# Patient Record
Sex: FEMALE | Race: BLACK OR AFRICAN AMERICAN | Employment: OTHER | URBAN - METROPOLITAN AREA
[De-identification: names, ages, dates, MRNs, and addresses within clinical notes are randomized per-mention and may not be internally consistent; named-entity substitution may affect disease eponyms.]

---

## 2017-04-12 ENCOUNTER — OFFICE VISIT (OUTPATIENT)
Dept: CARDIOLOGY CLINIC | Age: 72
End: 2017-04-12

## 2017-04-12 VITALS
SYSTOLIC BLOOD PRESSURE: 126 MMHG | DIASTOLIC BLOOD PRESSURE: 72 MMHG | RESPIRATION RATE: 18 BRPM | HEART RATE: 63 BPM | BODY MASS INDEX: 30.78 KG/M2 | OXYGEN SATURATION: 93 % | WEIGHT: 191.5 LBS | HEIGHT: 66 IN

## 2017-04-12 DIAGNOSIS — I71.00 DISSECTION OF AORTA, UNSPECIFIED PORTION OF AORTA (HCC): ICD-10-CM

## 2017-04-12 DIAGNOSIS — Z86.79 S/P ASCENDING AORTIC ANEURYSM REPAIR: ICD-10-CM

## 2017-04-12 DIAGNOSIS — R07.89 DISCOMFORT IN CHEST: ICD-10-CM

## 2017-04-12 DIAGNOSIS — R07.89 CHEST TIGHTNESS OR PRESSURE: Primary | ICD-10-CM

## 2017-04-12 DIAGNOSIS — R06.02 SOB (SHORTNESS OF BREATH) ON EXERTION: ICD-10-CM

## 2017-04-12 DIAGNOSIS — Z98.890 S/P ASCENDING AORTIC ANEURYSM REPAIR: ICD-10-CM

## 2017-04-12 NOTE — MR AVS SNAPSHOT
Visit Information Date & Time Provider Department Dept. Phone Encounter #  
 4/12/2017  2:30 PM Emma Andersen NP [facility] [phone] [id number] Upcoming Health Maintenance Date Due DTaP/Tdap/Td series (1 - Tdap) 7/23/1964 FOBT Q 1 YEAR AGE 50-75 7/23/1993 ZOSTER VACCINE AGE 60> 7/23/2003 GLAUCOMA SCREENING Q2Y 7/23/2008 OSTEOPOROSIS SCREENING (DEXA) 7/23/2008 MEDICARE YEARLY EXAM 7/23/2008 INFLUENZA AGE 9 TO ADULT 8/1/2016 Pneumococcal 65+ Low/Medium Risk (2 of 2 - PCV13) 1/8/2017 BREAST CANCER SCRN MAMMOGRAM 4/21/2018 Allergies as of 4/12/2017  Review Complete On: 4/12/2017 By: Emma Andersen NP No Known Allergies Current Immunizations  Reviewed on 1/7/2016 Name Date Influenza Vaccine (Quad) PF 1/8/2016  9:27 AM  
 Pneumococcal Polysaccharide (PPSV-23) 1/8/2016  9:24 AM  
  
 Not reviewed this visit You Were Diagnosed With   
  
 Codes Comments Chest tightness or pressure    -  Primary ICD-10-CM: R07.89 ICD-9-CM: 786.59   
 SOB (shortness of breath) on exertion     ICD-10-CM: R06.02 
ICD-9-CM: 786.05 Dissection of aorta, unspecified portion of aorta (HCC)     ICD-10-CM: I71.00 ICD-9-CM: 441.00 S/P ascending aortic aneurysm repair     ICD-10-CM: Z98.890, Z86.79 
ICD-9-CM: V45.89 Discomfort in chest     ICD-10-CM: R07.89 ICD-9-CM: 786.59 Vitals BP Pulse Resp Height(growth percentile) Weight(growth percentile) SpO2  
 126/72 (BP 1 Location: Right arm, BP Patient Position: Sitting) 63 18 5' 6\" (1.676 m) 191 lb 8 oz (86.9 kg) 93% BMI OB Status Smoking Status 30.91 kg/m2 Hysterectomy Never Smoker Vitals History BMI and BSA Data Body Mass Index Body Surface Area 30.91 kg/m 2 2.01 m 2 Preferred Pharmacy Pharmacy Name Phone [phone] [place] [phone] Your Updated Medication List  
  
   
 This list is accurate as of: 4/12/17  3:17 PM.  Always use your most recent med list.  
  
  
  
  
 aspirin 81 mg tablet Take 81 mg by mouth daily. FISH OIL 1,000 mg Cap Generic drug:  omega-3 fatty acids-vitamin e Take 1 Cap by mouth three (3) times daily. losartan 50 mg tablet Commonly known as:  COZAAR  
take 1 tablet by mouth once daily  
  
 multivitamin tablet Commonly known as:  ONE A DAY Take 1 Tab by mouth daily. naproxen sodium 220 mg tablet Commonly known as:  NAPROSYN Take 220 mg by mouth two (2) times daily (with meals). PRAVACHOL 20 mg tablet Generic drug:  pravastatin Take 20 mg by mouth daily. STOOL SOFTENER PO Take 50 mg by mouth two (2) times a day. We Performed the Following AMB POC EKG ROUTINE W/ 12 LEADS, INTER & REP [91602 CPT(R)] To-Do List   
 04/13/2017 ECG:  STRESS TEST LEXISCAN/CARDIOLITE   
  
 04/19/2017 ECHO:  2D ECHO COMPLETE ADULT (TTE) W OR WO CONTR   
  
 04/19/2017 Imaging:  CTA CHEST W OR W WO CONT   
  
 04/26/2017 10:00 AM  
  Appointment with [facility] 4 at [address] [place] [address] ([phone]) Shower or bathe using soap and water. Do not use deodorant, powder, perfumes, or lotion the day of your exam.  If your prior mammograms were not performed at [facility] 6 please bring films with you or forward prior images 2 days before your procedure. Check in at registration 15min before your appointment time unless you were instructed to do otherwise. A script is not necessary, but if you have one, please bring it on the day of the mammogram or have it faxed to the department. [facility] [phone] [facility]  [phone] [facility] [address]  [phone] [facility] [phone] [facility] [address] [facility] [phone] Referral Information Referral ID Referred By Referred To  
  
 [id number] iPerce CROCKETT Not Available Visits Status Start Date End Date 1 New Request 4/12/17 4/12/18 If your referral has a status of pending review or denied, additional information will be sent to support the outcome of this decision. Introducing [facility]! [facility] introduces [employer] patient portal. Now you can access parts of your medical record, email your doctor's office, and request medication refills online. 1. In your internet browser, go to [email or web address] [email or web address] 2. Click on the First Time User? Click Here link in the Sign In box. You will see the New Member Sign Up page. 3. Enter your [employer] Access Code exactly as it appears below. You will not need to use this code after youve completed the sign-up process. If you do not sign up before the expiration date, you must request a new code. · [employer] Access Code: [email or web address] Expires: 7/11/2017  2:19 PM 
 
4. Enter the last four digits of your Social Security Number (xxxx) and Date of Birth (mm/dd/yyyy) as indicated and click Submit. You will be taken to the next sign-up page. 5. Create a [employer] ID. This will be your [employer] login ID and cannot be changed, so think of one that is secure and easy to remember. 6. Create a [employer] password. You can change your password at any time. 7. Enter your Password Reset Question and Answer. This can be used at a later time if you forget your password. 8. Enter your e-mail address. You will receive e-mail notification when new information is available in [address] 9. Click Sign Up. You can now view and download portions of your medical record. 10. Click the Download Summary menu link to download a portable copy of your medical information. If you have questions, please visit the Frequently Asked Questions section of the [employer] website. Remember, [employer] is NOT to be used for urgent needs. For medical emergencies, dial 911. Now available from your iPhone and Android! Please provide this summary of care documentation to your next provider. Your primary care clinician is listed as DEBORAH Jeffrey. If you have any questions after today's visit, please call [phone].

## 2017-04-12 NOTE — PROGRESS NOTES
Chief Complaint   Patient presents with    Shortness of Breath     pt c/o intermittent sob with exertion x 1 mo    Chest Pain     pt c/o intermittent chest pain with tingling sensation to upper back x 1 mo; denies any arm pain or jaw pain

## 2017-04-12 NOTE — PROGRESS NOTES
Kayode Oneil NP  Subjective: Ludmila Rouse is a 66 y.o. female is here for symptom based appt. The patient presents with intermittent substernal chest burning and discomfort with radiation to the upper back on and off for the last 1 month. She reports there are no exacerbating triggering or alleviating factors. She is also noted to be experiencing some mild dyspnea on exertion on her daily walks. Cardiac risk factors include female postmenopausal, hypertension and family history of heart disease. She has a known history of TAA dissection repair. Patient Active Problem List    Diagnosis Date Noted    Heart palpitations 03/08/2016    Swelling of both ankles 01/20/2016    Aortic dissection (HCC) 12/28/2015    S/P ascending aortic aneurysm repair 12/28/2015      Ignacio Castro MD  Past Medical History:   Diagnosis Date    Arthritis     lower back    Asthma     GERD (gastroesophageal reflux disease)     Hypertension     Other ill-defined conditions     high cholesterol    PUD (peptic ulcer disease)       Past Surgical History:   Procedure Laterality Date    HX GYN      hysterectomy    HX GYN      tubal ligation    WY COLONOSCOPY FLX DX W/COLLJ SPEC WHEN PFRMD  3/19/2012          No Known Allergies   Family History   Problem Relation Age of Onset    Cancer Father      colon cancer      Current Outpatient Prescriptions   Medication Sig    losartan (COZAAR) 50 mg tablet take 1 tablet by mouth once daily    DOCUSATE CALCIUM (STOOL SOFTENER PO) Take 50 mg by mouth two (2) times a day.  multivitamin (ONE A DAY) tablet Take 1 Tab by mouth daily.  naproxen sodium (NAPROSYN) 220 mg tablet Take 220 mg by mouth two (2) times daily (with meals).  pravastatin (PRAVACHOL) 20 mg tablet Take 20 mg by mouth daily.  aspirin 81 mg tablet Take 81 mg by mouth daily.  omega-3 fatty acids-vitamin e (FISH OIL) 1,000 mg Cap Take 1 Cap by mouth three (3) times daily.        No current facility-administered medications for this visit. Vitals:    04/12/17 1431 04/12/17 1439   BP: 128/82 126/72   Pulse: 63    Resp: 18    SpO2: 93%    Weight: 191 lb 8 oz (86.9 kg)    Height: 5' 6\" (1.676 m)      Social History     Social History    Marital status:      Spouse name: N/A    Number of children: N/A    Years of education: N/A     Occupational History    Not on file. Social History Main Topics    Smoking status: Never Smoker    Smokeless tobacco: Never Used    Alcohol use 0.0 oz/week     0 Standard drinks or equivalent per week      Comment: Rare wine    Drug use: No    Sexual activity: Not on file     Other Topics Concern    Not on file     Social History Narrative       I have reviewed the nurses notes, vitals, problem list, allergy list, medical history, family medical, social history and medications. Review of Symptoms:    General: Pt denies excessive weight gain or loss. Pt is able to conduct ADL's  HEENT: Denies blurred vision, headaches, epistaxis and difficulty swallowing. Respiratory: Denies shortness of breath, + MIMS, wheezing or stridor. Cardiovascular: + Chest pain, no palpitations, edema or PND  Gastrointestinal: Denies poor appetite, indigestion, abdominal pain or blood in stool  Musculoskeletal: Denies pain or swelling from muscles or joints  Neurologic: Denies tremor, paresthesias, or sensory motor disturbance  Skin: Denies rash, itching or texture change. Physical Exam:      General: Well developed, in no acute distress. HEENT: No carotid bruits, no JVD, trach is midline. Heart:  Normal S1/S2 negative S3 or S4. Regular, no murmur, gallop or rub.   Respiratory: Clear bilaterally x 4, no wheezing or rales  Abdomen:   Soft, non-tender, bowel sounds are active.   Extremities:  No edema, normal cap refill, no cyanosis. Neuro: A&Ox3, speech clear, gait stable. Skin: Skin color is normal. No rashes or lesions.  Non diaphoretic  Vascular: 2+ pulses symmetric in all extremities      Cardiographics    ECG: Sinus rhythm  Results for orders placed or performed during the hospital encounter of 03/21/16   EKG, 12 LEAD, INITIAL   Result Value Ref Range    Ventricular Rate 62 BPM    Atrial Rate 62 BPM    P-R Interval 212 ms    QRS Duration 90 ms    Q-T Interval 460 ms    QTC Calculation (Bezet) 466 ms    Calculated P Axis 44 degrees    Calculated R Axis -9 degrees    Calculated T Axis 1 degrees    Diagnosis       Sinus rhythm with 1st degree AV block  Abnormal P wave  Voltage criteria for left ventricular hypertrophy  Nonspecific T wave abnormality  When compared with ECG of 28-DEC-2015 07:08,  [place] interval has increased  The heart rate has decreased BY  40 BPM  Confirmed by Daljit Salas MD, Yuni Jones ([id number]) on 3/21/2016 4:01:53 PM     Results for orders placed or performed in visit on 02/22/16   CARDIAC HOLTER MONITOR, 24 HOURS    Narrative    ECG Monitor/24 hours, Complete    Reason for Holter Monitor   A-FIB    Heartbeat    Slowest 58  Average 74  Fastest  85      Results:   Underlying Rhythm: Normal sinus rhythm      Atrial Arrhythmias: premature atrial contractions; occasional,  atrial couplets and atrial triplets            AV Conduction: normal    Ventricular Arrhythmias: premature ventricular contractions;  occasional     ST Segment Analysis:normal     Symptom Correlation:  Arm pain does not correlate with any arrhythmias    Comment:   Sinus rhythm throughout     Noy Tyler MD, [facility], [facility]           Labs:  Lab Results   Component Value Date/Time    Sodium 143 03/21/2016 12:40 PM    Potassium 3.9 03/21/2016 12:40 PM    Chloride 108 03/21/2016 12:40 PM    CO2 29 03/21/2016 12:40 PM    Anion gap 6 03/21/2016 12:40 PM    Glucose 84 03/21/2016 12:40 PM    BUN 23 03/21/2016 12:40 PM    Creatinine 0.93 03/21/2016 12:40 PM    BUN/Creatinine ratio 25 03/21/2016 12:40 PM    GFR est AA >60 03/21/2016 12:40 PM    GFR est non-AA 59 03/21/2016 12:40 PM    Calcium 9.2 03/21/2016 12:40 PM    Bilirubin, total 0.2 03/21/2016 12:40 PM    AST (SGOT) 12 03/21/2016 12:40 PM    Alk. phosphatase 71 03/21/2016 12:40 PM    Protein, total 7.1 03/21/2016 12:40 PM    Albumin 3.6 03/21/2016 12:40 PM    Globulin 3.5 03/21/2016 12:40 PM    A-G Ratio 1.0 03/21/2016 12:40 PM    ALT (SGPT) 18 03/21/2016 12:40 PM      Lab Results   Component Value Date/Time    WBC 5.5 03/21/2016 12:40 PM    HGB 11.6 03/21/2016 12:40 PM    HCT 37.4 03/21/2016 12:40 PM    PLATELET 189 71/51/8926 12:40 PM    MCV 84.0 03/21/2016 12:40 PM    All Cardiac Markers in the last 24 hours:  No results found for: CPK, CKMMB, CKMB, RCK3, CKMBT, CKNDX, CKND1, DEEP, TROPT, TROIQ, GODFREY, TROPT, TNIPOC, BNP, BNPP              Assessment:     Assessment:         ICD-10-CM ICD-9-CM    1. Chest tightness or pressure R07.89 786.59 2D ECHO COMPLETE ADULT (TTE) W OR WO CONTR      STRESS TEST LEXISCAN/CARDIOLITE      CTA CHEST W OR W WO CONT   2. SOB (shortness of breath) on exertion R06.02 786.05 AMB POC EKG ROUTINE W/ 12 LEADS, INTER & REP      2D ECHO COMPLETE ADULT (TTE) W OR WO CONTR      STRESS TEST LEXISCAN/CARDIOLITE      CTA CHEST W OR W WO CONT   3. Dissection of aorta, unspecified portion of aorta (HCC) I71.00 441.00 2D ECHO COMPLETE ADULT (TTE) W OR WO CONTR      STRESS TEST LEXISCAN/CARDIOLITE      CTA CHEST W OR W WO CONT   4. S/P ascending aortic aneurysm repair T86.260 V45.89 2D ECHO COMPLETE ADULT (TTE) W OR WO CONTR    Z86.79  STRESS TEST LEXISCAN/CARDIOLITE      CTA CHEST W OR W WO CONT   5. Discomfort in chest R07.89 786.59 2D ECHO COMPLETE ADULT (TTE) W OR WO CONTR      STRESS TEST LEXISCAN/CARDIOLITE      CTA CHEST W OR W WO CONT       Orders Placed This Encounter    CTA CHEST W OR W WO CONT     Standing Status:   Future     Standing Expiration Date:   5/12/2018     Order Specific Question:   Is Patient Allergic to Contrast Dye?      Answer:   No    AMB POC EKG ROUTINE W/ 12 LEADS, INTER & REP     Order Specific Question:   Reason for Exam:     Answer:   Routine    LEXISCAN/CARDIOLITE, Clinic Performed     Standing Status:   Future     Standing Expiration Date:   10/12/2017     Order Specific Question:   Reason for Exam:     Answer:   Mims chest pain    2D ECHO COMPLETE ADULT (TTE) W OR WO CONTR     Standing Status:   Future     Standing Expiration Date:   10/12/2017     Order Specific Question:   Reason for Exam:     Answer:   MIMS     Order Specific Question:   Contrast Enhancement (Bubble Study, Definity, Optison) may be used if criteria listed in established evidence-based protocol has been identified. Answer:   Yes        Plan:     Patient is a 64-year-old female who presents with anterior chest discomfort and shortness of breath with increasing fatigue. She has a history of a ascending aortic aneurysm repair, no known history of coronary artery disease. She has not been screened previously for coronary artery disease. 1.  Chest pain/dyspnea on exertion: Will evaluate with echo and Lexiscan  2. History of ascending thoracic aortic aneurysm repair due for surveillance CT: Ordered and she will arrange follow-up with Dr. Rayne Carrasco for yearly visit  3. Hypertension controlled: Continue losartan  Follow-up when testing complete.       Lamine Conklin NP

## 2017-04-24 ENCOUNTER — TELEPHONE (OUTPATIENT)
Dept: CARDIOLOGY CLINIC | Age: 72
End: 2017-04-24

## 2017-04-24 ENCOUNTER — CLINICAL SUPPORT (OUTPATIENT)
Dept: CARDIOLOGY CLINIC | Age: 72
End: 2017-04-24

## 2017-04-24 DIAGNOSIS — I71.00 DISSECTION OF AORTA, UNSPECIFIED PORTION OF AORTA (HCC): ICD-10-CM

## 2017-04-24 DIAGNOSIS — R06.02 SOB (SHORTNESS OF BREATH) ON EXERTION: ICD-10-CM

## 2017-04-24 DIAGNOSIS — Z86.79 S/P ASCENDING AORTIC ANEURYSM REPAIR: ICD-10-CM

## 2017-04-24 DIAGNOSIS — Z98.890 S/P ASCENDING AORTIC ANEURYSM REPAIR: ICD-10-CM

## 2017-04-24 DIAGNOSIS — R07.89 DISCOMFORT IN CHEST: ICD-10-CM

## 2017-04-24 DIAGNOSIS — R07.89 CHEST TIGHTNESS OR PRESSURE: ICD-10-CM

## 2017-04-24 DIAGNOSIS — R93.1 ABNORMAL NUCLEAR CARDIAC IMAGING TEST: Primary | ICD-10-CM

## 2017-04-24 NOTE — TELEPHONE ENCOUNTER
Verified patient with two identifiers. Spoke with patient regarding STRESS test results. Maurice Cifuentes, please call pt to set up an appt with Dr. Ranjit Putnam. Thanks!

## 2017-04-24 NOTE — TELEPHONE ENCOUNTER
----- Message from Gissel Frances MD sent at 4/24/2017  1:17 PM EDT -----  Stress test abnormal, f/u with Dr. Tahir Jenkins to discuss.  thx.

## 2017-04-24 NOTE — TELEPHONE ENCOUNTER
----- Message from Von Spangler MD sent at 4/24/2017  1:17 PM EDT -----  Stress test abnormal, f/u with Dr. Tiffani Lincoln to discuss.  thx.

## 2017-04-26 ENCOUNTER — HOSPITAL ENCOUNTER (OUTPATIENT)
Dept: MAMMOGRAPHY | Age: 72
Discharge: HOME | End: 2017-04-26
Attending: INTERNAL MEDICINE
Payer: MEDICARE

## 2017-04-26 DIAGNOSIS — Z12.31 VISIT FOR SCREENING MAMMOGRAM: ICD-10-CM

## 2017-04-26 PROCEDURE — 77067 SCR MAMMO BI INCL CAD: CPT

## 2017-04-27 ENCOUNTER — TELEPHONE (OUTPATIENT)
Dept: CARDIOLOGY CLINIC | Age: 72
End: 2017-04-27

## 2017-04-27 NOTE — TELEPHONE ENCOUNTER
Notes Recorded by Malou Lombardo LPN on 8/02/5213 at 09:71 PM  Verified patient with two identifiers.  Spoke with patient informed her [facility] is unchanged from last year, has a leak in her mitral valve, for now will treat with keeping BP down.  I am aware of her abnormal stress test, she has an appt with Dr Iline Sandhoff to discuss treatment plan.  She verbalized understanding.

## 2017-04-27 NOTE — TELEPHONE ENCOUNTER
----- Message from Delfina Laguna NP sent at 4/26/2017 10:20 PM EDT -----  Jermaine Veliz: Please call patient her ECHO is unchanged from last year, has a leak in her mitral valve, for now will treat with keeping BP down. I am aware of her abnormal stress test, she has an appt with Dr Connie Natarajan to discuss treatment plan.   Thanks Matt Cho

## 2017-05-01 ENCOUNTER — OFFICE VISIT (OUTPATIENT)
Dept: CARDIOLOGY CLINIC | Age: 72
End: 2017-05-01

## 2017-05-01 VITALS
SYSTOLIC BLOOD PRESSURE: 124 MMHG | BODY MASS INDEX: 30.78 KG/M2 | RESPIRATION RATE: 18 BRPM | WEIGHT: 191.5 LBS | DIASTOLIC BLOOD PRESSURE: 84 MMHG | HEART RATE: 64 BPM | OXYGEN SATURATION: 91 % | HEIGHT: 66 IN

## 2017-05-01 DIAGNOSIS — Z86.79 S/P ASCENDING AORTIC ANEURYSM REPAIR: ICD-10-CM

## 2017-05-01 DIAGNOSIS — I20.8 STABLE ANGINA PECTORIS (HCC): Primary | ICD-10-CM

## 2017-05-01 DIAGNOSIS — Z98.890 S/P ASCENDING AORTIC ANEURYSM REPAIR: ICD-10-CM

## 2017-05-01 DIAGNOSIS — I71.019 DISSECTION OF THORACIC AORTA: ICD-10-CM

## 2017-05-01 RX ORDER — ISOSORBIDE MONONITRATE 30 MG/1
30 TABLET, EXTENDED RELEASE ORAL DAILY
Qty: 30 TAB | Refills: 12 | Status: SHIPPED | OUTPATIENT
Start: 2017-05-01 | End: 2017-05-09

## 2017-05-01 RX ORDER — METOPROLOL TARTRATE 25 MG/1
25 TABLET, FILM COATED ORAL 2 TIMES DAILY
Qty: 60 TAB | Refills: 12 | Status: SHIPPED | OUTPATIENT
Start: 2017-05-01 | End: 2017-07-31 | Stop reason: SDUPTHER

## 2017-05-01 NOTE — PROGRESS NOTES
Chief Complaint   Patient presents with    Results     here for test results and review-pt denies any cardiac symptoms

## 2017-05-01 NOTE — PROGRESS NOTES
Rayo Trujillo MD          NAME:  Regan Connelly   :   1945   MRN:   [id number]   PCP:  Africa Dennison MD           Subjective: The patient is a 66y.o. year old female  who returns for a routine follow-up after stress testing. Echo fine. Myoview shows reversible inf defect with high probability of CAD. Less CP but episodic dyspnea, fairly consistent with exertion    Past Medical History:   Diagnosis Date    Arthritis     lower back    Asthma     GERD (gastroesophageal reflux disease)     Hypertension     Other ill-defined conditions     high cholesterol    PUD (peptic ulcer disease)         ICD-10-CM ICD-9-CM    1. Stable angina pectoris (HCC) I20.8 413.9 CBC WITH AUTOMATED DIFF      METABOLIC PANEL, COMPREHENSIVE      PROTHROMBIN TIME + INR   2. S/P ascending aortic aneurysm repair Z98.890 V45.89 CBC WITH AUTOMATED DIFF    L32.44  METABOLIC PANEL, COMPREHENSIVE      PROTHROMBIN TIME + INR   3. Dissection of thoracic aorta (HCC) I71.01 441.01 CBC WITH AUTOMATED DIFF      METABOLIC PANEL, COMPREHENSIVE      PROTHROMBIN TIME + INR      Social History   Substance Use Topics    Smoking status: Never Smoker    Smokeless tobacco: Never Used    Alcohol use 0.0 oz/week     0 Standard drinks or equivalent per week      Comment: Rare wine      Family History   Problem Relation Age of Onset    Cancer Father      colon cancer        Review of Systems  Cardiovascular: Negative except as noted in HPI      Objective:       Vitals:    17 0941 17 0950   BP: 130/82 124/84   Pulse: 64    Resp: 18    SpO2: 91%    Weight: 191 lb 8 oz (86.9 kg)    Height: 5' 6\" (1.676 m)     Body mass index is 30.91 kg/(m^2). General PE  Mental Status - Alert. General Appearance - Not in acute distress. Chest and Lung Exam   Inspection: Accessory muscles - No use of accessory muscles in breathing.   Auscultation:   Breath sounds: - Normal.    Cardiovascular   Inspection: Jugular vein - Bilateral - Inspection Normal.  Palpation/Percussion:   Apical Impulse: - Normal.  Auscultation: Rhythm - Regular. Heart Sounds - S1 WNL and S2 WNL. No S3 or S4. Murmurs & Other Heart Sounds: Auscultation of the heart reveals - No Murmurs. Peripheral Vascular   Upper Extremity: Inspection - Bilateral - No Cyanotic nailbeds or Digital clubbing. Lower Extremity:   Palpation: Edema - Bilateral - No edema. Data Review:     EKG -  EKG: normal EKG, normal sinus rhythm, unchanged from previous tracings. LABS- @brieflabs@      Allergies reviewed  No Known Allergies    Medications reviewed  Current Outpatient Prescriptions   Medication Sig    metoprolol tartrate (LOPRESSOR) 25 mg tablet Take 1 Tab by mouth two (2) times a day.  isosorbide mononitrate ER (IMDUR) 30 mg tablet Take 1 Tab by mouth daily.  losartan (COZAAR) 50 mg tablet take 1 tablet by mouth once daily    DOCUSATE CALCIUM (STOOL SOFTENER PO) Take 50 mg by mouth two (2) times a day.  multivitamin (ONE A DAY) tablet Take 1 Tab by mouth daily.  naproxen sodium (NAPROSYN) 220 mg tablet Take 220 mg by mouth two (2) times daily (with meals).  pravastatin (PRAVACHOL) 20 mg tablet Take 20 mg by mouth daily.  aspirin 81 mg tablet Take 81 mg by mouth daily.  omega-3 fatty acids-vitamin e (FISH OIL) 1,000 mg Cap Take 1 Cap by mouth three (3) times daily. No current facility-administered medications for this visit. Assessment:       ICD-10-CM ICD-9-CM    1. Stable angina pectoris (HCC) I20.8 413.9 CBC WITH AUTOMATED DIFF      METABOLIC PANEL, COMPREHENSIVE      PROTHROMBIN TIME + INR   2. S/P ascending aortic aneurysm repair Z98.890 V45.89 CBC WITH AUTOMATED DIFF    K37.67  METABOLIC PANEL, COMPREHENSIVE      PROTHROMBIN TIME + INR   3.  Dissection of thoracic aorta (HCC) I71.01 441.01 CBC WITH AUTOMATED DIFF      METABOLIC PANEL, COMPREHENSIVE      PROTHROMBIN TIME + INR        Orders Placed This Encounter    CBC WITH AUTOMATED DIFF    METABOLIC PANEL, COMPREHENSIVE    PROTHROMBIN TIME + INR    metoprolol tartrate (LOPRESSOR) 25 mg tablet     Sig: Take 1 Tab by mouth two (2) times a day. Dispense:  60 Tab     Refill:  12    isosorbide mononitrate ER (IMDUR) 30 mg tablet     Sig: Take 1 Tab by mouth daily. Dispense:  30 Tab     Refill:  12       Plan:     MIMS and rare CP with high prob stress test for inf ischemia. Rec r radial cath. Due for f/u CTA re thoracic aneurysm repair and f/u with Dr Sera Esteban.     Gini Ibarra MD

## 2017-05-02 LAB
ALBUMIN SERPL-MCNC: 4.1 G/DL (ref 3.5–4.8)
ALBUMIN/GLOB SERPL: 1.6 {RATIO} (ref 1.2–2.2)
ALP SERPL-CCNC: 78 IU/L (ref 39–117)
ALT SERPL-CCNC: 13 IU/L (ref 0–32)
AST SERPL-CCNC: 15 IU/L (ref 0–40)
BASOPHILS # BLD AUTO: 0 X10E3/UL (ref 0–0.2)
BASOPHILS NFR BLD AUTO: 0 %
BILIRUB SERPL-MCNC: 0.4 MG/DL (ref 0–1.2)
BUN SERPL-MCNC: 22 MG/DL (ref 8–27)
BUN/CREAT SERPL: 31 (ref 12–28)
CALCIUM SERPL-MCNC: 9.7 MG/DL (ref 8.7–10.3)
CHLORIDE SERPL-SCNC: 105 MMOL/L (ref 96–106)
CO2 SERPL-SCNC: 24 MMOL/L (ref 18–29)
CREAT SERPL-MCNC: 0.71 MG/DL (ref 0.57–1)
EOSINOPHIL # BLD AUTO: 0.1 X10E3/UL (ref 0–0.4)
EOSINOPHIL NFR BLD AUTO: 3 %
ERYTHROCYTE [DISTWIDTH] IN BLOOD BY AUTOMATED COUNT: 14.8 % (ref 12.3–15.4)
GLOBULIN SER CALC-MCNC: 2.5 G/DL (ref 1.5–4.5)
GLUCOSE SERPL-MCNC: 90 MG/DL (ref 65–99)
HCT VFR BLD AUTO: 41.1 % (ref 34–46.6)
HGB BLD-MCNC: 13.1 G/DL (ref 11.1–15.9)
IMM GRANULOCYTES # BLD: 0 X10E3/UL (ref 0–0.1)
IMM GRANULOCYTES NFR BLD: 0 %
INR PPP: 1 (ref 0.8–1.2)
LYMPHOCYTES # BLD AUTO: 2 X10E3/UL (ref 0.7–3.1)
LYMPHOCYTES NFR BLD AUTO: 44 %
MCH RBC QN AUTO: 28.4 PG (ref 26.6–33)
MCHC RBC AUTO-ENTMCNC: 31.9 G/DL (ref 31.5–35.7)
MCV RBC AUTO: 89 FL (ref 79–97)
MONOCYTES # BLD AUTO: 0.5 X10E3/UL (ref 0.1–0.9)
MONOCYTES NFR BLD AUTO: 11 %
NEUTROPHILS # BLD AUTO: 1.9 X10E3/UL (ref 1.4–7)
NEUTROPHILS NFR BLD AUTO: 42 %
PLATELET # BLD AUTO: 183 X10E3/UL (ref 150–379)
POTASSIUM SERPL-SCNC: 4.2 MMOL/L (ref 3.5–5.2)
PROT SERPL-MCNC: 6.6 G/DL (ref 6–8.5)
PROTHROMBIN TIME: 10.6 SEC (ref 9.1–12)
RBC # BLD AUTO: 4.62 X10E6/UL (ref 3.77–5.28)
SODIUM SERPL-SCNC: 145 MMOL/L (ref 134–144)
WBC # BLD AUTO: 4.6 X10E3/UL (ref 3.4–10.8)

## 2017-05-08 ENCOUNTER — HOSPITAL ENCOUNTER (OUTPATIENT)
Dept: CT IMAGING | Age: 72
Discharge: HOME | End: 2017-05-08
Attending: NURSE PRACTITIONER
Payer: MEDICARE

## 2017-05-08 DIAGNOSIS — R07.89 CHEST TIGHTNESS OR PRESSURE: ICD-10-CM

## 2017-05-08 DIAGNOSIS — I71.00 DISSECTION OF AORTA, UNSPECIFIED PORTION OF AORTA (HCC): ICD-10-CM

## 2017-05-08 DIAGNOSIS — Z98.890 S/P ASCENDING AORTIC ANEURYSM REPAIR: ICD-10-CM

## 2017-05-08 DIAGNOSIS — R07.89 DISCOMFORT IN CHEST: ICD-10-CM

## 2017-05-08 DIAGNOSIS — Z86.79 S/P ASCENDING AORTIC ANEURYSM REPAIR: ICD-10-CM

## 2017-05-08 DIAGNOSIS — R06.02 SOB (SHORTNESS OF BREATH) ON EXERTION: ICD-10-CM

## 2017-05-08 PROCEDURE — 74011250636 HC RX REV CODE- 250/636: Performed by: NURSE PRACTITIONER

## 2017-05-08 PROCEDURE — 71275 CT ANGIOGRAPHY CHEST: CPT

## 2017-05-08 PROCEDURE — 74011636320 HC RX REV CODE- 636/320: Performed by: NURSE PRACTITIONER

## 2017-05-08 RX ORDER — SODIUM CHLORIDE 9 MG/ML
50 INJECTION, SOLUTION INTRAVENOUS
Status: COMPLETED | OUTPATIENT
Start: 2017-05-08 | End: 2017-05-08

## 2017-05-08 RX ORDER — SODIUM CHLORIDE 0.9 % (FLUSH) 0.9 %
10 SYRINGE (ML) INJECTION
Status: COMPLETED | OUTPATIENT
Start: 2017-05-08 | End: 2017-05-08

## 2017-05-08 RX ADMIN — SODIUM CHLORIDE 50 ML/HR: 900 INJECTION, SOLUTION INTRAVENOUS at 10:55

## 2017-05-08 RX ADMIN — IOPAMIDOL 100 ML: 755 INJECTION, SOLUTION INTRAVENOUS at 10:55

## 2017-05-08 RX ADMIN — Medication 10 ML: at 10:55

## 2017-05-09 ENCOUNTER — HOSPITAL ENCOUNTER (OUTPATIENT)
Dept: CARDIAC CATH/INVASIVE PROCEDURES | Age: 72
Discharge: HOME | End: 2017-05-09
Attending: INTERNAL MEDICINE | Admitting: INTERNAL MEDICINE
Payer: MEDICARE

## 2017-05-09 VITALS
OXYGEN SATURATION: 94 % | HEART RATE: 64 BPM | DIASTOLIC BLOOD PRESSURE: 70 MMHG | WEIGHT: 191 LBS | HEIGHT: 66 IN | BODY MASS INDEX: 30.7 KG/M2 | TEMPERATURE: 98.8 F | RESPIRATION RATE: 16 BRPM | SYSTOLIC BLOOD PRESSURE: 146 MMHG

## 2017-05-09 PROBLEM — Z98.890 S/P CARDIAC CATH: Status: ACTIVE | Noted: 2017-05-09

## 2017-05-09 PROCEDURE — C1894 INTRO/SHEATH, NON-LASER: HCPCS

## 2017-05-09 PROCEDURE — 74011636320 HC RX REV CODE- 636/320

## 2017-05-09 PROCEDURE — 74011250637 HC RX REV CODE- 250/637: Performed by: NURSE PRACTITIONER

## 2017-05-09 PROCEDURE — 77030015766

## 2017-05-09 PROCEDURE — 74011000250 HC RX REV CODE- 250

## 2017-05-09 PROCEDURE — 77030019698 HC SYR ANGI MDLON MRTM -A

## 2017-05-09 PROCEDURE — 77030008543 HC TBNG MON PRSS MRTM -A

## 2017-05-09 PROCEDURE — 77030019569 HC BND COMPR RAD TERU -B

## 2017-05-09 PROCEDURE — C1769 GUIDE WIRE: HCPCS

## 2017-05-09 PROCEDURE — 99152 MOD SED SAME PHYS/QHP 5/>YRS: CPT

## 2017-05-09 PROCEDURE — 77030010221 HC SPLNT WR POS TELE -B

## 2017-05-09 PROCEDURE — 74011250636 HC RX REV CODE- 250/636

## 2017-05-09 PROCEDURE — 74011250636 HC RX REV CODE- 250/636: Performed by: INTERNAL MEDICINE

## 2017-05-09 PROCEDURE — 77030030195 HC CATH ANGI DX PRF4 MRTM -A

## 2017-05-09 PROCEDURE — 77030004549 HC CATH ANGI DX PRF MRTM -A

## 2017-05-09 RX ORDER — HEPARIN SODIUM 200 [USP'U]/100ML
500 INJECTION, SOLUTION INTRAVENOUS ONCE
Status: COMPLETED | OUTPATIENT
Start: 2017-05-09 | End: 2017-05-09

## 2017-05-09 RX ORDER — METOPROLOL TARTRATE 25 MG/1
25 TABLET, FILM COATED ORAL 2 TIMES DAILY
Status: DISCONTINUED | OUTPATIENT
Start: 2017-05-09 | End: 2017-05-09 | Stop reason: HOSPADM

## 2017-05-09 RX ORDER — LIDOCAINE HYDROCHLORIDE 10 MG/ML
1-30 INJECTION, SOLUTION EPIDURAL; INFILTRATION; INTRACAUDAL; PERINEURAL
Status: DISCONTINUED | OUTPATIENT
Start: 2017-05-09 | End: 2017-05-09 | Stop reason: HOSPADM

## 2017-05-09 RX ORDER — VERAPAMIL HYDROCHLORIDE 2.5 MG/ML
INJECTION, SOLUTION INTRAVENOUS
Status: COMPLETED
Start: 2017-05-09 | End: 2017-05-09

## 2017-05-09 RX ORDER — LIDOCAINE HYDROCHLORIDE 10 MG/ML
INJECTION, SOLUTION EPIDURAL; INFILTRATION; INTRACAUDAL; PERINEURAL
Status: COMPLETED
Start: 2017-05-09 | End: 2017-05-09

## 2017-05-09 RX ORDER — ACETAMINOPHEN 325 MG/1
650 TABLET ORAL
Status: DISCONTINUED | OUTPATIENT
Start: 2017-05-09 | End: 2017-05-09 | Stop reason: HOSPADM

## 2017-05-09 RX ORDER — NALOXONE HYDROCHLORIDE 0.4 MG/ML
0.4 INJECTION, SOLUTION INTRAMUSCULAR; INTRAVENOUS; SUBCUTANEOUS AS NEEDED
Status: DISCONTINUED | OUTPATIENT
Start: 2017-05-09 | End: 2017-05-09 | Stop reason: HOSPADM

## 2017-05-09 RX ORDER — HEPARIN SODIUM 200 [USP'U]/100ML
INJECTION, SOLUTION INTRAVENOUS
Status: COMPLETED
Start: 2017-05-09 | End: 2017-05-09

## 2017-05-09 RX ORDER — MIDAZOLAM HYDROCHLORIDE 1 MG/ML
INJECTION, SOLUTION INTRAMUSCULAR; INTRAVENOUS
Status: COMPLETED
Start: 2017-05-09 | End: 2017-05-09

## 2017-05-09 RX ORDER — SODIUM CHLORIDE 0.9 % (FLUSH) 0.9 %
5-10 SYRINGE (ML) INJECTION AS NEEDED
Status: DISCONTINUED | OUTPATIENT
Start: 2017-05-09 | End: 2017-05-09 | Stop reason: HOSPADM

## 2017-05-09 RX ORDER — HEPARIN SODIUM 1000 [USP'U]/ML
1000-10000 INJECTION, SOLUTION INTRAVENOUS; SUBCUTANEOUS
Status: DISCONTINUED | OUTPATIENT
Start: 2017-05-09 | End: 2017-05-09 | Stop reason: HOSPADM

## 2017-05-09 RX ORDER — VERAPAMIL HYDROCHLORIDE 2.5 MG/ML
2.5 INJECTION, SOLUTION INTRAVENOUS ONCE
Status: COMPLETED | OUTPATIENT
Start: 2017-05-09 | End: 2017-05-09

## 2017-05-09 RX ORDER — SODIUM CHLORIDE 0.9 % (FLUSH) 0.9 %
5-10 SYRINGE (ML) INJECTION EVERY 8 HOURS
Status: DISCONTINUED | OUTPATIENT
Start: 2017-05-09 | End: 2017-05-09 | Stop reason: HOSPADM

## 2017-05-09 RX ORDER — FENTANYL CITRATE 50 UG/ML
25-50 INJECTION, SOLUTION INTRAMUSCULAR; INTRAVENOUS
Status: DISCONTINUED | OUTPATIENT
Start: 2017-05-09 | End: 2017-05-09 | Stop reason: HOSPADM

## 2017-05-09 RX ORDER — HEPARIN SODIUM 1000 [USP'U]/ML
INJECTION, SOLUTION INTRAVENOUS; SUBCUTANEOUS
Status: COMPLETED
Start: 2017-05-09 | End: 2017-05-09

## 2017-05-09 RX ORDER — FENTANYL CITRATE 50 UG/ML
INJECTION, SOLUTION INTRAMUSCULAR; INTRAVENOUS
Status: COMPLETED
Start: 2017-05-09 | End: 2017-05-09

## 2017-05-09 RX ORDER — MIDAZOLAM HYDROCHLORIDE 1 MG/ML
.5-2 INJECTION, SOLUTION INTRAMUSCULAR; INTRAVENOUS
Status: DISCONTINUED | OUTPATIENT
Start: 2017-05-09 | End: 2017-05-09 | Stop reason: HOSPADM

## 2017-05-09 RX ADMIN — HEPARIN SODIUM 1000 UNITS: 200 INJECTION, SOLUTION INTRAVENOUS at 15:35

## 2017-05-09 RX ADMIN — LIDOCAINE HYDROCHLORIDE 1 ML: 10 INJECTION, SOLUTION EPIDURAL; INFILTRATION; INTRACAUDAL; PERINEURAL at 15:44

## 2017-05-09 RX ADMIN — FENTANYL CITRATE 25 MCG: 50 INJECTION, SOLUTION INTRAMUSCULAR; INTRAVENOUS at 15:27

## 2017-05-09 RX ADMIN — NITROGLYCERIN 200 MCG: 5 INJECTION, SOLUTION INTRAVENOUS at 15:46

## 2017-05-09 RX ADMIN — MIDAZOLAM HYDROCHLORIDE 1 MG: 1 INJECTION, SOLUTION INTRAMUSCULAR; INTRAVENOUS at 15:27

## 2017-05-09 RX ADMIN — MIDAZOLAM HYDROCHLORIDE 1 MG: 1 INJECTION, SOLUTION INTRAMUSCULAR; INTRAVENOUS at 15:48

## 2017-05-09 RX ADMIN — VERAPAMIL HYDROCHLORIDE 2.5 MG: 2.5 INJECTION, SOLUTION INTRAVENOUS at 15:47

## 2017-05-09 RX ADMIN — VERAPAMIL HYDROCHLORIDE 2.5 MG: 2.5 INJECTION INTRAVENOUS at 15:47

## 2017-05-09 RX ADMIN — HEPARIN SODIUM 2500 UNITS: 1000 INJECTION, SOLUTION INTRAVENOUS; SUBCUTANEOUS at 15:47

## 2017-05-09 RX ADMIN — MIDAZOLAM HYDROCHLORIDE 1 MG: 1 INJECTION INTRAMUSCULAR; INTRAVENOUS at 15:27

## 2017-05-09 RX ADMIN — METOPROLOL TARTRATE 25 MG: 25 TABLET ORAL at 18:21

## 2017-05-09 NOTE — IP AVS SNAPSHOT
[address] [facility] 
[phone] Patient: Sona Daniels MRN: [id number] EXQ:1/30/0507 You are allergic to the following No active allergies Recent Documentation Height Weight Breastfeeding? BMI OB Status Smoking Status 1.676 m 86.6 kg No 30.83 kg/m2 Hysterectomy Never Smoker Emergency Contacts Name Discharge Info Relation Home Work Mobile Ramos Howard DISCHARGE CAREGIVER [3] Spouse [3] [phone] Twan Ahuja  Child [2] [phone] About your hospitalization You were admitted on:  May 9, 2017 You last received care in the:  [facility] You were discharged on:  May 9, 2017 Unit phone number:  [phone] Why you were hospitalized Your primary diagnosis was:  Not on File Your diagnoses also included:  S/P Cardiac Cath Providers Seen During Your Hospitalizations Provider Role Specialty Primary office phone Jayjay Kidd MD Attending Provider Cardiology [phone] Your Primary Care Physician (PCP) Primary Care Physician Office Phone Office Fax Damariselisa Carlin [phone] Follow-up Information Follow up With Details Comments Contact Info Gloria Sam MD   [facility] 
[phone] Jayjay Kidd MD Schedule an appointment as soon as possible for a visit in 2 weeks  [address] [facility] 
[phone] Your Appointments Tuesday May 16, 2017  2:15 PM EDT  
CONSULT with Verenice Cross MD  
[facility] ([facility]) [address] [address] [phone]  
[phone] Current Discharge Medication List  
  
CONTINUE these medications which have NOT CHANGED Dose & Instructions Dispensing Information Comments Morning Noon Evening Bedtime  
 aspirin 81 mg tablet Your last dose was: Your next dose is:    
   
   
 Dose:  81 mg Take 81 mg by mouth daily. Refills:  0  
     
   
   
   
  
 FISH OIL 1,000 mg Cap Generic drug:  omega-3 fatty acids-vitamin e Your last dose was: Your next dose is:    
   
   
 Dose:  1 Cap Take 1 Cap by mouth three (3) times daily. Refills:  0  
     
   
   
   
  
 losartan 50 mg tablet Commonly known as:  COZAAR Your last dose was: Your next dose is:    
   
   
 take 1 tablet by mouth once daily Quantity:  30 Tab Refills:  6  
     
   
   
   
  
 metoprolol tartrate 25 mg tablet Commonly known as:  LOPRESSOR Your last dose was: Your next dose is:    
   
   
 Dose:  25 mg Take 1 Tab by mouth two (2) times a day. Quantity:  60 Tab Refills:  12  
     
   
   
   
  
 multivitamin tablet Commonly known as:  ONE A DAY Your last dose was: Your next dose is:    
   
   
 Dose:  1 Tab Take 1 Tab by mouth daily. Refills:  0  
     
   
   
   
  
 naproxen sodium 220 mg tablet Commonly known as:  NAPROSYN Your last dose was: Your next dose is:    
   
   
 Dose:  220 mg Take 220 mg by mouth two (2) times daily (with meals). Refills:  0 PRAVACHOL 20 mg tablet Generic drug:  pravastatin Your last dose was: Your next dose is:    
   
   
 Dose:  20 mg Take 20 mg by mouth daily. Refills:  0 STOP taking these medications   
 isosorbide mononitrate ER 30 mg tablet Commonly known as:  IMDUR Discharge Instructions [address] [address]  [phone] Patient ID: Pleasant Postal [id number] 
17 y.o. 
1945 Admit Date: 5/9/2017 Discharge Date: 5/9/2017 Admitting Physician: Mindy Gonzalez MD  
 
Discharge Physician: Nato Cornejo NP/ Dr. Dorothy Samuels Admission Diagnoses: ABN TEST Discharge Diagnoses: Active Problems: S/P cardiac cath (5/9/2017) Overview: 5/9/17: negative for significant CAD. No intervention Discharge Condition: Good Cardiology Procedures this Admission:  Diagnostic left heart catheterization Disposition: home Reference discharge instructions provided by nursing for diet and activity. Signed: 
Nato Cornejo NP 
5/9/2017 5:25 PM 
 
 
Radial Cardiac Catheterization/Angiography Discharge Instructions It is normal to feel tired the first couple days. Take it easy and follow the physicians instructions. CHECK THE CATHETER INSERTION SITE DAILY: 
 
Remove the wrist dressing 24 hours after the procedure. You may shower 24 hours after the procedure. Wash with soap and water and pat dry. Gentle cleaning of the site with soap and water is sufficient, cover with a dry clean dressing or bandage. Do not apply creams or powders to the area. No soaking the wrist for 3 days. Leave the puncture site open to air after 24 hours post-procedure. CALL THE PHYSICIANS:  
 
If the site becomes red, swollen or feels warm to the touch If there is bleeding or drainage or if there is unusual pain at the radial site. If there is any minor oozing, you may apply a band-aid and remove after 12 hours. If the bleeding continues, hold pressure with the middle finger against the puncture site and the thumb against the back of the wrist,call 911 to be transported to the hospital. 
DO NOT DRIVE YOURSELF, [employer]. ACTIVITY:  
For the first 24 hours do not manipulate the wrist. 
No lifting, pushing or pulling over 3-5 pounds with the affected wrist for 7 daysand no straining the insertion site.  Do not life grocery bags or the garbage can, do not run the vacuum  or  for 7 days. Start with short walks as in the hospital and gradually increase as tolerated each day. It is recommended to walk 30 minutes 5-7 days per week. Follow your physicians instructions on activity. Avoid walking outside in extremes of heat or cold. Walk inside when it is cold and windy or hot and humid. Things to keep in mind: 
No driving for at least 24 hours, or as designated by your physician. Limit the number of times you go up and down the stairs Take rests and pace yourself with activity. Be careful and do not strain with bowel movements. MEDICATIONS: 
 
Take all medications as prescribed Call your physician if you have any questions Keep an updated list of your medications with you at all times and give a list to your physician and pharmacist 
 
SIGNS AND SYMPTOMS:  
Be cautious of symptoms of angina or recurrent symptoms such as chest discomfort, unusual shortness of breath or fatigue. These could be symptoms of restenosis, a new blockage or a heart attack. If your symptoms are relieved with rest it is still recommended that you notify your physician of recurrent chest pain or discomfort. For CHEST PAIN or symptoms of angina not relieved with rest:  If the discomfort is not relieved with rest, and you have been prescribed Nitroglycerin, take as directed (taken under the tongue, one at a time 5 minutes apart for a total of 3 doses). If the discomfort is not relieved after the 3rd nitroglycerin, call 911. If you have not been prescribed Nitroglycerin  and your chest discomfort is not relieved with rest, call 911. AFTER CARE:  
Follow up with your physician as instructed. Follow a heart healthy diet with proper portion control, daily stress management, daily exercise, blood pressure and cholesterol control , and smoking cessation. Discharge Orders None Introducing [facility]! [employer] introduces [employer] patient portal. Now you can access parts of your medical record, email your doctor's office, and request medication refills online. 1. In your internet browser, go to [email or web address] 2. Click on the First Time User? Click Here link in the Sign In box. You will see the New Member Sign Up page. 3. Enter your [employer] Access Code exactly as it appears below. You will not need to use this code after youve completed the sign-up process. If you do not sign up before the expiration date, you must request a new code. · [employer] Access Code: [email or web address] Expires: 7/11/2017  2:19 PM 
 
4. Enter the last four digits of your Social Security Number (xxxx) and Date of Birth (mm/dd/yyyy) as indicated and click Submit. You will be taken to the next sign-up page. 5. Create a [employer] ID. This will be your [employer] login ID and cannot be changed, so think of one that is secure and easy to remember. 6. Create a [employer] password. You can change your password at any time. 7. Enter your Password Reset Question and Answer. This can be used at a later time if you forget your password. 8. Enter your e-mail address. You will receive e-mail notification when new information is available in [address] 9. Click Sign Up. You can now view and download portions of your medical record. 10. Click the Download Summary menu link to download a portable copy of your medical information. If you have questions, please visit the Frequently Asked Questions section of the [employer] website. Remember, [employer] is NOT to be used for urgent needs. For medical emergencies, dial 911. Now available from your iPhone and Android! General Information Please provide this summary of care documentation to your next provider. Patient Signature:  ____________________________________________________________ Date:  ____________________________________________________________  
  
[employer] [facility] Provider Signature:  ____________________________________________________________ Date:  ____________________________________________________________

## 2017-05-09 NOTE — INTERVAL H&P NOTE
H&P Update: Ceci Duckworth was seen and examined. History and physical has been reviewed. The patient has been examined.  There have been no significant clinical changes since the completion of the originally dated History and Physical.    Signed By: Darshan Enciso MD     May 9, 2017 3:04 PM

## 2017-05-09 NOTE — H&P (VIEW-ONLY)
Wil Nichols MD          NAME:  Hali Oscar   :   1945   MRN:   [id number]   PCP:  Yolanda Borges MD           Subjective: The patient is a 66y.o. year old female  who returns for a routine follow-up after stress testing. Echo fine. Myoview shows reversible inf defect with high probability of CAD. Less CP but episodic dyspnea, fairly consistent with exertion    Past Medical History:   Diagnosis Date    Arthritis     lower back    Asthma     GERD (gastroesophageal reflux disease)     Hypertension     Other ill-defined conditions     high cholesterol    PUD (peptic ulcer disease)         ICD-10-CM ICD-9-CM    1. Stable angina pectoris (HCC) I20.8 413.9 CBC WITH AUTOMATED DIFF      METABOLIC PANEL, COMPREHENSIVE      PROTHROMBIN TIME + INR   2. S/P ascending aortic aneurysm repair Z98.890 V45.89 CBC WITH AUTOMATED DIFF    J59.50  METABOLIC PANEL, COMPREHENSIVE      PROTHROMBIN TIME + INR   3. Dissection of thoracic aorta (HCC) I71.01 441.01 CBC WITH AUTOMATED DIFF      METABOLIC PANEL, COMPREHENSIVE      PROTHROMBIN TIME + INR      Social History   Substance Use Topics    Smoking status: Never Smoker    Smokeless tobacco: Never Used    Alcohol use 0.0 oz/week     0 Standard drinks or equivalent per week      Comment: Rare wine      Family History   Problem Relation Age of Onset    Cancer Father      colon cancer        Review of Systems  Cardiovascular: Negative except as noted in HPI      Objective:       Vitals:    17 0941 17 0950   BP: 130/82 124/84   Pulse: 64    Resp: 18    SpO2: 91%    Weight: 191 lb 8 oz (86.9 kg)    Height: 5' 6\" (1.676 m)     Body mass index is 30.91 kg/(m^2). General PE  Mental Status - Alert. General Appearance - Not in acute distress. Chest and Lung Exam   Inspection: Accessory muscles - No use of accessory muscles in breathing.   Auscultation:   Breath sounds: - Normal.    Cardiovascular   Inspection: Jugular vein - Bilateral - Inspection Normal.  Palpation/Percussion:   Apical Impulse: - Normal.  Auscultation: Rhythm - Regular. Heart Sounds - S1 WNL and S2 WNL. No S3 or S4. Murmurs & Other Heart Sounds: Auscultation of the heart reveals - No Murmurs. Peripheral Vascular   Upper Extremity: Inspection - Bilateral - No Cyanotic nailbeds or Digital clubbing. Lower Extremity:   Palpation: Edema - Bilateral - No edema. Data Review:     EKG -  EKG: normal EKG, normal sinus rhythm, unchanged from previous tracings. LABS- @brieflabs@      Allergies reviewed  No Known Allergies    Medications reviewed  Current Outpatient Prescriptions   Medication Sig    metoprolol tartrate (LOPRESSOR) 25 mg tablet Take 1 Tab by mouth two (2) times a day.  isosorbide mononitrate ER (IMDUR) 30 mg tablet Take 1 Tab by mouth daily.  losartan (COZAAR) 50 mg tablet take 1 tablet by mouth once daily    DOCUSATE CALCIUM (STOOL SOFTENER PO) Take 50 mg by mouth two (2) times a day.  multivitamin (ONE A DAY) tablet Take 1 Tab by mouth daily.  naproxen sodium (NAPROSYN) 220 mg tablet Take 220 mg by mouth two (2) times daily (with meals).  pravastatin (PRAVACHOL) 20 mg tablet Take 20 mg by mouth daily.  aspirin 81 mg tablet Take 81 mg by mouth daily.  omega-3 fatty acids-vitamin e (FISH OIL) 1,000 mg Cap Take 1 Cap by mouth three (3) times daily. No current facility-administered medications for this visit. Assessment:       ICD-10-CM ICD-9-CM    1. Stable angina pectoris (HCC) I20.8 413.9 CBC WITH AUTOMATED DIFF      METABOLIC PANEL, COMPREHENSIVE      PROTHROMBIN TIME + INR   2. S/P ascending aortic aneurysm repair Z98.890 V45.89 CBC WITH AUTOMATED DIFF    T68.76  METABOLIC PANEL, COMPREHENSIVE      PROTHROMBIN TIME + INR   3.  Dissection of thoracic aorta (HCC) I71.01 441.01 CBC WITH AUTOMATED DIFF      METABOLIC PANEL, COMPREHENSIVE      PROTHROMBIN TIME + INR        Orders Placed This Encounter    CBC WITH AUTOMATED DIFF    METABOLIC PANEL, COMPREHENSIVE    PROTHROMBIN TIME + INR    metoprolol tartrate (LOPRESSOR) 25 mg tablet     Sig: Take 1 Tab by mouth two (2) times a day. Dispense:  60 Tab     Refill:  12    isosorbide mononitrate ER (IMDUR) 30 mg tablet     Sig: Take 1 Tab by mouth daily. Dispense:  30 Tab     Refill:  12       Plan:     MIMS and rare CP with high prob stress test for inf ischemia. Rec r radial cath. Due for f/u CTA re thoracic aneurysm repair and f/u with Dr Collins Mast.     Mindy Gonzalez MD

## 2017-05-09 NOTE — PROGRESS NOTES
Franki Abel is recovering post-procedure. R radial site dressing is CDI without swelling or bleeding. VSS. Rhythm sinus. Franki Sarabjitnayan denies complaints at this time. If recovery continues to progress without complication, discharge is planned for later today. Discontinued patient's imdur on request, as medication did not help her SOB, gave her a headache, and she does not have significant CAD.        Emelia Ko NP  DNP, RN, AGACNP-BC

## 2017-05-09 NOTE — DISCHARGE INSTRUCTIONS
[address] [place] [address]  [phone]        Patient ID:  Alexx Olson  [id number]  27 y.o.  1945    Admit Date: 5/9/2017    Discharge Date: 5/9/2017     Admitting Physician: Davonte Penn MD     Discharge Physician: Mirian Palm NP/ Dr. Vicki Kate     Admission Diagnoses:   ABN TEST    Discharge Diagnoses: Active Problems:    S/P cardiac cath (5/9/2017)      Overview: 5/9/17: negative for significant CAD. No intervention         Discharge Condition: Good    Cardiology Procedures this Admission:  Diagnostic left heart catheterization    Disposition: home    Reference discharge instructions provided by nursing for diet and activity. Signed:  Mirian Palm NP  5/9/2017  5:25 PM      Radial Cardiac Catheterization/Angiography Discharge Instructions    It is normal to feel tired the first couple days. Take it easy and follow the physicians instructions. CHECK THE CATHETER INSERTION SITE DAILY:    Remove the wrist dressing 24 hours after the procedure. You may shower 24 hours after the procedure. Wash with soap and water and pat dry. Gentle cleaning of the site with soap and water is sufficient, cover with a dry clean dressing or bandage. Do not apply creams or powders to the area. No soaking the wrist for 3 days. Leave the puncture site open to air after 24 hours post-procedure. CALL THE PHYSICIANS:     If the site becomes red, swollen or feels warm to the touch  If there is bleeding or drainage or if there is unusual pain at the radial site. If there is any minor oozing, you may apply a band-aid and remove after 12 hours. If the bleeding continues, hold pressure with the middle finger against the puncture site and the thumb against the back of the wrist,call 911 to be transported to the hospital.  DO NOT DRIVE YOURSELF, [facility].     ACTIVITY:   For the first 24 hours do not manipulate the wrist.  No lifting, pushing or pulling over 3-5 pounds with the affected wrist for 7 daysand no straining the insertion site. Do not life grocery bags or the garbage can, do not run the vacuum  or  for 7 days. Start with short walks as in the hospital and gradually increase as tolerated each day. It is recommended to walk 30 minutes 5-7 days per week. Follow your physicians instructions on activity. Avoid walking outside in extremes of heat or cold. Walk inside when it is cold and windy or hot and humid. Things to keep in mind:  No driving for at least 24 hours, or as designated by your physician. Limit the number of times you go up and down the stairs  Take rests and pace yourself with activity. Be careful and do not strain with bowel movements. MEDICATIONS:    Take all medications as prescribed  Call your physician if you have any questions  Keep an updated list of your medications with you at all times and give a list to your physician and pharmacist    SIGNS AND SYMPTOMS:   Be cautious of symptoms of angina or recurrent symptoms such as chest discomfort, unusual shortness of breath or fatigue. These could be symptoms of restenosis, a new blockage or a heart attack. If your symptoms are relieved with rest it is still recommended that you notify your physician of recurrent chest pain or discomfort. For CHEST PAIN or symptoms of angina not relieved with rest:  If the discomfort is not relieved with rest, and you have been prescribed Nitroglycerin, take as directed (taken under the tongue, one at a time 5 minutes apart for a total of 3 doses). If the discomfort is not relieved after the 3rd nitroglycerin, call 911. If you have not been prescribed Nitroglycerin  and your chest discomfort is not relieved with rest, call 911. AFTER CARE:   Follow up with your physician as instructed.   Follow a heart healthy diet with proper portion control, daily stress management, daily exercise, blood pressure and cholesterol control , and smoking cessation.

## 2017-05-09 NOTE — IP AVS SNAPSHOT
Current Discharge Medication List  
  
CONTINUE these medications which have NOT CHANGED Dose & Instructions Dispensing Information Comments Morning Noon Evening Bedtime  
 aspirin 81 mg tablet Your last dose was: Your next dose is:    
   
   
 Dose:  81 mg Take 81 mg by mouth daily. Refills:  0  
     
   
   
   
  
 FISH OIL 1,000 mg Cap Generic drug:  omega-3 fatty acids-vitamin e Your last dose was: Your next dose is:    
   
   
 Dose:  1 Cap Take 1 Cap by mouth three (3) times daily. Refills:  0  
     
   
   
   
  
 losartan 50 mg tablet Commonly known as:  COZAAR Your last dose was: Your next dose is:    
   
   
 take 1 tablet by mouth once daily Quantity:  30 Tab Refills:  6  
     
   
   
   
  
 metoprolol tartrate 25 mg tablet Commonly known as:  LOPRESSOR Your last dose was: Your next dose is:    
   
   
 Dose:  25 mg Take 1 Tab by mouth two (2) times a day. Quantity:  60 Tab Refills:  12  
     
   
   
   
  
 multivitamin tablet Commonly known as:  ONE A DAY Your last dose was: Your next dose is:    
   
   
 Dose:  1 Tab Take 1 Tab by mouth daily. Refills:  0  
     
   
   
   
  
 naproxen sodium 220 mg tablet Commonly known as:  NAPROSYN Your last dose was: Your next dose is:    
   
   
 Dose:  220 mg Take 220 mg by mouth two (2) times daily (with meals). Refills:  0 PRAVACHOL 20 mg tablet Generic drug:  pravastatin Your last dose was: Your next dose is:    
   
   
 Dose:  20 mg Take 20 mg by mouth daily. Refills:  0 STOP taking these medications   
 isosorbide mononitrate ER 30 mg tablet Commonly known as:  IMDUR

## 2017-05-09 NOTE — PROGRESS NOTES
Cardiac Cath Lab Recovery Arrival Note:      Harry Carvajal arrived to Cardiac Cath Lab, Recovery Area. Staff introduced to patient. Patient identifiers verified with NAME and DATE OF BIRTH. Procedure verified with patient. Consent forms reviewed and signed by patient or authorized representative and verified. Allergies verified. Patient and family oriented to department. Patient and family informed of procedure and plan of care. Questions answered with review. Patient prepped for procedure, per orders from physician, prior to arrival.    Patient on cardiac monitor, non-invasive blood pressure, SPO2 monitor. On room air. Patient is A&Ox 3. Patient reports no c/o. Patient in stretcher, in low position, with side rails up, call bell within reach, patient instructed to call if assistance as needed. Patient prep in: [address], [place] [address]. Patient family has pager # none  Family in: [facility] waiting area.    Prep by: Yumiko Alarcon,, RN

## 2017-05-10 ENCOUNTER — TELEPHONE (OUTPATIENT)
Dept: CARDIOLOGY CLINIC | Age: 72
End: 2017-05-10

## 2017-05-10 NOTE — PROGRESS NOTES
Discharge instructions reviewed with patient and her son. No prescriptions given. All questions answered. Saline lock removed. Telemetry monitor removed. Pt transported to front hospital entrance via wheelchair to private car. Pt discharged in to son's care. No bleeding or hematoma noted to right radial site at time of discharge. Wrist immobilizer in use. Denies pain at time of discharge.

## 2017-05-10 NOTE — PROGRESS NOTES
Please call patient Mild change in the aorta from last CT scan, she should have a upcomming appt with CT Surgery Dr Christel Dozier.    Cardiac cath yesterday all arteries in heart are normal

## 2017-05-10 NOTE — TELEPHONE ENCOUNTER
----- Message from Valentina Morales NP sent at 5/10/2017 12:10 PM EDT -----  Please call patient Mild change in the aorta from last CT scan, she should have a upcomming appt with CT Surgery Dr Colleen Cockayne.    Cardiac cath yesterday all arteries in heart are normal

## 2017-05-10 NOTE — TELEPHONE ENCOUNTER
Notes Recorded by Holly Degroot LPN on 3/25/1036 at 1:33 PM  Verified patient with two identifiers.  Spoke with patient informed her per Eric Walters NP mild change in the aorta from last CT scan, she should have a upcomming appt with CT Surgery Dr Blayne Camarena.   Cardiac cath yesterday all arteries in heart are normal.  She verbalized understanding.

## 2017-05-10 NOTE — PROGRESS NOTES
Verified patient with two identifiers. Spoke with patient informed her per Grace Teixeira NP mild change in the aorta from last CT scan, she should have a upcomming appt with CT Surgery Dr Maria T Richards. Cardiac cath yesterday all arteries in heart are normal.  She verbalized understanding.

## 2017-05-12 ENCOUNTER — PATIENT OUTREACH (OUTPATIENT)
Dept: CARDIOLOGY CLINIC | Age: 72
End: 2017-05-12

## 2017-05-12 NOTE — PROGRESS NOTES
Transitional Care Nurse Navigator Note:  Hospital Follow Up for hospital visit to : [address] Admission on 5/9/17 for cardiac catheterization. Spoke to . Keny Lucero. She lives at home with her  and is independent in her ADL's. Patient denies any swelling, bleeding, or pain at her cath site (R radial) and states \"It's just fine. \" Patient states she does have SOB but this is not new for her. Reviewed medications and they are up-to-date. Patient stated she stopped her isosorbide and feels much better since doing so- she was having nausea before and has not had any since stopping. Medical History:     Past Medical History:   Diagnosis Date    Arthritis     lower back    Asthma     GERD (gastroesophageal reflux disease)     Hypertension     Other ill-defined conditions     high cholesterol    PUD (peptic ulcer disease)      Course of current Hospitalization (referenced by Dr. Josiah Valera note 5/1/17): Subjective:      The patient is a 66y.o. year old female who returns for a routine follow-up after stress testing. Echo fine. Myoview shows reversible inf defect with high probability of CAD. Less CP but episodic dyspnea, fairly consistent with exertion     Plan:      MIMS and rare CP with high prob stress test for inf ischemia. Rec r radial cath. Due for f/u CTA re thoracic aneurysm repair and f/u with Dr Deshawn Barrera. Advance Medical Directive on file in EMR? no     Total Hospitalizations/ED visits last 12 months? [facility] orders at discharge? no     Called patient on 5/12/17 and verified with 2 identifiers. Medication Reconciliation completed: yes New medications at discharge include STOPPED: isosorbide    Support System consists of: spouse    Plan: MD on call at [phone] 24/7.    Follow up appt with Dr. Deshawn Barrera 5/16/17

## 2017-05-17 ENCOUNTER — OFFICE VISIT (OUTPATIENT)
Dept: CARDIOTHORACIC SURGERY | Age: 72
End: 2017-05-17

## 2017-05-17 VITALS
DIASTOLIC BLOOD PRESSURE: 84 MMHG | SYSTOLIC BLOOD PRESSURE: 146 MMHG | WEIGHT: 190.5 LBS | RESPIRATION RATE: 16 BRPM | TEMPERATURE: 97.1 F | HEART RATE: 60 BPM | HEIGHT: 66 IN | BODY MASS INDEX: 30.62 KG/M2 | OXYGEN SATURATION: 98 %

## 2017-05-17 DIAGNOSIS — Z98.890 S/P ASCENDING AORTIC ANEURYSM REPAIR: Primary | ICD-10-CM

## 2017-05-17 DIAGNOSIS — I71.019 DISSECTION OF THORACIC AORTA: ICD-10-CM

## 2017-05-17 DIAGNOSIS — Z86.79 S/P ASCENDING AORTIC ANEURYSM REPAIR: Primary | ICD-10-CM

## 2017-05-17 RX ORDER — ACETAMINOPHEN 500 MG
2000 TABLET ORAL
COMMUNITY
End: 2017-08-21 | Stop reason: ALTCHOICE

## 2017-05-17 NOTE — LETTER
5/17/2017 3:47 PM 
 
Patient: Arlene Mendoza YOB: 1945 Date of Visit: 5/17/2017 May 17,2017 Augie Hays MD  
 
 
 
Dear. Dr. Julia Kyle: I saw Mrs. Venda Simmonds in my clinic today. She is a 66 y.o. woman who underwent ascending aortic dissection repair approximately two years ago, return today for clinic for shortness of breath and questionable chest pain. I reviewed her CT scan. All of her measurements by my eye are sort of in the 4.2 to 4.5 range as far as diameter. I do not really see anything surgical to repair at this stage. She does have a really large esophagus on CT scan. I am not sure if she is having some food retained in her esophagus and maybe she has some silent regurgitation going on, possibly some aspiration which is causing her shortness of breath and pain. I am going to have her see one of the GI specialists. Thank you for referring your patient to me.   
 
Sincerely,  
 
 
 
Ailyn Jiang MD  
 
 
 
 
 
 
Sincerely, 
 
 
Nirali Jerez MD

## 2017-05-17 NOTE — PROGRESS NOTES
CSS  Consult     Subjective: Gerber Arora is a 66 y.o.  female who was referred for cardiac evaluation by Dr. Ruddy Riley. 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 Magdaleno Bryan, last seen in April 2016 for routine follow up with repeat CTA (results below). Other PMH significant for HTN, high cholesterol, GERD/PUD, asthma and arthritis. Pt reports she was in her usual state of health until about 2 months ago when she started to experience intermittent CP and pain between her shoulder blades. No aggravating or relieving factors of the pain, would come and go on its own. She then started to feel more SOB with activity since the beginning of May. She made a follow up appointment with her cardiologist who ordered a repeat chest CTA and cardiac cath, results below. She was also placed on Imdur but was not able to tolerate this due to HA and nausea. She denies any dizziness, palpitations, orthopnea, PND or edema. She currently reports that she has not experienced any pain recently, but continues to have MIMS. Cardiac Testing    Cardiac catheterization 5/9/17:  VENTRICLES: EF calculated by contrast ventriculography was 60 %. CORONARY CIRCULATION: The coronary circulation is right dominant. Left  main: Normal. LAD: Normal. Circumflex: Normal. RCA: Normal.    CTA Chest 5/8/17:  1. Dissection of the aortic arch extending inferiorly to the inferior most  imaged abdominal aorta. Dissection extends superiorly into the brachiocephalic  artery, right subclavian artery and right common carotid artery. Overall,  findings appear similar with details as above. 2. Ectasia of the main pulmonary artery suggesting possible pulmonary artery  hypertension. 3. Patulous appearance of the esophagus. Other incidental findings as above. CTA Chest/Abdomen/Pelvis 3/21/16:  1. Known aortic dissection with involvement of the ascending aorta with   extension detailed below.  Patient post aortic root repair with slight   increased size of the proximal arch. Maximum dimension of 4.8 cm. 2. Main pulmonary artery mildly enlarged. Question pulmonary artery   hypertension  3. Dilated esophagus  4. No acute abnormality of the abdomen or pelvis    Echo 4/2017:  Left ventricle: Systolic function was normal. Ejection fraction was  estimated in the range of 55 % to 60 %. There were no regional wall motion  abnormalities. There was mild concentric hypertrophy. Left atrium: The atrium was moderately to severely dilated. Mitral valve: There was moderate regurgitation. Past Medical History:   Diagnosis Date    Aortic dissection (HCC)     Arthritis     lower back    Asthma     GERD (gastroesophageal reflux disease)     Hyperlipidemia     Hypertension     PUD (peptic ulcer disease)      Past Surgical History:   Procedure Laterality Date    HX GYN      hysterectomy    HX GYN      tubal ligation    HX OTHER SURGICAL  2015    Type A Dissection Repair    MD COLONOSCOPY FLX DX W/COLLJ SPEC WHEN PFRMD  3/19/2012           Social History   Substance Use Topics    Smoking status: Never Smoker    Smokeless tobacco: Never Used    Alcohol use 0.0 oz/week     0 Standard drinks or equivalent per week      Comment: Rare wine      Family History   Problem Relation Age of Onset    Cancer Father      colon cancer     Prior to Admission medications    Medication Sig Start Date End Date Taking? Authorizing Provider   acetaminophen (TYLENOL EXTRA STRENGTH) 500 mg tablet Take 2,000 mg by mouth daily as needed for Pain. Yes Historical Provider   vit B Cmplx 3-FA-Vit C-Biotin (NEPHRO ALLY RX) 1- mg-mg-mcg tablet Take 1 Tab by mouth daily. Yes Historical Provider   metoprolol tartrate (LOPRESSOR) 25 mg tablet Take 1 Tab by mouth two (2) times a day.  5/1/17  Yes Aquilino Gaston MD   losartan (COZAAR) 50 mg tablet take 1 tablet by mouth once daily 11/3/16  Yes Amol Wade NP   multivitamin (ONE A DAY) tablet Take 1 Tab by mouth daily. Yes Historical Provider   naproxen sodium (NAPROSYN) 220 mg tablet Take 220 mg by mouth daily as needed. Yes Kaushik Hickman MD   pravastatin (PRAVACHOL) 20 mg tablet Take 20 mg by mouth daily. Yes Historical Provider   aspirin 81 mg tablet Take 81 mg by mouth daily. Yes Historical Provider   omega-3 fatty acids-vitamin e (FISH OIL) 1,000 mg Cap Take 1 Cap by mouth three (3) times daily. Yes Historical Provider       No Known Allergies    Review of Systems:   Consititutional: Denies fever or chills. Eyes:  Denies use of glasses or vision problems (cataracts). ENT:  Denies hearing or swallowing difficulty. CV: Denies claudication. Resp: Denies productive cough. : Denies dialysis or kidney problems. GI: Denies ulcers, esophageal strictures, liver problems. M/S: Denies joint or bone problems, or implanted artificial hardware. Skin: Denies varicose veins, edema. Neuro: Denies strokes, or TIAs. Psych: Denies anxiety or depression. Endocrine: Denies thyroid problems or diabetes. Heme/Lymphatic: Denies easy bruising or lymphedema. Objective:     VS:   BP: 146/84 HR: 60 RR: 16 SpO2: 98% Temp: 97.1F Weight: 190 lb Height: 5'6\"    Physical Exam:    General appearance: alert, cooperative, no distress, appears stated age  Head: Normocephalic, without obvious abnormality, atraumatic  Eyes: conjunctivae/corneas clear. PERRL, EOM's intact. Neck: supple, symmetrical, trachea midline, no adenopathy, thyroid: not enlarged, symmetric, no tenderness/mass/nodules, no carotid bruit and no JVD  Lungs: clear to auscultation bilaterally  Heart: regular rate and rhythm, S1, S2 normal, soft murmur, no click, rub or gallop  Abdomen: soft, non-tender.  Bowel sounds normal. No masses,  no organomegaly  Extremities: extremities normal, atraumatic, no cyanosis or edema  Skin: Skin color, texture, turgor normal. No rashes or lesions  Neurologic: Grossly normal      Assessment: Thoracic aortic dissection    Plan:   1. Hx of aortic dissection repair: size of aorta not much changed since previous CTA, reviewed by Dr. Diane Henderson and did not feel there were any surgical concerns regarding dissection. Would continue to follow with CT scans and good BP control    2. Dilated esophagus: refer to GI for consultation/workup     3. HTN: cont metoprolol and losartan    4.  Hx of GERD/PUD: not on medications, can evaluate this when she sees GI    Signed By: Elizabeth Lucas NP     May 17, 2017        Saw patient, agree with above; do not see a surgical indication for her aorta at this time - max diameter is about 4.6 cm; she has a very large esophagus on CT scan, will refer her to GI specialist   Risk of morbidity and mortality - high  Medical decision making - high complexity

## 2017-06-05 RX ORDER — LOSARTAN POTASSIUM 50 MG/1
TABLET ORAL
Qty: 30 TAB | Refills: 6 | Status: SHIPPED | OUTPATIENT
Start: 2017-06-05 | End: 2018-01-01 | Stop reason: SDUPTHER

## 2017-07-12 ENCOUNTER — ANESTHESIA (OUTPATIENT)
Dept: ENDOSCOPY | Age: 72
End: 2017-07-12
Payer: MEDICARE

## 2017-07-12 ENCOUNTER — ANESTHESIA EVENT (OUTPATIENT)
Dept: ENDOSCOPY | Age: 72
End: 2017-07-12
Payer: MEDICARE

## 2017-07-12 ENCOUNTER — HOSPITAL ENCOUNTER (OUTPATIENT)
Age: 72
Setting detail: OUTPATIENT SURGERY
Discharge: HOME | End: 2017-07-12
Attending: INTERNAL MEDICINE | Admitting: INTERNAL MEDICINE
Payer: MEDICARE

## 2017-07-12 VITALS
TEMPERATURE: 97.7 F | DIASTOLIC BLOOD PRESSURE: 86 MMHG | WEIGHT: 190.5 LBS | HEIGHT: 63 IN | RESPIRATION RATE: 18 BRPM | BODY MASS INDEX: 33.75 KG/M2 | SYSTOLIC BLOOD PRESSURE: 153 MMHG | OXYGEN SATURATION: 96 % | HEART RATE: 56 BPM

## 2017-07-12 PROCEDURE — 74011000250 HC RX REV CODE- 250

## 2017-07-12 PROCEDURE — 74011250636 HC RX REV CODE- 250/636: Performed by: INTERNAL MEDICINE

## 2017-07-12 PROCEDURE — 76040000007: Performed by: INTERNAL MEDICINE

## 2017-07-12 PROCEDURE — 88305 TISSUE EXAM BY PATHOLOGIST: CPT | Performed by: INTERNAL MEDICINE

## 2017-07-12 PROCEDURE — 77030018712 HC DEV BLLN INFL BSC -B: Performed by: INTERNAL MEDICINE

## 2017-07-12 PROCEDURE — 77030013992 HC SNR POLYP ENDOSC BSC -B: Performed by: INTERNAL MEDICINE

## 2017-07-12 PROCEDURE — 77030019988 HC FCPS ENDOSC DISP BSC -B: Performed by: INTERNAL MEDICINE

## 2017-07-12 PROCEDURE — 74011250636 HC RX REV CODE- 250/636

## 2017-07-12 PROCEDURE — 76060000032 HC ANESTHESIA 0.5 TO 1 HR: Performed by: INTERNAL MEDICINE

## 2017-07-12 PROCEDURE — C1726 CATH, BAL DIL, NON-VASCULAR: HCPCS | Performed by: INTERNAL MEDICINE

## 2017-07-12 PROCEDURE — 74011000258 HC RX REV CODE- 258: Performed by: INTERNAL MEDICINE

## 2017-07-12 RX ORDER — PROPOFOL 10 MG/ML
INJECTION, EMULSION INTRAVENOUS AS NEEDED
Status: DISCONTINUED | OUTPATIENT
Start: 2017-07-12 | End: 2017-07-12 | Stop reason: HOSPADM

## 2017-07-12 RX ORDER — MIDAZOLAM HYDROCHLORIDE 1 MG/ML
1-2 INJECTION, SOLUTION INTRAMUSCULAR; INTRAVENOUS
Status: ACTIVE | OUTPATIENT
Start: 2017-07-12 | End: 2017-07-12

## 2017-07-12 RX ORDER — SODIUM CHLORIDE 0.9 % (FLUSH) 0.9 %
5-10 SYRINGE (ML) INJECTION AS NEEDED
Status: ACTIVE | OUTPATIENT
Start: 2017-07-12 | End: 2017-07-12

## 2017-07-12 RX ORDER — FENTANYL CITRATE 50 UG/ML
25 INJECTION, SOLUTION INTRAMUSCULAR; INTRAVENOUS
Status: ACTIVE | OUTPATIENT
Start: 2017-07-12 | End: 2017-07-12

## 2017-07-12 RX ORDER — SODIUM CHLORIDE 0.9 % (FLUSH) 0.9 %
5-10 SYRINGE (ML) INJECTION EVERY 8 HOURS
Status: ACTIVE | OUTPATIENT
Start: 2017-07-12 | End: 2017-07-12

## 2017-07-12 RX ORDER — SODIUM CHLORIDE 9 MG/ML
50 INJECTION, SOLUTION INTRAVENOUS CONTINUOUS
Status: DISPENSED | OUTPATIENT
Start: 2017-07-12 | End: 2017-07-12

## 2017-07-12 RX ORDER — DEXTROMETHORPHAN/PSEUDOEPHED 2.5-7.5/.8
1.2 DROPS ORAL
Status: DISCONTINUED | OUTPATIENT
Start: 2017-07-12 | End: 2017-07-14 | Stop reason: HOSPADM

## 2017-07-12 RX ORDER — PROPOFOL 10 MG/ML
10-1000 INJECTION, EMULSION INTRAVENOUS
Status: DISCONTINUED | OUTPATIENT
Start: 2017-07-12 | End: 2017-07-14 | Stop reason: HOSPADM

## 2017-07-12 RX ORDER — LIDOCAINE HYDROCHLORIDE 20 MG/ML
INJECTION, SOLUTION EPIDURAL; INFILTRATION; INTRACAUDAL; PERINEURAL AS NEEDED
Status: DISCONTINUED | OUTPATIENT
Start: 2017-07-12 | End: 2017-07-12 | Stop reason: HOSPADM

## 2017-07-12 RX ADMIN — GENTAMICIN SULFATE 160 MG: 40 INJECTION, SOLUTION INTRAMUSCULAR; INTRAVENOUS at 08:34

## 2017-07-12 RX ADMIN — PROPOFOL 50 MG: 10 INJECTION, EMULSION INTRAVENOUS at 08:53

## 2017-07-12 RX ADMIN — LIDOCAINE HYDROCHLORIDE 40 MG: 20 INJECTION, SOLUTION EPIDURAL; INFILTRATION; INTRACAUDAL; PERINEURAL at 08:47

## 2017-07-12 RX ADMIN — AMPICILLIN SODIUM 2 G: 2 INJECTION, POWDER, FOR SOLUTION INTRAMUSCULAR; INTRAVENOUS at 08:08

## 2017-07-12 RX ADMIN — PROPOFOL 50 MG: 10 INJECTION, EMULSION INTRAVENOUS at 09:06

## 2017-07-12 RX ADMIN — SODIUM CHLORIDE 50 ML/HR: 900 INJECTION, SOLUTION INTRAVENOUS at 07:52

## 2017-07-12 RX ADMIN — PROPOFOL 50 MG: 10 INJECTION, EMULSION INTRAVENOUS at 08:47

## 2017-07-12 RX ADMIN — PROPOFOL 50 MG: 10 INJECTION, EMULSION INTRAVENOUS at 09:02

## 2017-07-12 RX ADMIN — PROPOFOL 50 MG: 10 INJECTION, EMULSION INTRAVENOUS at 08:57

## 2017-07-12 NOTE — DISCHARGE INSTRUCTIONS
Gerber Arora  [id number]  1945    COLON DISCHARGE INSTRUCTIONS  Discomfort:  Redness at IV site- apply warm compress to area; if redness or soreness persist- contact your physician  There may be a slight amount of blood passed from the rectum  Gaseous discomfort- walking, belching will help relieve any discomfort  You may not operate a vehicle for 12 hours  You may not engage in an occupation involving machinery or appliances for rest of today  You may not drink alcoholic beverages for at least 12 hours  Avoid making any critical decisions for at least 24 hour  DIET:   Regular diet    - however -  remember your colon is empty and a heavy meal will produce gas. ACTIVITY:  It is recommended that you spend the remainder of the day resting -  avoid any strenuous activity. CALL M.D. ANY SIGN OF:   Increasing pain, nausea, vomiting  Abdominal distension (swelling)  New increased bleeding (oral or rectal)  Fever (chills)    Follow-up Instructions:   Call Dr. Tolbert Cardinal results in 10 days  Telephone # [phone]  Brief Findings: polyp removed.   Need manometry for achalasia        DISCHARGE SUMMARY from Nurse    The following personal items collected during your admission are returned to you:   Dental Appliance: Dental Appliances: Partials (partial top)  Vision: Visual Aid: Glasses  Hearing Aid:    Jewelry:    Clothing:    Other Valuables:    Valuables sent to safe:

## 2017-07-12 NOTE — H&P
Gastroenterology History and Physical    Patient: Lesly Bautista    Physician: Nancy Matamoros MD    Vital Signs: Blood pressure 166/79, pulse (!) 58, temperature 97.8 °F (36.6 °C), resp. rate 20, height 5' 3\" (1.6 m), weight 86.4 kg (190 lb 8 oz), SpO2 94 %. Allergies: No Known Allergies    Indication: dysphagia, dilated esophagus and FH colon cancer    History:  Past Medical History:   Diagnosis Date    Aortic dissection (HCC)     Arthritis     lower back    Asthma     GERD (gastroesophageal reflux disease)     Hyperlipidemia     Hypertension     PUD (peptic ulcer disease)       Past Surgical History:   Procedure Laterality Date    HX GYN      hysterectomy    HX GYN      tubal ligation    HX OTHER SURGICAL  2015    Type A Dissection Repair    AR COLONOSCOPY FLX DX W/COLLJ SPEC WHEN PFRMD  3/19/2012           Social History     Social History    Marital status:      Spouse name: N/A    Number of children: N/A    Years of education: N/A     Social History Main Topics    Smoking status: Never Smoker    Smokeless tobacco: Never Used    Alcohol use 0.0 oz/week     0 Standard drinks or equivalent per week    Drug use: No    Sexual activity: Not Asked     Other Topics Concern    None     Social History Narrative      Family History   Problem Relation Age of Onset    Cancer Father      colon cancer       Medications:   Prior to Admission medications    Medication Sig Start Date End Date Taking? Authorizing Provider   losartan (COZAAR) 50 mg tablet take 1 tablet by mouth once daily 6/5/17  Yes Allen Zhao NP   acetaminophen (TYLENOL EXTRA STRENGTH) 500 mg tablet Take 2,000 mg by mouth daily as needed for Pain. Yes Historical Provider   metoprolol tartrate (LOPRESSOR) 25 mg tablet Take 1 Tab by mouth two (2) times a day. 5/1/17  Yes Jada Bruno MD   multivitamin (ONE A DAY) tablet Take 1 Tab by mouth daily.    Yes Historical Provider   naproxen sodium (NAPROSYN) 220 mg tablet Take 220 mg by mouth daily as needed. Yes Phys MD Vick   pravastatin (PRAVACHOL) 20 mg tablet Take 20 mg by mouth daily. Yes Historical Provider   aspirin 81 mg tablet Take 81 mg by mouth daily. Yes Historical Provider   omega-3 fatty acids-vitamin e (FISH OIL) 1,000 mg Cap Take 1 Cap by mouth two (2) times a day. Yes Historical Provider       Physical Exam:   HEENT: Head: Normocephalic, no lesions, without obvious abnormality.    Heart: regular rate and rhythm, S1, S2 normal, no murmur, click, rub or gallop   Lungs: chest clear, no wheezing, rales, normal symmetric air entry, Heart exam - S1, S2 normal, no murmur, no gallop, rate regular   Abdominal: Bowel sounds are normal, liver is not enlarged, spleen is not enlarged     Signed:  Dearl Holstein, MD 7/12/2017

## 2017-07-12 NOTE — ANESTHESIA PREPROCEDURE EVALUATION
Anesthetic History   No history of anesthetic complications            Review of Systems / Medical History  Patient summary reviewed, nursing notes reviewed and pertinent labs reviewed    Pulmonary            Asthma : well controlled       Neuro/Psych   Within defined limits           Cardiovascular    Hypertension          Hyperlipidemia    Exercise tolerance: >4 METS     GI/Hepatic/Renal     GERD      PUD     Endo/Other        Obesity and arthritis     Other Findings   Comments: Sp AAA repair           Physical Exam    Airway  Mallampati: II  TM Distance: 4 - 6 cm  Neck ROM: normal range of motion   Mouth opening: Normal     Cardiovascular  Regular rate and rhythm,  S1 and S2 normal,  no murmur, click, rub, or gallop             Dental    Dentition: Upper partial plate     Pulmonary  Breath sounds clear to auscultation               Abdominal  GI exam deferred       Other Findings            Anesthetic Plan    ASA: 3  Anesthesia type: total IV anesthesia and MAC          Induction: Intravenous  Anesthetic plan and risks discussed with: Patient

## 2017-07-12 NOTE — PERIOP NOTES
Asher Carl  1945  [id number]    Situation:  Verbal report received from: Campbell Tomlin RN  Procedure: Procedure(s) with comments:  COLONOSCOPY WITH IV ANTIBIOTICS - colon with polyp  ESOPHAGOGASTRODUODENOSCOPY (EGD) - egd with bx and dil  ESOPHAGOGASTRODUODENAL (EGD) BIOPSY  ESOPHAGEAL DILATION  ENDOSCOPIC POLYPECTOMY    Background:    Preoperative diagnosis: DYSPHAGIA, FAM HX COLON CA  Postoperative diagnosis: achalasia. , colon polyp, diverticulosis    :  Dr. Dina Rosales  Assistant(s): Endoscopy Technician-1: Cherrie Murdock  Endoscopy RN-1: Bobby Fulton    Specimens:   ID Type Source Tests Collected by Time Destination   1 : bx Preservative   Sunil Medina MD 7/12/2017 1542 Pathology   2 : polyp Preservative Cecum  Sunil Medina MD 7/12/2017 1736 Pathology     H. Pylori  no    Assessment:  Intra-procedure medications   Anesthesia gave intra-procedure sedation and medications, see anesthesia flow sheet yes    Intravenous fluids: NS@ KVO     Vital signs stable     Abdominal assessment: round and soft     Recommendation:  Discharge patient per MD order.   Family or Friend   Permission to share finding with family or friend yes

## 2017-07-12 NOTE — PROCEDURES
Endoscopic Gastroduodenoscopy Procedure Note  Herchel Mask  1945  [id number]      Procedure: Endoscopic Gastroduodenoscopy with biopsy, esophageal dilation    Indication:  Dyphagia/odynophagia    Pre-operative Diagnosis: see indication above    Post-operative Diagnosis: see findings below    :  Maryam Avila MD    Referring Provider:  coy    Anethesia/Sedation:  MAC anesthesia Propofol    Pre-Procedural Exam:      Airway: clear, Malimpati 2   Heart: RRR, without gallops or rubs  Lungs: clear bilaterally without wheezes, crackles, or rhonchi  Abdomen: soft, nontender, nondistended, bowel sounds present        Procedure Details     After infom consent was obtained for the procedure, with all risks and benefits of procedure explained the patient was taken to the endoscopy suite and placed in the left lateral decubitus position. Following sequential administration of sedation as per above, the [id number] gastroscope was inserted into the mouth and advanced under direct vision to second portion of the duodenum. A careful inspection was made as the gastroscope was withdrawn, including a retroflexed view of the proximal stomach; findings and interventions are described below. Findings/Impression: ESOPHAGUS: The esophagus is dilated with exudate at GE junction. The proximal, mid, and distal portions are normal.   STOMACH: The fundus on antegrade and retroflex views is normal. The body, antrum, and pylorus are normal.   DUODENUM: The bulb and second portions are normal.      Therapies:  esophageal dilation with 20mm sized balloon  biopsy of esophagus    Specimens: GE junction          Complications:   None; patient tolerated the procedure well. EBL:  None. Recommendations:  -Await pathology. , -esophageal manometry    Discharge Disposition:

## 2017-07-12 NOTE — ANESTHESIA POSTPROCEDURE EVALUATION
Post-Anesthesia Evaluation and Assessment    Patient: Lesly Bautista MRN: [id number]  SSN: [id number]    YOB: 1945  Age: 66 y.o. Sex: female       Cardiovascular Function/Vital Signs  Visit Vitals    /86    Pulse (!) 56    Temp 36.5 °C (97.7 °F)    Resp 18    Ht 5' 3\" (1.6 m)    Wt 86.4 kg (190 lb 8 oz)    SpO2 96%    BMI 33.75 kg/m2       Patient is status post total IV anesthesia, MAC anesthesia for Procedure(s):  COLONOSCOPY WITH IV ANTIBIOTICS  ESOPHAGOGASTRODUODENOSCOPY (EGD)  ESOPHAGOGASTRODUODENAL (EGD) BIOPSY  ESOPHAGEAL DILATION  ENDOSCOPIC POLYPECTOMY. Nausea/Vomiting: None    Postoperative hydration reviewed and adequate. Pain:  Pain Scale 1: Numeric (0 - 10) (07/12/17 1002)  Pain Intensity 1: 0 (07/12/17 1002)   Managed    Neurological Status: At baseline    Mental Status and Level of Consciousness: Arousable    Pulmonary Status:   O2 Device: Room air (07/12/17 1002)   Adequate oxygenation and airway patent    Complications related to anesthesia: None    Post-anesthesia assessment completed.  No concerns    Signed By: India Flower DO     July 12, 2017

## 2017-07-12 NOTE — IP AVS SNAPSHOT
[address] [facility] 
[phone] Patient: Alexandra Alex MRN: [id number] ZWF:1[birth year]32[birth year] You are allergic to the following No active allergies Recent Documentation Height Weight BMI OB Status Smoking Status 1.6 m 86.4 kg 33.75 kg/m2 Hysterectomy Never Smoker Emergency Contacts Name Discharge Info Relation Home Work Mobile Ramos Howard DISCHARGE CAREGIVER [3] Spouse [3] [phone] Rafael Guidry  Child [2] [phone] About your hospitalization You were admitted on:  July 12, 2017 You last received care in the:  [facility] ENDOSCOPY You were discharged on:  July 12, 2017 Unit phone number:  [phone] Why you were hospitalized Your primary diagnosis was:  Not on File Providers Seen During Your Hospitalizations Provider Role Specialty Primary office phone Denise Claros MD Attending Provider Gastroenterology [phone] Your Primary Care Physician (PCP) Primary Care Physician Office Phone Office Fax Sujey Edelson [phone] Follow-up Information None Current Discharge Medication List  
  
CONTINUE these medications which have NOT CHANGED Dose & Instructions Dispensing Information Comments Morning Noon Evening Bedtime  
 aspirin 81 mg tablet Your last dose was: Your next dose is:    
   
   
 Dose:  81 mg Take 81 mg by mouth daily. Refills:  0  
     
   
   
   
  
 FISH OIL 1,000 mg Cap Generic drug:  omega-3 fatty acids-vitamin e Your last dose was: Your next dose is:    
   
   
 Dose:  1 Cap Take 1 Cap by mouth two (2) times a day. Refills:  0  
     
   
   
   
  
 losartan 50 mg tablet Commonly known as:  COZAAR Your last dose was: Your next dose is:    
   
   
 take 1 tablet by mouth once daily Quantity:  30 Tab Refills:  6  
     
   
   
   
  
 metoprolol tartrate 25 mg tablet Commonly known as:  LOPRESSOR Your last dose was: Your next dose is:    
   
   
 Dose:  25 mg Take 1 Tab by mouth two (2) times a day. Quantity:  60 Tab Refills:  12  
     
   
   
   
  
 multivitamin tablet Commonly known as:  ONE A DAY Your last dose was: Your next dose is:    
   
   
 Dose:  1 Tab Take 1 Tab by mouth daily. Refills:  0  
     
   
   
   
  
 naproxen sodium 220 mg tablet Commonly known as:  NAPROSYN Your last dose was: Your next dose is:    
   
   
 Dose:  220 mg Take 220 mg by mouth daily as needed. Refills:  0 PRAVACHOL 20 mg tablet Generic drug:  pravastatin Your last dose was: Your next dose is:    
   
   
 Dose:  20 mg Take 20 mg by mouth daily. Refills:  0  
     
   
   
   
  
 TYLENOL EXTRA STRENGTH 500 mg tablet Generic drug:  acetaminophen Your last dose was: Your next dose is:    
   
   
 Dose:  2000 mg Take 2,000 mg by mouth daily as needed for Pain. Refills:  0 Discharge Instructions Lesly Bautista [id number] 
1945 COLON DISCHARGE INSTRUCTIONS Discomfort: 
Redness at IV site- apply warm compress to area; if redness or soreness persist- contact your physician There may be a slight amount of blood passed from the rectum Gaseous discomfort- walking, belching will help relieve any discomfort You may not operate a vehicle for 12 hours You may not engage in an occupation involving machinery or appliances for rest of today You may not drink alcoholic beverages for at least 12 hours Avoid making any critical decisions for at least 24 hour DIET: 
 Regular diet  however -  remember your colon is empty and a heavy meal will produce gas.  
  
 
ACTIVITY: 
 It is recommended that you spend the remainder of the day resting -  avoid any strenuous activity. CALL M.D. ANY SIGN OF: Increasing pain, nausea, vomiting Abdominal distension (swelling) New increased bleeding (oral or rectal) Fever (chills) Follow-up Instructions: 
 Call Dr. Tom Melton Biopsy results in 10 days Telephone # [phone] Brief Findings: polyp removed. Need manometry for achalasia DISCHARGE SUMMARY from Nurse The following personal items collected during your admission are returned to you:  
Dental Appliance: Dental Appliances: Partials (partial top) Vision: Visual Aid: Glasses Hearing Aid:   
Jewelry:   
Clothing:   
Other Valuables:   
Valuables sent to safe:   
 
 
Discharge Orders None Introducing [address] Hiwot Multani introduces [employer] patient portal. Now you can access parts of your medical record, email your doctor's office, and request medication refills online. 1. In your internet browser, go to [email or web address] 2. Click on the First Time User? Click Here link in the Sign In box. You will see the New Member Sign Up page. 3. Enter your [employer] Access Code exactly as it appears below. You will not need to use this code after youve completed the sign-up process. If you do not sign up before the expiration date, you must request a new code. · [employer] Access Code: [email or web address] Expires: 10/10/2017  6:29 AM 
 
4. Enter the last four digits of your Social Security Number (xxxx) and Date of Birth (mm/dd/yyyy) as indicated and click Submit. You will be taken to the next sign-up page. 5. Create a [employer] ID. This will be your [employer] login ID and cannot be changed, so think of one that is secure and easy to remember. 6. Create a [employer] password. You can change your password at any time. 7. Enter your Password Reset Question and Answer. This can be used at a later time if you forget your password. 8. Enter your e-mail address. You will receive e-mail notification when new information is available in [address] 9. Click Sign Up. You can now view and download portions of your medical record. 10. Click the Download Summary menu link to download a portable copy of your medical information. If you have questions, please visit the Frequently Asked Questions section of the ATEM[employer] website. Remember, [employer] is NOT to be used for urgent needs. For medical emergencies, dial 911. Now available from your iPhone and Android! General Information Please provide this summary of care documentation to your next provider. Patient Signature:  ____________________________________________________________ Date:  ____________________________________________________________  
  
Kanika [facility] Provider Signature:  ____________________________________________________________ Date:  ____________________________________________________________

## 2017-07-12 NOTE — PERIOP NOTES
Anesthesia reports 250 mg Propofol, 40 mg Lidocaine and 550 mL NS given during procedure. Received report from anesthesia staff on vital signs and status of patient. Dentures and glasses  returned to pt.

## 2017-07-12 NOTE — PROCEDURES
Colonoscopy Operative Report  Fabiola Dumont  1945  [id number]      Procedure Type:   Colonoscopy with polypectomy (snare cautery)     Indications:     Family history of coloretal cancer (screening only)    Pre-operative Diagnosis: see indication above    Post-operative Diagnosis:  See findings below    : Matty Fuller MD    Referring Provider: [facility]    Sedation:  MAC anesthesia Propofol    Pre-Procedural Exam:      Airway: clear, Malimpati 2   Heart: RRR, without gallops or rubs  Lungs: clear bilaterally without wheezes, crackles, or rhonchi  Abdomen: soft, nontender, nondistended, bowel sounds present     Procedure Details:  After informed consent was obtained with all risks and benefits of procedure explained and preoperative exam completed, the patient was taken to the endoscopy suite and placed in the left lateral decubitus position. Upon sequential sedation as per above, a digital rectal exam was performed. The Olympus videocolonoscope [id number] was inserted in the rectum and carefully advanced to the cecum, which was identified by the ileocecal valve. The quality of preparation was good. The colonoscope was slowly withdrawn with careful evaluation between folds. Retoflexion in the rectum was completed. Findings/Impression: Rectum:   Normal  Sigmoid:     - Excavated lesions:     - Diverticulosis    - 3mm polyp removed with hot snare  Descending Colon:     - Excavated lesions:     - Diverticulosis  Transverse Colon:     - Excavated lesions:     - Diverticulosis  Ascending Colon:     - Excavated lesions:     - Diverticulosis  Cecum:     - Protruding lesions:     -Sessile Polyp(s) size 4 mm removed by polypectomy (snare cautery)              Specimen Removed:  2 polyps removed. No residual tissue in distal sigmoid    Complications: None. EBL:  None. Recommendations: --Await pathology. , -Repeat colonoscopy in 5 years. Regular diet. Resume normal medication(s).   You may be asked to call your doctor's office for the results. Discharge Disposition:  Home in the company of a  when able to ambulate.

## 2017-07-25 ENCOUNTER — HOSPITAL ENCOUNTER (OUTPATIENT)
Age: 72
Setting detail: OUTPATIENT SURGERY
Discharge: HOME | End: 2017-07-25
Attending: INTERNAL MEDICINE | Admitting: INTERNAL MEDICINE
Payer: MEDICARE

## 2017-07-25 ENCOUNTER — APPOINTMENT (OUTPATIENT)
Dept: GENERAL RADIOLOGY | Age: 72
End: 2017-07-25
Attending: INTERNAL MEDICINE
Payer: MEDICARE

## 2017-07-25 VITALS
HEART RATE: 65 BPM | DIASTOLIC BLOOD PRESSURE: 86 MMHG | BODY MASS INDEX: 33.66 KG/M2 | WEIGHT: 190 LBS | RESPIRATION RATE: 16 BRPM | SYSTOLIC BLOOD PRESSURE: 155 MMHG | HEIGHT: 63 IN | OXYGEN SATURATION: 93 %

## 2017-07-25 PROCEDURE — 74011000250 HC RX REV CODE- 250: Performed by: INTERNAL MEDICINE

## 2017-07-25 PROCEDURE — 76040000019: Performed by: INTERNAL MEDICINE

## 2017-07-25 RX ORDER — LIDOCAINE HYDROCHLORIDE 20 MG/ML
JELLY TOPICAL ONCE
Status: COMPLETED | OUTPATIENT
Start: 2017-07-25 | End: 2017-07-25

## 2017-07-25 RX ADMIN — LIDOCAINE HYDROCHLORIDE 5 MG: 20 JELLY TOPICAL at 12:21

## 2017-07-25 NOTE — IP AVS SNAPSHOT
[address] [facility] 
[phone] Patient: Alexandra Alex MRN: [id number] QNF:4/97/6042 You are allergic to the following No active allergies Recent Documentation Height Weight Breastfeeding? BMI OB Status Smoking Status 1.6 m 86.2 kg No 33.66 kg/m2 Hysterectomy Never Smoker Emergency Contacts Name Discharge Info Relation Home Work Mobile Ramos Howard DISCHARGE CAREGIVER [3] Spouse [3] [phone] Rafael Guidry  Child [2] [phone] About your hospitalization You were admitted on:  July 25, 2017 You last received care in the:  [facility] ENDOSCOPY You were discharged on:  July 25, 2017 Unit phone number:  [phone] Why you were hospitalized Your primary diagnosis was:  Not on File Providers Seen During Your Hospitalizations Provider Role Specialty Primary office phone Denise Claros MD Attending Provider Gastroenterology [phone] Your Primary Care Physician (PCP) Primary Care Physician Office Phone Office Fax Sujey Edelson [phone] Follow-up Information None Your Appointments Wednesday July 26, 2017  1:00 PM EDT ACUTE CARE with JESENIA Tse [facility] ([address]) [address] [id number] [phone] Current Discharge Medication List  
  
ASK your doctor about these medications Dose & Instructions Dispensing Information Comments Morning Noon Evening Bedtime  
 aspirin 81 mg tablet Your last dose was: Your next dose is:    
   
   
 Dose:  81 mg Take 81 mg by mouth daily. Refills:  0  
     
   
   
   
  
 FISH OIL 1,000 mg Cap Generic drug:  omega-3 fatty acids-vitamin e Your last dose was: Your next dose is: Dose:  1 Cap Take 1 Cap by mouth two (2) times a day. Refills:  0  
     
   
   
   
  
 losartan 50 mg tablet Commonly known as:  COZAAR Your last dose was: Your next dose is:    
   
   
 take 1 tablet by mouth once daily Quantity:  30 Tab Refills:  6  
     
   
   
   
  
 metoprolol tartrate 25 mg tablet Commonly known as:  LOPRESSOR Your last dose was: Your next dose is:    
   
   
 Dose:  25 mg Take 1 Tab by mouth two (2) times a day. Quantity:  60 Tab Refills:  12  
     
   
   
   
  
 multivitamin tablet Commonly known as:  ONE A DAY Your last dose was: Your next dose is:    
   
   
 Dose:  1 Tab Take 1 Tab by mouth daily. Refills:  0  
     
   
   
   
  
 naproxen sodium 220 mg tablet Commonly known as:  NAPROSYN Your last dose was: Your next dose is:    
   
   
 Dose:  220 mg Take 220 mg by mouth daily as needed. Refills:  0 PRAVACHOL 20 mg tablet Generic drug:  pravastatin Your last dose was: Your next dose is:    
   
   
 Dose:  20 mg Take 20 mg by mouth daily. Refills:  0  
     
   
   
   
  
 TYLENOL EXTRA STRENGTH 500 mg tablet Generic drug:  acetaminophen Your last dose was: Your next dose is:    
   
   
 Dose:  2000 mg Take 2,000 mg by mouth daily as needed for Pain. Refills:  0 Discharge Instructions Case Whitney [id number] 
1945 MANOMETRY DISCHARGE INSTRUCTION You may resume your regular diet as tolerated. You may resume your normal daily activities. If you develop a sore throat- throat lozenges or warm salt water gargles will help. Call your Physician if you have any complications or questions. [employer] Activation Thank you for requesting access to [employer]. Please follow the instructions below to securely access and download your online medical record.  [employer] allows you to send messages to your doctor, view your test results, renew your prescriptions, schedule appointments, and more. How Do I Sign Up? 1. In your internet browser, go to [email or web address] 
2. Click on the First Time User? Click Here link in the Sign In box. You will be redirect to the New Member Sign Up page. 3. Enter your [employer] Access Code exactly as it appears below. You will not need to use this code after youve completed the sign-up process. If you do not sign up before the expiration date, you must request a new code. [employer] Access Code: [email or web address] Expires: 10/10/2017  6:29 AM (This is the date your [employer] access code will ) 4. Enter the last four digits of your Social Security Number (xxxx) and Date of Birth (mm/dd/yyyy) as indicated and click Submit. You will be taken to the next sign-up page. 5. Create a [employer] ID. This will be your [employer] login ID and cannot be changed, so think of one that is secure and easy to remember. 6. Create a [employer] password. You can change your password at any time. 7. Enter your Password Reset Question and Answer. This can be used at a later time if you forget your password. 8. Enter your e-mail address. You will receive e-mail notification when new information is available in [address] 9. Click Sign Up. You can now view and download portions of your medical record. 10. Click the Download Summary menu link to download a portable copy of your medical information. Additional Information If you have questions, please visit the Frequently Asked Questions section of the [employer] website at [email or web address] Remember, In[employer]ha[employer] is NOT to be used for urgent needs. For medical emergencies, dial 911. Discharge Orders None Introducing hospitals SERVICES!    
 Дмитрий Sanderson introduces [employer] patient portal. Now you can access parts of your medical record, email your doctor's office, and request medication refills online. 1. In your internet browser, go to [email or web address] 2. Click on the First Time User? Click Here link in the Sign In box. You will see the New Member Sign Up page. 3. Enter your [facility] Access Code exactly as it appears below. You will not need to use this code after youve completed the sign-up process. If you do not sign up before the expiration date, you must request a new code. · [facility] Access Code: [email or web address] Expires: 10/10/2017  6:29 AM 
 
4. Enter the last four digits of your Social Security Number (xxxx) and Date of Birth (mm/dd/yyyy) as indicated and click Submit. You will be taken to the next sign-up page. 5. Create a [facility] ID. This will be your [facility] login ID and cannot be changed, so think of one that is secure and easy to remember. 6. Create a [facility] password. You can change your password at any time. 7. Enter your Password Reset Question and Answer. This can be used at a later time if you forget your password. 8. Enter your e-mail address. You will receive e-mail notification when new information is available in [address] 9. Click Sign Up. You can now view and download portions of your medical record. 10. Click the Download Summary menu link to download a portable copy of your medical information. If you have questions, please visit the Frequently Asked Questions section of the [facility] website. Remember, [facility] is NOT to be used for urgent needs. For medical emergencies, dial 911. Now available from your iPhone and Android! General Information Please provide this summary of care documentation to your next provider. Patient Signature:  ____________________________________________________________ Date:  ____________________________________________________________  
  
Elizabethann Glad Provider Signature:  ____________________________________________________________ Date:  ____________________________________________________________

## 2017-07-25 NOTE — DISCHARGE INSTRUCTIONS
[facility]  [id number]  1945      MANOMETRY DISCHARGE INSTRUCTION    You may resume your regular diet as tolerated. You may resume your normal daily activities. If you develop a sore throat- throat lozenges or warm salt water gargles will help. Call your Physician if you have any complications or questions. [employer] Activation    Thank you for requesting access to [employer]. Please follow the instructions below to securely access and download your online medical record. [employer] allows you to send messages to your doctor, view your test results, renew your prescriptions, schedule appointments, and more. How Do I Sign Up? 1. In your internet browser, go to [email or web address]  2. Click on the First Time User? Click Here link in the Sign In box. You will be redirect to the New Member Sign Up page. 3. Enter your [employer] Access Code exactly as it appears below. You will not need to use this code after youve completed the sign-up process. If you do not sign up before the expiration date, you must request a new code. [employer] Access Code: [email or web address]  Expires: 10/10/2017  6:29 AM (This is the date your [employer] access code will )    4. Enter the last four digits of your Social Security Number (xxxx) and Date of Birth (mm/dd/yyyy) as indicated and click Submit. You will be taken to the next sign-up page. 5. Create a [employer] ID. This will be your [employer] login ID and cannot be changed, so think of one that is secure and easy to remember. 6. Create a [employer] password. You can change your password at any time. 7. Enter your Password Reset Question and Answer. This can be used at a later time if you forget your password. 8. Enter your e-mail address. You will receive e-mail notification when new information is available in [address] 9. Click Sign Up. You can now view and download portions of your medical record.   10. Click the Download Summary menu link to download a portable copy of your medical information. Additional Information    If you have questions, please visit the Frequently Asked Questions section of the GitHub website at [email or web address] Remember, GitHub is NOT to be used for urgent needs. For medical emergencies, dial 911.

## 2017-07-26 ENCOUNTER — OFFICE VISIT (OUTPATIENT)
Dept: INTERNAL MEDICINE CLINIC | Age: 72
End: 2017-07-26

## 2017-07-26 VITALS
OXYGEN SATURATION: 91 % | SYSTOLIC BLOOD PRESSURE: 138 MMHG | HEIGHT: 64 IN | TEMPERATURE: 98.7 F | BODY MASS INDEX: 32.1 KG/M2 | DIASTOLIC BLOOD PRESSURE: 85 MMHG | HEART RATE: 57 BPM | WEIGHT: 188 LBS

## 2017-07-26 DIAGNOSIS — M54.41 ACUTE RIGHT-SIDED LOW BACK PAIN WITH RIGHT-SIDED SCIATICA: Primary | ICD-10-CM

## 2017-07-26 PROBLEM — L30.4 INTERTRIGO: Status: ACTIVE | Noted: 2017-07-26

## 2017-07-26 PROBLEM — I51.7 LVH (LEFT VENTRICULAR HYPERTROPHY): Status: ACTIVE | Noted: 2017-07-26

## 2017-07-26 PROBLEM — M54.32 SCIATICA OF LEFT SIDE: Status: ACTIVE | Noted: 2017-07-26

## 2017-07-26 PROBLEM — H10.10 ALLERGIC CONJUNCTIVITIS: Status: ACTIVE | Noted: 2017-07-26

## 2017-07-26 PROBLEM — Z79.899 LONG-TERM USE OF HIGH-RISK MEDICATION: Status: ACTIVE | Noted: 2017-07-26

## 2017-07-26 PROBLEM — M48.061 SPINAL STENOSIS, LUMBAR REGION, WITHOUT NEUROGENIC CLAUDICATION: Status: ACTIVE | Noted: 2017-07-26

## 2017-07-26 RX ORDER — DEXTROMETHORPHAN HYDROBROMIDE, GUAIFENESIN 5; 100 MG/5ML; MG/5ML
650 LIQUID ORAL
COMMUNITY

## 2017-07-26 RX ORDER — AMIODARONE HYDROCHLORIDE 200 MG/1
TABLET ORAL
COMMUNITY
End: 2017-08-21 | Stop reason: ALTCHOICE

## 2017-07-26 RX ORDER — NITROFURANTOIN 25; 75 MG/1; MG/1
100 CAPSULE ORAL 2 TIMES DAILY
COMMUNITY
End: 2017-08-21 | Stop reason: ALTCHOICE

## 2017-07-26 RX ORDER — METHYLPREDNISOLONE 4 MG/1
4 TABLET ORAL
Qty: 1 DOSE PACK | Refills: 0 | Status: SHIPPED | OUTPATIENT
Start: 2017-07-26 | End: 2017-08-21 | Stop reason: ALTCHOICE

## 2017-07-26 RX ORDER — CHOLECALCIFEROL (VITAMIN D3) 125 MCG
CAPSULE ORAL
COMMUNITY
End: 2017-08-21 | Stop reason: ALTCHOICE

## 2017-07-26 RX ORDER — CYCLOBENZAPRINE HCL 10 MG
10 TABLET ORAL
Qty: 21 TAB | Refills: 0 | Status: SHIPPED | OUTPATIENT
Start: 2017-07-26 | End: 2017-08-02

## 2017-07-26 NOTE — MR AVS SNAPSHOT
Visit Information Date & Time Provider Department Dept. Phone Encounter #  
 7/26/2017  1:00 PM Deepika Lackey NP [facility] [phone] [id number] Upcoming Health Maintenance Date Due DTaP/Tdap/Td series (1 - Tdap) 7/23/1964 FOBT Q 1 YEAR AGE 50-75 7/23/1993 ZOSTER VACCINE AGE 60> 5/23/2003 GLAUCOMA SCREENING Q2Y 7/23/2008 OSTEOPOROSIS SCREENING (DEXA) 7/23/2008 MEDICARE YEARLY EXAM 7/23/2008 Pneumococcal 65+ Low/Medium Risk (2 of 2 - PCV13) 1/8/2017 INFLUENZA AGE 9 TO ADULT 8/1/2017 BREAST CANCER SCRN MAMMOGRAM 4/26/2019 Allergies as of 7/26/2017  Review Complete On: 7/26/2017 By: Matthias Shoemaker No Known Allergies Current Immunizations  Reviewed on 1/7/2016 Name Date Influenza Vaccine (Quad) PF 1/8/2016  9:27 AM  
 Pneumococcal Polysaccharide (PPSV-23) 1/8/2016  9:24 AM  
  
 Not reviewed this visit You Were Diagnosed With   
  
 Codes Comments Acute right-sided low back pain with right-sided sciatica    -  Primary ICD-10-CM: M54.41 
ICD-9-CM: 724.2, 724.3 Vitals BP Pulse Temp Height(growth percentile) Weight(growth percentile) SpO2  
 138/85 (BP 1 Location: Left arm, BP Patient Position: Sitting) (!) 57 98.7 °F (37.1 °C) (Oral) 5' 3.5\" (1.613 m) 188 lb (85.3 kg) 91% BMI OB Status Smoking Status 32.78 kg/m2 Hysterectomy Never Smoker BMI and BSA Data Body Mass Index Body Surface Area 32.78 kg/m 2 1.95 m 2 Preferred Pharmacy Pharmacy Name Phone [id number], [address] [phone] Your Updated Medication List  
  
   
This list is accurate as of: 7/26/17  5:24 PM.  Always use your most recent med list.  
  
  
  
  
 amiodarone 200 mg tablet Commonly known as:  CORDARONE Take  by mouth. aspirin 81 mg tablet Take 81 mg by mouth daily. cyclobenzaprine 10 mg tablet Commonly known as:  FLEXERIL Take 1 Tab by mouth three (3) times daily as needed for Muscle Spasm(s) for up to 7 days. FISH OIL 1,000 mg Cap Generic drug:  omega-3 fatty acids-vitamin e Take 1 Cap by mouth two (2) times a day. losartan 50 mg tablet Commonly known as:  COZAAR  
take 1 tablet by mouth once daily MACROBID 100 mg capsule Generic drug:  nitrofurantoin (macrocrystal-monohydrate) Take 100 mg by mouth two (2) times a day. MELATIN PO Take  by mouth.  
  
 methylPREDNISolone 4 mg tablet Commonly known as:  Sanjay Midget Take 1 Tab by mouth Specific Days and Specific Times. metoprolol tartrate 25 mg tablet Commonly known as:  LOPRESSOR Take 1 Tab by mouth two (2) times a day. multivitamin tablet Commonly known as:  ONE A DAY Take 1 Tab by mouth daily. * naproxen sodium 220 mg tablet Commonly known as:  NAPROSYN Take 220 mg by mouth daily as needed. * ALEVE 220 mg Cap Generic drug:  naproxen sodium Take  by mouth. PRAVACHOL 20 mg tablet Generic drug:  pravastatin Take 20 mg by mouth daily. * TYLENOL EXTRA STRENGTH 500 mg tablet Generic drug:  acetaminophen Take 2,000 mg by mouth daily as needed for Pain. * TYLENOL ARTHRITIS PAIN 650 mg CR tablet Generic drug:  acetaminophen Take 650 mg by mouth every six (6) hours as needed for Pain. * Notice: This list has 4 medication(s) that are the same as other medications prescribed for you. Read the directions carefully, and ask your doctor or other care provider to review them with you. Prescriptions Sent to Pharmacy Refills  
 cyclobenzaprine (FLEXERIL) 10 mg tablet 0 Sig: Take 1 Tab by mouth three (3) times daily as needed for Muscle Spasm(s) for up to 7 days. Class: Normal  
 Pharmacy: [address] Ph #: [phone]  Route: Oral  
 methylPREDNISolone (MEDROL DOSEPACK) 4 mg tablet 0 Sig: Take 1 Tab by mouth Specific Days and Specific Times. Class: Normal  
 [facility]: [facility] [place], [address]  #: [phone] Route: Oral  
  
To-Do List   
 07/26/2017 Imaging:  XR SPINE LUMB 2 OR 3 V Patient Instructions Back Pain: Care Instructions Your Care Instructions Back pain has many possible causes. It is often related to problems with muscles and ligaments of the back. It may also be related to problems with the nerves, discs, or bones of the back. Moving, lifting, standing, sitting, or sleeping in an awkward way can strain the back. Sometimes you don't notice the injury until later. Arthritis is another common cause of back pain. Although it may hurt a lot, back pain usually improves on its own within several weeks. Most people recover in 12 weeks or less. Using good home treatment and being careful not to stress your back can help you feel better sooner. Follow-up care is a key part of your treatment and safety. Be sure to make and go to all appointments, and call your doctor if you are having problems. Its also a good idea to know your test results and keep a list of the medicines you take. How can you care for yourself at home? · Sit or lie in positions that are most comfortable and reduce your pain. Try one of these positions when you lie down: ¨ Lie on your back with your knees bent and supported by large pillows. ¨ Lie on the floor with your legs on the seat of a sofa or chair. Darcy Sermons on your side with your knees and hips bent and a pillow between your legs. ¨ Lie on your stomach if it does not make pain worse. · Do not sit up in bed, and avoid soft couches and twisted positions. Bed rest can help relieve pain at first, but it delays healing. Avoid bed rest after the first day of back pain. · Change positions every 30 minutes.  If you must sit for long periods of time, take breaks from sitting. Get up and walk around, or lie in a comfortable position. · Try using a heating pad on a low or medium setting for 15 to 20 minutes every 2 or 3 hours. Try a warm shower in place of one session with the heating pad. · You can also try an ice pack for 10 to 15 minutes every 2 to 3 hours. Put a thin cloth between the ice pack and your skin. · Take pain medicines exactly as directed. ¨ If the doctor gave you a prescription medicine for pain, take it as prescribed. ¨ If you are not taking a prescription pain medicine, ask your doctor if you can take an over-the-counter medicine. · Take short walks several times a day. You can start with 5 to 10 minutes, 3 or 4 times a day, and work up to longer walks. Walk on level surfaces and avoid hills and stairs until your back is better. · Return to work and other activities as soon as you can. Continued rest without activity is usually not good for your back. · To prevent future back pain, do exercises to stretch and strengthen your back and stomach. Learn how to use good posture, safe lifting techniques, and proper body mechanics. When should you call for help? Call your doctor now or seek immediate medical care if: 
· You have new or worsening numbness in your legs. · You have new or worsening weakness in your legs. (This could make it hard to stand up.) · You lose control of your bladder or bowels. Watch closely for changes in your health, and be sure to contact your doctor if: 
· Your pain gets worse. · You are not getting better after 2 weeks. Where can you learn more? Go to http://kim-natalia.info/. Enter Z311 in the search box to learn more about \"Back Pain: Care Instructions. \" Current as of: March 21, 2017 Content Version: 11.3 © 3026-7336 apta.me.  Care instructions adapted under license by [employer] (which disclaims liability or warranty for this information). If you have questions about a medical condition or this instruction, always ask your healthcare professional. [address] any warranty or liability for your use of this information. Introducing [facility]! Jonnathan Bell introduces Xignite patient portal. Now you can access parts of your medical record, email your doctor's office, and request medication refills online. 1. In your internet browser, go to [email or web address] 2. Click on the First Time User? Click Here link in the Sign In box. You will see the New Member Sign Up page. 3. Enter your Xignite Access Code exactly as it appears below. You will not need to use this code after youve completed the sign-up process. If you do not sign up before the expiration date, you must request a new code. · Xignite Access Code: [email or web address] Expires: 10/10/2017  6:29 AM 
 
4. Enter the last four digits of your Social Security Number (xxxx) and Date of Birth (mm/dd/yyyy) as indicated and click Submit. You will be taken to the next sign-up page. 5. Create a Xignite ID. This will be your Xignite login ID and cannot be changed, so think of one that is secure and easy to remember. 6. Create a Xignite password. You can change your password at any time. 7. Enter your Password Reset Question and Answer. This can be used at a later time if you forget your password. 8. Enter your e-mail address. You will receive e-mail notification when new information is available in [address] 9. Click Sign Up. You can now view and download portions of your medical record. 10. Click the Download Summary menu link to download a portable copy of your medical information. If you have questions, please visit the Frequently Asked Questions section of the X[employer]ite website. Remember, Xignite is NOT to be used for urgent needs. For medical emergencies, dial 911. Now available from your iPhone and Android! Please provide this summary of care documentation to your next provider. Your primary care clinician is listed as DEBORAH Jeffrey. If you have any questions after today's visit, please call [phone].

## 2017-07-26 NOTE — PROGRESS NOTES
Subjective:  Ms. Herb Corona is a very pleasant 74year old lady, patient of Dr. Leoncio Ramirez, who comes in today for evaluation of low back pain. She denied any injury and was not aware of any precipitating factors. All of her difficulties started last Saturday. She tells me that she's known for quite a while that she has degenerative arthritis throughout her spine. Maybe 2-3 times a year she gets an acute attack. This time the pain is located in her right lower back and radiates down her right leg. She denies any numbness or tingling down this right leg. She denies any groin pain. Denies any bowel or bladder difficulties. It is much worse when she is sitting or standing. Laying down helps her discomfort. She has used some Advil with very little relief. Physical Examination:  GENERAL:  On exam she is a pleasant lady in no acute distress. She is alert and oriented. She does ambulate with a limp. She is having difficulty getting from a sitting to standing position. NECK:  Supple without adenopathy or carotid bruits. CHEST:  Lungs were clear to auscultation, no rales or wheezes. CARDIAC:  Heart regular rhythm without murmur. EXTREMITIES:  No edema or calf tenderness. Distal pulses were present. NEUROMUSCULAR:  There is no pain on percussion of the lumbar spine. There is no CVA tenderness. There is no bruising or rashes noted. She did have pain on hyperextension and right lateral bending. SLRs are negative bilaterally. She does have full ROM of both hips. She had excellent strength in the lower extremities against resistance. Sensation is preserved. Reflexes are 2+ and symmetrical.    Studies: Three views of the lumbar spine reveal significant degenerative arthritis throughout the entire lumbar spine. Impression:  1. 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 Plan:  1.  It was opted to start her on a Medrol Dosepak along with some Cyclobenzaprine 10 mg three times a day as needed for muscle spasm. 2. She may use heat alternating with ice as needed. 3. She is cautioned of doing any heavy lifting or twisting until symptoms have improved. Should that fail to improve we certainly can consider a referral to ortho.

## 2017-07-26 NOTE — PROGRESS NOTES
Radha Cooper presents with   Chief Complaint   Patient presents with   [facility] PAIN     Patient of Dr Raúl Martell here with complaint of low back pain that started Saturday. Pain is mainly on th e right side & radiates down her leg. She has had this in the past.    1. Have you been to the ER, urgent care clinic since your last visit? Hospitalized since your last visit? No    2. Have you seen or consulted any other health care providers outside of the [address] [place] [address] since your last visit? Include any pap smears or colon screening.  No

## 2017-07-26 NOTE — PATIENT INSTRUCTIONS
Back Pain: Care Instructions  Your Care Instructions    Back pain has many possible causes. It is often related to problems with muscles and ligaments of the back. It may also be related to problems with the nerves, discs, or bones of the back. Moving, lifting, standing, sitting, or sleeping in an awkward way can strain the back. Sometimes you don't notice the injury until later. Arthritis is another common cause of back pain. Although it may hurt a lot, back pain usually improves on its own within several weeks. Most people recover in 12 weeks or less. Using good home treatment and being careful not to stress your back can help you feel better sooner. Follow-up care is a key part of your treatment and safety. Be sure to make and go to all appointments, and call your doctor if you are having problems. Its also a good idea to know your test results and keep a list of the medicines you take. How can you care for yourself at home? · Sit or lie in positions that are most comfortable and reduce your pain. Try one of these positions when you lie down:  ¨ Lie on your back with your knees bent and supported by large pillows. ¨ Lie on the floor with your legs on the seat of a sofa or chair. Clement Robin on your side with your knees and hips bent and a pillow between your legs. ¨ Lie on your stomach if it does not make pain worse. · Do not sit up in bed, and avoid soft couches and twisted positions. Bed rest can help relieve pain at first, but it delays healing. Avoid bed rest after the first day of back pain. · Change positions every 30 minutes. If you must sit for long periods of time, take breaks from sitting. Get up and walk around, or lie in a comfortable position. · Try using a heating pad on a low or medium setting for 15 to 20 minutes every 2 or 3 hours. Try a warm shower in place of one session with the heating pad. · You can also try an ice pack for 10 to 15 minutes every 2 to 3 hours.  Put a thin cloth between the ice pack and your skin. · Take pain medicines exactly as directed. ¨ If the doctor gave you a prescription medicine for pain, take it as prescribed. ¨ If you are not taking a prescription pain medicine, ask your doctor if you can take an over-the-counter medicine. · Take short walks several times a day. You can start with 5 to 10 minutes, 3 or 4 times a day, and work up to longer walks. Walk on level surfaces and avoid hills and stairs until your back is better. · Return to work and other activities as soon as you can. Continued rest without activity is usually not good for your back. · To prevent future back pain, do exercises to stretch and strengthen your back and stomach. Learn how to use good posture, safe lifting techniques, and proper body mechanics. When should you call for help? Call your doctor now or seek immediate medical care if:  · You have new or worsening numbness in your legs. · You have new or worsening weakness in your legs. (This could make it hard to stand up.)  · You lose control of your bladder or bowels. Watch closely for changes in your health, and be sure to contact your doctor if:  · Your pain gets worse. · You are not getting better after 2 weeks. Where can you learn more? Go to http://kim-natalia.info/. Enter A553 in the search box to learn more about \"Back Pain: Care Instructions. \"  Current as of: March 21, 2017  Content Version: 11.3  © 7221-8883 [employer]. Care instructions adapted under license by [employer] (which disclaims liability or warranty for this information). If you have questions about a medical condition or this instruction, always ask your healthcare professional. [address] any warranty or liability for your use of this information.

## 2017-07-31 RX ORDER — METOPROLOL TARTRATE 25 MG/1
TABLET, FILM COATED ORAL
Qty: 90 TAB | Refills: 0 | Status: SHIPPED | OUTPATIENT
Start: 2017-07-31 | End: 2017-07-31 | Stop reason: SDUPTHER

## 2017-07-31 NOTE — TELEPHONE ENCOUNTER
Requested Prescriptions     Pending Prescriptions Disp Refills    metoprolol tartrate (LOPRESSOR) 25 mg tablet [Pharmacy Med Name: Gonzalo Ready TAR 25MG   TAB] 90 Tab 0     Sig: TAKE ONE TABLET BY MOUTH TWICE DAILY       Last Refill: 3-14-17  Last visit:6-6-16

## 2017-08-01 RX ORDER — METOPROLOL TARTRATE 25 MG/1
TABLET, FILM COATED ORAL
Qty: 90 TAB | Refills: 0 | Status: SHIPPED | OUTPATIENT
Start: 2017-08-01 | End: 2017-10-30 | Stop reason: SDUPTHER

## 2017-08-01 NOTE — OP NOTES
[address], [address]   OP NOTE       Name:  Yvette Barreto   MR#:  [id number]   :  1945   Account #:  [de-identified]    Surgery Date:  2017   Date of Adm:  2017       DATE OF STUDY: 2017    READ: 2017    PREOPERATIVE DIAGNOSIS:      POSTOPERATIVE [email or web address]:      PROCEDURES PERFORMED:  Esophageal manometry with   impedance. ESTIMATED BLOOD LOSS: 0    SPECIMENS REMOVED: 0    ANESTHESIA:  0    INDICATION: Dysphagia. DESCRIPTION OF PROCEDURE: Lower esophageal sphincter   pressures were normal and with basal pressure 35.3 (normal 13-43)   and residual pressure normal at 12.3 (normal less than 15). The upper   esophageal sphincter basal pressure was low at 24.5 (normal )   with a normal residual pressure 7.1 (normal less than 12). There was   no peristalsis in the body of the esophagus with 100% of contractions   simultaneous, giving an impedance of 100% incomplete bolus   clearance.          MD ALO Bosch / AVE   D:  2017   13:57   T:  2017   14:22   Job #:  [id number]

## 2017-08-01 NOTE — TELEPHONE ENCOUNTER
Requested Prescriptions     Pending Prescriptions Disp Refills    metoprolol tartrate (LOPRESSOR) 25 mg tablet [Pharmacy Med Name: Sky Hernandez TAR 25MG   TAB] 90 Tab 0     Sig: TAKE ONE TABLET BY MOUTH TWICE DAILY       Last Refill: 3/14/17  Last visit:6/6/16

## 2017-08-21 ENCOUNTER — OFFICE VISIT (OUTPATIENT)
Dept: INTERNAL MEDICINE CLINIC | Age: 72
End: 2017-08-21

## 2017-08-21 VITALS
WEIGHT: 187.2 LBS | BODY MASS INDEX: 31.96 KG/M2 | SYSTOLIC BLOOD PRESSURE: 124 MMHG | HEART RATE: 66 BPM | HEIGHT: 64 IN | DIASTOLIC BLOOD PRESSURE: 80 MMHG

## 2017-08-21 DIAGNOSIS — N39.0 URINARY TRACT INFECTION, SITE NOT SPECIFIED: ICD-10-CM

## 2017-08-21 DIAGNOSIS — I10 ESSENTIAL HYPERTENSION: Primary | ICD-10-CM

## 2017-08-21 DIAGNOSIS — Z79.899 LONG-TERM USE OF HIGH-RISK MEDICATION: ICD-10-CM

## 2017-08-21 DIAGNOSIS — E78.00 HYPERCHOLESTEROLEMIA: ICD-10-CM

## 2017-08-21 LAB
ALBUMIN SERPL-MCNC: 4 G/DL (ref 3.9–5.4)
ALKALINE PHOS POC: 82 U/L (ref 38–126)
ALT SERPL-CCNC: 21 U/L (ref 9–52)
AST SERPL-CCNC: 20 U/L (ref 14–36)
BACTERIA UA POCT, BACTPOCT: ABNORMAL
BILIRUB UR QL STRIP: NEGATIVE
BUN BLD-MCNC: 26 MG/DL (ref 7–17)
CALCIUM BLD-MCNC: 9.8 MG/DL (ref 8.4–10.2)
CASTS UA POCT: ABNORMAL
CHLORIDE BLD-SCNC: 107 MMOL/L (ref 98–107)
CHOLEST SERPL-MCNC: 127 MG/DL (ref 0–200)
CK (CPK) POC: 106 U/L (ref 30–135)
CLUE CELLS, CLUEPOCT: NEGATIVE
CO2 POC: 29 MMOL/L (ref 22–32)
CREAT BLD-MCNC: 0.7 MG/DL (ref 0.7–1.2)
CRYSTALS UA POCT, CRYSPOCT: NEGATIVE
EGFR (POC): 85.4
EPITHELIAL CELLS POCT, EPITHPOCT: ABNORMAL
GLUCOSE POC: 83 MG/DL (ref 65–105)
GLUCOSE UR-MCNC: NEGATIVE MG/DL
GRAN# POC: 2.4 K/UL (ref 2–7.8)
GRAN% POC: 49.7 % (ref 37–92)
HCT VFR BLD CALC: 41.2 % (ref 37–51)
HDLC SERPL-MCNC: 52 MG/DL (ref 35–130)
HGB BLD-MCNC: 13 G/DL (ref 12–18)
KETONES P FAST UR STRIP-MCNC: NEGATIVE MG/DL
LDL CHOLESTEROL POC: 61.6 MG/DL (ref 0–130)
LY# POC: 2 K/UL (ref 0.6–4.1)
LY% POC: 43.5 % (ref 10–58.5)
MCH RBC QN: 28.3 PG (ref 26–32)
MCHC RBC-ENTMCNC: 31.6 G/DL (ref 30–36)
MCV RBC: 89 FL (ref 80–97)
MID #, POC: 0.3 K/UL (ref 0–1.8)
MID% POC: 6.8 % (ref 0.1–24)
MUCUS UA POCT, MUCPOCT: ABNORMAL
PH UR STRIP: 6 [PH] (ref 5–7)
PLATELET # BLD: 168 K/UL (ref 140–440)
POTASSIUM SERPL-SCNC: 4.4 MMOL/L (ref 3.6–5)
PROT SERPL-MCNC: 6.9 G/DL (ref 6.3–8.2)
PROTEIN,URINE POC: ABNORMAL MG/DL
RBC # BLD: 4.6 M/UL (ref 4.2–6.3)
RBC UA POCT, RBCPOCT: ABNORMAL
SODIUM SERPL-SCNC: 145 MMOL/L (ref 137–145)
SP GR UR STRIP: 1.02 (ref 1.01–1.02)
TCHOL/HDL RATIO (POC): 2.4 (ref 0–4)
TOTAL BILIRUBIN POC: 0.7 MG/DL (ref 0.2–1.3)
TRICH UA POCT, TRICHPOC: NEGATIVE
TRIGL SERPL-MCNC: 67 MG/DL (ref 0–200)
TSH BLD-ACNC: 0.71 UIU/ML (ref 0.4–4.2)
UA UROBILINOGEN AMB POC: NORMAL (ref 0.2–1)
URINALYSIS CLARITY POC: ABNORMAL
URINALYSIS COLOR POC: ABNORMAL
URINE BLOOD POC: ABNORMAL
URINE LEUKOCYTES POC: ABNORMAL
URINE NITRITES POC: POSITIVE
VLDLC SERPL CALC-MCNC: 13.4 MG/DL
WBC # BLD: 4.7 K/UL (ref 4.1–10.9)
WBC UA POCT, WBCPOCT: ABNORMAL
YEAST UA POCT, YEASTPOC: NEGATIVE

## 2017-08-21 RX ORDER — PRAVASTATIN SODIUM 20 MG/1
20 TABLET ORAL DAILY
Qty: 90 TAB | Refills: 3 | Status: SHIPPED | OUTPATIENT
Start: 2017-08-21 | End: 2018-08-19 | Stop reason: SDUPTHER

## 2017-08-21 NOTE — PROGRESS NOTES
This note will not be viewable in [address] Subjective:       Mrs. Joseph Hernandez presents to the office today in general medical follow-up. It has been a year since she was last here. She is had an aortic dissection and an aortic aneurysm repair. Patient returns in follow-up of her hypertension and hypercholesterolemia. The patient is on metoprolol and losartan for her blood pressure. She denies any fatigue or palpitations, dizzy spells or lower extremity edema. She has had no headaches numbness tingling or focal neurological problems. She remains on pravastatin for her hypercholesterolemia and tolerates this without muscle soreness or GI upset. She has no history of ASCVD and denies exertional chest pains or claudication.     Past Medical History:   Diagnosis Date    Allergic conjunctivitis 7/26/2017    Aortic dissection (HCC)     Arthritis     lower back    Asthma     Essential hypertension 8/21/2017    GERD (gastroesophageal reflux disease)     Hypercholesterolemia 8/21/2017    Hyperlipidemia     Hypertension     Intertrigo 7/26/2017    Long-term use of high-risk medication 7/26/2017    LVH (left ventricular hypertrophy) 7/26/2017    Positional vertigo 7/26/2017    PUD (peptic ulcer disease)     Sciatica of left side 7/26/2017    Spinal stenosis, lumbar region, without neurogenic claudication 7/26/2017     Past Surgical History:   Procedure Laterality Date    COLONOSCOPY N/A 7/12/2017    COLONOSCOPY WITH IV ANTIBIOTICS performed by Mae Ibarra MD at [address] [facility] [address]  7/12/2017         HX GYN      hysterectomy    HX GYN      tubal ligation    HX OTHER SURGICAL  2015    Type A Dissection Repair    OK COLONOSCOPY FLX DX W/COLLJ SPEC WHEN PFRMD  3/19/2012         UPPER GI ENDOSCOPY,BALL DIL,30MM  7/12/2017         UPPER GI ENDOSCOPY,BIOPSY  7/12/2017          No Known Allergies  Current Outpatient Prescriptions   Medication Sig Dispense Refill    pravastatin (PRAVACHOL) 20 mg tablet Take 1 Tab by mouth daily. 90 Tab 3    metoprolol tartrate (LOPRESSOR) 25 mg tablet TAKE ONE TABLET BY MOUTH TWICE DAILY 90 Tab 0    acetaminophen (TYLENOL ARTHRITIS PAIN) 650 mg CR tablet Take 650 mg by mouth every six (6) hours as needed for Pain.  losartan (COZAAR) 50 mg tablet take 1 tablet by mouth once daily 30 Tab 6    multivitamin (ONE A DAY) tablet Take 1 Tab by mouth daily.  aspirin 81 mg tablet Take 81 mg by mouth daily.  omega-3 fatty acids-vitamin e (FISH OIL) 1,000 mg Cap Take 1 Cap by mouth two (2) times a day. Social History     Social History    Marital status:      Spouse name: N/A    Number of children: N/A    Years of education: N/A     Social History Main Topics    Smoking status: Never Smoker    Smokeless tobacco: Never Used    Alcohol use 0.0 oz/week     0 Standard drinks or equivalent per week    Drug use: No    Sexual activity: Not Asked     Other Topics Concern    None     Social History Narrative     Family History   Problem Relation Age of Onset    Cancer Father      colon cancer       Review of Systems:  GEN: no weight loss, weight gain, fatigue or night sweats  CV: no PND, orthopnea, or palpitations  Resp: no dyspnea on exertion, no cough  Abd: no nausea, vomiting or diarrhea  EXT: denies edema, claudication  Endocrine: no hair loss, excessive thirst or polyuria  Neurological ROS: no TIA or stroke symptoms  ROS otherwise negative      Objective:     Visit Vitals    /80 (BP 1 Location: Right arm, BP Patient Position: Sitting)    Pulse 66    Ht 5' 3.5\" (1.613 m)    Wt 187 lb 3.2 oz (84.9 kg)    BMI 32.64 kg/m2     Body mass index is 32.64 kg/(m^2). General:   alert, cooperative and no distress   Eyes: conjunctivae/sclerae clear.  PERRL, EOM's intact   Mouth:  No oral lesions, no pharyngeal erythema, no exudates   Neck: Trachea midline, no thyromegaly, no bruits   Heart: S1 and S2 normal,no murmurs noted Lungs: Clear to auscultation bilaterally, no increased work of breathing   Abdomen: Soft, nontender. Normal bowel sounds   Extremities: No edema or cyanosis   Neuro: ..alert, oriented x3,speech normal in context and clarity, cranial nerves II-XII intact,motor strength: full proximally and distally,gait: normal  reflexes: full and symmetric     Physical exam otherwise negative         Assessment/Plan:     Diagnoses and all orders for this visit:    Essential hypertension  -     AMB POC COMPLETE CBC,AUTOMATED ENTER  -     AMB POC COMPREHENSIVE METABOLIC PANEL  -     AK COLLECTION VENOUS BLOOD,VENIPUNCTURE  -     AMB POC URINALYSIS DIP STICK AUTO W/ MICRO     Hypercholesterolemia  -     AMB POC LIPID PROFILE  -     AMB POC TSH  -     pravastatin (PRAVACHOL) 20 mg tablet; Take 1 Tab by mouth daily. , Normal, Disp-90 Tab, R-3    Long-term use of high-risk medication  -     AMB POC CK (CPK)        Other instructions: The patient's medications are reviewed and reconciled. No change in her current medical regimen is made. No added salt, prudent diet is encouraged    Await results of multiple labs. Follow-up Disposition:  Return in about 1 year (around 8/21/2018).     Paul Hardin MD

## 2017-08-21 NOTE — PATIENT INSTRUCTIONS

## 2017-08-21 NOTE — MR AVS SNAPSHOT
Visit Information Date & Time Provider Department Dept. Phone Encounter #  
 8/21/2017 10:30 AM [facility] Morales [facility] [phone] [id number] Follow-up Instructions Return in about 1 year (around 8/21/2018). Follow-up and Disposition History Your Appointments 8/20/2018  9:00 AM  
FOLLOW UP 10 with Felipa Bryan MD  
[facility] [address] ([address]) Appt Note: 1 yr fu  
 [address] [address] [address] [address] Upcoming Health Maintenance Date Due DTaP/Tdap/Td series (1 - Tdap) 7/23/1964 FOBT Q 1 YEAR AGE 50-75 7/23/1993 ZOSTER VACCINE AGE 60> 5/23/2003 GLAUCOMA SCREENING Q2Y 7/23/2008 OSTEOPOROSIS SCREENING (DEXA) 7/23/2008 MEDICARE YEARLY EXAM 7/23/2008 Pneumococcal 65+ Low/Medium Risk (2 of 2 - PCV13) 1/8/2017 INFLUENZA AGE 9 TO ADULT 8/1/2017 BREAST CANCER SCRN MAMMOGRAM 4/26/2019 Allergies as of 8/21/2017  Review Complete On: 8/21/2017 By: Felipa Bryan MD  
 No Known Allergies Current Immunizations  Reviewed on 1/7/2016 Name Date Influenza Vaccine (Quad) PF 1/8/2016  9:27 AM  
 Pneumococcal Polysaccharide (PPSV-23) 1/8/2016  9:24 AM  
  
 Not reviewed this visit You Were Diagnosed With   
  
 Codes Comments Essential hypertension    -  Primary ICD-10-CM: I10 
ICD-9-CM: 401.9 Hypercholesterolemia     ICD-10-CM: E78.00 ICD-9-CM: 272.0 Long-term use of high-risk medication     ICD-10-CM: Z79.899 ICD-9-CM: V58.69 Vitals BP Pulse Height(growth percentile) Weight(growth percentile) BMI OB Status 124/80 (BP 1 Location: Right arm, BP Patient Position: Sitting) 66 5' 3.5\" (1.613 m) 187 lb 3.2 oz (84.9 kg) 32.64 kg/m2 Hysterectomy Smoking Status Never Smoker BMI and BSA Data Body Mass Index Body Surface Area  
 32.64 kg/m 2 1.95 m 2 Preferred Pharmacy Pharmacy Name Phone [facility] [place], [place] - [address] [place] [address] Ashanti Elmore [phone] Your Updated Medication List  
  
   
This list is accurate as of: 8/21/17 11:28 AM.  Always use your most recent med list.  
  
  
  
  
 aspirin 81 mg tablet Take 81 mg by mouth daily. FISH OIL 1,000 mg Cap Generic drug:  omega-3 fatty acids-vitamin e Take 1 Cap by mouth two (2) times a day. losartan 50 mg tablet Commonly known as:  COZAAR  
take 1 tablet by mouth once daily  
  
 metoprolol tartrate 25 mg tablet Commonly known as:  LOPRESSOR  
TAKE ONE TABLET BY MOUTH TWICE DAILY  
  
 multivitamin tablet Commonly known as:  ONE A DAY Take 1 Tab by mouth daily. pravastatin 20 mg tablet Commonly known as:  PRAVACHOL Take 1 Tab by mouth daily. TYLENOL ARTHRITIS PAIN 650 mg CR tablet Generic drug:  acetaminophen Take 650 mg by mouth every six (6) hours as needed for Pain. Prescriptions Sent to Pharmacy Refills  
 pravastatin (PRAVACHOL) 20 mg tablet 3 Sig: Take 1 Tab by mouth daily. Class: Normal  
 Pharmacy: [facility], [address] [place] [address] Ph #: [phone] Route: Oral  
  
We Performed the Following AMB POC CK (CPK) [98518 CPT(R)] AMB POC COMPLETE CBC,AUTOMATED ENTER E9808142 CPT(R)] AMB POC COMPREHENSIVE METABOLIC PANEL [58942 CPT(R)] AMB POC LIPID PROFILE [77161 CPT(R)] AMB POC TSH [51620 CPT(R)] AMB POC URINALYSIS DIP STICK AUTO W/ MICRO  [16496 CPT(R)] DE COLLECTION VENOUS BLOOD,VENIPUNCTURE M6580053 CPT(R)] Follow-up Instructions Return in about 1 year (around 8/21/2018). Patient Instructions High Cholesterol: Care Instructions Your Care Instructions Cholesterol is a type of fat in your blood.  It is needed for many body functions, such as making new cells. Cholesterol is made by your body. It also comes from food you eat. High cholesterol means that you have too much of the fat in your blood. This raises your risk of a heart attack and stroke. LDL and HDL are part of your total cholesterol. LDL is the \"bad\" cholesterol. High LDL can raise your risk for heart disease, heart attack, and stroke. HDL is the \"good\" cholesterol. It helps clear bad cholesterol from the body. High HDL is linked with a lower risk of heart disease, heart attack, and stroke. Your cholesterol levels help your doctor find out your risk for having a heart attack or stroke. You and your doctor can talk about whether you need to lower your risk and what treatment is best for you. A heart-healthy lifestyle along with medicines can help lower your cholesterol and your risk. The way you choose to lower your risk will depend on how high your risk is for heart attack and stroke. It will also depend on how you feel about taking medicines. Follow-up care is a key part of your treatment and safety. Be sure to make and go to all appointments, and call your doctor if you are having problems. It's also a good idea to know your test results and keep a list of the medicines you take. How can you care for yourself at home? · Eat a variety of foods every day. Good choices include fruits, vegetables, whole grains (like oatmeal), dried beans and peas, nuts and seeds, soy products (like tofu), and fat-free or low-fat dairy products. · Replace butter, margarine, and hydrogenated or partially hydrogenated oils with olive and canola oils. (Canola oil margarine without trans fat is fine.) · Replace red meat with fish, poultry, and soy protein (like tofu). · Limit processed and packaged foods like chips, crackers, and cookies. · Bake, broil, or steam foods. Don't solis them. · Be physically active.  Get at least 30 minutes of exercise on most days of the week. Walking is a good choice. You also may want to do other activities, such as running, swimming, cycling, or playing tennis or team sports. · Stay at a healthy weight or lose weight by making the changes in eating and physical activity listed above. Losing just a small amount of weight, even 5 to 10 pounds, can reduce your risk for having a heart attack or stroke. · Do not smoke. When should you call for help? Watch closely for changes in your health, and be sure to contact your doctor if: 
· You need help making lifestyle changes. · You have questions about your medicine. Where can you learn more? Go to http://kimTwillionnatalia.info/. Enter J839 in the search box to learn more about \"High Cholesterol: Care Instructions. \" Current as of: April 3, 2017 Content Version: 11.3 © 4541-1791 [employer]. Care instructions adapted under license by [employer] (which disclaims liability or warranty for this information). If you have questions about a medical condition or this instruction, always ask your healthcare professional. [address] any warranty or liability for your use of this information. Patient Instructions History Introducing [facility]! [facility] introduces Ingenic patient portal. Now you can access parts of your medical record, email your doctor's office, and request medication refills online. 1. In your internet browser, go to [email or web address] 2. Click on the First Time User? Click Here link in the Sign In box. You will see the New Member Sign Up page. 3. Enter your Ingenic Access Code exactly as it appears below. You will not need to use this code after youve completed the sign-up process. If you do not sign up before the expiration date, you must request a new code. · Ingenic Access Code: [email or web address] Expires: 10/10/2017  6:29 AM 
 
 4. Enter the last four digits of your Social Security Number (xxxx) and Date of Birth (mm/dd/yyyy) as indicated and click Submit. You will be taken to the next sign-up page. 5. Create a [employer] ID. This will be your [employer] login ID and cannot be changed, so think of one that is secure and easy to remember. 6. Create a [employer] password. You can change your password at any time. 7. Enter your Password Reset Question and Answer. This can be used at a later time if you forget your password. 8. Enter your e-mail address. You will receive e-mail notification when new information is available in [address] 9. Click Sign Up. You can now view and download portions of your medical record. 10. Click the Download Summary menu link to download a portable copy of your medical information. If you have questions, please visit the Frequently Asked Questions section of the [employer] website. Remember, [employer] is NOT to be used for urgent needs. For medical emergencies, dial 911. Now available from your iPhone and Android! Please provide this summary of care documentation to your next provider. Your primary care clinician is listed as DEBORAH Banda 21. If you have any questions after today's visit, please call [phone].

## 2017-08-21 NOTE — PROGRESS NOTES
Kayla De La Rosa is a 74 y.o. female presenting for Hypertension (1 yr fu) and Cholesterol Problem (1 yr fu)  . 1. Have you been to the ER, urgent care clinic since your last visit? Hospitalized since your last visit? No    2. Have you seen or consulted any other health care providers outside of the [address] [place] [address] since your last visit? Include any pap smears or colon screening. No    Fall Risk Assessment, last 12 mths 7/26/2017   Able to walk? Yes   Fall in past 12 months? No         Abuse Screening Questionnaire 8/21/2017   Do you ever feel afraid of your partner? N   Are you in a relationship with someone who physically or mentally threatens you? N   Is it safe for you to go home? Y       PHQ over the last two weeks 7/26/2017   Little interest or pleasure in doing things Not at all   Feeling down, depressed or hopeless Not at all   Total Score PHQ 2 0       There are no discontinued medications.

## 2017-08-23 LAB
BACTERIA UR CULT: ABNORMAL
BACTERIA UR CULT: ABNORMAL

## 2017-08-24 RX ORDER — AMOXICILLIN 250 MG/1
250 CAPSULE ORAL 3 TIMES DAILY
Qty: 21 CAP | Refills: 0 | Status: SHIPPED | OUTPATIENT
Start: 2017-08-24 | End: 2017-08-31

## 2018-01-02 RX ORDER — LOSARTAN POTASSIUM 50 MG/1
TABLET ORAL
Qty: 30 TAB | Refills: 6 | Status: SHIPPED | OUTPATIENT
Start: 2018-01-02 | End: 2018-07-24 | Stop reason: SDUPTHER

## 2018-05-01 ENCOUNTER — HOSPITAL ENCOUNTER (OUTPATIENT)
Dept: MAMMOGRAPHY | Age: 73
Discharge: HOME | End: 2018-05-01
Attending: INTERNAL MEDICINE
Payer: MEDICARE

## 2018-05-01 DIAGNOSIS — Z12.31 VISIT FOR SCREENING MAMMOGRAM: ICD-10-CM

## 2018-05-01 PROCEDURE — 77067 SCR MAMMO BI INCL CAD: CPT

## 2018-05-16 DIAGNOSIS — Z98.890 S/P ASCENDING AORTIC ANEURYSM REPAIR: Primary | ICD-10-CM

## 2018-05-16 DIAGNOSIS — Z86.79 S/P ASCENDING AORTIC ANEURYSM REPAIR: Primary | ICD-10-CM

## 2018-05-22 ENCOUNTER — APPOINTMENT (OUTPATIENT)
Dept: GENERAL RADIOLOGY | Age: 73
End: 2018-05-22
Attending: NURSE PRACTITIONER
Payer: MEDICARE

## 2018-05-22 ENCOUNTER — HOSPITAL ENCOUNTER (EMERGENCY)
Age: 73
Discharge: HOME | End: 2018-05-22
Attending: EMERGENCY MEDICINE | Admitting: EMERGENCY MEDICINE
Payer: MEDICARE

## 2018-05-22 ENCOUNTER — APPOINTMENT (OUTPATIENT)
Dept: CT IMAGING | Age: 73
End: 2018-05-22
Attending: EMERGENCY MEDICINE
Payer: MEDICARE

## 2018-05-22 VITALS
DIASTOLIC BLOOD PRESSURE: 83 MMHG | TEMPERATURE: 97.6 F | RESPIRATION RATE: 21 BRPM | HEIGHT: 65 IN | OXYGEN SATURATION: 97 % | SYSTOLIC BLOOD PRESSURE: 155 MMHG | WEIGHT: 196.06 LBS | BODY MASS INDEX: 32.66 KG/M2 | HEART RATE: 70 BPM

## 2018-05-22 DIAGNOSIS — R06.02 SOB (SHORTNESS OF BREATH): ICD-10-CM

## 2018-05-22 DIAGNOSIS — R00.2 PALPITATIONS: Primary | ICD-10-CM

## 2018-05-22 LAB
ALBUMIN SERPL-MCNC: 3.5 G/DL (ref 3.5–5)
ALBUMIN/GLOB SERPL: 1 {RATIO} (ref 1.1–2.2)
ALP SERPL-CCNC: 78 U/L (ref 45–117)
ALT SERPL-CCNC: 44 U/L (ref 12–78)
ANION GAP SERPL CALC-SCNC: 8 MMOL/L (ref 5–15)
AST SERPL-CCNC: 28 U/L (ref 15–37)
ATRIAL RATE: 67 BPM
BASOPHILS # BLD: 0 K/UL (ref 0–0.1)
BASOPHILS NFR BLD: 0 % (ref 0–1)
BILIRUB SERPL-MCNC: 0.4 MG/DL (ref 0.2–1)
BNP SERPL-MCNC: 1896 PG/ML (ref 0–125)
BUN SERPL-MCNC: 23 MG/DL (ref 6–20)
BUN/CREAT SERPL: 32 (ref 12–20)
CALCIUM SERPL-MCNC: 9.2 MG/DL (ref 8.5–10.1)
CALCULATED R AXIS, ECG10: -8 DEGREES
CALCULATED T AXIS, ECG11: 60 DEGREES
CHLORIDE SERPL-SCNC: 116 MMOL/L (ref 97–108)
CO2 SERPL-SCNC: 21 MMOL/L (ref 21–32)
CREAT SERPL-MCNC: 0.72 MG/DL (ref 0.55–1.02)
D DIMER PPP FEU-MCNC: 2.28 MG/L FEU (ref 0–0.65)
DIAGNOSIS, 93000: NORMAL
DIFFERENTIAL METHOD BLD: ABNORMAL
EOSINOPHIL # BLD: 0.2 K/UL (ref 0–0.4)
EOSINOPHIL NFR BLD: 3 % (ref 0–7)
ERYTHROCYTE [DISTWIDTH] IN BLOOD BY AUTOMATED COUNT: 15.5 % (ref 11.5–14.5)
GLOBULIN SER CALC-MCNC: 3.4 G/DL (ref 2–4)
GLUCOSE SERPL-MCNC: 91 MG/DL (ref 65–100)
HCT VFR BLD AUTO: 44.2 % (ref 35–47)
HGB BLD-MCNC: 13.4 G/DL (ref 11.5–16)
IMM GRANULOCYTES # BLD: 0 K/UL (ref 0–0.04)
IMM GRANULOCYTES NFR BLD AUTO: 0 % (ref 0–0.5)
LYMPHOCYTES # BLD: 2.1 K/UL (ref 0.8–3.5)
LYMPHOCYTES NFR BLD: 39 % (ref 12–49)
MAGNESIUM SERPL-MCNC: 2.1 MG/DL (ref 1.6–2.4)
MCH RBC QN AUTO: 28.7 PG (ref 26–34)
MCHC RBC AUTO-ENTMCNC: 30.3 G/DL (ref 30–36.5)
MCV RBC AUTO: 94.6 FL (ref 80–99)
MONOCYTES # BLD: 0.7 K/UL (ref 0–1)
MONOCYTES NFR BLD: 13 % (ref 5–13)
NEUTS SEG # BLD: 2.4 K/UL (ref 1.8–8)
NEUTS SEG NFR BLD: 44 % (ref 32–75)
NRBC # BLD: 0 K/UL (ref 0–0.01)
NRBC BLD-RTO: 0 PER 100 WBC
PHOSPHATE SERPL-MCNC: 3 MG/DL (ref 2.6–4.7)
PLATELET # BLD AUTO: 149 K/UL (ref 150–400)
PMV BLD AUTO: 11.4 FL (ref 8.9–12.9)
POTASSIUM SERPL-SCNC: 3.9 MMOL/L (ref 3.5–5.1)
PROT SERPL-MCNC: 6.9 G/DL (ref 6.4–8.2)
Q-T INTERVAL, ECG07: 420 MS
QRS DURATION, ECG06: 92 MS
QTC CALCULATION (BEZET), ECG08: 443 MS
RBC # BLD AUTO: 4.67 M/UL (ref 3.8–5.2)
SODIUM SERPL-SCNC: 145 MMOL/L (ref 136–145)
TROPONIN I SERPL-MCNC: <0.04 NG/ML
TROPONIN I SERPL-MCNC: <0.04 NG/ML
VENTRICULAR RATE, ECG03: 67 BPM
WBC # BLD AUTO: 5.4 K/UL (ref 3.6–11)

## 2018-05-22 PROCEDURE — 74174 CTA ABD&PLVS W/CONTRAST: CPT

## 2018-05-22 PROCEDURE — 93005 ELECTROCARDIOGRAM TRACING: CPT

## 2018-05-22 PROCEDURE — 74011636320 HC RX REV CODE- 636/320: Performed by: EMERGENCY MEDICINE

## 2018-05-22 PROCEDURE — 99285 EMERGENCY DEPT VISIT HI MDM: CPT

## 2018-05-22 PROCEDURE — G8978 MOBILITY CURRENT STATUS: HCPCS

## 2018-05-22 PROCEDURE — 36415 COLL VENOUS BLD VENIPUNCTURE: CPT | Performed by: EMERGENCY MEDICINE

## 2018-05-22 PROCEDURE — 74011000258 HC RX REV CODE- 258: Performed by: EMERGENCY MEDICINE

## 2018-05-22 PROCEDURE — 84484 ASSAY OF TROPONIN QUANT: CPT | Performed by: NURSE PRACTITIONER

## 2018-05-22 PROCEDURE — 71046 X-RAY EXAM CHEST 2 VIEWS: CPT

## 2018-05-22 PROCEDURE — 85379 FIBRIN DEGRADATION QUANT: CPT | Performed by: NURSE PRACTITIONER

## 2018-05-22 PROCEDURE — 97116 GAIT TRAINING THERAPY: CPT

## 2018-05-22 PROCEDURE — G8979 MOBILITY GOAL STATUS: HCPCS

## 2018-05-22 PROCEDURE — 80053 COMPREHEN METABOLIC PANEL: CPT | Performed by: NURSE PRACTITIONER

## 2018-05-22 PROCEDURE — 85025 COMPLETE CBC W/AUTO DIFF WBC: CPT | Performed by: NURSE PRACTITIONER

## 2018-05-22 PROCEDURE — 83880 ASSAY OF NATRIURETIC PEPTIDE: CPT | Performed by: NURSE PRACTITIONER

## 2018-05-22 PROCEDURE — 83735 ASSAY OF MAGNESIUM: CPT | Performed by: NURSE PRACTITIONER

## 2018-05-22 PROCEDURE — 97161 PT EVAL LOW COMPLEX 20 MIN: CPT

## 2018-05-22 PROCEDURE — 84100 ASSAY OF PHOSPHORUS: CPT | Performed by: NURSE PRACTITIONER

## 2018-05-22 PROCEDURE — 74011250637 HC RX REV CODE- 250/637: Performed by: EMERGENCY MEDICINE

## 2018-05-22 PROCEDURE — 93270 REMOTE 30 DAY ECG REV/REPORT: CPT | Performed by: NURSE PRACTITIONER

## 2018-05-22 PROCEDURE — 71275 CT ANGIOGRAPHY CHEST: CPT

## 2018-05-22 PROCEDURE — G8980 MOBILITY D/C STATUS: HCPCS

## 2018-05-22 PROCEDURE — 93306 TTE W/DOPPLER COMPLETE: CPT

## 2018-05-22 RX ORDER — FUROSEMIDE 10 MG/ML
40 INJECTION INTRAMUSCULAR; INTRAVENOUS
Status: DISCONTINUED | OUTPATIENT
Start: 2018-05-22 | End: 2018-05-22

## 2018-05-22 RX ORDER — SODIUM CHLORIDE 0.9 % (FLUSH) 0.9 %
10 SYRINGE (ML) INJECTION
Status: COMPLETED | OUTPATIENT
Start: 2018-05-22 | End: 2018-05-22

## 2018-05-22 RX ORDER — FUROSEMIDE 40 MG/1
40 TABLET ORAL DAILY
Qty: 20 TAB | Refills: 0 | Status: SHIPPED | OUTPATIENT
Start: 2018-05-22 | End: 2018-05-31 | Stop reason: SDUPTHER

## 2018-05-22 RX ORDER — FUROSEMIDE 40 MG/1
40 TABLET ORAL DAILY
Status: DISCONTINUED | OUTPATIENT
Start: 2018-05-23 | End: 2018-05-22

## 2018-05-22 RX ORDER — POTASSIUM CHLORIDE 20 MEQ/1
20 TABLET, EXTENDED RELEASE ORAL DAILY
Qty: 20 TAB | Refills: 0 | Status: SHIPPED | OUTPATIENT
Start: 2018-05-22 | End: 2018-05-31 | Stop reason: SDUPTHER

## 2018-05-22 RX ORDER — FUROSEMIDE 40 MG/1
40 TABLET ORAL
Status: COMPLETED | OUTPATIENT
Start: 2018-05-22 | End: 2018-05-22

## 2018-05-22 RX ADMIN — Medication 10 ML: at 13:36

## 2018-05-22 RX ADMIN — SODIUM CHLORIDE 100 ML: 900 INJECTION, SOLUTION INTRAVENOUS at 13:36

## 2018-05-22 RX ADMIN — FUROSEMIDE 40 MG: 40 TABLET ORAL at 17:13

## 2018-05-22 RX ADMIN — IOPAMIDOL 100 ML: 755 INJECTION, SOLUTION INTRAVENOUS at 13:36

## 2018-05-22 NOTE — CONSULTS
CARDIOLOGY CONSULT NOTE     [facility] [address], [address] [place] [address], [place] [address]   [phone] fax [phone]    Name: Millicent Matt  1945 [id number]  5/22/2018 2:22 PM     Assessment/Plan:     Assessment:      1. Palpitations/frequent PAC- will get op loop, no afib seen here, keep k and mag repleted, fu cardiology   2. MR- moderate by last Echo, repeat today for progression with dyspena and pac  3. Ao Dissection s/p repair stable on CTA today  4. LE edema- mild, chronic, not impressive today    Soc no tob no eoth  Fhx +HTN  Admit Date: 5/22/2018     Primary Care Physician:Haroldo Ohara MD     Attending Provider: Ashlie Yu MD  REASON FOR CONSULT: palpitations  Requesting Physician: Ben Mcdowell    Subjective: Millicent Matt is a 74 y.o. female     Millicent Matt is a 66 y.o.  female who was referred for cardiac evaluation by Dr. Inocente Garay. 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 Isi Rosen, last seen in April 2016 for routine follow up with repeat CTA (results below). Other PMH significant for HTN, high cholesterol, GERD/PUD, asthma and arthritis. Pt reports she was in her usual state of health until about 2 months ago when she started to experience intermittent CP and pain between her shoulder blades. No aggravating or relieving factors of the pain, would come and go on its own. She then started to feel more SOB with activity since the beginning of May. She made a follow up appointment with her cardiologist who ordered a repeat chest CTA and cardiac cath, results below. She was also placed on Imdur but was not able to tolerate this due to HA and nausea. She denies any dizziness, palpitations, orthopnea, PND or edema. She currently reports that she has not experienced any pain recently, but continues to have MIMS. Today in Er feeling better, edema is better, having frequent Pac but no afib on monitor. Agreeable to loop and echo. No chest pain. Has been a little tired but nothing severe. admitted for There are no admission diagnoses documented for this encounter. .Patient complains of  dyspnea, palpitations. Review of Symptoms:  A comprehensive review of systems was negative except for that written in the HPI. Previous treatment/evaluation includes echocardiogram .  Cardiac risk factors: hypertension, stress, post-menopausal.    Past Medical History:   Diagnosis Date    Allergic conjunctivitis 7/26/2017    Aortic dissection (HCC)     Arthritis     lower back    Asthma     Essential hypertension 8/21/2017    GERD (gastroesophageal reflux disease)     Hypercholesterolemia 8/21/2017    Hyperlipidemia     Hypertension     Intertrigo 7/26/2017    Long-term use of high-risk medication 7/26/2017    LVH (left ventricular hypertrophy) 7/26/2017    Positional vertigo 7/26/2017    PUD (peptic ulcer disease)     Sciatica of left side 7/26/2017    Spinal stenosis, lumbar region, without neurogenic claudication 7/26/2017     Past Surgical History:   Procedure Laterality Date    COLONOSCOPY N/A 7/12/2017    COLONOSCOPY WITH IV ANTIBIOTICS performed by Landry King MD at [facility]  7/12/2017         HX GYN      hysterectomy    HX GYN      tubal ligation    HX OTHER SURGICAL  2015    Type A Dissection Repair    FL COLONOSCOPY FLX DX W/COLLJ SPEC WHEN PFRMD  3/19/2012         UPPER GI ENDOSCOPY,BALL DIL,30MM  7/12/2017         UPPER GI ENDOSCOPY,BIOPSY  7/12/2017          No current facility-administered medications for this encounter.       Current Outpatient Prescriptions   Medication Sig    metoprolol tartrate (LOPRESSOR) 25 mg tablet TAKE ONE TABLET BY MOUTH TWICE DAILY    losartan (COZAAR) 50 mg tablet take 1 tablet by mouth once daily    metoprolol tartrate (LOPRESSOR) 25 mg tablet TAKE ONE TABLET BY MOUTH TWICE DAILY    pravastatin (PRAVACHOL) 20 mg tablet Take 1 Tab by mouth daily.  acetaminophen (TYLENOL ARTHRITIS PAIN) 650 mg CR tablet Take 650 mg by mouth every six (6) hours as needed for Pain.  multivitamin (ONE A DAY) tablet Take 1 Tab by mouth daily.  aspirin 81 mg tablet Take 81 mg by mouth daily.  omega-3 fatty acids-vitamin e (FISH OIL) 1,000 mg Cap Take 1 Cap by mouth two (2) times a day. No Known Allergies   Family History   Problem Relation Age of Onset    Cancer Father      colon cancer      Social History     Social History    Marital status:      Spouse name: N/A    Number of children: N/A    Years of education: N/A     Social History Main Topics    Smoking status: Never Smoker    Smokeless tobacco: Never Used    Alcohol use 0.0 oz/week     0 Standard drinks or equivalent per week    Drug use: No    Sexual activity: Not Asked     Other Topics Concern    None     Social History Narrative          Objective:      Physical Exam  Vitals:    05/22/18 1205 05/22/18 1215 05/22/18 1300 05/22/18 1415   BP: 135/85 157/87 134/71 154/86   Pulse: (!) 54 63 69 67   Resp:   19 20   Temp:       SpO2: 96% 93% 93% 95%   Weight:       Height:           General:  Alert, cooperative, no distress, appears stated age. Eyes:  Conjunctivae/corneas clear. Ears:  Normal external ear canals both ears. Nose: Nares normal. No drainage    Mouth/Throat: Moist mucous membranes. Dentition normal.    Neck: Supple, symmetrical, trachea midline, no carotid bruit and no JVD. Back:   Symmetric, no curvature. ROM normal.    Lungs:   Clear to auscultation bilaterally. Heart:  Irregular rate and rhythm, S1, S2 normal, no murmur, click, rub or gallop. Abdomen:   Soft, non-tender. Bowel sounds normal. No masses,  No organomegaly. Extremities: Extremities normal, atraumatic, no cyanosis, 1+ edema bilat. Vascular: 1+ and symmetric all extremities.    Skin: Skin color normal. No rashes or lesions   Lymph nodes: Not assessed   Neurologic: CNII-XII intact. Normal strength throughout. Telemetry: PAC  ECG: PAC's noted    Data Review:     Recent Labs      05/22/18   1058   TROIQ  <0.04     Recent Labs      05/22/18   1058   NA  145   K  3.9   CL  116*   CO2  21   BUN  23*   CREA  0.72   GLU  91   PHOS  3.0   CA  9.2     Recent Labs      05/22/18   1058   WBC  5.4   HGB  13.4   HCT  44.2   PLT  149*     Recent Labs      05/22/18   1058   SGOT  28   AP  78     No results for input(s): CHOL, LDLC in the last 72 hours. No lab exists for component: TGL, HDLC,  HBA1C  No results for input(s): CRP, TSH, TSHEXT, TSHEXT in the last 72 hours. No lab exists for component: ESR  Thank you very much for this referral. I appreciate the opportunity to participate in this patient's care. I will follow along with above stated plan.     Thomas Galindo MD  [facility] [address] [place] [address] [address], [address] [facility] [address]  [facility]  [phone]    Pilar Perez MD

## 2018-05-22 NOTE — ED TRIAGE NOTES
Pt c/o palpitations and sob for a few days, denies cp, denies fever, denies n/v , worse with walking , denies cough

## 2018-05-22 NOTE — ED NOTES
10:46 AM  I have evaluated the patient as the Provider in Triage. I have reviewed Her vital signs and the triage nurse assessment. I have talked with the patient and any available family and advised that I am the provider in triage and have ordered the appropriate study to initiate their work up based on the clinical presentation during my assessment. I have advised that the patient will be accommodated in the Main ED as soon as possible. I have also requested to contact the triage nurse or myself immediately if the patient experiences any changes in their condition during this brief waiting period.   Kelsey Bourgeois, NP

## 2018-05-22 NOTE — ED NOTES
2:03 PM  Dr. Neva Hassan: Change of shift. Care of patient taken over from Dr. Quinton Isidro; H&P reviewed, bedside handoff complete. Awaiting Cardiology consult. PROGRESS NOTE:  2:58 PM  Cardiology has seen the patient. Cardiology midlevel provider has ordered a D-dimer, BNP, echocardiogram, and event monitor. Plan is to discharge the patient after these results are available. PROGRESS NOTE:  4:24 PM  Patient reports not being on a diuretic at this time. PROGRESS NOTE:  4:29 PM  Spoke with Dr. Long Cary MD, Cardiology. Dr. Arnav Calvillo recommends starting the patient on a diuretic and discharging her home with instructions to follow up with her office in ~1 week.

## 2018-05-22 NOTE — ED NOTES
Discharge instructions reviewed with and given to pt by ER MD.  No obvious distress noted at time of discharge, no questions per pt. Taken to waiting room via wheelchair to wait for family for ride.

## 2018-05-22 NOTE — SENIOR SERVICES NOTE
physical Therapy Emergency Department EVALUATION/DISCHARGE with 6 MWT  Patient: Brook Elmore (74 y.o. female)  Date: 5/22/2018  Primary Diagnosis: There are no admission diagnoses documented for this encounter. Precautions:      ASSESSMENT :  Chart reviewed. Patient cleared to be seen by MD. Patient presents to ED with \"palitations\" and episodes of SOB. Based on the objective data described below, the patient presents with slow but steady gait pattern and generally decreased activity tolerance, but otherwise approaching functional baseline. Upon entering the room, patient holding abdomin/lower chest (between the xiphoid and the belly button) and seemed to have an increased RR. Vitals stable. Patient stating \"it may be gas\". Patient sitting EOB without assist and RR returning to normal. In fact, breathing stable throughout assessment. Initiated 6 MWT without device. Narrow ALEKSANDAR observed and gait speed very slow. Given a RW to increase patient's confidence and speed to assess O2. Patient completing x 168 feet, SBA in total. O2 >93% throughout on RA. Patient resting in bed with vitals stable. No c/o of palpations or SOB at his time. Awaiting results of chest/abdomin CT. If medically stable, recommend return home with family without need for further PT services at this time. Discussed the benefits of a rollator in the future. Patient to follow up with PCP. Safe to return home using her cane and with family at this time. Further acute physical therapy is not indicated at this time. PLAN :  Discharge Recommendations:     [x]   Home with family  []   Skilled nursing facility  []   Admission to hospital with rehab likely needed  []   Inpatient rehab referral  []   Outpatient physical therapy referral  []   Other:    Further Equipment Recommendations for Discharge: Discussed the benefits of a rollator in the future. Patient to follow up with PCP. Safe using a cane to return home today.    []   Rolling walker with 5\" wheels  []   Crutches   []   [address] [facility]   []   Wheelchair   []   Other:     COMMUNICATION/EDUCATION:   Communication/Collaboration:  [x]   Fall prevention education was provided and the patient/caregiver indicated understanding. [x]   Patient/family have participated as able and agree with findings and recommendations. []   Patient is unable to participate in plan of care at this time. Findings and recommendations were discussed with: MD physician       SUBJECTIVE:   Patient stated Maybe I should describe it as being light headed?     OBJECTIVE DATA SUMMARY:   HISTORY:    Past Medical History:   Diagnosis Date    Allergic conjunctivitis 7/26/2017    Aortic dissection (HCC)     Arthritis     lower back    Asthma     Essential hypertension 8/21/2017    GERD (gastroesophageal reflux disease)     Hypercholesterolemia 8/21/2017    Hyperlipidemia     Hypertension     Intertrigo 7/26/2017    Long-term use of high-risk medication 7/26/2017    LVH (left ventricular hypertrophy) 7/26/2017    Positional vertigo 7/26/2017    PUD (peptic ulcer disease)     Sciatica of left side 7/26/2017    Spinal stenosis, lumbar region, without neurogenic claudication 7/26/2017     Past Surgical History:   Procedure Laterality Date    COLONOSCOPY N/A 7/12/2017    COLONOSCOPY WITH IV ANTIBIOTICS [facility] Dearl Holstein[facility]  7/12/2017         HX GYN      hysterectomy    HX GYN      tubal ligation    HX OTHER SURGICAL  2015    Type A Dissection Repair    [place] COLONOSCOPY FLX DX W/COLLJ SPEC WHEN PFRMD  3/19/2012         UPPER GI ENDOSCOPY,BALL DIL,30MM  7/12/2017         UPPER GI ENDOSCOPY,BIOPSY  7/12/2017          Prior Level of Function/Home Situation: Patient lives with her  and son. She ambulate independently within her house and uses a cane for community ambulation. Patient admits that she used to walk almost a mile a day, but she has recently cut back. No falls reported. Personal factors and/or comorbidities impacting plan of care:     Home Situation  Home Environment: Private residence  # Steps to Enter: 4  Rails to Enter: Yes  Hand Rails : Bilateral  Wheelchair Ramp: No  One/Two Story Residence: Two story  # of Interior Steps: 12  Interior Rails: Right  Living Alone: No  Support Systems: Spouse/Significant Other/Partner, Child(aj) (son)  Patient Expects to be Discharged to[de-identified] Private residence  Current DME Used/Available at Home: Cane, straight    EXAMINATION/PRESENTATION/DECISION MAKING:   Hearing: Auditory  Auditory Impairment: None     Strength:    Strength: Within functional limits     Tone & Sensation:   Tone: Normal  Sensation: Intact      Coordination:  Coordination: Within functional limits    Functional Mobility:  Bed Mobility:  Supine to Sit: Stand-by assistance  Sit to Supine: Stand-by assistance  Transfers:  Sit to Stand: Stand-by assistance  Stand to Sit: Stand-by assistance  Balance:   Sitting: Intact  Standing: Impaired  Standing - Static: Good  Standing - Dynamic : Fair  Ambulation/Gait Training:  Distance (ft): 168 Feet (ft)  Assistive Device: Gait belt  Ambulation - Level of Assistance: Stand-by assistance;Contact guard assistance  Gait Abnormalities: Decreased step clearance; Path deviations  Base of Support: Narrowed  Speed/Angy: Slow  Step Length: Left shortened;Right shortened    Special Tests:  6 MWT results: on RA  Distance Walked in Feet (ft): 168 ft. Pre Heart Rate: 54   Pre O2 Saturation: 96      Post Heart Rate: 63   Post O2 Saturation: 93   Assistive device used: Assistive Device: Gait belt       Normative data:   Men 39-80 years old = 1889 feet; Women 3680 years auw=8035 feet  Modified 10 point Luiz RPE scale utilized:  0 = no breathlessness at all ---> 10 = maximum exertion  Please refer to the flowsheet for any additional vital signs taken during this treatment.      Tinetti test:    Sitting Balance: 1  Arises: 1  Attempts to Rise: 2  Immediate Standing Balance: 2  Standing Balance: 2  Nudged: 1  Eyes Closed: 0  Turn 360 Degrees - Continuous/Discontinuous: 0  Turn 360 Degrees - Steady/Unsteady: 1  Sitting Down: 2  Balance Score: 12  Indication of Gait: 1  R Step Length/Height: 0  L Step Length/Height: 0  R Foot Clearance: 1  L Foot Clearance: 1  Step Symmetry: 1  Step Continuity: 1  Path: 1  Trunk: 1  Walking Time: 1  Gait Score: 8  Total Score: 20       Tinetti Test and G-code impairment scale:  Percentage of Impairment CH    0%   CI    1-19% CJ    20-39% CK    40-59% CL    60-79% CM    80-99% CN     100%   Tinetti  Score 0-28 28 23-27 17-22 12-16 6-11 1-5 0       Tinetti Tool Score Risk of Falls  <19 = High Fall Risk  19-24 = Moderate Fall Risk  25-28 = Low Fall Risk  Tinetti ME. Performance-Oriented Assessment of Mobility Problems in Elderly Patients. Gonsales 64; [id number]. (Scoring Description: PT Bulletin Feb. 10, 1993)    Older adults: Artist Wili et al, 2009; n = 1000 [facility] elderly evaluated with ABC, NANO, ADL, and IADL)  · Mean NANO score for males aged 69-68 years = 26.21(3.40)  · Mean NANO score for females age 69-68 years = 25.16(4.30)  · Mean NANO score for males over 80 years = 23.29(6.02)  · Mean NANO score for females over 80 years = 17.20(8.32)          In compliance with CMSs Claims Based Outcome Reporting, the following G-code set was chosen for this patient based on their primary functional limitation being treated: The outcome measure chosen to determine the severity of the functional limitation was the Tinetti with a score of 20/28 which was correlated with the impairment scale.     ? Mobility - Walking and Moving Around:     - CURRENT STATUS: CJ - 20%-39% impaired, limited or restricted    - GOAL STATUS: CJ - 20%-39% impaired, limited or restricted    - D/C STATUS:  CJ - 20%-39% impaired, limited or restricted    Physical Therapy Evaluation Charge Determination   History Examination Presentation Decision-Making   MEDIUM  Complexity : 1-2 comorbidities / personal factors will impact the outcome/ POC  LOW Complexity : 1-2 Standardized tests and measures addressing body structure, function, activity limitation and / or participation in recreation  LOW Complexity : Stable, uncomplicated  LOW Complexity : FOTO score of       Based on the above components, the patient evaluation is determined to be of the following complexity level: LOW       Pain:  Pain Scale 1: Numeric (0 - 10)  Pain Intensity 1: 0     Activity Tolerance:   Patient completing 6 MWT on RA, keeping O2 >93% on RA. HR 63, BP elevating to 157/87. Minimal SOB observed during exercises, however very slow taylor with ambulation. Please refer to the flowsheet for vital signs taken during this treatment.   After treatment:   []         Patient left in no apparent distress sitting up in chair  [x]         Patient left in no apparent distress in bed  [x]         Call bell left within reach  [x]         Nursing notified  []         Caregiver present  []         Bed alarm activated        Thank you for this referral.  Chiqui Fuller PT, DPT   Time Calculation: 15 mins

## 2018-05-22 NOTE — ED NOTES
Resting quietly sitting on edge of stretcher. Waiting for additional blood tests to result. Holter monitor on pt and pt was educated by person applying it.

## 2018-05-22 NOTE — DISCHARGE INSTRUCTIONS
Future Appointments  Date Time Provider Luis Angel Borden   5/23/2018 10:00 AM [facility] ER [facility]   5/31/2018 10:30 AM JESENIA Joshi SCHED   8/20/2018 9:00 AM MD MUNA Mars GISEL SCHED          Palpitations: Care Instructions  Your Care Instructions    Heart palpitations are the uncomfortable sensation that your heart is beating fast or irregularly. You might feel pounding or fluttering in your chest. It might feel like your heart is skipping a beat. Although palpitations may be caused by a heart problem, they also occur because of stress, fatigue, or use of alcohol, caffeine, or nicotine. Many medicines, including diet pills, antihistamines, decongestants, and some herbal products, can cause heart palpitations. Nearly everyone has palpitations from time to time. Depending on your symptoms, your doctor may need to do more tests to try to find the cause of your palpitations. Follow-up care is a key part of your treatment and safety. Be sure to make and go to all appointments, and call your doctor if you are having problems. It's also a good idea to know your test results and keep a list of the medicines you take. How can you care for yourself at home? · Avoid caffeine, nicotine, and excess alcohol. · Do not take illegal drugs, such as methamphetamines and cocaine. · Do not take weight loss or diet medicines unless you talk with your doctor first.  · Get plenty of sleep. · Do not overeat. · If you have palpitations again, take deep breaths and try to relax. This may slow a racing heart. · If you start to feel lightheaded, lie down to avoid injuries that might result if you pass out and fall down. · Keep a record of your palpitations and bring it to your next doctor's appointment. Write down:  ¨ The date and time. ¨ Your pulse. (If your heart is beating fast, it may be hard to count your pulse.)  ¨ What you were doing when the palpitations started.   ¨ How long the palpitations lasted. ¨ Any other symptoms. · If an activity causes palpitations, slow down or stop. Talk to your doctor before you do that activity again. · Take your medicines exactly as prescribed. Call your doctor if you think you are having a problem with your medicine. When should you call for help? Call 911 anytime you think you may need emergency care. For example, call if:  ? · You passed out (lost consciousness). ? · You have symptoms of a heart attack. These may include:  ¨ Chest pain or pressure, or a strange feeling in the chest.  ¨ Sweating. ¨ Shortness of breath. ¨ Pain, pressure, or a strange feeling in the back, neck, jaw, or upper belly or in one or both shoulders or arms. ¨ Lightheadedness or sudden weakness. ¨ A fast or irregular heartbeat. After you call 911, the  may tell you to chew 1 adult-strength or 2 to 4 low-dose aspirin. Wait for an ambulance. Do not try to drive yourself. ? · You have symptoms of a stroke. These may include:  ¨ Sudden numbness, tingling, weakness, or loss of movement in your face, arm, or leg, especially on only one side of your body. ¨ Sudden vision changes. ¨ Sudden trouble speaking. ¨ Sudden confusion or trouble understanding simple statements. ¨ Sudden problems with walking or balance. ¨ A sudden, severe headache that is different from past headaches. ?Call your doctor now or seek immediate medical care if:  ? · You have heart palpitations and:  ¨ Are dizzy or lightheaded, or you feel like you may faint. ¨ Have new or increased shortness of breath. ? Watch closely for changes in your health, and be sure to contact your doctor if:  ? · You continue to have heart palpitations. Where can you learn more? Go to http://kim-natalia.info/. Enter R508 in the search box to learn more about \"Palpitations: Care Instructions. \"  Current as of: September 21, 2016  Content Version: 11.4  © 3951-2884 [employer]. Care instructions adapted under license by [employer] (which disclaims liability or warranty for this information). If you have questions about a medical condition or this instruction, always ask your healthcare professional. [address] any warranty or liability for your use of this information. Shortness of Breath: Care Instructions  Your Care Instructions  Shortness of breath has many causes. Sometimes conditions such as anxiety can lead to shortness of breath. Some people get mild shortness of breath when they exercise. Trouble breathing also can be a symptom of a serious problem, such as asthma, lung disease, emphysema, heart problems, and pneumonia. If your shortness of breath continues, you may need tests and treatment. Watch for any changes in your breathing and other symptoms. Follow-up care is a key part of your treatment and safety. Be sure to make and go to all appointments, and call your doctor if you are having problems. It's also a good idea to know your test results and keep a list of the medicines you take. How can you care for yourself at home? · Do not smoke or allow others to smoke around you. If you need help quitting, talk to your doctor about stop-smoking programs and medicines. These can increase your chances of quitting for good. · Get plenty of rest and sleep. · Take your medicines exactly as prescribed. Call your doctor if you think you are having a problem with your medicine. · Find healthy ways to deal with stress. ¨ Exercise daily. ¨ Get plenty of sleep. ¨ Eat regularly and well. When should you call for help? Call 911 anytime you think you may need emergency care. For example, call if:  ? · You have severe shortness of breath. ? · You have symptoms of a heart attack. These may include:  ¨ Chest pain or pressure, or a strange feeling in the chest.  ¨ Sweating. ¨ Shortness of breath. ¨ Nausea or vomiting.   ¨ Pain, pressure, or a strange feeling in the back, neck, jaw, or upper belly or in one or both shoulders or arms. ¨ Lightheadedness or sudden weakness. ¨ A fast or irregular heartbeat. After you call 911, the  may tell you to chew 1 adult-strength or 2 to 4 low-dose aspirin. Wait for an ambulance. Do not try to drive yourself. ?Call your doctor now or seek immediate medical care if:  ? · Your shortness of breath gets worse or you start to wheeze. Wheezing is a high-pitched sound when you breathe. ? · You wake up at night out of breath or have to prop your head up on several pillows to breathe. ? · You are short of breath after only light activity or while at rest.   ? Watch closely for changes in your health, and be sure to contact your doctor if:  ? · You do not get better over the next 1 to 2 days. Where can you learn more? Go to http://kim-natalia.info/. Enter S780 in the search box to learn more about \"Shortness of Breath: Care Instructions. \"  Current as of: May 12, 2017  Content Version: 11.4  © 9233-3698 [employer]. Care instructions adapted under license by [employer] (which disclaims liability or warranty for this information). If you have questions about a medical condition or this instruction, always ask your healthcare professional. [place] [address] any warranty or liability for your use of this information.

## 2018-05-22 NOTE — ED PROVIDER NOTES
HPI Comments: 74 y.o. female with past medical history significant for HTN, asthma, PUD, GERD, aortic dissection, and vertigo who presents from home via private vehicle with chief complaint of palpitations. Pt reports SOB and palpitations for the last 3 days with exertion. Pt notes yesterday she had palpitations without any exertion for the first time. Pt notes mild chest tightness this morning, otherwise denies chest pain. Pt reports a history of an aortic dissection, and was scheduled for a chest CTA tomorrow for a routine check. Pt denies any fever, nausea, vomiting, or cough. There are no other acute medical concerns at this time. Social hx: Nonsmoker; No EtOH use  PCP: Radhames Maldonado MD  Cardiologist: Roni Capellan MD    Note written by Jorge L Baez, as dictated by Janee Lee MD 11:50 AM    The history is provided by the patient. No  was used.         Past Medical History:   Diagnosis Date    Allergic conjunctivitis 7/26/2017    Aortic dissection (HCC)     Arthritis     lower back    Asthma     Essential hypertension 8/21/2017    GERD (gastroesophageal reflux disease)     Hypercholesterolemia 8/21/2017    Hyperlipidemia     Hypertension     Intertrigo 7/26/2017    Long-term use of high-risk medication 7/26/2017    LVH (left ventricular hypertrophy) 7/26/2017    Positional vertigo 7/26/2017    PUD (peptic ulcer disease)     Sciatica of left side 7/26/2017    Spinal stenosis, lumbar region, without neurogenic claudication 7/26/2017       Past Surgical History:   Procedure Laterality Date    COLONOSCOPY N/A 7/12/2017    COLONOSCOPY WITH IV ANTIBIOTICS performed [facility] Hudson Salinas [facility]  7/12/2017         HX GYN      hysterectomy    HX GYN      tubal ligation    HX OTHER SURGICAL  2015    Type A Dissection Repair    HI COLONOSCOPY FLX DX W/COLLJ SPEC WHEN PFRMD  3/19/2012         UPPER GI ENDOSCOPYDEVEN DIL,30MM  7/12/2017         UPPER GI ENDOSCOPY,BIOPSY  7/12/2017              Family History:   Problem Relation Age of Onset    Cancer Father      colon cancer       Social History     Social History    Marital status:      Spouse name: N/A    Number of children: N/A    Years of education: N/A     Occupational History    Not on file. Social History Main Topics    Smoking status: Never Smoker    Smokeless tobacco: Never Used    Alcohol use 0.0 oz/week     0 Standard drinks or equivalent per week    Drug use: No    Sexual activity: Not on file     Other Topics Concern    Not on file     Social History Narrative         ALLERGIES: Review of patient's allergies indicates no known allergies. Review of Systems   Constitutional: Negative for chills and fever. HENT: Negative for rhinorrhea and sore throat. Respiratory: Positive for chest tightness and shortness of breath. Negative for cough. Cardiovascular: Positive for palpitations. Negative for chest pain. Gastrointestinal: Negative for abdominal pain, diarrhea, nausea and vomiting. Genitourinary: Negative for dysuria and urgency. Musculoskeletal: Negative for arthralgias and back pain. Skin: Negative for rash. Neurological: Negative for dizziness, weakness and light-headedness. All other systems reviewed and are negative. Vitals:    05/22/18 1046   BP: 161/77   Pulse: 60   Resp: 20   Temp: 97.4 °F (36.3 °C)   SpO2: 92%   Weight: 88.9 kg (196 lb 1 oz)   Height: 5' 5\" (1.651 m)            Physical Exam   Vital signs reviewed. Nursing notes reviewed.     Const:  No acute distress, well developed, well nourished  Head:  Atraumatic, normocephalic  Eyes:  PERRL, conjunctiva normal, no scleral icterus  Neck:  Supple, trachea midline  Cardiovascular:  RRR, no murmurs, no gallops, no rubs  Resp:  No resp distress, no increased work of breathing, no wheezes, no rhonchi, no rales,  Abd:  Soft, non-tender, non-distended, no rebound, no guarding, no CVA tenderness  :  Deferred  MSK:  No pedal edema, normal ROM  Neuro:  Alert and oriented x3, no cranial nerve defect  Skin:  Warm, dry, intact  Psych: normal mood and affect, behavior is normal, judgement and thought content is normal    Note written by Jorge L Lyman, as dictated by Oneyda Santiago MD 11:50 AM     MDM  Number of Diagnoses or Management Options  Palpitations:   SOB (shortness of breath):      Amount and/or Complexity of Data Reviewed  Clinical lab tests: ordered and reviewed  Tests in the radiology section of CPT®: ordered and reviewed  Review and summarize past medical records: yes    Patient Progress  Patient progress: stable      ED Course     Pt. Presents to the ER with complaints of palpitations and MIMS. She was able to walk for 6 minutes in the ER without SOB and she did not become hypoxic. CTA's pending. Cardiology consult pending. Pt. Signed out to Dr. Lesvia Oneil. Procedures    PROGRESS NOTE:  12:17 PM  Physical Therapy completed a 6 minute walk test and her O2 sats stayed above 93% the entire time.

## 2018-05-23 NOTE — CALL BACK NOTE
[facility] Emergency Department Follow Up Call Record    Discharged to : Home/Family Home/Home Health/Skilled Facility/Rehab/Assisted Living/Other__Home_____  1) Did you receive your discharge instructions? Yes  This writer spoke with Bryson Byrnes, who verified . [facility] reports feeling better today. Minimal Shortness of breath. 2) Do you understand them? Yes         3) Are you able to follow them? Yes          If NO, what can I clarify for you? 4) Do you understand your diagnosis? Yes         5) Do you know which symptoms should prompt you to call the doctor? Yes     6) Were you able to fill and  any medications that were prescribed? Yes     7) You were prescribed __lasix, potassium _________for _fluid retention and low K+___________________. Common side effects of this medication are__electolyte imbalance, dehydration, arrythmia__________________. This is not a complete list so please review the forms given from the pharmacy for a complete list.      8) Are there any questions about your medications? No            Have you scheduled any recommended doctors appointments (specialty, PCP) YES. Appointment scheduled with Cardiology May 31,2018  If NO, what barriers are you encountering (transportation/lost contact info/cost/  didnt think necessary/no PCP  9) If discharged with Home Health, has the agency contacted you to schedule visit? Not applicable  10) Is there anyone available to help you at home (meals, errands, transportation    monitoring) (adult children, neighbors, private duty companions) Yes    11) Are you on a special diet? No         If YES, do you understand the requirements for this diet? Education provided? 12) If presented with cough, bronchitis, COPD, asthma, is it ok to ask that the   respiratory disease management educator call you? Not applicable      13)  A) If presented with fall, were you issued an assistive device in the ED    Are you using? Not applicable  B) If given RX for device, have you obtained? Not applicable       If NO, barriers? C) Therapist recommended:No   Are you able to implement the suggestions? Not applicable        If NO, barriers to implementation? D) Are you having any difficulties with mobility inside your home?     (steps, bed, tub)No   If YES, ask if the [facility] PT can contact patient and good time and number?  14)  At the end of your discharge instructions, there is information about accessing [facility] SERVICES, have you had a chance to review those? Yes         Do you have any questions about signing up for this service? No   We encourage our patients to be active participants in their healthcare and this site is one of the ways to do that. It will allow you to access parts of your medical record, email your doctors office, schedule appointments, and request medications refills . 15) Are there any other questions that I can answer for you regarding    your Emergency department visit?  NO             Estimated Call Time:____4:52 PM  _______________ Date/Time:_______________

## 2018-05-31 ENCOUNTER — OFFICE VISIT (OUTPATIENT)
Dept: CARDIOLOGY CLINIC | Age: 73
End: 2018-05-31

## 2018-05-31 VITALS
WEIGHT: 186.6 LBS | SYSTOLIC BLOOD PRESSURE: 146 MMHG | DIASTOLIC BLOOD PRESSURE: 86 MMHG | BODY MASS INDEX: 31.09 KG/M2 | HEIGHT: 65 IN | RESPIRATION RATE: 18 BRPM | HEART RATE: 65 BPM | OXYGEN SATURATION: 93 %

## 2018-05-31 DIAGNOSIS — E78.00 HYPERCHOLESTEROLEMIA: ICD-10-CM

## 2018-05-31 DIAGNOSIS — Z86.79 S/P ASCENDING AORTIC ANEURYSM REPAIR: ICD-10-CM

## 2018-05-31 DIAGNOSIS — I51.7 LVH (LEFT VENTRICULAR HYPERTROPHY): ICD-10-CM

## 2018-05-31 DIAGNOSIS — I34.0 SEVERE MITRAL REGURGITATION BY PRIOR ECHOCARDIOGRAM: Primary | ICD-10-CM

## 2018-05-31 DIAGNOSIS — Z98.890 S/P ASCENDING AORTIC ANEURYSM REPAIR: ICD-10-CM

## 2018-05-31 RX ORDER — FUROSEMIDE 40 MG/1
40 TABLET ORAL DAILY
Qty: 30 TAB | Refills: 1 | Status: SHIPPED | OUTPATIENT
Start: 2018-05-31 | End: 2018-07-13 | Stop reason: ALTCHOICE

## 2018-05-31 RX ORDER — POTASSIUM CHLORIDE 20 MEQ/1
20 TABLET, EXTENDED RELEASE ORAL DAILY
Qty: 30 TAB | Refills: 1 | Status: SHIPPED | OUTPATIENT
Start: 2018-05-31 | End: 2018-07-13 | Stop reason: ALTCHOICE

## 2018-05-31 NOTE — MR AVS SNAPSHOT
[address] [facility] 
[phone] Patient: Hugh Moreno MRN: [id number] Visit Information Date & Time Provider Department Dept. Phone Encounter #  
 5/31/2018 10:30 AM Vince Joseph NP [facility] [phone] Your Appointments 8/20/2018  9:00 AM  
FOLLOW UP 10 with MD Donell [address] ([address]) Appt Note: 1 yr fu  
 [address] [address] [address] [address] Upcoming Health Maintenance Date Due DTaP/Tdap/Td series (1 - Tdap) 7/23/1964 FOBT Q 1 YEAR AGE 50-75 7/23/1993 ZOSTER VACCINE AGE 60> 5/23/2003 GLAUCOMA SCREENING Q2Y 7/23/2008 Bone Densitometry (Dexa) Screening 7/23/2008 Pneumococcal 65+ Low/Medium Risk (2 of 2 - PCV13) 1/8/2017 MEDICARE YEARLY EXAM 3/14/2018 Influenza Age 5 to Adult 8/1/2018 BREAST CANCER SCRN MAMMOGRAM 5/1/2020 Allergies as of 5/31/2018  Review Complete On: 5/31/2018 By: Vince Joseph NP No Known Allergies Current Immunizations  Reviewed on 1/7/2016 Name Date Influenza Vaccine (Quad) PF 1/8/2016  9:27 AM  
 Pneumococcal Polysaccharide (PPSV-23) 1/8/2016  9:24 AM  
  
 Not reviewed this visit You Were Diagnosed With   
  
 Codes Comments Hypercholesterolemia    -  Primary ICD-10-CM: E78.00 ICD-9-CM: 272.0 Severe mitral regurgitation by prior echocardiogram     ICD-10-CM: I34.0 ICD-9-CM: 424.0 Vitals BP Pulse Resp Height(growth percentile) Weight(growth percentile) SpO2  
 146/86 (BP 1 Location: Right arm, BP Patient Position: Sitting) 65 18 5' 5\" (1.651 m) 186 lb 9.6 oz (84.6 kg) 93% BMI OB Status Smoking Status 31.05 kg/m2 Hysterectomy Never Smoker Vitals History BMI and BSA Data Body Mass Index Body Surface Area 31.05 kg/m 2 1.97 m 2 Preferred Pharmacy Pharmacy Name Phone [facility] [address] [facility], [address] [place] [phone] Your Updated Medication List  
  
   
This list is accurate as of 5/31/18 12:24 PM.  Always use your most recent med list.  
  
  
  
  
 aspirin 81 mg tablet Take 81 mg by mouth daily. FISH OIL 1,000 mg Cap Generic drug:  omega-3 fatty acids-vitamin e Take 1 Cap by mouth two (2) times a day. furosemide 40 mg tablet Commonly known as:  LASIX Take 1 Tab by mouth daily. losartan 50 mg tablet Commonly known as:  COZAAR  
take 1 tablet by mouth once daily  
  
 metoprolol tartrate 25 mg tablet Commonly known as:  LOPRESSOR  
TAKE ONE TABLET BY MOUTH TWICE DAILY  
  
 multivitamin tablet Commonly known as:  ONE A DAY Take 1 Tab by mouth daily. potassium chloride 20 mEq tablet Commonly known as:  K-DUR, KLOR-CON Take 1 Tab by mouth daily. pravastatin 20 mg tablet Commonly known as:  PRAVACHOL Take 1 Tab by mouth daily. TYLENOL ARTHRITIS PAIN 650 mg [facility] Generic drug:  acetaminophen Take 650 mg by mouth every six (6) hours as needed for Pain. Prescriptions Sent to Pharmacy Refills  
 furosemide (LASIX) 40 mg tablet 1 Sig: Take 1 Tab by mouth daily. Class: Normal  
 [facility] [address] [place] [address] Ph #: [phone] Route: Oral  
 potassium chloride (K-DUR, KLOR-CON) 20 mEq tablet 1 Sig: Take 1 Tab by mouth daily. Class: Normal  
 Pharmacy: [facility] LOPE[facility] [address] [place] [address] Ph #: [phone] Route: Oral  
  
We Performed the Following AMB POC EKG ROUTINE W/ 12 LEADS, INTER & REP [56689 CPT(R)] To-Do List   
 06/05/2018 ECHO:  BARB DOPPLER Introducing [facility]!    
 Sofia Nava introduces [employer] patient portal. Now you can access parts of your medical record, email your doctor's office, and request medication refills online. 1. In your internet browser, go to [email or web address] 2. Click on the First Time User? Click Here link in the Sign In box. You will see the New Member Sign Up page. 3. Enter your [employer] Access Code exactly as it appears below. You will not need to use this code after youve completed the sign-up process. If you do not sign up before the expiration date, you must request a new code. · Cook[employer] Access Code: [email or web address] Expires: 8/29/2018 10:31 AM 
 
4. Enter the last four digits of your Social Security Number (xxxx) and Date of Birth (mm/dd/yyyy) as indicated and click Submit. You will be taken to the next sign-up page. 5. Create a Cookstr ID. This will be your Cookstr login ID and cannot be changed, so think of one that is secure and easy to remember. 6. Create a Cook[employer] password. You can change your password at any time. 7. Enter your Password Reset Question and Answer. This can be used at a later time if you forget your password. 8. Enter your e-mail address. You will receive e-mail notification when new information is available in [address] 9. Click Sign Up. You can now view and download portions of your medical record. 10. Click the Download Summary menu link to download a portable copy of your medical information. If you have questions, please visit the Frequently Asked Questions section of the Cook[employer] website. Remember, Cook[employer] is NOT to be used for urgent needs. For medical emergencies, dial 911. Now available from your iPhone and Android! Please provide this summary of care documentation to your next provider. Your primary care clinician is listed as DEBORAH Jeffrey. If you have any questions after today's visit, please call [phone].

## 2018-05-31 NOTE — PROGRESS NOTES
1. Have you been to the ER, urgent care clinic since your last visit? Hospitalized since your last visit? [facility] [address] [facility] for palpitations    2. Have you seen or consulted any other health care providers outside of the [address] [place] [address] since your last visit? Include any pap smears or colon screening.  No    Chief Complaint   Patient presents with   [facility] Follow Up     ED for palpitations    Ankle swelling     pt c/o mild bilateral swelling to ankles, worse on the left

## 2018-05-31 NOTE — PROGRESS NOTES
Bishnu Whitt DNP, ANP-BC  Subjective/HPI:     Edgardo Hdez is a 74 y.o. female is here for hospital follow-up where patient was seen at [address] [place] [address] on May 22 for progressive dyspnea on exertion and palpitations. D-dimer 2.28 underwent CT scan results as posted below. Her proBNP 1896. She was discharged on diuretics and reports her symptoms are improved. Part of her workup at [facility] a 2D echocardiogram was performed showing she has moderate to severe mitral regurgitation which is a new finding compared to her transesophageal echocardiogram performed in 2015 at the time of her aortic dissection repair. She is currently wearing an event monitor for evaluation of palpitations, she reports the frequency and intensity has decreased. IMPRESSION  IMPRESSION:      1. Stable aortic dissection and surgical changes involving the aortic root. There is stable dilatation of the aortic arch measuring 4.8 cm in diameter. No  new vascular abnormality.     2. Trace right pleural effusion with patchy opacity throughout the lungs  predominantly in the lower lobes that may represent edema, atelectasis or  nonspecific infection or inflammation.     3. There is no other acute abnormality in the chest, abdomen, or pelvis. SUMMARY:  Left ventricle: Systolic function was normal. Ejection fraction was  estimated in the range of 65 % to 70 %. There were no regional wall motion  abnormalities. Left atrium: The atrium was moderately dilated. Right atrium: The atrium was dilated. Mitral valve: There was moderate to severe regurgitation. Multiple jets. Aortic valve: There was mild to moderate regurgitation. Tricuspid valve: There was moderate regurgitation. Pulmonic valve: There was moderate regurgitation.     PCP Provider  Radhames Maldonado MD  Past Medical History:   Diagnosis Date    Allergic conjunctivitis 7/26/2017    Aortic dissection (HCC)     Arthritis     lower back    Asthma     Essential hypertension 8/21/2017    GERD (gastroesophageal reflux disease)     Hypercholesterolemia 8/21/2017    Hyperlipidemia     Hypertension     Intertrigo 7/26/2017    Long-term use of high-risk medication 7/26/2017    LVH (left ventricular hypertrophy) 7/26/2017    Positional vertigo 7/26/2017    PUD (peptic ulcer disease)     Sciatica of left side 7/26/2017    Spinal stenosis, lumbar region, without neurogenic claudication 7/26/2017      Past Surgical History:   Procedure Laterality Date    COLONOSCOPY N/A 7/12/2017    COLONOSCOPY WITH IV ANTIBIOTICS [facility]  7/12/2017         HX GYN      hysterectomy    HX GYN      tubal ligation    HX OTHER SURGICAL  2015    Type A Dissection Repair    [place] COLONOSCOPY FLX DX W/COLLJ SPEC WHEN PFRMD  3/19/2012         UPPER GI ENDOSCOPY,BALL DIL,30MM  7/12/2017         UPPER GI ENDOSCOPY,BIOPSY  7/12/2017          No Known Allergies   Family History   Problem Relation Age of Onset    Cancer Father      colon cancer      Current Outpatient Prescriptions   Medication Sig    furosemide (LASIX) 40 mg tablet Take 1 Tab by mouth daily.  potassium chloride (K-DUR, KLOR-CON) 20 mEq tablet Take 1 Tab by mouth daily.  losartan (COZAAR) 50 mg tablet take 1 tablet by mouth once daily    metoprolol tartrate (LOPRESSOR) 25 mg tablet TAKE ONE TABLET BY MOUTH TWICE DAILY    pravastatin (PRAVACHOL) 20 mg tablet Take 1 Tab by mouth daily.  acetaminophen (TYLENOL ARTHRITIS PAIN) 650 mg CR tablet Take 650 mg by mouth every six (6) hours as needed for Pain.  multivitamin (ONE A DAY) tablet Take 1 Tab by mouth daily.  aspirin 81 mg tablet Take 81 mg by mouth daily.  omega-3 fatty acids-vitamin e (FISH OIL) 1,000 mg Cap Take 1 Cap by mouth two (2) times a day. No current facility-administered medications for this visit.        Vitals:    05/31/18 1046 05/31/18 1055   BP: 140/82 146/86 Pulse: 65    Resp: 18    SpO2: 93%    Weight: 186 lb 9.6 oz (84.6 kg)    Height: 5' 5\" (1.651 m)      Social History     Social History    Marital status:      Spouse name: N/A    Number of children: N/A    Years of education: N/A     Occupational History    Not on file. Social History Main Topics    Smoking status: Never Smoker    Smokeless tobacco: Never Used    Alcohol use 0.0 oz/week     0 Standard drinks or equivalent per week    Drug use: No    Sexual activity: Not on file     Other Topics Concern    Not on file     Social History Narrative       I have reviewed the nurses notes, vitals, problem list, allergy list, medical history, family, social history and medications. Review of Symptoms:    General: Pt denies excessive weight gain or loss. Pt is able to conduct ADL's  HEENT: Denies blurred vision, headaches, epistaxis and difficulty swallowing. Respiratory: Denies shortness of breath, + MIMS, denies wheezing or stridor. Cardiovascular: Denies precordial pain, + palpitations, denies edema +PND  Gastrointestinal: Denies poor appetite, indigestion, abdominal pain or blood in stool  Musculoskeletal: Denies pain or swelling from muscles or joints  Neurologic: Denies tremor, paresthesias, or sensory motor disturbance  Skin: Denies rash, itching or texture change. Physical Exam:      General: Well developed, in no acute distress, cooperative and alert  HEENT: No carotid bruits, no JVD, trach is midline. Neck Supple,   Heart:  Normal S1/S2 negative S3 or S4. Regular, 2/6 systolic murmur, gallop or rub.   Respiratory: Clear bilaterally x 4, no wheezing or rales  Abdomen:   Soft, non-tender, no masses, bowel sounds are active.   Extremities: No dependent edema, normal cap refill, no cyanosis, atraumatic. Neuro: A&Ox3, speech clear, gait stable. Skin: Skin color is normal. No rashes or lesions.  Non diaphoretic  Vascular: 2+ pulses symmetric in all extremities    Cardiographics    ECG: Sinus rhythm  Results for orders placed or performed during the hospital encounter of 05/22/18   EKG, 12 LEAD, INITIAL   Result Value Ref Range    Ventricular Rate 67 BPM    Atrial Rate 67 BPM    QRS Duration 92 ms    Q-T Interval 420 ms    QTC Calculation (Bezet) 443 ms    Calculated R Axis -8 degrees    Calculated T Axis 60 degrees    Diagnosis       Sinus bradycardia premature atrial complexes  When compared with ECG of 21-MAR-2016 11:49,  Current undetermined rhythm precludes rhythm comparison, needs review  T wave inversion no longer evident in Inferior leads  Confirmed by Tg Bernal MD, Huyen Red ([id number]) on 5/22/2018 12:45:01 PM     Results for orders placed or performed in visit on 02/22/16   CARDIAC HOLTER MONITOR, 24 HOURS    Narrative    ECG Monitor/24 hours, Complete    Reason for Holter Monitor   A-FIB    Heartbeat    Slowest 58  Average 74  Fastest  85      Results:   Underlying Rhythm: Normal sinus rhythm      Atrial Arrhythmias: premature atrial contractions; occasional,  atrial couplets and atrial triplets            AV Conduction: normal    Ventricular Arrhythmias: premature ventricular contractions;  occasional     ST Segment Analysis:normal     Symptom Correlation:  Arm pain does not correlate with any arrhythmias    Comment:   Sinus rhythm throughout     Faye Allen MD, [facility]            Cardiology Labs:  No results found for: CHOL, CHOLX, CHLST, CHOLV, 634186, HDL, LDL, LDLC, DLDLP, TGLX, TRIGL, TRIGP, CHHD, CHHDX    Lab Results   Component Value Date/Time    Sodium 145 05/22/2018 10:58 AM    Potassium 3.9 05/22/2018 10:58 AM    Chloride 116 (H) 05/22/2018 10:58 AM    CO2 21 05/22/2018 10:58 AM    Anion gap 8 05/22/2018 10:58 AM    Glucose 91 05/22/2018 10:58 AM    BUN 23 (H) 05/22/2018 10:58 AM    Creatinine 0.72 05/22/2018 10:58 AM    BUN/Creatinine ratio 32 (H) 05/22/2018 10:58 AM    GFR est AA >60 05/22/2018 10:58 AM    GFR est non-AA >60 05/22/2018 10:58 AM    Calcium 9.2 05/22/2018 10:58 AM    Bilirubin, total 0.4 05/22/2018 10:58 AM    AST (SGOT) 28 05/22/2018 10:58 AM    Alk. phosphatase 78 05/22/2018 10:58 AM    Protein, total 6.9 05/22/2018 10:58 AM    Albumin 3.5 05/22/2018 10:58 AM    Globulin 3.4 05/22/2018 10:58 AM    A-G Ratio 1.0 (L) 05/22/2018 10:58 AM    ALT (SGPT) 44 05/22/2018 10:58 AM           Assessment:     Assessment:     Diagnoses and all orders for this visit:    1. Severe mitral regurgitation by prior echocardiogram  -     BARB DOPPLER; Future    2. Hypercholesterolemia  -     AMB POC EKG ROUTINE W/ 12 LEADS, INTER & REP  -     BARB DOPPLER; Future    3. LVH (left ventricular hypertrophy)    4. S/P ascending aortic aneurysm repair    Other orders  -     furosemide (LASIX) 40 mg tablet; Take 1 Tab by mouth daily. -     potassium chloride (K-DUR, KLOR-CON) 20 mEq tablet; Take 1 Tab by mouth daily. ICD-10-CM ICD-9-CM    1. Severe mitral regurgitation by prior echocardiogram I34.0 424.0 BARB DOPPLER   2. Hypercholesterolemia E78.00 272.0 AMB POC EKG ROUTINE W/ 12 LEADS, INTER & REP      BARB DOPPLER   3. LVH (left ventricular hypertrophy) I51.7 429.3    4. S/P ascending aortic aneurysm repair Z98.890 V45.89     Z86.79       Orders Placed This Encounter    AMB POC EKG ROUTINE W/ 12 LEADS, INTER & REP     Order Specific Question:   Reason for Exam:     Answer:   Routine    BARB DOPPLER     Standing Status:   Future     Standing Expiration Date:   11/30/2018     Order Specific Question:   Reason for Exam:     Answer:   severe MR    furosemide (LASIX) 40 mg tablet     Sig: Take 1 Tab by mouth daily. Dispense:  30 Tab     Refill:  1    potassium chloride (K-DUR, KLOR-CON) 20 mEq tablet     Sig: Take 1 Tab by mouth daily. Dispense:  30 Tab     Refill:  1        Plan:     Patient is a 15-year-old female who presented to [facility] ER with progressive dyspnea on exertion, shortness of breath and palpitations.   ProBNP 1896, she was started on diuretics and her dyspnea is improved. Will continue furosemide 40 mg daily with 20 mEq of K Dur. Echocardiogram indicates she has severe mitral regurgitation with several jets, discussed with patient pathology and further evaluation with transesophageal echocardiogram and she agrees to proceed. Procedure has been scheduled with Dr. Melba Corado and will eventually need valve clinic consultation. Follow-up with general cardiology after BARB complete. Ivan Arora NP    This note was created using voice recognition software. Despite editing, there may be syntax errors.

## 2018-06-18 ENCOUNTER — HOSPITAL ENCOUNTER (OUTPATIENT)
Dept: NON INVASIVE DIAGNOSTICS | Age: 73
Discharge: HOME | End: 2018-06-18
Payer: MEDICARE

## 2018-06-18 VITALS
HEIGHT: 65 IN | SYSTOLIC BLOOD PRESSURE: 133 MMHG | WEIGHT: 187 LBS | OXYGEN SATURATION: 95 % | BODY MASS INDEX: 31.16 KG/M2 | HEART RATE: 60 BPM | RESPIRATION RATE: 16 BRPM | DIASTOLIC BLOOD PRESSURE: 88 MMHG

## 2018-06-18 DIAGNOSIS — I05.9 MITRAL VALVE DISORDER: ICD-10-CM

## 2018-06-18 PROCEDURE — 74011250636 HC RX REV CODE- 250/636

## 2018-06-18 PROCEDURE — 99153 MOD SED SAME PHYS/QHP EA: CPT

## 2018-06-18 PROCEDURE — 96374 THER/PROPH/DIAG INJ IV PUSH: CPT

## 2018-06-18 PROCEDURE — 99152 MOD SED SAME PHYS/QHP 5/>YRS: CPT

## 2018-06-18 PROCEDURE — 74011000250 HC RX REV CODE- 250

## 2018-06-18 PROCEDURE — 74011000250 HC RX REV CODE- 250: Performed by: INTERNAL MEDICINE

## 2018-06-18 RX ORDER — LIDOCAINE HYDROCHLORIDE 20 MG/ML
15 SOLUTION OROPHARYNGEAL ONCE
Status: COMPLETED | OUTPATIENT
Start: 2018-06-18 | End: 2018-06-18

## 2018-06-18 RX ORDER — FENTANYL CITRATE 50 UG/ML
INJECTION, SOLUTION INTRAMUSCULAR; INTRAVENOUS
Status: COMPLETED
Start: 2018-06-18 | End: 2018-06-18

## 2018-06-18 RX ORDER — LIDOCAINE HYDROCHLORIDE 20 MG/ML
SOLUTION OROPHARYNGEAL
Status: COMPLETED
Start: 2018-06-18 | End: 2018-06-18

## 2018-06-18 RX ORDER — FENTANYL CITRATE 50 UG/ML
25-50 INJECTION, SOLUTION INTRAMUSCULAR; INTRAVENOUS
Status: DISCONTINUED | OUTPATIENT
Start: 2018-06-18 | End: 2018-06-18 | Stop reason: ALTCHOICE

## 2018-06-18 RX ORDER — MIDAZOLAM HYDROCHLORIDE 1 MG/ML
INJECTION, SOLUTION INTRAMUSCULAR; INTRAVENOUS
Status: COMPLETED
Start: 2018-06-18 | End: 2018-06-18

## 2018-06-18 RX ORDER — MIDAZOLAM HYDROCHLORIDE 1 MG/ML
.5-2 INJECTION, SOLUTION INTRAMUSCULAR; INTRAVENOUS
Status: DISCONTINUED | OUTPATIENT
Start: 2018-06-18 | End: 2018-06-18 | Stop reason: ALTCHOICE

## 2018-06-18 RX ADMIN — LIDOCAINE HYDROCHLORIDE 15 ML: 20 SOLUTION ORAL; TOPICAL at 08:17

## 2018-06-18 RX ADMIN — MIDAZOLAM HYDROCHLORIDE 1 MG: 1 INJECTION, SOLUTION INTRAMUSCULAR; INTRAVENOUS at 08:17

## 2018-06-18 RX ADMIN — FENTANYL CITRATE 25 MCG: 50 INJECTION, SOLUTION INTRAMUSCULAR; INTRAVENOUS at 08:16

## 2018-06-18 RX ADMIN — BENZOCAINE, BUTAMBEN, AND TETRACAINE HYDROCHLORIDE 1 SPRAY: .028; .004; .004 AEROSOL, SPRAY TOPICAL at 08:18

## 2018-06-18 RX ADMIN — LIDOCAINE HYDROCHLORIDE 15 ML: 20 SOLUTION OROPHARYNGEAL at 08:17

## 2018-06-18 NOTE — PROGRESS NOTES
Pt sedated with 25 mcg IV Fentanyl and 1 mg IV Versed for BARB. Pt tolerated well.  Monitored sedation time 8:16 to 8:45

## 2018-06-18 NOTE — PROGRESS NOTES
Patient arrived to Non-Invasive Cardiology Lab for Out Patient Procedure. Staff introduced to patient. Patient identifiers verified with Name and Date of Birth. Procedure verified with patient. Consent forms reviewed and signed by patient or authorized representative and verified. Allergies verified. Patient informed of procedure and plan of care. Questions answered with review. Patient on cardiac monitor, non-invasive blood pressure, SPO2 monitor. On RA. Patient is A&Ox3. Patient reports no complaints. Patient on stretcher, in low position, with side rails up. Patient instructed to call for assistance as needed. Family in waiting room.  ASA  3 Mallampati 3   Per MD

## 2018-06-18 NOTE — DISCHARGE INSTRUCTIONS
DISCHARGE SUMMARY from Emerson Allen RN        The following personal items collected during your admission are returned to you:   Clothing:  Shirt, purse, cane    PATIENT INSTRUCTIONS:  Stay on all the same medications      Start with sips of water and advance diet as the feeling in your throat returns to normal. Avoid any scalding liquids for the next 2 hours. Dr. Lam Joanne office will call you for a follow up appointment    What to do at Home:  Recommended activity: No driving today      The discharge information has been reviewed with the PATIENT . The PATIENT  verbalized understanding.

## 2018-06-25 NOTE — PROCEDURES
[address]  MR#: [id number]  : 1945  ACCOUNT #: [de-identified]   DATE OF SERVICE: 2018    INDICATIONS:  Palpitations. Date of enrollment was , date of termination of monitoring was 2018. BASELINE RECORDIN. On 2018 showed sinus rhythm, with an SVT at 6 beats with a rate 150. No flutter waves were definitely identified. The baseline tracing just prior to this run of PSVT showed sinus rhythm with PACs. 2.  On 2018, the patient sinus rhythm with PVCs. 3.  On 2018, the patient had sinus bradycardia with PACs. 4.  On 2018, the patient had sinus rhythm with atrial fibrillation of less than 10 seconds at a rate of 75.  5. On 2018, the patient had sinus rhythm with a PVC. 6.  On 06/15/2018, the patient had sinus rhythm with PAC and run of PACs. 7.  On 2018, the patient had sinus rhythm with PVC, followed by a PSVT, likely atrial fibrillation at a rate of 205.  8.  On 2018, the patient had sinus rhythm with PACs. CONCLUSION:  Short runs of supraventricular tachycardia, possibly atrial fibrillation were seen. None of them are prolonged, and then periods of PACs are seen.       MD CANDIE Marino / GABE  D: 2018 14:12     T: 2018 14:27  JOB #: [id number]  CC: Wendy Flores MD  CC: Cain Lanza MD  CC: Lisbeth Calvert MD

## 2018-07-10 NOTE — PROGRESS NOTES
Reviewed event monitor with Dr. Eduard Enrique, PACs, PVCs, PSVT and possible AFIb. She recommends that she see Dr. Nathalie WAGNER at [facility] for further evaluation. Please make her an appt to see him.

## 2018-07-13 ENCOUNTER — OFFICE VISIT (OUTPATIENT)
Dept: CARDIOTHORACIC SURGERY | Age: 73
End: 2018-07-13

## 2018-07-13 VITALS
HEART RATE: 73 BPM | BODY MASS INDEX: 30.99 KG/M2 | OXYGEN SATURATION: 93 % | HEIGHT: 65 IN | WEIGHT: 186 LBS | RESPIRATION RATE: 24 BRPM | SYSTOLIC BLOOD PRESSURE: 172 MMHG | DIASTOLIC BLOOD PRESSURE: 82 MMHG | TEMPERATURE: 98 F

## 2018-07-13 DIAGNOSIS — M48.061 SPINAL STENOSIS, LUMBAR REGION, WITHOUT NEUROGENIC CLAUDICATION: ICD-10-CM

## 2018-07-13 DIAGNOSIS — I71.019 DISSECTION OF THORACIC AORTA: ICD-10-CM

## 2018-07-13 DIAGNOSIS — I34.0 MITRAL VALVE INSUFFICIENCY, UNSPECIFIED ETIOLOGY: Primary | ICD-10-CM

## 2018-07-13 DIAGNOSIS — Z98.890 S/P ASCENDING AORTIC ANEURYSM REPAIR: ICD-10-CM

## 2018-07-13 DIAGNOSIS — Z86.79 S/P ASCENDING AORTIC ANEURYSM REPAIR: ICD-10-CM

## 2018-07-13 DIAGNOSIS — I10 ESSENTIAL HYPERTENSION: ICD-10-CM

## 2018-07-13 DIAGNOSIS — E78.00 HYPERCHOLESTEROLEMIA: ICD-10-CM

## 2018-07-13 NOTE — PROGRESS NOTES
Pt seen and examined  Discussed with Dr Shania Heredia  Echo reviewed and full consult by Klaus Queen NP reviewed and confirmed  She has moderately severe MR secondary to degenerative disease with several jets of MR  Likely will come to intervention, but will follow on medications for now   Discussed with pt and

## 2018-07-13 NOTE — PROGRESS NOTES
Patient: Tyler Georges   Age: 74 y.o. Patient Care Team:  Delano Manzanares MD as PCP - General (Internal Medicine)  Ritu Alanis MD as Referring Provider (Cardiology)  Meagan Lama MD as Surgeon (Cardiothoracic Surgery)  Michaelle Kilgore MD as Surgeon (Cardiothoracic Surgery)  Seamus Ritter NP as Nurse Practitioner (Nurse Practitioner)  Sebastian Jay NP as Nurse Practitioner (Cardiology)    PCP: Delano Manzanares MD    Cardiologist: Mammie Felty     Diagnosis/Reason for Consultation: The primary encounter diagnosis was Mitral valve insufficiency, unspecified etiology. Diagnoses of Dissection of thoracic aorta ([facility] [address] S/P ascending aortic aneurysm repair, Essential hypertension, Hypercholesterolemia, and Spinal stenosis, lumbar region, without neurogenic claudication were also pertinent to this visit. Problem List:   Patient Active Problem List   Diagnosis Code    Aortic dissection (HCC) I71.00    S/P ascending aortic aneurysm repair Z98.890, Z86.79    Swelling of both ankles M25.471, M25.472    Heart palpitations R00.2    S/P cardiac cath Z98.890    Intertrigo L30.4    Positional vertigo [id number]    Spinal stenosis, lumbar region, without neurogenic claudication M48.061    Long-term use of high-risk medication Z79.899    LVH (left ventricular hypertrophy) I51.7    Sciatica of left side M54.32    Allergic conjunctivitis H10.10    Essential hypertension I10    Hypercholesterolemia E78.00      HPI: 74 y.o. 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 Bernardino Dawson 12/2015), HTN, & Spinal stenosis that is referred to the [address] [place] [address] by Dr. Lisa Elias for interventional evaluation of MR. Ms. Star Moody has been followed by Dr. Mammie Felty since her TAA in 2015. She had been doing well then developed palpitations, SOB/MIMS and volume overload (195 lbs / approx 10 lbs up) and went to ED in May.   She received IV diuretics and clinically improved. She was not admitted to the hospital.  She remained on oral diuretics for approximately 2 weeks but has not had any in almost a month. She weighs herself at home and denies any recurrent wt gain. She completed the 6 min walk today w/ only mild MIMS. She will admit to some progressive fatigue over the past yr but denies any CP, chest tightness, palpitations, lightheadedness, orthopnea, PND, LE or abdominal edema. She walks 1/4 mile twice a day. NYHA Classification: II   Class II (Mild): Slight limitation of physical activity. Comfortable at rest, but ordinary physical activity results in fatigue, palpitation, or dyspnea.      Angina Classification: 0   Class 0: No symptoms     Past Medical History:   Diagnosis Date    Allergic conjunctivitis 7/26/2017    Aortic dissection (HCC)     Arthritis     lower back    Asthma     Essential hypertension 8/21/2017    GERD (gastroesophageal reflux disease)     Hypercholesterolemia 8/21/2017    Hyperlipidemia     Hypertension     Intertrigo 7/26/2017    Long-term use of high-risk medication 7/26/2017    LVH (left ventricular hypertrophy) 7/26/2017    Positional vertigo 7/26/2017    PUD (peptic ulcer disease)     Sciatica of left side 7/26/2017    Spinal stenosis, lumbar region, without neurogenic claudication 7/26/2017     Endocarditis: no  Pulmonary HTN:  yes Greater than 2/3 systemic: N/A  Immunocompromised/Steroids:  no  Heart Failure Admission w/in past year:  no   Heart Failure Admission w/in past 2 weeks: no  Liver Dz/Cirrhosis: no  If yes, MELD score:  N/A    Past Surgical History:   Procedure Laterality Date    COLONOSCOPY N/A 7/12/2017    COLONOSCOPY WITH IV ANTIBIOTICS performed by Luis Miguel Dee MD at [address] [place] [address]  7/12/2017         HX GYN      hysterectomy    HX GYN      tubal ligation    HX OTHER SURGICAL  2015    Type A Dissection Repair    SC COLONOSCOPY FLX DX W/COLLJ SPEC WHEN PFRMD 3/19/2012         UPPER GI ENDOSCOPY,BALL DIL,30MM  7/12/2017         UPPER GI ENDOSCOPY,BIOPSY  7/12/2017           Social History   Substance Use Topics    Smoking status: Never Smoker    Smokeless tobacco: Never Used    Alcohol use 0.0 oz/week     0 Standard drinks or equivalent per week      Family History   Problem Relation Age of Onset    Cancer Father      colon cancer     Prior to Admission medications    Medication Sig Start Date End Date Taking? Authorizing Provider   losartan (COZAAR) 50 mg tablet take 1 tablet by mouth once daily 1/2/18  Yes Brigitte Worthy NP   metoprolol tartrate (LOPRESSOR) 25 mg tablet TAKE ONE TABLET BY MOUTH TWICE DAILY 10/30/17  Yes Anil Redd MD   pravastatin (PRAVACHOL) 20 mg tablet Take 1 Tab by mouth daily. 8/21/17  Yes Anil Redd MD   acetaminophen (TYLENOL ARTHRITIS PAIN) 650 mg CR tablet Take 650 mg by mouth every six (6) hours as needed for Pain. Yes Historical Provider   aspirin 81 mg tablet Take 81 mg by mouth daily. Yes Historical Provider   omega-3 fatty acids-vitamin e (FISH OIL) 1,000 mg Cap Take 1 Cap by mouth two (2) times a day. Yes Historical Provider   multivitamin (ONE A DAY) tablet Take 1 Tab by mouth daily. Historical Provider       No Known Allergies    Current Medications:   Current Outpatient Prescriptions   Medication Sig Dispense Refill    losartan (COZAAR) 50 mg tablet take 1 tablet by mouth once daily 30 Tab 6    metoprolol tartrate (LOPRESSOR) 25 mg tablet TAKE ONE TABLET BY MOUTH TWICE DAILY 180 Tab 0    pravastatin (PRAVACHOL) 20 mg tablet Take 1 Tab by mouth daily. 90 Tab 3    acetaminophen (TYLENOL ARTHRITIS PAIN) 650 mg CR tablet Take 650 mg by mouth every six (6) hours as needed for Pain.  aspirin 81 mg tablet Take 81 mg by mouth daily.  omega-3 fatty acids-vitamin e (FISH OIL) 1,000 mg Cap Take 1 Cap by mouth two (2) times a day.  multivitamin (ONE A DAY) tablet Take 1 Tab by mouth daily. Vitals: Blood pressure 172/82, pulse 73, temperature 98 °F (36.7 °C), temperature source Oral, resp. rate 24, height 5' 5\" (1.651 m), weight 186 lb (84.4 kg), SpO2 93 %. Pre 6 min walk VS: HR 63, 130/82, RR 16, 95% sat on RA    Allergies: has No Known Allergies. Review of Systems: Pertinent Positives per HPI   [x]Total of 13 systems reviewed as follows:  Constitutional: Negative fever, negative chills  Eyes:   Negative for amauroses fugax  ENT:   Negative sore throat,oral absecess  Endocrine Negative for thyroid replacement Rx; goiter; DM  Respiratory:  Negative chronic cough,sputum production  Cards:   Negative for palpitations, lower extremity edema, varicosities, claudication  GI:   Negative for dysphagia, bleeding, nausea, vomiting, diarrhea, and abdominal pain  Genitourinary: Negative for frequency, dysuria  Integument:  Negative for rash and pruritus  Hematologic:  Negative for easy bruising; bleeding dyscarsia  Musculoskel: Negative for muscle weakness inhibiting ambulation  Neurological:  Negative for stroke, TIA, syncope, dizziness  Behavl/Psych: Negative for feelings of anxiety, depression     Cardiovascular Testing:   TTE 5/22/2018:  LEFT VENTRICLE: Size was normal. Systolic function was normal. Ejection fraction was estimated in the range of 65 % to 70 %. There were no regional wall motion abnormalities. Wall thickness was normal. RIGHT VENTRICLE: The size was normal. Systolic function was normal. Wall thickness was normal. LEFT ATRIUM: The atrium was moderately dilated. RIGHT ATRIUM: The atrium was dilated. MITRAL VALVE: There was mild thickening. DOPPLER: There was moderate to severe regurgitation. Multiple jets. AORTIC VALVE: The valve was trileaflet. Leaflets exhibited mildly to moderately increased thickness and good mobility. DOPPLER: There was mild to moderate regurgitation. TRICUSPID VALVE: Not well visualized. Normal valve structure. DOPPLER: There was moderate regurgitation. Pulmonary artery systolic pressure: 55 mmHg. PULMONIC VALVE: Not well visualized. DOPPLER: There was moderate regurgitation. AORTA: The root exhibited normal size. PERICARDIUM: There was no pericardial effusion. BARB 6/18/2018: LEFT VENTRICLE: Size was normal. Systolic function was normal. Ejection fraction was estimated in the range of 55 % to 60 %. There were no regional wall motion abnormalities. Wall thickness was normal. RIGHT VENTRICLE: The size was normal. Systolic function was normal. LEFT ATRIUM: The atrium was dilated. No mass was present. No thrombus was identified. APPENDAGE: The size was normal. No thrombus was identified. ATRIAL SEPTUM: No defect or patent foramen ovale was identified. Contrast injection was performed. There was no right-to-left shunt, with provocative maneuvers to increase right atrial pressure. RIGHT ATRIUM: Size was normal. No thrombus was identified. MITRAL VALVE: Normal valve structure. There was a mild prolapse involving the anterior leaflet. DOPPLER: There was no evidence for stenosis. There was severe regurgitation. AORTIC VALVE: The valve was trileaflet. Leaflets exhibited normal thickness and normal cuspal separation. No obvious mass, vegetation or thrombus noted. DOPPLER: There was mild regurgitation. TRICUSPID VALVE: Normal valve structure. There was normal leaflet separation. No obvious mass, vegetation or thrombus noted. DOPPLER: There was mild regurgitation. There was mild pulmonary hypertension. PULMONIC VALVE: Leaflets exhibited normal thickness, no calcification, and normal cuspal separation. No obvious mass, vegetation or thrombus noted. DOPPLER: There was no regurgitation. AORTA: The root exhibited normal size. There was no atheroma. A dissection was seen in the ascending aorta, extending to the distal descending aorta. s/p dissection repair. There was no evidence for aneurysm. PERICARDIUM: There was no pericardial effusion.     L Cardiac catheterization 5/9/2017: VENTRICLES: EF calculated by contrast ventriculography was 60 %. CORONARY CIRCULATION: The coronary circulation is right dominant. Left  main: Normal. LAD: Normal. Circumflex: Normal. RCA: Normal.    C/A/P CTA 5/22/2018: CTA chest: Surgical changes are again seen following aortic root repair. 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 The dissection extends into the abdomen and right common iliac artery, unchanged. Abdominal aorta is normal in caliber. SMA and celiac are patent  and are fed by the true lumen. Left renal artery is patent and fed by the  true lumen. Right renal artery is patent and fed by the false lumen. CHRIS is  fed by the false lumen. There is stable dilatation of the main pulmonary artery measuring 4.1 cm in diameter in keeping with pulmonary artery hypertension. The visualized thyroid gland is unremarkable. There is stable cardiomegaly. No pericardial effusion. No lymphadenopathy by imaging size criteria. There is a trace right pleural effusion with bilateral dependent atelectasis. There is patchy opacity throughout the lungs predominantly in the lower lobes. There is no pneumothorax. Central airways are unremarkable. The esophagus is patulous and fluid-filled, unchanged. CTA abdomen and pelvis: The liver and spleen are unremarkable. The gallbladder is present without biliary duct dilatation. The pancreas and adrenal glands are unremarkable.  The kidneys enhance symmetrically without hydronephrosis. There are no enlarged lymph nodes. There are no dilated bowel loops. The appendix is unremarkable. There is no free  fluid or free air. The urinary bladder is unremarkable. There is no pelvic mass. Brina  are stable degenerative changes in the spine without aggressive bony lesion.   IMPRESSION:   1. Stable aortic dissection and surgical changes involving the aortic root. There is stable dilatation of the aortic arch measuring 4.8 cm in diameter. No new vascular abnormality. 2. Trace right pleural effusion with patchy opacity throughout the lungs predominantly in the lower lobes that may represent edema, atelectasis or nonspecific infection or inflammation. 3. There is no other acute abnormality in the chest, abdomen, or pelvis. Physical Exam:  General: Well nourished well groomed woman appearing stated age accompanied by   Neuro: A&OX3. HERNANDEZ. PERRL. Steady un-assisted gait  Head:Normocephalic. Atraumatic. Symmetrical  Neck: Trachea Midline  Resp: CTA B. No Adv BS/cough/sputum/tachypnea with seated conversation  CV: S1S2 RRR. KATHIA II/VI. No JVD/carotid bruits. Pink/warm/dry extremities. Trace LE peripheral edema - L > R  GI:Benign ab. Soft. NT/ND. Active BS  : Voids  Integ: No obvious s/s of infection or breakdown  Musculo/Skeletal: FROM in all major joints. Good muscle tone    Clinic Evaluation:   KCCQ-12: scanned into EMR    6 minute walk test: PreTest HR/02 sat:  See VS this visit             Post Test HR/02 sat: 73 / 93% sat on RA             Distance Walked: 754 ft 1 inch    Frality Survey:  Noel Index ADL - 6/6  scanned into EMR     STS 2.81 Risk Score / Predicted 30 day mortality: - calculations scanned into EMR   Procedure: MV Repair    Risk of Mortality: 1.877%    Morbidity or Mortality: 18.659%    Long Length of Stay: 9.112%    Short Length of Stay: 24.876%    Permanent Stroke: 2.724%    Prolonged Ventilation: 13.4%    DSW Infection: 0.226%    Renal Failure: 5.034%    Reoperation: 7.519%   Procedure: MV Replacement Only    Risk of Mortality: 3.642%    Morbidity or Mortality: 25.603%    Long Length of Stay: 12.796%    Short Length of Stay: 16.54%    Permanent Stroke: 2.724%    Prolonged Ventilation: 15.943%    DSW Infection: 0.226%    Renal Failure: 6.659%    Reoperation: 10.038%    Assessment/Plan:   1.   MR - moderate degenerative and relatively asymptomatic. Euvolemic currently w/ oral diuretics. To re-eval in 6 months or earlier if sx worsen w/ TTE.  2. Ascending Ao Aneurysm s/p repair 2015- CTA 5/22/2018 w/o any new vascular changes. No current issues. 3. HTN - controlled at  prior to activity on ARB/BB per cards. No change today  4. Further plan/care by Hernan Quintanilla

## 2018-07-13 NOTE — PROGRESS NOTES
I had the pleasure of seeing Ms. Kae Whiting in the valve clinic earlier today with Ms. Melanie Tirado, JESENIA - please see her note for details. She has moderate/severe MR due to prolapse, but is not terribly symptomatic at present (had one episode of ED visit for dyspnea), and feels that on diuretics she is managing well. Therefore will hold off on surgery (she is low surgical risk despite previous aortic dissection repair, by my eye), but get her to see Dr. Dedrick Machuca for initial discussion should she worsen. We'll see her back in 6 months.

## 2018-07-24 RX ORDER — LOSARTAN POTASSIUM 50 MG/1
TABLET ORAL
Qty: 90 TAB | Refills: 1 | Status: SHIPPED | OUTPATIENT
Start: 2018-07-24 | End: 2019-01-07 | Stop reason: SDUPTHER

## 2018-08-16 ENCOUNTER — CLINICAL SUPPORT (OUTPATIENT)
Dept: CARDIOLOGY CLINIC | Age: 73
End: 2018-08-16

## 2018-08-16 ENCOUNTER — OFFICE VISIT (OUTPATIENT)
Dept: CARDIOLOGY CLINIC | Age: 73
End: 2018-08-16

## 2018-08-16 VITALS
HEART RATE: 64 BPM | HEIGHT: 65 IN | SYSTOLIC BLOOD PRESSURE: 130 MMHG | DIASTOLIC BLOOD PRESSURE: 70 MMHG | OXYGEN SATURATION: 94 % | BODY MASS INDEX: 30.96 KG/M2 | WEIGHT: 185.8 LBS

## 2018-08-16 DIAGNOSIS — I10 ESSENTIAL HYPERTENSION: ICD-10-CM

## 2018-08-16 DIAGNOSIS — Z86.79 S/P ASCENDING AORTIC ANEURYSM REPAIR: ICD-10-CM

## 2018-08-16 DIAGNOSIS — Z98.890 S/P ASCENDING AORTIC ANEURYSM REPAIR: ICD-10-CM

## 2018-08-16 DIAGNOSIS — R00.2 PALPITATIONS: ICD-10-CM

## 2018-08-16 DIAGNOSIS — R00.2 HEART PALPITATIONS: ICD-10-CM

## 2018-08-16 DIAGNOSIS — E78.00 HYPERCHOLESTEROLEMIA: ICD-10-CM

## 2018-08-16 DIAGNOSIS — I10 ESSENTIAL HYPERTENSION: Primary | ICD-10-CM

## 2018-08-16 DIAGNOSIS — I48.0 PAROXYSMAL ATRIAL FIBRILLATION (HCC): ICD-10-CM

## 2018-08-16 RX ORDER — CHOLECALCIFEROL (VITAMIN D3) 125 MCG
CAPSULE ORAL AS NEEDED
COMMUNITY
End: 2018-08-20 | Stop reason: ALTCHOICE

## 2018-08-16 NOTE — PROGRESS NOTES
Subjective: Jeremias Betts is a 74 y.o. female is here for EP consult. The patient reports palpitations for which she wore and event monitor in May. The symptoms resolved but returned again this week. Monday she went for her daily walk and noted palpitations, fatigue and dyspnea. Has felt them on and off since that time. Denies chest pain.      Patient Active Problem List    Diagnosis Date Noted    Essential hypertension 08/21/2017    Hypercholesterolemia 08/21/2017    Intertrigo 07/26/2017    Positional vertigo 07/26/2017    Spinal stenosis, lumbar region, without neurogenic claudication 07/26/2017    Long-term use of high-risk medication 07/26/2017    LVH (left ventricular hypertrophy) 07/26/2017    Sciatica of left side 07/26/2017    Allergic conjunctivitis 07/26/2017    S/P cardiac cath 05/09/2017    Heart palpitations 03/08/2016    Swelling of both ankles 01/20/2016    Aortic dissection ([address] 12/28/2015    S/P ascending aortic aneurysm repair 12/28/2015      Yahaira Hayes MD  Past Medical History:   Diagnosis Date    Allergic conjunctivitis 7/26/2017    Aortic dissection (HCC)     Arthritis     lower back    Asthma     Essential hypertension 8/21/2017    GERD (gastroesophageal reflux disease)     Hypercholesterolemia 8/21/2017    Hyperlipidemia     Hypertension     Intertrigo 7/26/2017    Long-term use of high-risk medication 7/26/2017    LVH (left ventricular hypertrophy) 7/26/2017    Positional vertigo 7/26/2017    PUD (peptic ulcer disease)     Sciatica of left side 7/26/2017    Spinal stenosis, lumbar region, without neurogenic claudication 7/26/2017      Past Surgical History:   Procedure Laterality Date    COLONOSCOPY N/A 7/12/2017    COLONOSCOPY WITH IV ANTIBIOTICS performed by Merlinda Ferretti, MD at [facility]  7/12/2017         HX GYN      hysterectomy    HX GYN      tubal ligation    HX OTHER SURGICAL  2015    Type A Dissection Repair    ID COLONOSCOPY FLX DX W/COLLJ SPEC WHEN PFRMD  3/19/2012         UPPER GI ENDOSCOPY,BALL DIL,30MM  7/12/2017         UPPER GI ENDOSCOPY,BIOPSY  7/12/2017          No Known Allergies   Family History   Problem Relation Age of Onset    Cancer Father      colon cancer    negative for cardiac disease  Social History     Social History    Marital status:      Spouse name: N/A    Number of children: N/A    Years of education: N/A     Social History Main Topics    Smoking status: Never Smoker    Smokeless tobacco: Never Used    Alcohol use 0.0 oz/week     0 Standard drinks or equivalent per week    Drug use: No    Sexual activity: Not Asked     Other Topics Concern    None     Social History Narrative     Current Outpatient Prescriptions   Medication Sig    naproxen sodium (ALEVE) 220 mg cap Take  by mouth as needed.  losartan (COZAAR) 50 mg tablet Take 1 tablet by mouth once daily    metoprolol tartrate (LOPRESSOR) 25 mg tablet TAKE ONE TABLET BY MOUTH TWICE DAILY    pravastatin (PRAVACHOL) 20 mg tablet Take 1 Tab by mouth daily.  acetaminophen (TYLENOL ARTHRITIS PAIN) 650 mg CR tablet Take 650 mg by mouth every six (6) hours as needed for Pain.  multivitamin (ONE A DAY) tablet Take 1 Tab by mouth daily.  aspirin 81 mg tablet Take 81 mg by mouth daily.  omega-3 fatty acids-vitamin e (FISH OIL) 1,000 mg Cap Take 1 Cap by mouth two (2) times a day. No current facility-administered medications for this visit. Vitals:    08/16/18 1007   BP: 130/70   Pulse: 64   SpO2: 94%   Weight: 185 lb 12.8 oz (84.3 kg)   Height: 5' 5\" (1.651 m)       I have reviewed the nurses notes, vitals, problem list, allergy list, medical history, family, social history and medications. Review of Symptoms:    General: Pt denies excessive weight gain or loss. Pt is able to conduct ADL's  HEENT: Denies blurred vision, headaches, epistaxis and difficulty swallowing.   Respiratory: Denies shortness of breath, MIMS, wheezing or stridor. Cardiovascular: Denies precordial pain, palpitations, edema or PND  Gastrointestinal: Denies poor appetite, indigestion, abdominal pain or blood in stool  Urinary: Denies dysuria, pyuria  Musculoskeletal: Denies pain or swelling from muscles or joints  Neurologic: Denies tremor, paresthesias, or sensory motor disturbance  Skin: Denies rash, itching or texture change. Psych: Denies depression      Physical Exam:      General: Well developed, in no acute distress. HEENT: Eyes - PERRL, no jvd  Heart:  Normal S1/S2 negative S3 or S4. Regular, + murmur  Respiratory: Clear bilaterally x 4, no wheezing or rales  Extremities:  No edema, normal cap refill, no cyanosis. Musculoskeletal: No clubbing  Neuro: A&Ox3, speech clear, gait stable. Skin: Skin color is normal. No rashes or lesions. Non diaphoretic  Vascular: 2+ pulses symmetric in all extremities    Cardiographics    Ekg: Sinus  Rhythm   Voltage criteria for LVH  (R(I)+S(III) exceeds 2.50 mV)  -Voltage criteria w/o ST/T abnormality may be normal.    -Anterior infarct -age undetermined.     Results for orders placed or performed during the hospital encounter of 05/22/18   EKG, 12 LEAD, INITIAL   Result Value Ref Range    Ventricular Rate 67 BPM    Atrial Rate 67 BPM    QRS Duration 92 ms    Q-T Interval 420 ms    QTC Calculation (Bezet) 443 ms    Calculated R Axis -8 degrees    Calculated T Axis 60 degrees    Diagnosis       Sinus bradycardia premature atrial complexes  When compared with ECG of 21-MAR-2016 11:49,  Current undetermined rhythm precludes rhythm comparison, needs review  T wave inversion no longer evident in Inferior leads  Confirmed by Oswaldo Melgar MD, Muna Santos ([id number]) on 5/22/2018 12:45:01 PM     Results for orders placed or performed in visit on 02/22/16   CARDIAC HOLTER MONITOR, 24 HOURS    Narrative    ECG Monitor/24 hours, Complete    Reason for Holter Monitor   A-FIB    Heartbeat    Slowest 58  Average 74  Fastest  85      Results:   Underlying Rhythm: Normal sinus rhythm      Atrial Arrhythmias: premature atrial contractions; occasional,  atrial couplets and atrial triplets            AV Conduction: normal    Ventricular Arrhythmias: premature ventricular contractions;  occasional     ST Segment Analysis:normal     Symptom Correlation:  Arm pain does not correlate with any arrhythmias    Comment:   Sinus rhythm throughout     Gail Kumar MD, [facility]            Lab Results   Component Value Date/Time    WBC 5.4 05/22/2018 10:58 AM    HGB (POC) 13.0 08/21/2017 11:12 AM    HGB 13.4 05/22/2018 10:58 AM    HCT (POC) 41.2 08/21/2017 11:12 AM    HCT 44.2 05/22/2018 10:58 AM    PLATELET 701 (L) 36/95/3187 10:58 AM    MCV 94.6 05/22/2018 10:58 AM      Lab Results   Component Value Date/Time    Sodium 145 05/22/2018 10:58 AM    Potassium 3.9 05/22/2018 10:58 AM    Chloride 116 (H) 05/22/2018 10:58 AM    CO2 21 05/22/2018 10:58 AM    Anion gap 8 05/22/2018 10:58 AM    Glucose 91 05/22/2018 10:58 AM    BUN 23 (H) 05/22/2018 10:58 AM    Creatinine 0.72 05/22/2018 10:58 AM    BUN/Creatinine ratio 32 (H) 05/22/2018 10:58 AM    GFR est AA >60 05/22/2018 10:58 AM    GFR est non-AA >60 05/22/2018 10:58 AM    Calcium 9.2 05/22/2018 10:58 AM    Bilirubin, total 0.4 05/22/2018 10:58 AM    AST (SGOT) 28 05/22/2018 10:58 AM    Alk. phosphatase 78 05/22/2018 10:58 AM    Protein, total 6.9 05/22/2018 10:58 AM    Albumin 3.5 05/22/2018 10:58 AM    Globulin 3.4 05/22/2018 10:58 AM    A-G Ratio 1.0 (L) 05/22/2018 10:58 AM    ALT (SGPT) 44 05/22/2018 10:58 AM      Lab Results   Component Value Date/Time    TSH 1.870 03/08/2016 03:34 PM        Assessment:            ICD-10-CM ICD-9-CM    1. Essential hypertension I10 401.9 naproxen sodium (ALEVE) 220 mg cap      AMB POC EKG ROUTINE W/ 12 LEADS, INTER & REP      CARDIAC HOLTER MONITOR, 24 HOURS   2.  Heart palpitations R00.2 785.1 naproxen sodium (ALEVE) 220 mg cap      AMB POC EKG ROUTINE W/ 12 LEADS, INTER & REP      CARDIAC HOLTER MONITOR, 24 HOURS   3. Hypercholesterolemia E78.00 272.0 CARDIAC HOLTER MONITOR, 24 HOURS   4. Palpitations R00.2 785.1 CARDIAC HOLTER MONITOR, 24 HOURS   5. Paroxysmal atrial fibrillation (HCC) I48.0 427.31 CARDIAC HOLTER MONITOR, 24 HOURS   6. S/P ascending aortic aneurysm repair Z98.890 V45.89 CARDIAC HOLTER MONITOR, 24 HOURS    Z86.79     7. BMI 30.0-30.9,adult Z68.30 V85.30 CARDIAC HOLTER MONITOR, 24 HOURS     Orders Placed This Encounter    AMB POC EKG ROUTINE W/ 12 LEADS, INTER & REP     Order Specific Question:   Reason for Exam:     Answer:   ROUTINE    HOLTER MONITOR, 24 HOURS, Clinic Performed     Standing Status:   Future     Standing Expiration Date:   2/16/2019     Order Specific Question:   Reason for Exam:     Answer:   ? Atrial fib    naproxen sodium (ALEVE) 220 mg cap     Sig: Take  by mouth as needed. Plan:     Ms Monique Stokes is a pleasant 74year old female with poss AF, HTN, hyperlipidemia, moderate/severe MR due to prolapse. Will get holter as the rhythm strips from May are not available for review. She is a CHADSVAC of 4 and would benefit from [address] if she indeed has AF. Continue medical management for HTN, hyperlipidemia and MV. Thank you for allowing me to participate in Stepan Jewels 's care. Ahsan Junior NP    Patient seen and examined. All pertinent data reviewed. I have reviewed detailed note as outlined by Ahsan Junior NP. Case discussed with Nursing/medical assistant staff and Ahsan Junior NP. Plans as outlined. Will obtain a repeat monitor and have her f/u for results. The reading from monitor at [facility] was not definite.     Joon Watson MD, [facility] [place]

## 2018-08-16 NOTE — PROGRESS NOTES
Chief Complaint   Patient presents with    Other     PAC'S PVC'S PSVT, AND POSSIBLE AFIB    Shortness of Breath     1. Have you been to the ER, urgent care clinic since your last visit? Hospitalized since your last visit? ED [facility] ON 5/22/18 FOR PALPITATIONS. 2. Have you seen or consulted any other health care providers outside of the [address] [place] [address] since your last visit? Include any pap smears or colon screening.  NO

## 2018-08-16 NOTE — MR AVS SNAPSHOT
[address] [facility] 
[phone] Patient: Odilon Jacques MRN: [id number] HPQ:[id number]/75/[id number] Visit Information Date & Time Provider Department Dept. Phone Encounter #  
 8/16/2018 10:15 AM Elise Perez Dose, [address] [facility] [phone] [id number] Your Appointments 8/20/2018  9:00 AM  
FOLLOW UP 10 with MD Quyen Hubbard [address] [facility] Appt Note: 1 yr fu  
 [address] [phone] [address] [address]  
  
    
 8/28/2018  1:15 PM  
ESTABLISHED PATIENT with Rebekah Potter MD  
[facility]) Appt Note: Per pt, Dr. Jen Mckenzie wants to see her back in two weeks, discuss 24 monitor results-scc08/16/2018  
 [id number] [facility]  
[phone] [address] [facility] Upcoming Health Maintenance Date Due DTaP/Tdap/Td series (1 - Tdap) 7/23/1964 FOBT Q 1 YEAR AGE 50-75 7/23/1993 ZOSTER VACCINE AGE 60> 5/23/2003 GLAUCOMA SCREENING Q2Y 7/23/2008 Bone Densitometry (Dexa) Screening 7/23/2008 Pneumococcal 65+ Low/Medium Risk (2 of 2 - PCV13) 1/8/2017 MEDICARE YEARLY EXAM 3/14/2018 Influenza Age 5 to Adult 8/1/2018 BREAST CANCER SCRN MAMMOGRAM 5/1/2020 Allergies as of 8/16/2018  Review Complete On: 8/16/2018 By: Gutierrez Burden No Known Allergies Current Immunizations  Reviewed on 1/7/2016 Name Date Influenza Vaccine (Quad) PF 1/8/2016  9:27 AM  
 Pneumococcal Polysaccharide (PPSV-23) 1/8/2016  9:24 AM  
  
 Not reviewed this visit You Were Diagnosed With   
  
 Codes Comments Essential hypertension    -  Primary ICD-10-CM: I10 
ICD-9-CM: 401.9 Heart palpitations     ICD-10-CM: R00.2 ICD-9-CM: 785.1 Hypercholesterolemia     ICD-10-CM: E78.00 ICD-9-CM: 272.0 Palpitations     ICD-10-CM: R00.2 ICD-9-CM: 785.1 Paroxysmal atrial fibrillation (HCC)     ICD-10-CM: I48.0 ICD-9-CM: 427.31 S/P ascending aortic aneurysm repair     ICD-10-CM: Z98.890, Z86.79 
ICD-9-CM: V45.89 BMI 30.0-30.9,adult     ICD-10-CM: Z68.30 ICD-9-CM: V85.30 Vitals BP Pulse Height(growth percentile) Weight(growth percentile) SpO2 BMI  
 130/70 (BP 1 Location: Left arm, BP Patient Position: Sitting) 64 5' 5\" (1.651 m) 185 lb 12.8 oz (84.3 kg) 94% 30.92 kg/m2 OB Status Smoking Status Hysterectomy Never Smoker Vitals History BMI and BSA Data Body Mass Index Body Surface Area 30.92 kg/m 2 1.97 m 2 Preferred Pharmacy Pharmacy Name Phone [phone], [address] [phone] Your Updated Medication List  
  
   
This list is accurate as of 8/16/18 11:17 AM.  Always use your most recent med list.  
  
  
  
  
 ALEVE 220 mg Cap Generic drug:  naproxen sodium Take  by mouth as needed. aspirin 81 mg tablet Take 81 mg by mouth daily. FISH OIL 1,000 mg Cap Generic drug:  omega-3 fatty acids-vitamin e Take 1 Cap by mouth two (2) times a day. losartan 50 mg tablet Commonly known as:  COZAAR Take 1 tablet by mouth once daily  
  
 metoprolol tartrate 25 mg tablet Commonly known as:  LOPRESSOR  
TAKE ONE TABLET BY MOUTH TWICE DAILY  
  
 multivitamin tablet Commonly known as:  ONE A DAY Take 1 Tab by mouth daily. pravastatin 20 mg tablet Commonly known as:  PRAVACHOL Take 1 Tab by mouth daily. TYLENOL ARTHRITIS PAIN 650 mg Xochitl Glass Generic drug:  acetaminophen Take 650 mg by mouth every six (6) hours as needed for Pain. We Performed the Following AMB POC EKG ROUTINE W/ 12 LEADS, INTER & REP [29194 CPT(R)] To-Do List   
 Around 08/16/2018 ECG: CARDIAC HOLTER MONITOR, 24 HOURS Introducing [facility]! [employer] introduces [employer] patient portal. Now you can access parts of your medical record, email your doctor's office, and request medication refills online. 1. In your internet browser, go to [email or web address] 2. Click on the First Time User? Click Here link in the Sign In box. You will see the New Member Sign Up page. 3. Enter your WiFi Rail Access Code exactly as it appears below. You will not need to use this code after youve completed the sign-up process. If you do not sign up before the expiration date, you must request a new code. · WiFi Rail Access Code: [email or web address] Expires: 8/29/2018 10:31 AM 
 
4. Enter the last four digits of your Social Security Number (xxxx) and Date of Birth (mm/dd/yyyy) as indicated and click Submit. You will be taken to the next sign-up page. 5. Create a WiFi Rail ID. This will be your WiFi Rail login ID and cannot be changed, so think of one that is secure and easy to remember. 6. Create a WiFi Rail password. You can change your password at any time. 7. Enter your Password Reset Question and Answer. This can be used at a later time if you forget your password. 8. Enter your e-mail address. You will receive e-mail notification when new information is available in [address] 9. Click Sign Up. You can now view and download portions of your medical record. 10. Click the Download Summary menu link to download a portable copy of your medical information. If you have questions, please visit the Frequently Asked Questions section of the WiFi Rail website. Remember, WiFi Rail is NOT to be used for urgent needs. For medical emergencies, dial 911. Now available from your iPhone and Android! Please provide this summary of care documentation to your next provider. Your primary care clinician is listed as DEBORAH Jeffrey.  If you have any questions after today's visit, please call [phone].

## 2018-08-17 ENCOUNTER — TELEPHONE (OUTPATIENT)
Dept: CARDIOLOGY CLINIC | Age: 73
End: 2018-08-17

## 2018-08-17 NOTE — TELEPHONE ENCOUNTER
Verified patient with two identifiers. Pt informed per Allison Carrasco NP  that due to findings of a-fib on the holter monitor she is to start Eliquis 5 mg bid. Script has been e-scribed to her pharmacy. She has a follow up on 8-28 with Dr Carrie Monique. Pt verbalized understanding.

## 2018-08-19 DIAGNOSIS — E78.00 HYPERCHOLESTEROLEMIA: ICD-10-CM

## 2018-08-19 RX ORDER — PRAVASTATIN SODIUM 20 MG/1
TABLET ORAL
Qty: 90 TAB | Refills: 3 | Status: SHIPPED | OUTPATIENT
Start: 2018-08-19 | End: 2018-08-20 | Stop reason: SDUPTHER

## 2018-08-20 ENCOUNTER — OFFICE VISIT (OUTPATIENT)
Dept: INTERNAL MEDICINE CLINIC | Age: 73
End: 2018-08-20

## 2018-08-20 VITALS
BODY MASS INDEX: 31.19 KG/M2 | HEART RATE: 74 BPM | WEIGHT: 187.2 LBS | RESPIRATION RATE: 19 BRPM | SYSTOLIC BLOOD PRESSURE: 120 MMHG | OXYGEN SATURATION: 92 % | DIASTOLIC BLOOD PRESSURE: 62 MMHG | HEIGHT: 65 IN

## 2018-08-20 DIAGNOSIS — Z79.899 LONG-TERM USE OF HIGH-RISK MEDICATION: Chronic | ICD-10-CM

## 2018-08-20 DIAGNOSIS — N39.0 URINARY TRACT INFECTION WITH HEMATURIA, SITE UNSPECIFIED: ICD-10-CM

## 2018-08-20 DIAGNOSIS — I48.0 PAF (PAROXYSMAL ATRIAL FIBRILLATION) (HCC): Chronic | ICD-10-CM

## 2018-08-20 DIAGNOSIS — I10 ESSENTIAL HYPERTENSION: Chronic | ICD-10-CM

## 2018-08-20 DIAGNOSIS — R31.9 URINARY TRACT INFECTION WITH HEMATURIA, SITE UNSPECIFIED: ICD-10-CM

## 2018-08-20 DIAGNOSIS — Z00.00 INITIAL MEDICARE ANNUAL WELLNESS VISIT: Primary | ICD-10-CM

## 2018-08-20 DIAGNOSIS — E78.00 HYPERCHOLESTEROLEMIA: Chronic | ICD-10-CM

## 2018-08-20 LAB
BACTERIA UA POCT, BACTPOCT: ABNORMAL
BILIRUB UR QL STRIP: NEGATIVE
CASTS UA POCT: ABNORMAL
CLUE CELLS, CLUEPOCT: NEGATIVE
CRYSTALS UA POCT, CRYSPOCT: NEGATIVE
EPITHELIAL CELLS POCT: ABNORMAL
GLUCOSE UR-MCNC: NEGATIVE MG/DL
GRAN# POC: 2.3 K/UL (ref 2–7.8)
GRAN% POC: 54.9 % (ref 37–92)
HCT VFR BLD CALC: 40.3 % (ref 37–51)
HGB BLD-MCNC: 13.2 G/DL (ref 12–18)
KETONES P FAST UR STRIP-MCNC: NEGATIVE MG/DL
LY# POC: 1.6 K/UL (ref 0.6–4.1)
LY% POC: 39.6 % (ref 10–58.5)
MCH RBC QN: 29.2 PG (ref 26–32)
MCHC RBC-ENTMCNC: 32.8 G/DL (ref 30–36)
MCV RBC: 89 FL (ref 80–97)
MID #, POC: 0.2 K/UL (ref 0–1.8)
MID% POC: 5.5 % (ref 0.1–24)
MUCUS UA POCT, MUCPOCT: ABNORMAL
PH UR STRIP: 5 [PH] (ref 5–7)
PLATELET # BLD: 191 K/UL (ref 140–440)
PROT UR QL STRIP: ABNORMAL
RBC # BLD: 4.53 M/UL (ref 4.2–6.3)
RBC UA POCT, RBCPOCT: ABNORMAL
SP GR UR STRIP: 1.02 (ref 1.01–1.02)
TRICH UA POCT, TRICHPOC: NEGATIVE
UA UROBILINOGEN AMB POC: NORMAL (ref 0.2–1)
URINALYSIS CLARITY POC: ABNORMAL
URINALYSIS COLOR POC: YELLOW
URINE BLOOD POC: ABNORMAL
URINE CULT COMMENT, POCT: ABNORMAL
URINE LEUKOCYTES POC: ABNORMAL
URINE NITRITES POC: POSITIVE
WBC # BLD: 4.1 K/UL (ref 4.1–10.9)
WBC UA POCT, WBCPOCT: ABNORMAL
YEAST UA POCT, YEASTPOC: NEGATIVE

## 2018-08-20 RX ORDER — PRAVASTATIN SODIUM 20 MG/1
TABLET ORAL
Qty: 90 TAB | Refills: 3 | Status: SHIPPED | OUTPATIENT
Start: 2018-08-20 | End: 2019-11-06 | Stop reason: SDUPTHER

## 2018-08-20 NOTE — PATIENT INSTRUCTIONS
Learning About Cutting Calories  How do calories affect your weight? Food gives your body energy. Energy from the food you eat is measured in calories. This energy keeps your heart beating, your brain active, and your muscles working. Your body needs a certain number of calories each day. After your body uses the calories it needs, it stores extra calories as fat. To lose weight safely, you have to eat fewer calories while eating in a healthy way. How many calories do you need each day? The more active you are, the more calories you need. When you are less active, you need fewer calories. How many calories you need each day also depends on several things, including your age and whether you are male or female. Here are some general guidelines for adults:  · Less active women and older adults need 1,600 to 2,000 calories each day. · Active women and less active men need 2,000 to 2,400 calories each day. · Active men need 2,400 to 3,000 calories each day. How can you cut calories and eat healthy meals? Whole grains, vegetables and fruits, and dried beans are good lower-calorie foods. They give you lots of nutrients and fiber. And they fill you up. Sweets, energy drinks, and soda pop are high in calories. They give you few nutrients and no fiber. Try to limit soda pop, fruit juice, and energy drinks. Drink water instead. Some fats can be part of a healthy diet. But cutting back on fats from highly processed foods like fast foods and many snack foods is a good way to lower the calories in your diet. Also, use smaller amounts of fats like butter, margarine, salad dressing, and mayonnaise. Add fresh garlic, lemon, or herbs to your meals to add flavor without adding fat. Meats and dairy products can be a big source of hidden fats. Try to choose lean or low-fat versions of these products. Fat-free cookies, candies, chips, and frozen treats can still be high in sugar and calories.  Some fat-free foods have more calories than regular ones. Eat fat-free treats in moderation, as you would other foods. If your favorite foods are high in fat, salt, sugar, or calories, limit how often you eat them. Eat smaller servings, or look for healthy substitutes. Fill up on fruits, vegetables, and whole grains. Eating at home  · Use meat as a side dish instead of as the main part of your meal.  · Try main dishes that use whole wheat pasta, brown rice, dried beans, or vegetables. · Find ways to cook with little or no fat, such as broiling, steaming, or grilling. · Use cooking spray instead of oil. If you use oil, use a monounsaturated oil, such as canola or olive oil. · Trim fat from meats before you cook them. · Drain off fat after you brown the meat or while you roast it. · Chill soups and stews after you cook them. Then skim the fat off the top after it hardens. Eating out  · Order foods that are broiled or poached rather than fried or breaded. · Cut back on the amount of butter or margarine that you use on bread. · Order sauces, gravies, and salad dressings on the side, and use only a little. · When you order pasta, choose tomato sauce rather than cream sauce. · Ask for salsa with your baked potato instead of sour cream, butter, cheese, or michaels. · Order meals in a small size instead of upgrading to a large. · Share an entree, or take part of your food home to eat as another meal.  · Share appetizers and desserts. Where can you learn more? Go to http://kim-natalia.info/. Enter 99 [id number] in the search box to learn more about \"Learning About Cutting Calories. \"  Current as of: May 12, 2017  Content Version: 11.7  © 1584-9757 [employer]. Care instructions adapted under license by [employer] (which disclaims liability or warranty for this information).  If you have questions about a medical condition or this instruction, always ask your healthcare professional. [employer] disclaims any warranty or liability for your use of this information. Advance Care Planning: Care Instructions  Your Care Instructions    It can be hard to live with an illness that cannot be cured. But if your health is getting worse, you may want to make decisions about end-of-life care. Planning for the end of your life does not mean that you are giving up. It is a way to make sure that your wishes are met. Clearly stating your wishes can make it easier for your loved ones. Making plans while you are still able may also ease your mind and make your final days less stressful and more meaningful. Follow-up care is a key part of your treatment and safety. Be sure to make and go to all appointments, and call your doctor if you are having problems. It's also a good idea to know your test results and keep a list of the medicines you take. What can you do to plan for the end of life? · You can bring these issues up with your doctor. You do not need to wait until your doctor starts the conversation. You might start with \"I would not be willing to live with . Candance Huger Candance Huger Candance Huger \" When you complete this sentence it helps your doctor understand your wishes. · Talk openly and honestly with your doctor. This is the best way to understand the decisions you will need to make as your health changes. Know that you can always change your mind. · Ask your doctor about commonly used life-support measures. These include tube feedings, breathing machines, and fluids given through a vein (IV). Understanding these treatments will help you decide whether you want them. · You may choose to have these life-supporting treatments for a limited time. This allows a trial period to see whether they will help you. You may also decide that you want your doctor to take only certain measures to keep you alive. It is important to spell out these conditions so that your doctor and family understand them.   · Talk to your doctor about how long you are likely to live. He or she may be able to give you an idea of what usually happens with your specific illness. · Think about preparing papers that state your wishes. This way there will not be any confusion about what you want. You can change your instructions at any time. Which papers should you prepare? Advance directives are legal papers that tell doctors how you want to be cared for at the end of your life. You do not need a  to write these papers. Ask your doctor or your state health department for information on how to write your advance directives. They may have the forms for each of these types of papers. Make sure your doctor has a copy of these on file, and give a copy to a family member or close friend. · Consider a do-not-resuscitate order (DNR). This order asks that no extra treatments be done if your heart stops or you stop breathing. Extra treatments may include cardiopulmonary resuscitation (CPR), electrical shock to restart your heart, or a machine to breathe for you. If you decide to have a DNR order, ask your doctor to explain and write it. Place the order in your home where everyone can easily see it. · Consider a living will. A living will explains your wishes about life support and other treatments at the end of your life if you become unable to speak for yourself. Living dickens tell doctors to use or not use treatments that would keep you alive. You must have one or two witnesses or a notary present when you sign this form. · Consider a durable power of  for health care. This allows you to name a person to make decisions about your care if you are not able to. Most people ask a close friend or family member. Talk to this person about the kinds of treatments you want and those that you do not want. Make sure this person understands your wishes. These legal papers are simple to change.  Tell your doctor what you want to change, and ask him or her to make a note in your medical file. Give your family updated copies of the papers. Where can you learn more? Go to http://kim-natalia.info/. Enter P184 in the search box to learn more about \"Advance Care Planning: Care Instructions. \"  Current as of: October 6, 2017  Content Version: 11.7  © 6144-9908 [employer]. Care instructions adapted under license by [employer] (which disclaims liability or warranty for this information). If you have questions about a medical condition or this instruction, always ask your healthcare professional. [address] any warranty or liability for your use of this information.

## 2018-08-20 NOTE — PROGRESS NOTES
Health Maintenance Due   Topic Date Due    DTaP/Tdap/Td series (1 - Tdap) 07/23/1964    FOBT Q 1 YEAR AGE 50-75  07/23/1993    ZOSTER VACCINE AGE 60>  05/23/2003    GLAUCOMA SCREENING Q2Y  07/23/2008    Bone Densitometry (Dexa) Screening  07/23/2008    Pneumococcal 65+ Low/Medium Risk (2 of 2 - PCV13) 01/08/2017    Influenza Age 5 to Adult  08/01/2018       Chief Complaint   Patient presents with    Follow Up Chronic Condition    LOW BACK PAIN    Annual Wellness Visit     initial       1. Have you been to the ER, urgent care clinic since your last visit? Hospitalized since your last visit? No    2. Have you seen or consulted any other health care providers outside of the [address] [place] [address] since your last visit? Include any pap smears or colon screening. No    3) Do you have an Advance Directive on file? no    4) Are you interested in receiving information on Advance Directives? NO      Patient is accompanied by self I have received verbal consent from Hussain Donald to discuss any/all medical information while they are present in the room. Hussain Donald is a 74 y.o. female and presents for annual Medicare Wellness Visit. Problem List: Reviewed with patient and discussed risk factors.     Patient Active Problem List   Diagnosis Code    S/P ascending aortic aneurysm repair Z98.890, Z86.79    Swelling of both ankles M25.471, M25.472    Heart palpitations R00.2    S/P cardiac cath Z98.890    Intertrigo L30.4    Positional vertigo [id number]    Spinal stenosis, lumbar region, without neurogenic claudication M48.061    Long-term use of high-risk medication Z79.899    LVH (left ventricular hypertrophy) I51.7    Sciatica of left side M54.32    Allergic conjunctivitis H10.10    Essential hypertension I10    Hypercholesterolemia E78.00       Current medical providers:  Patient Care Team:  Petra Patel MD as PCP - General (Internal Medicine)  Rik Main MD as Referring Provider (Cardiology)  Beka Pathak MD as Surgeon (Cardiothoracic Surgery)  Heladio Thomason MD as Surgeon (Cardiothoracic Surgery)  Rodríguez Mcgill NP as Nurse Practitioner (Nurse Practitioner)  Harjinder Ramirez NP as Nurse Practitioner (Cardiology)    PSH: Reviewed with patient  Past Surgical History:   Procedure Laterality Date    COLONOSCOPY N/A 7/12/2017    COLONOSCOPY WITH IV ANTIBIOTICS performed [facility] Gogo [facility]  7/12/2017         HX GYN      hysterectomy    HX GYN      tubal ligation    HX OTHER SURGICAL  2015    Type A Dissection Repair    DE COLONOSCOPY FLX DX W/COLLJ SPEC WHEN PFRMD  3/19/2012         UPPER GI ENDOSCOPY,BALL DIL,30MM  7/12/2017         UPPER GI ENDOSCOPY,BIOPSY  7/12/2017             SH: Reviewed with patient  Social History   Substance Use Topics    Smoking status: Never Smoker    Smokeless tobacco: Never Used    Alcohol use 0.0 oz/week     0 Standard drinks or equivalent per week       FH: Reviewed with patient  Family History   Problem Relation Age of Onset    Cancer Father      colon cancer       Medications/Allergies: Reviewed with patient  Current Outpatient Prescriptions on File Prior to Visit   Medication Sig Dispense Refill    pravastatin (PRAVACHOL) 20 mg tablet TAKE ONE TABLET BY MOUTH ONCE DAILY 90 Tab 3    apixaban (ELIQUIS) 5 mg tablet Take 1 Tab by mouth two (2) times a day. 60 Tab 0    losartan (COZAAR) 50 mg tablet Take 1 tablet by mouth once daily 90 Tab 1    metoprolol tartrate (LOPRESSOR) 25 mg tablet TAKE ONE TABLET BY MOUTH TWICE DAILY 180 Tab 0    acetaminophen (TYLENOL ARTHRITIS PAIN) 650 mg CR tablet Take 650 mg by mouth every six (6) hours as needed for Pain.  multivitamin (ONE A DAY) tablet Take 1 Tab by mouth daily.  aspirin 81 mg tablet Take 81 mg by mouth daily.  omega-3 fatty acids-vitamin e (FISH OIL) 1,000 mg Cap Take 1 Cap by mouth two (2) times a day.       naproxen sodium (ALEVE) 220 mg cap Take  by mouth as needed. No current facility-administered medications on file prior to visit. No Known Allergies    Objective:  Visit Vitals    Resp 18    Ht 5' 5\" (1.651 m)    Wt 187 lb 3.2 oz (84.9 kg)    BMI 31.15 kg/m2    Body mass index is 31.15 kg/(m^2). Assessment of cognitive impairment: Alert and oriented x 3    Depression Screen:   PHQ over the last two weeks 7/26/2017   Little interest or pleasure in doing things Not at all   Feeling down, depressed, irritable, or hopeless Not at all   Total Score PHQ 2 0       Fall Risk Assessment:    Fall Risk Assessment, last 12 mths 8/16/2018   Able to walk? Yes   Fall in past 12 months? No       Functional Ability:   Does the patient exhibit a steady gait?  no   How long did it take the patient to get up and walk from a sitting position? 3 sec     Is the patient self reliant?  (ie can do own laundry, meals, household chores)  yes     Does the patient handle his/her own medications? yes     Does the patient handle his/her own money? yes     Is the patients home safe (ie good lighting, handrails on stairs and bath, etc.)? yes     Did you notice or did patient express any hearing difficulties? yes     Did you notice or did patient express any vision difficulties? yes     Were distance and reading eye charts used? no       Advance Care Planning:   Patient was offered the opportunity to discuss advance care planning:  yes     Does patient have an Advance Directive:  no   If no, did you provide information on Caring Connections? Yes       Plan:      No orders of the defined types were placed in this encounter.       Health Maintenance   Topic Date Due    DTaP/Tdap/Td series (1 - Tdap) 07/23/1964    FOBT Q 1 YEAR AGE 50-75  07/23/1993    ZOSTER VACCINE AGE 60>  05/23/2003    GLAUCOMA SCREENING Q2Y  07/23/2008    Bone Densitometry (Dexa) Screening  07/23/2008    Pneumococcal 65+ Low/Medium Risk (2 of 2 - PCV13) 01/08/2017    Influenza Age 9 to Adult  08/01/2018    MEDICARE YEARLY EXAM  08/21/2019    BREAST CANCER SCRN MAMMOGRAM  05/01/2020       *Patient verbalized understanding and agreement with the plan. A copy of the After Visit Summary with personalized health plan was given to the patient today.

## 2018-08-20 NOTE — PROGRESS NOTES
This note will not be viewable in [address] Sammi Watters is a 74 y.o. female who presents for an Initial Medicare Annual Wellness Exam (AWV) and follow up of chronic medical conditions. The patient states she has not had a Medicare wellness exam done within the last year    I have reviewed the patient's medical history in detail and updated the computerized patient record. History     Subjective:  Mrs. Saint Clair is a 78-year-old female who presents to the office today for Medicare wellness check and follow-up of a number of medical issues    The patient has hypertension. She remains on losartan and Lopressor. She is tolerating this without dizziness, lower extremity edema, fatigue or palpitations. She has had no headaches, numbness, tingling or focal neurological problems. She has hypercholesterolemia for which she remains on statin therapy. The patient denies any history of coronary artery disease and is had no exertional chest pains or claudication. The patient has recently had problems with shortness of breath. She was seen recently by her cardiologist and was felt to have paroxysmal atrial fibrillation. She was placed on Eliquis. Presently she notes no shortness of breath or lower extremity edema. She denies palpitations and is had no strokelike symptoms. She has had no bleeding problems related to the Eliquis and did stop her Aleve which she had been taking for arthritis.     Patient Active Problem List   Diagnosis Code    S/P ascending aortic aneurysm repair Z98.890, Z86.79    Swelling of both ankles M25.471, M25.472    Heart palpitations R00.2    S/P cardiac cath Z98.890    Intertrigo L30.4    Positional vertigo [id number]    Spinal stenosis, lumbar region, without neurogenic claudication M48.061    Long-term use of high-risk medication Z79.899    LVH (left ventricular hypertrophy) I51.7    Sciatica of left side M54.32    Allergic conjunctivitis H10.10    Essential hypertension I10    Hypercholesterolemia E78.00    PAF (paroxysmal atrial fibrillation) ([facility] [address] I48.0       Patient Care Team:  Lola Dean MD as PCP - General (Internal Medicine)  Bud Romero MD as Referring Provider (Cardiology)  Roxy Goodpasture, MD as Surgeon (Cardiothoracic Surgery)  Ynui Santana MD as Surgeon (Cardiothoracic Surgery)  Jerome Huerta NP as Nurse Practitioner (Nurse Practitioner)  Osvaldo Valdez NP as Nurse Practitioner (Cardiology)    Past Medical History:   Diagnosis Date    Allergic conjunctivitis 7/26/2017    Aortic dissection ([facility] [address]     Arthritis     lower back    Asthma     Essential hypertension 8/21/2017    GERD (gastroesophageal reflux disease)     Hypercholesterolemia 8/21/2017    Hyperlipidemia     Hypertension     Intertrigo 7/26/2017    Long-term use of high-risk medication 7/26/2017    LVH (left ventricular hypertrophy) 7/26/2017    PAF (paroxysmal atrial fibrillation) ([facility] [address] 8/20/2018    Positional vertigo 7/26/2017    PUD (peptic ulcer disease)     Sciatica of left side 7/26/2017    Spinal stenosis, lumbar region, without neurogenic claudication 7/26/2017     Past Surgical History:   Procedure Laterality Date    COLONOSCOPY N/A 7/12/2017    COLONOSCOPY WITH IV ANTIBIOTICS performed by Marisel Patel [facility] at [facility]  7/12/2017         HX GYN      hysterectomy    HX GYN      tubal ligation    HX OTHER SURGICAL  2015    Type A Dissection Repair    ND COLONOSCOPY FLX DX W/COLLJ SPEC WHEN PFRMD  3/19/2012         UPPER GI ENDOSCOPY,BALL DIL,30MM  7/12/2017         UPPER GI ENDOSCOPY,BIOPSY  7/12/2017          No Known Allergies  Current Outpatient Prescriptions   Medication Sig Dispense Refill    pravastatin (PRAVACHOL) 20 mg tablet TAKE ONE TABLET BY MOUTH ONCE DAILY 90 Tab 3    apixaban (ELIQUIS) 5 mg tablet Take 1 Tab by mouth two (2) times a day.  60 Tab 0    losartan (COZAAR) 50 mg tablet Take 1 tablet by mouth once daily 90 Tab 1    metoprolol tartrate (LOPRESSOR) 25 mg tablet TAKE ONE TABLET BY MOUTH TWICE DAILY 180 Tab 0    acetaminophen (TYLENOL ARTHRITIS PAIN) 650 mg CR tablet Take 650 mg by mouth every six (6) hours as needed for Pain.  multivitamin (ONE A DAY) tablet Take 1 Tab by mouth daily.  aspirin 81 mg tablet Take 81 mg by mouth daily.  omega-3 fatty acids-vitamin e (FISH OIL) 1,000 mg Cap Take 1 Cap by mouth two (2) times a day.        Social History     Social History    Marital status:      Spouse name: N/A    Number of children: N/A    Years of education: N/A     Social History Main Topics    Smoking status: Never Smoker    Smokeless tobacco: Never Used    Alcohol use 0.0 oz/week     0 Standard drinks or equivalent per week    Drug use: No    Sexual activity: Not Asked     Other Topics Concern    None     Social History Narrative     Family History   Problem Relation Age of Onset    Cancer Father      colon cancer     Health Maintenance   Topic Date Due    DTaP/Tdap/Td series (1 - Tdap) 07/23/1964    ZOSTER VACCINE AGE 60>  05/23/2003    GLAUCOMA SCREENING Q2Y  07/23/2008    Bone Densitometry (Dexa) Screening  07/23/2008    Pneumococcal 65+ Low/Medium Risk (2 of 2 - PCV13) 01/08/2017    Influenza Age 9 to Adult  08/01/2018    MEDICARE YEARLY EXAM  08/21/2019    BREAST CANCER SCRN MAMMOGRAM  05/01/2020    COLONOSCOPY  07/12/2022          Review of Systems  Constitutional: negative for fevers, chills, anorexia and weight loss  Eyes:   negative for visual disturbance and irritation  ENT:   negative for tinnitus,sore throat,nasal congestion,ear pain,hoarseness  Respiratory:  negative for cough, hemoptysis, dyspnea,wheezing  CV:   negative for chest pain, palpitations, lower extremity edema  GI:   negative for nausea, vomiting, diarrhea, abdominal pain,melena  Endo:               negative for polyuria,polydipsia,polyphagia,heat intolerance  Genitourinary: negative for frequency, dysuria and hematuria  Integumentary: negative for rash and pruritus  Hematologic:  negative for easy bruising and gum/nose bleeding  Musculoskel: negative for myalgias, arthralgias, back pain, muscle weakness, joint pain  Neurological:  negative for headaches, dizziness, vertigo, memory problems and gait   Behavl/Psych: negative for feelings of anxiety, depression, mood changes  ROS otherwise negative      Depression Risk Factor Screening:     PHQ over the last two weeks 7/26/2017   Little interest or pleasure in doing things Not at all   Feeling down, depressed, irritable, or hopeless Not at all   Total Score PHQ 2 0       Alcohol Risk Factor Screening: You do not drink alcohol or very rarely. Functional Ability and Level of Safety:   Hearing Loss  Hearing is good. Activities of Daily Living  No flowsheet data found. Fall Risk  Fall Risk Assessment, last 12 mths 8/16/2018   Able to walk? Yes   Fall in past 12 months? No       Abuse Screen  Abuse Screening Questionnaire 8/21/2017   Do you ever feel afraid of your partner? N   Are you in a relationship with someone who physically or mentally threatens you? N   Is it safe for you to go home? Y       Cognitive Screening   Evaluation of Cognitive Function:  Has your family/caregiver stated any concerns about your memory: no    Physical Exam     Visit Vitals    /62 (BP 1 Location: Left arm, BP Patient Position: Sitting)    Pulse 74    Resp 19    Ht 5' 5\" (1.651 m)    Wt 187 lb 3.2 oz (84.9 kg)    SpO2 92%    BMI 31.15 kg/m2     Body mass index is 31.15 kg/(m^2).      General appearance - alert, well appearing, and in no distress  Mental status - alert, oriented to person, place, and time  EYE-ANA, EOMI,conjunctiva normal bilaterally, lids normal  ENT-ENT exam normal, no neck nodes or sinus tenderness  Nose - normal and patent, no erythema,  Or discharge   Mouth - mucous membranes moist, pharynx normal without lesions  Neck - supple, no significant adenopathy or bruit  Chest - clear to auscultation, no wheezes, rales or rhonchi. Heart - normal rate, regular rhythm, normal S1, S2, no murmurs, rubs, clicks or gallops   Abdomen - soft, nontender, nondistended, no masses or organomegaly  Lymph- no adenopathy palpable  Ext-peripheral pulses normal, no pedal edema, no clubbing or cyanosis  Skin-Warm and dry. no hyperpigmentation, vitiligo, or suspicious lesions  Neuro -alert, oriented, normal speech, no focal findings or movement disorder noted    Assessment/Plan   [place] education and counseling provided:  Age appropriate evidence-based preventive care recommendations based on today's review and evaluation; including relevant cancer screening guidelines, and vaccination recommendations. An After Visit Summary was printed and given to the patient which information about these guidelines, and a personalized schedule for health maintenance items. Whe appropriate and with patient agreement, orders noted below were placed to complete missing health maintenance items. Additional Plan for follow up chronic medical conditions includes:  Diagnoses and all orders for this visit:    Initial Medicare annual wellness visit    Essential hypertension    Hypercholesterolemia    Long-term use of high-risk medication    PAF (paroxysmal atrial fibrillation) ([facility] [address]          Other instructions: The patient's medications are reviewed and reconciled. No change in her current medical regimen is made. Body mass index is 31.15 and dietary counseling along with printed patient education is given    Advanced care planning discussion occurred today. Health maintenance issues were discussed. The patient has had a glaucoma screening within the last year and will have a copy of that office note forwarded to us. She has had a bone density done at the [address] [place] [address] and we will try to obtain a copy.     Age-appropriate vaccinations were discussed including influenza and Prevnar 13 which she will be due for after 1 September. In addition I have recommended a Tdap vaccination as well as shingles shot. Await results of multiple labs    Follow-up yearly    Follow-up Disposition: Not on File    I have reviewed with the patient details of the assessment and plan and all questions were answered. Relevent patient education was performed. The most recent lab findings were reviewed with the patient.     Lester Potts MD

## 2018-08-20 NOTE — MR AVS SNAPSHOT
[facility] [address] [address] [phone] Patient: Cristi Aponte MRN: [id number]:[id number] Visit Information Date & Time Provider Department Dept. Phone Encounter #  
 8/20/2018  9:00 AM Kris Krishna, [facility] [phone] [id number] Follow-up Instructions Return in about 1 year (around 8/20/2019). Follow-up and Disposition History Your Appointments 8/28/2018  1:15 PM  
ESTABLISHED PATIENT with Yahaira Feng MD  
[facility]) Appt Note: Per pt, Dr. Houston Bruce wants to see her back in two weeks, discuss 24 monitor results-scc08/16/2018  
 [address] [facility] [address]  
[phone] [address] [facility] [address] Upcoming Health Maintenance Date Due DTaP/Tdap/Td series (1 - Tdap) 7/23/1964 ZOSTER VACCINE AGE 60> 5/23/2003 GLAUCOMA SCREENING Q2Y 7/23/2008 Bone Densitometry (Dexa) Screening 7/23/2008 Pneumococcal 65+ Low/Medium Risk (2 of 2 - PCV13) 1/8/2017 Influenza Age 5 to Adult 8/1/2018 MEDICARE YEARLY EXAM 8/21/2019 BREAST CANCER SCRN MAMMOGRAM 5/1/2020 COLONOSCOPY 7/12/2022 Allergies as of 8/20/2018  Review Complete On: 8/20/2018 By: Kris Krishna MD  
 No Known Allergies Current Immunizations  Reviewed on 1/7/2016 Name Date Influenza Vaccine (Quad) PF 1/8/2016  9:27 AM  
 Pneumococcal Polysaccharide (PPSV-23) 1/8/2016  9:24 AM  
  
 Not reviewed this visit You Were Diagnosed With   
  
 Codes Comments Initial Medicare annual wellness visit    -  Primary ICD-10-CM: Z00.00 ICD-9-CM: V70.0 Essential hypertension     ICD-10-CM: I10 
ICD-9-CM: 401.9 Hypercholesterolemia     ICD-10-CM: E78.00 ICD-9-CM: 272.0 Long-term use of high-risk medication     ICD-10-CM: Z79.899 ICD-9-CM: V58.69 PAF (paroxysmal atrial fibrillation) (HCC)     ICD-10-CM: I48.0 ICD-9-CM: 427.31 Vitals BP Pulse Resp Height(growth percentile) Weight(growth percentile) SpO2  
 120/62 (BP 1 Location: Left arm, BP Patient Position: Sitting) 74 19 5' 5\" (1.651 m) 187 lb 3.2 oz (84.9 kg) 92% BMI OB Status Smoking Status 31.15 kg/m2 Hysterectomy Never Smoker Vitals History BMI and BSA Data Body Mass Index Body Surface Area  
 31.15 kg/m 2 1.97 m 2 Preferred Pharmacy Pharmacy Name Phone [facility] [address] [place] [address], [place] [facility] [phone] Your Updated Medication List  
  
   
This list is accurate as of 8/20/18  9:21 AM.  Always use your most recent med list.  
  
  
  
  
 apixaban 5 mg tablet Commonly known as:  Wonda Deem Take 1 Tab by mouth two (2) times a day. aspirin 81 mg tablet Take 81 mg by mouth daily. FISH OIL 1,000 mg Cap Generic drug:  omega-3 fatty acids-vitamin e Take 1 Cap by mouth two (2) times a day. losartan 50 mg tablet Commonly known as:  COZAAR Take 1 tablet by mouth once daily  
  
 metoprolol tartrate 25 mg tablet Commonly known as:  LOPRESSOR  
TAKE ONE TABLET BY MOUTH TWICE DAILY  
  
 multivitamin tablet Commonly known as:  ONE A DAY Take 1 Tab by mouth daily. pravastatin 20 mg tablet Commonly known as:  PRAVACHOL  
TAKE ONE TABLET BY MOUTH ONCE DAILY  
  
 TYLENOL ARTHRITIS PAIN 650 mg Tber Generic drug:  acetaminophen Take 650 mg by mouth every six (6) hours as needed for Pain. Prescriptions Sent to Pharmacy Refills  
 pravastatin (PRAVACHOL) 20 mg tablet 3 Sig: TAKE ONE TABLET BY MOUTH ONCE DAILY Class: Normal  
 Pharmacy: [address], [address] [place] [address] Ph #: [phone] We Performed the Following AMB POC COMPLETE CBC,AUTOMATED ENTER [id number] CPT(R)] AMB POC URINALYSIS DIP STICK AUTO W/ MICRO  [82672 CPT(R)] CK [id number] CPT(R)] COLLECTION VENOUS BLOOD,VENIPUNCTURE [id number] CPT(R)] LIPID PANEL [51848 CPT(R)] METABOLIC PANEL, COMPREHENSIVE [10033 CPT(R)] TSH 3RD GENERATION [44346 CPT(R)] Follow-up Instructions Return in about 1 year (around 8/20/2019). Patient Instructions Learning About Cutting Calories How do calories affect your weight? Food gives your body energy. Energy from the food you eat is measured in calories. This energy keeps your heart beating, your brain active, and your muscles working. Your body needs a certain number of calories each day. After your body uses the calories it needs, it stores extra calories as fat. To lose weight safely, you have to eat fewer calories while eating in a healthy way. How many calories do you need each day? The more active you are, the more calories you need. When you are less active, you need fewer calories. How many calories you need each day also depends on several things, including your age and whether you are male or female. Here are some general guidelines for adults: 
· Less active women and older adults need 1,600 to 2,000 calories each day. · Active women and less active men need 2,000 to 2,400 calories each day. · Active men need 2,400 to 3,000 calories each day. How can you cut calories and eat healthy meals? Whole grains, vegetables and fruits, and dried beans are good lower-calorie foods. They give you lots of nutrients and fiber. And they fill you up. Sweets, energy drinks, and soda pop are high in calories. They give you few nutrients and no fiber. Try to limit soda pop, fruit juice, and energy drinks. Drink water instead. Some fats can be part of a healthy diet. But cutting back on fats from highly processed foods like fast foods and many snack foods is a good way to lower the calories in your diet.  Also, use smaller amounts of fats like butter, margarine, salad dressing, and mayonnaise. Add fresh garlic, lemon, or herbs to your meals to add flavor without adding fat. Meats and dairy products can be a big source of hidden fats. Try to choose lean or low-fat versions of these products. Fat-free cookies, candies, chips, and frozen treats can still be high in sugar and calories. Some fat-free foods have more calories than regular ones. Eat fat-free treats in moderation, as you would other foods. If your favorite foods are high in fat, salt, sugar, or calories, limit how often you eat them. Eat smaller servings, or look for healthy substitutes. Fill up on fruits, vegetables, and whole grains. Eating at home · Use meat as a side dish instead of as the main part of your meal. 
· Try main dishes that use whole wheat pasta, brown rice, dried beans, or vegetables. · Find ways to cook with little or no fat, such as broiling, steaming, or grilling. · Use cooking spray instead of oil. If you use oil, use a monounsaturated oil, such as canola or olive oil. · Trim fat from meats before you cook them. · Drain off fat after you brown the meat or while you roast it. · Chill soups and stews after you cook them. Then skim the fat off the top after it hardens. Eating out · Order foods that are broiled or poached rather than fried or breaded. · Cut back on the amount of butter or margarine that you use on bread. · Order sauces, gravies, and salad dressings on the side, and use only a little. · When you order pasta, choose tomato sauce rather than cream sauce. · Ask for salsa with your baked potato instead of sour cream, butter, cheese, or michaels. · Order meals in a small size instead of upgrading to a large. · Share an entree, or take part of your food home to eat as another meal. 
· Share appetizers and desserts. Where can you learn more? Go to http://melecio.info/. Enter 99 [id number] in the search box to learn more about \"Learning About Cutting Calories. \" Current as of: May 12, 2017 Content Version: 11.7 © 3839-2175 CrowdTunes. Care instructions adapted under license by Cswitch (which disclaims liability or warranty for this information). If you have questions about a medical condition or this instruction, always ask your healthcare professional. [address] any warranty or liability for your use of this information. Advance Care Planning: Care Instructions Your Care Instructions It can be hard to live with an illness that cannot be cured. But if your health is getting worse, you may want to make decisions about end-of-life care. Planning for the end of your life does not mean that you are giving up. It is a way to make sure that your wishes are met. Clearly stating your wishes can make it easier for your loved ones. Making plans while you are still able may also ease your mind and make your final days less stressful and more meaningful. Follow-up care is a key part of your treatment and safety. Be sure to make and go to all appointments, and call your doctor if you are having problems. It's also a good idea to know your test results and keep a list of the medicines you take. What can you do to plan for the end of life? · You can bring these issues up with your doctor. You do not need to wait until your doctor starts the conversation. You might start with \"I would not be willing to live with . Ana Cristina Ortega \" When you complete this sentence it helps your doctor understand your wishes. · Talk openly and honestly with your doctor. This is the best way to understand the decisions you will need to make as your health changes. Know that you can always change your mind. · Ask your doctor about commonly used life-support measures. These include tube feedings, breathing machines, and fluids given through a vein (IV). Understanding these treatments will help you decide whether you want them. · You may choose to have these life-supporting treatments for a limited time. This allows a trial period to see whether they will help you. You may also decide that you want your doctor to take only certain measures to keep you alive. It is important to spell out these conditions so that your doctor and family understand them. · Talk to your doctor about how long you are likely to live. He or she may be able to give you an idea of what usually happens with your specific illness. · Think about preparing papers that state your wishes. This way there will not be any confusion about what you want. You can change your instructions at any time. Which papers should you prepare? Advance directives are legal papers that tell doctors how you want to be cared for at the end of your life. You do not need a  to write these papers. Ask your doctor or your state health department for information on how to write your advance directives. They may have the forms for each of these types of papers. Make sure your doctor has a copy of these on file, and give a copy to a family member or close friend. · Consider a do-not-resuscitate order (DNR). This order asks that no extra treatments be done if your heart stops or you stop breathing. Extra treatments may include cardiopulmonary resuscitation (CPR), electrical shock to restart your heart, or a machine to breathe for you. If you decide to have a DNR order, ask your doctor to explain and write it. Place the order in your home where everyone can easily see it. · Consider a living will. A living will explains your wishes about life support and other treatments at the end of your life if you become unable to speak for yourself. Living dickens tell doctors to use or not use treatments that would keep you alive. You must have one or two witnesses or a notary present when you sign this form. · Consider a durable power of  for health care. This allows you to name a person to make decisions about your care if you are not able to. Most people ask a close friend or family member. Talk to this person about the kinds of treatments you want and those that you do not want. Make sure this person understands your wishes. These legal papers are simple to change. Tell your doctor what you want to change, and ask him or her to make a note in your medical file. Give your family updated copies of the papers. Where can you learn more? Go to http://kim-natalia.info/. Enter P184 in the search box to learn more about \"Advance Care Planning: Care Instructions. \" Current as of: October 6, 2017 Content Version: 11.7 © 1210-9542 Healthwise, Incorporated. Care instructions adapted under license by [employer] Solar (which disclaims liability or warranty for this information). If you have questions about a medical condition or this instruction, always ask your healthcare professional. [address] any warranty or liability for your use of this information. Patient Instructions History Introducing [facility] & HEALTH SERVICES! Stephen Bustillo introduces Phorm patient portal. Now you can access parts of your medical record, email your doctor's office, and request medication refills online. 1. In your internet browser, go to [email or web address] 2. Click on the First Time User? Click Here link in the Sign In box. You will see the New Member Sign Up page. 3. Enter your Phorm Access Code exactly as it appears below. You will not need to use this code after youve completed the sign-up process. If you do not sign up before the expiration date, you must request a new code. · Phorm Access Code: [email or web address] Expires: 8/29/2018 10:31 AM 
 
4.  Enter the last four digits of your Social Security Number (xxxx) and Date of Birth (mm/dd/yyyy) as indicated and click Submit. You will be taken to the next sign-up page. 5. Create a Jumio ID. This will be your Jumio login ID and cannot be changed, so think of one that is secure and easy to remember. 6. Create a Jumio password. You can change your password at any time. 7. Enter your Password Reset Question and Answer. This can be used at a later time if you forget your password. 8. Enter your e-mail address. You will receive e-mail notification when new information is available in [address] 9. Click Sign Up. You can now view and download portions of your medical record. 10. Click the Download Summary menu link to download a portable copy of your medical information. If you have questions, please visit the Frequently Asked Questions section of the Jumio website. Remember, Jumio is NOT to be used for urgent needs. For medical emergencies, dial 911. Now available from your iPhone and Android! Please provide this summary of care documentation to your next provider. Your primary care clinician is listed as DEBORAH Jeffrey. If you have any questions after today's visit, please call [phone].

## 2018-08-21 DIAGNOSIS — I10 ESSENTIAL HYPERTENSION: Chronic | ICD-10-CM

## 2018-08-21 DIAGNOSIS — Z79.899 LONG-TERM USE OF HIGH-RISK MEDICATION: Chronic | ICD-10-CM

## 2018-08-21 DIAGNOSIS — I48.0 PAF (PAROXYSMAL ATRIAL FIBRILLATION) (HCC): Primary | Chronic | ICD-10-CM

## 2018-08-21 DIAGNOSIS — E78.00 HYPERCHOLESTEROLEMIA: Chronic | ICD-10-CM

## 2018-08-21 LAB
ALBUMIN SERPL-MCNC: 4 G/DL (ref 3.5–4.8)
ALBUMIN/GLOB SERPL: 1.7 {RATIO} (ref 1.2–2.2)
ALP SERPL-CCNC: 64 IU/L (ref 39–117)
ALT SERPL-CCNC: 7 IU/L (ref 0–32)
AST SERPL-CCNC: 11 IU/L (ref 0–40)
BILIRUB SERPL-MCNC: 0.4 MG/DL (ref 0–1.2)
BUN SERPL-MCNC: 18 MG/DL (ref 8–27)
BUN/CREAT SERPL: 29 (ref 12–28)
CALCIUM SERPL-MCNC: 9.5 MG/DL (ref 8.7–10.3)
CHLORIDE SERPL-SCNC: 105 MMOL/L (ref 96–106)
CHOLEST SERPL-MCNC: 117 MG/DL (ref 100–199)
CK SERPL-CCNC: 68 U/L (ref 24–173)
CO2 SERPL-SCNC: 29 MMOL/L (ref 20–29)
CREAT SERPL-MCNC: 0.63 MG/DL (ref 0.57–1)
GLOBULIN SER CALC-MCNC: 2.3 G/DL (ref 1.5–4.5)
GLUCOSE SERPL-MCNC: 80 MG/DL (ref 65–99)
HDLC SERPL-MCNC: 45 MG/DL
LDLC SERPL CALC-MCNC: 57 MG/DL (ref 0–99)
POTASSIUM SERPL-SCNC: 4.1 MMOL/L (ref 3.5–5.2)
PROT SERPL-MCNC: 6.3 G/DL (ref 6–8.5)
SODIUM SERPL-SCNC: 145 MMOL/L (ref 134–144)
TRIGL SERPL-MCNC: 77 MG/DL (ref 0–149)
TSH SERPL DL<=0.005 MIU/L-ACNC: 1.32 UIU/ML (ref 0.45–4.5)
VLDLC SERPL CALC-MCNC: 15 MG/DL (ref 5–40)

## 2018-08-23 ENCOUNTER — TELEPHONE (OUTPATIENT)
Dept: INTERNAL MEDICINE CLINIC | Age: 73
End: 2018-08-23

## 2018-08-23 LAB
BACTERIA UR CULT: ABNORMAL
BACTERIA UR CULT: ABNORMAL

## 2018-08-23 RX ORDER — NITROFURANTOIN 25; 75 MG/1; MG/1
100 CAPSULE ORAL 2 TIMES DAILY
Qty: 14 CAP | Refills: 0 | Status: ON HOLD | OUTPATIENT
Start: 2018-08-23 | End: 2018-09-10

## 2018-08-23 NOTE — TELEPHONE ENCOUNTER
----- Message from Jsei Chavez MD sent at 8/23/2018 11:11 AM EDT -----  Labs are stable except she has a urinary tract infection.   Rx: Macrobid 100 mg twice daily #14  No change in current medical management

## 2018-08-23 NOTE — PROGRESS NOTES
Labs are stable except she has a urinary tract infection.   Rx: Macrobid 100 mg twice daily #14  No change in current medical management

## 2018-08-28 ENCOUNTER — OFFICE VISIT (OUTPATIENT)
Dept: CARDIOLOGY CLINIC | Age: 73
End: 2018-08-28

## 2018-08-28 VITALS
RESPIRATION RATE: 16 BRPM | WEIGHT: 186.4 LBS | DIASTOLIC BLOOD PRESSURE: 70 MMHG | HEIGHT: 65 IN | OXYGEN SATURATION: 94 % | SYSTOLIC BLOOD PRESSURE: 120 MMHG | HEART RATE: 60 BPM | BODY MASS INDEX: 31.06 KG/M2

## 2018-08-28 DIAGNOSIS — E78.00 HYPERCHOLESTEROLEMIA: Chronic | ICD-10-CM

## 2018-08-28 DIAGNOSIS — I10 ESSENTIAL HYPERTENSION: Chronic | ICD-10-CM

## 2018-08-28 DIAGNOSIS — I49.5 BRADY-TACHY SYNDROME (HCC): ICD-10-CM

## 2018-08-28 DIAGNOSIS — I49.5 SSS (SICK SINUS SYNDROME) (HCC): ICD-10-CM

## 2018-08-28 DIAGNOSIS — I48.0 PAF (PAROXYSMAL ATRIAL FIBRILLATION) (HCC): Primary | Chronic | ICD-10-CM

## 2018-08-28 NOTE — MR AVS SNAPSHOT
[address] [facility] 
[phone] Patient: Gayatri Green MRN: [id number] Visit Information Date & Time Provider Department Dept. Phone Encounter #  
 8/28/2018  1:15 PM Eric Hernandez, [address] [facility] [phone] [id number] Follow-up Instructions Return in about 4 weeks (around 9/25/2018). Routing History Follow-up and Disposition History Your Appointments 10/4/2018  2:30 PM  
ESTABLISHED PATIENT with Erika Mayes NP [facility] [address] [place] [address]) Appt Note: f/u PVI and new implant dos, 09/12/2018,jar  
 [address]  
[phone] [address]  
  
    
 10/4/2018  2:30 PM  
PROCEDURE with PACEMAKER, [facility] [address] [place] [address]) Appt Note: f/u PVI and new implant dos, 09/12/2018,jar  
 [address]  
[phone] [address]  
  
    
 8/20/2019  9:00 AM  
Complete Physical with Edilma Carrasco MD  
[facility] ([facility]) Appt Note: cpe-1 year follow up appt [address] [address] [address] [facility] [address] Upcoming Health Maintenance Date Due DTaP/Tdap/Td series (1 - Tdap) 7/23/1964 ZOSTER VACCINE AGE 60> 5/23/2003 GLAUCOMA SCREENING Q2Y 7/23/2008 Bone Densitometry (Dexa) Screening 7/23/2008 Pneumococcal 65+ Low/Medium Risk (2 of 2 - PCV13) 1/8/2017 Influenza Age 5 to Adult 8/1/2018 MEDICARE YEARLY EXAM 8/21/2019 COLONOSCOPY 7/12/2022 Allergies as of 8/28/2018  Review Complete On: 8/28/2018 By: Donice Osler, MD  
 No Known Allergies Current Immunizations  Reviewed on 1/7/2016 Name Date Influenza Vaccine (Quad) PF 1/8/2016  9:27 AM  
 Pneumococcal Polysaccharide (PPSV-23) 1/8/2016  9:24 AM  
  
 Not reviewed this visit You Were Diagnosed With   
  
 Codes Comments PAF (paroxysmal atrial fibrillation) ([facility]    -  Primary ICD-10-CM: I48.0 ICD-9-CM: 427.31 Essential hypertension     ICD-10-CM: I10 
ICD-9-CM: 401.9 Hypercholesterolemia     ICD-10-CM: E78.00 ICD-9-CM: 272.0 BMI 31.0-31.9,adult     ICD-10-CM: Z68.31 
ICD-9-CM: V85.31 Danilo-tachy syndrome ([facility]     ICD-10-CM: I49.5 ICD-9-CM: 427.81   
 SSS (sick sinus syndrome) ([facility])     ICD-10-CM: I49.5 ICD-9-CM: 427.81 Vitals BP Pulse Resp Height(growth percentile) Weight(growth percentile) SpO2  
 120/70 (BP 1 Location: Right arm, BP Patient Position: Sitting) 60 16 5' 5\" (1.651 m) 186 lb 6.4 oz (84.6 kg) 94% BMI OB Status Smoking Status 31.02 kg/m2 Hysterectomy Never Smoker BMI and BSA Data Body Mass Index Body Surface Area 31.02 kg/m 2 1.97 m 2 Preferred Pharmacy Pharmacy Name Phone [facility] [address] [place] [address] [phone] Your Updated Medication List  
  
   
This list is accurate as of 8/28/18  1:59 PM.  Always use your most recent med list.  
  
  
  
  
 apixaban 5 mg tablet Commonly known as:  Mally Cory Take 1 Tab by mouth two (2) times a day. aspirin 81 mg tablet Take 81 mg by mouth daily. FISH OIL 1,000 mg Cap Generic drug:  omega-3 fatty acids-vitamin e Take 1 Cap by mouth two (2) times a day. losartan 50 mg tablet Commonly known as:  COZAAR Take 1 tablet by mouth once daily  
  
 metoprolol tartrate 25 mg tablet Commonly known as:  LOPRESSOR  
TAKE ONE TABLET BY MOUTH TWICE DAILY  
  
 multivitamin tablet Commonly known as:  ONE A DAY Take 1 Tab by mouth daily. nitrofurantoin (macrocrystal-monohydrate) 100 mg capsule Commonly known as:  MACROBID  
 Take 1 Cap by mouth two (2) times a day. pravastatin 20 mg tablet Commonly known as:  PRAVACHOL  
TAKE ONE TABLET BY MOUTH ONCE DAILY  
  
 TYLENOL ARTHRITIS PAIN 650 mg Tber Generic drug:  acetaminophen Take 650 mg by mouth every six (6) hours as needed for Pain. We Performed the Following CBC W/O DIFF [83557 CPT(R)] MAGNESIUM [id number] CPT(R)] METABOLIC PANEL, COMPREHENSIVE [61390 CPT(R)] PROTHROMBIN TIME + INR [53052 CPT(R)] Follow-up Instructions Return in about 4 weeks (around 9/25/2018). To-Do List   
 Around 08/28/2018 Imaging:  XR CHEST PA LAT   
  
 09/10/2018 1:00 PM  
  Appointment with [address] [place] [place] [address] at [address] [place] [address] ([phone]) NPO AFTER MIDNIGHT! ROUTINE CASES:  Please arrive 2 hour prior to your scheduled appointment time. If your scheduled appointment is for 0730, 0800, 0815, please arrive by 0645. NON ROUTINE CASES:  PATIENTS WHO REQUIRE LABS, X-RAY, EKG, or MEDS:  PLEASE ARRIVE 3 HOURS PRIOR TO YOUR SCHEDULED APPOINTMENT. If you require hydration prior to your procedure, PLEASE ARRIVE 4 HOURS PRIOR TO YOUR APPOINTMENT  **** IT IS THE OFFICE SCHEDULARS RESPONSBILITY TO NOTIFY THE CATH LAB SCHEDULAR IF THE PATIENT WILL REQUIRE ANY ADDITIONAL TIME FOR PREP FROM ROUTINE CASE *****  
  
 09/12/2018 9:15 AM  
  Appointment with CATH EP ROOM [facility] at [address] [place] [address] ([phone]) NPO AFTER MIDNIGHT! ROUTINE CASES:  Please arrive 2 hour prior to your scheduled appointment time. If your scheduled appointment is for 0730, 0800, 0815, please arrive by 0645. NON ROUTINE CASES:  PATIENTS WHO REQUIRE LABS, X-RAY, EKG, or MEDS:  PLEASE ARRIVE 3 HOURS PRIOR TO YOUR SCHEDULED APPOINTMENT.   If you require hydration prior to your procedure, PLEASE ARRIVE 4 HOURS PRIOR TO YOUR APPOINTMENT  **** IT IS THE OFFICE SCHEDULARS RESPONSBILITY TO NOTIFY THE CATH LAB SCHEDULAR IF THE PATIENT WILL REQUIRE ANY ADDITIONAL TIME FOR PREP FROM ROUTINE CASE ***** Introducing \[facility]\"" & HEALTH SERVICES! [facility] introduces PolyPid patient portal. Now you can access parts of your medical record, email your doctor's office, and request medication refills online. 1. In your internet browser, go to [email or web address] 2. Click on the First Time User? Click Here link in the Sign In box. You will see the New Member Sign Up page. 3. Enter your PolyPid Access Code exactly as it appears below. You will not need to use this code after youve completed the sign-up process. If you do not sign up before the expiration date, you must request a new code. · PolyPid Access Code: [email or web address] Expires: 8/29/2018 10:31 AM 
 
4. Enter the last four digits of your Social Security Number (xxxx) and Date of Birth (mm/dd/yyyy) as indicated and click Submit. You will be taken to the next sign-up page. 5. Create a PolyPid ID. This will be your PolyPid login ID and cannot be changed, so think of one that is secure and easy to remember. 6. Create a PolyPid password. You can change your password at any time. 7. Enter your Password Reset Question and Answer. This can be used at a later time if you forget your password. 8. Enter your e-mail address. You will receive e-mail notification when new information is available in [address] 9. Click Sign Up. You can now view and download portions of your medical record. 10. Click the Download Summary menu link to download a portable copy of your medical information. If you have questions, please visit the Frequently Asked Questions section of the PolyPid website. Remember, PolyPid is NOT to be used for urgent needs. For medical emergencies, dial 911. Now available from your iPhone and Android! Please provide this summary of care documentation to your next provider. Your primary care clinician is listed as DEBORAH Jeffrey. If you have any questions after today's visit, please call [phone].

## 2018-08-28 NOTE — PROGRESS NOTES
Subjective: Arturo Shin is a 74 y.o. female is here for test results.      Patient Active Problem List    Diagnosis Date Noted    PAF (paroxysmal atrial fibrillation) ([facility] [address] 08/20/2018    Essential hypertension 08/21/2017    Hypercholesterolemia 08/21/2017    Intertrigo 07/26/2017    Positional vertigo 07/26/2017    Spinal stenosis, lumbar region, without neurogenic claudication 07/26/2017    Long-term use of high-risk medication 07/26/2017    LVH (left ventricular hypertrophy) 07/26/2017    Sciatica of left side 07/26/2017    Allergic conjunctivitis 07/26/2017    S/P cardiac cath 05/09/2017    Heart palpitations 03/08/2016    Swelling of both ankles 01/20/2016    S/P ascending aortic aneurysm repair 12/28/2015      Chanel Caban MD  Past Medical History:   Diagnosis Date    Allergic conjunctivitis 7/26/2017    Aortic dissection (HCC)     Arthritis     lower back    Asthma     Essential hypertension 8/21/2017    GERD (gastroesophageal reflux disease)     Hypercholesterolemia 8/21/2017    Hyperlipidemia     Hypertension     Intertrigo 7/26/2017    Long-term use of high-risk medication 7/26/2017    LVH (left ventricular hypertrophy) 7/26/2017    PAF (paroxysmal atrial fibrillation) (HCC) 8/20/2018    Positional vertigo 7/26/2017    PUD (peptic ulcer disease)     Sciatica of left side 7/26/2017    Spinal stenosis, lumbar region, without neurogenic claudication 7/26/2017      Past Surgical History:   Procedure Laterality Date    COLONOSCOPY N/A 7/12/2017    COLONOSCOPY WITH IV ANTIBIOTICS performed by Ana Payan MD at [facility]  7/12/2017         HX GYN      hysterectomy    HX GYN      tubal ligation    HX OTHER SURGICAL  2015    Type A Dissection Repair    LA COLONOSCOPY FLX DX W/COLLJ SPEC WHEN PFRMD  3/19/2012         UPPER GI ENDOSCOPY,BALL DIL,30MM  7/12/2017         UPPER GI ENDOSCOPY,BIOPSY  7/12/2017          No Known Allergies   Family History   Problem Relation Age of Onset    Cancer Father      colon cancer    negative for cardiac disease  Social History     Social History    Marital status:      Spouse name: N/A    Number of children: N/A    Years of education: N/A     Social History Main Topics    Smoking status: Never Smoker    Smokeless tobacco: Never Used    Alcohol use 0.0 oz/week     0 Standard drinks or equivalent per week    Drug use: No    Sexual activity: Not Asked     Other Topics Concern    None     Social History Narrative     Current Outpatient Prescriptions   Medication Sig    pravastatin (PRAVACHOL) 20 mg tablet TAKE ONE TABLET BY MOUTH ONCE DAILY    apixaban (ELIQUIS) 5 mg tablet Take 1 Tab by mouth two (2) times a day.  losartan (COZAAR) 50 mg tablet Take 1 tablet by mouth once daily    metoprolol tartrate (LOPRESSOR) 25 mg tablet TAKE ONE TABLET BY MOUTH TWICE DAILY    acetaminophen (TYLENOL ARTHRITIS PAIN) 650 mg CR tablet Take 650 mg by mouth every six (6) hours as needed for Pain.  aspirin 81 mg tablet Take 81 mg by mouth daily.  omega-3 fatty acids-vitamin e (FISH OIL) 1,000 mg Cap Take 1 Cap by mouth two (2) times a day.  nitrofurantoin, macrocrystal-monohydrate, (MACROBID) 100 mg capsule Take 1 Cap by mouth two (2) times a day.  multivitamin (ONE A DAY) tablet Take 1 Tab by mouth daily. No current facility-administered medications for this visit. Vitals:    08/28/18 1306   BP: 120/70   Pulse: 60   Resp: 16   SpO2: 94%   Weight: 186 lb 6.4 oz (84.6 kg)   Height: 5' 5\" (1.651 m)       I have reviewed the nurses notes, vitals, problem list, allergy list, medical history, family, social history and medications. Review of Symptoms:    General: Pt denies excessive weight gain or loss. Pt is able to conduct ADL's  HEENT: Denies blurred vision, headaches, epistaxis and difficulty swallowing.   Respiratory: Denies shortness of breath, MIMS, wheezing or stridor. Cardiovascular: Denies precordial pain, palpitations, edema or PND  Gastrointestinal: Denies poor appetite, indigestion, abdominal pain or blood in stool  Urinary: Denies dysuria, pyuria  Musculoskeletal: Denies pain or swelling from muscles or joints  Neurologic: Denies tremor, paresthesias, or sensory motor disturbance  Skin: Denies rash, itching or texture change. Psych: Denies depression      Physical Exam:      General: Well developed, in no acute distress. HEENT: Eyes - PERRL, no jvd  Heart:  Normal S1/S2 negative S3 or S4. Regular, no murmur, gallop or rub.   Respiratory: Clear bilaterally x 4, no wheezing or rales  Extremities:  No edema, normal cap refill, no cyanosis. Musculoskeletal: No clubbing  Neuro: A&Ox3, speech clear, gait stable. Skin: Skin color is normal. No rashes or lesions. Non diaphoretic  Vascular: 2+ pulses symmetric in all extremities    Cardiographics    Ekg: nsr    Results for orders placed or performed in visit on 08/16/18   CARDIAC HOLTER MONITOR, 24 HOURS    Narrative    ECG Monitor/24 hours, Complete    Reason for Holter Monitor   PALPITATIONS    Heartbeat    Slowest 37  Average 72  Fastest  145      Results:   Underlying Rhythm: Normal sinus rhythm      Atrial Arrhythmias: premature atrial contractions; occasional, paroxysmal atrial fibrillation and severe bradycardia            AV Conduction: normal    Ventricular Arrhythmias: premature ventricular contractions; occasional     ST Segment Analysis:normal     Symptom Correlation:  Sob correlates with PAF    Comment:   Sinus rhythm with symptomatic PAF with rvr and profound bradycardia to 37 bpm. C/w tachy-bridgett syndrome. Clinical correlation advised.      Braulio Oneil MD, [facility] Dears        Results for orders placed or performed during the hospital encounter of 05/22/18   EKG, 12 LEAD, INITIAL   Result Value Ref Range    Ventricular Rate 67 BPM    Atrial Rate 67 BPM    QRS Duration 92 ms    Q-T Interval 420 ms    QTC Calculation (Bezet) 443 ms    Calculated R Axis -8 degrees    Calculated T Axis 60 degrees    Diagnosis       Sinus bradycardia premature atrial complexes  When compared with ECG of 21-MAR-2016 11:49,  Current undetermined rhythm precludes rhythm comparison, needs review  T wave inversion no longer evident in Inferior leads  Confirmed by Mayco Adams MD, Joel García ([id number]) on 5/22/2018 12:45:01 PM           Lab Results   Component Value Date/Time    WBC 5.4 05/22/2018 10:58 AM    HGB (POC) 13.2 08/20/2018 09:26 AM    HGB 13.4 05/22/2018 10:58 AM    HCT (POC) 40.3 08/20/2018 09:26 AM    HCT 44.2 05/22/2018 10:58 AM    PLATELET 877 (L) 55/43/1851 10:58 AM    MCV 94.6 05/22/2018 10:58 AM      Lab Results   Component Value Date/Time    Sodium 145 (H) 08/20/2018 09:27 AM    Potassium 4.1 08/20/2018 09:27 AM    Chloride 105 08/20/2018 09:27 AM    CO2 29 08/20/2018 09:27 AM    Anion gap 8 05/22/2018 10:58 AM    Glucose 80 08/20/2018 09:27 AM    BUN 18 08/20/2018 09:27 AM    Creatinine 0.63 08/20/2018 09:27 AM    BUN/Creatinine ratio 29 (H) 08/20/2018 09:27 AM    GFR est  08/20/2018 09:27 AM    GFR est non-AA 88 08/20/2018 09:27 AM    Calcium 9.5 08/20/2018 09:27 AM    Bilirubin, total 0.4 08/20/2018 09:27 AM    AST (SGOT) 11 08/20/2018 09:27 AM    Alk. phosphatase 64 08/20/2018 09:27 AM    Protein, total 6.3 08/20/2018 09:27 AM    Albumin 4.0 08/20/2018 09:27 AM    Globulin 3.4 05/22/2018 10:58 AM    A-G Ratio 1.7 08/20/2018 09:27 AM    ALT (SGPT) 7 08/20/2018 09:27 AM      Lab Results   Component Value Date/Time    TSH 1.320 08/20/2018 09:27 AM        Assessment:     Assessment:        ICD-10-CM ICD-9-CM    1. PAF (paroxysmal atrial fibrillation) (HCC) I48.0 427.31 CBC W/O DIFF      METABOLIC PANEL, COMPREHENSIVE      PROTHROMBIN TIME + INR      MAGNESIUM      XR CHEST PA LAT   2.  Essential hypertension I10 401.9 CBC W/O DIFF      METABOLIC PANEL, COMPREHENSIVE      PROTHROMBIN TIME + INR      MAGNESIUM      XR CHEST PA LAT 3. Hypercholesterolemia E78.00 272.0 CBC W/O DIFF      METABOLIC PANEL, COMPREHENSIVE      PROTHROMBIN TIME + INR      MAGNESIUM      XR CHEST PA LAT   4. BMI 31.0-31.9,adult Z68.31 V85.31 CBC W/O DIFF      METABOLIC PANEL, COMPREHENSIVE      PROTHROMBIN TIME + INR      MAGNESIUM      XR CHEST PA LAT   5. 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 SSS (sick sinus syndrome) (HCC) I49.5 427.81      Orders Placed This Encounter    XR CHEST PA LAT     Standing Status:   Future     Standing Expiration Date:   9/28/2019     Order Specific Question:   Reason for Exam     Answer:   pre- ablation and pacemaker    CBC W/O DIFF    METABOLIC PANEL, COMPREHENSIVE    PROTHROMBIN TIME + INR    MAGNESIUM        Plan:     Ms Africa Hickey is a pleasant 74year old female with AF, HTN, hyperlipidemia, moderate/severe MR due to prolapse. Holter with Sinus rhythm with symptomatic PAF with rvr and profound bradycardia to 37 bpm. C/w tachy-danilo syndrome. She is a [employer] on  [address] [place] [address]. Pt is a candidate for an afib ablation. I discussed the risks/benefits/alternatives of the procedure with the patient. Risks include (but are not limited to) bleeding, heart block, infection, cva/mi/tamponade/esophageal perforation/pv stenosis/death. The patient understands that there is a 4-4% major complication rate and agrees to proceed. Thank you for this interesting consultation.     Continue medical management for HTN, hyperlipidemia and MV.          Renetta Yancey NP    Patient seen and examined. All pertinent data reviewed. I have reviewed detailed note as outlined by Douglas Jay NP. Case discussed with Nursing/medical assistant staff and Douglas Jay NP. Plans as outlined. Monitor c/w AF, tachy-danilo and sss. Candidate for AF ablation followed by ppm for sick sinus.  Pt understands procedures and agrees to proceed    Eric Maryanne Keen MD, [facility]

## 2018-08-28 NOTE — PROGRESS NOTES
1. Have you been to the ER, urgent care clinic since your last visit? Hospitalized since your last visit? No    2. Have you seen or consulted any other health care providers outside of the [facility] since your last visit? Include any pap smears or colon screening. No     Patient here for recent review of holter monitor. Patient states she is vagina spotting since taking Eliquis.

## 2018-09-04 ENCOUNTER — HOSPITAL ENCOUNTER (OUTPATIENT)
Dept: GENERAL RADIOLOGY | Age: 73
Discharge: HOME | End: 2018-09-04
Payer: MEDICARE

## 2018-09-04 DIAGNOSIS — I49.5 BRADY-TACHY SYNDROME (HCC): ICD-10-CM

## 2018-09-04 DIAGNOSIS — I10 ESSENTIAL HYPERTENSION: Chronic | ICD-10-CM

## 2018-09-04 DIAGNOSIS — I48.0 PAF (PAROXYSMAL ATRIAL FIBRILLATION) (HCC): Chronic | ICD-10-CM

## 2018-09-04 DIAGNOSIS — E78.00 HYPERCHOLESTEROLEMIA: Chronic | ICD-10-CM

## 2018-09-04 PROCEDURE — 71046 X-RAY EXAM CHEST 2 VIEWS: CPT

## 2018-09-05 LAB
ALBUMIN SERPL-MCNC: 4 G/DL (ref 3.5–4.8)
ALBUMIN/GLOB SERPL: 2 {RATIO} (ref 1.2–2.2)
ALP SERPL-CCNC: 67 IU/L (ref 39–117)
ALT SERPL-CCNC: 12 IU/L (ref 0–32)
AST SERPL-CCNC: 14 IU/L (ref 0–40)
BILIRUB SERPL-MCNC: 0.2 MG/DL (ref 0–1.2)
BUN SERPL-MCNC: 20 MG/DL (ref 8–27)
BUN/CREAT SERPL: 26 (ref 12–28)
CALCIUM SERPL-MCNC: 9.4 MG/DL (ref 8.7–10.3)
CHLORIDE SERPL-SCNC: 107 MMOL/L (ref 96–106)
CO2 SERPL-SCNC: 26 MMOL/L (ref 20–29)
CREAT SERPL-MCNC: 0.76 MG/DL (ref 0.57–1)
ERYTHROCYTE [DISTWIDTH] IN BLOOD BY AUTOMATED COUNT: 14.9 % (ref 12.3–15.4)
GLOBULIN SER CALC-MCNC: 2 G/DL (ref 1.5–4.5)
GLUCOSE SERPL-MCNC: 85 MG/DL (ref 65–99)
HCT VFR BLD AUTO: 39.5 % (ref 34–46.6)
HGB BLD-MCNC: 12 G/DL (ref 11.1–15.9)
INR PPP: 1.1 (ref 0.8–1.2)
MAGNESIUM SERPL-MCNC: 2 MG/DL (ref 1.6–2.3)
MCH RBC QN AUTO: 27.7 PG (ref 26.6–33)
MCHC RBC AUTO-ENTMCNC: 30.4 G/DL (ref 31.5–35.7)
MCV RBC AUTO: 91 FL (ref 79–97)
PLATELET # BLD AUTO: 177 X10E3/UL (ref 150–379)
POTASSIUM SERPL-SCNC: 4 MMOL/L (ref 3.5–5.2)
PROT SERPL-MCNC: 6 G/DL (ref 6–8.5)
PROTHROMBIN TIME: 11.8 SEC (ref 9.1–12)
RBC # BLD AUTO: 4.33 X10E6/UL (ref 3.77–5.28)
SODIUM SERPL-SCNC: 144 MMOL/L (ref 134–144)
WBC # BLD AUTO: 4.3 X10E3/UL (ref 3.4–10.8)

## 2018-09-06 ENCOUNTER — TELEPHONE (OUTPATIENT)
Dept: CARDIOLOGY CLINIC | Age: 73
End: 2018-09-06

## 2018-09-07 NOTE — TELEPHONE ENCOUNTER
Called, spoke to pt, two identifiers verified. Patient states she is taking aspirin 81mg daily. Advised patient will let the provider know and will call her back with directions. Pt verbalized understanding of information discussed w/ no further questions at this time.

## 2018-09-07 NOTE — TELEPHONE ENCOUNTER
Called, spoke to pt, two identifiers verified. Per Ariana Barrera, ANP pt is to stop taking aspirin. Explained to patient that the aspirin and Eliquis together are to much. States understanding and will discontinue aspirin today.

## 2018-09-10 ENCOUNTER — HOSPITAL ENCOUNTER (OUTPATIENT)
Dept: NON INVASIVE DIAGNOSTICS | Age: 73
Setting detail: OBSERVATION
Discharge: HOME | End: 2018-09-12
Attending: INTERNAL MEDICINE | Admitting: INTERNAL MEDICINE
Payer: MEDICARE

## 2018-09-10 ENCOUNTER — ANESTHESIA EVENT (OUTPATIENT)
Dept: MEDSURG UNIT | Age: 73
End: 2018-09-10
Payer: MEDICARE

## 2018-09-10 ENCOUNTER — ANESTHESIA (OUTPATIENT)
Dept: MEDSURG UNIT | Age: 73
End: 2018-09-10
Payer: MEDICARE

## 2018-09-10 PROBLEM — I48.91 A-FIB (HCC): Status: ACTIVE | Noted: 2018-09-10

## 2018-09-10 PROCEDURE — C1759 CATH, INTRA ECHOCARDIOGRAPHY: HCPCS

## 2018-09-10 PROCEDURE — 77030013797 HC KT TRNSDUC PRSSR EDWD -A

## 2018-09-10 PROCEDURE — 77030026438 HC STYL ET INTUB CARD -A: Performed by: ANESTHESIOLOGY

## 2018-09-10 PROCEDURE — 77030029359 HC PRB ESOPH TEMP CATH ANTM -F

## 2018-09-10 PROCEDURE — 74011000250 HC RX REV CODE- 250

## 2018-09-10 PROCEDURE — C1733 CATH, EP, OTHR THAN COOL-TIP: HCPCS

## 2018-09-10 PROCEDURE — 77030015398 HC CBL EP EXT STJU -C

## 2018-09-10 PROCEDURE — C1730 CATH, EP, 19 OR FEW ELECT: HCPCS

## 2018-09-10 PROCEDURE — 77030034850

## 2018-09-10 PROCEDURE — 77030030261 HC CBL UMB ELEC DISP MEDT -C

## 2018-09-10 PROCEDURE — C1769 GUIDE WIRE: HCPCS

## 2018-09-10 PROCEDURE — 99218 HC RM OBSERVATION: CPT

## 2018-09-10 PROCEDURE — 74011250637 HC RX REV CODE- 250/637: Performed by: INTERNAL MEDICINE

## 2018-09-10 PROCEDURE — 85347 COAGULATION TIME ACTIVATED: CPT

## 2018-09-10 PROCEDURE — 77030020506 HC NDL TRNSPTL NRG BAYL -F

## 2018-09-10 PROCEDURE — 77030030806 HC PTCH ENSIT NAVX STJU -G

## 2018-09-10 PROCEDURE — 93613 INTRACARDIAC EPHYS 3D MAPG: CPT

## 2018-09-10 PROCEDURE — 93655 ICAR CATH ABLTJ DSCRT ARRHYT: CPT

## 2018-09-10 PROCEDURE — 77030018729 HC ELECTRD DEFIB PAD CARD -B

## 2018-09-10 PROCEDURE — C1894 INTRO/SHEATH, NON-LASER: HCPCS

## 2018-09-10 PROCEDURE — 77030008684 HC TU ET CUF COVD -B: Performed by: ANESTHESIOLOGY

## 2018-09-10 PROCEDURE — 77030029065 HC DRSG HEMO QCLOT ZMED -B

## 2018-09-10 PROCEDURE — 76060000034 HC ANESTHESIA 1.5 TO 2 HR

## 2018-09-10 PROCEDURE — 77030013079 HC BLNKT BAIR HGGR 3M -A: Performed by: ANESTHESIOLOGY

## 2018-09-10 PROCEDURE — 74011250636 HC RX REV CODE- 250/636: Performed by: INTERNAL MEDICINE

## 2018-09-10 PROCEDURE — 93656 COMPRE EP EVAL ABLTJ ATR FIB: CPT

## 2018-09-10 PROCEDURE — 74011250636 HC RX REV CODE- 250/636

## 2018-09-10 PROCEDURE — 77030030278 HC COAX UMB DISP MEDT -C

## 2018-09-10 PROCEDURE — 77030016894 HC CBL EP DX CATH3 STJU -B

## 2018-09-10 PROCEDURE — 74011636320 HC RX REV CODE- 636/320

## 2018-09-10 PROCEDURE — 77030010880 HC CBL EP SUPRME STJU -C

## 2018-09-10 PROCEDURE — 77030008543 HC TBNG MON PRSS MRTM -A

## 2018-09-10 PROCEDURE — 76210000016 HC OR PH I REC 1 TO 1.5 HR

## 2018-09-10 PROCEDURE — C1893 INTRO/SHEATH, FIXED,NON-PEEL: HCPCS

## 2018-09-10 RX ORDER — LOSARTAN POTASSIUM 50 MG/1
50 TABLET ORAL DAILY
Status: DISCONTINUED | OUTPATIENT
Start: 2018-09-11 | End: 2018-09-12 | Stop reason: HOSPADM

## 2018-09-10 RX ORDER — PROTAMINE SULFATE 10 MG/ML
25-100 INJECTION, SOLUTION INTRAVENOUS
Status: DISCONTINUED | OUTPATIENT
Start: 2018-09-10 | End: 2018-09-10 | Stop reason: HOSPADM

## 2018-09-10 RX ORDER — OXYCODONE HYDROCHLORIDE 5 MG/1
5 TABLET ORAL AS NEEDED
Status: DISCONTINUED | OUTPATIENT
Start: 2018-09-10 | End: 2018-09-10 | Stop reason: HOSPADM

## 2018-09-10 RX ORDER — DOBUTAMINE HYDROCHLORIDE 200 MG/100ML
INJECTION INTRAVENOUS
Status: DISCONTINUED | OUTPATIENT
Start: 2018-09-10 | End: 2018-09-10 | Stop reason: HOSPADM

## 2018-09-10 RX ORDER — DIPHENHYDRAMINE HYDROCHLORIDE 50 MG/ML
12.5 INJECTION, SOLUTION INTRAMUSCULAR; INTRAVENOUS AS NEEDED
Status: DISCONTINUED | OUTPATIENT
Start: 2018-09-10 | End: 2018-09-10 | Stop reason: HOSPADM

## 2018-09-10 RX ORDER — EPHEDRINE SULFATE 50 MG/ML
INJECTION, SOLUTION INTRAVENOUS
Status: COMPLETED
Start: 2018-09-10 | End: 2018-09-10

## 2018-09-10 RX ORDER — SODIUM CHLORIDE, SODIUM LACTATE, POTASSIUM CHLORIDE, CALCIUM CHLORIDE 600; 310; 30; 20 MG/100ML; MG/100ML; MG/100ML; MG/100ML
INJECTION, SOLUTION INTRAVENOUS
Status: DISCONTINUED | OUTPATIENT
Start: 2018-09-10 | End: 2018-09-10 | Stop reason: HOSPADM

## 2018-09-10 RX ORDER — DOBUTAMINE HYDROCHLORIDE 200 MG/100ML
0-10 INJECTION INTRAVENOUS
Status: DISCONTINUED | OUTPATIENT
Start: 2018-09-10 | End: 2018-09-10 | Stop reason: HOSPADM

## 2018-09-10 RX ORDER — HEPARIN SODIUM 200 [USP'U]/100ML
500 INJECTION, SOLUTION INTRAVENOUS ONCE
Status: COMPLETED | OUTPATIENT
Start: 2018-09-10 | End: 2018-09-10

## 2018-09-10 RX ORDER — PHENYLEPHRINE HCL IN 0.9% NACL 0.4MG/10ML
SYRINGE (ML) INTRAVENOUS AS NEEDED
Status: DISCONTINUED | OUTPATIENT
Start: 2018-09-10 | End: 2018-09-10 | Stop reason: HOSPADM

## 2018-09-10 RX ORDER — SUCCINYLCHOLINE CHLORIDE 20 MG/ML
INJECTION INTRAMUSCULAR; INTRAVENOUS AS NEEDED
Status: DISCONTINUED | OUTPATIENT
Start: 2018-09-10 | End: 2018-09-10 | Stop reason: HOSPADM

## 2018-09-10 RX ORDER — HYDROCODONE BITARTRATE AND ACETAMINOPHEN 5; 325 MG/1; MG/1
1 TABLET ORAL
Status: DISCONTINUED | OUTPATIENT
Start: 2018-09-10 | End: 2018-09-12 | Stop reason: SDUPTHER

## 2018-09-10 RX ORDER — ROPIVACAINE HYDROCHLORIDE 5 MG/ML
150 INJECTION, SOLUTION EPIDURAL; INFILTRATION; PERINEURAL AS NEEDED
Status: CANCELLED | OUTPATIENT
Start: 2018-09-10

## 2018-09-10 RX ORDER — ACETAMINOPHEN 325 MG/1
650 TABLET ORAL
Status: DISCONTINUED | OUTPATIENT
Start: 2018-09-10 | End: 2018-09-12 | Stop reason: SDUPTHER

## 2018-09-10 RX ORDER — HEPARIN SODIUM 200 [USP'U]/100ML
INJECTION, SOLUTION INTRAVENOUS
Status: COMPLETED
Start: 2018-09-10 | End: 2018-09-10

## 2018-09-10 RX ORDER — SODIUM CHLORIDE 0.9 % (FLUSH) 0.9 %
5-10 SYRINGE (ML) INJECTION AS NEEDED
Status: CANCELLED | OUTPATIENT
Start: 2018-09-10

## 2018-09-10 RX ORDER — LIDOCAINE HYDROCHLORIDE 10 MG/ML
0.1 INJECTION, SOLUTION EPIDURAL; INFILTRATION; INTRACAUDAL; PERINEURAL AS NEEDED
Status: CANCELLED | OUTPATIENT
Start: 2018-09-10

## 2018-09-10 RX ORDER — SODIUM CHLORIDE 0.9 % (FLUSH) 0.9 %
5-10 SYRINGE (ML) INJECTION AS NEEDED
Status: DISCONTINUED | OUTPATIENT
Start: 2018-09-10 | End: 2018-09-10 | Stop reason: HOSPADM

## 2018-09-10 RX ORDER — SODIUM CHLORIDE 0.9 % (FLUSH) 0.9 %
5-10 SYRINGE (ML) INJECTION EVERY 8 HOURS
Status: DISCONTINUED | OUTPATIENT
Start: 2018-09-10 | End: 2018-09-12 | Stop reason: SDUPTHER

## 2018-09-10 RX ORDER — FENTANYL CITRATE 50 UG/ML
50 INJECTION, SOLUTION INTRAMUSCULAR; INTRAVENOUS AS NEEDED
Status: CANCELLED | OUTPATIENT
Start: 2018-09-10

## 2018-09-10 RX ORDER — DOBUTAMINE HYDROCHLORIDE 200 MG/100ML
INJECTION INTRAVENOUS
Status: COMPLETED
Start: 2018-09-10 | End: 2018-09-10

## 2018-09-10 RX ORDER — SODIUM CHLORIDE, SODIUM LACTATE, POTASSIUM CHLORIDE, CALCIUM CHLORIDE 600; 310; 30; 20 MG/100ML; MG/100ML; MG/100ML; MG/100ML
100 INJECTION, SOLUTION INTRAVENOUS CONTINUOUS
Status: DISCONTINUED | OUTPATIENT
Start: 2018-09-10 | End: 2018-09-10 | Stop reason: HOSPADM

## 2018-09-10 RX ORDER — ONDANSETRON 2 MG/ML
4 INJECTION INTRAMUSCULAR; INTRAVENOUS AS NEEDED
Status: DISCONTINUED | OUTPATIENT
Start: 2018-09-10 | End: 2018-09-10 | Stop reason: HOSPADM

## 2018-09-10 RX ORDER — MIDAZOLAM HYDROCHLORIDE 1 MG/ML
1 INJECTION, SOLUTION INTRAMUSCULAR; INTRAVENOUS AS NEEDED
Status: CANCELLED | OUTPATIENT
Start: 2018-09-10

## 2018-09-10 RX ORDER — HEPARIN SODIUM 1000 [USP'U]/ML
0-30000 INJECTION, SOLUTION INTRAVENOUS; SUBCUTANEOUS
Status: DISCONTINUED | OUTPATIENT
Start: 2018-09-10 | End: 2018-09-10 | Stop reason: HOSPADM

## 2018-09-10 RX ORDER — EPHEDRINE SULFATE 50 MG/ML
INJECTION, SOLUTION INTRAVENOUS AS NEEDED
Status: DISCONTINUED | OUTPATIENT
Start: 2018-09-10 | End: 2018-09-10 | Stop reason: HOSPADM

## 2018-09-10 RX ORDER — MORPHINE SULFATE 10 MG/ML
2 INJECTION, SOLUTION INTRAMUSCULAR; INTRAVENOUS
Status: DISCONTINUED | OUTPATIENT
Start: 2018-09-10 | End: 2018-09-10 | Stop reason: HOSPADM

## 2018-09-10 RX ORDER — FENTANYL CITRATE 50 UG/ML
25 INJECTION, SOLUTION INTRAMUSCULAR; INTRAVENOUS
Status: DISCONTINUED | OUTPATIENT
Start: 2018-09-10 | End: 2018-09-10 | Stop reason: HOSPADM

## 2018-09-10 RX ORDER — HEPARIN SODIUM 1000 [USP'U]/ML
INJECTION, SOLUTION INTRAVENOUS; SUBCUTANEOUS
Status: COMPLETED
Start: 2018-09-10 | End: 2018-09-10

## 2018-09-10 RX ORDER — FENTANYL CITRATE 50 UG/ML
INJECTION, SOLUTION INTRAMUSCULAR; INTRAVENOUS
Status: COMPLETED
Start: 2018-09-10 | End: 2018-09-10

## 2018-09-10 RX ORDER — FENTANYL CITRATE 50 UG/ML
INJECTION, SOLUTION INTRAMUSCULAR; INTRAVENOUS AS NEEDED
Status: DISCONTINUED | OUTPATIENT
Start: 2018-09-10 | End: 2018-09-10 | Stop reason: HOSPADM

## 2018-09-10 RX ORDER — SODIUM CHLORIDE 0.9 % (FLUSH) 0.9 %
5-10 SYRINGE (ML) INJECTION EVERY 8 HOURS
Status: CANCELLED | OUTPATIENT
Start: 2018-09-10

## 2018-09-10 RX ORDER — PROPOFOL 10 MG/ML
INJECTION, EMULSION INTRAVENOUS AS NEEDED
Status: DISCONTINUED | OUTPATIENT
Start: 2018-09-10 | End: 2018-09-10 | Stop reason: HOSPADM

## 2018-09-10 RX ORDER — SODIUM CHLORIDE, SODIUM LACTATE, POTASSIUM CHLORIDE, CALCIUM CHLORIDE 600; 310; 30; 20 MG/100ML; MG/100ML; MG/100ML; MG/100ML
1000 INJECTION, SOLUTION INTRAVENOUS CONTINUOUS
Status: CANCELLED | OUTPATIENT
Start: 2018-09-10 | End: 2018-09-11

## 2018-09-10 RX ORDER — PROTAMINE SULFATE 10 MG/ML
INJECTION, SOLUTION INTRAVENOUS
Status: COMPLETED
Start: 2018-09-10 | End: 2018-09-10

## 2018-09-10 RX ORDER — SODIUM CHLORIDE 0.9 % (FLUSH) 0.9 %
5-10 SYRINGE (ML) INJECTION AS NEEDED
Status: DISCONTINUED | OUTPATIENT
Start: 2018-09-10 | End: 2018-09-12 | Stop reason: SDUPTHER

## 2018-09-10 RX ORDER — SODIUM CHLORIDE 9 MG/ML
25 INJECTION, SOLUTION INTRAVENOUS CONTINUOUS
Status: CANCELLED | OUTPATIENT
Start: 2018-09-10 | End: 2018-09-11

## 2018-09-10 RX ORDER — MIDAZOLAM HYDROCHLORIDE 1 MG/ML
0.5 INJECTION, SOLUTION INTRAMUSCULAR; INTRAVENOUS
Status: DISCONTINUED | OUTPATIENT
Start: 2018-09-10 | End: 2018-09-10 | Stop reason: HOSPADM

## 2018-09-10 RX ORDER — HEPARIN SODIUM 200 [USP'U]/100ML
500 INJECTION, SOLUTION INTRAVENOUS ONCE
Status: COMPLETED | OUTPATIENT
Start: 2018-09-10 | End: 2018-09-12

## 2018-09-10 RX ORDER — DEXAMETHASONE SODIUM PHOSPHATE 4 MG/ML
INJECTION, SOLUTION INTRA-ARTICULAR; INTRALESIONAL; INTRAMUSCULAR; INTRAVENOUS; SOFT TISSUE AS NEEDED
Status: DISCONTINUED | OUTPATIENT
Start: 2018-09-10 | End: 2018-09-10 | Stop reason: HOSPADM

## 2018-09-10 RX ORDER — HEPARIN SODIUM 1000 [USP'U]/ML
INJECTION, SOLUTION INTRAVENOUS; SUBCUTANEOUS AS NEEDED
Status: DISCONTINUED | OUTPATIENT
Start: 2018-09-10 | End: 2018-09-10 | Stop reason: HOSPADM

## 2018-09-10 RX ORDER — SODIUM CHLORIDE 9 MG/ML
25 INJECTION, SOLUTION INTRAVENOUS CONTINUOUS
Status: DISCONTINUED | OUTPATIENT
Start: 2018-09-10 | End: 2018-09-10 | Stop reason: HOSPADM

## 2018-09-10 RX ORDER — METOPROLOL TARTRATE 25 MG/1
25 TABLET, FILM COATED ORAL 2 TIMES DAILY
Status: DISCONTINUED | OUTPATIENT
Start: 2018-09-10 | End: 2018-09-12 | Stop reason: HOSPADM

## 2018-09-10 RX ORDER — LIDOCAINE HYDROCHLORIDE 20 MG/ML
INJECTION, SOLUTION EPIDURAL; INFILTRATION; INTRACAUDAL; PERINEURAL AS NEEDED
Status: DISCONTINUED | OUTPATIENT
Start: 2018-09-10 | End: 2018-09-10 | Stop reason: HOSPADM

## 2018-09-10 RX ORDER — ROCURONIUM BROMIDE 10 MG/ML
INJECTION, SOLUTION INTRAVENOUS AS NEEDED
Status: DISCONTINUED | OUTPATIENT
Start: 2018-09-10 | End: 2018-09-10 | Stop reason: HOSPADM

## 2018-09-10 RX ADMIN — METOPROLOL TARTRATE 25 MG: 25 TABLET ORAL at 17:52

## 2018-09-10 RX ADMIN — HEPARIN SODIUM 1000 UNITS: 200 INJECTION, SOLUTION INTRAVENOUS at 14:00

## 2018-09-10 RX ADMIN — EPHEDRINE SULFATE 10 MG: 50 INJECTION, SOLUTION INTRAVENOUS at 14:17

## 2018-09-10 RX ADMIN — DOBUTAMINE HYDROCHLORIDE 20 MCG/KG/MIN: 200 INJECTION INTRAVENOUS at 14:35

## 2018-09-10 RX ADMIN — APIXABAN 5 MG: 5 TABLET, FILM COATED ORAL at 17:52

## 2018-09-10 RX ADMIN — FENTANYL CITRATE 25 MCG: 50 INJECTION, SOLUTION INTRAMUSCULAR; INTRAVENOUS at 14:58

## 2018-09-10 RX ADMIN — Medication 80 MCG: at 13:38

## 2018-09-10 RX ADMIN — EPHEDRINE SULFATE 10 MG: 50 INJECTION, SOLUTION INTRAVENOUS at 13:43

## 2018-09-10 RX ADMIN — SUCCINYLCHOLINE CHLORIDE 100 MG: 20 INJECTION INTRAMUSCULAR; INTRAVENOUS at 13:30

## 2018-09-10 RX ADMIN — ROCURONIUM BROMIDE 5 MG: 10 INJECTION, SOLUTION INTRAVENOUS at 13:30

## 2018-09-10 RX ADMIN — Medication 80 MCG: at 13:36

## 2018-09-10 RX ADMIN — HEPARIN SODIUM 15000 UNITS: 1000 INJECTION, SOLUTION INTRAVENOUS; SUBCUTANEOUS at 13:58

## 2018-09-10 RX ADMIN — DEXAMETHASONE SODIUM PHOSPHATE 8 MG: 4 INJECTION, SOLUTION INTRA-ARTICULAR; INTRALESIONAL; INTRAMUSCULAR; INTRAVENOUS; SOFT TISSUE at 13:45

## 2018-09-10 RX ADMIN — PROPOFOL 100 MG: 10 INJECTION, EMULSION INTRAVENOUS at 13:30

## 2018-09-10 RX ADMIN — LIDOCAINE HYDROCHLORIDE 50 MG: 20 INJECTION, SOLUTION EPIDURAL; INFILTRATION; INTRACAUDAL; PERINEURAL at 13:30

## 2018-09-10 RX ADMIN — Medication 10 ML: at 22:30

## 2018-09-10 RX ADMIN — FENTANYL CITRATE 50 MCG: 50 INJECTION, SOLUTION INTRAMUSCULAR; INTRAVENOUS at 13:30

## 2018-09-10 RX ADMIN — SODIUM CHLORIDE, SODIUM LACTATE, POTASSIUM CHLORIDE, CALCIUM CHLORIDE: 600; 310; 30; 20 INJECTION, SOLUTION INTRAVENOUS at 13:24

## 2018-09-10 RX ADMIN — FENTANYL CITRATE 25 MCG: 50 INJECTION, SOLUTION INTRAMUSCULAR; INTRAVENOUS at 14:59

## 2018-09-10 RX ADMIN — PROTAMINE SULFATE 100 MG: 10 INJECTION, SOLUTION INTRAVENOUS at 14:35

## 2018-09-10 RX ADMIN — IOPAMIDOL 50 ML: 755 INJECTION, SOLUTION INTRAVENOUS at 14:00

## 2018-09-10 NOTE — PROGRESS NOTES
Problem: Falls - Risk of 
Goal: *Absence of Falls Document Gian Patiño Fall Risk and appropriate interventions in the flowsheet. Outcome: Progressing Towards Goal 
Fall Risk Interventions: 
  
 
  
 
Medication Interventions: Patient to call before getting OOB, Teach patient to arise slowly

## 2018-09-10 NOTE — IP AVS SNAPSHOT
[address] [facility] 
[phone] Patient: Elaine Pisano MRN: [id number] NTI:0[birth year] About your hospitalization You were admitted on:  September 10, 2018 You last received care in the:  [facility] 2 [facility] You were discharged on:  September 12, 2018 Why you were hospitalized Your primary diagnosis was:  Not on File Your diagnoses also included:  A-Fib (Hcc) Follow-up Information Follow up With Details Comments Contact Info MD Maryanne Moore [address] [facility] 
[phone] Your Scheduled Appointments Thursday October 04, 2018  2:30 PM EDT  
ESTABLISHED PATIENT with Chris Marley NP [facility]) [address] [facility]  
[phone] Thursday October 04, 2018  2:30 PM EDT PROCEDURE with PACEMAKER, [facility]) [address] [facility]  
[phone] Discharge Orders None A check lauren indicates which time of day the medication should be taken. My Medications ASK your doctor about these medications Instructions Each Dose to Equal  
 Morning Noon Evening Bedtime  
 apixaban 5 mg tablet Commonly known as:  Ramila Diaz Your last dose was: Your next dose is: Take 1 Tab by mouth two (2) times a day. 5 mg FISH OIL 1,000 mg Cap Generic drug:  omega-3 fatty acids-vitamin e Your last dose was: Your next dose is: Take 1 Cap by mouth two (2) times a day. 1 Cap  
    
   
   
   
  
 losartan 50 mg tablet Commonly known as:  COZAAR Your last dose was: Your next dose is: Take 1 tablet by mouth once daily metoprolol tartrate 25 mg tablet Commonly known as:  LOPRESSOR Your last dose was: Your next dose is: TAKE ONE TABLET BY MOUTH TWICE DAILY pravastatin 20 mg tablet Commonly known as:  PRAVACHOL Your last dose was: Your next dose is: TAKE ONE TABLET BY MOUTH ONCE DAILY  
     
   
   
   
  
 TYLENOL ARTHRITIS PAIN 650 mg Tber Generic drug:  acetaminophen Your last dose was: Your next dose is: Take 650 mg by mouth every six (6) hours as needed for Pain. 650 mg Discharge Instructions [address]  [phone] NEW PACEMAKER IMPLANT DISCHARGE INSTRUCTIONS Patient ID: Martínez Araujo [id number] 
30 y.o. 
1945 Admit Date: 9/10/2018 Discharge Date: 9/12/2018 Admitting Physician: Armando Ramos MD  
 
Discharge Physician: Armando Ramos MD 
 
Admission Diagnoses:  
a fib PACU RECOVERY NEEDED A-fib ([address] Discharge Diagnoses: Active Problems: 
  A-fib ([address] (9/10/2018) Discharge Condition: Good Cardiology Procedures this Admission:  Pacemaker insertion. Disposition: home Reference discharge instructions provided by nursing for diet and activity. Follow-up with device clinic in two weeks. Call [phone] to make an appointment. Signed: 
KARIME Pedro 
9/12/2018 
9:17 AM 
 
 
DISCHARGE INSTRUCTIONS FOR PATIENTS WITH PACEMAKERS 1. Remember to call for an appointment for 2 weeks [phone] to check healing and implant programming. 2. Medic Alert Bracelets are available from your pharmacist to wear at all times if you choose to wear one. 3. Carry your ID card for pacemaker with you at all times. This card will be given to you in the hospital or mailed to you. 4. The pacemaker will bulge slightly under your skin.   The bulge will decrease in size over the next few weeks. Please notify the doctor's office if you notice any of the following around your site: A.  A bruise that does not go away. B.  Soreness or yellow, green, or brown drainage from the site. C. Any swelling from the site. D. If you have a fever of 100 degrees or higher that lasts for a few days. INCISION CARE 1.  Leave dressing over your site until it starts to fall off, usually in a few weeks. 2.  You may shower after 3 days as long as your incision isnt submerged or directly sprayed upon until well healed. 3.  For comfort, wear loose fitting clothing. 4.  Report any signs of infection, fever, pain, swelling, redness, oozing, or heat at site especially if these symptoms increase after the first 3 to 4 days. ACTIVITY PRECAUTIONS 1. Avoid rough contact with the implant site. 2. No driving for 14 days. 3. Avoid lifting your arm over your head, carrying anything on the affected side, or lifting over 10 pounds for 30 days. For the first 2 days only bend your arm at the elbow. 4. Any extreme activity such as golf, weight lifting or exercise biking should be restricted for 60 days. 5. Do not carry objects by holding them against your implant site. 6.  No shooting rifles or any type of gun with the affected shoulder permanently. SPECIAL PRECAUTIONS 1. You should avoid all strong magnetic fields, such as arc welding, large transformers, large motors. 2.  You may or may not (depending on your device) have an MRI which uses a strong magnet to take pictures. 3.  Treatments or surgery that requires diathermy or electrocautery should be discussed with your doctor before scheduled. 4. Avoid radio frequency transmitters, including radar. 5. Advise dentist or other medical personnel you see that you have a pacemaker. 6.  Cell phones and microwave oven use is okay.  
7.  If you plan to move or take a trip to a new area, the doctor's office will give you a name of a doctor to contact for any problems. ANTIBIOTIC THERAPY During the first 8 weeks after your pacemaker insertion, you may need antibiotics before any dental work or certain tests or operations. Let the dentist or doctor who is caring for you know that you have had an implanted device. Introducing [facility]! [employer] introduces ValueClick patient portal. Now you can access parts of your medical record, email your doctor's office, and request medication refills online. 1. In your internet browser, go to [email or web address] 2. Click on the First Time User? Click Here link in the Sign In box. You will see the New Member Sign Up page. 3. Enter your ValueClick Access Code exactly as it appears below. You will not need to use this code after youve completed the sign-up process. If you do not sign up before the expiration date, you must request a new code. · ValueClick Access Code: [email or web address] Expires: 11/27/2018  2:12 PM 
 
4. Enter the last four digits of your Social Security Number (xxxx) and Date of Birth (mm/dd/yyyy) as indicated and click Submit. You will be taken to the next sign-up page. 5. Create a ValueClick ID. This will be your ValueClick login ID and cannot be changed, so think of one that is secure and easy to remember. 6. Create a ValueClick password. You can change your password at any time. 7. Enter your Password Reset Question and Answer. This can be used at a later time if you forget your password. 8. Enter your e-mail address. You will receive e-mail notification when new information is available in [address] 9. Click Sign Up. You can now view and download portions of your medical record. 10. Click the Download Summary menu link to download a portable copy of your medical information.  
 
If you have questions, please visit the Frequently Asked Questions section of the [employer]. Remember, MyChart is NOT to be used for urgent needs. For medical emergencies, dial 911. Now available from your iPhone and Android! Introducing Jimmy Bowers As a [facility] patient, I wanted to make you aware of our electronic visit tool called Jimmy Bowers. [employer]/Danlan allows you to connect within minutes with a medical provider 24 hours a day, seven days a week via a mobile device or tablet or logging into a secure website from your computer. You can access Jimmy Bowers from anywhere in the [place]. A virtual visit might be right for you when you have a simple condition and feel like you just dont want to get out of bed, or cant get away from work for an appointment, when your regular [facility] provider is not available (evenings, weekends or holidays), or when youre out of town and need minor care. Electronic visits cost only $49 and if the [employer] 24/Danlan provider determines a prescription is needed to treat your condition, one can be electronically transmitted to a nearby pharmacy*. Please take a moment to enroll today if you have not already done so. The enrollment process is free and takes just a few minutes. To enroll, please download the Terviu/Danlan anneliese to your tablet or phone, or visit [email or web address] to enroll on your computer. And, as an [address] [place] [address] patient with a [employer] account, the results of your visits will be scanned into your electronic medical record and your primary care provider will be able to view the scanned results. We urge you to continue to see your regular [facility] provider for your ongoing medical care. And while your primary care provider may not be the one available when you seek a Jimmy Bowers virtual visit, the peace of mind you get from getting a real diagnosis real time can be priceless. For more information on Jimmy Bowers, view our Frequently Asked Questions (FAQs) at [email or web address]. Sincerely, 
 
Stepan Buckley MD 
Chief Medical Officer Luis8 Mariana Miguel *:  certain medications cannot be prescribed via Jimmy Bowers Providers Seen During Your Hospitalization Provider Specialty Primary office phone Saskia Leyva MD Cardiology [phone] Your Primary Care Physician (PCP) Primary Care Physician Office Phone Office Fax Lyndsay Rondon [phone] You are allergic to the following No active allergies Recent Documentation Height Weight BMI OB Status Smoking Status 1.651 m 85.3 kg 31.28 kg/m2 Hysterectomy Never Smoker Emergency Contacts Name Discharge Info Relation Home Work Mobile III,LeeRamos DISCHARGE CAREGIVER [3] Spouse [3] [phone] Patient Belongings The following personal items are in your possession at time of discharge: 
  Dental Appliances: Uppers, With patient  Visual Aid: Glasses, With patient      Home Medications: None   Jewelry: Ring, With patient  Clothing: At bedside    Other Valuables: Eyeglasses, With patient Please provide this summary of care documentation to your next provider. Signatures-by signing, you are acknowledging that this After Visit Summary has been reviewed with you and you have received a copy. Patient Signature:  ____________________________________________________________ Date:  ____________________________________________________________  
  
Noe Allen Provider Signature:  ____________________________________________________________ Date:  ____________________________________________________________

## 2018-09-10 NOTE — H&P (VIEW-ONLY)
Subjective: Jeremias Betts is a 74 y.o. female is here for test results. Patient Active Problem List  
 Diagnosis Date Noted  PAF (paroxysmal atrial fibrillation) ([facility] [address] 08/20/2018  Essential hypertension 08/21/2017  Hypercholesterolemia 08/21/2017  Intertrigo 07/26/2017  Positional vertigo 07/26/2017  Spinal stenosis, lumbar region, without neurogenic claudication 07/26/2017  Long-term use of high-risk medication 07/26/2017  
 LVH (left ventricular hypertrophy) 07/26/2017  Sciatica of left side 07/26/2017  Allergic conjunctivitis 07/26/2017  S/P cardiac cath 05/09/2017  Heart palpitations 03/08/2016  Swelling of both ankles 01/20/2016  S/P ascending aortic aneurysm repair 12/28/2015 Yahaira Hayes MD 
Past Medical History:  
Diagnosis Date  Allergic conjunctivitis 7/26/2017  Aortic dissection (HCC)  Arthritis   
 lower back  Asthma  Essential hypertension 8/21/2017  GERD (gastroesophageal reflux disease)  Hypercholesterolemia 8/21/2017  Hyperlipidemia  Hypertension  Intertrigo 7/26/2017  Long-term use of high-risk medication 7/26/2017  
 LVH (left ventricular hypertrophy) 7/26/2017  PAF (paroxysmal atrial fibrillation) ([facility] [address] 8/20/2018  Positional vertigo 7/26/2017  PUD (peptic ulcer disease)  Sciatica of left side 7/26/2017  Spinal stenosis, lumbar region, without neurogenic claudication 7/26/2017 Past Surgical History:  
Procedure Laterality Date  COLONOSCOPY N/A 7/12/2017 COLONOSCOPY WITH IV ANTIBIOTICS performed by Merlinda Ferretti, MD at [facility] ENDOSCOPY  COLONOSCOPY,DIAGNOSTIC  7/12/2017  HX GYN    
 hysterectomy  HX GYN    
 tubal ligation  HX OTHER SURGICAL  2015 Type A Dissection Repair  [place] COLONOSCOPY FLX DX W/COLLJ SPEC WHEN PFRMD  3/19/2012  UPPER GI ENDOSCOPY,BALL DIL,30MM  7/12/2017  UPPER GI ENDOSCOPY,BIOPSY  7/12/2017 No Known Allergies Family History Problem Relation Age of Onset  Cancer Father   
  colon cancer  
 negative for cardiac disease Social History Social History  Marital status:  Spouse name: N/A  
 Number of children: N/A  
 Years of education: N/A Social History Main Topics  Smoking status: Never Smoker  Smokeless tobacco: Never Used  Alcohol use 0.0 oz/week  
  0 Standard drinks or equivalent per week  Drug use: No  
 Sexual activity: Not Asked Other Topics Concern  None Social History Narrative Current Outpatient Prescriptions Medication Sig  pravastatin (PRAVACHOL) 20 mg tablet TAKE ONE TABLET BY MOUTH ONCE DAILY  apixaban (ELIQUIS) 5 mg tablet Take 1 Tab by mouth two (2) times a day.  losartan (COZAAR) 50 mg tablet Take 1 tablet by mouth once daily  metoprolol tartrate (LOPRESSOR) 25 mg tablet TAKE ONE TABLET BY MOUTH TWICE DAILY  acetaminophen (TYLENOL ARTHRITIS PAIN) 650 mg CR tablet Take 650 mg by mouth every six (6) hours as needed for Pain.  aspirin 81 mg tablet Take 81 mg by mouth daily.  omega-3 fatty acids-vitamin e (FISH OIL) 1,000 mg Cap Take 1 Cap by mouth two (2) times a day.  nitrofurantoin, macrocrystal-monohydrate, (MACROBID) 100 mg capsule Take 1 Cap by mouth two (2) times a day.  multivitamin (ONE A DAY) tablet Take 1 Tab by mouth daily. No current facility-administered medications for this visit. Vitals:  
 08/28/18 1306 BP: 120/70 Pulse: 60 Resp: 16 SpO2: 94% Weight: 186 lb 6.4 oz (84.6 kg) Height: 5' 5\" (1.651 m) I have reviewed the nurses notes, vitals, problem list, allergy list, medical history, family, social history and medications. Review of Symptoms: 
 
General: Pt denies excessive weight gain or loss. Pt is able to conduct ADL's HEENT: Denies blurred vision, headaches, epistaxis and difficulty swallowing. Respiratory: Denies shortness of breath, MIMS, wheezing or stridor. Cardiovascular: Denies precordial pain, palpitations, edema or PND Gastrointestinal: Denies poor appetite, indigestion, abdominal pain or blood in stool Urinary: Denies dysuria, pyuria Musculoskeletal: Denies pain or swelling from muscles or joints Neurologic: Denies tremor, paresthesias, or sensory motor disturbance Skin: Denies rash, itching or texture change. Psych: Denies depression Physical Exam:   
 
General: Well developed, in no acute distress. HEENT: Eyes - PERRL, no jvd Heart:  Normal S1/S2 negative S3 or S4. Regular, no murmur, gallop or rub.  
Respiratory: Clear bilaterally x 4, no wheezing or rales Extremities:  No edema, normal cap refill, no cyanosis. Musculoskeletal: No clubbing Neuro: A&Ox3, speech clear, gait stable. Skin: Skin color is normal. No rashes or lesions. Non diaphoretic Vascular: 2+ pulses symmetric in all extremities Cardiographics Ekg: nsr Results for orders placed or performed in visit on 08/16/18 CARDIAC HOLTER MONITOR, 24 HOURS Narrative ECG Monitor/24 hours, Complete Reason for Holter Monitor   PALPITATIONS Heartbeat Slowest 37 Average 72 Fastest  145 Results:  
Underlying Rhythm: Normal sinus rhythm Atrial Arrhythmias: premature atrial contractions; occasional, paroxysmal atrial fibrillation and severe bradycardia AV Conduction: normal 
 
Ventricular Arrhythmias: premature ventricular contractions; occasional  
 
ST Segment Analysis:normal  
 
Symptom Correlation: 
Sob correlates with PAF Comment:  
Sinus rhythm with symptomatic PAF with rvr and profound bradycardia to 37 bpm. C/w tachy-bridgett syndrome. Clinical correlation advised. Braulio Oneil MD, [facility] Results for orders placed or performed during the hospital encounter of 05/22/18 EKG, 12 LEAD, INITIAL Result Value Ref Range  Ventricular Rate 67 BPM  
 Atrial Rate 67 BPM  
 QRS Duration 92 ms Q-T Interval 420 ms QTC Calculation (Bezet) 443 ms Calculated R Axis -8 degrees Calculated T Axis 60 degrees Diagnosis Sinus bradycardia premature atrial complexes When compared with ECG of 21-MAR-2016 11:49, 
Current undetermined rhythm precludes rhythm comparison, needs review T wave inversion no longer evident in Inferior leads Confirmed by Nancy Lundberg MD, Kevin Block ([id number]) on 5/22/2018 12:45:01 PM 
  
 
 
 
Lab Results Component Value Date/Time WBC 5.4 05/22/2018 10:58 AM  
 HGB (POC) 13.2 08/20/2018 09:26 AM  
 HGB 13.4 05/22/2018 10:58 AM  
 HCT (POC) 40.3 08/20/2018 09:26 AM  
 HCT 44.2 05/22/2018 10:58 AM  
 PLATELET 606 (L) 65/66/0763 10:58 AM  
 MCV 94.6 05/22/2018 10:58 AM  
  
Lab Results Component Value Date/Time Sodium 145 (H) 08/20/2018 09:27 AM  
 Potassium 4.1 08/20/2018 09:27 AM  
 Chloride 105 08/20/2018 09:27 AM  
 CO2 29 08/20/2018 09:27 AM  
 Anion gap 8 05/22/2018 10:58 AM  
 Glucose 80 08/20/2018 09:27 AM  
 BUN 18 08/20/2018 09:27 AM  
 Creatinine 0.63 08/20/2018 09:27 AM  
 BUN/Creatinine ratio 29 (H) 08/20/2018 09:27 AM  
 GFR est  08/20/2018 09:27 AM  
 GFR est non-AA 88 08/20/2018 09:27 AM  
 Calcium 9.5 08/20/2018 09:27 AM  
 Bilirubin, total 0.4 08/20/2018 09:27 AM  
 AST (SGOT) 11 08/20/2018 09:27 AM  
 Alk. phosphatase 64 08/20/2018 09:27 AM  
 Protein, total 6.3 08/20/2018 09:27 AM  
 Albumin 4.0 08/20/2018 09:27 AM  
 Globulin 3.4 05/22/2018 10:58 AM  
 A-G Ratio 1.7 08/20/2018 09:27 AM  
 ALT (SGPT) 7 08/20/2018 09:27 AM  
  
Lab Results Component Value Date/Time TSH 1.320 08/20/2018 09:27 AM  
 
 
 Assessment: 
 
 Assessment: ICD-10-CM ICD-9-CM 1. PAF (paroxysmal atrial fibrillation) (HCC) I48.0 427.31 CBC W/O DIFF  
   METABOLIC PANEL, COMPREHENSIVE PROTHROMBIN TIME + INR  
   MAGNESIUM  
   XR CHEST PA LAT 2.  Essential hypertension I10 401.9 CBC W/O DIFF  
 METABOLIC PANEL, COMPREHENSIVE PROTHROMBIN TIME + INR  
   MAGNESIUM  
   XR CHEST PA LAT 3. Hypercholesterolemia E78.00 272.0 CBC W/O DIFF  
   METABOLIC PANEL, COMPREHENSIVE PROTHROMBIN TIME + INR  
   MAGNESIUM  
   XR CHEST PA LAT 4. BMI 31.0-31.9,adult Z68.31 V85.31 CBC W/O DIFF  
   METABOLIC PANEL, COMPREHENSIVE PROTHROMBIN TIME + INR  
   MAGNESIUM  
   XR CHEST PA LAT 5. 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  
   METABOLIC PANEL, COMPREHENSIVE PROTHROMBIN TIME + INR  
   MAGNESIUM  
   XR CHEST PA LAT 6. SSS (sick sinus syndrome) ([facility] [address] I49.5 427.81 Orders Placed This Encounter  XR CHEST PA LAT Standing Status:   Future Standing Expiration Date:   9/28/2019 Order Specific Question:   Reason for Exam  
  Answer:   pre- ablation and pacemaker  CBC W/O DIFF  
 METABOLIC PANEL, COMPREHENSIVE  
 PROTHROMBIN TIME + INR  MAGNESIUM Plan: Ms Nando Hamilton is a pleasant 74year old female with AF, HTN, hyperlipidemia, moderate/severe MR due to prolapse. Holter with Sinus rhythm with symptomatic PAF with rvr and profound bradycardia to 37 bpm. C/w tachy-danilo syndrome. She is a [employer] on  [facility]. Pt is a candidate for an afib ablation. I discussed the risks/benefits/alternatives of the procedure with the patient. Risks include (but are not limited to) bleeding, heart block, infection, cva/mi/tamponade/esophageal perforation/pv stenosis/death. The patient understands that there is a 9-7% major complication rate and agrees to proceed. Thank you for this interesting consultation. 
  
Continue medical management for HTN, hyperlipidemia and MV.  
  
 
 
Renetta Boateng NP Patient seen and examined. All pertinent data reviewed. I have reviewed detailed note as outlined by Izzy Mcadams NP. Case discussed with Nursing/medical assistant staff and Izzy Mcadams NP. Plans as outlined. Monitor c/w AF, tachy-bridgett and sss. Candidate for AF ablation followed by ppm for sick sinus. Pt understands procedures and agrees to proceed Mk Gauthier MD, Mayela Mendez

## 2018-09-10 NOTE — INTERVAL H&P NOTE
H&P Update: Stepan Donis was seen and examined. History and physical has been reviewed. The patient has been examined.  There have been no significant clinical changes since the completion of the originally dated History and Physical. 
 
Signed By: Joon Watson MD   
 September 10, 2018 11:14 AM

## 2018-09-10 NOTE — PROCEDURES
[address] [place] [address]  [phone]    Indications and Pre-Procedure Diagnosis:  Denice Fernandez is a 74 y.o. female with AF is referred for electr-physiologic evaluation and intervention. Post Procedure Diagnosis    AF  Atrial flutter    Atrial Fibrillation Ablation Summary  Informed consent was obtained. The patient was brought to the EP lab where BARB demonstrated no thrombus in the left atrial appendage. All vascular access sites were prepped and draped in the usual sterile fashion and the Seldinger technique was used to catherize the RFV and LFV with multi-polar electrode catheters, which were placed in the appropriate intra-cardiac sites under fluoroscopic guidance (see catheter list). Right and left atrial pacing and recording, His bundle recording, and right ventricular pacing and recording were performed. Continuous pulse oximetry and cuff BP monitoring were performed. During the procedure, the patient received Versed, Fentanyl and Propofol for sedation per anesthesia personnel. Transeptal Puncture  A transeptal atrial puncture was performed using fluoroscopic and intracardiac ultrasound guidance. An SL1 sheath was dragged from the SVC along the septum until a sudden leftward displacement was observed. Engagement of the Fossa Ovalis was confirmed by the interatrial tenting seen under intracardiac ultrasound guidance. The Darwyn Eyal NRG needle was advanced into the left atrium and its positioned confirmed with contrast injection. The dilator and sheath were advanced carefully over the needle into the body of the left atrium and the dilator/needle removed. A Flexcath sheath was exchanged over the wire for the SL1 sheath.  No pericardial effusion was appreciated on intracardiac ultrasound so a bolus injection of heparin 100 units/kg was administered, so that the activated clotting time maintained was between 300 and 400 seconds with additional boluses q 30 minutes as necessary. A 3D shell representing the left atrium and pulmonary veins was constructed with the electroanatomic mapping system. An Achieve circular mapping catheter was advanced into the left atrium through the Flexcath sheath and a map of the body of the LA and the pulmonary veins was created. Pulmonary vein venography was also performed at that time. A 28 mm Medtronic Cryo balloon was advanced into the left atrium. Cryo ablation of the left atrium was performed at the PV ostia to encircle the right and left PVs. Cryo energy was applied for a total of 16 minutes with confirmed entrance/exit block of four pulmonary veins. Atrial Flutter Ablation  The Flexcath sheath was pulled back to the right atrium and the cryoballoon was removed. A large curve 10 mm tip Blazer catheter was used to deliver 2 RF lesions for a total of 3 minutes in the cavo-tricuspid isthmus with creation of bi-directional isthmus block. Autonomic manipulation with Dobutamine @ 20 mcg/min was performed during this procedure. Post ablation, rapid atrial pacing to 240 msec, on and off dobutamine, failed to induce any atrial arrhythmias. At the end of the procedure, all catheters were removed, heparin reversed, groin sheaths pulled and hemostasis achieved. The patient left the laboratory in a stable condition. Fluoroscopy and procedure times were 7 and 60 minutes respectively. Estimated blood loss: <10 ml. Sharp counts: correct. Specimen (s) collected: none. The procedure related complication occurred: none. The following problems were encountered: none. Conduction intervals (ms)    A-A A-H H-V P-R QRS Q-T R-R V-V  1113 106 56 235 97 [phone]    AV alicia conduction    VA Block when pacing at 400 ms    Findings and Summary    This study demonstrates:  1. PVI of all 4 PVs  2. Atrial flutter ablation with RFA of the CTI and confirmed bi-directional isthmus block  3.  No further inducible atrial arrhythmias on dobutamine with rapid atrial pacing    Recommendation:  1. [facility]      Thank you for allowing me to participate in this patients care.     Karol Modi MD, [facility]ide Faye

## 2018-09-10 NOTE — ANESTHESIA PREPROCEDURE EVALUATION
Anesthetic History No history of anesthetic complications Review of Systems / Medical History Patient summary reviewed, nursing notes reviewed and pertinent labs reviewed Pulmonary Asthma Neuro/Psych Within defined limits Cardiovascular Hypertension Dysrhythmias : atrial fibrillation PAD 
 
 
  
GI/Hepatic/Renal 
  
GERD 
 
 
PUD Endo/Other Arthritis Other Findings Physical Exam 
 
Airway Mallampati: II 
TM Distance: > 6 cm Neck ROM: normal range of motion Mouth opening: Normal 
 
 Cardiovascular Rhythm: irregular Dental 
No notable dental hx Pulmonary Breath sounds clear to auscultation Abdominal 
GI exam deferred Other Findings Anesthetic Plan ASA: 3 Anesthesia type: general 
 
 
 
 
Induction: Intravenous Anesthetic plan and risks discussed with: Patient

## 2018-09-10 NOTE — ANESTHESIA POSTPROCEDURE EVALUATION
Post-Anesthesia Evaluation and Assessment Patient: Gayatri Green MRN: [id number]  SSN: [id number] YOB: 1945  Age: 74 y.o. Sex: female Cardiovascular Function/Vital Signs Visit Vitals  /75  Pulse 71  Temp 36.4 °C (97.6 °F)  Resp 22  
 Ht 5' 5\" (1.651 m)  Wt 85.3 kg (188 lb)  SpO2 100%  BMI 31.28 kg/m2 Patient is status post general anesthesia for * No procedures listed *. Nausea/Vomiting: None Postoperative hydration reviewed and adequate. Pain: 
Pain Scale 1: Numeric (0 - 10) (09/10/18 1615) Pain Intensity 1: 0 (09/10/18 1615) Managed Neurological Status:  
Neuro Neurologic State: Drowsy (09/10/18 1514) Orientation Level: Oriented to person;Oriented to place;Oriented to situation (09/10/18 1514) Cognition: Follows commands (09/10/18 1514) Speech: Clear (09/10/18 1514) LUE Motor Response: Purposeful (09/10/18 1514) LLE Motor Response: Purposeful (09/10/18 1514) RUE Motor Response: Purposeful (09/10/18 1514) RLE Motor Response: Purposeful (09/10/18 1514) At baseline Mental Status and Level of Consciousness: Arousable Pulmonary Status:  
O2 Device: Nasal cannula (09/10/18 1600) Adequate oxygenation and airway patent Complications related to anesthesia: None Post-anesthesia assessment completed. No concerns Signed By: Sofy Moya MD   
 September 10, 2018

## 2018-09-10 NOTE — PERIOP NOTES
Handoff Report from Operating Room to PACU Report received from [facility] and [facility] regarding Stew Santana. Surgeon(s): 
Case Anesthesia  And Procedure(s) (LRB): 
PACU/RECOVERY FROM PVI (N/A)  confirmed  
with allergies and dressings discussed. Anesthesia type, drugs, patient history, complications, estimated blood loss, vital signs, intake and output, and last pain medication, lines, reversal medications and temperature were reviewed.

## 2018-09-10 NOTE — IP AVS SNAPSHOT
[address] [facility] 
[phone] Patient: Rosemary Ruiz MRN: [id number] HOW:[birth year] A check lauren indicates which time of day the medication should be taken. My Medications ASK your doctor about these medications Instructions Each Dose to Equal  
 Morning Noon Evening Bedtime  
 apixaban 5 mg tablet Commonly known as:  Stephanie Scripture Your last dose was: Your next dose is: Take 1 Tab by mouth two (2) times a day. 5 mg FISH OIL 1,000 mg Cap Generic drug:  omega-3 fatty acids-vitamin e Your last dose was: Your next dose is: Take 1 Cap by mouth two (2) times a day. 1 Cap  
    
   
   
   
  
 losartan 50 mg tablet Commonly known as:  COZAAR Your last dose was: Your next dose is: Take 1 tablet by mouth once daily  
     
   
   
   
  
 metoprolol tartrate 25 mg tablet Commonly known as:  LOPRESSOR Your last dose was: Your next dose is: TAKE ONE TABLET BY MOUTH TWICE DAILY pravastatin 20 mg tablet Commonly known as:  PRAVACHOL Your last dose was: Your next dose is: TAKE ONE TABLET BY MOUTH ONCE DAILY  
     
   
   
   
  
 TYLENOL ARTHRITIS PAIN 650 mg Tber Generic drug:  acetaminophen Your last dose was: Your next dose is: Take 650 mg by mouth every six (6) hours as needed for Pain.   
 650 mg

## 2018-09-10 NOTE — PERIOP NOTES
TRANSFER - OUT REPORT: 
 
Verbal report given to ASHOK Torres(name) on Anna Farr  being transferred to [id number]) for routine progression of care Report consisted of patients Situation, Background, Assessment and  
Recommendations(SBAR). Information from the following report(s) SBAR was reviewed with the receiving nurse. Lines:  
Peripheral IV 09/10/18 Left Hand (Active) Site Assessment Clean, dry, & intact 9/10/2018  4:21 PM  
Phlebitis Assessment 0 9/10/2018  4:21 PM  
Infiltration Assessment 0 9/10/2018  4:21 PM  
Dressing Status Clean, dry, & intact 9/10/2018  4:21 PM  
Dressing Type Tape;Transparent 9/10/2018  4:21 PM  
Hub Color/Line Status Blue 9/10/2018  4:21 PM  
  
 
Opportunity for questions and clarification was provided. Patient transported with: 
 Monitor O2 @ 3 liters Registered Nurse

## 2018-09-10 NOTE — PROGRESS NOTES
Bedside shift change report given to Maricruz Mckeon RN  (oncoming nurse) by Peyton Manriquez RN  (offgoing nurse). Report included the following information SBAR and Procedure Summary.

## 2018-09-11 LAB
ACT BLD: 136 SECS (ref 79–138)
ACT BLD: 362 SECS (ref 79–138)
ANION GAP SERPL CALC-SCNC: 7 MMOL/L (ref 5–15)
ATRIAL RATE: 66 BPM
BUN SERPL-MCNC: 21 MG/DL (ref 6–20)
BUN/CREAT SERPL: 30 (ref 12–20)
CALCIUM SERPL-MCNC: 9.6 MG/DL (ref 8.5–10.1)
CALCULATED P AXIS, ECG09: 34 DEGREES
CALCULATED R AXIS, ECG10: -17 DEGREES
CALCULATED T AXIS, ECG11: 22 DEGREES
CHLORIDE SERPL-SCNC: 110 MMOL/L (ref 97–108)
CO2 SERPL-SCNC: 26 MMOL/L (ref 21–32)
CREAT SERPL-MCNC: 0.69 MG/DL (ref 0.55–1.02)
DIAGNOSIS, 93000: NORMAL
ERYTHROCYTE [DISTWIDTH] IN BLOOD BY AUTOMATED COUNT: 14.9 % (ref 11.5–14.5)
GLUCOSE SERPL-MCNC: 133 MG/DL (ref 65–100)
HCT VFR BLD AUTO: 38.8 % (ref 35–47)
HGB BLD-MCNC: 12.4 G/DL (ref 11.5–16)
MCH RBC QN AUTO: 29.1 PG (ref 26–34)
MCHC RBC AUTO-ENTMCNC: 32 G/DL (ref 30–36.5)
MCV RBC AUTO: 91.1 FL (ref 80–99)
NRBC # BLD: 0 K/UL (ref 0–0.01)
NRBC BLD-RTO: 0 PER 100 WBC
P-R INTERVAL, ECG05: 204 MS
PLATELET # BLD AUTO: 170 K/UL (ref 150–400)
PMV BLD AUTO: 11 FL (ref 8.9–12.9)
POTASSIUM SERPL-SCNC: 4 MMOL/L (ref 3.5–5.1)
Q-T INTERVAL, ECG07: 448 MS
QRS DURATION, ECG06: 92 MS
QTC CALCULATION (BEZET), ECG08: 469 MS
RBC # BLD AUTO: 4.26 M/UL (ref 3.8–5.2)
SODIUM SERPL-SCNC: 143 MMOL/L (ref 136–145)
VENTRICULAR RATE, ECG03: 66 BPM
WBC # BLD AUTO: 5.5 K/UL (ref 3.6–11)

## 2018-09-11 PROCEDURE — 36415 COLL VENOUS BLD VENIPUNCTURE: CPT | Performed by: INTERNAL MEDICINE

## 2018-09-11 PROCEDURE — 93005 ELECTROCARDIOGRAM TRACING: CPT

## 2018-09-11 PROCEDURE — 74011250637 HC RX REV CODE- 250/637: Performed by: INTERNAL MEDICINE

## 2018-09-11 PROCEDURE — 85027 COMPLETE CBC AUTOMATED: CPT | Performed by: INTERNAL MEDICINE

## 2018-09-11 PROCEDURE — 80048 BASIC METABOLIC PNL TOTAL CA: CPT | Performed by: INTERNAL MEDICINE

## 2018-09-11 PROCEDURE — 99218 HC RM OBSERVATION: CPT

## 2018-09-11 RX ORDER — PRAVASTATIN SODIUM 10 MG/1
20 TABLET ORAL DAILY
Status: DISCONTINUED | OUTPATIENT
Start: 2018-09-11 | End: 2018-09-12 | Stop reason: HOSPADM

## 2018-09-11 RX ADMIN — PRAVASTATIN SODIUM 20 MG: 10 TABLET ORAL at 09:45

## 2018-09-11 RX ADMIN — Medication 10 ML: at 21:15

## 2018-09-11 RX ADMIN — METOPROLOL TARTRATE 25 MG: 25 TABLET ORAL at 17:31

## 2018-09-11 RX ADMIN — LOSARTAN POTASSIUM 50 MG: 50 TABLET ORAL at 08:19

## 2018-09-11 RX ADMIN — METOPROLOL TARTRATE 25 MG: 25 TABLET ORAL at 08:19

## 2018-09-11 NOTE — ROUTINE PROCESS
Pt is having some light vaginal bleeding, says it has been happening since before admission and is seeing her GYN for it. Dr Vickey Parras aware and advised to hold Eliquis.

## 2018-09-11 NOTE — PROGRESS NOTES
Spiritual Care Partner Volunteer visited patient in [place] on 9/11/18. Documented by: 
RAYMOND Treviño

## 2018-09-11 NOTE — PROGRESS NOTES
Cardiology Progress Note [address] [address]  [phone] 9/11/2018 10:38 AM 
 
Admit Date: 9/10/2018 Admit Diagnosis: a fib;PACU RECOVERY NEEDED; A-fib ([address] Subjective: Alexi Lieberman   denies chest pain. Visit Vitals  /60 (BP 1 Location: Right arm, BP Patient Position: At rest)  Pulse 67  Temp 98.4 °F (36.9 °C)  Resp 16  
 Ht 5' 5\" (1.651 m)  Wt 188 lb (85.3 kg)  SpO2 99%  BMI 31.28 kg/m2 Current Facility-Administered Medications Medication Dose Route Frequency  pravastatin (PRAVACHOL) tablet 20 mg  20 mg Oral DAILY  metoprolol tartrate (LOPRESSOR) tablet 25 mg  25 mg Oral BID  losartan (COZAAR) tablet 50 mg  50 mg Oral DAILY  sodium chloride (NS) flush 5-10 mL  5-10 mL IntraVENous Q8H  
 sodium chloride (NS) flush 5-10 mL  5-10 mL IntraVENous PRN  
 acetaminophen (TYLENOL) tablet 650 mg  650 mg Oral Q4H PRN  
 HYDROcodone-acetaminophen (NORCO) 5-325 mg per tablet 1 Tab  1 Tab Oral Q4H PRN Objective:  
  
Visit Vitals  /60 (BP 1 Location: Right arm, BP Patient Position: At rest)  Pulse 67  Temp 98.4 °F (36.9 °C)  Resp 16  
 Ht 5' 5\" (1.651 m)  Wt 188 lb (85.3 kg)  SpO2 99%  BMI 31.28 kg/m2 Physical Exam: Abdomen: soft, non-tender Extremities: extremities normal 
Heart: regular rate and rhythm Lungs: clear to auscultation bilaterally Pulses: 2+ and symmetric Data Review:  
Labs:   
Recent Labs  
   09/11/18 
 0325 WBC  5.5 HGB  12.4 HCT  38.8 PLT  170 Recent Labs  
   09/11/18 
 0325 NA  143  
K  4.0  
CL  110* CO2  26 GLU  133* BUN  21* CREA  0.69 CA  9.6 No results for input(s): TROIQ, CPK, CKMB in the last 72 hours. Intake/Output Summary (Last 24 hours) at 09/11/18 1038 Last data filed at 09/10/18 1930 Gross per 24 hour Intake              600 ml Output              700 ml Net             -100 ml Telemetry: nsr Assessment:  
 
Active Problems: 
  A-fib ([address] (9/10/2018) Plan: Parrish Bowens is in sinus sp af ablation. Will keep npo p mn for pacemaker for sick syndrome in the am. I discussed the risks/benefits/alternatives of the procedure with the patient. Risks include (but are not limited to) bleeding, heart block, infection, cva/mi/tamponade/death. The patient understands and agrees to proceed. Matthew Campbell MD, [facility] 
 
9/11/2018

## 2018-09-11 NOTE — PROGRESS NOTES
Bedside and Verbal shift change report given to Jose Luis Cortes RN (oncoming nurse) by Sherwin Lima RN (offgoing nurse). Report included the following information SBAR, Kardex, Procedure Summary, Intake/Output, MAR, Accordion, Recent Results and Cardiac Rhythm NSR.

## 2018-09-11 NOTE — PROGRESS NOTES
Patient has ambulated in the hallway without significant distress. She was noticeably winded when returning to her room.

## 2018-09-11 NOTE — PROGRESS NOTES
Problem: Falls - Risk of 
Goal: *Absence of Falls Document Lashay Sykesons Fall Risk and appropriate interventions in the flowsheet. Outcome: Progressing Towards Goal 
Fall Risk Interventions: 
Mobility Interventions: Patient to call before getting OOB, Utilize walker, cane, or other assistive device Medication Interventions: Patient to call before getting OOB, Teach patient to arise slowly

## 2018-09-12 ENCOUNTER — APPOINTMENT (OUTPATIENT)
Dept: GENERAL RADIOLOGY | Age: 73
End: 2018-09-12
Attending: INTERNAL MEDICINE
Payer: MEDICARE

## 2018-09-12 ENCOUNTER — APPOINTMENT (OUTPATIENT)
Dept: NON INVASIVE DIAGNOSTICS | Age: 73
End: 2018-09-12
Payer: MEDICARE

## 2018-09-12 VITALS
OXYGEN SATURATION: 94 % | HEIGHT: 65 IN | WEIGHT: 188 LBS | DIASTOLIC BLOOD PRESSURE: 71 MMHG | BODY MASS INDEX: 31.32 KG/M2 | HEART RATE: 70 BPM | RESPIRATION RATE: 16 BRPM | SYSTOLIC BLOOD PRESSURE: 118 MMHG | TEMPERATURE: 97.9 F

## 2018-09-12 LAB
ANION GAP SERPL CALC-SCNC: 7 MMOL/L (ref 5–15)
BUN SERPL-MCNC: 26 MG/DL (ref 6–20)
BUN/CREAT SERPL: 37 (ref 12–20)
CALCIUM SERPL-MCNC: 9.3 MG/DL (ref 8.5–10.1)
CHLORIDE SERPL-SCNC: 111 MMOL/L (ref 97–108)
CO2 SERPL-SCNC: 25 MMOL/L (ref 21–32)
CREAT SERPL-MCNC: 0.7 MG/DL (ref 0.55–1.02)
GLUCOSE SERPL-MCNC: 92 MG/DL (ref 65–100)
POTASSIUM SERPL-SCNC: 3.6 MMOL/L (ref 3.5–5.1)
SODIUM SERPL-SCNC: 143 MMOL/L (ref 136–145)

## 2018-09-12 PROCEDURE — 77030018673

## 2018-09-12 PROCEDURE — 74011000250 HC RX REV CODE- 250

## 2018-09-12 PROCEDURE — C1785 PMKR, DUAL, RATE-RESP: HCPCS

## 2018-09-12 PROCEDURE — 74011250636 HC RX REV CODE- 250/636: Performed by: INTERNAL MEDICINE

## 2018-09-12 PROCEDURE — C1892 INTRO/SHEATH,FIXED,PEEL-AWAY: HCPCS

## 2018-09-12 PROCEDURE — 74011250636 HC RX REV CODE- 250/636

## 2018-09-12 PROCEDURE — 77030037713 HC CLOSR DEV INCIS ZIP STRY -B

## 2018-09-12 PROCEDURE — C1898 LEAD, PMKR, OTHER THAN TRANS: HCPCS

## 2018-09-12 PROCEDURE — 77030018836 HC SOL IRR NACL ICUM -A

## 2018-09-12 PROCEDURE — 74011250637 HC RX REV CODE- 250/637: Performed by: INTERNAL MEDICINE

## 2018-09-12 PROCEDURE — 77030002996 HC SUT SLK J&J -A

## 2018-09-12 PROCEDURE — 33208 INSRT HEART PM ATRIAL & VENT: CPT

## 2018-09-12 PROCEDURE — C1894 INTRO/SHEATH, NON-LASER: HCPCS

## 2018-09-12 PROCEDURE — 71045 X-RAY EXAM CHEST 1 VIEW: CPT

## 2018-09-12 PROCEDURE — 77030018729 HC ELECTRD DEFIB PAD CARD -B

## 2018-09-12 PROCEDURE — 80048 BASIC METABOLIC PNL TOTAL CA: CPT | Performed by: INTERNAL MEDICINE

## 2018-09-12 PROCEDURE — 99218 HC RM OBSERVATION: CPT

## 2018-09-12 PROCEDURE — 77030031139 HC SUT VCRL2 J&J -A

## 2018-09-12 PROCEDURE — 99153 MOD SED SAME PHYS/QHP EA: CPT

## 2018-09-12 PROCEDURE — A4565 SLINGS: HCPCS

## 2018-09-12 PROCEDURE — 36415 COLL VENOUS BLD VENIPUNCTURE: CPT | Performed by: INTERNAL MEDICINE

## 2018-09-12 PROCEDURE — 99152 MOD SED SAME PHYS/QHP 5/>YRS: CPT

## 2018-09-12 RX ORDER — FENTANYL CITRATE 50 UG/ML
INJECTION, SOLUTION INTRAMUSCULAR; INTRAVENOUS
Status: COMPLETED
Start: 2018-09-12 | End: 2018-09-12

## 2018-09-12 RX ORDER — BACITRACIN 50000 [IU]/1
INJECTION, POWDER, FOR SOLUTION INTRAMUSCULAR
Status: COMPLETED
Start: 2018-09-12 | End: 2018-09-12

## 2018-09-12 RX ORDER — VANCOMYCIN HYDROCHLORIDE 1 G/20ML
INJECTION, POWDER, LYOPHILIZED, FOR SOLUTION INTRAVENOUS
Status: DISCONTINUED
Start: 2018-09-12 | End: 2018-09-12 | Stop reason: HOSPADM

## 2018-09-12 RX ORDER — MIDAZOLAM HYDROCHLORIDE 1 MG/ML
INJECTION, SOLUTION INTRAMUSCULAR; INTRAVENOUS
Status: COMPLETED
Start: 2018-09-12 | End: 2018-09-12

## 2018-09-12 RX ORDER — LIDOCAINE HYDROCHLORIDE 10 MG/ML
INJECTION INFILTRATION; PERINEURAL
Status: COMPLETED
Start: 2018-09-12 | End: 2018-09-12

## 2018-09-12 RX ORDER — SODIUM CHLORIDE 900 MG/100ML
INJECTION INTRAVENOUS
Status: DISCONTINUED
Start: 2018-09-12 | End: 2018-09-12 | Stop reason: HOSPADM

## 2018-09-12 RX ORDER — HEPARIN SODIUM 200 [USP'U]/100ML
500 INJECTION, SOLUTION INTRAVENOUS ONCE
Status: DISCONTINUED | OUTPATIENT
Start: 2018-09-12 | End: 2018-09-12 | Stop reason: HOSPADM

## 2018-09-12 RX ORDER — MIDAZOLAM HYDROCHLORIDE 1 MG/ML
1-5 INJECTION, SOLUTION INTRAMUSCULAR; INTRAVENOUS
Status: DISCONTINUED | OUTPATIENT
Start: 2018-09-12 | End: 2018-09-12 | Stop reason: HOSPADM

## 2018-09-12 RX ORDER — HEPARIN SODIUM 200 [USP'U]/100ML
INJECTION, SOLUTION INTRAVENOUS
Status: DISCONTINUED
Start: 2018-09-12 | End: 2018-09-12 | Stop reason: HOSPADM

## 2018-09-12 RX ORDER — HYDROCODONE BITARTRATE AND ACETAMINOPHEN 5; 325 MG/1; MG/1
1 TABLET ORAL
Status: DISCONTINUED | OUTPATIENT
Start: 2018-09-12 | End: 2018-09-12 | Stop reason: HOSPADM

## 2018-09-12 RX ORDER — LIDOCAINE HYDROCHLORIDE 10 MG/ML
1-40 INJECTION INFILTRATION; PERINEURAL
Status: DISCONTINUED | OUTPATIENT
Start: 2018-09-12 | End: 2018-09-12 | Stop reason: HOSPADM

## 2018-09-12 RX ORDER — SODIUM CHLORIDE 0.9 % (FLUSH) 0.9 %
5-10 SYRINGE (ML) INJECTION AS NEEDED
Status: DISCONTINUED | OUTPATIENT
Start: 2018-09-12 | End: 2018-09-12 | Stop reason: HOSPADM

## 2018-09-12 RX ORDER — FENTANYL CITRATE 50 UG/ML
12.5-5 INJECTION, SOLUTION INTRAMUSCULAR; INTRAVENOUS
Status: DISCONTINUED | OUTPATIENT
Start: 2018-09-12 | End: 2018-09-12 | Stop reason: HOSPADM

## 2018-09-12 RX ORDER — ACETAMINOPHEN 325 MG/1
650 TABLET ORAL
Status: DISCONTINUED | OUTPATIENT
Start: 2018-09-12 | End: 2018-09-12 | Stop reason: HOSPADM

## 2018-09-12 RX ORDER — BACITRACIN 50000 [IU]/1
50000 INJECTION, POWDER, FOR SOLUTION INTRAMUSCULAR ONCE
Status: COMPLETED | OUTPATIENT
Start: 2018-09-12 | End: 2018-09-12

## 2018-09-12 RX ORDER — NALOXONE HYDROCHLORIDE 0.4 MG/ML
0.4 INJECTION, SOLUTION INTRAMUSCULAR; INTRAVENOUS; SUBCUTANEOUS AS NEEDED
Status: DISCONTINUED | OUTPATIENT
Start: 2018-09-12 | End: 2018-09-12 | Stop reason: HOSPADM

## 2018-09-12 RX ORDER — SODIUM CHLORIDE 0.9 % (FLUSH) 0.9 %
5-10 SYRINGE (ML) INJECTION EVERY 8 HOURS
Status: DISCONTINUED | OUTPATIENT
Start: 2018-09-12 | End: 2018-09-12 | Stop reason: HOSPADM

## 2018-09-12 RX ADMIN — MIDAZOLAM HYDROCHLORIDE 2 MG: 1 INJECTION, SOLUTION INTRAMUSCULAR; INTRAVENOUS at 08:25

## 2018-09-12 RX ADMIN — BACITRACIN 50000 UNITS: 50000 INJECTION, POWDER, FOR SOLUTION INTRAMUSCULAR at 08:58

## 2018-09-12 RX ADMIN — MIDAZOLAM HYDROCHLORIDE 2 MG: 1 INJECTION, SOLUTION INTRAMUSCULAR; INTRAVENOUS at 08:10

## 2018-09-12 RX ADMIN — BACITRACIN 50000 UNITS: 5000 INJECTION, POWDER, FOR SOLUTION INTRAMUSCULAR at 08:58

## 2018-09-12 RX ADMIN — LIDOCAINE HYDROCHLORIDE 30 ML: 10 INJECTION INFILTRATION; PERINEURAL at 08:31

## 2018-09-12 RX ADMIN — HEPARIN SODIUM 1000 UNITS: 200 INJECTION, SOLUTION INTRAVENOUS at 08:37

## 2018-09-12 RX ADMIN — MIDAZOLAM HYDROCHLORIDE 2 MG: 1 INJECTION, SOLUTION INTRAMUSCULAR; INTRAVENOUS at 08:20

## 2018-09-12 RX ADMIN — MIDAZOLAM 2 MG: 1 INJECTION INTRAMUSCULAR; INTRAVENOUS at 08:20

## 2018-09-12 RX ADMIN — METOPROLOL TARTRATE 25 MG: 25 TABLET ORAL at 10:39

## 2018-09-12 RX ADMIN — HYDROCODONE BITARTRATE AND ACETAMINOPHEN 1 TABLET: 5; 325 TABLET ORAL at 12:14

## 2018-09-12 RX ADMIN — Medication 10 ML: at 04:17

## 2018-09-12 RX ADMIN — FENTANYL CITRATE 50 MCG: 50 INJECTION, SOLUTION INTRAMUSCULAR; INTRAVENOUS at 08:20

## 2018-09-12 RX ADMIN — LOSARTAN POTASSIUM 50 MG: 50 TABLET ORAL at 10:39

## 2018-09-12 RX ADMIN — LIDOCAINE HYDROCHLORIDE 30 ML: 10 INJECTION, SOLUTION INFILTRATION; PERINEURAL at 08:31

## 2018-09-12 RX ADMIN — MIDAZOLAM HYDROCHLORIDE 2 MG: 1 INJECTION, SOLUTION INTRAMUSCULAR; INTRAVENOUS at 08:30

## 2018-09-12 RX ADMIN — VANCOMYCIN HYDROCHLORIDE 1000 MG: 1 INJECTION, POWDER, LYOPHILIZED, FOR SOLUTION INTRAVENOUS at 08:10

## 2018-09-12 RX ADMIN — MIDAZOLAM HYDROCHLORIDE 2 MG: 1 INJECTION, SOLUTION INTRAMUSCULAR; INTRAVENOUS at 08:50

## 2018-09-12 RX ADMIN — MIDAZOLAM 2 MG: 1 INJECTION INTRAMUSCULAR; INTRAVENOUS at 08:10

## 2018-09-12 RX ADMIN — FENTANYL CITRATE 50 MCG: 50 INJECTION, SOLUTION INTRAMUSCULAR; INTRAVENOUS at 08:15

## 2018-09-12 RX ADMIN — FENTANYL CITRATE 50 MCG: 50 INJECTION, SOLUTION INTRAMUSCULAR; INTRAVENOUS at 08:35

## 2018-09-12 RX ADMIN — PRAVASTATIN SODIUM 20 MG: 10 TABLET ORAL at 10:39

## 2018-09-12 NOTE — PROGRESS NOTES
Bedside shift change report given to Rosita Lewis RN (oncoming nurse) by Denice Vernon RN (offgoing nurse). Report included the following information SBAR, Kardex, Procedure Summary, Intake/Output, MAR, Accordion, Recent Results, Med Rec Status, Cardiac Rhythm NSR, Alarm Parameters , Pre Procedure Checklist, Procedure Verification and Quality Measures.

## 2018-09-12 NOTE — PROGRESS NOTES
Problem: Falls - Risk of 
Goal: *Absence of Falls Document Adamaris Alonzo Fall Risk and appropriate interventions in the flowsheet. Outcome: Progressing Towards Goal 
Fall Risk Interventions: 
Mobility Interventions: Patient to call before getting OOB Medication Interventions: Evaluate medications/consider consulting pharmacy, Patient to call before getting OOB, Teach patient to arise slowly, Assess postural VS orthostatic hypotension Problem: Pressure Injury - Risk of 
Goal: *Prevention of pressure injury Document Chacho Scale and appropriate interventions in the flowsheet. Outcome: Progressing Towards Goal 
Pressure Injury Interventions: 
  
 
  
 
  
 
Mobility Interventions: Pressure redistribution bed/mattress (bed type)

## 2018-09-12 NOTE — PROCEDURES
[address] [place] [address]  [phone]    Indications and Pre-Procedure Diagnosis:  Alexi Lieberman is a 74 y.o. female with sick sinus syndrome is referred for dual chamber pacemaker. Post Procedure Diagnosis:    Sick sinus syndrome  Tachy-bridgett syndrome    Pacemaker Implant Procedure and Findings:  Informed consent was obtained and the patient was premedicated with cefazolin. The procedure was performed under local anesthesia. Continuous pulse oximetry and cuff pressure were monitored. During the procedure, the patient received Versed and Fentanyl for sedation per nursing personnel. The left deltopectoral area was prepped and draped in the usual sterile fashion and was liberally infiltrated with 1% lidocaine. An incision was made over the left subpectoral area and a generator pocket was manually dissected. Access was achieved in the left axillary vein under fluoroscopic guidance and using the seldinger technique. Through the left axillary vein, pacing leads were positioned in appropriate regions in the right heart chambers where satisfactory pacing and sensing parameters were measured. Stability of the leads was assessed with deep breathing and there was no diaphragmatic pacing at 10V output. The leads were anchored using the sleeves and a pulse generator pocket fashioned using blunt dissection. The leads were then connected to the pulse generator. The pulse generator pocket was then liberally infiltrated with bacitracin solution, and the device implanted with a single silk fixation suture in the header to prevent migration. The wound was closed in layers using continuous 2-0 Vicryl and 4-0 Vicryl ending with a sub-cuticular closure. A bio-occlusive dressing were applied to the skin. Fluoroscopy and total procedure times were 3 and 30 minutes respectively. Estimated blood loss <10 ml. Sharp count: correct. Specimen(s) collected: none.  The following procedure related complication occurred: none. The following problems were encountered: none. Findings: successful pacemaker placement. Device Data Measurements:  Lead Sensing (mV) Threshold (V)Pulse Width (ms) Impedance (Ohms)  RA 2  1.1  0.5   504  RV 9  0.4  0.5   829      Final Programmed Parameters  Bradycardia pacing rate  70 bpm  Pacing Mode    DDDR  Pacing Output    3.5 V@ 0.5 ms    Supplies Summary available in the chart  [employer]    Thank you for allowing me to participate in this patients care.     Houston Gonzalez MD, Edgar Colon

## 2018-09-12 NOTE — ROUTINE PROCESS
Patient taken for pacemaker insertion. Pt reports she still has some vaginal bleeding but improved from yesterday. She has already been seeing her GYN for this issue, MD aware. 0913: Pt just arrived to [facility], site CDI, no complaints

## 2018-09-12 NOTE — PROGRESS NOTES
PCP ZARI appt scheduled with Dana Black on 9/18/2018 at 10:30am. Appt added to AVS. BON Rodas CM Specialist

## 2018-09-12 NOTE — DISCHARGE INSTRUCTIONS
Care Coordinator Progress Note    Admission Date/Time:  9/5/2018  Attending MD:  Mary Kate Mcdonough, *    Data  Chart reviewed, discussed with interdisciplinary team.   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    Concerns with insurance coverage for discharge needs: insurance coverage is inadequate.  Current Living Situation: Patient lives with family.  Support System: Involved  Services Involved: NA  Transportation at Discharge: [employer]  Transportation to Medical Appointments:   - Not applicable  Barriers to Discharge: financial support inadequate, lack of support system/caregiver and transportation    Coordination of Care and Referrals: Met with patient at bedside to discuss discharge planning. Patient transferred her from outside hospital for procedure. Patient does not have any transportation home and has limited finances for transportation. Patient will transfer home via [employer]. A taxi is scheduled to pick patient up at the hospital at 9pm and transport to [facility] where she will take the [employer] to [facility]. No further discharge concerns at this time. RNCC will continue to follow as needed.    [employer]  Phone [phone]  [facility] acct # [id number]  Confirmation #[id number]        Plan  Anticipated Discharge Date:  9/7/2018  Anticipated Discharge Plan:  Home    Sheyla Cortez RN, BSN  Care Coordinator, 8A  Phone [phone]  Pager [phone]           [address] [address]  [phone]        NEW PACEMAKER IMPLANT DISCHARGE INSTRUCTIONS    Patient ID:  Yancy Wilkes  [id number]  8 y.o.  1945    Admit Date: 9/10/2018    Discharge Date: 9/12/2018     Admitting Physician: Frank Franco MD     Discharge Physician: Frank Franco MD    Admission Diagnoses:   a fib  PACU RECOVERY NEEDED  [facility])    Discharge Diagnoses: Active Problems:    [facility]) (9/10/2018)        Discharge Condition: Good    Cardiology Procedures this Admission:  Pacemaker insertion. Disposition: home    Reference discharge instructions provided by nursing for diet and activity. Follow-up with device clinic in two weeks. Call [phone] to make an appointment. Signed:  KARIME De La Garza  9/12/2018  9:17 AM      DISCHARGE INSTRUCTIONS FOR PATIENTS WITH PACEMAKERS    1. Remember to call for an appointment for 2 weeks [phone] to check healing and implant programming. 2. Medic Alert Bracelets are available from your pharmacist to wear at all times if you choose to wear one. 3. Carry your ID card for pacemaker with you at all times. This card will be given to you in the hospital or mailed to you. 4. The pacemaker will bulge slightly under your skin. The bulge will decrease in size over the next few weeks. Please notify the doctor's office if you notice any of the following around your site:   A.  A bruise that does not go away. B.  Soreness or yellow, green, or brown drainage from the site. C. Any swelling from the site. D. If you have a fever of 100 degrees or higher that lasts for a few days. INCISION CARE       1.  Leave dressing over your site until it starts to fall off, usually in a few weeks. 2.  You may shower after 3 days as long as your incision isnt submerged or directly sprayed upon until well healed. 3.  For comfort, wear loose fitting clothing.   4.  Report any signs of infection, fever, pain, swelling, redness, oozing, or heat at site especially if these symptoms increase after the first 3 to 4 days. ACTIVITY PRECAUTIONS     1. Avoid rough contact with the implant site. 2. No driving for 14 days. 3. Avoid lifting your arm over your head, carrying anything on the affected side, or lifting over 10 pounds for 30 days. For the first 2 days only bend your arm at the elbow. 4. Any extreme activity such as golf, weight lifting or exercise biking should be restricted for 60 days. 5. Do not carry objects by holding them against your implant site. 6.  No shooting rifles or any type of gun with the affected shoulder permanently. SPECIAL PRECAUTIONS     1. You should avoid all strong magnetic fields, such as arc welding, large transformers, large motors. 2.  You may or may not (depending on your device) have an MRI which uses a strong magnet to take pictures. 3.  Treatments or surgery that requires diathermy or electrocautery should be discussed with your doctor before scheduled. 4. Avoid radio frequency transmitters, including radar. 5. Advise dentist or other medical personnel you see that you have a pacemaker. 6.  Cell phones and microwave oven use is okay. 7.  If you plan to move or take a trip to a new area, the doctor's office will give you a name of a doctor to contact for any problems. ANTIBIOTIC THERAPY    During the first 8 weeks after your pacemaker insertion, you may need antibiotics before any dental work or certain tests or operations. Let the dentist or doctor who is caring for you know that you have had an implanted device.

## 2018-09-12 NOTE — ROUTINE PROCESS
Patient's bedrest has ended. Pt ambulated in hallway with RN, tolerated well, site CDI. 
 
1500: Discharge info reviewed with pt, who verbalized understanding. Opportunity for questions provided. 1520: Pt discharged safely via wheelchair.

## 2018-09-13 ENCOUNTER — PATIENT OUTREACH (OUTPATIENT)
Dept: INTERNAL MEDICINE CLINIC | Age: 73
End: 2018-09-13

## 2018-09-13 NOTE — PROGRESS NOTES
Hospital Discharge Follow-Up Date/Time:  2018 1:04 PM 
 
Patient was admitted to [facility] on 9/10/18 and discharged on 18 for sick sinus syndrome. The physician discharge summary was not available at the time of outreach. Patient was contacted within two business days of discharge. Top Challenges reviewed with the provider Method of communication with provider :chart routing Inpatient RRAT score: 5 Was this a readmission? no  
Patient stated reason for the readmission: n/a Nurse Navigator (NN) contacted the patient by telephone to perform post hospital discharge assessment. Verified name and  with patient as identifiers. Provided introduction to self, and explanation of the Nurse Navigator role. Reviewed discharge instructions and red flags with patient who verbalized understanding. Patient given an opportunity to ask questions and does not have any further questions or concerns at this time. The patient agrees to contact the PCP office for questions related to their healthcare. NN provided contact information for future reference. Disease Specific:   N/A Summary of patient's top problems: 1. Patient had pacemaker placement for sick sinus syndrome / tachy - bridgett syndrome and ablation for atrial fibrillation. Home Health orders at discharge: none [address]: n/a Date of initial visit: n/a Durable Medical Equipment ordered/company: n/a Durable Medical Equipment received: n/a Barriers to care? none Advance Care Planning:  
Does patient have an Advance Directive:  not on file; education provided. \"Your Right to Decide\" booklet mailed to patient. Medication(s):  
New Medications at Discharge: n/a Changed Medications at Discharge: n/a Discontinued Medications at Discharge: n/a Medication reconciliation was performed with patient, who verbalizes understanding of administration of home medications. There were no barriers to obtaining medications identified at this time. Referral to Pharm D needed: no  
 
Current Outpatient Prescriptions Medication Sig  pravastatin (PRAVACHOL) 20 mg tablet TAKE ONE TABLET BY MOUTH ONCE DAILY  losartan (COZAAR) 50 mg tablet Take 1 tablet by mouth once daily  metoprolol tartrate (LOPRESSOR) 25 mg tablet TAKE ONE TABLET BY MOUTH TWICE DAILY  acetaminophen (TYLENOL ARTHRITIS PAIN) 650 mg CR tablet Take 650 mg by mouth every six (6) hours as needed for Pain.  omega-3 fatty acids-vitamin e (FISH OIL) 1,000 mg Cap Take 1 Cap by mouth two (2) times a day.  apixaban (ELIQUIS) 5 mg tablet Take 1 Tab by mouth two (2) times a day. No current facility-administered medications for this visit. There are no discontinued medications. [facility] follow up appointment(s): Future Appointments Date Time Provider Luis Angel Borden 9/18/2018 10:30 AM Katherine Dickerson MD [facility] GISEL SCHED  
10/4/2018 2:30 PM Amanda Giang NP [facility] SCHED  
10/4/2018 2:30 PM PACEMAKER, [facility] GISEL SCHED  
8/20/2019 9:00 AM Katherine Dickerson MD [address] [place] [address],[facility] [id number] Non-[facility] follow up appointment(s): n/a [facility]:  n/a  
 
 
Goals  Understands red flags post discharge. 9/13/18-NN spoke to patient who was discharged from [facility] s/p ablation and pacemaker placement for a.fib and sick sinus syndrome. Discussed signs and symptoms to be alert to following pacemaker placement. Discussed with her that some swelling and bruising is normal, however if she notices significant increase or drainage from pacemaker site that she should contact MD.  In addition, told her to report any fever, or purulent drainage right away as this might indicate infection.   Reviewed with her importance of not lifting arm over her head on side of pacemaker and no driving for 14 days. Patient voiced understanding. Patient has f/u appointment with PCP on 9/18/18.   NN will check back with patient in 2 weeks to see how she is progressing/ vs

## 2018-09-18 ENCOUNTER — OFFICE VISIT (OUTPATIENT)
Dept: INTERNAL MEDICINE CLINIC | Age: 73
End: 2018-09-18

## 2018-09-18 VITALS
WEIGHT: 184 LBS | HEART RATE: 78 BPM | HEIGHT: 65 IN | BODY MASS INDEX: 30.66 KG/M2 | DIASTOLIC BLOOD PRESSURE: 70 MMHG | OXYGEN SATURATION: 95 % | SYSTOLIC BLOOD PRESSURE: 128 MMHG

## 2018-09-18 DIAGNOSIS — I48.0 PAF (PAROXYSMAL ATRIAL FIBRILLATION) (HCC): Primary | Chronic | ICD-10-CM

## 2018-09-18 DIAGNOSIS — I49.5 SSS (SICK SINUS SYNDROME) (HCC): ICD-10-CM

## 2018-09-18 DIAGNOSIS — E78.00 HYPERCHOLESTEROLEMIA: Chronic | ICD-10-CM

## 2018-09-18 DIAGNOSIS — Z23 ENCOUNTER FOR IMMUNIZATION: ICD-10-CM

## 2018-09-18 DIAGNOSIS — I10 ESSENTIAL HYPERTENSION: Chronic | ICD-10-CM

## 2018-09-18 NOTE — PATIENT INSTRUCTIONS
Vaccine Information Statement Influenza (Flu) Vaccine (Inactivated or Recombinant): What you need to know Many Vaccine Information Statements are available in Korean and other languages. See www.immunize.org/vis Hojas de Información Sobre Vacunas están disponibles en Español y en muchos otros idiomas. Visite www.immunize.org/vis 1. Why get vaccinated? Influenza (flu) is a contagious disease that spreads around the [place] every year, usually between October and May. Flu is caused by influenza viruses, and is spread mainly by coughing, sneezing, and close contact. Anyone can get flu. Flu strikes suddenly and can last several days. Symptoms vary by age, but can include: 
 fever/chills  sore throat  muscle aches  fatigue  cough  headache  runny or stuffy nose Flu can also lead to pneumonia and blood infections, and cause diarrhea and seizures in children. If you have a medical condition, such as heart or lung disease, flu can make it worse. Flu is more dangerous for some people. Infants and young children, people 72years of age and older, pregnant women, and people with certain health conditions or a weakened immune system are at greatest risk. Each year thousands of people in the [facility] die from flu, and many more are hospitalized. Flu vaccine can: 
 keep you from getting flu, 
 make flu less severe if you do get it, and 
 keep you from spreading flu to your family and other people. 2. Inactivated and recombinant flu vaccines A dose of flu vaccine is recommended every flu season. Children 6 months through 6years of age may need two doses during the same flu season. Everyone else needs only one dose each flu season.   
 
 
Some inactivated flu vaccines contain a very small amount of a mercury-based preservative called thimerosal. Studies have not shown thimerosal in vaccines to be harmful, but flu vaccines that do not contain thimerosal are available. There is no live flu virus in flu shots. They cannot cause the flu. There are many flu viruses, and they are always changing. Each year a new flu vaccine is made to protect against three or four viruses that are likely to cause disease in the upcoming flu season. But even when the vaccine doesnt exactly match these viruses, it may still provide some protection Flu vaccine cannot prevent: 
 flu that is caused by a virus not covered by the vaccine, or 
 illnesses that look like flu but are not. It takes about 2 weeks for protection to develop after vaccination, and protection lasts through the flu season. 3. Some people should not get this vaccine Tell the person who is giving you the vaccine:  If you have any severe, life-threatening allergies. If you ever had a life-threatening allergic reaction after a dose of flu vaccine, or have a severe allergy to any part of this vaccine, you may be advised not to get vaccinated. Most, but not all, types of flu vaccine contain a small amount of egg protein.  If you ever had Guillain-Barré Syndrome (also called GBS). Some people with a history of GBS should not get this vaccine. This should be discussed with your doctor.  If you are not feeling well. It is usually okay to get flu vaccine when you have a mild illness, but you might be asked to come back when you feel better. 4. Risks of a vaccine reaction With any medicine, including vaccines, there is a chance of reactions. These are usually mild and go away on their own, but serious reactions are also possible. Most people who get a flu shot do not have any problems with it. Minor problems following a flu shot include:  
 soreness, redness, or swelling where the shot was given  hoarseness  sore, red or itchy eyes  cough  fever  aches  headache  itching  fatigue If these problems occur, they usually begin soon after the shot and last 1 or 2 days. More serious problems following a flu shot can include the following:  There may be a small increased risk of Guillain-Barré Syndrome (GBS) after inactivated flu vaccine. This risk has been estimated at 1 or 2 additional cases per million people vaccinated. This is much lower than the risk of severe complications from flu, which can be prevented by flu vaccine.  Young children who get the flu shot along with pneumococcal vaccine (PCV13) and/or DTaP vaccine at the same time might be slightly more likely to have a seizure caused by fever. Ask your doctor for more information. Tell your doctor if a child who is getting flu vaccine has ever had a seizure. Problems that could happen after any injected vaccine:  People sometimes faint after a medical procedure, including vaccination. Sitting or lying down for about 15 minutes can help prevent fainting, and injuries caused by a fall. Tell your doctor if you feel dizzy, or have vision changes or ringing in the ears.  Some people get severe pain in the shoulder and have difficulty moving the arm where a shot was given. This happens very rarely.  Any medication can cause a severe allergic reaction. Such reactions from a vaccine are very rare, estimated at about 1 in a million doses, and would happen within a few minutes to a few hours after the vaccination. As with any medicine, there is a very remote chance of a vaccine causing a serious injury or death. The safety of vaccines is always being monitored. For more information, visit: www.cdc.gov/vaccinesafety/ 
 
5. What if there is a serious reaction? What should I look for?  Look for anything that concerns you, such as signs of a severe allergic reaction, very high fever, or unusual behavior.  
 
Signs of a severe allergic reaction can include hives, swelling of the face and throat, difficulty breathing, a fast heartbeat, dizziness, and weakness  usually within a few minutes to a few hours after the vaccination. What should I do?  If you think it is a severe allergic reaction or other emergency that cant wait, call 9-1-1 and get the person to the nearest hospital. Otherwise, call your doctor.  Reactions should be reported to the Vaccine Adverse Event Reporting System (VAERS). Your doctor should file this report, or you can do it yourself through  the VAERS web site at www.vaers. [facility].gov, or by calling 3-888.331.4019. VAERS does not give medical advice. 6. The National Vaccine Injury Compensation Program 
 
The [facility] Vaccine Injury Compensation Program (VICP) is a federal program that was created to compensate people who may have been injured by certain vaccines. Persons who believe they may have been injured by a vaccine can learn about the program and about filing a claim by calling 6-455.229.5727 or visiting the [employer] website at www.[facility].gov/vaccinecompensation. There is a time limit to file a claim for compensation. 7. How can I learn more?  Ask your healthcare provider. He or she can give you the vaccine package insert or suggest other sources of information.  Call your local or state health department.  Contact the Centers for Disease Control and Prevention (CDC): 
- Call 3-144.621.6185 (1-800-CDC-INFO) or 
- Visit CDCs website at www.cdc.gov/flu Vaccine Information Statement Inactivated Influenza Vaccine 8/7/2015 
42 ORLIN Elena [id number] [employer] [facility] and [place] Centers for Disease Control and Prevention Office Use Only Atrial Fibrillation: Care Instructions Your Care Instructions Atrial fibrillation is an irregular and often fast heartbeat. Treating this condition is important for several reasons. It can cause blood clots, which can travel from your heart to your brain and cause a stroke.  If you have a fast heartbeat, you may feel lightheaded, dizzy, and weak. An irregular heartbeat can also increase your risk for heart failure. Atrial fibrillation is often the result of another heart condition, such as high blood pressure or coronary artery disease. Making changes to improve your heart condition will help you stay healthy and active. Follow-up care is a key part of your treatment and safety. Be sure to make and go to all appointments, and call your doctor if you are having problems. It's also a good idea to know your test results and keep a list of the medicines you take. How can you care for yourself at home? Medicines 
  · Take your medicines exactly as prescribed. Call your doctor if you think you are having a problem with your medicine. You will get more details on the specific medicines your doctor prescribes.  
  · If your doctor has given you a blood thinner to prevent a stroke, be sure you get instructions about how to take your medicine safely. Blood thinners can cause serious bleeding problems.  
  · Do not take any vitamins, over-the-counter drugs, or herbal products without talking to your doctor first.  
 Lifestyle changes 
  · Do not smoke. Smoking can increase your chance of a stroke and heart attack. If you need help quitting, talk to your doctor about stop-smoking programs and medicines. These can increase your chances of quitting for good.  
  · Eat a heart-healthy diet.  
  · Stay at a healthy weight. Lose weight if you need to.  
  · Limit alcohol to 2 drinks a day for men and 1 drink a day for women. Too much alcohol can cause health problems.  
  · Avoid colds and flu. Get a pneumococcal vaccine shot. If you have had one before, ask your doctor whether you need another dose. Get a flu shot every year. If you must be around people with colds or flu, wash your hands often. Activity 
  · If your doctor recommends it, get more exercise.  Walking is a good choice. Bit by bit, increase the amount you walk every day. Try for at least 30 minutes on most days of the week. You also may want to swim, bike, or do other activities. Your doctor may suggest that you join a cardiac rehabilitation program so that you can have help increasing your physical activity safely.  
  · Start light exercise if your doctor says it is okay. Even a small amount will help you get stronger, have more energy, and manage stress. Walking is an easy way to get exercise. Start out by walking a little more than you did in the hospital. Gradually increase the amount you walk.  
  · When you exercise, watch for signs that your heart is working too hard. You are pushing too hard if you cannot talk while you are exercising. If you become short of breath or dizzy or have chest pain, sit down and rest immediately.  
  · Check your pulse regularly. Place two fingers on the artery at the palm side of your wrist, in line with your thumb. If your heartbeat seems uneven or fast, talk to your doctor. When should you call for help? Call 911 anytime you think you may need emergency care. For example, call if: 
  · You have symptoms of a heart attack. These may include: ¨ Chest pain or pressure, or a strange feeling in the chest. 
¨ Sweating. ¨ Shortness of breath. ¨ Nausea or vomiting. ¨ Pain, pressure, or a strange feeling in the back, neck, jaw, or upper belly or in one or both shoulders or arms. ¨ Lightheadedness or sudden weakness. ¨ A fast or irregular heartbeat. After you call 911, the  may tell you to chew 1 adult-strength or 2 to 4 low-dose aspirin. Wait for an ambulance. Do not try to drive yourself.  
  · You have symptoms of a stroke. These may include: 
¨ Sudden numbness, tingling, weakness, or loss of movement in your face, arm, or leg, especially on only one side of your body. ¨ Sudden vision changes. ¨ Sudden trouble speaking. ¨ Sudden confusion or trouble understanding simple statements. ¨ Sudden problems with walking or balance. ¨ A sudden, severe headache that is different from past headaches.  
  · You passed out (lost consciousness).  
 Call your doctor now or seek immediate medical care if: 
  · You have new or increased shortness of breath.  
  · You feel dizzy or lightheaded, or you feel like you may faint.  
  · Your heart rate becomes irregular.  
  · You can feel your heart flutter in your chest or skip heartbeats. Tell your doctor if these symptoms are new or worse.  
 Watch closely for changes in your health, and be sure to contact your doctor if you have any problems. Where can you learn more? Go to http://kim-natalia.info/. Enter U020 in the search box to learn more about \"Atrial Fibrillation: Care Instructions. \" Current as of: December 6, 2017 Content Version: 11.7 © 9791-1532 [employer]. Care instructions adapted under license by [employer] (which disclaims liability or warranty for this information). If you have questions about a medical condition or this instruction, always ask your healthcare professional. [address] any warranty or liability for your use of this information.

## 2018-09-18 NOTE — PROGRESS NOTES
This note will not be viewable in [address] Parrish Bowens is a 74 y.o. female and presents with Transitions Of Care (afib) Kerri Su Subjective: 
Mrs. Saint Clair returns to our office today and transition of care subsequent to a hospitalization at [facility] from September 10 until September 12. She was hospitalized by Dr. Pavel Tineo, her electrophysiologist, for paroxysmal atrial fibrillation. The patient underwent an ablation but then suffered with tacky/bradycardia syndrome. She subsequently underwent permanent pacemaker placement. The patient was taken off of her Eliquis. She has been doing well since discharge and denies any palpitations, shortness of breath or dizziness. She has had no headaches or strokelike symptoms. The patient has hypertension and remains on losartan and Lopressor. Her blood pressures have been well controlled and she denies any dizziness, cough or lower extremity edema. She remains on hypercholesterolemia medication in the form of pravastatin. She denies muscle soreness or GI upset. She has not had any exertional chest pain or claudication. Past Medical History:  
Diagnosis Date  Allergic conjunctivitis 7/26/2017  Aortic dissection (HCC)  Arthritis   
 lower back  Asthma  Essential hypertension 8/21/2017  GERD (gastroesophageal reflux disease)  Hypercholesterolemia 8/21/2017  Hyperlipidemia  Hypertension  Intertrigo 7/26/2017  Long-term use of high-risk medication 7/26/2017  
 LVH (left ventricular hypertrophy) 7/26/2017  PAF (paroxysmal atrial fibrillation) ([facility] [address] 8/20/2018  Positional vertigo 7/26/2017  PUD (peptic ulcer disease)  Sciatica of left side 7/26/2017  Spinal stenosis, lumbar region, without neurogenic claudication 7/26/2017  
 SSS (sick sinus syndrome) ([facility] [address] 9/18/2018 Past Surgical History:  
Procedure Laterality Date  COLONOSCOPY N/A 7/12/2017 COLONOSCOPY WITH IV ANTIBIOTICS performed by Magdaleno Councilman, MD at [facility] ENDOSCOPY  COLONOSCOPY,DIAGNOSTIC  7/12/2017  HX GYN    
 hysterectomy  HX GYN    
 tubal ligation  HX OTHER SURGICAL  2015 Type A Dissection Repair  IN COLONOSCOPY FLX DX W/COLLJ SPEC WHEN PFRMD  3/19/2012  UPPER GI ENDOSCOPY,BALL DIL,30MM  7/12/2017  UPPER GI ENDOSCOPY,BIOPSY  7/12/2017 No Known Allergies Current Outpatient Prescriptions Medication Sig Dispense Refill  pravastatin (PRAVACHOL) 20 mg tablet TAKE ONE TABLET BY MOUTH ONCE DAILY 90 Tab 3  
 losartan (COZAAR) 50 mg tablet Take 1 tablet by mouth once daily 90 Tab 1  
 metoprolol tartrate (LOPRESSOR) 25 mg tablet TAKE ONE TABLET BY MOUTH TWICE DAILY 180 Tab 0  
 acetaminophen (TYLENOL ARTHRITIS PAIN) 650 mg CR tablet Take 650 mg by mouth every six (6) hours as needed for Pain.  omega-3 fatty acids-vitamin e (FISH OIL) 1,000 mg Cap Take 1 Cap by mouth two (2) times a day. Social History Social History  Marital status:  Spouse name: N/A  
 Number of children: N/A  
 Years of education: N/A Social History Main Topics  Smoking status: Never Smoker  Smokeless tobacco: Never Used  Alcohol use 0.0 oz/week  
  0 Standard drinks or equivalent per week  Drug use: No  
 Sexual activity: Not Asked Other Topics Concern  None Social History Narrative Family History Problem Relation Age of Onset  Cancer Father   
  colon cancer Health Maintenance Topic Date Due  
 DTaP/Tdap/Td series (1 - Tdap) 07/23/1964  ZOSTER VACCINE AGE 60>  05/23/2003  GLAUCOMA SCREENING Q2Y  07/23/2008  Pneumococcal 65+ Low/Medium Risk (2 of 2 - PCV13) 01/08/2017  Influenza Age 5 to Adult  08/01/2018  MEDICARE YEARLY EXAM  08/21/2019  COLONOSCOPY  07/12/2022  Bone Densitometry (Dexa) Screening  Completed Review of Systems Constitutional: negative for fevers, chills, anorexia and weight loss Eyes:   negative for visual disturbance and irritation ENT:   negative for tinnitus,sore throat,nasal congestion,ear pain,hoarseness Respiratory:  negative for cough, hemoptysis, dyspnea,wheezing CV:   negative for chest pain, palpitations, lower extremity edema GI:   negative for nausea, vomiting, diarrhea, abdominal pain,melena Endo:               negative for polyuria,polydipsia,polyphagia,heat intolerance Genitourinary: negative for frequency, dysuria and hematuria Integumentary: negative for rash and pruritus Hematologic:  negative for easy bruising and gum/nose bleeding Musculoskel: negative for myalgias, arthralgias, back pain, muscle weakness, joint pain Neurological:  negative for headaches, dizziness, vertigo, memory problems and gait Behavl/Psych: negative for feelings of anxiety, depression, mood changes ROS otherwise negative Objective: 
Visit Vitals  /70 (BP 1 Location: Left arm, BP Patient Position: Sitting)  Pulse 78  Ht 5' 5\" (1.651 m)  Wt 184 lb (83.5 kg)  SpO2 95%  BMI 30.62 kg/m2 Body mass index is 30.62 kg/(m^2). Physical Exam:  
General appearance - alert, well appearing, and in no distress Mental status - alert, oriented to person, place, and time EYE-ANA, EOMI,conjunctiva normal bilaterally, lids normal 
ENT-ENT exam normal, no neck nodes or sinus tenderness Nose - normal and patent, no erythema,  Or discharge Mouth - mucous membranes moist, pharynx normal without lesions Neck - supple, no significant adenopathy or bruit Chest - clear to auscultation, no wheezes, rales or rhonchi. Heart - normal rate, regular rhythm, normal S1, S2, no murmurs, rubs, clicks or gallops Abdomen - soft, nontender, nondistended, no masses or organomegaly Lymph- no adenopathy palpable Ext-peripheral pulses normal, no pedal edema, no clubbing or cyanosis Skin-Warm and dry. no hyperpigmentation, vitiligo, or suspicious lesions Neuro -alert, oriented, normal speech, no focal findings or movement disorder noted Assessment/Plan: 
Diagnoses and all orders for this visit: 
 
PAF (paroxysmal atrial fibrillation) ([facility] [address] SSS (sick sinus syndrome) ([facility] [address] Essential hypertension Hypercholesterolemia Encounter for immunization -     Influenza Vaccine Inactivated (IIV)(FLUAD), Subunit, Adjuvanted, IM, (44768) -     Administration fee () for Medicare insured patients Other instructions: The patient's medications are reviewed and reconciled. No change in her current medical regimen is made. The patient is doing well in regards to her recent procedures and she will continue follow-up with her electrophysiologist. 
 
Influenza vaccination is given today and she will check on the vaccination for pneumonia that she received in 2016 to find out if this is a Prevnar 13 vaccination prior to us giving her her next pneumococcal vaccine. Follow-up here as previously scheduled Follow-up Disposition: 
Return for As previously scheduled. I have reviewed with the patient details of the assessment and plan and all questions were answered. Relevent patient education was performed. The most recent lab findings were reviewed with the patient. An After Visit Summary was printed and given to the patient.  
 
Lester Potts MD

## 2018-09-18 NOTE — PROGRESS NOTES
Jose Reagan is a 74 y.o. female presenting for Transitions Of Care (afib) [place] 1. Have you been to the ER, urgent care clinic since your last visit? Hospitalized since your last visit? Yes When: 10/12/2018 Where: [facility] Reason for visit: afib 2. Have you seen or consulted any other health care providers outside of the [facility] since your last visit? Include any pap smears or colon screening. No 
 
Fall Risk Assessment, last 12 mths 8/16/2018 Able to walk? Yes Fall in past 12 months? No  
 
 
 
Abuse Screening Questionnaire 8/28/2018 Do you ever feel afraid of your partner? Florian Joseph Are you in a relationship with someone who physically or mentally threatens you? Florian Joseph Is it safe for you to go home? Y  
 
 
PHQ over the last two weeks 7/26/2017 Little interest or pleasure in doing things Not at all Feeling down, depressed, irritable, or hopeless Not at all Total Score PHQ 2 0 There are no discontinued medications.

## 2018-09-18 NOTE — MR AVS SNAPSHOT
Michael Madden [address] [id number] [phone] Patient: Flora Mary MRN: [id number] PLW:[id number]/[id number] Visit Information Date & Time Provider Department Dept. Phone Encounter #  
 9/18/2018 10:30 AM Donell Drew 26 [phone] [id number] Follow-up Instructions Return for As previously scheduled. Your Appointments 10/4/2018  2:30 PM  
ESTABLISHED PATIENT with Marianna Pickering, NP [address]  [facility] [address] [place] [address]) Appt Note: f/u PVI and new implant dos, 09/12/2018,jar  
 [address]  
[phone] [address]  
  
    
 10/4/2018  2:30 PM  
PROCEDURE with PACEMAKER, [facility] [address] [place] [address]) Appt Note: f/u PVI and new implant dos, 09/12/2018,jar  
 [address]  
[phone] [address]  
  
    
 8/20/2019  9:00 AM  
Complete Physical with MD Donell Drew  ([facility]) Appt Note: cpe-1 year follow up appt Maryanne 70 [address] [phone] [address] [facility] [address] Upcoming Health Maintenance Date Due DTaP/Tdap/Td series (1 - Tdap) 7/23/1964 ZOSTER VACCINE AGE 60> 5/23/2003 GLAUCOMA SCREENING Q2Y 7/23/2008 Pneumococcal 65+ Low/Medium Risk (2 of 2 - PCV13) 1/8/2017 Influenza Age 5 to Adult 8/1/2018 MEDICARE YEARLY EXAM 8/21/2019 COLONOSCOPY 7/12/2022 Allergies as of 9/18/2018  Review Complete On: 9/18/2018 By: Roxy Dawson MD  
 No Known Allergies Current Immunizations  Reviewed on 1/7/2016 Name Date  Influenza Vaccine (Quad) PF 1/8/2016  9:27 AM  
 Influenza Vaccine (Tri) Adjuvanted  Incomplete Pneumococcal Polysaccharide (PPSV-23) 1/8/2016  9:24 AM  
  
 Not reviewed this visit You Were Diagnosed With   
  
 Codes Comments PAF (paroxysmal atrial fibrillation) ([facility] 75.)    -  Primary ICD-10-CM: I48.0 ICD-9-CM: 427.31   
 SSS (sick sinus syndrome) ([facility])     ICD-10-CM: I49.5 ICD-9-CM: 427.81 Essential hypertension     ICD-10-CM: I10 
ICD-9-CM: 401.9 Hypercholesterolemia     ICD-10-CM: E78.00 ICD-9-CM: 272.0 Encounter for immunization     ICD-10-CM: L60 ICD-9-CM: V03.89 Vitals BP Pulse Height(growth percentile) Weight(growth percentile) SpO2 BMI  
 128/70 (BP 1 Location: Left arm, BP Patient Position: Sitting) 78 5' 5\" (1.651 m) 184 lb (83.5 kg) 95% 30.62 kg/m2 OB Status Smoking Status Hysterectomy Never Smoker BMI and BSA Data Body Mass Index Body Surface Area  
 30.62 kg/m 2 1.96 m 2 Preferred Pharmacy Pharmacy Name Phone [facility] [address] [place] [address] [phone] Your Updated Medication List  
  
   
This list is accurate as of 9/18/18 10:36 AM.  Always use your most recent med list.  
  
  
  
  
 FISH OIL 1,000 mg Cap Generic drug:  omega-3 fatty acids-vitamin e Take 1 Cap by mouth two (2) times a day. losartan 50 mg tablet Commonly known as:  COZAAR Take 1 tablet by mouth once daily  
  
 metoprolol tartrate 25 mg tablet Commonly known as:  LOPRESSOR  
TAKE ONE TABLET BY MOUTH TWICE DAILY pravastatin 20 mg tablet Commonly known as:  PRAVACHOL  
TAKE ONE TABLET BY MOUTH ONCE DAILY  
  
 TYLENOL ARTHRITIS PAIN 650 mg Tber Generic drug:  acetaminophen Take 650 mg by mouth every six (6) hours as needed for Pain. We Performed the Following ADMIN INFLUENZA VIRUS VAC [ [facility]] INFLUENZA VACCINE INACTIVATED (IIV), SUBUNIT, ADJUVANTED, IM [id number] CPT(R)] Follow-up Instructions Return for As previously scheduled. Patient Instructions Vaccine Information Statement Influenza (Flu) Vaccine (Inactivated or Recombinant): What you need to know Many Vaccine Information Statements are available in Serbian and other languages. See www.immunize.org/vis Hojas de Información Sobre Vacunas están disponibles en Español y en muchos otros idiomas. Visite www.immunize.org/vis 1. Why get vaccinated? Influenza (flu) is a contagious disease that spreads around the [place] every year, usually between October and May. Flu is caused by influenza viruses, and is spread mainly by coughing, sneezing, and close contact. Anyone can get flu. Flu strikes suddenly and can last several days. Symptoms vary by age, but can include: 
 fever/chills  sore throat  muscle aches  fatigue  cough  headache  runny or stuffy nose Flu can also lead to pneumonia and blood infections, and cause diarrhea and seizures in children. If you have a medical condition, such as heart or lung disease, flu can make it worse. Flu is more dangerous for some people. Infants and young children, people 72years of age and older, pregnant women, and people with certain health conditions or a weakened immune system are at greatest risk. Each year thousands of people in the [facility] die from flu, and many more are hospitalized. Flu vaccine can: 
 keep you from getting flu, 
 make flu less severe if you do get it, and 
 keep you from spreading flu to your family and other people. 2. Inactivated and recombinant flu vaccines A dose of flu vaccine is recommended every flu season. Children 6 months through 6years of age may need two doses during the same flu season. Everyone else needs only one dose each flu season.   
 
 
Some inactivated flu vaccines contain a very small amount of a mercury-based preservative called thimerosal. Studies have not shown thimerosal in vaccines to be harmful, but flu vaccines that do not contain thimerosal are available. There is no live flu virus in flu shots. They cannot cause the flu. There are many flu viruses, and they are always changing. Each year a new flu vaccine is made to protect against three or four viruses that are likely to cause disease in the upcoming flu season. But even when the vaccine doesnt exactly match these viruses, it may still provide some protection Flu vaccine cannot prevent: 
 flu that is caused by a virus not covered by the vaccine, or 
 illnesses that look like flu but are not. It takes about 2 weeks for protection to develop after vaccination, and protection lasts through the flu season. 3. Some people should not get this vaccine Tell the person who is giving you the vaccine:  If you have any severe, life-threatening allergies. If you ever had a life-threatening allergic reaction after a dose of flu vaccine, or have a severe allergy to any part of this vaccine, you may be advised not to get vaccinated. Most, but not all, types of flu vaccine contain a small amount of egg protein.  If you ever had Guillain-Barré Syndrome (also called GBS). Some people with a history of GBS should not get this vaccine. This should be discussed with your doctor.  If you are not feeling well. It is usually okay to get flu vaccine when you have a mild illness, but you might be asked to come back when you feel better. 4. Risks of a vaccine reaction With any medicine, including vaccines, there is a chance of reactions. These are usually mild and go away on their own, but serious reactions are also possible. Most people who get a flu shot do not have any problems with it. Minor problems following a flu shot include:  
 soreness, redness, or swelling where the shot was given  hoarseness  sore, red or itchy eyes  cough  fever  aches  headache  itching  fatigue If these problems occur, they usually begin soon after the shot and last 1 or 2 days. More serious problems following a flu shot can include the following:  There may be a small increased risk of Guillain-Barré Syndrome (GBS) after inactivated flu vaccine. This risk has been estimated at 1 or 2 additional cases per million people vaccinated. This is much lower than the risk of severe complications from flu, which can be prevented by flu vaccine.  Young children who get the flu shot along with pneumococcal vaccine (PCV13) and/or DTaP vaccine at the same time might be slightly more likely to have a seizure caused by fever. Ask your doctor for more information. Tell your doctor if a child who is getting flu vaccine has ever had a seizure. Problems that could happen after any injected vaccine:  People sometimes faint after a medical procedure, including vaccination. Sitting or lying down for about 15 minutes can help prevent fainting, and injuries caused by a fall. Tell your doctor if you feel dizzy, or have vision changes or ringing in the ears.  Some people get severe pain in the shoulder and have difficulty moving the arm where a shot was given. This happens very rarely.  Any medication can cause a severe allergic reaction. Such reactions from a vaccine are very rare, estimated at about 1 in a million doses, and would happen within a few minutes to a few hours after the vaccination. As with any medicine, there is a very remote chance of a vaccine causing a serious injury or death. The safety of vaccines is always being monitored. For more information, visit: www.cdc.gov/vaccinesafety/ 
 
5. What if there is a serious reaction? What should I look for?  Look for anything that concerns you, such as signs of a severe allergic reaction, very high fever, or unusual behavior.  
 
Signs of a severe allergic reaction can include hives, swelling of the face and throat, difficulty breathing, a fast heartbeat, dizziness, and weakness  usually within a few minutes to a few hours after the vaccination. What should I do?  If you think it is a severe allergic reaction or other emergency that cant wait, call 9-1-1 and get the person to the nearest hospital. Otherwise, call your doctor.  Reactions should be reported to the Vaccine Adverse Event Reporting System (VAERS). Your doctor should file this report, or you can do it yourself through  the VAERS web site at www.vaers. [facility].gov, or by calling 8-218.399.6249. VAERS does not give medical advice. 6. The National Vaccine Injury Compensation Program 
 
The [facility] Vaccine Injury Compensation Program (VICP) is a federal program that was created to compensate people who may have been injured by certain vaccines. Persons who believe they may have been injured by a vaccine can learn about the program and about filing a claim by calling 4-299.164.8643 or visiting the [address] website at www.[facility].gov/vaccinecompensation. There is a time limit to file a claim for compensation. 7. How can I learn more?  Ask your healthcare provider. He or she can give you the vaccine package insert or suggest other sources of information.  Call your local or state health department.  Contact the Centers for Disease Control and Prevention (CDC): 
- Call 8-733.983.2052 (1-800-CDC-INFO) or 
- Visit CDCs website at www.cdc.gov/flu Vaccine Information Statement Inactivated Influenza Vaccine 8/7/2015 
42 ORLIN Betancur Ip [id number]-[id number] [facility] and [employer] Centers for Disease Control and Prevention Office Use Only Atrial Fibrillation: Care Instructions Your Care Instructions Atrial fibrillation is an irregular and often fast heartbeat. Treating this condition is important for several reasons. It can cause blood clots, which can travel from your heart to your brain and cause a stroke.  If you have a fast heartbeat, you may feel lightheaded, dizzy, and weak. An irregular heartbeat can also increase your risk for heart failure. Atrial fibrillation is often the result of another heart condition, such as high blood pressure or coronary artery disease. Making changes to improve your heart condition will help you stay healthy and active. Follow-up care is a key part of your treatment and safety. Be sure to make and go to all appointments, and call your doctor if you are having problems. It's also a good idea to know your test results and keep a list of the medicines you take. How can you care for yourself at home? Medicines 
  · Take your medicines exactly as prescribed. Call your doctor if you think you are having a problem with your medicine. You will get more details on the specific medicines your doctor prescribes.  
  · If your doctor has given you a blood thinner to prevent a stroke, be sure you get instructions about how to take your medicine safely. Blood thinners can cause serious bleeding problems.  
  · Do not take any vitamins, over-the-counter drugs, or herbal products without talking to your doctor first.  
 Lifestyle changes 
  · Do not smoke. Smoking can increase your chance of a stroke and heart attack. If you need help quitting, talk to your doctor about stop-smoking programs and medicines. These can increase your chances of quitting for good.  
  · Eat a heart-healthy diet.  
  · Stay at a healthy weight. Lose weight if you need to.  
  · Limit alcohol to 2 drinks a day for men and 1 drink a day for women. Too much alcohol can cause health problems.  
  · Avoid colds and flu. Get a pneumococcal vaccine shot. If you have had one before, ask your doctor whether you need another dose. Get a flu shot every year. If you must be around people with colds or flu, wash your hands often. Activity 
  · If your doctor recommends it, get more exercise.  Walking is a good choice. Bit by bit, increase the amount you walk every day. Try for at least 30 minutes on most days of the week. You also may want to swim, bike, or do other activities. Your doctor may suggest that you join a cardiac rehabilitation program so that you can have help increasing your physical activity safely.  
  · Start light exercise if your doctor says it is okay. Even a small amount will help you get stronger, have more energy, and manage stress. Walking is an easy way to get exercise. Start out by walking a little more than you did in the hospital. Gradually increase the amount you walk.  
  · When you exercise, watch for signs that your heart is working too hard. You are pushing too hard if you cannot talk while you are exercising. If you become short of breath or dizzy or have chest pain, sit down and rest immediately.  
  · Check your pulse regularly. Place two fingers on the artery at the palm side of your wrist, in line with your thumb. If your heartbeat seems uneven or fast, talk to your doctor. When should you call for help? Call 911 anytime you think you may need emergency care. For example, call if: 
  · You have symptoms of a heart attack. These may include: ¨ Chest pain or pressure, or a strange feeling in the chest. 
¨ Sweating. ¨ Shortness of breath. ¨ Nausea or vomiting. ¨ Pain, pressure, or a strange feeling in the back, neck, jaw, or upper belly or in one or both shoulders or arms. ¨ Lightheadedness or sudden weakness. ¨ A fast or irregular heartbeat. After you call 911, the  may tell you to chew 1 adult-strength or 2 to 4 low-dose aspirin. Wait for an ambulance. Do not try to drive yourself.  
  · You have symptoms of a stroke. These may include: 
¨ Sudden numbness, tingling, weakness, or loss of movement in your face, arm, or leg, especially on only one side of your body. ¨ Sudden vision changes. ¨ Sudden trouble speaking. ¨ Sudden confusion or trouble understanding simple statements. ¨ Sudden problems with walking or balance. ¨ A sudden, severe headache that is different from past headaches.  
  · You passed out (lost consciousness).  
 Call your doctor now or seek immediate medical care if: 
  · You have new or increased shortness of breath.  
  · You feel dizzy or lightheaded, or you feel like you may faint.  
  · Your heart rate becomes irregular.  
  · You can feel your heart flutter in your chest or skip heartbeats. Tell your doctor if these symptoms are new or worse.  
 Watch closely for changes in your health, and be sure to contact your doctor if you have any problems. Where can you learn more? Go to http://kim-natalia.info/. Enter U020 in the search box to learn more about \"Atrial Fibrillation: Care Instructions. \" Current as of: December 6, 2017 Content Version: 11.7 © 7007-8986 [employer]. Care instructions adapted under license by [employer] (which disclaims liability or warranty for this information). If you have questions about a medical condition or this instruction, always ask your healthcare professional. [place] [address] any warranty or liability for your use of this information. Introducing [facility]! [employer] introduces [employer] patient portal. Now you can access parts of your medical record, email your doctor's office, and request medication refills online. 1. In your internet browser, go to [email or web address] 2. Click on the First Time User? Click Here link in the Sign In box. You will see the New Member Sign Up page. 3. Enter your Play[employer]Focus Access Code exactly as it appears below. You will not need to use this code after youve completed the sign-up process. If you do not sign up before the expiration date, you must request a new code.  
 
· Play[employer] Access Code: [email or web address] 
 Expires: 11/27/2018  2:12 PM 
 
4. Enter the last four digits of your Social Security Number (xxxx) and Date of Birth (mm/dd/yyyy) as indicated and click Submit. You will be taken to the next sign-up page. 5. Create a Work[employer] ID. This will be your [employer] login ID and cannot be changed, so think of one that is secure and easy to remember. 6. Create a [employer] password. You can change your password at any time. 7. Enter your Password Reset Question and Answer. This can be used at a later time if you forget your password. 8. Enter your e-mail address. You will receive e-mail notification when new information is available in [address] 9. Click Sign Up. You can now view and download portions of your medical record. 10. Click the Download Summary menu link to download a portable copy of your medical information. If you have questions, please visit the Frequently Asked Questions section of the [employer] website. Remember, [employer] is NOT to be used for urgent needs. For medical emergencies, dial 911. Now available from your iPhone and Android! Please provide this summary of care documentation to your next provider. Your primary care clinician is listed as DEBORAH Banda 21. If you have any questions after today's visit, please call [phone].

## 2018-10-04 ENCOUNTER — CLINICAL SUPPORT (OUTPATIENT)
Dept: CARDIOLOGY CLINIC | Age: 73
End: 2018-10-04

## 2018-10-04 ENCOUNTER — OFFICE VISIT (OUTPATIENT)
Dept: CARDIOLOGY CLINIC | Age: 73
End: 2018-10-04

## 2018-10-04 VITALS
BODY MASS INDEX: 30.42 KG/M2 | OXYGEN SATURATION: 97 % | HEART RATE: 70 BPM | HEIGHT: 65 IN | RESPIRATION RATE: 18 BRPM | WEIGHT: 182.6 LBS | DIASTOLIC BLOOD PRESSURE: 70 MMHG | SYSTOLIC BLOOD PRESSURE: 122 MMHG

## 2018-10-04 DIAGNOSIS — I49.5 TACHY-BRADY SYNDROME (HCC): ICD-10-CM

## 2018-10-04 DIAGNOSIS — Z98.890 S/P ABLATION OF ATRIAL FIBRILLATION: ICD-10-CM

## 2018-10-04 DIAGNOSIS — I49.5 SSS (SICK SINUS SYNDROME) (HCC): ICD-10-CM

## 2018-10-04 DIAGNOSIS — Z95.0 PACEMAKER: Primary | ICD-10-CM

## 2018-10-04 DIAGNOSIS — I10 ESSENTIAL HYPERTENSION: Chronic | ICD-10-CM

## 2018-10-04 DIAGNOSIS — Z95.0 PACEMAKER: ICD-10-CM

## 2018-10-04 DIAGNOSIS — I48.0 PAF (PAROXYSMAL ATRIAL FIBRILLATION) (HCC): Primary | Chronic | ICD-10-CM

## 2018-10-04 DIAGNOSIS — I48.0 PAF (PAROXYSMAL ATRIAL FIBRILLATION) (HCC): Chronic | ICD-10-CM

## 2018-10-04 DIAGNOSIS — Z86.79 S/P ABLATION OF ATRIAL FIBRILLATION: ICD-10-CM

## 2018-10-04 NOTE — MR AVS SNAPSHOT
[address] [facility] 
[phone] Patient: Alix Palm MRN: [id number]:[id number]/[id number] Visit Information Date & Time Provider Department Dept. Phone Encounter #  
 10/4/2018  2:30 PM Jose Amador, [address] [facility] [phone] Your Appointments 1/22/2019  8:45 AM  
PROCEDURE with PACEMAKER, [facility] Appt Note: BSC DCPM 3 month f/u, see Dr. Myranda Kohli; Bsc dcpm, 3 mo post op, on remote, see [address] [place] [address]  
[phone] [address] [place] [address]  
  
    
 1/22/2019  9:00 AM  
ESTABLISHED PATIENT with MD Octavio [facility]) Appt Note: Bsc dcpm, 3 mo post op, on remote, see [address] [facility]  
[phone] [address] [place] [address]  
  
    
 8/20/2019  9:00 AM  
Complete Physical with Alecia Ramos MD  
Donell Shyla 26 ([facility]) Appt Note: cpe-1 year follow up appt Kalda 70 [address] [address] [address] [facility] [phone] Upcoming Health Maintenance Date Due DTaP/Tdap/Td series (1 - Tdap) 7/23/1964 Shingrix Vaccine Age 50> (1 of 2) 7/23/1993 GLAUCOMA SCREENING Q2Y 7/23/2008 Pneumococcal 65+ Low/Medium Risk (2 of 2 - PCV13) 1/8/2017 MEDICARE YEARLY EXAM 8/21/2019 COLONOSCOPY 7/12/2022 Allergies as of 10/4/2018  Review Complete On: 10/4/2018 By: Jose Amador NP No Known Allergies Current Immunizations  Reviewed on 1/7/2016 Name Date Influenza Vaccine (Quad) PF 1/8/2016  9:27 AM  
 Influenza Vaccine (Tri) Adjuvanted 9/18/2018  Pneumococcal Polysaccharide (PPSV-23) 1/8/2016  9:24 AM  
  
 Not reviewed this visit You Were Diagnosed With   
  
 Codes Comments PAF (paroxysmal atrial fibrillation) [facility]    -  Primary ICD-10-CM: I48.0 ICD-9-CM: 427.31 Vitals BP Pulse Resp Height(growth percentile) Weight(growth percentile) SpO2  
 122/70 (BP 1 Location: Right arm, BP Patient Position: Sitting) 70 18 5' 5\" (1.651 m) 182 lb 9.6 oz (82.8 kg) 97% BMI OB Status Smoking Status 30.39 kg/m2 Hysterectomy Never Smoker Vitals History BMI and BSA Data Body Mass Index Body Surface Area  
 30.39 kg/m 2 1.95 m 2 Preferred Pharmacy Pharmacy Name Phone [facility] [address] [place] [address] [phone] Your Updated Medication List  
  
   
This list is accurate as of 10/4/18  3:34 PM.  Always use your most recent med list.  
  
  
  
  
 FISH OIL 1,000 mg Cap Generic drug:  omega-3 fatty acids-vitamin e Take 1 Cap by mouth two (2) times a day. losartan 50 mg tablet Commonly known as:  COZAAR Take 1 tablet by mouth once daily  
  
 metoprolol tartrate 25 mg tablet Commonly known as:  LOPRESSOR  
TAKE ONE TABLET BY MOUTH TWICE DAILY pravastatin 20 mg tablet Commonly known as:  PRAVACHOL  
TAKE ONE TABLET BY MOUTH ONCE DAILY  
  
 TYLENOL ARTHRITIS PAIN 650 mg Tber Generic drug:  acetaminophen Take 650 mg by mouth every six (6) hours as needed for Pain. We Performed the Following AMB POC EKG ROUTINE W/ 12 LEADS, INTER & REP [32227 CPT(R)] Introducing [facility]! Deni Lora introduces [employer] patient portal. Now you can access parts of your medical record, email your doctor's office, and request medication refills online. 1. In your internet browser, go to [email or web address] 2. Click on the First Time User? Click Here link in the Sign In box. You will see the New Member Sign Up page. 3. Enter your Page Mage Access Code exactly as it appears below. You will not need to use this code after youve completed the sign-up process. If you do not sign up before the expiration date, you must request a new code. · Page Mage Access Code: [email or web address] Expires: 11/27/2018  2:12 PM 
 
4. Enter the last four digits of your Social Security Number (xxxx) and Date of Birth (mm/dd/yyyy) as indicated and click Submit. You will be taken to the next sign-up page. 5. Create a Page Mage ID. This will be your Page Mage login ID and cannot be changed, so think of one that is secure and easy to remember. 6. Create a Page Mage password. You can change your password at any time. 7. Enter your Password Reset Question and Answer. This can be used at a later time if you forget your password. 8. Enter your e-mail address. You will receive e-mail notification when new information is available in [address] 9. Click Sign Up. You can now view and download portions of your medical record. 10. Click the Download Summary menu link to download a portable copy of your medical information. If you have questions, please visit the Frequently Asked Questions section of the Page [employer] website. Remember, Page Mage is NOT to be used for urgent needs. For medical emergencies, dial 911. Now available from your iPhone and Android! Please provide this summary of care documentation to your next provider. Your primary care clinician is listed as DEBORAH Jeffrey. If you have any questions after today's visit, please call [phone].

## 2018-10-04 NOTE — PROGRESS NOTES
Subjective: Alexandrea Langford is a 74 y.o. female is here for follow up s/p PPM for SSS and PVI for AF. She is doing well and denies cardiac complaints   The patient denies chest pain/ shortness of breath, orthopnea, PND, LE edema, palpitations, syncope, presyncope or fatigue.        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    A-fib ([address] 09/10/2018    PAF (paroxysmal atrial fibrillation) ([address] 08/20/2018    Essential hypertension 08/21/2017    Hypercholesterolemia 08/21/2017    Intertrigo 07/26/2017    Positional vertigo 07/26/2017    Spinal stenosis, lumbar region, without neurogenic claudication 07/26/2017    Long-term use of high-risk medication 07/26/2017    LVH (left ventricular hypertrophy) 07/26/2017    Sciatica of left side 07/26/2017    Allergic conjunctivitis 07/26/2017    S/P cardiac cath 05/09/2017    Heart palpitations 03/08/2016    Swelling of both ankles 01/20/2016    S/P ascending aortic aneurysm repair 12/28/2015      Arielle Sapp MD  Past Medical History:   Diagnosis Date    Allergic conjunctivitis 7/26/2017    Aortic dissection (HCC)     Arthritis     lower back    Asthma     Essential hypertension 8/21/2017    GERD (gastroesophageal reflux disease)     Hypercholesterolemia 8/21/2017    Hyperlipidemia     Hypertension     Intertrigo 7/26/2017    Long-term use of high-risk medication 7/26/2017    LVH (left ventricular hypertrophy) 7/26/2017    PAF (paroxysmal atrial fibrillation) (HCC) 8/20/2018    Positional vertigo 7/26/2017    PUD (peptic ulcer disease)     Sciatica of left side 7/26/2017    Spinal stenosis, lumbar region, without neurogenic claudication 7/26/2017    SSS (sick sinus syndrome) ([address] 9/18/2018      Past Surgical History:   Procedure Laterality Date    COLONOSCOPY N/A 7/12/2017    COLONOSCOPY WITH IV ANTIBIOTICS performed by Beata Henderson MD [facility] ENDOSCOPY    COLONOSCOPY,DIAGNOSTIC  7/12/2017         HX GYN      hysterectomy    HX GYN      tubal ligation    HX OTHER SURGICAL  2015    Type A Dissection Repair    UT COLONOSCOPY FLX DX W/COLLJ SPEC WHEN PFRMD  3/19/2012         UPPER GI ENDOSCOPY,BALL DIL,30MM  7/12/2017         UPPER GI ENDOSCOPY,BIOPSY  7/12/2017          No Known Allergies   Family History   Problem Relation Age of Onset    Cancer Father      colon cancer    negative for cardiac disease  Social History     Social History    Marital status:      Spouse name: N/A    Number of children: N/A    Years of education: N/A     Social History Main Topics    Smoking status: Never Smoker    Smokeless tobacco: Never Used    Alcohol use 0.0 oz/week     0 Standard drinks or equivalent per week    Drug use: No    Sexual activity: Not Asked     Other Topics Concern    None     Social History Narrative     Current Outpatient Prescriptions   Medication Sig    pravastatin (PRAVACHOL) 20 mg tablet TAKE ONE TABLET BY MOUTH ONCE DAILY    losartan (COZAAR) 50 mg tablet Take 1 tablet by mouth once daily    metoprolol tartrate (LOPRESSOR) 25 mg tablet TAKE ONE TABLET BY MOUTH TWICE DAILY    acetaminophen (TYLENOL ARTHRITIS PAIN) 650 mg CR tablet Take 650 mg by mouth every six (6) hours as needed for Pain.  omega-3 fatty acids-vitamin e (FISH OIL) 1,000 mg Cap Take 1 Cap by mouth two (2) times a day. No current facility-administered medications for this visit. Vitals:    10/04/18 1505   BP: 122/70   Pulse: 70   Resp: 18   SpO2: 97%   Weight: 182 lb 9.6 oz (82.8 kg)   Height: 5' 5\" (1.651 m)       I have reviewed the nurses notes, vitals, problem list, allergy list, medical history, family, social history and medications. Review of Symptoms:    General: Pt denies excessive weight gain or loss. Pt is able to conduct ADL's  HEENT: Denies blurred vision, headaches, epistaxis and difficulty swallowing.   Respiratory: Denies shortness of breath, MIMS, wheezing or stridor. Cardiovascular: Denies precordial pain, palpitations, edema or PND  Gastrointestinal: Denies poor appetite, indigestion, abdominal pain or blood in stool  Urinary: Denies dysuria, pyuria  Musculoskeletal: Denies pain or swelling from muscles or joints  Neurologic: Denies tremor, paresthesias, or sensory motor disturbance  Skin: Denies rash, itching or texture change. Psych: Denies depression      Physical Exam:      General: Well developed, in no acute distress. HEENT: Eyes - PERRL, no jvd  Heart:  Normal S1/S2 negative S3 or S4. Regular, no murmur, gallop or rub.   Respiratory: Clear bilaterally x 4, no wheezing or rales  Abdomen:   Soft, non-tender, bowel sounds are active.   Extremities:  No edema, normal cap refill, no cyanosis. Musculoskeletal: No clubbing  Neuro: A&Ox3, speech clear, gait stable. Skin: Skin color is normal. No rashes or lesions.  Non diaphoretic  Vascular: 2+ pulses symmetric in all extremities    Cardiographics    Ekg: atrial pacing    Results for orders placed or performed during the hospital encounter of 09/10/18   EKG, 12 LEAD, INITIAL   Result Value Ref Range    Ventricular Rate 66 BPM    Atrial Rate 66 BPM    P-R Interval 204 ms    QRS Duration 92 ms    Q-T Interval 448 ms    QTC Calculation (Bezet) 469 ms    Calculated P Axis 34 degrees    Calculated R Axis -17 degrees    Calculated T Axis 22 degrees    Diagnosis       Sinus rhythm with premature supraventricular complexes  Voltage criteria for left ventricular hypertrophy  Non-specific ST & T wave changes  Confirmed by Wendy Kruger ([id number]) on 9/11/2018 10:08:01 PM     Results for orders placed or performed in visit on 08/16/18   CARDIAC HOLTER MONITOR, 24 HOURS    Narrative    ECG Monitor/24 hours, Complete    Reason for Holter Monitor   PALPITATIONS    Heartbeat    Slowest 37  Average 72  Fastest  145      Results:   Underlying Rhythm: Normal sinus rhythm      Atrial Arrhythmias: premature atrial contractions; occasional, paroxysmal atrial fibrillation and severe bradycardia            AV Conduction: normal    Ventricular Arrhythmias: premature ventricular contractions; occasional     ST Segment Analysis:normal     Symptom Correlation:  Sob correlates with PAF    Comment:   Sinus rhythm with symptomatic PAF with rvr and profound bradycardia to 37 bpm. C/w tachy-bridgett syndrome. Clinical correlation advised. Ned Goldstein MD, [facility]              Lab Results   Component Value Date/Time    WBC 5.5 09/11/2018 03:25 AM    HGB (POC) 13.2 08/20/2018 09:26 AM    HGB 12.4 09/11/2018 03:25 AM    HCT (POC) 40.3 08/20/2018 09:26 AM    HCT 38.8 09/11/2018 03:25 AM    PLATELET 624 78/76/0271 03:25 AM    MCV 91.1 09/11/2018 03:25 AM      Lab Results   Component Value Date/Time    Sodium 143 09/12/2018 04:28 AM    Potassium 3.6 09/12/2018 04:28 AM    Chloride 111 (H) 09/12/2018 04:28 AM    CO2 25 09/12/2018 04:28 AM    Anion gap 7 09/12/2018 04:28 AM    Glucose 92 09/12/2018 04:28 AM    BUN 26 (H) 09/12/2018 04:28 AM    Creatinine 0.70 09/12/2018 04:28 AM    BUN/Creatinine ratio 37 (H) 09/12/2018 04:28 AM    GFR est AA >60 09/12/2018 04:28 AM    GFR est non-AA >60 09/12/2018 04:28 AM    Calcium 9.3 09/12/2018 04:28 AM    Bilirubin, total 0.2 09/04/2018 11:46 AM    AST (SGOT) 14 09/04/2018 11:46 AM    Alk. phosphatase 67 09/04/2018 11:46 AM    Protein, total 6.0 09/04/2018 11:46 AM    Albumin 4.0 09/04/2018 11:46 AM    Globulin 3.4 05/22/2018 10:58 AM    A-G Ratio 2.0 09/04/2018 11:46 AM    ALT (SGPT) 12 09/04/2018 11:46 AM         Assessment:     Assessment:        ICD-10-CM ICD-9-CM    1. PAF (paroxysmal atrial fibrillation) ([facility]) I48.0 427.31 AMB POC EKG ROUTINE W/ 12 LEADS, INTER & REP   2. Essential hypertension I10 401.9    3. SSS (sick sinus syndrome) (HCC) I49.5 427.81    4. Pacemaker Z95.0 V45.01    5.  S/P ablation of atrial fibrillation Z98.890 V45.89     Z86.79       Orders Placed This Encounter    AMB POC EKG ROUTINE W/ 12 LEADS, INTER & REP     Order Specific Question:   Reason for Exam:     Answer:   routine        Plan:   Ms. Kendrick Snow is here for follow up s/p PPM for SSS as well as PVI for AF. She is doing well and denies cardiac complaints. EKG shows atrial pacing and her device interrogation demonstrates normal functioning with 60% AP for SSS, 4% RVP. She's maintained NSR since her procedure. Left subclavian pacemaker incision has healed well, no erythema, drainage or swelling. She denies fever. She has not restarted Wurl. Will restart Eliquis 5mg BID. Continue current medical therapy and follow up 3 months from device implant. Continue medical management for AF, SSS. Thank you for allowing me to participate in Thang Moreno 's care.     Beata Maurice NP

## 2018-10-04 NOTE — PROGRESS NOTES
1. Have you been to the ER, urgent care clinic since your last visit? Hospitalized since your last visit? No    2. Have you seen or consulted any other health care providers outside of the [facility] since your last visit? Include any pap smears or colon screening. No    Chief Complaint   Patient presents with    Irregular Heart Beat     S/P PM and ablation. Denied cardiac symptoms.

## 2018-10-16 ENCOUNTER — PATIENT OUTREACH (OUTPATIENT)
Dept: INTERNAL MEDICINE CLINIC | Age: 73
End: 2018-10-16

## 2018-10-16 NOTE — PROGRESS NOTES
Patient has graduated from the Transitions of Care Coordination  program on 10/16/18 Fab Cain Patient's symptoms are stable at this time. Patient/family has the ability to self-manage. Care management goals have been completed at this time. No further nurse navigator follow up scheduled. Goals Addressed Most Recent  COMPLETED: Understands red flags post discharge. On track (10/16/2018) 10/16/18-Patient had f/u with PCP on 9/18/18 and with cardiologist on 10/4/18 without cardiac complaints. NN will be available to patient as needed. / vs 
 
9/13/18-NN spoke to patient who was discharged from [facility] y s/p ablation and pacemaker placement for a.fib and sick sinus syndrome. Discussed signs and symptoms to be alert to following pacemaker placement. Discussed with her that some swelling and bruising is normal, however if she notices significant increase or drainage from pacemaker site that she should contact MD.  In addition, told her to report any fever, or purulent drainage right away as this might indicate infection. Reviewed with her importance of not lifting arm over her head on side of pacemaker and no driving for 14 days. Patient voiced understanding. Patient has f/u appointment with PCP on 9/18/18. NN will check back with patient in 2 weeks to see how she is progressing/ vs 
  
  
 
 
Pt has nurse navigator's contact information for any further questions, concerns, or needs. Patients upcoming visits:  Future Appointments Date Time Provider Luis Angel Borden 1/22/2019 8:45 AM PACEMAKER, [facility] GISEL SCHED  
1/22/2019 9:00 AM Eric Mccartney MD [facility] GISEL SCHED  
8/20/2019 9:00 AM Christo Thomas MD [address] [place] [address],[facility], [id number]

## 2018-11-06 RX ORDER — METOPROLOL TARTRATE 25 MG/1
TABLET, FILM COATED ORAL
Qty: 180 TAB | Refills: 1 | Status: SHIPPED | OUTPATIENT
Start: 2018-11-06 | End: 2019-01-22 | Stop reason: SDUPTHER

## 2019-01-08 RX ORDER — LOSARTAN POTASSIUM 50 MG/1
TABLET ORAL
Qty: 90 TAB | Refills: 1 | Status: SHIPPED | OUTPATIENT
Start: 2019-01-08 | End: 2019-07-05 | Stop reason: SDUPTHER

## 2019-01-22 ENCOUNTER — OFFICE VISIT (OUTPATIENT)
Dept: CARDIOLOGY CLINIC | Age: 74
End: 2019-01-22

## 2019-01-22 ENCOUNTER — CLINICAL SUPPORT (OUTPATIENT)
Dept: CARDIOLOGY CLINIC | Age: 74
End: 2019-01-22

## 2019-01-22 VITALS
DIASTOLIC BLOOD PRESSURE: 84 MMHG | HEART RATE: 70 BPM | RESPIRATION RATE: 18 BRPM | SYSTOLIC BLOOD PRESSURE: 136 MMHG | WEIGHT: 175.2 LBS | OXYGEN SATURATION: 97 % | BODY MASS INDEX: 29.19 KG/M2 | HEIGHT: 65 IN

## 2019-01-22 DIAGNOSIS — I49.9 IRREGULAR HEARTBEAT: Primary | ICD-10-CM

## 2019-01-22 DIAGNOSIS — I10 ESSENTIAL HYPERTENSION: ICD-10-CM

## 2019-01-22 DIAGNOSIS — Z95.0 CARDIAC PACEMAKER IN SITU: Primary | ICD-10-CM

## 2019-01-22 DIAGNOSIS — I48.0 PAF (PAROXYSMAL ATRIAL FIBRILLATION) (HCC): Chronic | ICD-10-CM

## 2019-01-22 DIAGNOSIS — Z86.79 S/P ABLATION OF ATRIAL FIBRILLATION: ICD-10-CM

## 2019-01-22 DIAGNOSIS — I48.0 PAF (PAROXYSMAL ATRIAL FIBRILLATION) (HCC): ICD-10-CM

## 2019-01-22 DIAGNOSIS — Z95.0 PACEMAKER: ICD-10-CM

## 2019-01-22 DIAGNOSIS — Z98.890 S/P ABLATION OF ATRIAL FIBRILLATION: ICD-10-CM

## 2019-01-22 DIAGNOSIS — I49.5 SSS (SICK SINUS SYNDROME) (HCC): ICD-10-CM

## 2019-01-22 NOTE — PROGRESS NOTES
Subjective: Arlene Mendoza is a 74 y.o. female is here for follow up of SSS and AF s/p AF ablation and PPM implantaiton 9/2018. She is doing well. The patient denies chest pain/ shortness of breath, orthopnea, PND, LE edema, palpitations, syncope, presyncope or fatigue.        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     Priority: 1 - One    PAF (paroxysmal atrial fibrillation) ([address] 08/20/2018     Priority: 1 - One    Essential hypertension 08/21/2017     Priority: 1 - One    Hypercholesterolemia 08/21/2017     Priority: 1 - One    A-fib ([address] 09/10/2018    Intertrigo 07/26/2017    Positional vertigo 07/26/2017    Spinal stenosis, lumbar region, without neurogenic claudication 07/26/2017    Long-term use of high-risk medication 07/26/2017    LVH (left ventricular hypertrophy) 07/26/2017    Sciatica of left side 07/26/2017    Allergic conjunctivitis 07/26/2017    S/P cardiac cath 05/09/2017    Heart palpitations 03/08/2016    Swelling of both ankles 01/20/2016    S/P ascending aortic aneurysm repair 12/28/2015      Windell Babinski, MD  Past Medical History:   Diagnosis Date    Allergic conjunctivitis 7/26/2017    Aortic dissection (HCC)     Arthritis     lower back    Asthma     Essential hypertension 8/21/2017    GERD (gastroesophageal reflux disease)     Hypercholesterolemia 8/21/2017    Hyperlipidemia     Hypertension     Intertrigo 7/26/2017    Long-term use of high-risk medication 7/26/2017    LVH (left ventricular hypertrophy) 7/26/2017    PAF (paroxysmal atrial fibrillation) (HCC) 8/20/2018    Positional vertigo 7/26/2017    PUD (peptic ulcer disease)     Sciatica of left side 7/26/2017    Spinal stenosis, lumbar region, without neurogenic claudication 7/26/2017    SSS (sick [facility] syndrome) ([facility] 9/18/2018      Past Surgical History:   Procedure Laterality Date    COLONOSCOPY N/A 7/12/2017    COLONOSCOPY WITH IV ANTIBIOTICS performed by Ned Mcgee MD at [facility]  7/12/2017         HX GYN      hysterectomy    HX GYN      tubal ligation    HX OTHER SURGICAL  2015    Type A Dissection Repair    MT COLONOSCOPY FLX DX W/COLLJ SPEC WHEN PFRMD  3/19/2012         UPPER GI ENDOSCOPY,BALL DIL,30MM  7/12/2017         UPPER GI ENDOSCOPY,BIOPSY  7/12/2017          No Known Allergies   Family History   Problem Relation Age of Onset    Cancer Father         colon cancer    negative for cardiac disease  Social History     Socioeconomic History    Marital status:      Spouse name: Not on file    Number of children: Not on file    Years of education: Not on file    Highest education level: Not on file   Tobacco Use    Smoking status: Never Smoker    Smokeless tobacco: Never Used   Substance and Sexual Activity    Alcohol use: Yes     Alcohol/week: 0.0 oz     Comment: rare    Drug use: No     Current Outpatient Medications   Medication Sig    losartan (COZAAR) 50 mg tablet take 1 tablet by mouth once daily    ELIQUIS 5 mg tablet take 1 tablet by mouth twice a day    pravastatin (PRAVACHOL) 20 mg tablet TAKE ONE TABLET BY MOUTH ONCE DAILY    metoprolol tartrate (LOPRESSOR) 25 mg tablet TAKE ONE TABLET BY MOUTH TWICE DAILY    acetaminophen (TYLENOL ARTHRITIS PAIN) 650 mg CR tablet Take 650 mg by mouth every six (6) hours as needed for Pain.  omega-3 fatty acids-vitamin e (FISH OIL) 1,000 mg Cap Take 1 Cap by mouth two (2) times a day. No current facility-administered medications for this visit. Vitals:    01/22/19 0846   BP: 136/84   Pulse: 70   Resp: 18   SpO2: 97%   Weight: 175 lb 3.2 oz (79.5 kg)   Height: 5' 5\" (1.651 m)       I have reviewed the nurses notes, vitals, problem list, allergy list, medical history, family, social history and medications. Review of Symptoms:    General: Pt denies excessive weight gain or loss.  Pt is able to conduct ADL's  HEENT: Denies blurred vision, headaches, epistaxis and difficulty swallowing. Respiratory: Denies shortness of breath, MIMS, wheezing or stridor. Cardiovascular: Denies precordial pain, palpitations, edema or PND  Gastrointestinal: Denies poor appetite, indigestion, abdominal pain or blood in stool  Urinary: Denies dysuria, pyuria  Musculoskeletal: Denies pain or swelling from muscles or joints  Neurologic: Denies tremor, paresthesias, or sensory motor disturbance  Skin: Denies rash, itching or texture change. Psych: Denies depression      Physical Exam:      General: Well developed, in no acute distress. HEENT: Eyes - PERRL, no jvd  Heart:  Normal S1/S2 negative S3 or S4. Regular, no murmur, gallop or rub.   Respiratory: Clear bilaterally x 4, no wheezing or rales  Abdomen:   Soft, non-tender, bowel sounds are active.   Extremities:  No edema, normal cap refill, no cyanosis. Musculoskeletal: No clubbing  Neuro: A&Ox3, speech clear, gait stable. Skin: Skin color is normal. No rashes or lesions.  Non diaphoretic  Vascular: 2+ pulses symmetric in all extremities    Cardiographics    Ekg: sinus rhythm    Results for orders placed or performed during the hospital encounter of 09/10/18   EKG, 12 LEAD, INITIAL   Result Value Ref Range    Ventricular Rate 66 BPM    Atrial Rate 66 BPM    P-R Interval 204 ms    QRS Duration 92 ms    Q-T Interval 448 ms    QTC Calculation (Bezet) 469 ms    Calculated P Axis 34 degrees    Calculated R Axis -17 degrees    Calculated T Axis 22 degrees    Diagnosis       Sinus rhythm with premature supraventricular complexes  Voltage criteria for left ventricular hypertrophy  Non-specific ST & T wave changes  Confirmed by Baron Bobo ([id number]) on 9/11/2018 10:08:01 PM     Results for orders placed or performed in visit on 08/16/18   CARDIAC HOLTER MONITOR, 24 HOURS    Narrative    ECG Monitor/24 hours, Complete    Reason for Holter Monitor   PALPITATIONS    Heartbeat    Slowest 37  Average 72  Fastest  145      Results:   Underlying Rhythm: Normal sinus rhythm      Atrial Arrhythmias: premature atrial contractions; occasional, paroxysmal atrial fibrillation and severe bradycardia            AV Conduction: normal    Ventricular Arrhythmias: premature ventricular contractions; occasional     ST Segment Analysis:normal     Symptom Correlation:  Sob correlates with PAF    Comment:   Sinus rhythm with symptomatic PAF with rvr and profound bradycardia to 37 bpm. C/w tachy-bridgett syndrome. Clinical correlation advised. Celedonio Boeck, MD, [facility]              Lab Results   Component Value Date/Time    WBC 5.5 09/11/2018 03:25 AM    HGB (POC) 13.2 08/20/2018 09:26 AM    HGB 12.4 09/11/2018 03:25 AM    HCT (POC) 40.3 08/20/2018 09:26 AM    HCT 38.8 09/11/2018 03:25 AM    PLATELET 176 77/68/3807 03:25 AM    MCV 91.1 09/11/2018 03:25 AM      Lab Results   Component Value Date/Time    Sodium 143 09/12/2018 04:28 AM    Potassium 3.6 09/12/2018 04:28 AM    Chloride 111 (H) 09/12/2018 04:28 AM    CO2 25 09/12/2018 04:28 AM    Anion gap 7 09/12/2018 04:28 AM    Glucose 92 09/12/2018 04:28 AM    BUN 26 (H) 09/12/2018 04:28 AM    Creatinine 0.70 09/12/2018 04:28 AM    BUN/Creatinine ratio 37 (H) 09/12/2018 04:28 AM    GFR est AA >60 09/12/2018 04:28 AM    GFR est non-AA >60 09/12/2018 04:28 AM    Calcium 9.3 09/12/2018 04:28 AM    Bilirubin, total 0.2 09/04/2018 11:46 AM    AST (SGOT) 14 09/04/2018 11:46 AM    Alk. phosphatase 67 09/04/2018 11:46 AM    Protein, total 6.0 09/04/2018 11:46 AM    Albumin 4.0 09/04/2018 11:46 AM    Globulin 3.4 05/22/2018 10:58 AM    A-G Ratio 2.0 09/04/2018 11:46 AM    ALT (SGPT) 12 09/04/2018 11:46 AM         Assessment:     Assessment:        ICD-10-CM ICD-9-CM    1. Irregular heartbeat I49.9 427.9 AMB POC EKG ROUTINE W/ 12 LEADS, INTER & REP   2. PAF (paroxysmal atrial fibrillation) ([facility]) I48.0 427.31    3. Pacemaker Z95.0 V45.01    4.  SSS (sick sinus syndrome) ([facility] I49.5 427.81    5. S/P ablation of atrial fibrillation Z98.890 V45.89     Z86.79     6. Essential hypertension I10 401.9      Orders Placed This Encounter    AMB POC EKG ROUTINE W/ 12 LEADS, INTER & REP     Order Specific Question:   Reason for Exam:     Answer:   routine        Plan:   Ms. Yony Willingham is here for follow up s/p PPM for SSS as well as PVI for AF 9/2018. She is doing well and denies cardiac complaints. EKG shows atrial pacing and her device interrogation demonstrates normal functioning with 64% AP for SSS, 2% RVP. She's maintained NSR since her procedure. She is tolerating Eliquis. Continue current medical therapy and follow up 3 months from device implant    Continue medical management for HTN, AF. Thank you for allowing me to participate in Vlad Moreno 's care. Pankaj Matamoros NP    Patient seen and examined by me with nurse practitioner. I personally performed all components of the history, physical, and medical decision making and agree with the assessment and plan with minor modifications as noted. A paced 64% for sick sinus. No AF noted on pacer. Cont oac for chadsvasc. Cont med rx for htn.  F/u in one year    Danii Berry MD, Cathi Crowe

## 2019-01-22 NOTE — PROGRESS NOTES
1. Have you been to the ER, urgent care clinic since your last visit? Hospitalized since your last visit? No    2. Have you seen or consulted any other health care providers outside of the [address] [place] [address] since your last visit? Include any pap smears or colon screening. No    Chief Complaint   Patient presents with    Irregular Heart Beat     3 mo appt. Denied cardiac symptoms.

## 2019-02-11 ENCOUNTER — TELEPHONE (OUTPATIENT)
Dept: CARDIOLOGY CLINIC | Age: 74
End: 2019-02-11

## 2019-02-11 NOTE — TELEPHONE ENCOUNTER
Spoke with patient regarding making a follow up appointment with the [facility] -- patient stated she is doing well and at this point does not want to make a follow up appointment.  Patient stated she will continue to follow up with her primary cardiologist.

## 2019-04-04 ENCOUNTER — TELEPHONE (OUTPATIENT)
Dept: CARDIOLOGY CLINIC | Age: 74
End: 2019-04-04

## 2019-04-05 NOTE — TELEPHONE ENCOUNTER
Spoke with patient   Verified patient with 2 patient identifiers  Informed we received fax for Medical Records shows scanned to chart. Request would have been faxed over to medical records.

## 2019-04-24 ENCOUNTER — CLINICAL SUPPORT (OUTPATIENT)
Dept: CARDIOLOGY CLINIC | Age: 74
End: 2019-04-24

## 2019-04-24 DIAGNOSIS — Z86.79 S/P ABLATION OF ATRIAL FIBRILLATION: ICD-10-CM

## 2019-04-24 DIAGNOSIS — I49.5 TACHY-BRADY SYNDROME (HCC): ICD-10-CM

## 2019-04-24 DIAGNOSIS — Z95.0 PACEMAKER: Primary | ICD-10-CM

## 2019-04-24 DIAGNOSIS — Z98.890 S/P ABLATION OF ATRIAL FIBRILLATION: ICD-10-CM

## 2019-05-04 RX ORDER — METOPROLOL TARTRATE 25 MG/1
TABLET, FILM COATED ORAL
Qty: 180 TAB | Refills: 1 | Status: SHIPPED | OUTPATIENT
Start: 2019-05-04 | End: 2019-08-20 | Stop reason: SDUPTHER

## 2019-05-07 ENCOUNTER — HOSPITAL ENCOUNTER (OUTPATIENT)
Dept: MAMMOGRAPHY | Age: 74
Discharge: HOME | End: 2019-05-07
Attending: INTERNAL MEDICINE
Payer: MEDICARE

## 2019-05-07 DIAGNOSIS — Z12.39 SCREENING BREAST EXAMINATION: ICD-10-CM

## 2019-05-07 PROCEDURE — 77063 BREAST TOMOSYNTHESIS BI: CPT

## 2019-07-08 RX ORDER — LOSARTAN POTASSIUM 50 MG/1
TABLET ORAL
Qty: 90 TAB | Refills: 0 | Status: SHIPPED | OUTPATIENT
Start: 2019-07-08 | End: 2019-10-04 | Stop reason: SDUPTHER

## 2019-07-24 ENCOUNTER — OFFICE VISIT (OUTPATIENT)
Dept: CARDIOLOGY CLINIC | Age: 74
End: 2019-07-24

## 2019-07-24 DIAGNOSIS — I48.0 PAF (PAROXYSMAL ATRIAL FIBRILLATION) (HCC): ICD-10-CM

## 2019-07-24 DIAGNOSIS — Z95.0 CARDIAC PACEMAKER IN SITU: Primary | ICD-10-CM

## 2019-08-20 ENCOUNTER — OFFICE VISIT (OUTPATIENT)
Dept: INTERNAL MEDICINE CLINIC | Age: 74
End: 2019-08-20

## 2019-08-20 VITALS
DIASTOLIC BLOOD PRESSURE: 80 MMHG | BODY MASS INDEX: 29.36 KG/M2 | WEIGHT: 176.2 LBS | HEART RATE: 70 BPM | OXYGEN SATURATION: 93 % | RESPIRATION RATE: 22 BRPM | HEIGHT: 65 IN | TEMPERATURE: 98.1 F | SYSTOLIC BLOOD PRESSURE: 128 MMHG

## 2019-08-20 DIAGNOSIS — N39.0 URINARY TRACT INFECTION WITH HEMATURIA, SITE UNSPECIFIED: ICD-10-CM

## 2019-08-20 DIAGNOSIS — I10 ESSENTIAL HYPERTENSION: Chronic | ICD-10-CM

## 2019-08-20 DIAGNOSIS — R31.9 URINARY TRACT INFECTION WITH HEMATURIA, SITE UNSPECIFIED: ICD-10-CM

## 2019-08-20 DIAGNOSIS — I48.0 PAF (PAROXYSMAL ATRIAL FIBRILLATION) (HCC): Chronic | ICD-10-CM

## 2019-08-20 DIAGNOSIS — Z00.00 MEDICARE ANNUAL WELLNESS VISIT, SUBSEQUENT: Primary | ICD-10-CM

## 2019-08-20 DIAGNOSIS — E78.00 HYPERCHOLESTEROLEMIA: Chronic | ICD-10-CM

## 2019-08-20 DIAGNOSIS — Z79.899 LONG-TERM USE OF HIGH-RISK MEDICATION: Chronic | ICD-10-CM

## 2019-08-20 LAB
A-G RATIO,AGRAT: 1.4 RATIO
ALBUMIN SERPL-MCNC: 4.2 G/DL (ref 3.9–5.4)
ALP SERPL-CCNC: 87 U/L (ref 38–126)
ALT SERPL-CCNC: 10 U/L (ref 0–35)
ANION GAP SERPL CALC-SCNC: 12 MMOL/L
AST SERPL W P-5'-P-CCNC: 17 U/L (ref 14–36)
BACTERIA,BACTU: ABNORMAL
BILIRUB SERPL-MCNC: 0.4 MG/DL (ref 0.2–1.3)
BILIRUB UR QL: ABNORMAL
BUN SERPL-MCNC: 24 MG/DL (ref 7–17)
BUN/CREATININE RATIO,BUCR: 34 RATIO
CALCIUM SERPL-MCNC: 10 MG/DL (ref 8.4–10.2)
CHLORIDE SERPL-SCNC: 105 MMOL/L (ref 98–107)
CHOL/HDL RATIO,CHHD: 2 RATIO (ref 0–4)
CHOLEST SERPL-MCNC: 130 MG/DL (ref 0–200)
CK SERPL-CCNC: 69 U/L (ref 30–135)
CLARITY: ABNORMAL
CO2 SERPL-SCNC: 30 MMOL/L (ref 22–32)
COLOR UR: ABNORMAL
CREAT SERPL-MCNC: 0.7 MG/DL (ref 0.7–1.2)
GLOBULIN,GLOB: 3.1
GLUCOSE 24H UR-MRATE: NEGATIVE G/(24.H)
GLUCOSE SERPL-MCNC: 92 MG/DL (ref 65–105)
HDLC SERPL-MCNC: 53 MG/DL (ref 35–130)
HGB UR QL STRIP: ABNORMAL
KETONES UR QL STRIP.AUTO: NEGATIVE
LDL/HDL RATIO,LDHD: 1 RATIO
LDLC SERPL CALC-MCNC: 54 MG/DL (ref 0–130)
LEUKOCYTE ESTERASE: ABNORMAL
NITRITE UR QL STRIP.AUTO: ABNORMAL
PH UR STRIP: 5 [PH] (ref 5–7)
POTASSIUM SERPL-SCNC: 4.6 MMOL/L (ref 3.6–5)
PROT SERPL-MCNC: 7.3 G/DL (ref 6.3–8.2)
PROT UR STRIP-MCNC: ABNORMAL MG/DL
RBC #/AREA URNS HPF: ABNORMAL #/HPF
SODIUM SERPL-SCNC: 147 MMOL/L (ref 137–145)
SP GR UR REFRACTOMETRY: 1.02 (ref 1–1.03)
SQUAMOUS EPITHELIAL CELLS: ABNORMAL
TRIGL SERPL-MCNC: 113 MG/DL (ref 0–200)
UROBILINOGEN UR QL STRIP.AUTO: NEGATIVE
VLDLC SERPL CALC-MCNC: 23 MG/DL
WBC URNS QL MICRO: ABNORMAL #/HPF

## 2019-08-20 NOTE — PROGRESS NOTES
This note will not be viewable in [address] Yancy Rader is a 66 y.o. female and presents with Annual Wellness Visit and Follow Up Chronic Condition  . Subjective:  Ms. Neida Theodore presents to the office today for Medicare wellness check and follow-up of multiple medical problems. The patient has hypertension. She remains on losartan and metoprolol. She is tolerating this regimen without cough, orthostatic dizziness, lower extremity edema, fatigue or palpitations. She denies headaches, numbness, tingling or focal neurological problems. She has hypercholesterolemia currently on pravastatin and fish oil capsules. She denies GI upset and has had no muscle soreness. She has no history of coronary artery disease and denies exertional chest pains or claudication. She is status post ascending aortic aneurysm repair. The patient has a history of paroxysmal atrial fibrillation which has been managed on beta-blocker. She denies any palpitations or syncope. She remains on Eliquis for stroke prevention and denies any bleeding problems. She denies any strokelike symptoms.     Past Medical History:   Diagnosis Date    Allergic conjunctivitis 7/26/2017    Aortic dissection (HCC)     Arthritis     lower back    Asthma     Essential hypertension 8/21/2017    GERD (gastroesophageal reflux disease)     Hypercholesterolemia 8/21/2017    Hyperlipidemia     Hypertension     Intertrigo 7/26/2017    Long-term use of high-risk medication 7/26/2017    LVH (left ventricular hypertrophy) 7/26/2017    PAF (paroxysmal atrial fibrillation) (HCC) 8/20/2018    Positional vertigo 7/26/2017    PUD (peptic ulcer disease)     Sciatica of left side 7/26/2017    Spinal stenosis, lumbar region, without neurogenic claudication 7/26/2017    SSS (sick sinus syndrome) ([facility] [address] 9/18/2018     Past Surgical History:   Procedure Laterality Date    COLONOSCOPY N/A 7/12/2017    COLONOSCOPY WITH IV ANTIBIOTICS performed by Caleb Mccrary MD at [facility]  7/12/2017         HX GYN      hysterectomy    HX GYN      tubal ligation    HX OTHER SURGICAL  2015    Type A Dissection Repair    ND COLONOSCOPY FLX DX W/COLLJ SPEC WHEN PFRMD  3/19/2012         UPPER GI ENDOSCOPY,BALL DIL,30MM  7/12/2017         UPPER GI ENDOSCOPY,BIOPSY  7/12/2017          No Known Allergies  Current Outpatient Medications   Medication Sig Dispense Refill    losartan (COZAAR) 50 mg tablet TAKE 1 TABLET BY MOUTH ONCE DAILY 90 Tab 0    ELIQUIS 5 mg tablet take 1 tablet by mouth twice a day 60 Tab 12    pravastatin (PRAVACHOL) 20 mg tablet TAKE ONE TABLET BY MOUTH ONCE DAILY 90 Tab 3    metoprolol tartrate (LOPRESSOR) 25 mg tablet TAKE ONE TABLET BY MOUTH TWICE DAILY 180 Tab 0    acetaminophen (TYLENOL ARTHRITIS PAIN) 650 mg CR tablet Take 650 mg by mouth every six (6) hours as needed for Pain.  omega-3 fatty acids-vitamin e (FISH OIL) 1,000 mg Cap Take 1 Cap by mouth two (2) times a day. Social History     Socioeconomic History    Marital status:      Spouse name: Not on file    Number of children: Not on file    Years of education: Not on file    Highest education level: Not on file   Tobacco Use    Smoking status: Never Smoker    Smokeless tobacco: Never Used   Substance and Sexual Activity    Alcohol use:  Yes     Alcohol/week: 0.0 standard drinks     Comment: rare    Drug use: No     Family History   Problem Relation Age of Onset    Cancer Father         colon cancer       Health Maintenance   Topic Date Due    DTaP/Tdap/Td series (1 - Tdap) 07/23/1964    GLAUCOMA SCREENING Q2Y  07/23/2008    Pneumococcal 65+ years (2 of 2 - PCV13) 01/08/2017    MEDICARE YEARLY EXAM  08/20/2020    COLONOSCOPY  07/12/2022    Bone Densitometry (Dexa) Screening  Completed    Shingrix Vaccine Age 50>  Completed    Influenza Age 5 to Adult  Completed        Review of Systems  Constitutional: negative for fevers, chills, anorexia and weight loss  Eyes:   negative for visual disturbance and irritation  ENT:   negative for tinnitus,sore throat,nasal congestion,ear pain,hoarseness  Respiratory:  negative for cough, hemoptysis, dyspnea,wheezing  CV:   negative for chest pain, palpitations, lower extremity edema  GI:   negative for nausea, vomiting, diarrhea, abdominal pain,melena  Endo:               negative for polyuria,polydipsia,polyphagia,heat intolerance  Genitourinary: negative for frequency, dysuria and hematuria  Integumentary: negative for rash and pruritus  Hematologic:  negative for easy bruising and gum/nose bleeding  Musculoskel: negative for myalgias, arthralgias, back pain, muscle weakness, joint pain  Neurological:  negative for headaches, dizziness, vertigo, memory problems and gait   Behavl/Psych: negative for feelings of anxiety, depression, mood changes  ROS otherwise negative      Objective:  Visit Vitals  /80 (BP 1 Location: Right arm, BP Patient Position: Sitting)   Pulse 70   Temp 98.1 °F (36.7 °C) (Oral)   Resp 22   Ht 5' 5\" (1.651 m)   Wt 176 lb 3.2 oz (79.9 kg)   SpO2 93%   BMI 29.32 kg/m²     Body mass index is 29.32 kg/m². Physical Exam:   General appearance - alert, well appearing, and in no distress  Mental status - alert, oriented to person, place, and time  EYE-ANA, EOMI,conjunctiva normal bilaterally, lids normal  ENT-ENT exam normal, no neck nodes or sinus tenderness  Nose - normal and patent, no erythema,  Or discharge   Mouth - mucous membranes moist, pharynx normal without lesions  Neck - supple, no significant adenopathy or bruit  Chest - clear to auscultation, no wheezes, rales or rhonchi. Heart - normal rate, regular rhythm, normal S1, S2, no murmurs, rubs, clicks or gallops   Abdomen - soft, nontender, nondistended, no masses or organomegaly  Lymph- no adenopathy palpable  Ext-peripheral pulses normal, no pedal edema, no clubbing or cyanosis  Skin-Warm and dry.  no hyperpigmentation, vitiligo, or suspicious lesions  Neuro -alert, oriented, normal speech, no focal findings or movement disorder noted    In addition this patient is seen for AWV  as detailed below: This is a Subsequent Medicare Annual Wellness Exam (AWV) (Performed 12 months after IPPE or effective date of Medicare Part B enrollment)    I have reviewed the patient's medical history in detail and updated the computerized patient record. Problem list reviewed with patient and risk factors discussed. PSH, SH, FH, Medications and HM issues also reviewed and discussed. Depression screen, fall risk assessment, functional abilities and ACP also reviewed and discussed as above and below. Depression Risk Factor Screening:     3 most recent PHQ Screens 1/22/2019   Little interest or pleasure in doing things Not at all   Feeling down, depressed, irritable, or hopeless Not at all   Total Score PHQ 2 0     Alcohol Risk Factor Screening: You do not drink alcohol or very rarely. Functional Ability and Level of Safety:   Hearing Loss  Hearing is good. Activities of Daily Living  The home contains: no safety equipment. Patient does total self care    Fall Risk  Fall Risk Assessment, last 12 mths 1/22/2019   Able to walk? Yes   Fall in past 12 months?  No       Abuse Screen  Patient is not abused    Cognitive Screening   Evaluation of Cognitive Function:  Has your family/caregiver stated any concerns about your memory: no  Normal    Patient Care Team   Patient Care Team:  Tony Johnson MD as PCP - General (Internal Medicine)  David Alvarado MD as Referring Provider (Cardiology)  Veronica Verde MD as Surgeon (Cardiothoracic Surgery)  Harvinder Bear MD as Surgeon (Cardiothoracic Surgery)  Ezekiel Roy NP as Nurse Practitioner (Nurse Practitioner)  Salima Wilder NP as Nurse Practitioner (Cardiology)  Richi Huerta MD as Physician (Cardiology)    Assessment/Plan   Education and counseling provided:  Are appropriate based on today's review and evaluation    Assessment/Plan:   Impressions:      ICD-10-CM ICD-9-CM    1. Medicare annual wellness visit, subsequent Z00.00 V70.0    2. Essential hypertension I10 401.9 CBC WITH AUTOMATED DIFF      COLLECTION VENOUS BLOOD,VENIPUNCTURE      METABOLIC PANEL, COMPREHENSIVE      URINALYSIS W/MICROSCOPIC   3. Hypercholesterolemia E78.00 272.0 CK      LIPID PANEL      TSH 3RD GENERATION   4. Long-term use of high-risk medication Z79.899 V58.69    5. PAF (paroxysmal atrial fibrillation) (HCC) I48.0 427.31         Plan:  1. Continue present meds  2. Lifestyle modifications including Na restriction, low carb/fat diet, weight reduction and exercise (at least a walking program). Follow-up and Dispositions    · Return in about 6 months (around 2/20/2020). Orders Placed This Encounter    CBC WITH AUTOMATED DIFF    CK ([facility])    LIPID PANEL ([facility])    METABOLIC PANEL, COMPREHENSIVE ([facility])    TSH 3RD GENERATION ([facility])    URINALYSIS W/MICROSCOPIC ([facility])    COLLECTION VENOUS BLOOD,VENIPUNCTURE       Rubio Villela MD   Assessment/Plan:  Diagnoses and all orders for this visit:    Medicare annual wellness visit, subsequent    Essential hypertension  -     CBC WITH AUTOMATED DIFF  -     COLLECTION VENOUS BLOOD,VENIPUNCTURE  -     METABOLIC PANEL, COMPREHENSIVE  -     URINALYSIS W/MICROSCOPIC    Hypercholesterolemia  -     CK  -     LIPID PANEL  -     TSH 3RD GENERATION    Long-term use of high-risk medication    PAF (paroxysmal atrial fibrillation) [facility])        Health Maintenance Due   Topic Date Due    DTaP/Tdap/Td series (1 - Tdap) 07/23/1964    GLAUCOMA SCREENING Q2Y  07/23/2008    Pneumococcal 65+ years (2 of 2 - PCV13) 01/08/2017       Other instructions: The patient's medications were reviewed and reconciled. No change in her current medical regimen is made.     Body mass index is 29.3 and dietary counseling along with printed patient education is given    The patient is due for glaucoma screening and will be seen by an eye doctor tomorrow    Age-appropriate vaccinations were reviewed and Prevnar 13 has been recommended along with a Tdap vaccination    Await results of multiple labs    Follow-up in 6 months    Follow-up and Dispositions    · Return in about 6 months (around 2/20/2020). I have reviewed with the patient details of the assessment and plan and all questions were answered. Relevent patient education was performed. The most recent lab findings were reviewed with the patient. An After Visit Summary was printed and given to the patient.     Natalia Rojo MD

## 2019-08-20 NOTE — ACP (ADVANCE CARE PLANNING)
====Raymon Hannon Invitation====    Patient was invited to [facility] on this date and given the information folder for review. Recommended appointment with [facility] facilitator for ACP conversation regarding advance directives. [x] Yes  [] No  Referral sent to [facility] Choices team member or Coordinator for follow-up    [] Yes  [x] No  Patient scheduled an appointment.        Site of Referral:[facility]

## 2019-08-20 NOTE — PROGRESS NOTES
Chief Complaint   Patient presents with    Annual Wellness Visit       Depression Risk Factor Screening:     3 most recent PHQ Screens 1/22/2019   Little interest or pleasure in doing things Not at all   Feeling down, depressed, irritable, or hopeless Not at all   Total Score PHQ 2 0       Functional Ability and Level of Safety:     Activities of Daily Living  ADL Assessment 8/20/2019   Feeding yourself No Help Needed   Getting from bed to chair No Help Needed   Getting dressed No Help Needed   Bathing or showering No Help Needed   Walk across the room (includes cane/walker) No Help Needed   Using the telphone No Help Needed   Taking your medications No Help Needed   Preparing meals No Help Needed   Managing money (expenses/bills) No Help Needed   Moderately strenuous housework (laundry) No Help Needed   Shopping for personal items (toiletries/medicines) No Help Needed   Shopping for groceries No Help Needed   Driving No Help Needed   Climbing a flight of stairs No Help Needed   Getting to places beyond walking distances No Help Needed       Fall Risk  Fall Risk Assessment, last 12 mths 1/22/2019   Able to walk? Yes   Fall in past 12 months? No       Abuse Screen  Abuse Screening Questionnaire 1/22/2019   Do you ever feel afraid of your partner? N   Are you in a relationship with someone who physically or mentally threatens you? N   Is it safe for you to go home?  Y         Patient Care Team   Patient Care Team:  Angelica Wang MD as PCP - General (Internal Medicine)  Baljinder Austin MD as Referring Provider (Cardiology)  Jayden Marquez MD as Surgeon (Cardiothoracic Surgery)  Víctor Webb MD as Surgeon (Cardiothoracic Surgery)  Tamera Eisenmenger, NP as Nurse Practitioner (Nurse Practitioner)  Nic Hernandez NP as Nurse Practitioner (Cardiology)  Faith Parker MD as Physician (Cardiology)

## 2019-08-20 NOTE — PATIENT INSTRUCTIONS
Learning About Cutting Calories  How do calories affect your weight? Food gives your body energy. Energy from the food you eat is measured in calories. This energy keeps your heart beating, your brain active, and your muscles working. Your body needs a certain number of calories each day. After your body uses the calories it needs, it stores extra calories as fat. To lose weight safely, you have to eat fewer calories while eating in a healthy way. How many calories do you need each day? The more active you are, the more calories you need. When you are less active, you need fewer calories. How many calories you need each day also depends on several things, including your age and whether you are male or female. Here are some general guidelines for adults:  · Less active women and older adults need 1,600 to 2,000 calories each day. · Active women and less active men need 2,000 to 2,400 calories each day. · Active men need 2,400 to 3,000 calories each day. How can you cut calories and eat healthy meals? Whole grains, vegetables and fruits, and dried beans are good lower-calorie foods. They give you lots of nutrients and fiber. And they fill you up. Sweets, energy drinks, and soda pop are high in calories. They give you few nutrients and no fiber. Try to limit soda pop, fruit juice, and energy drinks. Drink water instead. Some fats can be part of a healthy diet. But cutting back on fats from highly processed foods like fast foods and many snack foods is a good way to lower the calories in your diet. Also, use smaller amounts of fats like butter, margarine, salad dressing, and mayonnaise. Add fresh garlic, lemon, or herbs to your meals to add flavor without adding fat. Meats and dairy products can be a big source of hidden fats. Try to choose lean or low-fat versions of these products. Fat-free cookies, candies, chips, and frozen treats can still be high in sugar and calories.  Some fat-free foods have more calories than regular ones. Eat fat-free treats in moderation, as you would other foods. If your favorite foods are high in fat, salt, sugar, or calories, limit how often you eat them. Eat smaller servings, or look for healthy substitutes. Fill up on fruits, vegetables, and whole grains. Eating at home  · Use meat as a side dish instead of as the main part of your meal.  · Try main dishes that use whole wheat pasta, brown rice, dried beans, or vegetables. · Find ways to cook with little or no fat, such as broiling, steaming, or grilling. · Use cooking spray instead of oil. If you use oil, use a monounsaturated oil, such as canola or olive oil. · Trim fat from meats before you cook them. · Drain off fat after you brown the meat or while you roast it. · Chill soups and stews after you cook them. Then skim the fat off the top after it hardens. Eating out  · Order foods that are broiled or poached rather than fried or breaded. · Cut back on the amount of butter or margarine that you use on bread. · Order sauces, gravies, and salad dressings on the side, and use only a little. · When you order pasta, choose tomato sauce rather than cream sauce. · Ask for salsa with your baked potato instead of sour cream, butter, cheese, or michaels. · Order meals in a small size instead of upgrading to a large. · Share an entree, or take part of your food home to eat as another meal.  · Share appetizers and desserts. Where can you learn more? Go to http://kim-natalia.info/. Enter 99 [id number] in the search box to learn more about \"Learning About Cutting Calories. \"  Current as of: November 7, 2018  Content Version: 12.1  © 9468-2575 [employer]. Care instructions adapted under license by [employer] (which disclaims liability or warranty for this information).  If you have questions about a medical condition or this instruction, always ask your healthcare professional. [employer] disclaims any warranty or liability for your use of this information. The best way to stay healthy is to live a healthy lifestyle. A healthy lifestyle includes regular exercise, eating a well-balanced diet, keeping a healthy weight and not smoking. Regular physical exams and screening tests are another important way to take care of yourself. Preventive exams provided by health care providers can find health problems early when treatment works best and can keep you from getting certain diseases or illnesses. Preventive services include exams, lab tests, screenings, shots, monitoring and information to help you take care of your own health. All people over 65 should have a pneumonia shot. Pneumonia shots are usually only needed once in a lifetime unless your doctor decides differently. In addition to your physical exam, some screening tests are recommended:    All people over 65 should have a yearly flu shot. People over 65 are at medium to high risk for Hepatitis B. Three shots are needed for complete protection. Bone mass measurement (dexa scan) is recommended every two years. Diabetes Mellitus screening is recommended every year. Glaucoma is an eye disease caused by high pressure in the eye. An eye exam is recommended every year. Cardiovascular screening tests that check your cholesterol and other blood fat (lipid) levels are recommended every five years. Colorectal Cancer screening tests help to find pre-cancerous polyps (growths in the colon) so they can be removed before they turn into cancer. Tests ordered for screening depend on your personal and family history risk factors. Prostate Cancer Screening (annually up to age 76)    Screening for breast cancer is recommended yearly with a Mammogram.    Screening for cervical and vaginal cancer is recommended with a pelvic and Pap test every two years.  However if you have had an abnormal pap in the past  three years or at high risk for cervical or vaginal cancer Medicare will cover a pap test and a pelvic exam every year.      Here is a list of your current Health Maintenance items with a due date:  Health Maintenance Due   Topic Date Due    DTaP/Tdap/Td  (1 - Tdap) 07/23/1964    Glaucoma Screening   07/23/2008    Pneumococcal Vaccine (2 of 2 - PCV13) 01/08/2017    Shingles Vaccine (2 of 2) 06/25/2019    Annual Well Visit  08/21/2019

## 2019-08-21 LAB
BASOPHILS # BLD AUTO: 0 X10E3/UL (ref 0–0.2)
BASOPHILS NFR BLD AUTO: 0 %
EOSINOPHIL # BLD AUTO: 0.1 X10E3/UL (ref 0–0.4)
EOSINOPHIL NFR BLD AUTO: 3 %
ERYTHROCYTE [DISTWIDTH] IN BLOOD BY AUTOMATED COUNT: 14 % (ref 12.3–15.4)
HCT VFR BLD AUTO: 42 % (ref 34–46.6)
HGB BLD-MCNC: 13.2 G/DL (ref 11.1–15.9)
IMM GRANULOCYTES # BLD AUTO: 0 X10E3/UL (ref 0–0.1)
IMM GRANULOCYTES NFR BLD AUTO: 0 %
LYMPHOCYTES # BLD AUTO: 2.2 X10E3/UL (ref 0.7–3.1)
LYMPHOCYTES NFR BLD AUTO: 47 %
MCH RBC QN AUTO: 28.8 PG (ref 26.6–33)
MCHC RBC AUTO-ENTMCNC: 31.4 G/DL (ref 31.5–35.7)
MCV RBC AUTO: 92 FL (ref 79–97)
MONOCYTES # BLD AUTO: 0.7 X10E3/UL (ref 0.1–0.9)
MONOCYTES NFR BLD AUTO: 14 %
NEUTROPHILS # BLD AUTO: 1.7 X10E3/UL (ref 1.4–7)
NEUTROPHILS NFR BLD AUTO: 36 %
PLATELET # BLD AUTO: 187 X10E3/UL (ref 150–450)
RBC # BLD AUTO: 4.59 X10E6/UL (ref 3.77–5.28)
TSH SERPL DL<=0.05 MIU/L-ACNC: 0.48 UIU/ML (ref 0.34–5.6)
WBC # BLD AUTO: 4.8 X10E3/UL (ref 3.4–10.8)

## 2019-08-23 LAB — BACTERIA UR CULT: ABNORMAL

## 2019-08-26 ENCOUNTER — TELEPHONE (OUTPATIENT)
Dept: INTERNAL MEDICINE CLINIC | Age: 74
End: 2019-08-26

## 2019-08-26 RX ORDER — AMOXICILLIN 250 MG/1
250 CAPSULE ORAL 3 TIMES DAILY
Qty: 21 CAP | Refills: 0 | Status: SHIPPED | OUTPATIENT
Start: 2019-08-26 | End: 2020-02-25 | Stop reason: ALTCHOICE

## 2019-08-26 NOTE — TELEPHONE ENCOUNTER
----- Message from Julio Feng MD sent at 8/24/2019  7:37 AM EDT -----  Labs stable except has UTI  Rx: amoxicillin 250 mg TID #21

## 2019-08-26 NOTE — TELEPHONE ENCOUNTER
Requested Prescriptions     Pending Prescriptions Disp Refills    amoxicillin (AMOXIL) 250 mg capsule 21 Cap 0     Sig: Take 1 Cap by mouth three (3) times daily.

## 2019-08-26 NOTE — PROGRESS NOTES
Patient notified of lab results and Rx to be sent to [facility] in [place]

## 2019-08-28 ENCOUNTER — OFFICE VISIT (OUTPATIENT)
Dept: CARDIOLOGY CLINIC | Age: 74
End: 2019-08-28

## 2019-08-28 VITALS
WEIGHT: 173.4 LBS | HEIGHT: 65 IN | DIASTOLIC BLOOD PRESSURE: 80 MMHG | SYSTOLIC BLOOD PRESSURE: 130 MMHG | HEART RATE: 70 BPM | RESPIRATION RATE: 16 BRPM | BODY MASS INDEX: 28.89 KG/M2 | OXYGEN SATURATION: 94 %

## 2019-08-28 DIAGNOSIS — I49.5 BRADY-TACHY SYNDROME (HCC): ICD-10-CM

## 2019-08-28 DIAGNOSIS — I48.0 PAROXYSMAL ATRIAL FIBRILLATION (HCC): Primary | ICD-10-CM

## 2019-08-28 DIAGNOSIS — Z86.79 S/P ABLATION OF ATRIAL FIBRILLATION: ICD-10-CM

## 2019-08-28 DIAGNOSIS — I49.5 SSS (SICK SINUS SYNDROME) (HCC): ICD-10-CM

## 2019-08-28 DIAGNOSIS — Z95.0 PACEMAKER: ICD-10-CM

## 2019-08-28 DIAGNOSIS — Z98.890 S/P ABLATION OF ATRIAL FIBRILLATION: ICD-10-CM

## 2019-08-28 NOTE — PROGRESS NOTES
Subjective: Joan Melendez is a 66 y.o. female is here for EP follow up. The patient reports an epidose of \"flipping\" in her chest after laughing hard last week. Has noted MIMS in the summer heat and some lower extremity edema.      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     Priority: 1 - One    PAF (paroxysmal atrial fibrillation) ([address] 08/20/2018     Priority: 1 - One    Essential hypertension 08/21/2017     Priority: 1 - One    Hypercholesterolemia 08/21/2017     Priority: 1 - One    A-fib ([address] 09/10/2018    Intertrigo 07/26/2017    Positional vertigo 07/26/2017    Spinal stenosis, lumbar region, without neurogenic claudication 07/26/2017    Long-term use of high-risk medication 07/26/2017    LVH (left ventricular hypertrophy) 07/26/2017    Sciatica of left side 07/26/2017    Allergic conjunctivitis 07/26/2017    S/P cardiac cath 05/09/2017    Heart palpitations 03/08/2016    Swelling of both ankles 01/20/2016    S/P ascending aortic aneurysm repair 12/28/2015      Charley Melendez MD  Past Medical History:   Diagnosis Date    Allergic conjunctivitis 7/26/2017    Aortic dissection (HCC)     Arthritis     lower back    Asthma     Essential hypertension 8/21/2017    GERD (gastroesophageal reflux disease)     Hypercholesterolemia 8/21/2017    Hyperlipidemia     Hypertension     Intertrigo 7/26/2017    Long-term use of high-risk medication 7/26/2017    LVH (left ventricular hypertrophy) 7/26/2017    PAF (paroxysmal atrial fibrillation) (HCC) 8/20/2018    Positional vertigo 7/26/2017    PUD (peptic ulcer disease)     Sciatica of left side 7/26/2017    Spinal stenosis, lumbar region, without neurogenic claudication 7/26/2017    SSS (sick sinus syndrome) ([facility] [address] 9/18/2018      Past Surgical History:   Procedure Laterality Date    COLONOSCOPY N/A 7/12/2017    COLONOSCOPY WITH IV ANTIBIOTICS performed by Deidre Salgado Anisha Dang MD at [facility]  7/12/2017         HX GYN      hysterectomy    HX GYN      tubal ligation    HX OTHER SURGICAL  2015    Type A Dissection Repair    VT COLONOSCOPY FLX DX W/COLLJ SPEC WHEN PFRMD  3/19/2012         UPPER GI ENDOSCOPY,BALL DIL,30MM  7/12/2017         UPPER GI ENDOSCOPY,BIOPSY  7/12/2017          No Known Allergies   Family History   Problem Relation Age of Onset    Cancer Father         colon cancer    negative for cardiac disease  Social History     Socioeconomic History    Marital status:      Spouse name: Not on file    Number of children: Not on file    Years of education: Not on file    Highest education level: Not on file   Tobacco Use    Smoking status: Never Smoker    Smokeless tobacco: Never Used   Substance and Sexual Activity    Alcohol use: Yes     Alcohol/week: 0.0 standard drinks     Comment: rare    Drug use: No     Current Outpatient Medications   Medication Sig    amoxicillin (AMOXIL) 250 mg capsule Take 1 Cap by mouth three (3) times daily.  losartan (COZAAR) 50 mg tablet TAKE 1 TABLET BY MOUTH ONCE DAILY    ELIQUIS 5 mg tablet take 1 tablet by mouth twice a day    pravastatin (PRAVACHOL) 20 mg tablet TAKE ONE TABLET BY MOUTH ONCE DAILY    metoprolol tartrate (LOPRESSOR) 25 mg tablet TAKE ONE TABLET BY MOUTH TWICE DAILY    acetaminophen (TYLENOL ARTHRITIS PAIN) 650 mg CR tablet Take 650 mg by mouth every six (6) hours as needed for Pain.  omega-3 fatty acids-vitamin e (FISH OIL) 1,000 mg Cap Take 1 Cap by mouth two (2) times a day. No current facility-administered medications for this visit. Vitals:    08/28/19 1115   BP: 130/80   Pulse: 70   Resp: 16   SpO2: 94%   Weight: 173 lb 6.4 oz (78.7 kg)   Height: 5' 5\" (1.651 m)       I have reviewed the nurses notes, vitals, problem list, allergy list, medical history, family, social history and medications.     Review of Symptoms:    General: Pt denies excessive weight gain or loss. Pt is able to conduct ADL's  HEENT: Denies blurred vision, headaches, epistaxis and difficulty swallowing. Respiratory: Denies shortness of breath, MIMS, wheezing or stridor. Cardiovascular: Denies precordial pain. Reports palpitations, edema. Gastrointestinal: Denies poor appetite, indigestion, abdominal pain or blood in stool  Urinary: Denies dysuria, pyuria  Musculoskeletal: Denies pain or swelling from muscles or joints  Neurologic: Denies tremor, paresthesias, or sensory motor disturbance  Skin: Denies rash, itching or texture change. Psych: Denies depression      Physical Exam:      General: Well developed, in no acute distress. HEENT: Eyes - PERRL, no jvd  Heart:  Normal S1/S2 negative S3 or S4. Regular, + murmur, gallop or rub. Respiratory: Clear bilaterally x 4, no wheezing or rales  Extremities:  Tr  edema, normal cap refill, no cyanosis. Musculoskeletal: No clubbing  Neuro: A&Ox3, speech clear, gait stable. Skin: Skin color is normal. No rashes or lesions. Non diaphoretic  Vascular: 2+ pulses symmetric in all extremities    Cardiographics    Ekg: A paced.      Results for orders placed or performed during the hospital encounter of 09/10/18   EKG, 12 LEAD, INITIAL   Result Value Ref Range    Ventricular Rate 66 BPM    Atrial Rate 66 BPM    P-R Interval 204 ms    QRS Duration 92 ms    Q-T Interval 448 ms    QTC Calculation (Bezet) 469 ms    Calculated P Axis 34 degrees    Calculated R Axis -17 degrees    Calculated T Axis 22 degrees    Diagnosis       Sinus rhythm with premature supraventricular complexes  Voltage criteria for left ventricular hypertrophy  Non-specific ST & T wave changes  Confirmed by Opal Petty ([id number]) on 9/11/2018 10:08:01 PM     Results for orders placed or performed in visit on 08/16/18   CARDIAC HOLTER MONITOR, 24 HOURS    Narrative    ECG Monitor/24 hours, Complete    Reason for Holter Monitor   PALPITATIONS    Heartbeat    Slowest 37  Average 67  Fastest  145      Results:   Underlying Rhythm: Normal sinus rhythm      Atrial Arrhythmias: premature atrial contractions; occasional, paroxysmal atrial fibrillation and severe bradycardia            AV Conduction: normal    Ventricular Arrhythmias: premature ventricular contractions; occasional     ST Segment Analysis:normal     Symptom Correlation:  Sob correlates with PAF    Comment:   Sinus rhythm with symptomatic PAF with rvr and profound bradycardia to 37 bpm. C/w tachy-bridgett syndrome. Clinical correlation advised. Genevieve Castañeda MD, [facility]              Lab Results   Component Value Date/Time    WBC 4.8 08/20/2019 01:36 PM    HGB (POC) 13.2 08/20/2018 09:26 AM    HGB 13.2 08/20/2019 01:36 PM    HCT (POC) 40.3 08/20/2018 09:26 AM    HCT 42.0 08/20/2019 01:36 PM    PLATELET 159 80/39/2509 01:36 PM    MCV 92 08/20/2019 01:36 PM      Lab Results   Component Value Date/Time    Sodium 147 (H) 08/20/2019 01:38 PM    Potassium 4.6 08/20/2019 01:38 PM    Chloride 105 08/20/2019 01:38 PM    CO2 30.0 08/20/2019 01:38 PM    Anion gap 12 08/20/2019 01:38 PM    Glucose 92 08/20/2019 01:38 PM    BUN 24.0 (H) 08/20/2019 01:38 PM    Creatinine 0.7 08/20/2019 01:38 PM    BUN/Creatinine ratio 34 08/20/2019 01:38 PM    GFR est AA >60 08/20/2019 01:38 PM    GFR est non-AA >60 08/20/2019 01:38 PM    Calcium 10.0 08/20/2019 01:38 PM    Bilirubin, total 0.4 08/20/2019 01:38 PM    AST (SGOT) 17.0 08/20/2019 01:38 PM    Alk. phosphatase 87 08/20/2019 01:38 PM    Protein, total 7.3 08/20/2019 01:38 PM    Albumin 4.2 08/20/2019 01:38 PM    Globulin 3.10 08/20/2019 01:38 PM    A-G Ratio 1.4 08/20/2019 01:38 PM    ALT (SGPT) 10 08/20/2019 01:38 PM      Lab Results   Component Value Date/Time    TSH 1.320 08/20/2018 09:27 AM    TSH, 3rd generation 0.48 08/20/2019 01:38 PM        Assessment:           ICD-10-CM ICD-9-CM    1.  Paroxysmal atrial fibrillation (HCC) I48.0 427.31 AMB POC EKG ROUTINE W/ 12 LEADS, INTER & REP ECHO ADULT COMPLETE   2. Danilo-tachy syndrome (HCC) I49.5 427.81 ECHO ADULT COMPLETE   3. SSS (sick sinus syndrome) (HCC) I49.5 427.81 ECHO ADULT COMPLETE   4. Pacemaker Z95.0 V45.01 ECHO ADULT COMPLETE   5. S/P ablation of atrial fibrillation Z98.890 V45.89 ECHO ADULT COMPLETE    Z86.79       Orders Placed This Encounter    AMB POC EKG ROUTINE W/ 12 LEADS, INTER & REP     Order Specific Question:   Reason for Exam:     Answer:   routine        Plan:     Ms Burciaga Westlake has hx SSS and AF,  s/p AF ablation and PPM implantaiton 9/2018. Echo 6/18 with normal EF. Device interrogation today WNL with no new episodes since last remote. She did have 17K pvcs since January; no couples or more. Reassured. Will repeat echo with complaints of dyspnea and lower extremity edema. Will continue to follow device remotely and see her back as planned. Thank you for allowing me to participate in Edwardo Landau 's care.       Stephenie Connor NP

## 2019-08-28 NOTE — PROGRESS NOTES
1. Have you been to the ER, urgent care clinic since your last visit? Hospitalized since your last visit? No    2. Have you seen or consulted any other health care providers outside of the [address] [place] [address] since your last visit? Include any pap smears or colon screening.   Eye exam 8/2019    Chief Complaint   Patient presents with    Irregular Heart Beat    Patient noted last week after laughing hard a major heart flutter and SOB  with walking

## 2019-10-04 RX ORDER — LOSARTAN POTASSIUM 50 MG/1
TABLET ORAL
Qty: 90 TAB | Refills: 0 | Status: SHIPPED | OUTPATIENT
Start: 2019-10-04 | End: 2020-01-05

## 2019-10-23 ENCOUNTER — OFFICE VISIT (OUTPATIENT)
Dept: CARDIOLOGY CLINIC | Age: 74
End: 2019-10-23

## 2019-10-23 DIAGNOSIS — Z95.0 CARDIAC PACEMAKER IN SITU: Primary | ICD-10-CM

## 2019-10-23 DIAGNOSIS — I49.5 SSS (SICK SINUS SYNDROME) (HCC): ICD-10-CM

## 2019-10-31 RX ORDER — METOPROLOL TARTRATE 25 MG/1
TABLET, FILM COATED ORAL
Qty: 180 TAB | Refills: 1 | Status: SHIPPED | OUTPATIENT
Start: 2019-10-31 | End: 2020-02-25 | Stop reason: SDUPTHER

## 2019-11-06 DIAGNOSIS — E78.00 HYPERCHOLESTEROLEMIA: Chronic | ICD-10-CM

## 2019-11-07 RX ORDER — PRAVASTATIN SODIUM 20 MG/1
TABLET ORAL
Qty: 90 TAB | Refills: 3 | Status: SHIPPED | OUTPATIENT
Start: 2019-11-07 | End: 2020-11-03

## 2020-01-05 RX ORDER — LOSARTAN POTASSIUM 50 MG/1
TABLET ORAL
Qty: 90 TAB | Refills: 0 | Status: SHIPPED | OUTPATIENT
Start: 2020-01-05 | End: 2020-03-27 | Stop reason: SDUPTHER

## 2020-01-22 ENCOUNTER — OFFICE VISIT (OUTPATIENT)
Dept: CARDIOLOGY CLINIC | Age: 75
End: 2020-01-22

## 2020-01-22 DIAGNOSIS — Z95.0 CARDIAC PACEMAKER IN SITU: Primary | ICD-10-CM

## 2020-01-22 DIAGNOSIS — I49.5 SSS (SICK SINUS SYNDROME) (HCC): ICD-10-CM

## 2020-02-25 ENCOUNTER — OFFICE VISIT (OUTPATIENT)
Dept: INTERNAL MEDICINE CLINIC | Age: 75
End: 2020-02-25

## 2020-02-25 VITALS
HEART RATE: 70 BPM | OXYGEN SATURATION: 98 % | BODY MASS INDEX: 29.66 KG/M2 | SYSTOLIC BLOOD PRESSURE: 120 MMHG | RESPIRATION RATE: 16 BRPM | DIASTOLIC BLOOD PRESSURE: 82 MMHG | WEIGHT: 178 LBS | HEIGHT: 65 IN | TEMPERATURE: 98.1 F

## 2020-02-25 DIAGNOSIS — E78.00 HYPERCHOLESTEROLEMIA: Chronic | ICD-10-CM

## 2020-02-25 DIAGNOSIS — I10 ESSENTIAL HYPERTENSION: Primary | Chronic | ICD-10-CM

## 2020-02-25 DIAGNOSIS — Z79.899 LONG-TERM USE OF HIGH-RISK MEDICATION: Chronic | ICD-10-CM

## 2020-02-25 DIAGNOSIS — I48.0 PAF (PAROXYSMAL ATRIAL FIBRILLATION) (HCC): Chronic | ICD-10-CM

## 2020-02-25 PROBLEM — I48.91 A-FIB (HCC): Status: RESOLVED | Noted: 2018-09-10 | Resolved: 2020-02-25

## 2020-02-25 NOTE — PROGRESS NOTES
This note will not be viewable in [address] Moira Daugherty is a 66 y.o. female and presents with Follow Up Chronic Condition (6 mo fu)  . Subjective:  Mrs. Geovani Patricia returns to the office today in follow-up of multiple medical problems. The patient has hypertension and remains on losartan and metoprolol. She tolerates this regimen without cough, orthostatic dizziness, fatigue or palpitations. She does have some mild dependent edema later in the day in her ankles and feet. The patient denies any headaches, numbness, tingling or focal neurological problems. She is on pravastatin for hypercholesterolemia. She denies muscle soreness or GI upset. She has no history of coronary artery disease and denies exertional chest pains or claudication. There is a history of paroxysmal atrial fibrillation for which she is followed by Dr. Darrion Cavazos. She is on Eliquis for stroke prevention. Beta-blocker helps to control her heart rate. She denies any palpitations, dizziness or syncope. She has had no bleeding problems or strokelike symptoms.     Past Medical History:   Diagnosis Date    Allergic conjunctivitis 7/26/2017    Aortic dissection (HCC)     Arthritis     lower back    Asthma     Essential hypertension 8/21/2017    GERD (gastroesophageal reflux disease)     Hypercholesterolemia 8/21/2017    Hyperlipidemia     Hypertension     Intertrigo 7/26/2017    Long-term use of high-risk medication 7/26/2017    LVH (left ventricular hypertrophy) 7/26/2017    PAF (paroxysmal atrial fibrillation) (HCC) 8/20/2018    Positional vertigo 7/26/2017    PUD (peptic ulcer disease)     Sciatica of left side 7/26/2017    Spinal stenosis, lumbar region, without neurogenic claudication 7/26/2017    SSS (sick sinus syndrome) ([facility] [address] 9/18/2018     Past Surgical History:   Procedure Laterality Date    COLONOSCOPY N/A 7/12/2017    COLONOSCOPY WITH IV ANTIBIOTICS performed by Kandy Kwan MD at [facility] ENDOSCOPY    COLONOSCOPY,DIAGNOSTIC  7/12/2017         HX GYN      hysterectomy    HX GYN      tubal ligation    HX OTHER SURGICAL  2015    Type A Dissection Repair    NC COLONOSCOPY FLX DX W/COLLJ SPEC WHEN PFRMD  3/19/2012         UPPER GI ENDOSCOPY,BALL DIL,30MM  7/12/2017         UPPER GI ENDOSCOPY,BIOPSY  7/12/2017          No Known Allergies  Current Outpatient Medications   Medication Sig Dispense Refill    losartan (COZAAR) 50 mg tablet TAKE 1 TABLET BY MOUTH EVERY DAY 90 Tab 0    pravastatin (PRAVACHOL) 20 mg tablet TAKE 1 TABLET BY MOUTH ONCE DAILY 90 Tab 3    ELIQUIS 5 mg tablet TAKE 1 TABLET BY MOUTH TWICE DAILY 60 Tab 4    metoprolol tartrate (LOPRESSOR) 25 mg tablet TAKE ONE TABLET BY MOUTH TWICE DAILY 180 Tab 0    acetaminophen (TYLENOL ARTHRITIS PAIN) 650 mg CR tablet Take 650 mg by mouth every six (6) hours as needed for Pain.  omega-3 fatty acids-vitamin e (FISH OIL) 1,000 mg Cap Take 1 Cap by mouth two (2) times a day. Social History     Socioeconomic History    Marital status:      Spouse name: Not on file    Number of children: Not on file    Years of education: Not on file    Highest education level: Not on file   Tobacco Use    Smoking status: Never Smoker    Smokeless tobacco: Never Used   Substance and Sexual Activity    Alcohol use:  Yes     Alcohol/week: 0.0 standard drinks     Comment: rare    Drug use: No     Family History   Problem Relation Age of Onset    Cancer Father         colon cancer       Health Maintenance   Topic Date Due    GLAUCOMA SCREENING Q2Y  07/23/2008    Medicare Yearly Exam  08/20/2020    Lipid Screen  08/20/2020    Colonoscopy  07/12/2022    DTaP/Tdap/Td series (2 - Td) 08/27/2029    Bone Densitometry (Dexa) Screening  Completed    Shingrix Vaccine Age 50>  Completed    Influenza Age 5 to Adult  Completed    Pneumococcal 65+ years  Completed        Review of Systems  Constitutional: negative for fevers, chills, anorexia and weight loss  Eyes:   negative for visual disturbance and irritation  ENT:   negative for tinnitus,sore throat,nasal congestion,ear pain,hoarseness  Respiratory:  negative for cough, hemoptysis, dyspnea,wheezing  CV:   negative for chest pain, palpitations, lower extremity edema  GI:   negative for nausea, vomiting, diarrhea, abdominal pain,melena  Endo:               negative for polyuria,polydipsia,polyphagia,heat intolerance  Genitourinary: negative for frequency, dysuria and hematuria  Integumentary: negative for rash and pruritus  Hematologic:  negative for easy bruising and gum/nose bleeding  Musculoskel: negative for myalgias, arthralgias, back pain, muscle weakness, joint pain  Neurological:  negative for headaches, dizziness, vertigo, memory problems and gait   Behavl/Psych: negative for feelings of anxiety, depression, mood changes  ROS otherwise negative      Objective:  Visit Vitals  /82 (BP 1 Location: Left arm, BP Patient Position: Sitting)   Pulse 70   Temp 98.1 °F (36.7 °C) (Oral)   Resp 16   Ht 5' 5\" (1.651 m)   Wt 178 lb (80.7 kg)   SpO2 98%   BMI 29.62 kg/m²     Body mass index is 29.62 kg/m². Physical Exam:   General appearance - alert, well appearing, and in no distress  Mental status - alert, oriented to person, place, and time  EYE-ANA, EOMI,conjunctiva normal bilaterally, lids normal  ENT-ENT exam normal, no neck nodes or sinus tenderness  Nose - normal and patent, no erythema,  Or discharge   Mouth - mucous membranes moist, pharynx normal without lesions  Neck - supple, no significant adenopathy or bruit  Chest - clear to auscultation, no wheezes, rales or rhonchi. Heart - normal rate, regular rhythm, normal S1, S2, no murmurs, rubs, clicks or gallops   Abdomen - soft, nontender, nondistended, no masses or organomegaly  Lymph- no adenopathy palpable  Ext-peripheral pulses normal, no pedal edema, no clubbing or cyanosis  Skin-Warm and dry.  no hyperpigmentation, vitiligo, or suspicious lesions  Neuro -alert, oriented, normal speech, no focal findings or movement disorder noted      Assessment/Plan:  Diagnoses and all orders for this visit:    Essential hypertension    Hypercholesterolemia    Long-term use of high-risk medication    PAF (paroxysmal atrial fibrillation) ([facility] [address] [address]        Other instructions:   Patient's medications were reviewed and reconciled. No change in her current medical regimen will be made. Body mass index is 29.6 and dietary counseling along with printed patient education is given    Continued cardiology follow-up regarding her atrial fibrillation    Labs from 8/20 were reviewed with the patient    Follow-up 6 months    Follow-up and Dispositions    · Return in about 6 months (around 8/25/2020). I have reviewed with the patient details of the assessment and plan and all questions were answered. Relevent patient education was performed. The most recent lab findings were reviewed with the patient. An After Visit Summary was printed and given to the patient. Rosita Dominguez MD    Please note that this dictation was completed with [employer], the computer voice recognition software. Quite often unanticipated grammatical, syntax, homophones, and other interpretive errors are inadvertently transcribed by the computer software. Please disregard these errors. Please excuse any errors that have escaped final proofreading.

## 2020-02-25 NOTE — PATIENT INSTRUCTIONS

## 2020-02-25 NOTE — PROGRESS NOTES
Estelle Morgan is a 66 y.o. female presenting for Follow Up Chronic Condition (6 mo fu)  . 1. Have you been to the ER, urgent care clinic since your last visit? Hospitalized since your last visit? No    2. Have you seen or consulted any other health care providers outside of the [address] [place] [address] since your last visit? Include any pap smears or colon screening. Eye Dr Brian Major, last 12 mths 2/25/2020   Able to walk? Yes   Fall in past 12 months? No         Abuse Screening Questionnaire 2/25/2020   Do you ever feel afraid of your partner? N   Are you in a relationship with someone who physically or mentally threatens you? N   Is it safe for you to go home?  Y       3 most recent PHQ Screens 2/25/2020   Little interest or pleasure in doing things Not at all   Feeling down, depressed, irritable, or hopeless Not at all   Total Score PHQ 2 0       Medications Discontinued During This Encounter   Medication Reason    metoprolol tartrate (LOPRESSOR) 25 mg tablet Duplicate Order

## 2020-03-09 NOTE — PROGRESS NOTES
Subjective: Yancy Wilkes is a 66 y.o. female is here for EP follow up. The patient denies chest pain/ shortness of breath, orthopnea, PND, LE edema, palpitations, syncope, presyncope or fatigue. Admits to eating high sodium diet yesterday.       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     Priority: 1 - One    PAF (paroxysmal atrial fibrillation) ([address] 08/20/2018     Priority: 1 - One    Essential hypertension 08/21/2017     Priority: 1 - One    Hypercholesterolemia 08/21/2017     Priority: 1 - One    Intertrigo 07/26/2017    Positional vertigo 07/26/2017    Spinal stenosis, lumbar region, without neurogenic claudication 07/26/2017    Long-term use of high-risk medication 07/26/2017    LVH (left ventricular hypertrophy) 07/26/2017    Sciatica of left side 07/26/2017    Allergic conjunctivitis 07/26/2017    S/P cardiac cath 05/09/2017    Heart palpitations 03/08/2016    Swelling of both ankles 01/20/2016    S/P ascending aortic aneurysm repair 12/28/2015      Charlotte Verde MD  Past Medical History:   Diagnosis Date    Allergic conjunctivitis 7/26/2017    Aortic dissection (HCC)     Arthritis     lower back    Asthma     Essential hypertension 8/21/2017    GERD (gastroesophageal reflux disease)     Hypercholesterolemia 8/21/2017    Hyperlipidemia     Hypertension     Intertrigo 7/26/2017    Long-term use of high-risk medication 7/26/2017    LVH (left ventricular hypertrophy) 7/26/2017    PAF (paroxysmal atrial fibrillation) (HCC) 8/20/2018    Positional vertigo 7/26/2017    PUD (peptic ulcer disease)     Sciatica of left side 7/26/2017    Spinal stenosis, lumbar region, without neurogenic claudication 7/26/2017    SSS (sick sinus syndrome) ([facility] 9/18/2018      Past Surgical History:   Procedure Laterality Date    COLONOSCOPY N/A 7/12/2017    COLONOSCOPY WITH IV ANTIBIOTICS performed by Norbert Barreto MD at [facility] ENDOSCOPY    COLONOSCOPY,DIAGNOSTIC  7/12/2017         HX GYN      hysterectomy    HX GYN      tubal ligation    HX OTHER SURGICAL  2015    Type A Dissection Repair    [place] COLONOSCOPY FLX DX W/COLLJ SPEC WHEN PFRMD  3/19/2012         UPPER GI ENDOSCOPY,BALL DIL,30MM  7/12/2017         UPPER GI ENDOSCOPY,BIOPSY  7/12/2017          No Known Allergies   Family History   Problem Relation Age of Onset    Cancer Father         colon cancer    negative for cardiac disease  Social History     Socioeconomic History    Marital status:      Spouse name: Not on file    Number of children: Not on file    Years of education: Not on file    Highest education level: Not on file   Tobacco Use    Smoking status: Never Smoker    Smokeless tobacco: Never Used   Substance and Sexual Activity    Alcohol use: Yes     Alcohol/week: 0.0 standard drinks     Comment: rare    Drug use: No     Current Outpatient Medications   Medication Sig    losartan (COZAAR) 50 mg tablet TAKE 1 TABLET BY MOUTH EVERY DAY    pravastatin (PRAVACHOL) 20 mg tablet TAKE 1 TABLET BY MOUTH ONCE DAILY    ELIQUIS 5 mg tablet TAKE 1 TABLET BY MOUTH TWICE DAILY    metoprolol tartrate (LOPRESSOR) 25 mg tablet TAKE ONE TABLET BY MOUTH TWICE DAILY    acetaminophen (TYLENOL ARTHRITIS PAIN) 650 mg CR tablet Take 650 mg by mouth every six (6) hours as needed for Pain.  omega-3 fatty acids-vitamin e (FISH OIL) 1,000 mg Cap Take 1 Cap by mouth two (2) times a day. No current facility-administered medications for this visit. Vitals:    03/10/20 1056 03/10/20 1114   BP: (!) 150/100 (!) 150/102   Pulse: 72    Resp: 18    SpO2: 96%    Weight: 178 lb (80.7 kg)    Height: 5' 5\" (1.651 m)        I have reviewed the nurses notes, vitals, problem list, allergy list, medical history, family, social history and medications. Review of Symptoms:    General: Pt denies excessive weight gain or loss.  Pt is able to conduct ADL's  HEENT: Denies blurred vision, headaches, epistaxis and difficulty swallowing. Respiratory: Denies shortness of breath, MIMS, wheezing or stridor. Cardiovascular: Denies precordial pain, palpitations, edema or PND  Gastrointestinal: Denies poor appetite, indigestion, abdominal pain or blood in stool  Urinary: Denies dysuria, pyuria  Musculoskeletal: Denies pain or swelling from muscles or joints  Neurologic: Denies tremor, paresthesias, or sensory motor disturbance  Skin: Denies rash, itching or texture change. Psych: Denies depression      Physical Exam:      General: Well developed, in no acute distress. HEENT: Eyes - PERRL, no jvd  Heart:  Normal S1/S2 negative S3 or S4. Regular, no murmur, gallop or rub. Respiratory: Clear bilaterally x 4, no wheezing or rales  Extremities:  No edema, normal cap refill, no cyanosis. Musculoskeletal: No clubbing  Neuro: A&Ox3, speech clear, gait stable. Skin: Skin color is normal. No rashes or lesions. Non diaphoretic  Vascular: 2+ pulses symmetric in all extremities    Cardiographics    Echo: 9/19  · Left Ventricle: Normal cavity size, systolic function (ejection fraction normal) and diastolic function. Moderate concentric hypertrophy. Estimated left ventricular ejection fraction is 56 - 60%. No regional wall motion abnormality noted. · Color flow Doppler was used. Small secundum atrial septal defect present. · Left Atrium: Moderately dilated left atrium. · Mitral Valve: Mitral valve thickening. Mild mitral valve regurgitation.     Results for orders placed or performed during the hospital encounter of 09/10/18   EKG, 12 LEAD, INITIAL   Result Value Ref Range    Ventricular Rate 66 BPM    Atrial Rate 66 BPM    P-R Interval 204 ms    QRS Duration 92 ms    Q-T Interval 448 ms    QTC Calculation (Bezet) 469 ms    Calculated P Axis 34 degrees    Calculated R Axis -17 degrees    Calculated T Axis 22 degrees    Diagnosis       Sinus rhythm with premature supraventricular complexes  Voltage criteria for left ventricular hypertrophy  Non-specific ST & T wave changes  Confirmed by Ziyad Wagner ([id number]) on 9/11/2018 10:08:01 PM     Results for orders placed or performed in visit on 08/16/18   CARDIAC HOLTER MONITOR, 24 HOURS    Narrative    ECG Monitor/24 hours, Complete    Reason for Holter Monitor   PALPITATIONS    Heartbeat    Slowest 37  Average 72  Fastest  145      Results:   Underlying Rhythm: Normal sinus rhythm      Atrial Arrhythmias: premature atrial contractions; occasional, paroxysmal atrial fibrillation and severe bradycardia            AV Conduction: normal    Ventricular Arrhythmias: premature ventricular contractions; occasional     ST Segment Analysis:normal     Symptom Correlation:  Sob correlates with PAF    Comment:   Sinus rhythm with symptomatic PAF with rvr and profound bradycardia to 37 bpm. C/w tachy-bridgett syndrome. Clinical correlation advised. Nova Fong MD, [facility]              Lab Results   Component Value Date/Time    WBC 4.8 08/20/2019 01:36 PM    HGB (POC) 13.2 08/20/2018 09:26 AM    HGB 13.2 08/20/2019 01:36 PM    HCT (POC) 40.3 08/20/2018 09:26 AM    HCT 42.0 08/20/2019 01:36 PM    PLATELET 244 84/37/4412 01:36 PM    MCV 92 08/20/2019 01:36 PM      Lab Results   Component Value Date/Time    Sodium 147 (H) 08/20/2019 01:38 PM    Potassium 4.6 08/20/2019 01:38 PM    Chloride 105 08/20/2019 01:38 PM    CO2 30.0 08/20/2019 01:38 PM    Anion gap 12 08/20/2019 01:38 PM    Glucose 92 08/20/2019 01:38 PM    BUN 24.0 (H) 08/20/2019 01:38 PM    Creatinine 0.7 08/20/2019 01:38 PM    BUN/Creatinine ratio 34 08/20/2019 01:38 PM    GFR est AA >60 08/20/2019 01:38 PM    GFR est non-AA >60 08/20/2019 01:38 PM    Calcium 10.0 08/20/2019 01:38 PM    Bilirubin, total 0.4 08/20/2019 01:38 PM    AST (SGOT) 17.0 08/20/2019 01:38 PM    Alk.  phosphatase 87 08/20/2019 01:38 PM    Protein, total 7.3 08/20/2019 01:38 PM    Albumin 4.2 08/20/2019 01:38 PM    Globulin 3.10 08/20/2019 01:38 PM    A-G Ratio 1.4 08/20/2019 01:38 PM    ALT (SGPT) 10 08/20/2019 01:38 PM      Lab Results   Component Value Date/Time    TSH 1.320 08/20/2018 09:27 AM    TSH, 3rd generation 0.48 08/20/2019 01:38 PM        Assessment:           ICD-10-CM ICD-9-CM    1. SSS (sick sinus syndrome) (HCC) I49.5 427.81    2. Paroxysmal atrial fibrillation (HCC) I48.0 427.31    3. Cardiac pacemaker in situ Z95.0 V45.01    4. Hypercholesterolemia E78.00 272.0      No orders of the defined types were placed in this encounter. Plan:      Ms Angeline Ojeda is here for annual follow up. She has hx SSS and AF, s/p AF ablation and PPM implantation 9/2018. Echo 9/19 with normal EF. Device interrogation today with 67% AP and 3% RVP. She has had 5 SVT episodes with rate of 161 (asymptomatic); AF < 1% of the time on [address] [place] [address]. BPs are up today and she confesses to a high salt diet yesterday. Discussed importance of low sodium diet with HTN. She will monitor her bps at home and contact her PCP if they remain elevated. Pt verbalizes understanding and is in agreement with the plan. Will continue to follow device remotely and see her back as planned in 1 year. Continue medical management of hyperlipidemia, SSS and PAF. Thank you for allowing me to participate in Hermes Sy 's care.       Hood Potter NP

## 2020-03-10 ENCOUNTER — CLINICAL SUPPORT (OUTPATIENT)
Dept: CARDIOLOGY CLINIC | Age: 75
End: 2020-03-10

## 2020-03-10 ENCOUNTER — OFFICE VISIT (OUTPATIENT)
Dept: CARDIOLOGY CLINIC | Age: 75
End: 2020-03-10

## 2020-03-10 VITALS
DIASTOLIC BLOOD PRESSURE: 102 MMHG | BODY MASS INDEX: 29.66 KG/M2 | WEIGHT: 178 LBS | RESPIRATION RATE: 18 BRPM | OXYGEN SATURATION: 96 % | SYSTOLIC BLOOD PRESSURE: 150 MMHG | HEIGHT: 65 IN | HEART RATE: 72 BPM

## 2020-03-10 DIAGNOSIS — I49.5 SSS (SICK SINUS SYNDROME) (HCC): Primary | ICD-10-CM

## 2020-03-10 DIAGNOSIS — I49.5 SSS (SICK SINUS SYNDROME) (HCC): ICD-10-CM

## 2020-03-10 DIAGNOSIS — Z95.0 CARDIAC PACEMAKER IN SITU: ICD-10-CM

## 2020-03-10 DIAGNOSIS — Z95.0 CARDIAC PACEMAKER IN SITU: Primary | ICD-10-CM

## 2020-03-10 DIAGNOSIS — I48.0 PAROXYSMAL ATRIAL FIBRILLATION (HCC): ICD-10-CM

## 2020-03-10 DIAGNOSIS — E78.00 HYPERCHOLESTEROLEMIA: ICD-10-CM

## 2020-03-10 NOTE — PROGRESS NOTES
1. Have you been to the ER, urgent care clinic since your last visit? Hospitalized since your last visit? No    2. Have you seen or consulted any other health care providers outside of the [address] [place] [address] since your last visit? Include any pap smears or colon screening. No    Chief Complaint   Patient presents with    Pacemaker Check     Yearly appt. Denied cardiac symptoms.

## 2020-03-26 RX ORDER — APIXABAN 5 MG/1
TABLET, FILM COATED ORAL
Qty: 60 TAB | Refills: 4 | Status: SHIPPED | OUTPATIENT
Start: 2020-03-26 | End: 2020-07-30

## 2020-03-27 RX ORDER — LOSARTAN POTASSIUM 50 MG/1
50 TABLET ORAL DAILY
Qty: 90 TAB | Refills: 0 | Status: SHIPPED | OUTPATIENT
Start: 2020-03-27 | End: 2020-03-31

## 2020-03-27 NOTE — TELEPHONE ENCOUNTER
Spoke to patient using 2 identifiers. She was made aware that a refill was faxed to our office for losartan. She is overdue for a follow up with Dr. Karla Oconnor. Offered patient a virtual visit for a follow up. She agreed and has an iPhone.

## 2020-03-31 ENCOUNTER — VIRTUAL VISIT (OUTPATIENT)
Dept: CARDIOLOGY CLINIC | Age: 75
End: 2020-03-31

## 2020-03-31 VITALS — SYSTOLIC BLOOD PRESSURE: 154 MMHG | DIASTOLIC BLOOD PRESSURE: 95 MMHG

## 2020-03-31 DIAGNOSIS — Z95.0 CARDIAC PACEMAKER IN SITU: ICD-10-CM

## 2020-03-31 DIAGNOSIS — E78.00 HYPERCHOLESTEROLEMIA: ICD-10-CM

## 2020-03-31 DIAGNOSIS — I10 ESSENTIAL HYPERTENSION: ICD-10-CM

## 2020-03-31 DIAGNOSIS — I48.0 PAROXYSMAL ATRIAL FIBRILLATION (HCC): Primary | ICD-10-CM

## 2020-03-31 RX ORDER — METOPROLOL TARTRATE 25 MG/1
25 TABLET, FILM COATED ORAL 2 TIMES DAILY
Qty: 180 TAB | Refills: 3 | Status: SHIPPED | OUTPATIENT
Start: 2020-03-31 | End: 2020-03-31 | Stop reason: SDUPTHER

## 2020-03-31 RX ORDER — METOPROLOL TARTRATE 25 MG/1
25 TABLET, FILM COATED ORAL 2 TIMES DAILY
Qty: 180 TAB | Refills: 3 | Status: SHIPPED | OUTPATIENT
Start: 2020-03-31 | End: 2021-05-04 | Stop reason: SDUPTHER

## 2020-03-31 RX ORDER — LOSARTAN POTASSIUM 100 MG/1
100 TABLET ORAL DAILY
Qty: 90 TAB | Refills: 3 | Status: SHIPPED | OUTPATIENT
Start: 2020-03-31 | End: 2020-08-25 | Stop reason: SDUPTHER

## 2020-03-31 NOTE — PROGRESS NOTES
Elke Irby MD          NAME:  Cristi Aponte   :   1945   MRN:   [id number]   PCP:  Rene Daniels MD    Cristi Aponte is a 66 y.o. female who was seen by synchronous (real-time) audio-video technology on 3/31/2020     Subjective: The patient is a 66y.o. year old female  who returns for a routine follow-up. Since the last visit, patient reports no change in exercise tolerance, chest pain, edema, medication intolerance, palpitations, shortness of breath, PND/orthopnea wheezing, sputum, syncope, dizziness or light headedness. Doing well. Past Medical History:   Diagnosis Date    Allergic conjunctivitis 2017    Aortic dissection (HCC)     Arthritis     lower back    Asthma     Essential hypertension 2017    GERD (gastroesophageal reflux disease)     Hypercholesterolemia 2017    Hyperlipidemia     Hypertension     Intertrigo 2017    Long-term use of high-risk medication 2017    LVH (left ventricular hypertrophy) 2017    PAF (paroxysmal atrial fibrillation) ([facility]) 2018    Positional vertigo 2017    PUD (peptic ulcer disease)     Sciatica of left side 2017    Spinal stenosis, lumbar region, without neurogenic claudication 2017    SSS (sick sinus syndrome) ([facility] 2018        ICD-10-CM ICD-9-CM    1. Paroxysmal atrial fibrillation (HCC) I48.0 427.31    2. Essential hypertension I10 401.9    3. Cardiac pacemaker in situ Z95.0 V45.01    4. Hypercholesterolemia E78.00 272.0       Social History     Tobacco Use    Smoking status: Never Smoker    Smokeless tobacco: Never Used   Substance Use Topics    Alcohol use:  Yes     Alcohol/week: 0.0 standard drinks     Comment: rare      Family History   Problem Relation Age of Onset    Cancer Father         colon cancer        Review of Systems  Cardiovascular: Negative except as noted in HPI      Objective:       Vitals:    20 1442   BP: (!) 154/95    There is no height or weight on file to calculate BMI. General PE    Mental Status - Alert. General Appearance - Not in acute distress. HEENT:  PERRL, no JVD. Chest and Lung Exam   Inspection: Accessory muscles - No use of accessory muscles in breathing. Cardiovascular   Inspection: Jugular vein - Bilateral - Inspection Normal.    Peripheral Vascular   Upper Extremity: Inspection - Bilateral - No Cyanotic nailbeds or Digital clubbing. Lower Extremity:   Palpation: Edema - Bilateral - No edema. Neuro  Alert and Oriented X 3          Data Review:       LABS- @brieflabs@      Allergies reviewed  No Known Allergies    Medications reviewed  Current Outpatient Medications   Medication Sig    losartan (COZAAR) 100 mg tablet Take 1 Tab by mouth daily.  metoprolol tartrate (LOPRESSOR) 25 mg tablet Take 1 Tab by mouth two (2) times a day.  Eliquis 5 mg tablet TAKE 1 TABLET BY MOUTH TWICE DAILY    pravastatin (PRAVACHOL) 20 mg tablet TAKE 1 TABLET BY MOUTH ONCE DAILY    acetaminophen (TYLENOL ARTHRITIS PAIN) 650 mg CR tablet Take 650 mg by mouth every six (6) hours as needed for Pain.  omega-3 fatty acids-vitamin e (FISH OIL) 1,000 mg Cap Take 1 Cap by mouth two (2) times a day. No current facility-administered medications for this visit. Assessment:       ICD-10-CM ICD-9-CM    1. Paroxysmal atrial fibrillation (HCC) I48.0 427.31    2. Essential hypertension I10 401.9    3. Cardiac pacemaker in situ Z95.0 V45.01    4. Hypercholesterolemia E78.00 272.0         Orders Placed This Encounter    losartan (COZAAR) 100 mg tablet     Sig: Take 1 Tab by mouth daily. Dispense:  90 Tab     Refill:  3    metoprolol tartrate (LOPRESSOR) 25 mg tablet     Sig: Take 1 Tab by mouth two (2) times a day. Dispense:  180 Tab     Refill:  3     Please consider 90 day supplies to promote better adherence       Plan:     No rapid palpitations to suggest recurrent AF.   BP running high, 140-15-/; will increase her losartan to 100 mg daily and f/u with her in 2 weeks. LDL at target. Emilia Randhawa MD    Pursuant to the emergency declaration under the [address] [place] [address], [facility] waiver authority and the [employer] Act, this Virtual Visit was conducted, with patient's consent, to reduce the patient's risk of exposure to COVID-19 and provide continuity of care for an established patient. Services were provided through a video synchronous discussion virtually to substitute for in-person clinic visit.

## 2020-06-11 ENCOUNTER — OFFICE VISIT (OUTPATIENT)
Dept: CARDIOLOGY CLINIC | Age: 75
End: 2020-06-11

## 2020-06-11 DIAGNOSIS — Z95.0 CARDIAC PACEMAKER IN SITU: Primary | ICD-10-CM

## 2020-06-11 DIAGNOSIS — I49.5 SSS (SICK SINUS SYNDROME) (HCC): ICD-10-CM

## 2020-06-25 NOTE — PROGRESS NOTES
History and Physical   Hospital Medicine     Patient Name: Zeina Mahoney  MRN:  [id number]  [facility] Medicine Team: [facility] HOSP MED L Elen Dumont MD  Date of Admission:  6/25/2020     Principal Problem:  Generalized abdominal pain   Primary Care Physician: Primary Doctor No      History of Present Illness:     Ms. Zeina Mahoney is a 52 y.o. 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  Patient describes pain as constant, dull in nature,  Nonradiating; pain started several hours before presentation to the ED.  No associated fevers chills, abdominal distension, nausea vomiting, decreased appetite with this pain.  Patient has not been constipating, and has been taking her lactulose appropriately.  No recent mental status changes.  Denies any dysuria or fevers.  No prior history of SBP.  Patient has not need recurrent frequent paracentesis.  Patient does have history of acid reflux and takes PPI at home.     On evaluation on presentation, patient felt that her abdominal pain has alleviated with IV morphine given in the ED.  Lipase 218, has history of pancreatitis.  Meld score 20 on admission    Review of Systems   Constitutional: Negative for chills, fatigue, fever.   HENT: Negative for sore throat, trouble swallowing.    Eyes: Negative for photophobia, visual disturbance.   Respiratory: Negative for cough, shortness of breath.    Cardiovascular: Negative for chest pain, palpitations, leg swelling.   Gastrointestinal: Negative for constipation, diarrhea, nausea, vomiting. + abdominal pain,  Endocrine: Negative for cold intolerance, heat intolerance.   Genitourinary: Negative for dysuria, frequency.   Musculoskeletal: Negative for arthralgias, myalgias.   Skin: Negative for  Cardiology Progress Note [address] [facility], [address]  [phone] 9/12/2018 9:06 AM 
 
Admit Date: 9/10/2018 Admit Diagnosis: a fib;PACU RECOVERY NEEDED; A-fib ([address] Subjective: Tyler Georges   denies chest pain. Visit Vitals  /84 (BP 1 Location: Left arm, BP Patient Position: Sitting)  Pulse 71  Temp 98 °F (36.7 °C)  Resp 18  Ht 5' 5\" (1.651 m)  Wt 188 lb (85.3 kg)  SpO2 99%  BMI 31.28 kg/m2 Current Facility-Administered Medications Medication Dose Route Frequency  heparinized saline 2 units/mL infusion 1,000 Units  500 mL Irrigation ONCE  
 fentaNYL citrate (PF) injection 12.5-50 mcg  12.5-50 mcg IntraVENous Multiple  iopamidol (ISOVUE-370) 76 % injection 0-100 mL  0-100 mL IntraVENous ONCE  
 lidocaine (XYLOCAINE) 10 mg/mL (1 %) injection 1-40 mL  1-40 mL SubCUTAneous Multiple  midazolam (VERSED) injection 1-5 mg  1-5 mg IntraVENous Multiple  vancomycin (VANCOCIN) 1,000 mg in 0.9% sodium chloride (MBP/ADV) 250 mL  1,000 mg IntraVENous ONCE  
 ADDaptor  vancomycin (VANCOCIN) 1,000 mg injection  0.9% sodium chloride (MBP/ADV) infusion  iopamidol (ISOVUE-370) 76 % injection  pravastatin (PRAVACHOL) tablet 20 mg  20 mg Oral DAILY  metoprolol tartrate (LOPRESSOR) tablet 25 mg  25 mg Oral BID  losartan (COZAAR) tablet 50 mg  50 mg Oral DAILY  sodium chloride (NS) flush 5-10 mL  5-10 mL IntraVENous Q8H  
 sodium chloride (NS) flush 5-10 mL  5-10 mL IntraVENous PRN  
 acetaminophen (TYLENOL) tablet 650 mg  650 mg Oral Q4H PRN  
 HYDROcodone-acetaminophen (NORCO) 5-325 mg per tablet 1 Tab  1 Tab Oral Q4H PRN Objective:  
  
Visit Vitals  /84 (BP 1 Location: Left arm, BP Patient Position: Sitting)  Pulse 71  Temp 98 °F (36.7 °C)  Resp 18  Ht 5' 5\" (1.651 m)  Wt 188 lb (85.3 kg)  SpO2 99%  BMI 31.28 kg/m2 Physical Exam: Abdomen: soft, non-tender Extremities: extremities normal 
Heart: regular rate and rhythm Lungs: clear to auscultation bilaterally Pulses: 2+ and symmetric Data Review:  
Labs:   
Recent Labs  
   09/11/18 
 0325 WBC  5.5 HGB  12.4 HCT  38.8 PLT  170 Recent Labs  
   09/12/18 
 0428  09/11/18 
 0325 NA  143  143  
K  3.6  4.0  
CL  111*  110* CO2  25  26 GLU  92  133* BUN  26*  21* CREA  0.70  0.69 CA  9.3  9.6 No results for input(s): TROIQ, CPK, CKMB in the last 72 hours. Intake/Output Summary (Last 24 hours) at 09/12/18 9157 Last data filed at 09/12/18 0781 Gross per 24 hour Intake              200 ml Output              550 ml Net             -350 ml Telemetry: nsr Assessment:  
 
Active Problems: 
  A-fib ([address] (9/10/2018) Plan: Tyler José Manuel is sp ppm for sick sinus/tachy-bridgett syndrome. cxr pending. If cxr wnl, then ok for dc this am. Will resume oac in 48 hours 91 Santos To MD, [facility] 
 
9/12/2018 rash, wound, erythema   Neurological: Negative for dizziness, syncope, weakness, light-headedness.   Psychiatric/Behavioral: Negative for confusion, hallucinations, anxiety  All other systems reviewed and are negative.      Past Medical History:   Past Medical History:   Diagnosis Date    Chronic Hepatic encephalopathy 6/25/2020    Decompensated hepatic cirrhosis 6/25/2020    History of pancreatitis 6/25/2020    Liver cirrhosis secondary to CHAUDHARY 6/25/2020    Macrocytic anemia 6/25/2020    Other ascites 6/25/2020    Thrombocytopenia 6/25/2020       Past Surgical History:   No past surgical history on file.    Social History:   Social History     Tobacco Use    Smoking status: Not on file   Substance Use Topics    Alcohol use: Not on file    Drug use: Not on file       Family History:   No family history on file.      Medications: Scheduled Meds:   enoxaparin  40 mg Subcutaneous Q24H    furosemide  60 mg Oral Daily    lactulose  20 g Oral TID    [START ON 6/26/2020] levothyroxine  150 mcg Oral Before breakfast    midodrine  10 mg Oral TID    [START ON 6/26/2020] pantoprazole  40 mg Oral Before breakfast    rifAXImin  550 mg Oral BID    spironolactone  100 mg Oral Daily     Continuous Infusions:  PRN Meds:.acetaminophen, dextrose 50%, dextrose 50%, glucagon (human recombinant), glucose, glucose, melatonin, ondansetron, ondansetron, polyethylene glycol, sodium chloride 0.9%    Allergies: Patient is allergic to aspirin and ciprofloxacin.    Physical Exam:     Vital Signs (Most Recent):  Temp: 97.4 °F (36.3 °C) (06/25/20 1711)  Pulse: 76 (06/25/20 1600)  Resp: 18 (06/25/20 1600)  BP: 132/81 (06/25/20 1600)  SpO2: 99 % (06/25/20 1600) Vital Signs Range (Last 24H):  Temp:  [97.2 °F (36.2 °C)-98.5 °F (36.9 °C)]   Pulse:  [67-91]   Resp:  [13-19]   BP: ()/(50-81)   SpO2:  [99 %-100 %]    Body mass index is 23.05 kg/m².         Physical Exam:  Constitutional: appears older than stated age, chronically  ill looking,  non-distressed, not diaphoretic.   HENT: NC/AT, external ears normal, oropharynx clear, MMM w/o exudates.   Eyes: PERRL, EOMI, conjunctiva normal, no discharge b/l, +scleral icterus   Neck: normal ROM, supple  CV: RRR, no m/r/g, no carotid bruits, +2 peripheral pulses.  Pulmonary/Chest wall: Breathing comfortably w/o distress, CTAB, no w/r/r, no crackles.    GI: Soft, (+) BS, (+) BM   + mildly distended + mildly tender in epigastric region  Musculoskeletal: Normal ROM, +atrophy,  no pitting edema  Neurological: AAO x 4, CN II-XI in tact, nl sensation, nl strength/tone  No asterixis   Skin: warm, dry   +pallor, jaundice, + spider angiomas,  Psych: normal mood and affect, normal behavior, thought content and judgement.      Labs:      Chemistries:   Recent Labs   Lab 06/25/20  0441   *   K 5.0      CO2 22*   BUN 28*   CREATININE 1.2   CALCIUM 9.9   PROT 6.0   BILITOT 6.3*   ALKPHOS 84   ALT 19   AST 35   MG 2.0   PHOS 3.5        WBC:   Recent Labs   Lab 06/25/20  0441   WBC 7.36     Bands:     CBC/Anemia Labs: Coags:    Recent Labs   Lab 06/25/20  0441   WBC 7.36   HGB 8.9*   HCT 26.9*   PLT 82*   *   RDW 13.2    Recent Labs   Lab 06/25/20  0441   INR 1.3*        Diagnostic Results:    Liver ultrasound    Hepatic cirrhosis and sequelae of portal hypertension including splenomegaly, splenic collateral vessel formation, and moderate volume ascites.     Cholelithiasis and mild gallbladder wall thickening likely secondary to underlying hepatic dysfunction.     No evidence of portal vein thrombosis as clinically questioned.       Assessment and Plan:     Ms. Zeina Mahoney is a 52 y.o. female who presented to [facility] on 6/25/2020 with  Generalized abdominal pain     Active Hospital Problems    Diagnosis  POA    *Generalized abdominal pain [R10.84]  Yes    Decompensated hepatic cirrhosis [K72.90]  Yes    Macrocytic anemia [D53.9]  Yes    Chronic Hepatic encephalopathy [K72.90]  Yes     Liver cirrhosis secondary to CHAUDHARY [K75.81, K74.60]  Yes    Other ascites [R18.8]  Yes    Thrombocytopenia [D69.6]  Yes    History of pancreatitis [Z87.19]  Not Applicable      Resolved Hospital Problems   No resolved problems to display.     Generalized abdominal pain  History of pancreatitis  - unclear etiology at this time.  Labs and presentation not consistent with acute pancreatitis.  Ultrasound of the liver showed no PVT / thrombus and showed moderate amount of ascites. IR paracentesis attempted but insufficient ascites identified for safe paracentesis.  Concern for possible acid reflux as etiology.  No UTI based on UA results  - IR paracentesis attempted but insufficient ascites identified for safe paracentesis.   - will consider CT scan abdominal pain does not improve  - holding off antibiotics  - attempt to GI cocktail, monitor for improvement    Decompensated hepatic cirrhosis  Liver cirrhosis secondary to CHAUDHARY  Other ascites  Chronic Hepatic encephalopathy  MELD-Na score: 20 at 6/25/2020  4:41 AM  MELD score: 18 at 6/25/2020  4:41 AM  Calculated from:  Serum Creatinine: 1.2 mg/dL at 6/25/2020  4:41 AM  Serum Sodium: 134 mmol/L at 6/25/2020  4:41 AM  Total Bilirubin: 6.3 mg/dL at 6/25/2020  4:41 AM  INR(ratio): 1.3 at 6/25/2020  4:41 AM  Age: 52 years 9 months    - listed for transplant at [facility] in [place]  - continue home diuretics as well as lactulose and rifaximin.  No current concerns for acute hepatic encephalopathy or volume overload  - hepatology consulted.  Investigating abdominal pain as above.  - IR paracentesis attempted but insufficient ascites identified for safe paracentesis    Macrocytic anemia  Thrombocytopenia  - stable on chronic due to liver disease  - monitor     Diet:    GI PPx:    DVT PPx:    Goals of Care:      High Risk Conditions:  abd pain    Disposition:    Admitted to hospital medicine    Discharge  in 1-2 days      Signing Physician:     Elen Dumont MD  Department of  Hospital Medicine [facility]  Pager [phone]  Spectra [phone]  6/25/2020

## 2020-07-13 ENCOUNTER — HOSPITAL ENCOUNTER (OUTPATIENT)
Dept: MAMMOGRAPHY | Age: 75
Discharge: HOME | End: 2020-07-13
Attending: INTERNAL MEDICINE
Payer: MEDICARE

## 2020-07-13 DIAGNOSIS — Z12.31 VISIT FOR SCREENING MAMMOGRAM: ICD-10-CM

## 2020-07-13 PROCEDURE — 77063 BREAST TOMOSYNTHESIS BI: CPT

## 2020-07-30 ENCOUNTER — TELEPHONE (OUTPATIENT)
Dept: INTERNAL MEDICINE CLINIC | Age: 75
End: 2020-07-30

## 2020-07-30 RX ORDER — APIXABAN 5 MG/1
TABLET, FILM COATED ORAL
Qty: 60 TAB | Refills: 4 | Status: SHIPPED | OUTPATIENT
Start: 2020-07-30 | End: 2020-08-25 | Stop reason: SDUPTHER

## 2020-08-25 ENCOUNTER — OFFICE VISIT (OUTPATIENT)
Dept: INTERNAL MEDICINE CLINIC | Age: 75
End: 2020-08-25
Payer: MEDICARE

## 2020-08-25 VITALS
SYSTOLIC BLOOD PRESSURE: 120 MMHG | HEIGHT: 65 IN | HEART RATE: 70 BPM | OXYGEN SATURATION: 97 % | WEIGHT: 177 LBS | DIASTOLIC BLOOD PRESSURE: 70 MMHG | RESPIRATION RATE: 16 BRPM | BODY MASS INDEX: 29.49 KG/M2 | TEMPERATURE: 97.9 F

## 2020-08-25 DIAGNOSIS — I48.0 PAF (PAROXYSMAL ATRIAL FIBRILLATION) (HCC): Chronic | ICD-10-CM

## 2020-08-25 DIAGNOSIS — R31.9 URINARY TRACT INFECTION WITH HEMATURIA, SITE UNSPECIFIED: ICD-10-CM

## 2020-08-25 DIAGNOSIS — Z79.899 LONG-TERM USE OF HIGH-RISK MEDICATION: Chronic | ICD-10-CM

## 2020-08-25 DIAGNOSIS — N39.0 URINARY TRACT INFECTION WITH HEMATURIA, SITE UNSPECIFIED: ICD-10-CM

## 2020-08-25 DIAGNOSIS — I10 ESSENTIAL HYPERTENSION: Chronic | ICD-10-CM

## 2020-08-25 DIAGNOSIS — E78.00 HYPERCHOLESTEROLEMIA: Chronic | ICD-10-CM

## 2020-08-25 DIAGNOSIS — Z00.00 MEDICARE ANNUAL WELLNESS VISIT, SUBSEQUENT: Primary | ICD-10-CM

## 2020-08-25 LAB
A-G RATIO,AGRAT: 1.5 RATIO
ALBUMIN SERPL-MCNC: 4.5 G/DL (ref 3.9–5.4)
ALP SERPL-CCNC: 70 U/L (ref 38–126)
ALT SERPL-CCNC: 8 U/L (ref 0–35)
ANION GAP SERPL CALC-SCNC: 8 MMOL/L
AST SERPL W P-5'-P-CCNC: 17 U/L (ref 14–36)
BILIRUB SERPL-MCNC: 1.2 MG/DL (ref 0.2–1.3)
BUN SERPL-MCNC: 26 MG/DL (ref 7–17)
BUN/CREATININE RATIO,BUCR: 33 RATIO
CALCIUM SERPL-MCNC: 10.1 MG/DL (ref 8.4–10.2)
CHLORIDE SERPL-SCNC: 106 MMOL/L (ref 98–107)
CHOL/HDL RATIO,CHHD: 3 RATIO (ref 0–4)
CHOLEST SERPL-MCNC: 147 MG/DL (ref 0–200)
CK SERPL-CCNC: 61 U/L (ref 30–135)
CO2 SERPL-SCNC: 31 MMOL/L (ref 22–32)
CREAT SERPL-MCNC: 0.8 MG/DL (ref 0.7–1.2)
ERYTHROCYTE [DISTWIDTH] IN BLOOD BY AUTOMATED COUNT: 14.2 %
GLOBULIN,GLOB: 3.1
GLUCOSE SERPL-MCNC: 84 MG/DL (ref 65–105)
HCT VFR BLD AUTO: 45 % (ref 37–51)
HDLC SERPL-MCNC: 55 MG/DL (ref 35–130)
HGB BLD-MCNC: 14.2 G/DL (ref 12–18)
LDL/HDL RATIO,LDHD: 1 RATIO
LDLC SERPL CALC-MCNC: 67 MG/DL (ref 0–130)
LYMPHOCYTES ABSOLUTE: 2.1 K/UL (ref 0.6–4.1)
LYMPHOCYTES NFR BLD: 39.3 % (ref 10–58.5)
MCH RBC QN AUTO: 29.2 PG (ref 26–32)
MCHC RBC AUTO-ENTMCNC: 31.6 G/DL (ref 30–36)
MCV RBC AUTO: 92.3 FL (ref 80–97)
MONOCYTES ABS-DIF,2141: 0.6 K/UL (ref 0–1.8)
MONOCYTES NFR BLD: 11 % (ref 0.1–24)
NEUTROPHILS # BLD: 49.7 % (ref 37–92)
NEUTROPHILS ABS,2156: 2.6 K/UL (ref 2–7.8)
PLATELET # BLD AUTO: 189 K/UL (ref 140–440)
POTASSIUM SERPL-SCNC: 4.7 MMOL/L (ref 3.6–5)
PROT SERPL-MCNC: 7.6 G/DL (ref 6.3–8.2)
RBC # BLD AUTO: 4.87 M/UL (ref 4.2–6.3)
SODIUM SERPL-SCNC: 145 MMOL/L (ref 137–145)
TRIGL SERPL-MCNC: 123 MG/DL (ref 0–200)
TSH SERPL DL<=0.05 MIU/L-ACNC: 0.84 UIU/ML (ref 0.34–5.6)
VLDLC SERPL CALC-MCNC: 25 MG/DL
WBC # BLD AUTO: 5.3 K/UL (ref 4.1–10.9)

## 2020-08-25 PROCEDURE — G8432 DEP SCR NOT DOC, RNG: HCPCS | Performed by: INTERNAL MEDICINE

## 2020-08-25 PROCEDURE — G8427 DOCREV CUR MEDS BY ELIG CLIN: HCPCS | Performed by: INTERNAL MEDICINE

## 2020-08-25 PROCEDURE — 1090F PRES/ABSN URINE INCON ASSESS: CPT | Performed by: INTERNAL MEDICINE

## 2020-08-25 PROCEDURE — G8536 NO DOC ELDER MAL SCRN: HCPCS | Performed by: INTERNAL MEDICINE

## 2020-08-25 PROCEDURE — 36415 COLL VENOUS BLD VENIPUNCTURE: CPT | Performed by: INTERNAL MEDICINE

## 2020-08-25 PROCEDURE — 80061 LIPID PANEL: CPT | Performed by: INTERNAL MEDICINE

## 2020-08-25 PROCEDURE — G0439 PPPS, SUBSEQ VISIT: HCPCS | Performed by: INTERNAL MEDICINE

## 2020-08-25 PROCEDURE — G0444 DEPRESSION SCREEN ANNUAL: HCPCS | Performed by: INTERNAL MEDICINE

## 2020-08-25 PROCEDURE — G8399 PT W/DXA RESULTS DOCUMENT: HCPCS | Performed by: INTERNAL MEDICINE

## 2020-08-25 PROCEDURE — 81001 URINALYSIS AUTO W/SCOPE: CPT | Performed by: INTERNAL MEDICINE

## 2020-08-25 PROCEDURE — 99214 OFFICE O/P EST MOD 30 MIN: CPT | Performed by: INTERNAL MEDICINE

## 2020-08-25 PROCEDURE — 82550 ASSAY OF CK (CPK): CPT | Performed by: INTERNAL MEDICINE

## 2020-08-25 PROCEDURE — 84443 ASSAY THYROID STIM HORMONE: CPT | Performed by: INTERNAL MEDICINE

## 2020-08-25 PROCEDURE — 80053 COMPREHEN METABOLIC PANEL: CPT | Performed by: INTERNAL MEDICINE

## 2020-08-25 PROCEDURE — G8754 DIAS BP LESS 90: HCPCS | Performed by: INTERNAL MEDICINE

## 2020-08-25 PROCEDURE — G8752 SYS BP LESS 140: HCPCS | Performed by: INTERNAL MEDICINE

## 2020-08-25 PROCEDURE — 85025 COMPLETE CBC W/AUTO DIFF WBC: CPT | Performed by: INTERNAL MEDICINE

## 2020-08-25 PROCEDURE — 1101F PT FALLS ASSESS-DOCD LE1/YR: CPT | Performed by: INTERNAL MEDICINE

## 2020-08-25 PROCEDURE — G8419 CALC BMI OUT NRM PARAM NOF/U: HCPCS | Performed by: INTERNAL MEDICINE

## 2020-08-25 RX ORDER — LOSARTAN POTASSIUM 100 MG/1
100 TABLET ORAL DAILY
Qty: 90 TAB | Refills: 3 | Status: SHIPPED | OUTPATIENT
Start: 2020-08-25 | End: 2021-09-19

## 2020-08-25 NOTE — PROGRESS NOTES
This note will not be viewable in [address] Jacek Stephens is a 66 y.o. female and presents with No chief complaint on file. .    Subjective:  Mrs. Mulu Melo returns to the office today for Medicare wellness check and follow-up of multiple medical problems. The patient has hypertension and remains on losartan and metoprolol. She tolerates this regimen without cough, rash, lower extremity edema, fatigue or palpitations. Denies headaches, numbness, tingling or focal neurological problems. She is on pravastatin and fish oil for her hypercholesterolemia. She denies muscle soreness or GI upset. She has no history of coronary artery disease and denies exertional chest pains or claudication. She has a history of atrial fibrillation for which she had tachybradycardia syndrome. Patient subsequently underwent permanent pacemaker. She continues to be followed by cardiology. She denies any palpitations, shortness of breath or syncope. She remains on Eliquis for stroke prevention. She has had no bleeding problems related to her medication usage.     Past Medical History:   Diagnosis Date    Allergic conjunctivitis 7/26/2017    Aortic dissection (HCC)     Arthritis     lower back    Asthma     Essential hypertension 8/21/2017    GERD (gastroesophageal reflux disease)     Hypercholesterolemia 8/21/2017    Hyperlipidemia     Hypertension     Intertrigo 7/26/2017    Long-term use of high-risk medication 7/26/2017    LVH (left ventricular hypertrophy) 7/26/2017    [facility]ox[facility]) 8/20/2018    Positional vertigo 7/26/2017    PUD (peptic ulcer disease)     Sciatica of left side 7/26/2017    Spinal stenosis, lumbar region, without neurogenic claudication 7/26/2017    SSS (sick sinus syndrome) ([facility] [address] [address] 9/18/2018     Past Surgical History:   Procedure Laterality Date    COLONOSCOPY N/A 7/12/2017    COLONOSCOPY WITH IV ANTIBIOTICS performed by Eder Mendosa MD at [facility] ENDOSCOPY    COLONOSCOPY,DIAGNOSTIC  7/12/2017         HX GYN      hysterectomy    HX GYN      tubal ligation    HX OTHER SURGICAL  2015    Type A Dissection Repair    TX COLONOSCOPY FLX DX W/COLLJ SPEC WHEN PFRMD  3/19/2012         UPPER GI ENDOSCOPY,BALL DIL,30MM  7/12/2017         UPPER GI ENDOSCOPY,BIOPSY  7/12/2017          No Known Allergies  Current Outpatient Medications   Medication Sig Dispense Refill    Eliquis 5 mg tablet TAKE 1 TABLET BY MOUTH TWICE DAILY 60 Tab 4    losartan (COZAAR) 100 mg tablet Take 1 Tab by mouth daily. 90 Tab 3    metoprolol tartrate (LOPRESSOR) 25 mg tablet Take 1 Tab by mouth two (2) times a day. 180 Tab 3    pravastatin (PRAVACHOL) 20 mg tablet TAKE 1 TABLET BY MOUTH ONCE DAILY 90 Tab 3    acetaminophen (TYLENOL ARTHRITIS PAIN) 650 mg CR tablet Take 650 mg by mouth every six (6) hours as needed for Pain.  omega-3 fatty acids-vitamin e (FISH OIL) 1,000 mg Cap Take 1 Cap by mouth two (2) times a day. Social History     Socioeconomic History    Marital status:      Spouse name: Not on file    Number of children: Not on file    Years of education: Not on file    Highest education level: Not on file   Tobacco Use    Smoking status: Never Smoker    Smokeless tobacco: Never Used   Substance and Sexual Activity    Alcohol use:  Yes     Alcohol/week: 0.0 standard drinks     Comment: rare    Drug use: No     Family History   Problem Relation Age of Onset    Cancer Father         colon cancer       Health Maintenance   Topic Date Due    GLAUCOMA SCREENING Q2Y  07/23/2008    Medicare Yearly Exam  08/20/2020    Lipid Screen  08/20/2020    DTaP/Tdap/Td series (2 - Td) 08/27/2029    Bone Densitometry (Dexa) Screening  Completed    Shingrix Vaccine Age 50>  Completed    Pneumococcal 65+ years  Completed        Review of Systems  Constitutional: negative for fevers, chills, anorexia and weight loss  Eyes:   negative for visual disturbance and irritation  ENT:   negative for tinnitus,sore throat,nasal congestion,ear pain,hoarseness  Respiratory:  negative for cough, hemoptysis, dyspnea,wheezing  CV:   negative for chest pain, palpitations, lower extremity edema  GI:   negative for nausea, vomiting, diarrhea, abdominal pain,melena  Endo:               negative for polyuria,polydipsia,polyphagia,heat intolerance  Genitourinary: negative for frequency, dysuria and hematuria  Integumentary: negative for rash and pruritus  Hematologic:  negative for easy bruising and gum/nose bleeding  Musculoskel: negative for myalgias, arthralgias, back pain, muscle weakness, joint pain  Neurological:  negative for headaches, dizziness, vertigo, memory problems and gait   Behavl/Psych: negative for feelings of anxiety, depression, mood changes  ROS otherwise negative      Objective: There were no vitals taken for this visit. There is no height or weight on file to calculate BMI. Physical Exam:   General appearance - alert, well appearing, and in no distress  Mental status - alert, oriented to person, place, and time  EYE-ANA, EOMI,conjunctiva normal bilaterally, lids normal  ENT-ENT exam normal, no neck nodes or sinus tenderness  Nose - normal and patent, no erythema,  Or discharge   Mouth - mucous membranes moist, pharynx normal without lesions  Neck - supple, no significant adenopathy or bruit  Chest - clear to auscultation, no wheezes, rales or rhonchi. Heart - normal rate, regular rhythm, normal S1, S2, no murmurs, rubs, clicks or gallops   Abdomen - soft, nontender, nondistended, no masses or organomegaly  Lymph- no adenopathy palpable  Ext-peripheral pulses normal, no pedal edema, no clubbing or cyanosis  Skin-Warm and dry. no hyperpigmentation, vitiligo, or suspicious lesions  Neuro -alert, oriented, normal speech, no focal findings or movement disorder noted    In addition this patient is seen for AWV  as detailed below:     This is a Subsequent Medicare Annual Wellness Exam (AWV) (Performed 12 months after IPPE or effective date of Medicare Part B enrollment)    I have reviewed the patient's medical history in detail and updated the computerized patient record. Problem list reviewed with patient and risk factors discussed. PSH, SH, FH, Medications and HM issues also reviewed and discussed. Depression screen, fall risk assessment, functional abilities and ACP also reviewed and discussed as above and below. Depression Risk Factor Screening:     3 most recent PHQ Screens 3/10/2020   Little interest or pleasure in doing things Not at all   Feeling down, depressed, irritable, or hopeless Not at all   Total Score PHQ 2 0     Alcohol Risk Factor Screening:     Alcohol Risk Factor Screening:   Do you average 1 drink per night or more than 7 drinks a week:  No    On any one occasion in the past three months have you have had more than 3 drinks containing alcohol:  No    Functional Ability and Level of Safety:   Hearing Loss  Hearing is good. Activities of Daily Living  The home contains: no safety equipment. Patient does total self care    Fall Risk  Fall Risk Assessment, last 12 mths 3/10/2020   Able to walk? Yes   Fall in past 12 months?  No       Abuse Screen  Patient is not abused    Cognitive Screening   Evaluation of Cognitive Function:  Has your family/caregiver stated any concerns about your memory: no  Normal    Patient Care Team   Patient Care Team:  Yg Boyd MD as PCP - General (Internal Medicine)  Yg Boyd MD as PCP - [facility]  Odessa Saint, MD as Referring Provider (Cardiology)  Warden Geovani MD as Surgeon (Cardiothoracic Surgery)  Manuel Garland MD as Surgeon (Cardiothoracic Surgery)  Rashaun Vázquez NP as Nurse Practitioner (Nurse Practitioner)  Jillian Gallardo NP as Nurse Practitioner (Cardiology)  Osorio Recio MD as Physician (Cardiology)  KARIME Solorio as Nurse Practitioner (Nurse Practitioner)  KARIME Buchanan as Nurse Practitioner (Nurse Practitioner)    Assessment/Plan   Education and counseling provided:  Are appropriate based on today's review and evaluation    Assessment/Plan:   Impressions:      ICD-10-CM ICD-9-CM    1. Medicare annual wellness visit, subsequent  Z00.00 V70.0    2. Essential hypertension  I10 401.9    3. Hypercholesterolemia  E78.00 272.0    4. Long-term use of high-risk medication  Z79.899 V58.69    5. PAF (paroxysmal atrial fibrillation) ([facility])  I48.0 427.31         Plan:  1. Continue present meds  2. Lifestyle modifications including Na restriction, low carb/fat diet, weight reduction and exercise (at least a walking program). No orders of the defined types were placed in this encounter. Heath Spring MD   Assessment/Plan:  Diagnoses and all orders for this visit:    Medicare annual wellness visit, subsequent    Essential hypertension    Hypercholesterolemia    Long-term use of high-risk medication    PAF (paroxysmal atrial fibrillation) [facility])        Health Maintenance Due   Topic Date Due    GLAUCOMA SCREENING Q2Y  07/23/2008    Medicare Yearly Exam  08/20/2020    Lipid Screen  08/20/2020       Other instructions: The patient's medications were reviewed and reconciled. No change in her current medical regimen will be made. A no added salt, prudent diet is encouraged    Health maintenance issues are up-to-date    Age-appropriate vaccinations are up-to-date and she will receive an influenza vaccination next month    Continued follow-up by cardiology    Await results of multiple labs    Follow-up yearly        I have reviewed with the patient details of the assessment and plan and all questions were answered. Relevent patient education was performed. The most recent lab findings were reviewed with the patient. An After Visit Summary was printed and given to the patient.     Heath Spring MD    Please note that this dictation was completed with Dragon, the computer voice recognition software. Quite often unanticipated grammatical, syntax, homophones, and other interpretive errors are inadvertently transcribed by the computer software. Please disregard these errors. Please excuse any errors that have escaped final proofreading.

## 2020-08-25 NOTE — PROGRESS NOTES
Chief Complaint   Patient presents with    Follow Up Chronic Condition     6 mo fu    Annual Wellness Visit       Depression Risk Factor Screening:     3 most recent PHQ Screens 3/10/2020   Little interest or pleasure in doing things Not at all   Feeling down, depressed, irritable, or hopeless Not at all   Total Score PHQ 2 0       Functional Ability and Level of Safety:     Activities of Daily Living  ADL Assessment 2/25/2020   Feeding yourself No Help Needed   Getting from bed to chair No Help Needed   Getting dressed No Help Needed   Bathing or showering No Help Needed   Walk across the room (includes cane/walker) No Help Needed   Using the telphone No Help Needed   Taking your medications No Help Needed   Preparing meals No Help Needed   Managing money (expenses/bills) No Help Needed   Moderately strenuous housework (laundry) No Help Needed   Shopping for personal items (toiletries/medicines) No Help Needed   Shopping for groceries No Help Needed   Driving No Help Needed   Climbing a flight of stairs No Help Needed   Getting to places beyond walking distances No Help Needed       Fall Risk  Fall Risk Assessment, last 12 mths 3/10/2020   Able to walk? Yes   Fall in past 12 months? No       Abuse Screen  Abuse Screening Questionnaire 3/10/2020   Do you ever feel afraid of your partner? N   Are you in a relationship with someone who physically or mentally threatens you? N   Is it safe for you to go home?  Y         Patient Care Team   Patient Care Team:  Marshal Rawls MD as PCP - General (Internal Medicine)  Marshal Rawls MD as PCP - [facility] Empaneled Provider  Ama Zaldivar MD as Referring Provider (Cardiology)  Adolfo Peterson MD as Surgeon (Cardiothoracic Surgery)  Robina Walters MD as Surgeon (Cardiothoracic Surgery)  Davina Dalton NP as Nurse Practitioner (Nurse Practitioner)  Clinton Jones NP as Nurse Practitioner (Cardiology)  Angela Alexander MD as Physician (Cardiology)  KARIME Crawford as Nurse Practitioner (Nurse Practitioner)  KARIME Crawford as Nurse Practitioner (Nurse Practitioner)

## 2020-08-25 NOTE — PATIENT INSTRUCTIONS
Atrial Fibrillation: Care Instructions Your Care Instructions Atrial fibrillation is an irregular and often fast heartbeat. Treating this condition is important for several reasons. It can cause blood clots, which can travel from your heart to your brain and cause a stroke. If you have a fast heartbeat, you may feel lightheaded, dizzy, and weak. An irregular heartbeat can also increase your risk for heart failure. Atrial fibrillation is often the result of another heart condition, such as high blood pressure or coronary artery disease. Making changes to improve your heart condition will help you stay healthy and active. Follow-up care is a key part of your treatment and safety. Be sure to make and go to all appointments, and call your doctor if you are having problems. It's also a good idea to know your test results and keep a list of the medicines you take. How can you care for yourself at home? Medicines · Take your medicines exactly as prescribed. Call your doctor if you think you are having a problem with your medicine. You will get more details on the specific medicines your doctor prescribes. · If your doctor has given you a blood thinner to prevent a stroke, be sure you get instructions about how to take your medicine safely. Blood thinners can cause serious bleeding problems. · Do not take any vitamins, over-the-counter drugs, or herbal products without talking to your doctor first. 
Lifestyle changes · Do not smoke. Smoking can increase your chance of a stroke and heart attack. If you need help quitting, talk to your doctor about stop-smoking programs and medicines. These can increase your chances of quitting for good. · Eat a heart-healthy diet. · Stay at a healthy weight. Lose weight if you need to. · Limit alcohol to 2 drinks a day for men and 1 drink a day for women. Too much alcohol can cause health problems. · Avoid colds and flu. Get a pneumococcal vaccine shot.  If you have had one before, ask your doctor whether you need another dose. Get a flu shot every year. If you must be around people with colds or flu, wash your hands often. Activity · If your doctor recommends it, get more exercise. Walking is a good choice. Bit by bit, increase the amount you walk every day. Try for at least 30 minutes on most days of the week. You also may want to swim, bike, or do other activities. Your doctor may suggest that you join a cardiac rehabilitation program so that you can have help increasing your physical activity safely. · Start light exercise if your doctor says it is okay. Even a small amount will help you get stronger, have more energy, and manage stress. Walking is an easy way to get exercise. Start out by walking a little more than you did in the hospital. Gradually increase the amount you walk. · When you exercise, watch for signs that your heart is working too hard. You are pushing too hard if you cannot talk while you are exercising. If you become short of breath or dizzy or have chest pain, sit down and rest immediately. · Check your pulse regularly. Place two fingers on the artery at the palm side of your wrist, in line with your thumb. If your heartbeat seems uneven or fast, talk to your doctor. When should you call for help? [id number] anytime you think you may need emergency care. For example, call if: 
· You have symptoms of a heart attack. These may include: 
? Chest pain or pressure, or a strange feeling in the chest. 
? Sweating. ? Shortness of breath. ? Nausea or vomiting. ? Pain, pressure, or a strange feeling in the back, neck, jaw, or upper belly or in one or both shoulders or arms. ? Lightheadedness or sudden weakness. ? A fast or irregular heartbeat. After you call 911, the  may tell you to chew 1 adult-strength or 2 to 4 low-dose aspirin. Wait for an ambulance. Do not try to drive yourself. · You have symptoms of a stroke. These may include: ? Sudden numbness, tingling, weakness, or loss of movement in your face, arm, or leg, especially on only one side of your body. ? Sudden vision changes. ? Sudden trouble speaking. ? Sudden confusion or trouble understanding simple statements. ? Sudden problems with walking or balance. ? A sudden, severe headache that is different from past headaches. · You passed out (lost consciousness). Call your doctor now or seek immediate medical care if: 
· You have new or increased shortness of breath. · You feel dizzy or lightheaded, or you feel like you may faint. · Your heart rate becomes irregular. · You can feel your heart flutter in your chest or skip heartbeats. Tell your doctor if these symptoms are new or worse. Watch closely for changes in your health, and be sure to contact your doctor if you have any problems. Where can you learn more? Go to http://kim-natalia.info/ Enter U020 in the search box to learn more about \"Atrial Fibrillation: Care Instructions. \" Current as of: December 16, 2019               Content Version: 12.5 © 6798-4525 [employer]. Care instructions adapted under license by [employer] (which disclaims liability or warranty for this information). If you have questions about a medical condition or this instruction, always ask your healthcare professional. [address] any warranty or liability for your use of this information. The best way to stay healthy is to live a healthy lifestyle. A healthy lifestyle includes regular exercise, eating a well-balanced diet, keeping a healthy weight and not smoking. Regular physical exams and screening tests are another important way to take care of yourself. Preventive exams provided by health care providers can find health problems early when treatment works best and can keep you from getting certain diseases or illnesses.  Preventive services include exams, lab tests, screenings, shots, monitoring and information to help you take care of your own health. All people over 65 should have a pneumonia shot. Pneumonia shots are usually only needed once in a lifetime unless your doctor decides differently. In addition to your physical exam, some screening tests are recommended: 
 
All people over 65 should have a yearly flu shot. People over 65 are at medium to high risk for Hepatitis B. Three shots are needed for complete protection. Bone mass measurement (dexa scan) is recommended every two years. Diabetes Mellitus screening is recommended every year. Glaucoma is an eye disease caused by high pressure in the eye. An eye exam is recommended every year. Cardiovascular screening tests that check your cholesterol and other blood fat (lipid) levels are recommended every five years. Colorectal Cancer screening tests help to find pre-cancerous polyps (growths in the colon) so they can be removed before they turn into cancer. Tests ordered for screening depend on your personal and family history risk factors. Prostate Cancer Screening (annually up to age 76) Screening for breast cancer is recommended yearly with a Mammogram. 
 
Screening for cervical and vaginal cancer is recommended with a pelvic and Pap test every two years. However if you have had an abnormal pap in the past  three years or at high risk for cervical or vaginal cancer Medicare will cover a pap test and a pelvic exam every year. Here is a list of your current Health Maintenance items with a due date: 
Health Maintenance Due Topic Date Due  Glaucoma Screening   07/23/2008 Grace Interiano Annual Well Visit  08/20/2020  Cholesterol Test   08/20/2020

## 2020-08-27 LAB
BACTERIA,BACTU: ABNORMAL
BILIRUB UR QL: NEGATIVE
CLARITY: CLEAR
COLOR UR: ABNORMAL
GLUCOSE 24H UR-MRATE: NEGATIVE G/(24.H)
HGB UR QL STRIP: ABNORMAL
KETONES UR QL STRIP.AUTO: NEGATIVE
LEUKOCYTE ESTERASE: ABNORMAL
NITRITE UR QL STRIP.AUTO: ABNORMAL
PH UR STRIP: 5 [PH] (ref 5–7)
PROT UR STRIP-MCNC: ABNORMAL MG/DL
RBC #/AREA URNS HPF: ABNORMAL #/HPF
SP GR UR REFRACTOMETRY: 1.02 (ref 1–1.03)
URATE CRY URNS QL MICRO: ABNORMAL
UROBILINOGEN UR QL STRIP.AUTO: NEGATIVE
WBC URNS QL MICRO: ABNORMAL #/HPF

## 2020-08-30 LAB — BACTERIA UR CULT: ABNORMAL

## 2020-08-30 NOTE — PROGRESS NOTES
Please notify patient. Labs stable.    No change in current management/meds  Has UTI - rx: macrobid 100 mg bid #14

## 2020-08-31 ENCOUNTER — TELEPHONE (OUTPATIENT)
Dept: INTERNAL MEDICINE CLINIC | Age: 75
End: 2020-08-31

## 2020-08-31 RX ORDER — NITROFURANTOIN 25; 75 MG/1; MG/1
100 CAPSULE ORAL 2 TIMES DAILY
Qty: 14 CAP | Refills: 0 | Status: SHIPPED | OUTPATIENT
Start: 2020-08-31 | End: 2021-01-07 | Stop reason: ALTCHOICE

## 2020-08-31 NOTE — TELEPHONE ENCOUNTER
----- Message from Carmencita Daly MD sent at 8/30/2020 11:01 AM EDT -----  Please notify patient. Labs stable.    No change in current management/meds  Has UTI - rx: macrobid 100 mg bid #14

## 2020-08-31 NOTE — TELEPHONE ENCOUNTER
Patient advised of lab results- please send pended Rx to [facility]  Requested Prescriptions     Pending Prescriptions Disp Refills    nitrofurantoin, macrocrystal-monohydrate, (MACROBID) 100 mg capsule 14 Cap 0     Sig: Take 1 Cap by mouth two (2) times a day.

## 2020-09-10 ENCOUNTER — OFFICE VISIT (OUTPATIENT)
Dept: CARDIOLOGY CLINIC | Age: 75
End: 2020-09-10
Payer: MEDICARE

## 2020-09-10 DIAGNOSIS — I49.5 SSS (SICK SINUS SYNDROME) (HCC): ICD-10-CM

## 2020-09-10 DIAGNOSIS — Z95.0 CARDIAC PACEMAKER IN SITU: Primary | ICD-10-CM

## 2020-09-10 PROCEDURE — 93294 REM INTERROG EVL PM/LDLS PM: CPT | Performed by: INTERNAL MEDICINE

## 2020-09-10 PROCEDURE — 93296 REM INTERROG EVL PM/IDS: CPT | Performed by: INTERNAL MEDICINE

## 2020-11-02 DIAGNOSIS — E78.00 HYPERCHOLESTEROLEMIA: Chronic | ICD-10-CM

## 2020-11-03 DIAGNOSIS — E78.00 HYPERCHOLESTEROLEMIA: Chronic | ICD-10-CM

## 2020-11-03 RX ORDER — PRAVASTATIN SODIUM 20 MG/1
TABLET ORAL
Qty: 90 TAB | Refills: 0 | Status: SHIPPED | OUTPATIENT
Start: 2020-11-03 | End: 2020-11-04

## 2020-11-04 RX ORDER — PRAVASTATIN SODIUM 20 MG/1
TABLET ORAL
Qty: 90 TAB | Refills: 0 | Status: SHIPPED | OUTPATIENT
Start: 2020-11-04 | End: 2021-05-03

## 2020-12-04 ENCOUNTER — TELEPHONE (OUTPATIENT)
Dept: INTERNAL MEDICINE CLINIC | Age: 75
End: 2020-12-04

## 2020-12-04 NOTE — TELEPHONE ENCOUNTER
Patient was tested at [facility] for Covid. Told on Sunday she was negative then received text on Wednesday she is positive. Patient states every time she eats she goes to bathroom.   Would like a call back to advise her on what to do

## 2020-12-04 NOTE — TELEPHONE ENCOUNTER
Clear liquids. Imodium every 4 hrs if having diarrhea within 4 hrs.  Hold imodium until having diarre if diarrhea > 4 hrs

## 2020-12-04 NOTE — TELEPHONE ENCOUNTER
Spoke to patient- she said she will try Imodium for diarrhea and will return to [facility] of she feels worse over the weekend.

## 2020-12-08 ENCOUNTER — TELEPHONE (OUTPATIENT)
Dept: INTERNAL MEDICINE CLINIC | Age: 75
End: 2020-12-08

## 2020-12-08 NOTE — TELEPHONE ENCOUNTER
Patient states she has had covid since Nov 29th. She is still weak, no appetite and cold. She has no fever or shortness of breath. She is not feeling well. Please advise.      [phone]

## 2020-12-08 NOTE — TELEPHONE ENCOUNTER
Needs to make sure that she is drinking plenty of fluids and eating to keep up her strength. The fatigue is a prominent problem with the Covid virus. It may take several weeks to recover from it.

## 2020-12-17 ENCOUNTER — OFFICE VISIT (OUTPATIENT)
Dept: CARDIOLOGY CLINIC | Age: 75
End: 2020-12-17
Payer: MEDICARE

## 2020-12-17 DIAGNOSIS — I49.5 SSS (SICK SINUS SYNDROME) (HCC): ICD-10-CM

## 2020-12-17 DIAGNOSIS — Z95.0 CARDIAC PACEMAKER IN SITU: Primary | ICD-10-CM

## 2020-12-17 PROCEDURE — 93294 REM INTERROG EVL PM/LDLS PM: CPT | Performed by: INTERNAL MEDICINE

## 2020-12-17 PROCEDURE — 93296 REM INTERROG EVL PM/IDS: CPT | Performed by: INTERNAL MEDICINE

## 2021-01-07 ENCOUNTER — OFFICE VISIT (OUTPATIENT)
Dept: INTERNAL MEDICINE CLINIC | Age: 76
End: 2021-01-07
Payer: MEDICARE

## 2021-01-07 VITALS
TEMPERATURE: 98.5 F | DIASTOLIC BLOOD PRESSURE: 78 MMHG | OXYGEN SATURATION: 99 % | HEIGHT: 65 IN | HEART RATE: 69 BPM | WEIGHT: 171.2 LBS | SYSTOLIC BLOOD PRESSURE: 128 MMHG | BODY MASS INDEX: 28.52 KG/M2 | RESPIRATION RATE: 16 BRPM

## 2021-01-07 DIAGNOSIS — J96.01 ACUTE RESPIRATORY FAILURE WITH HYPOXEMIA (HCC): ICD-10-CM

## 2021-01-07 DIAGNOSIS — I10 ESSENTIAL HYPERTENSION: Chronic | ICD-10-CM

## 2021-01-07 DIAGNOSIS — U07.1 PNEUMONIA DUE TO COVID-19 VIRUS: Primary | ICD-10-CM

## 2021-01-07 DIAGNOSIS — G93.31 POSTVIRAL FATIGUE SYNDROME: ICD-10-CM

## 2021-01-07 DIAGNOSIS — J12.82 PNEUMONIA DUE TO COVID-19 VIRUS: Primary | ICD-10-CM

## 2021-01-07 PROBLEM — I49.5 TACHY-BRADY SYNDROME (HCC): Chronic | Status: ACTIVE | Noted: 2018-09-18

## 2021-01-07 PROCEDURE — 99214 OFFICE O/P EST MOD 30 MIN: CPT | Performed by: INTERNAL MEDICINE

## 2021-01-07 PROCEDURE — 1090F PRES/ABSN URINE INCON ASSESS: CPT | Performed by: INTERNAL MEDICINE

## 2021-01-07 PROCEDURE — G8754 DIAS BP LESS 90: HCPCS | Performed by: INTERNAL MEDICINE

## 2021-01-07 PROCEDURE — G8752 SYS BP LESS 140: HCPCS | Performed by: INTERNAL MEDICINE

## 2021-01-07 PROCEDURE — G8427 DOCREV CUR MEDS BY ELIG CLIN: HCPCS | Performed by: INTERNAL MEDICINE

## 2021-01-07 PROCEDURE — 1101F PT FALLS ASSESS-DOCD LE1/YR: CPT | Performed by: INTERNAL MEDICINE

## 2021-01-07 PROCEDURE — G8510 SCR DEP NEG, NO PLAN REQD: HCPCS | Performed by: INTERNAL MEDICINE

## 2021-01-07 PROCEDURE — G8536 NO DOC ELDER MAL SCRN: HCPCS | Performed by: INTERNAL MEDICINE

## 2021-01-07 PROCEDURE — G8399 PT W/DXA RESULTS DOCUMENT: HCPCS | Performed by: INTERNAL MEDICINE

## 2021-01-07 PROCEDURE — G8419 CALC BMI OUT NRM PARAM NOF/U: HCPCS | Performed by: INTERNAL MEDICINE

## 2021-01-07 RX ORDER — CHOLECALCIFEROL TAB 125 MCG (5000 UNIT) 125 MCG
TAB ORAL DAILY
COMMUNITY
End: 2022-08-22

## 2021-01-07 NOTE — PATIENT INSTRUCTIONS
Learning About Coronavirus [phone])  Coronavirus [phone]): Overview  What is coronavirus (COVID-19)? The coronavirus disease (COVID-19) is caused by a virus. It is an illness that was first found in December 2019. It has since spread worldwide. The virus can cause fever, cough, and trouble breathing. In severe cases, it can cause pneumonia and make it hard to breathe without help. It can cause death. This virus spreads person-to-person through droplets from coughing and sneezing. It can also spread when you are close to someone who is infected. And it can spread when you touch something that has the virus on it, such as a doorknob or a tabletop. Coronaviruses are a large group of viruses. They cause the common cold. They also cause more serious illnesses like [place] East respiratory syndrome (MERS) and severe acute respiratory syndrome (SARS). COVID-19 is caused by a novel coronavirus. That means it's a new type that has not been seen in people before. How is COVID-19 treated? Mild illness can be treated at home, but more serious illness needs to be treated in the hospital. Treatment may include medicines to reduce symptoms, plus breathing support such as oxygen therapy or a ventilator. Other treatments, such as antiviral medicines, may help people who have COVID-19. What can you do to protect yourself from COVID-19? The best way to protect yourself from getting sick is to:  · Avoid areas where there is an outbreak. · Avoid contact with people who may be infected. · Avoid crowds and try to stay at least 6 feet away from other people. · Wash your hands often, especially after you cough or sneeze. Use soap and water, and scrub for at least 20 seconds. If soap and water aren't available, use an alcohol-based hand . · Avoid touching your mouth, nose, and eyes. What can you do to avoid spreading the virus to others?   To help avoid spreading the virus to others:  · Wash your hands often with soap or alcohol-based hand sanitizers. · Cover your mouth with a tissue when you cough or sneeze. Then throw the tissue in the trash. · Use a disinfectant to clean things that you touch often. These include doorknobs, remote controls, phones, and handles on your refrigerator and microwave. And don't forget countertops, tabletops, bathrooms, and computer keyboards. · Wear a cloth face cover if you have to go to public areas. If you know or suspect that you have COVID-19:  · Stay home. Don't go to school, work, or public areas. And don't use public transportation, ride-shares, or taxis unless you have no choice. · Leave your home only if you need to get medical care or testing. But call the doctor's office first so they know you're coming. And wear a face cover. · Limit contact with people in your home. If possible, stay in a separate bedroom and use a separate bathroom. · Wear a face cover whenever you're around other people. It can help stop the spread of the virus when you cough or sneeze. · Clean and disinfect your home every day. Use household  and disinfectant wipes or sprays. Take special care to clean things that you grab with your hands. · Self-isolate until it's safe to be around others again. ? If you have symptoms, it's safe when you haven't had a fever for 3 days and your symptoms have improved and it's been at least 10 days since your symptoms started. ? If you were exposed to the virus but don't have symptoms, it's safe to be around others 14 days after exposure. ? Talk to your doctor about whether you also need testing, especially if you have a weakened immune system. When to call for help  Call 911 anytime you think you may need emergency care. For example, call if:  · You have severe trouble breathing. (You can't talk at all.)  · You have constant chest pain or pressure. · You are severely dizzy or lightheaded. · You are confused or can't think clearly.   · Your face and lips have a blue color. · You passed out (lost consciousness) or are very hard to wake up. Call your doctor now if you develop symptoms such as:  · Shortness of breath. · Fever. · Cough. If you need to get care, call ahead to the doctor's office for instructions before you go. Make sure you wear a face cover to prevent exposing other people to the virus. Where can you get the latest information? The following health organizations are tracking and studying this virus. Their websites contain the most up-to-date information. Alexandria Rodriguez also learn what to do if you think you may have been exposed to the virus. · U.S. Centers for Disease Control and Prevention (CDC): The CDC provides updated news about the disease and travel advice. The website also tells you how to prevent the spread of infection. www.cdc.gov  · World Health Organization [facility]): WHO offers information about the virus outbreaks. WHO also has travel advice. www.who.int  Current as of: July 10, 2020               Content Version: 12.6  © 2006-2020 [employer]. Care instructions adapted under license by [employer] (which disclaims liability or warranty for this information). If you have questions about a medical condition or this instruction, always ask your healthcare professional. [address] any warranty or liability for your use of this information.

## 2021-01-07 NOTE — PROGRESS NOTES
Subjective:     Mrs. Melia Wilson is a 52-year-old female with multiple medical problems who presents to the office today and transition of care subsequent to a hospitalization at [address] [place] [address], around 12/8 until 12/15/2020 at which time she was admitted with Covid-19 pneumonia. We do not currently have any medical records available to review in regards to her medical stay. The patient's history is remarkable in that she was diagnosed at the end of November has been Covid-19+. She did have GI symptoms associated with her illness. She continued to have a downward decline in regards to energy and ability to take care of herself. She presented to [facility] short of breath with activity and was admitted with Covid pneumonia and acute respiratory failure with hypoxia. The patient was treated with oxygen, remdesivir, dexamethasone, plasma and antibiotics. She slowly improved however needed to remain on oxygen with physical activities. She was sent home on 4 L/min to be used with physical activity but none when sitting at rest.  She notes that her breathing has improved and has had no further cough or chest congestion. She denies any fevers. She has had no GI symptoms. She remains on losartan and metoprolol for her blood pressure and has had no orthostatic dizziness. She is eating well and ambulating without difficulty. She was supposed to have home health physical therapy at discharge but for some reason this fell through the cracks and was never ordered.     Past Medical History:   Diagnosis Date    Allergic conjunctivitis 7/26/2017    Aortic dissection (HCC)     Arthritis     lower back    Asthma     Essential hypertension 8/21/2017    GERD (gastroesophageal reflux disease)     Hypercholesterolemia 8/21/2017    Hyperlipidemia     Hypertension     Intertrigo 7/26/2017    Long-term use of high-risk medication 7/26/2017    LVH (left ventricular hypertrophy) 7/26/2017    PAF (paroxysmal atrial fibrillation) ([facility] 8/20/2018    Positional vertigo 7/26/2017    PUD (peptic ulcer disease)     Sciatica of left side 7/26/2017    Spinal stenosis, lumbar region, without neurogenic claudication 7/26/2017    SSS (sick sinus syndrome) ([facility] 9/18/2018     Past Surgical History:   Procedure Laterality Date    COLONOSCOPY N/A 7/12/2017    COLONOSCOPY WITH IV ANTIBIOTICS performed [facility] Finn [facility]ullen [facility]  7/12/2017         HX GYN      hysterectomy    HX GYN      tubal ligation    HX OTHER SURGICAL  2015    Type A Dissection Repair    OR COLONOSCOPY FLX DX W/COLLJ SPEC WHEN PFRMD  3/19/2012         UPPER GI ENDOSCOPY,BALL DIL,30MM  7/12/2017         UPPER GI ENDOSCOPY,BIOPSY  7/12/2017          No Known Allergies  Current Outpatient Medications   Medication Sig Dispense Refill    cholecalciferol (Vitamin D3) (5000 Units/125 mcg) tab tablet Take  by mouth daily.  pravastatin (PRAVACHOL) 20 mg tablet Take 1 tablet by mouth once daily 90 Tab 0    apixaban (Eliquis) 5 mg tablet TAKE 1 TABLET BY MOUTH TWICE DAILY 180 Tab 3    losartan (COZAAR) 100 mg tablet Take 1 Tab by mouth daily. 90 Tab 3    metoprolol tartrate (LOPRESSOR) 25 mg tablet Take 1 Tab by mouth two (2) times a day. 180 Tab 3    acetaminophen (TYLENOL ARTHRITIS PAIN) 650 mg CR tablet Take 650 mg by mouth every six (6) hours as needed for Pain.  omega-3 fatty acids-vitamin e (FISH OIL) 1,000 mg Cap Take 1 Cap by mouth two (2) times a day. Social History     Socioeconomic History    Marital status:      Spouse name: Not on file    Number of children: Not on file    Years of education: Not on file    Highest education level: Not on file   Tobacco Use    Smoking status: Never Smoker    Smokeless tobacco: Never Used   Substance and Sexual Activity    Alcohol use:  Yes     Alcohol/week: 0.0 standard drinks     Comment: rare    Drug use: No     Family History   Problem Relation Age of Onset    Cancer Father         colon cancer       Review of Systems:  GEN: no weight loss, weight gain, fatigue or night sweats  CV: no PND, orthopnea, or palpitations  Resp: no dyspnea on exertion, no cough  Abd: no nausea, vomiting or diarrhea  EXT: denies edema, claudication  Endocrine: no hair loss, excessive thirst or polyuria  Neurological ROS: no TIA or stroke symptoms  ROS otherwise negative      Objective:     Visit Vitals  /78 (BP 1 Location: Left arm, BP Patient Position: Sitting)   Pulse 69   Temp 98.5 °F (36.9 °C) (Oral)   Resp 16   Ht 5' 5\" (1.651 m)   Wt 171 lb 3.2 oz (77.7 kg)   SpO2 99% Comment: on 4 lpm of O2   BMI 28.49 kg/m²     Body mass index is 28.49 kg/m². General:   alert, cooperative and no distress   Eyes: conjunctivae/sclerae clear. PERRL, EOM's intact   Mouth:  No oral lesions, no pharyngeal erythema, no exudates   Neck: Trachea midline, no thyromegaly, no bruits   Heart: S1 and S2 normal,no murmurs noted    Lungs: Clear to auscultation bilaterally, no increased work of breathing   Abdomen: Soft, nontender. Normal bowel sounds   Extremities: No edema or cyanosis   Neuro: ..alert, oriented x3,speech normal in context and clarity, cranial nerves II-XII intact,motor strength: full proximally and distally,gait: normal  reflexes: full and symmetric     Physical exam otherwise negative         Assessment/Plan:     Diagnoses and all orders for this visit:    Pneumonia due to COVID-19 virus    Acute respiratory failure with hypoxemia (HCC)    Postviral fatigue syndrome    Essential hypertension        Other instructions: The patient's medications were reviewed and reconciled. No change in her current medical regimen will be made. Progressive activity recommended. Reduce oxygen by 1 L/min on a weekly basis. We will have her return for follow-up in 4 weeks time. Follow-up and Dispositions    · Return in about 4 weeks (around 2/4/2021).          Shayy Graves MD    Please note that this dictation was completed with [email or web address], the computer voice recognition software. Quite often unanticipated grammatical, syntax, homophones, and other interpretive errors are inadvertently transcribed by the computer software. Please disregard these errors. Please excuse any errors that have escaped final proofreading.

## 2021-01-07 NOTE — PROGRESS NOTES
Levar Randhawa is a 66 y.o. female presenting for Transitions Of Care  . 1. Have you been to the ER, urgent care clinic since your last visit? Hospitalized since your last visit[place] [facility] [place]    2. Have you seen or consulted any other health care providers outside of the [address] [place] [address] since your last visit? Include any pap smears or colon screening. No    Fall Risk Assessment, last 12 mths 1/7/2021   Able to walk? Yes   Fall in past 12 months? 0   Do you feel unsteady? 0   Are you worried about falling 0         Abuse Screening Questionnaire 3/10/2020   Do you ever feel afraid of your partner? N   Are you in a relationship with someone who physically or mentally threatens you? N   Is it safe for you to go home? Y       3 most recent PHQ Screens 1/7/2021   Little interest or pleasure in doing things Not at all   Feeling down, depressed, irritable, or hopeless Not at all   Total Score PHQ 2 0       There are no discontinued medications.

## 2021-02-11 ENCOUNTER — OFFICE VISIT (OUTPATIENT)
Dept: INTERNAL MEDICINE CLINIC | Age: 76
End: 2021-02-11
Payer: MEDICARE

## 2021-02-11 VITALS
TEMPERATURE: 97.9 F | WEIGHT: 173 LBS | HEIGHT: 65 IN | BODY MASS INDEX: 28.82 KG/M2 | SYSTOLIC BLOOD PRESSURE: 126 MMHG | RESPIRATION RATE: 16 BRPM | HEART RATE: 76 BPM | OXYGEN SATURATION: 96 % | DIASTOLIC BLOOD PRESSURE: 76 MMHG

## 2021-02-11 DIAGNOSIS — G93.31 POSTVIRAL FATIGUE SYNDROME: ICD-10-CM

## 2021-02-11 DIAGNOSIS — I10 ESSENTIAL HYPERTENSION: Chronic | ICD-10-CM

## 2021-02-11 DIAGNOSIS — J12.82 PNEUMONIA DUE TO COVID-19 VIRUS: Primary | ICD-10-CM

## 2021-02-11 DIAGNOSIS — J96.01 ACUTE RESPIRATORY FAILURE WITH HYPOXEMIA (HCC): ICD-10-CM

## 2021-02-11 DIAGNOSIS — U07.1 PNEUMONIA DUE TO COVID-19 VIRUS: Primary | ICD-10-CM

## 2021-02-11 PROCEDURE — 1101F PT FALLS ASSESS-DOCD LE1/YR: CPT | Performed by: INTERNAL MEDICINE

## 2021-02-11 PROCEDURE — G8432 DEP SCR NOT DOC, RNG: HCPCS | Performed by: INTERNAL MEDICINE

## 2021-02-11 PROCEDURE — G8754 DIAS BP LESS 90: HCPCS | Performed by: INTERNAL MEDICINE

## 2021-02-11 PROCEDURE — G8427 DOCREV CUR MEDS BY ELIG CLIN: HCPCS | Performed by: INTERNAL MEDICINE

## 2021-02-11 PROCEDURE — G8419 CALC BMI OUT NRM PARAM NOF/U: HCPCS | Performed by: INTERNAL MEDICINE

## 2021-02-11 PROCEDURE — 99213 OFFICE O/P EST LOW 20 MIN: CPT | Performed by: INTERNAL MEDICINE

## 2021-02-11 PROCEDURE — G8399 PT W/DXA RESULTS DOCUMENT: HCPCS | Performed by: INTERNAL MEDICINE

## 2021-02-11 PROCEDURE — 1090F PRES/ABSN URINE INCON ASSESS: CPT | Performed by: INTERNAL MEDICINE

## 2021-02-11 PROCEDURE — G8536 NO DOC ELDER MAL SCRN: HCPCS | Performed by: INTERNAL MEDICINE

## 2021-02-11 PROCEDURE — G8752 SYS BP LESS 140: HCPCS | Performed by: INTERNAL MEDICINE

## 2021-02-11 NOTE — PROGRESS NOTES
Gustavo Frances is a 66 y.o. female presenting for Follow Up Chronic Condition (4 week fu)  . 1. Have you been to the ER, urgent care clinic since your last visit? Hospitalized since your last visit? No    2. Have you seen or consulted any other health care providers outside of the [address] [place] [address] since your last visit? Include any pap smears or colon screening. Eye Dr Raven Guillen, last 12 mths 1/7/2021   Able to walk? Yes   Fall in past 12 months? 0   Do you feel unsteady? 0   Are you worried about falling 0         Abuse Screening Questionnaire 2/11/2021   Do you ever feel afraid of your partner? N   Are you in a relationship with someone who physically or mentally threatens you? N   Is it safe for you to go home? Y       3 most recent PHQ Screens 1/7/2021   Little interest or pleasure in doing things Not at all   Feeling down, depressed, irritable, or hopeless Not at all   Total Score PHQ 2 0       There are no discontinued medications.

## 2021-02-11 NOTE — PROGRESS NOTES
Subjective:     Mrs. Zackary Sheth returns to the office today in follow-up of her recent Covid19 pneumonia, respiratory failure with hypoxemia, post viral fatigue and hypertension. The patient has been doing well over the last month. She was able to wean herself off of her oxygen and and has not worn it for 1 week. She denies any cough, shortness of breath, wheezing or pleuritic pain. She notes that her energy level has come back and she believes that she is at her baseline in regards to the energy she had prior to the infection. She noted that during the infection she had lost her taste for a period of time but this has improved as well. She denies any fevers, chills, loss of appetite, GI related symptoms.     Past Medical History:   Diagnosis Date    Allergic conjunctivitis 7/26/2017    Aortic dissection (HCC)     Arthritis     lower back    Asthma     Essential hypertension 8/21/2017    GERD (gastroesophageal reflux disease)     Hypercholesterolemia 8/21/2017    Hyperlipidemia     Hypertension     Intertrigo 7/26/2017    Long-term use of high-risk medication 7/26/2017    LVH (left ventricular hypertrophy) 7/26/2017    PAF (paroxysmal atrial fibrillation) (HCC) 8/20/2018    Positional vertigo 7/26/2017    PUD (peptic ulcer disease)     Sciatica of left side 7/26/2017    Spinal stenosis, lumbar region, without neurogenic claudication 7/26/2017    [facility] ([facility] 9/18/2018     Past Surgical History:   Procedure Laterality Date    COLONOSCOPY N/A 7/12/2017    COLONOSCOPY WITH IV ANTIBIOTICS performed by West Murdock MD at [facility]  7/12/2017         HX GYN      hysterectomy    HX GYN      tubal ligation    HX OTHER SURGICAL  2015    Type A Dissection Repair    TX COLONOSCOPY FLX DX W/COLLJ SPEC WHEN PFRMD  3/19/2012         UPPER GI ENDOSCOPY,BALL DIL,30MM  7/12/2017         UPPER GI ENDOSCOPY,BIOPSY  7/12/2017          No Known Allergies  Current Outpatient Medications   Medication Sig Dispense Refill    cholecalciferol (Vitamin D3) (5000 Units/125 mcg) tab tablet Take  by mouth daily.  pravastatin (PRAVACHOL) 20 mg tablet Take 1 tablet by mouth once daily 90 Tab 0    apixaban (Eliquis) 5 mg tablet TAKE 1 TABLET BY MOUTH TWICE DAILY 180 Tab 3    losartan (COZAAR) 100 mg tablet Take 1 Tab by mouth daily. 90 Tab 3    metoprolol tartrate (LOPRESSOR) 25 mg tablet Take 1 Tab by mouth two (2) times a day. 180 Tab 3    acetaminophen (TYLENOL ARTHRITIS PAIN) 650 mg CR tablet Take 650 mg by mouth every six (6) hours as needed for Pain.  omega-3 fatty acids-vitamin e (FISH OIL) 1,000 mg Cap Take 1 Cap by mouth two (2) times a day. Social History     Socioeconomic History    Marital status:      Spouse name: Not on file    Number of children: Not on file    Years of education: Not on file    Highest education level: Not on file   Tobacco Use    Smoking status: Never Smoker    Smokeless tobacco: Never Used   Substance and Sexual Activity    Alcohol use: Yes     Alcohol/week: 0.0 standard drinks     Comment: rare    Drug use: No     Family History   Problem Relation Age of Onset    Cancer Father         colon cancer       Review of Systems:  GEN: no weight loss, weight gain, fatigue or night sweats  CV: no PND, orthopnea, or palpitations  Resp: no dyspnea on exertion, no cough  Abd: no nausea, vomiting or diarrhea  EXT: denies edema, claudication  Endocrine: no hair loss, excessive thirst or polyuria  Neurological ROS: no TIA or stroke symptoms  ROS otherwise negative      Objective:     Visit Vitals  /76 (BP 1 Location: Right upper arm, BP Patient Position: Sitting, BP Cuff Size: Large adult)   Pulse 76   Temp 97.9 °F (36.6 °C) (Oral)   Resp 16   Ht 5' 5\" (1.651 m)   Wt 173 lb (78.5 kg)   SpO2 96%   BMI 28.79 kg/m²     Body mass index is 28.79 kg/m².     General:   alert, cooperative and no distress   Eyes: conjunctivae/sclerae clear. PERRL, EOM's intact   Mouth:  No oral lesions, no pharyngeal erythema, no exudates   Neck: Trachea midline, no thyromegaly, no bruits   Heart: S1 and S2 normal,no murmurs noted    Lungs: Clear to auscultation bilaterally, no increased work of breathing   Abdomen: Soft, nontender. Normal bowel sounds   Extremities: No edema or cyanosis   Neuro: ..alert, oriented x3,speech normal in context and clarity, cranial nerves II-XII intact,motor strength: full proximally and distally,gait: normal  reflexes: full and symmetric     Physical exam otherwise negative         Assessment/Plan:     Diagnoses and all orders for this visit:    Pneumonia due to COVID-19 virus    Acute respiratory failure with hypoxemia (HCC)    Postviral fatigue syndrome    Essential hypertension        Other instructions: The patient's medications  are reviewed and reconciled. She appears to be back to her baseline status    She will continue to progress her physical activity and continue on all medications    Covid19 vaccination is recommended for 90 days after her illness which would be at the end of March    We will have her return in 6 months time for follow-up    Follow-up and Dispositions    · Return in about 6 months (around 8/11/2021). Janet Ayala MD    Please note that this dictation was completed with [employer], the computer voice recognition software. Quite often unanticipated grammatical, syntax, homophones, and other interpretive errors are inadvertently transcribed by the computer software. Please disregard these errors. Please excuse any errors that have escaped final proofreading.

## 2021-03-23 ENCOUNTER — CLINICAL SUPPORT (OUTPATIENT)
Dept: CARDIOLOGY CLINIC | Age: 76
End: 2021-03-23

## 2021-03-23 ENCOUNTER — OFFICE VISIT (OUTPATIENT)
Dept: CARDIOLOGY CLINIC | Age: 76
End: 2021-03-23
Payer: MEDICARE

## 2021-03-23 VITALS
SYSTOLIC BLOOD PRESSURE: 140 MMHG | BODY MASS INDEX: 28.99 KG/M2 | HEIGHT: 65 IN | DIASTOLIC BLOOD PRESSURE: 80 MMHG | OXYGEN SATURATION: 95 % | HEART RATE: 70 BPM | WEIGHT: 174 LBS | RESPIRATION RATE: 18 BRPM

## 2021-03-23 DIAGNOSIS — I48.0 PAF (PAROXYSMAL ATRIAL FIBRILLATION) (HCC): Primary | Chronic | ICD-10-CM

## 2021-03-23 DIAGNOSIS — E78.00 HYPERCHOLESTEROLEMIA: ICD-10-CM

## 2021-03-23 DIAGNOSIS — Z98.890 S/P ABLATION OF ATRIAL FIBRILLATION: ICD-10-CM

## 2021-03-23 DIAGNOSIS — I49.5 SSS (SICK SINUS SYNDROME) (HCC): ICD-10-CM

## 2021-03-23 DIAGNOSIS — Z86.79 S/P ABLATION OF ATRIAL FIBRILLATION: ICD-10-CM

## 2021-03-23 DIAGNOSIS — I49.5 TACHY-BRADY SYNDROME (HCC): Chronic | ICD-10-CM

## 2021-03-23 DIAGNOSIS — I10 ESSENTIAL HYPERTENSION: ICD-10-CM

## 2021-03-23 DIAGNOSIS — Z95.0 CARDIAC PACEMAKER IN SITU: Primary | ICD-10-CM

## 2021-03-23 PROCEDURE — 93280 PM DEVICE PROGR EVAL DUAL: CPT | Performed by: INTERNAL MEDICINE

## 2021-03-23 PROCEDURE — G8510 SCR DEP NEG, NO PLAN REQD: HCPCS | Performed by: INTERNAL MEDICINE

## 2021-03-23 PROCEDURE — G8399 PT W/DXA RESULTS DOCUMENT: HCPCS | Performed by: INTERNAL MEDICINE

## 2021-03-23 PROCEDURE — 93000 ELECTROCARDIOGRAM COMPLETE: CPT | Performed by: INTERNAL MEDICINE

## 2021-03-23 PROCEDURE — G8536 NO DOC ELDER MAL SCRN: HCPCS | Performed by: INTERNAL MEDICINE

## 2021-03-23 PROCEDURE — G8753 SYS BP > OR = 140: HCPCS | Performed by: INTERNAL MEDICINE

## 2021-03-23 PROCEDURE — G8419 CALC BMI OUT NRM PARAM NOF/U: HCPCS | Performed by: INTERNAL MEDICINE

## 2021-03-23 PROCEDURE — G8427 DOCREV CUR MEDS BY ELIG CLIN: HCPCS | Performed by: INTERNAL MEDICINE

## 2021-03-23 PROCEDURE — 1101F PT FALLS ASSESS-DOCD LE1/YR: CPT | Performed by: INTERNAL MEDICINE

## 2021-03-23 PROCEDURE — 1090F PRES/ABSN URINE INCON ASSESS: CPT | Performed by: INTERNAL MEDICINE

## 2021-03-23 PROCEDURE — G8754 DIAS BP LESS 90: HCPCS | Performed by: INTERNAL MEDICINE

## 2021-03-23 PROCEDURE — 99215 OFFICE O/P EST HI 40 MIN: CPT | Performed by: INTERNAL MEDICINE

## 2021-03-23 NOTE — LETTER
3/23/2021 Patient: Funmilayo Zhou YOB: 1945 Date of Visit: 3/23/2021 Ivan Ellis MD 
[address] [id number] [address] [employer] Dear Ivan Ellis MD, Thank you for referring Ms. Temi Oconnor to [facility] for evaluation. My notes for this consultation are attached. If you have questions, please do not hesitate to call me. I look forward to following your patient along with you. Sincerely, Lauren Webster MD

## 2021-03-23 NOTE — PROGRESS NOTES
Chief Complaint   Patient presents with    Irregular Heart Beat     sick sinus syndrome, pacemaker check, annual f/u    Foot Swelling     Left more than right    Shortness of Breath     with activity    Fatigue     occasinal    Arm Pain     States L arm felt \"heavy\" a few days ago     1. Have you been to the ER, urgent care clinic since your last visit? Hospitalized since your last visit? Yes - hospitalized at [facility] in December for COVID-19    2. Have you seen or consulted any other health care providers outside of the [address] [place] [address] since your last visit? Include any pap smears or colon screening.  Yes - [facility] in December COVID-19

## 2021-03-23 NOTE — PROGRESS NOTES
ELECTROPHYSIOLOGY        Subjective: Twan Austin is a 66 y.o. female is here for EP follow up. The patient denies chest pain/ shortness of breath, orthopnea, PND, LE edema, palpitations, syncope, presyncope or fatigue. Twan Austin  is on [facility], reports no melena, hematuria, or obvious signs of bleeding. No falls.             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 08/20/2018     Priority: 1 - One    Essential hypertension 08/21/2017     Priority: 1 - One    Hypercholesterolemia 08/21/2017     Priority: 1 - One    Intertrigo 07/26/2017    Positional vertigo 07/26/2017    Spinal stenosis, lumbar region, without neurogenic claudication 07/26/2017    Long-term use of high-risk medication 07/26/2017    LVH (left ventricular hypertrophy) 07/26/2017    Sciatica of left side 07/26/2017    Allergic conjunctivitis 07/26/2017    S/P cardiac cath 05/09/2017    Heart palpitations 03/08/2016    Swelling of both ankles 01/20/2016    S/P ascending aortic aneurysm repair 12/28/2015      Rigoberto Bishop MD  Past Medical History:   Diagnosis Date    Allergic conjunctivitis 7/26/2017    Aortic dissection (HCC)     Arthritis     lower back    Asthma     Essential hypertension 8/21/2017    GERD (gastroesophageal reflux disease)     Hypercholesterolemia 8/21/2017    Hyperlipidemia     Hypertension     Intertrigo 7/26/2017    Long-term use of high-risk medication 7/26/2017    LVH (left ventricular hypertrophy) 7/26/2017    PAF (paroxysmal atrial fibrillation) (HCC) 8/20/2018    Positional vertigo 7/26/2017    PUD (peptic ulcer disease)     Sciatica of left side 7/26/2017    Spinal stenosis, lumbar region, without neurogenic claudication 7/26/2017    SSS (sick sinus syndrome) ([facility] 9/18/2018      Past Surgical History:   Procedure Laterality Date    COLONOSCOPY N/A 7/12/2017    COLONOSCOPY WITH IV ANTIBIOTICS performed by Carlos Callahan [facility]  7/12/2017         HX GYN      hysterectomy    HX GYN      tubal ligation    HX OTHER SURGICAL  2015    Type A Dissection Repair    FL COLONOSCOPY FLX DX W/COLLJ SPEC WHEN PFRMD  3/19/2012         UPPER GI ENDOSCOPY,BALL DIL,30MM  7/12/2017         UPPER GI ENDOSCOPY,BIOPSY  7/12/2017          No Known Allergies   Family History   Problem Relation Age of Onset    Cancer Father         colon cancer    negative for cardiac disease  Social History     Socioeconomic History    Marital status:      Spouse name: Not on file    Number of children: Not on file    Years of education: Not on file    Highest education level: Not on file   Tobacco Use    Smoking status: Never Smoker    Smokeless tobacco: Never Used   Substance and Sexual Activity    Alcohol use: Yes     Alcohol/week: 0.0 standard drinks     Comment: rare    Drug use: No     Current Outpatient Medications   Medication Sig    cholecalciferol (Vitamin D3) (5000 Units/125 mcg) tab tablet Take  by mouth daily.  pravastatin (PRAVACHOL) 20 mg tablet Take 1 tablet by mouth once daily    apixaban (Eliquis) 5 mg tablet TAKE 1 TABLET BY MOUTH TWICE DAILY    losartan (COZAAR) 100 mg tablet Take 1 Tab by mouth daily.  metoprolol tartrate (LOPRESSOR) 25 mg tablet Take 1 Tab by mouth two (2) times a day.  acetaminophen (TYLENOL ARTHRITIS PAIN) 650 mg CR tablet Take 650 mg by mouth every six (6) hours as needed for Pain.  omega-3 fatty acids-vitamin e (FISH OIL) 1,000 mg Cap Take 1 Cap by mouth two (2) times a day. Takes irregularly     No current facility-administered medications for this visit. Vitals:    21 1123   BP: (!) 140/80   Pulse: 70   Resp: 18   SpO2: 95%   Weight: 174 lb (78.9 kg)   Height: 5' 5\" (1.651 m)       I have reviewed the nurses notes, vitals, problem list, allergy list, medical history, family, social history and medications. Review of Symptoms:    General: Pt denies excessive weight gain or loss. Pt is able to conduct ADL's  HEENT: Denies blurred vision, headaches, epistaxis and difficulty swallowing. Respiratory: Denies shortness of breath, MIMS, wheezing or stridor. Cardiovascular: Denies precordial pain, palpitations, edema or PND  Gastrointestinal: Denies poor appetite, indigestion, abdominal pain or blood in stool  Urinary: Denies dysuria, pyuria  Musculoskeletal: Denies pain or swelling from muscles or joints  Neurologic: Denies tremor, paresthesias, or sensory motor disturbance  Skin: Denies rash, itching or texture change. Psych: Denies depression      Physical Exam:      General: Well developed, in no acute distress. HEENT: Eyes - PERRL  Heart:  Normal S1/S2 negative S3 or S4. Regular, no murmur  Respiratory: Clear bilaterally x 4, no wheezing or rales  Extremities:  No edema, no cyanosis. Musculoskeletal: No clubbing  Neuro: A&Ox3, speech clear  Skin: No visible rashes or lesions. Non diaphoretic.  No visible ulcers  Vascular: 2+ pulses symmetric in all extremities  Psych - judgement intact and orientation is wnl       Cardiographics    Ek21  Sinus  Rhythm  -First degree A-V block   Deb = 222    Results for orders placed or performed during the hospital encounter of 09/10/18   EKG, 12 LEAD, INITIAL   Result Value Ref Range    Ventricular Rate 66 BPM    Atrial Rate 66 BPM    P-R Interval 204 ms    QRS Duration 92 ms    Q-T Interval 448 ms    QTC Calculation (Bezet) 469 ms    Calculated P Axis 34 degrees    Calculated R Axis -17 degrees    Calculated T Axis 22 degrees    Diagnosis       Sinus rhythm with premature supraventricular complexes  Voltage criteria for left ventricular hypertrophy  Non-specific ST & T wave changes  Confirmed by Evgeny Mcmullen ([id number]) on 9/11/2018 10:08:01 PM     Results for orders placed or performed in visit on 08/16/18   CARDIAC HOLTER MONITOR, 24 HOURS    Narrative    ECG Monitor/24 hours, Complete    Reason for Holter Monitor   PALPITATIONS    Heartbeat    Slowest 37  Average 72  Fastest  145      Results:   Underlying Rhythm: Normal sinus rhythm      Atrial Arrhythmias: premature atrial contractions; occasional, paroxysmal atrial fibrillation and severe bradycardia            AV Conduction: normal    Ventricular Arrhythmias: premature ventricular contractions; occasional     ST Segment Analysis:normal     Symptom Correlation:  Sob correlates with PAF    Comment:   Sinus rhythm with symptomatic PAF with rvr and profound bradycardia to 37 bpm. C/w tachy-bridgett syndrome. Clinical correlation advised. Donnell Silverman MD, [facility]              Lab Results   Component Value Date/Time    WBC 5.3 08/25/2020 01:30 PM    HGB (POC) 13.2 08/20/2018 09:26 AM    HGB 14.2 08/25/2020 01:30 PM    HCT (POC) 40.3 08/20/2018 09:26 AM    HCT 45.0 08/25/2020 01:30 PM    PLATELET 381.5 11/11/3723 01:30 PM    MCV 92.3 08/25/2020 01:30 PM      Lab Results   Component Value Date/Time    Sodium 145 08/25/2020 01:30 PM    Potassium 4.7 08/25/2020 01:30 PM    Chloride 106 08/25/2020 01:30 PM    CO2 31.0 08/25/2020 01:30 PM    Anion gap 8 08/25/2020 01:30 PM    Glucose 84 08/25/2020 01:30 PM    BUN 26.0 (H) 08/25/2020 01:30 PM    Creatinine 0.8 08/25/2020 01:30 PM    BUN/Creatinine ratio 33 08/25/2020 01:30 PM    GFR est AA >60 08/25/2020 01:30 PM    GFR est non-AA >60 08/25/2020 01:30 PM    Calcium 10.1 08/25/2020 01:30 PM    Bilirubin, total 1.2 08/25/2020 01:30 PM    Alk.  phosphatase 70 08/25/2020 01:30 PM    Protein, total 7.6 08/25/2020 01:30 PM    Albumin 4.5 08/25/2020 01:30 PM    Globulin 3.10 08/25/2020 01:30 PM    A-G Ratio 1.5 08/25/2020 01:30 PM    ALT (SGPT) 8 08/25/2020 01:30 PM      Lab Results   Component Value Date/Time    TSH 1.320 08/20/2018 09:27 AM    TSH, 3rd generation 0.84 08/25/2020 01:30 PM           Assessment:           ICD-10-CM ICD-9-CM    1. PAF (paroxysmal atrial fibrillation) ([facility])  I48.0 427.31 AMB POC EKG ROUTINE W/ 12 LEADS, INTER & REP   2. Tachy-bridgett syndrome (HCC)  I49.5 427.81 AMB POC EKG ROUTINE W/ 12 LEADS, INTER & REP   3. SSS (sick sinus syndrome) ([facility])  I49.5 427.81    4. S/P ablation of atrial fibrillation  Z98.890 V45.89     Z86.79     5. Essential hypertension  I10 401.9    6. Hypercholesterolemia  E78.00 272.0      Orders Placed This Encounter    AMB POC EKG ROUTINE W/ 12 LEADS, INTER & REP     Order Specific Question:   Reason for Exam:     Answer:   routine        Plan:     Ms [facility] is here for annual follow up. She has hx SSS and AF, s/p AF ablation and PPM implantation 9/2018. Echo 9/19 with normal EF. Device interrogation today with 62% AP and 6% RVP. She has had  AT/SVT episodes with rate of 160s (asymptomatic); AF < 1% of the time on [address] [place] [address]. During this visit,  the patient and I had a comprehensive discussion of device management using principles of shared decision making. we reviewed device therapy, including the potential risks and benefits of device management. These risks include death, myocardial infarction, stroke, cardiac perforation, vascular injury, injury, pacing induced cardiomyopathy, inappropriate shocks (defibrillator) and other less severe complications. The patient demonstrated a clear understanding of these issues during out discussion. Our plans, determined together after thorough consideration, are outlined else where in this note. Enrolled in remote monitoring and I will see her back in 1 year. Addressed all patient questions and concerns at this visit. Continue medical management for AF. Discussed side effects, efficacy and good medication compliance with pt re: genevieve. Refills not requested. Thank you for allowing me to participate in Daisy Juares 's care. Sanjay Day NP    Patient seen and examined by me with nurse practitioner. I personally performed all components of the history, physical, and medical decision making and agree with the assessment and plan with minor modifications as noted. A paced 62% for sick sinus. V paced 6% for intermittent heart block. Cont med rx for htn - stable and compliant on bb therapy. Cont oac for AF. F/u in one year. During this visit,  the patient and I had a comprehensive discussion of arrhythmia management using principles of shared decision making. This included a discussion of oral anticoagulation to prevent thromboembolic stroke in individuals with a history of AF and according to an elevated chadsvasc score. We also reviewed the requisite monitoring required of some of these medications. In addition, we reviewed ablative therapy, including the potential benefits and risks of catheter ablation. These risks include death, myocardial infarction, stroke, cardiac perforation, vascular injury, and other less severe complications. The patient demonstrated a clear understanding of these issues during out discussion. Our plans, determined together after thorough consideration, are outlined else where in this note. Remington Carlson MD, [facility]          On this date 03/23/2021 I have spent 48 minutes reviewing previous notes, test results and face to face with the patient discussing the diagnosis and importance of compliance with the treatment plan as well as documenting on the day of the visit.

## 2021-05-03 DIAGNOSIS — E78.00 HYPERCHOLESTEROLEMIA: Chronic | ICD-10-CM

## 2021-05-03 RX ORDER — PRAVASTATIN SODIUM 20 MG/1
TABLET ORAL
Qty: 90 TAB | Refills: 0 | Status: SHIPPED | OUTPATIENT
Start: 2021-05-03 | End: 2021-08-03

## 2021-05-03 RX ORDER — METOPROLOL TARTRATE 25 MG/1
TABLET, FILM COATED ORAL
Qty: 180 TAB | Refills: 3 | OUTPATIENT
Start: 2021-05-03

## 2021-05-04 RX ORDER — METOPROLOL TARTRATE 25 MG/1
25 TABLET, FILM COATED ORAL 2 TIMES DAILY
Qty: 180 TAB | Refills: 3 | Status: SHIPPED | OUTPATIENT
Start: 2021-05-04 | End: 2022-05-05 | Stop reason: SDUPTHER

## 2021-06-24 ENCOUNTER — TELEPHONE (OUTPATIENT)
Dept: CARDIOLOGY CLINIC | Age: 76
End: 2021-06-24

## 2021-06-24 ENCOUNTER — OFFICE VISIT (OUTPATIENT)
Dept: CARDIOLOGY CLINIC | Age: 76
End: 2021-06-24
Payer: MEDICARE

## 2021-06-24 DIAGNOSIS — I49.5 SSS (SICK SINUS SYNDROME) (HCC): ICD-10-CM

## 2021-06-24 DIAGNOSIS — Z95.0 CARDIAC PACEMAKER IN SITU: Primary | ICD-10-CM

## 2021-06-24 PROCEDURE — 93294 REM INTERROG EVL PM/LDLS PM: CPT | Performed by: INTERNAL MEDICINE

## 2021-06-24 PROCEDURE — 93296 REM INTERROG EVL PM/IDS: CPT | Performed by: INTERNAL MEDICINE

## 2021-07-15 ENCOUNTER — HOSPITAL ENCOUNTER (OUTPATIENT)
Dept: MAMMOGRAPHY | Age: 76
Discharge: HOME | End: 2021-07-15
Attending: INTERNAL MEDICINE
Payer: MEDICARE

## 2021-07-15 ENCOUNTER — TRANSCRIBE ORDER (OUTPATIENT)
Dept: REGISTRATION | Age: 76
End: 2021-07-15

## 2021-07-15 DIAGNOSIS — Z12.31 VISIT FOR SCREENING MAMMOGRAM: Primary | ICD-10-CM

## 2021-07-15 DIAGNOSIS — Z12.31 VISIT FOR SCREENING MAMMOGRAM: ICD-10-CM

## 2021-07-15 PROCEDURE — 77067 SCR MAMMO BI INCL CAD: CPT

## 2021-08-02 DIAGNOSIS — E78.00 HYPERCHOLESTEROLEMIA: Chronic | ICD-10-CM

## 2021-08-03 RX ORDER — PRAVASTATIN SODIUM 20 MG/1
TABLET ORAL
Qty: 90 TABLET | Refills: 0 | Status: SHIPPED | OUTPATIENT
Start: 2021-08-03 | End: 2021-11-02

## 2021-08-17 ENCOUNTER — OFFICE VISIT (OUTPATIENT)
Dept: INTERNAL MEDICINE CLINIC | Age: 76
End: 2021-08-17
Payer: MEDICARE

## 2021-08-17 VITALS
OXYGEN SATURATION: 97 % | BODY MASS INDEX: 29.66 KG/M2 | RESPIRATION RATE: 14 BRPM | HEIGHT: 65 IN | DIASTOLIC BLOOD PRESSURE: 82 MMHG | WEIGHT: 178 LBS | TEMPERATURE: 98.1 F | HEART RATE: 71 BPM | SYSTOLIC BLOOD PRESSURE: 132 MMHG

## 2021-08-17 DIAGNOSIS — Z79.899 LONG-TERM USE OF HIGH-RISK MEDICATION: Chronic | ICD-10-CM

## 2021-08-17 DIAGNOSIS — Z11.59 NEED FOR HEPATITIS C SCREENING TEST: ICD-10-CM

## 2021-08-17 DIAGNOSIS — I10 ESSENTIAL HYPERTENSION: Primary | Chronic | ICD-10-CM

## 2021-08-17 DIAGNOSIS — I48.0 PAF (PAROXYSMAL ATRIAL FIBRILLATION) (HCC): Chronic | ICD-10-CM

## 2021-08-17 DIAGNOSIS — I49.5 TACHY-BRADY SYNDROME (HCC): Chronic | ICD-10-CM

## 2021-08-17 DIAGNOSIS — E78.00 HYPERCHOLESTEROLEMIA: Chronic | ICD-10-CM

## 2021-08-17 LAB
ALBUMIN SERPL-MCNC: 4.1 G/DL (ref 3.5–5)
ALBUMIN/GLOB SERPL: 1.4 {RATIO} (ref 1.1–2.2)
ALP SERPL-CCNC: 66 U/L (ref 45–117)
ALT SERPL-CCNC: 17 U/L (ref 12–78)
ANION GAP SERPL CALC-SCNC: 1 MMOL/L (ref 5–15)
AST SERPL-CCNC: 8 U/L (ref 15–37)
BASOPHILS # BLD: 0 K/UL (ref 0–0.1)
BASOPHILS NFR BLD: 1 % (ref 0–1)
BILIRUB SERPL-MCNC: 0.5 MG/DL (ref 0.2–1)
BUN SERPL-MCNC: 17 MG/DL (ref 6–20)
BUN/CREAT SERPL: 29 (ref 12–20)
CALCIUM SERPL-MCNC: 10 MG/DL (ref 8.5–10.1)
CHLORIDE SERPL-SCNC: 111 MMOL/L (ref 97–108)
CHOLEST SERPL-MCNC: 119 MG/DL
CK SERPL-CCNC: 79 U/L (ref 26–192)
CO2 SERPL-SCNC: 32 MMOL/L (ref 21–32)
CREAT SERPL-MCNC: 0.59 MG/DL (ref 0.55–1.02)
DIFFERENTIAL METHOD BLD: NORMAL
EOSINOPHIL # BLD: 0.1 K/UL (ref 0–0.4)
EOSINOPHIL NFR BLD: 3 % (ref 0–7)
ERYTHROCYTE [DISTWIDTH] IN BLOOD BY AUTOMATED COUNT: 14.2 % (ref 11.5–14.5)
GLOBULIN SER CALC-MCNC: 2.9 G/DL (ref 2–4)
GLUCOSE SERPL-MCNC: 90 MG/DL (ref 65–100)
HCT VFR BLD AUTO: 42.1 % (ref 35–47)
HCV AB SERPL QL IA: NONREACTIVE
HDLC SERPL-MCNC: 55 MG/DL
HDLC SERPL: 2.2 {RATIO} (ref 0–5)
HGB BLD-MCNC: 12.9 G/DL (ref 11.5–16)
IMM GRANULOCYTES # BLD AUTO: 0 K/UL (ref 0–0.04)
IMM GRANULOCYTES NFR BLD AUTO: 0 % (ref 0–0.5)
LDLC SERPL CALC-MCNC: 48.4 MG/DL (ref 0–100)
LYMPHOCYTES # BLD: 1.8 K/UL (ref 0.8–3.5)
LYMPHOCYTES NFR BLD: 42 % (ref 12–49)
MCH RBC QN AUTO: 29.1 PG (ref 26–34)
MCHC RBC AUTO-ENTMCNC: 30.6 G/DL (ref 30–36.5)
MCV RBC AUTO: 94.8 FL (ref 80–99)
MONOCYTES # BLD: 0.5 K/UL (ref 0–1)
MONOCYTES NFR BLD: 13 % (ref 5–13)
NEUTS SEG # BLD: 1.8 K/UL (ref 1.8–8)
NEUTS SEG NFR BLD: 41 % (ref 32–75)
NRBC # BLD: 0 K/UL (ref 0–0.01)
NRBC BLD-RTO: 0 PER 100 WBC
PLATELET # BLD AUTO: 180 K/UL (ref 150–400)
PMV BLD AUTO: 10.9 FL (ref 8.9–12.9)
POTASSIUM SERPL-SCNC: 4.2 MMOL/L (ref 3.5–5.1)
PROT SERPL-MCNC: 7 G/DL (ref 6.4–8.2)
RBC # BLD AUTO: 4.44 M/UL (ref 3.8–5.2)
SODIUM SERPL-SCNC: 144 MMOL/L (ref 136–145)
TRIGL SERPL-MCNC: 78 MG/DL (ref ?–150)
TSH SERPL DL<=0.05 MIU/L-ACNC: 0.7 UIU/ML (ref 0.36–3.74)
VLDLC SERPL CALC-MCNC: 15.6 MG/DL
WBC # BLD AUTO: 4.2 K/UL (ref 3.6–11)

## 2021-08-17 PROCEDURE — 1101F PT FALLS ASSESS-DOCD LE1/YR: CPT | Performed by: INTERNAL MEDICINE

## 2021-08-17 PROCEDURE — G8427 DOCREV CUR MEDS BY ELIG CLIN: HCPCS | Performed by: INTERNAL MEDICINE

## 2021-08-17 PROCEDURE — G8754 DIAS BP LESS 90: HCPCS | Performed by: INTERNAL MEDICINE

## 2021-08-17 PROCEDURE — G8419 CALC BMI OUT NRM PARAM NOF/U: HCPCS | Performed by: INTERNAL MEDICINE

## 2021-08-17 PROCEDURE — 1090F PRES/ABSN URINE INCON ASSESS: CPT | Performed by: INTERNAL MEDICINE

## 2021-08-17 PROCEDURE — G8399 PT W/DXA RESULTS DOCUMENT: HCPCS | Performed by: INTERNAL MEDICINE

## 2021-08-17 PROCEDURE — 99214 OFFICE O/P EST MOD 30 MIN: CPT | Performed by: INTERNAL MEDICINE

## 2021-08-17 PROCEDURE — G8752 SYS BP LESS 140: HCPCS | Performed by: INTERNAL MEDICINE

## 2021-08-17 PROCEDURE — G8536 NO DOC ELDER MAL SCRN: HCPCS | Performed by: INTERNAL MEDICINE

## 2021-08-17 PROCEDURE — G8432 DEP SCR NOT DOC, RNG: HCPCS | Performed by: INTERNAL MEDICINE

## 2021-08-17 NOTE — PROGRESS NOTES
Subjective:     Mrs. Nirali Brantley returns to the office today in follow-up of multiple medical problems. The patient has hypertension currently on metoprolol and losartan. She tolerates this regimen without fatigue, palpitations, orthostatic dizziness or lower extremity edema. She denies headaches, numbness, tingling or focal neurological problems. She has hypercholesterolemia currently on statin therapy with pravastatin. In addition she does take fish oil supplementation. Patient denies muscle soreness or GI upset. She has had no history of vascular disease and denies exertional chest pains or claudication. The patient has a history of paroxysmal atrial fibrillation and tachybradycardia syndrome. The metoprolol has been controlling her rate. She did have a pacemaker placed and is followed by Stephanie Arroyo MD.  The patient denies any palpitations, shortness of breath or syncope. She has had no strokelike symptoms and remains on Eliquis for clot prevention.     Past Medical History:   Diagnosis Date    Allergic conjunctivitis 7/26/2017    Aortic dissection (HCC)     Arthritis     lower back    Asthma     Essential hypertension 8/21/2017    GERD (gastroesophageal reflux disease)     Hypercholesterolemia 8/21/2017    Hyperlipidemia     Hypertension     Intertrigo 7/26/2017    Long-term use of high-risk medication 7/26/2017    LVH (left ventricular hypertrophy) 7/26/2017    PAF (paroxysmal atrial fibrillation) (HCC) 8/20/2018    Positional vertigo 7/26/2017    PUD (peptic ulcer disease)     Sciatica of left side 7/26/2017    Spinal stenosis, lumbar region, without neurogenic claudication 7/26/2017    SSS (sick sinus syndrome) ([facility] [address] 9/18/2018     Past Surgical History:   Procedure Laterality Date    COLONOSCOPY N/A 7/12/2017    COLONOSCOPY WITH IV ANTIBIOTICS performed by Darcy Crouch [facility] at [facility]  7/12/2017         HX GYN      hysterectomy    HX GYN      tubal ligation    HX HYSTERECTOMY      HX OTHER SURGICAL  2015    Type A Dissection Repair    [place] COLONOSCOPY FLX DX W/COLLJ SPEC WHEN PFRMD  3/19/2012         UPPER GI ENDOSCOPY,BALL DIL,30MM  7/12/2017         UPPER GI ENDOSCOPY,BIOPSY  7/12/2017          No Known Allergies  Current Outpatient Medications   Medication Sig Dispense Refill    pravastatin (PRAVACHOL) 20 mg tablet Take 1 tablet by mouth once daily 90 Tablet 0    metoprolol tartrate (LOPRESSOR) 25 mg tablet Take 1 Tab by mouth two (2) times a day. 180 Tab 3    cholecalciferol (Vitamin D3) (5000 Units/125 mcg) tab tablet Take  by mouth daily.  apixaban (Eliquis) 5 mg tablet TAKE 1 TABLET BY MOUTH TWICE DAILY 180 Tab 3    losartan (COZAAR) 100 mg tablet Take 1 Tab by mouth daily. 90 Tab 3    acetaminophen (TYLENOL ARTHRITIS PAIN) 650 mg CR tablet Take 650 mg by mouth every six (6) hours as needed for Pain.  omega-3 fatty acids-vitamin e (FISH OIL) 1,000 mg Cap Take 1 Cap by mouth two (2) times a day. Takes irregularly       Social History     Socioeconomic History    Marital status:      Spouse name: Not on file    Number of children: Not on file    Years of education: Not on file    Highest education level: Not on file   Tobacco Use    Smoking status: Never Smoker    Smokeless tobacco: Never Used   Vaping Use    Vaping Use: Never used   Substance and Sexual Activity    Alcohol use: Yes     Alcohol/week: 0.0 standard drinks     Comment: rare    Drug use: No     Social Determinants of Health     Financial Resource Strain:     Difficulty of Paying Living Expenses:    Food Insecurity:     Worried About Running Out of Food in the Last Year:     [address] in the Last Year:    Transportation Needs:     Lack of Transportation (Medical):      Lack of Transportation (Non-Medical):    Physical Activity:     Days of Exercise per Week:     Minutes of Exercise per Session:    Stress:     Feeling of Stress : Social Connections:     Frequency of Communication with Friends and Family:     Frequency of Social Gatherings with Friends and Family:     Attends [employer] Services:     Active Member of Clubs or Organizations:     Attends Club or Organization Meetings:     Marital Status:      Family History   Problem Relation Age of Onset    Cancer Father         colon cancer       Review of Systems:  GEN: no weight loss, weight gain, fatigue or night sweats  CV: no PND, orthopnea, or palpitations  Resp: no dyspnea on exertion, no cough  Abd: no nausea, vomiting or diarrhea  EXT: denies edema, claudication  Endocrine: no hair loss, excessive thirst or polyuria  Neurological ROS: no TIA or stroke symptoms  ROS otherwise negative      Objective:     Visit Vitals  /82 (BP 1 Location: Left upper arm, BP Patient Position: Sitting, BP Cuff Size: Adult)   Pulse 71   Temp 98.1 °F (36.7 °C) (Oral)   Resp 14   Ht 5' 5\" (1.651 m)   Wt 178 lb (80.7 kg)   SpO2 97%   BMI 29.62 kg/m²     Body mass index is 29.62 kg/m². General:   alert, cooperative and no distress   Eyes: conjunctivae/sclerae clear. PERRL, EOM's intact   Mouth:  No oral lesions, no pharyngeal erythema, no exudates   Neck: Trachea midline, no thyromegaly, no bruits   Heart: S1 and S2 normal,no murmurs noted    Lungs: Clear to auscultation bilaterally, no increased work of breathing   Abdomen: Soft, nontender. Normal bowel sounds   Extremities: No edema or cyanosis   Neuro: ..alert, oriented x3,speech normal in context and clarity, cranial nerves II-XII intact,motor strength: full proximally and distally,gait: normal  reflexes: full and symmetric     Physical exam otherwise negative         Assessment/Plan:     Diagnoses and all orders for this visit:    Essential hypertension  -     COLLECTION VENOUS BLOOD,VENIPUNCTURE  -     CBC WITH AUTOMATED DIFF; Future  -     METABOLIC PANEL, COMPREHENSIVE;  Future  -     URINALYSIS W/ REFLEX CULTURE; Future    Hypercholesterolemia  -     LIPID PANEL; Future  -     TSH 3RD GENERATION; Future    Long-term use of high-risk medication  -     CK; Future    PAF (paroxysmal atrial fibrillation) (HCC)    Tachy-bridgett syndrome ([address]    Need for hepatitis C screening test  -     HEPATITIS C AB; Future        Other instructions: The patient's medications were reviewed and reconciled. No change in her current medical regimen will be made. A no added salt, prudent diet is encouraged. Weight loss recommended with body mass index of 30    Await results of multiple labs    Follow-up in 6 months        Agueda Espino MD    Please note that this dictation was completed with [employer], the computer voice recognition software. Quite often unanticipated grammatical, syntax, homophones, and other interpretive errors are inadvertently transcribed by the computer software. Please disregard these errors. Please excuse any errors that have escaped final proofreading.

## 2021-08-17 NOTE — PROGRESS NOTES
Becky Sandoval is a 64 y.o. female presenting for Follow Up Chronic Condition (6 mo fu)  . 1. Have you been to the ER, urgent care clinic since your last visit? Hospitalized since your last visit? No    2. Have you seen or consulted any other health care providers outside of the [address] [place] [address] since your last visit? Include any pap smears or colon screening. No    Fall Risk Assessment, last 12 mths 3/23/2021   Able to walk? Yes   Fall in past 12 months? -   Do you feel unsteady? 0   Are you worried about falling 0         Abuse Screening Questionnaire 3/23/2021   Do you ever feel afraid of your partner? N   Are you in a relationship with someone who physically or mentally threatens you? N   Is it safe for you to go home? Y       3 most recent PHQ Screens 3/23/2021   Little interest or pleasure in doing things Not at all   Feeling down, depressed, irritable, or hopeless Not at all   Total Score PHQ 2 0       There are no discontinued medications.

## 2021-08-17 NOTE — PATIENT INSTRUCTIONS
DASH Diet: Care Instructions  Your Care Instructions     The DASH diet is an eating plan that can help lower your blood pressure. DASH stands for Dietary Approaches to Stop Hypertension. Hypertension is high blood pressure. The DASH diet focuses on eating foods that are high in calcium, potassium, and magnesium. These nutrients can lower blood pressure. The foods that are highest in these nutrients are fruits, vegetables, low-fat dairy products, nuts, seeds, and legumes. But taking calcium, potassium, and magnesium supplements instead of eating foods that are high in those nutrients does not have the same effect. The DASH diet also includes whole grains, fish, and poultry. The DASH diet is one of several lifestyle changes your doctor may recommend to lower your high blood pressure. Your doctor may also want you to decrease the amount of sodium in your diet. Lowering sodium while following the DASH diet can lower blood pressure even further than just the DASH diet alone. Follow-up care is a key part of your treatment and safety. Be sure to make and go to all appointments, and call your doctor if you are having problems. It's also a good idea to know your test results and keep a list of the medicines you take. How can you care for yourself at home? Following the DASH diet  · Eat 4 to 5 servings of fruit each day. A serving is 1 medium-sized piece of fruit, ½ cup chopped or canned fruit, 1/4 cup dried fruit, or 4 ounces (½ cup) of fruit juice. Choose fruit more often than fruit juice. · Eat 4 to 5 servings of vegetables each day. A serving is 1 cup of lettuce or raw leafy vegetables, ½ cup of chopped or cooked vegetables, or 4 ounces (½ cup) of vegetable juice. Choose vegetables more often than vegetable juice. · Get 2 to 3 servings of low-fat and fat-free dairy each day. A serving is 8 ounces of milk, 1 cup of yogurt, or 1 ½ ounces of cheese. · Eat 6 to 8 servings of grains each day.  A serving is 1 slice of bread, 1 ounce of dry cereal, or ½ cup of cooked rice, pasta, or cooked cereal. Try to choose whole-grain products as much as possible. · Limit lean meat, poultry, and fish to 2 servings each day. A serving is 3 ounces, about the size of a deck of cards. · Eat 4 to 5 servings of nuts, seeds, and legumes (cooked dried beans, lentils, and split peas) each week. A serving is 1/3 cup of nuts, 2 tablespoons of seeds, or ½ cup of cooked beans or peas. · Limit fats and oils to 2 to 3 servings each day. A serving is 1 teaspoon of vegetable oil or 2 tablespoons of salad dressing. · Limit sweets and added sugars to 5 servings or less a week. A serving is 1 tablespoon jelly or jam, ½ cup sorbet, or 1 cup of lemonade. · Eat less than 2,300 milligrams (mg) of sodium a day. If you limit your sodium to 1,500 mg a day, you can lower your blood pressure even more. · Be aware that all of these are the suggested number of servings for people who eat 1,800 to 2,000 calories a day. Your recommended number of servings may be different if you need more or fewer calories. Tips for success  · Start small. Do not try to make dramatic changes to your diet all at once. You might feel that you are missing out on your favorite foods and then be more likely to not follow the plan. Make small changes, and stick with them. Once those changes become habit, add a few more changes. · Try some of the following:  ? Make it a goal to eat a fruit or vegetable at every meal and at snacks. This will make it easy to get the recommended amount of fruits and vegetables each day. ? Try yogurt topped with fruit and nuts for a snack or healthy dessert. ? Add lettuce, tomato, cucumber, and onion to sandwiches. ? Combine a ready-made pizza crust with low-fat mozzarella cheese and lots of vegetable toppings. Try using tomatoes, squash, spinach, broccoli, carrots, cauliflower, and onions. ?  Have a variety of cut-up vegetables with a low-fat dip as an appetizer instead of chips and dip. ? Sprinkle sunflower seeds or chopped almonds over salads. Or try adding chopped walnuts or almonds to cooked vegetables. ? Try some vegetarian meals using beans and peas. Add garbanzo or kidney beans to salads. Make burritos and tacos with mashed preston beans or black beans. Where can you learn more? Go to http://www.simons.com/  Enter H967 in the search box to learn more about \"DASH Diet: Care Instructions. \"  Current as of: August 31, 2020               Content Version: 12.8  © 2300-9234 [employer]. Care instructions adapted under license by [employer] (which disclaims liability or warranty for this information). If you have questions about a medical condition or this instruction, always ask your healthcare professional. [address] any warranty or liability for your use of this information.

## 2021-08-20 LAB
APPEARANCE UR: ABNORMAL
BACTERIA URNS QL MICRO: ABNORMAL /HPF
BILIRUB UR QL: NEGATIVE
COLOR UR: ABNORMAL
EPITH CASTS URNS QL MICRO: ABNORMAL /LPF
GLUCOSE UR STRIP.AUTO-MCNC: NEGATIVE MG/DL
HGB UR QL STRIP: ABNORMAL
KETONES UR QL STRIP.AUTO: ABNORMAL MG/DL
LEUKOCYTE ESTERASE UR QL STRIP.AUTO: ABNORMAL
NITRITE UR QL STRIP.AUTO: POSITIVE
PH UR STRIP: 5 [PH] (ref 5–8)
PROT UR STRIP-MCNC: NEGATIVE MG/DL
RBC #/AREA URNS HPF: ABNORMAL /HPF (ref 0–5)
SP GR UR REFRACTOMETRY: 1.03 (ref 1–1.03)
UA: UC IF INDICATED,UAUC: ABNORMAL
UROBILINOGEN UR QL STRIP.AUTO: 0.2 EU/DL (ref 0.2–1)
WBC URNS QL MICRO: ABNORMAL /HPF (ref 0–4)

## 2021-08-23 ENCOUNTER — TELEPHONE (OUTPATIENT)
Dept: INTERNAL MEDICINE CLINIC | Age: 76
End: 2021-08-23

## 2021-08-23 LAB
BACTERIA SPEC CULT: ABNORMAL
CC UR VC: ABNORMAL
SERVICE CMNT-IMP: ABNORMAL

## 2021-08-23 RX ORDER — AMOXICILLIN 500 MG/1
500 CAPSULE ORAL 3 TIMES DAILY
Qty: 21 CAPSULE | Refills: 0 | Status: SHIPPED | OUTPATIENT
Start: 2021-08-23 | End: 2021-08-31 | Stop reason: ALTCHOICE

## 2021-08-23 NOTE — TELEPHONE ENCOUNTER
Patient advised of lab results- please send pended Rx to pharmacy:  Requested Prescriptions     Pending Prescriptions Disp Refills    amoxicillin (AMOXIL) 500 mg capsule 21 Capsule 0     Sig: Take 1 Capsule by mouth three (3) times daily.

## 2021-08-23 NOTE — TELEPHONE ENCOUNTER
----- Message from Meño Alejandro MD sent at 8/23/2021  9:20 AM EDT -----  Urine is infected with E. coli. Rx amoxicillin 500 mg 3 times daily #21

## 2021-08-31 ENCOUNTER — OFFICE VISIT (OUTPATIENT)
Dept: INTERNAL MEDICINE CLINIC | Age: 76
End: 2021-08-31
Payer: MEDICARE

## 2021-08-31 VITALS
DIASTOLIC BLOOD PRESSURE: 72 MMHG | SYSTOLIC BLOOD PRESSURE: 122 MMHG | HEIGHT: 65 IN | OXYGEN SATURATION: 97 % | BODY MASS INDEX: 29.92 KG/M2 | RESPIRATION RATE: 18 BRPM | WEIGHT: 179.6 LBS | HEART RATE: 70 BPM | TEMPERATURE: 98.3 F

## 2021-08-31 DIAGNOSIS — Z00.00 MEDICARE ANNUAL WELLNESS VISIT, SUBSEQUENT: Primary | ICD-10-CM

## 2021-08-31 PROCEDURE — G8752 SYS BP LESS 140: HCPCS | Performed by: INTERNAL MEDICINE

## 2021-08-31 PROCEDURE — G8432 DEP SCR NOT DOC, RNG: HCPCS | Performed by: INTERNAL MEDICINE

## 2021-08-31 PROCEDURE — G8536 NO DOC ELDER MAL SCRN: HCPCS | Performed by: INTERNAL MEDICINE

## 2021-08-31 PROCEDURE — G8754 DIAS BP LESS 90: HCPCS | Performed by: INTERNAL MEDICINE

## 2021-08-31 PROCEDURE — 1101F PT FALLS ASSESS-DOCD LE1/YR: CPT | Performed by: INTERNAL MEDICINE

## 2021-08-31 PROCEDURE — G8399 PT W/DXA RESULTS DOCUMENT: HCPCS | Performed by: INTERNAL MEDICINE

## 2021-08-31 PROCEDURE — G8419 CALC BMI OUT NRM PARAM NOF/U: HCPCS | Performed by: INTERNAL MEDICINE

## 2021-08-31 PROCEDURE — G0439 PPPS, SUBSEQ VISIT: HCPCS | Performed by: INTERNAL MEDICINE

## 2021-08-31 PROCEDURE — G8427 DOCREV CUR MEDS BY ELIG CLIN: HCPCS | Performed by: INTERNAL MEDICINE

## 2021-08-31 NOTE — PROGRESS NOTES
This is a Subsequent Medicare Annual Wellness Exam (AWV) (Performed 12 months after IPPE or effective date of Medicare Part B enrollment)    I have reviewed the patient's medical history in detail and updated the computerized patient record. Problem list reviewed with patient and risk factors discussed. PSH, SH, FH, Medications and HM issues also reviewed and discussed. Depression screen, fall risk assessment, functional abilities and ACP also reviewed and discussed as above and below. Depression Risk Factor Screening:     3 most recent PHQ Screens 3/23/2021   Little interest or pleasure in doing things Not at all   Feeling down, depressed, irritable, or hopeless Not at all   Total Score PHQ 2 0     Alcohol Risk Factor Screening:   Do you average more than 1 drink per night or more than 7 drinks a week:  No    On any one occasion in the past three months have you have had more than 3 drinks containing alcohol:  No    Functional Ability and Level of Safety:   Hearing Loss  Hearing is good. Activities of Daily Living  The home contains: handrails and grab bars  Patient does total self care    Fall Risk  Fall Risk Assessment, last 12 mths 3/23/2021   Able to walk? Yes   Fall in past 12 months? -   Do you feel unsteady?  0   Are you worried about falling 0       Abuse Screen  Patient is not abused    Cognitive Screening   Evaluation of Cognitive Function:  Has your family/caregiver stated any concerns about your memory: no  Normal    Patient Care Team   Patient Care Team:  Brad Carrillo MD as PCP - General (Internal Medicine)  Brad Carrillo MD as PCP - REHABILITATION [facility] Empaneled Provider  Francisca Orozco MD as Referring Provider (Cardiology)  Rashmi Kraft MD as Surgeon (Cardiothoracic Surgery)  Blanquita Crocker MD as Surgeon (Cardiothoracic Surgery)  Shanelle De Santiago NP as Nurse Practitioner (Nurse Practitioner)  Genevieve Mendez NP as Nurse Practitioner (Cardiology)  Sean Alcaraz MD as Physician (Cardiology)  KARIME Yoon as Nurse Practitioner (Nurse Practitioner)  KARIME Yoon as Nurse Practitioner (Nurse Practitioner)    Assessment/Plan   Education and counseling provided:  Are appropriate based on today's review and evaluation    Assessment/Plan:   Impressions:      ICD-10-CM ICD-9-CM    1. Medicare annual wellness visit, subsequent  Z00.00 V70.0         Plan:  1. Continue present meds  2. Lifestyle modifications including Na restriction, low carb/fat diet, weight reduction and exercise (at least a walking program). Follow-up and Dispositions    · Return for As previously scheduled. No orders of the defined types were placed in this encounter. Terra Potts MD   Assessment/Plan:  Diagnoses and all orders for this visit:    Medicare annual wellness visit, subsequent        There are no preventive care reminders to display for this patient. Other instructions: The patient's medications were reviewed and reconciled. Health maintenance issues were reviewed and are completely up-to-date. Age-appropriate vaccinations are up-to-date. I have recommended a Covid booster to be obtained when available in September/October and also have recommended influenza vaccination in October    Follow-up here as previously appointed    Follow-up and Dispositions    · Return for As previously scheduled. I have reviewed with the patient details of the assessment and plan and all questions were answered. Relevent patient education was performed. The most recent lab findings were reviewed with the patient. An After Visit Summary was printed and given to the patient. Terra Potts MD    Please note that this dictation was completed with [employer], the computer voice recognition software. Quite often unanticipated grammatical, syntax, homophones, and other interpretive errors are inadvertently transcribed by the computer software.   Please disregard these errors. Please excuse any errors that have escaped final proofreading.

## 2021-08-31 NOTE — PROGRESS NOTES
Chief Complaint   Patient presents with    Annual Wellness Visit       Depression Risk Factor Screening:     3 most recent PHQ Screens 3/23/2021   Little interest or pleasure in doing things Not at all   Feeling down, depressed, irritable, or hopeless Not at all   Total Score PHQ 2 0       Functional Ability and Level of Safety:     Activities of Daily Living  ADL Assessment 2/11/2021   Feeding yourself No Help Needed   Getting from bed to chair No Help Needed   Getting dressed No Help Needed   Bathing or showering No Help Needed   Walk across the room (includes cane/walker) No Help Needed   Using the telphone No Help Needed   Taking your medications No Help Needed   Preparing meals No Help Needed   Managing money (expenses/bills) No Help Needed   Moderately strenuous housework (laundry) No Help Needed   Shopping for personal items (toiletries/medicines) No Help Needed   Shopping for groceries No Help Needed   Driving No Help Needed   Climbing a flight of stairs No Help Needed   Getting to places beyond walking distances No Help Needed       Fall Risk  Fall Risk Assessment, last 12 mths 3/23/2021   Able to walk? Yes   Fall in past 12 months? -   Do you feel unsteady? 0   Are you worried about falling 0       Abuse Screen  Abuse Screening Questionnaire 3/23/2021   Do you ever feel afraid of your partner? N   Are you in a relationship with someone who physically or mentally threatens you? N   Is it safe for you to go home?  Y         Patient Care Team   Patient Care Team:  Sam Kaur MD as PCP - General (Internal Medicine)  Sam Kaur MD as PCP - [address] [place] [address] Provider  Daniela Velasco MD as Referring Provider (Cardiology)  Fidencio Slade MD as Surgeon (Cardiothoracic Surgery)  Lorie Brunner MD as Surgeon (Cardiothoracic Surgery)  Vamshi Pathak NP as Nurse Practitioner (Nurse Practitioner)  Carolyn Agarwal NP as Nurse Practitioner (Cardiology)  Stefan Salmon MD as Physician (Cardiology)  KARIME Piedra as Nurse Practitioner (Nurse Practitioner)  Bette Elena ANP as Nurse Practitioner (Nurse Practitioner)

## 2021-08-31 NOTE — PATIENT INSTRUCTIONS
The best way to stay healthy is to live a healthy lifestyle. A healthy lifestyle includes regular exercise, eating a well-balanced diet, keeping a healthy weight and not smoking. Regular physical exams and screening tests are another important way to take care of yourself. Preventive exams provided by health care providers can find health problems early when treatment works best and can keep you from getting certain diseases or illnesses. Preventive services include exams, lab tests, screenings, shots, monitoring and information to help you take care of your own health. All people over 65 should have a pneumonia shot. Pneumonia shots are usually only needed once in a lifetime unless your doctor decides differently. In addition to your physical exam, some screening tests are recommended:    All people over 65 should have a yearly flu shot. People over 65 are at medium to high risk for Hepatitis B. Three shots are needed for complete protection. Bone mass measurement (dexa scan) is recommended every two years. Diabetes Mellitus screening is recommended every year. Glaucoma is an eye disease caused by high pressure in the eye. An eye exam is recommended every year. Cardiovascular screening tests that check your cholesterol and other blood fat (lipid) levels are recommended every five years. Colorectal Cancer screening tests help to find pre-cancerous polyps (growths in the colon) so they can be removed before they turn into cancer. Tests ordered for screening depend on your personal and family history risk factors. Prostate Cancer Screening (annually up to age 76)    Screening for breast cancer is recommended yearly with a Mammogram.    Screening for cervical and vaginal cancer is recommended with a pelvic and Pap test every two years.  However if you have had an abnormal pap in the past  three years or at high risk for cervical or vaginal cancer Medicare will cover a pap test and a pelvic exam every year.      Here is a list of your current Health Maintenance items with a due date:  Health Maintenance Due   Topic Date Due    Annual Well Visit  08/26/2021

## 2021-09-23 ENCOUNTER — OFFICE VISIT (OUTPATIENT)
Dept: CARDIOLOGY CLINIC | Age: 76
End: 2021-09-23
Payer: MEDICARE

## 2021-09-23 DIAGNOSIS — I49.5 SSS (SICK SINUS SYNDROME) (HCC): ICD-10-CM

## 2021-09-23 DIAGNOSIS — Z95.0 CARDIAC PACEMAKER IN SITU: Primary | ICD-10-CM

## 2021-09-23 PROCEDURE — 93294 REM INTERROG EVL PM/LDLS PM: CPT | Performed by: INTERNAL MEDICINE

## 2021-09-23 PROCEDURE — 93296 REM INTERROG EVL PM/IDS: CPT | Performed by: INTERNAL MEDICINE

## 2021-11-01 DIAGNOSIS — E78.00 HYPERCHOLESTEROLEMIA: Chronic | ICD-10-CM

## 2021-11-02 RX ORDER — PRAVASTATIN SODIUM 20 MG/1
TABLET ORAL
Qty: 90 TABLET | Refills: 0 | Status: SHIPPED | OUTPATIENT
Start: 2021-11-02 | End: 2022-02-01

## 2021-12-23 ENCOUNTER — OFFICE VISIT (OUTPATIENT)
Dept: CARDIOLOGY CLINIC | Age: 76
End: 2021-12-23
Payer: MEDICARE

## 2021-12-23 DIAGNOSIS — I49.5 SSS (SICK SINUS SYNDROME) (HCC): ICD-10-CM

## 2021-12-23 DIAGNOSIS — Z95.0 CARDIAC PACEMAKER IN SITU: Primary | ICD-10-CM

## 2021-12-23 PROCEDURE — 93296 REM INTERROG EVL PM/IDS: CPT | Performed by: INTERNAL MEDICINE

## 2021-12-23 PROCEDURE — 93294 REM INTERROG EVL PM/LDLS PM: CPT | Performed by: INTERNAL MEDICINE

## 2022-01-31 DIAGNOSIS — E78.00 HYPERCHOLESTEROLEMIA: Chronic | ICD-10-CM

## 2022-02-01 RX ORDER — PRAVASTATIN SODIUM 20 MG/1
TABLET ORAL
Qty: 90 TABLET | Refills: 0 | Status: SHIPPED | OUTPATIENT
Start: 2022-02-01 | End: 2022-05-02 | Stop reason: SDUPTHER

## 2022-02-21 ENCOUNTER — OFFICE VISIT (OUTPATIENT)
Dept: INTERNAL MEDICINE CLINIC | Age: 77
End: 2022-02-21
Payer: MEDICARE

## 2022-02-21 VITALS
BODY MASS INDEX: 29.06 KG/M2 | RESPIRATION RATE: 18 BRPM | TEMPERATURE: 98.8 F | WEIGHT: 174.4 LBS | DIASTOLIC BLOOD PRESSURE: 80 MMHG | HEIGHT: 65 IN | HEART RATE: 83 BPM | SYSTOLIC BLOOD PRESSURE: 120 MMHG | OXYGEN SATURATION: 91 %

## 2022-02-21 DIAGNOSIS — E78.00 HYPERCHOLESTEROLEMIA: Chronic | ICD-10-CM

## 2022-02-21 DIAGNOSIS — I10 ESSENTIAL HYPERTENSION: Primary | Chronic | ICD-10-CM

## 2022-02-21 DIAGNOSIS — Z79.899 LONG-TERM USE OF HIGH-RISK MEDICATION: Chronic | ICD-10-CM

## 2022-02-21 DIAGNOSIS — I48.0 PAF (PAROXYSMAL ATRIAL FIBRILLATION) (HCC): Chronic | ICD-10-CM

## 2022-02-21 PROCEDURE — 99214 OFFICE O/P EST MOD 30 MIN: CPT | Performed by: INTERNAL MEDICINE

## 2022-02-21 PROCEDURE — G8536 NO DOC ELDER MAL SCRN: HCPCS | Performed by: INTERNAL MEDICINE

## 2022-02-21 PROCEDURE — G8427 DOCREV CUR MEDS BY ELIG CLIN: HCPCS | Performed by: INTERNAL MEDICINE

## 2022-02-21 PROCEDURE — G8419 CALC BMI OUT NRM PARAM NOF/U: HCPCS | Performed by: INTERNAL MEDICINE

## 2022-02-21 PROCEDURE — G8752 SYS BP LESS 140: HCPCS | Performed by: INTERNAL MEDICINE

## 2022-02-21 PROCEDURE — G8754 DIAS BP LESS 90: HCPCS | Performed by: INTERNAL MEDICINE

## 2022-02-21 PROCEDURE — G8399 PT W/DXA RESULTS DOCUMENT: HCPCS | Performed by: INTERNAL MEDICINE

## 2022-02-21 PROCEDURE — 1090F PRES/ABSN URINE INCON ASSESS: CPT | Performed by: INTERNAL MEDICINE

## 2022-02-21 PROCEDURE — 1101F PT FALLS ASSESS-DOCD LE1/YR: CPT | Performed by: INTERNAL MEDICINE

## 2022-02-21 PROCEDURE — G8510 SCR DEP NEG, NO PLAN REQD: HCPCS | Performed by: INTERNAL MEDICINE

## 2022-02-21 NOTE — PATIENT INSTRUCTIONS
DASH Diet: Care Instructions  Your Care Instructions     The DASH diet is an eating plan that can help lower your blood pressure. DASH stands for Dietary Approaches to Stop Hypertension. Hypertension is high blood pressure. The DASH diet focuses on eating foods that are high in calcium, potassium, and magnesium. These nutrients can lower blood pressure. The foods that are highest in these nutrients are fruits, vegetables, low-fat dairy products, nuts, seeds, and legumes. But taking calcium, potassium, and magnesium supplements instead of eating foods that are high in those nutrients does not have the same effect. The DASH diet also includes whole grains, fish, and poultry. The DASH diet is one of several lifestyle changes your doctor may recommend to lower your high blood pressure. Your doctor may also want you to decrease the amount of sodium in your diet. Lowering sodium while following the DASH diet can lower blood pressure even further than just the DASH diet alone. Follow-up care is a key part of your treatment and safety. Be sure to make and go to all appointments, and call your doctor if you are having problems. It's also a good idea to know your test results and keep a list of the medicines you take. How can you care for yourself at home? Following the DASH diet  · Eat 4 to 5 servings of fruit each day. A serving is 1 medium-sized piece of fruit, ½ cup chopped or canned fruit, 1/4 cup dried fruit, or 4 ounces (½ cup) of fruit juice. Choose fruit more often than fruit juice. · Eat 4 to 5 servings of vegetables each day. A serving is 1 cup of lettuce or raw leafy vegetables, ½ cup of chopped or cooked vegetables, or 4 ounces (½ cup) of vegetable juice. Choose vegetables more often than vegetable juice. · Get 2 to 3 servings of low-fat and fat-free dairy each day. A serving is 8 ounces of milk, 1 cup of yogurt, or 1 ½ ounces of cheese. · Eat 6 to 8 servings of grains each day.  A serving is 1 slice of bread, 1 ounce of dry cereal, or ½ cup of cooked rice, pasta, or cooked cereal. Try to choose whole-grain products as much as possible. · Limit lean meat, poultry, and fish to 2 servings each day. A serving is 3 ounces, about the size of a deck of cards. · Eat 4 to 5 servings of nuts, seeds, and legumes (cooked dried beans, lentils, and split peas) each week. A serving is 1/3 cup of nuts, 2 tablespoons of seeds, or ½ cup of cooked beans or peas. · Limit fats and oils to 2 to 3 servings each day. A serving is 1 teaspoon of vegetable oil or 2 tablespoons of salad dressing. · Limit sweets and added sugars to 5 servings or less a week. A serving is 1 tablespoon jelly or jam, ½ cup sorbet, or 1 cup of lemonade. · Eat less than 2,300 milligrams (mg) of sodium a day. If you limit your sodium to 1,500 mg a day, you can lower your blood pressure even more. · Be aware that all of these are the suggested number of servings for people who eat 1,800 to 2,000 calories a day. Your recommended number of servings may be different if you need more or fewer calories. Tips for success  · Start small. Do not try to make dramatic changes to your diet all at once. You might feel that you are missing out on your favorite foods and then be more likely to not follow the plan. Make small changes, and stick with them. Once those changes become habit, add a few more changes. · Try some of the following:  ? Make it a goal to eat a fruit or vegetable at every meal and at snacks. This will make it easy to get the recommended amount of fruits and vegetables each day. ? Try yogurt topped with fruit and nuts for a snack or healthy dessert. ? Add lettuce, tomato, cucumber, and onion to sandwiches. ? Combine a ready-made pizza crust with low-fat mozzarella cheese and lots of vegetable toppings. Try using tomatoes, squash, spinach, broccoli, carrots, cauliflower, and onions. ?  Have a variety of cut-up vegetables with a low-fat dip as an appetizer instead of chips and dip. ? Sprinkle sunflower seeds or chopped almonds over salads. Or try adding chopped walnuts or almonds to cooked vegetables. ? Try some vegetarian meals using beans and peas. Add garbanzo or kidney beans to salads. Make burritos and tacos with mashed preston beans or black beans. Where can you learn more? Go to http://www.simons.com/  Enter H967 in the search box to learn more about \"DASH Diet: Care Instructions. \"  Current as of: April 29, 2021               Content Version: 13.0  © 3396-5525 [employer]. Care instructions adapted under license by [employer] (which disclaims liability or warranty for this information). If you have questions about a medical condition or this instruction, always ask your healthcare professional. [address] any warranty or liability for your use of this information.

## 2022-02-21 NOTE — PROGRESS NOTES
HIPAA verified by two patient identifiers. Jeanette Heredia is a 64 y.o. female    Chief Complaint   Patient presents with    Hypertension     6 months       Visit Vitals  /80 (BP 1 Location: Left upper arm, BP Patient Position: Sitting, BP Cuff Size: Large adult)   Pulse 83   Temp 98.8 °F (37.1 °C) (Oral)   Resp 18   Ht 5' 5\" (1.651 m)   Wt 174 lb 6.4 oz (79.1 kg)   SpO2 91%   BMI 29.02 kg/m²       Pain Scale: 0 - No pain/10  Pain Location:       There are no preventive care reminders to display for this patient. Coordination of Care Questionnaire:  :   1) Have you been to an emergency room, urgent care, or hospitalized since your last visit? If yes, where when, and reason for visit? no       2. Have seen or consulted any other health care provider since your last visit? If yes, where when, and reason for visit? NO      Patient is accompanied by self I have received verbal consent from Jeanette Heredia to discuss any/all medical information while they are present in the room.

## 2022-02-21 NOTE — PROGRESS NOTES
Subjective:     Mrs. Abbie Stearns returns to the office today in follow-up of multiple medical problems. The patient has hypertension currently managed on losartan and metoprolol. She tolerates this regimen without orthostatic dizziness, lower extremity edema, fatigue or palpitations. Denies headaches, numbness, tingling or focal neurological problems. She has hypercholesterolemia currently on statin therapy along with fish oil. The patient has no history of coronary artery disease. She denies muscle soreness or GI upset. She denies exertional chest pains or claudication. The patient has a history of atrial fibrillation and tacky bradycardia syndrome. She is on metoprolol to help control her atrial fibrillation and remains on Eliquis for stroke prevention. She denies any palpitations, syncope, bleeding problems or strokelike symptoms.     Past Medical History:   Diagnosis Date    Allergic conjunctivitis 7/26/2017    Aortic dissection (HCC)     Arthritis     lower back    Asthma     Essential hypertension 8/21/2017    GERD (gastroesophageal reflux disease)     Hypercholesterolemia 8/21/2017    Hyperlipidemia     Hypertension     Intertrigo 7/26/2017    Long-term use of high-risk medication 7/26/2017    LVH (left ventricular hypertrophy) 7/26/2017    PAF (paroxysmal atrial fibrillation) (HCC) 8/20/2018    Positional vertigo 7/26/2017    PUD (peptic ulcer disease)     Sciatica of left side 7/26/2017    Spinal stenosis, lumbar region, without neurogenic claudication 7/26/2017    SSS (sick sinus syndrome) ([facility] [address] 9/18/2018     Past Surgical History:   Procedure Laterality Date    COLONOSCOPY N/A 7/12/2017    COLONOSCOPY WITH IV ANTIBIOTICS performed by Gallito Pickering [facility] at [facility]  7/12/2017         HX GYN      hysterectomy    HX GYN      tubal ligation    HX HYSTERECTOMY      HX OTHER SURGICAL  2015    Type A Dissection Repair    [place] COLONOSCOPY FLX DX DANA/YONATHAN SPEC WHEN PFRMD  3/19/2012         UPPER GI ENDOSCOPY,BALL DIL,30MM  7/12/2017         UPPER GI ENDOSCOPY,BIOPSY  7/12/2017          No Known Allergies  Current Outpatient Medications   Medication Sig Dispense Refill    pravastatin (PRAVACHOL) 20 mg tablet Take 1 tablet by mouth once daily 90 Tablet 0    losartan (COZAAR) 100 mg tablet TAKE 1 TABLET BY MOUTH DAILY 90 Tablet 1    apixaban (Eliquis) 5 mg tablet TAKE 1 TABLET BY MOUTH TWICE DAILY 180 Tablet 1    metoprolol tartrate (LOPRESSOR) 25 mg tablet Take 1 Tab by mouth two (2) times a day. 180 Tab 3    cholecalciferol (Vitamin D3) (5000 Units/125 mcg) tab tablet Take  by mouth daily.  acetaminophen (TYLENOL ARTHRITIS PAIN) 650 mg CR tablet Take 650 mg by mouth every six (6) hours as needed for Pain.  omega-3 fatty acids-vitamin e (FISH OIL) 1,000 mg Cap Take 1 Cap by mouth two (2) times a day. Takes irregularly       Social History     Socioeconomic History    Marital status:    Tobacco Use    Smoking status: Never Smoker    Smokeless tobacco: Never Used   Vaping Use    Vaping Use: Never used   Substance and Sexual Activity    Alcohol use:  Yes     Alcohol/week: 0.0 standard drinks     Comment: rare    Drug use: No     Family History   Problem Relation Age of Onset    Cancer Father         colon cancer       Review of Systems:  GEN: no weight loss, weight gain, fatigue or night sweats  CV: no PND, orthopnea, or palpitations  Resp: no dyspnea on exertion, no cough  Abd: no nausea, vomiting or diarrhea  EXT: denies edema, claudication  Endocrine: no hair loss, excessive thirst or polyuria  Neurological ROS: no TIA or stroke symptoms  ROS otherwise negative      Objective:     Visit Vitals  /80 (BP 1 Location: Left upper arm, BP Patient Position: Sitting, BP Cuff Size: Large adult)   Pulse 83   Temp 98.8 °F (37.1 °C) (Oral)   Resp 18   Ht 5' 5\" (1.651 m)   Wt 174 lb 6.4 oz (79.1 kg)   SpO2 91%   BMI 29.02 kg/m²     Body mass index is 29.02 kg/m². General:   alert, cooperative and no distress   Eyes: conjunctivae/sclerae clear. PERRL, EOM's intact   Mouth:  No oral lesions, no pharyngeal erythema, no exudates   Neck: Trachea midline, no thyromegaly, no bruits   Heart: S1 and S2 normal,no murmurs noted    Lungs: Clear to auscultation bilaterally, no increased work of breathing   Abdomen: Soft, nontender. Normal bowel sounds   Extremities: No edema or cyanosis   Neuro: ..alert, oriented x3,speech normal in context and clarity, cranial nerves II-XII intact,motor strength: full proximally and distally,gait: normal  reflexes: full and symmetric     Physical exam otherwise negative         Assessment/Plan:     Diagnoses and all orders for this visit:    Essential hypertension    Hypercholesterolemia    Long-term use of high-risk medication    PAF (paroxysmal atrial fibrillation) ([facility] [address]        Other instructions: The patient's medications were reviewed and reconciled. No change in her current medical regimen will be made. A no added salt, prudent diet is encouraged    Exercise program is endorsed    Labs from 8/17 were reviewed with the patient today    Follow-up in 6 months    Follow-up and Dispositions    · Return in about 6 months (around 8/21/2022). Francie Hart MD    Please note that this dictation was completed with [employer], the computer voice recognition software. Quite often unanticipated grammatical, syntax, homophones, and other interpretive errors are inadvertently transcribed by the computer software. Please disregard these errors. Please excuse any errors that have escaped final proofreading.

## 2022-03-18 ENCOUNTER — TRANSCRIBE ORDER (OUTPATIENT)
Dept: SCHEDULING | Age: 77
End: 2022-03-18

## 2022-03-18 DIAGNOSIS — Z12.31 ENCOUNTER FOR SCREENING MAMMOGRAM FOR MALIGNANT NEOPLASM OF BREAST: Primary | ICD-10-CM

## 2022-03-18 PROBLEM — H10.10 ALLERGIC CONJUNCTIVITIS: Status: ACTIVE | Noted: 2017-07-26

## 2022-03-18 PROBLEM — U07.1 PNEUMONIA DUE TO COVID-19 VIRUS: Status: ACTIVE | Noted: 2018-09-18

## 2022-03-18 PROBLEM — L30.4 INTERTRIGO: Status: ACTIVE | Noted: 2017-07-26

## 2022-03-18 PROBLEM — J12.82 PNEUMONIA DUE TO COVID-19 VIRUS: Status: ACTIVE | Noted: 2018-09-18

## 2022-03-19 PROBLEM — J96.01 ACUTE RESPIRATORY FAILURE WITH HYPOXEMIA (HCC): Status: ACTIVE | Noted: 2018-09-18

## 2022-03-19 PROBLEM — M48.061 SPINAL STENOSIS, LUMBAR REGION, WITHOUT NEUROGENIC CLAUDICATION: Status: ACTIVE | Noted: 2017-07-26

## 2022-03-19 PROBLEM — Z98.890 S/P CARDIAC CATH: Status: ACTIVE | Noted: 2017-05-09

## 2022-03-19 PROBLEM — I48.0 PAF (PAROXYSMAL ATRIAL FIBRILLATION) (HCC): Status: ACTIVE | Noted: 2018-08-20

## 2022-03-19 PROBLEM — G93.31 POSTVIRAL FATIGUE SYNDROME: Status: ACTIVE | Noted: 2018-09-18

## 2022-03-19 PROBLEM — I49.5 TACHY-BRADY SYNDROME (HCC): Status: ACTIVE | Noted: 2018-09-18

## 2022-03-19 PROBLEM — I51.7 LVH (LEFT VENTRICULAR HYPERTROPHY): Status: ACTIVE | Noted: 2017-07-26

## 2022-03-19 PROBLEM — I10 ESSENTIAL HYPERTENSION: Status: ACTIVE | Noted: 2017-08-21

## 2022-03-19 PROBLEM — Z79.899 LONG-TERM USE OF HIGH-RISK MEDICATION: Status: ACTIVE | Noted: 2017-07-26

## 2022-03-20 PROBLEM — M54.32 SCIATICA OF LEFT SIDE: Status: ACTIVE | Noted: 2017-07-26

## 2022-03-20 PROBLEM — E78.00 HYPERCHOLESTEROLEMIA: Status: ACTIVE | Noted: 2017-08-21

## 2022-04-13 ENCOUNTER — OFFICE VISIT (OUTPATIENT)
Dept: CARDIOLOGY CLINIC | Age: 77
End: 2022-04-13
Payer: MEDICARE

## 2022-04-13 ENCOUNTER — OFFICE VISIT (OUTPATIENT)
Dept: CARDIOLOGY CLINIC | Age: 77
End: 2022-04-13

## 2022-04-13 VITALS
RESPIRATION RATE: 18 BRPM | HEIGHT: 65 IN | BODY MASS INDEX: 28.82 KG/M2 | SYSTOLIC BLOOD PRESSURE: 160 MMHG | HEART RATE: 70 BPM | OXYGEN SATURATION: 97 % | WEIGHT: 173 LBS | DIASTOLIC BLOOD PRESSURE: 92 MMHG

## 2022-04-13 DIAGNOSIS — I48.0 PAF (PAROXYSMAL ATRIAL FIBRILLATION) (HCC): ICD-10-CM

## 2022-04-13 DIAGNOSIS — I49.5 SSS (SICK SINUS SYNDROME) (HCC): ICD-10-CM

## 2022-04-13 DIAGNOSIS — I10 ESSENTIAL HYPERTENSION: ICD-10-CM

## 2022-04-13 DIAGNOSIS — I48.0 PAF (PAROXYSMAL ATRIAL FIBRILLATION) (HCC): Primary | ICD-10-CM

## 2022-04-13 DIAGNOSIS — Z95.0 CARDIAC PACEMAKER IN SITU: Primary | ICD-10-CM

## 2022-04-13 PROCEDURE — G8510 SCR DEP NEG, NO PLAN REQD: HCPCS | Performed by: INTERNAL MEDICINE

## 2022-04-13 PROCEDURE — 1090F PRES/ABSN URINE INCON ASSESS: CPT | Performed by: INTERNAL MEDICINE

## 2022-04-13 PROCEDURE — G8536 NO DOC ELDER MAL SCRN: HCPCS | Performed by: INTERNAL MEDICINE

## 2022-04-13 PROCEDURE — G8753 SYS BP > OR = 140: HCPCS | Performed by: INTERNAL MEDICINE

## 2022-04-13 PROCEDURE — 93280 PM DEVICE PROGR EVAL DUAL: CPT | Performed by: INTERNAL MEDICINE

## 2022-04-13 PROCEDURE — 99213 OFFICE O/P EST LOW 20 MIN: CPT | Performed by: INTERNAL MEDICINE

## 2022-04-13 PROCEDURE — G8399 PT W/DXA RESULTS DOCUMENT: HCPCS | Performed by: INTERNAL MEDICINE

## 2022-04-13 PROCEDURE — G8755 DIAS BP > OR = 90: HCPCS | Performed by: INTERNAL MEDICINE

## 2022-04-13 PROCEDURE — 93000 ELECTROCARDIOGRAM COMPLETE: CPT | Performed by: INTERNAL MEDICINE

## 2022-04-13 PROCEDURE — G8419 CALC BMI OUT NRM PARAM NOF/U: HCPCS | Performed by: INTERNAL MEDICINE

## 2022-04-13 PROCEDURE — G8427 DOCREV CUR MEDS BY ELIG CLIN: HCPCS | Performed by: INTERNAL MEDICINE

## 2022-04-13 PROCEDURE — 1101F PT FALLS ASSESS-DOCD LE1/YR: CPT | Performed by: INTERNAL MEDICINE

## 2022-04-13 RX ORDER — LANOLIN ALCOHOL/MO/W.PET/CERES
1000 CREAM (GRAM) TOPICAL DAILY
COMMUNITY

## 2022-04-13 NOTE — Clinical Note
4/13/2022    Patient: Lavon Penny   YOB: 1945   Date of Visit: 4/13/2022     Edil Gamble MD  Via     Dear Edil Gamble MD,      Thank you for referring Ms. Demetrius Donato to [address] [place] [address] [facility] for evaluation. My notes for this consultation are attached. If you have questions, please do not hesitate to call me. I look forward to following your patient along with you.       Sincerely,    Samantha Tate MD

## 2022-04-13 NOTE — PROGRESS NOTES
Subjective: Inocente Vegas is a 64 y.o. female is here for EP consult. The patient denies chest pain/ shortness of breath, orthopnea, PND, LE edema, palpitations, syncope, presyncope or fatigue.        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    PAF (paroxysmal atrial fibrillation) ([facility] [address] 08/20/2018    Essential hypertension 08/21/2017    Hypercholesterolemia 08/21/2017    Intertrigo 07/26/2017    Positional vertigo 07/26/2017    Spinal stenosis, lumbar region, without neurogenic claudication 07/26/2017    Long-term use of high-risk medication 07/26/2017    LVH (left ventricular hypertrophy) 07/26/2017    Sciatica of left side 07/26/2017    Allergic conjunctivitis 07/26/2017    S/P cardiac cath 05/09/2017    Heart palpitations 03/08/2016    Swelling of both ankles 01/20/2016    S/P ascending aortic aneurysm repair 12/28/2015      Gigi Koenig MD  Past Medical History:   Diagnosis Date    Allergic conjunctivitis 7/26/2017    Aortic dissection (HCC)     Arthritis     lower back    Asthma     Essential hypertension 8/21/2017    GERD (gastroesophageal reflux disease)     Hypercholesterolemia 8/21/2017    Hyperlipidemia     Hypertension     Intertrigo 7/26/2017    Long-term use of high-risk medication 7/26/2017    LVH (left ventricular hypertrophy) 7/26/2017    PAF (paroxysmal atrial fibrillation) (HCC) 8/20/2018    Positional vertigo 7/26/2017    PUD (peptic ulcer disease)     Sciatica of left side 7/26/2017    Spinal stenosis, lumbar region, without neurogenic claudication 7/26/2017    SSS (sick sinus syndrome) ([facility] [address] 9/18/2018      Past Surgical History:   Procedure Laterality Date    COLONOSCOPY N/A 7/12/2017    COLONOSCOPY WITH IV ANTIBIOTICS performed by Jaki Mustafa Sulema Rain MD [facility] ENDOSCOPY    COLONOSCOPY,DIAGNOSTIC  7/12/2017         HX GYN      hysterectomy    HX GYN      tubal ligation    HX HYSTERECTOMY      HX OTHER SURGICAL  2015    Type A Dissection Repair    [place] COLONOSCOPY FLX DX W/COLLJ SPEC WHEN PFRMD  3/19/2012         UPPER GI ENDOSCOPY,BALL DIL,30MM  7/12/2017         UPPER GI ENDOSCOPY,BIOPSY  7/12/2017          No Known Allergies   Family History   Problem Relation Age of Onset    Cancer Father         colon cancer    negative for cardiac disease  Social History     Socioeconomic History    Marital status:    Tobacco Use    Smoking status: Never Smoker    Smokeless tobacco: Never Used   Vaping Use    Vaping Use: Never used   Substance and Sexual Activity    Alcohol use: Yes     Alcohol/week: 0.0 standard drinks     Comment: rare    Drug use: No     Current Outpatient Medications   Medication Sig    cyanocobalamin 1,000 mcg tablet Take 1,000 mcg by mouth daily.  losartan (COZAAR) 100 mg tablet TAKE 1 TABLET BY MOUTH DAILY    apixaban (Eliquis) 5 mg tablet TAKE 1 TABLET BY MOUTH TWICE DAILY    pravastatin (PRAVACHOL) 20 mg tablet Take 1 tablet by mouth once daily    metoprolol tartrate (LOPRESSOR) 25 mg tablet Take 1 Tab by mouth two (2) times a day.  cholecalciferol (Vitamin D3) (5000 Units/125 mcg) tab tablet Take  by mouth daily.  acetaminophen (TYLENOL ARTHRITIS PAIN) 650 mg CR tablet Take 650 mg by mouth every six (6) hours as needed for Pain.  omega-3 fatty acids-vitamin e (FISH OIL) 1,000 mg Cap Take 1 Cap by mouth two (2) times a day. Takes irregularly     No current facility-administered medications for this visit. Vitals:    04/13/22 1113   BP: (!) 160/92   Pulse: 70   Resp: 18   SpO2: 97%   Weight: 173 lb (78.5 kg)   Height: 5' 5\" (1.651 m)       I have reviewed the nurses notes, vitals, problem list, allergy list, medical history, family, social history and medications.     Review of Symptoms:    General: Pt denies excessive weight gain or loss. Pt is able to conduct ADL's  HEENT: Denies blurred vision, headaches, hearing loss, epistaxis and difficulty swallowing. Respiratory: Denies cough, congestion, shortness of breath, MIMS, wheezing or stridor. Cardiovascular: Denies precordial pain, palpitations, edema or PND  Gastrointestinal: Denies poor appetite, indigestion, abdominal pain or blood in stool  Genitourinary: Denies hematuria, dysuria, increased urinary frequency  Musculoskeletal: Denies joint pain or swelling from muscles or joints  Neurologic: Denies tremor, paresthesias, headache, or sensory motor disturbance  Psychiatric: Denies confusion, insomnia, depression  Integumentray: Denies rash, itching or ulcers. Hematologic: Denies easy bruising, bleeding    Physical Exam:      General: Well developed, in no acute distress. HEENT: Eyes - PERRL, no jvd  Heart:  Normal S1/S2 negative S3 or S4. Regular, no murmur, gallop or rub. Respiratory: Clear bilaterally x 4, no wheezing or rales  Abdomen:   Soft, non-tender, bowel sounds are active. Extremities:  No edema, normal cap refill, no cyanosis. Musculoskeletal: No clubbing  Neuro: A&Ox3, speech clear, gait stable. Skin: Skin color is normal. No rashes or lesions.  Non diaphoretic, no ulcers or subcutaneous nodule  Vascular: 2+ pulses symmetric in all extremities  Psych - judgement intact and orientation is wnl     Cardiographics    Ekg: a paced      Results for orders placed or performed during the hospital encounter of 09/10/18   EKG, 12 LEAD, INITIAL   Result Value Ref Range    Ventricular Rate 66 BPM    Atrial Rate 66 BPM    P-R Interval 204 ms    QRS Duration 92 ms    Q-T Interval 448 ms    QTC Calculation (Bezet) 469 ms    Calculated P Axis 34 degrees    Calculated R Axis -17 degrees    Calculated T Axis 22 degrees    Diagnosis       Sinus rhythm with premature supraventricular complexes  Voltage criteria for left ventricular hypertrophy  Non-specific ST & T wave changes  Confirmed by Maycol Crum ([id number]) on 9/11/2018 10:08:01 PM     Results for orders placed or performed in visit on 08/16/18   CARDIAC HOLTER MONITOR, 24 HOURS    Narrative    ECG Monitor/24 hours, Complete    Reason for Holter Monitor   PALPITATIONS    Heartbeat    Slowest 37  Average 72  Fastest  145      Results:   Underlying Rhythm: Normal sinus rhythm      Atrial Arrhythmias: premature atrial contractions; occasional, paroxysmal atrial fibrillation and severe bradycardia            AV Conduction: normal    Ventricular Arrhythmias: premature ventricular contractions; occasional     ST Segment Analysis:normal     Symptom Correlation:  Sob correlates with PAF    Comment:   Sinus rhythm with symptomatic PAF with rvr and profound bradycardia to 37 bpm. C/w tachy-bridgett syndrome. Clinical correlation advised. Aimee Dutta MD, [facility]              Lab Results   Component Value Date/Time    WBC 4.2 08/17/2021 10:38 AM    HGB (POC) 13.2 08/20/2018 09:26 AM    HGB 12.9 08/17/2021 10:38 AM    HCT (POC) 40.3 08/20/2018 09:26 AM    HCT 42.1 08/17/2021 10:38 AM    PLATELET 026 15/13/1623 10:38 AM    MCV 94.8 08/17/2021 10:38 AM      Lab Results   Component Value Date/Time    Sodium 144 08/17/2021 10:38 AM    Potassium 4.2 08/17/2021 10:38 AM    Chloride 111 (H) 08/17/2021 10:38 AM    CO2 32 08/17/2021 10:38 AM    Anion gap 1 (L) 08/17/2021 10:38 AM    Glucose 90 08/17/2021 10:38 AM    BUN 17 08/17/2021 10:38 AM    Creatinine 0.59 08/17/2021 10:38 AM    BUN/Creatinine ratio 29 (H) 08/17/2021 10:38 AM    GFR est AA >60 08/17/2021 10:38 AM    GFR est non-AA >60 08/17/2021 10:38 AM    Calcium 10.0 08/17/2021 10:38 AM    Bilirubin, total 0.5 08/17/2021 10:38 AM    Alk.  phosphatase 66 08/17/2021 10:38 AM    Protein, total 7.0 08/17/2021 10:38 AM    Albumin 4.1 08/17/2021 10:38 AM    Globulin 2.9 08/17/2021 10:38 AM    A-G Ratio 1.4 08/17/2021 10:38 AM    ALT (SGPT) 17 08/17/2021 10:38 AM Assessment:     Assessment:        ICD-10-CM ICD-9-CM    1. PAF (paroxysmal atrial fibrillation) ([facility])  I48.0 427.31 AMB POC EKG ROUTINE W/ 12 LEADS, INTER & REP   2. Essential hypertension  I10 401.9    3. SSS (sick sinus syndrome) ([facility])  I49.5 427.81      Orders Placed This Encounter    AMB POC EKG ROUTINE W/ 12 LEADS, INTER & REP     Order Specific Question:   Reason for Exam:     Answer:   routine    cyanocobalamin 1,000 mcg tablet     Sig: Take 1,000 mcg by mouth daily. Plan: Niels Bashir is doing well. A paced 57% for sick sinus. V paced 8% for high grade av block. She has AF less than 1% of the time and is stable and compliant with oac. Cont med rx for htn and f/u in one year. Thank you for allowing me to participate in Niels Bashir 's care.     Nishi Santacruz MD, [facility]

## 2022-04-13 NOTE — PROGRESS NOTES
Chief Complaint   Patient presents with    Pacemaker Check     Annual follow up -     Shortness of Breath     she walks daily and when she misses a day or two she can tell      1. Have you been to the ER, urgent care clinic since your last visit? Hospitalized since your last visit? No     2. Have you seen or consulted any other health care providers outside of the [address] [place] [address] since your last visit? Include any pap smears or colon screening.   No

## 2022-05-02 DIAGNOSIS — E78.00 HYPERCHOLESTEROLEMIA: Chronic | ICD-10-CM

## 2022-05-02 RX ORDER — PRAVASTATIN SODIUM 20 MG/1
20 TABLET ORAL DAILY
Qty: 90 TABLET | Refills: 0 | Status: SHIPPED | OUTPATIENT
Start: 2022-05-02 | End: 2022-07-26

## 2022-05-02 NOTE — TELEPHONE ENCOUNTER
Requested Prescriptions     Pending Prescriptions Disp Refills    pravastatin (PRAVACHOL) 20 mg tablet 90 Tablet 0     Sig: Take 1 Tablet by mouth daily.      Next Appt: 8/22/22  Last Refill: 2/1/22

## 2022-05-05 RX ORDER — METOPROLOL TARTRATE 25 MG/1
25 TABLET, FILM COATED ORAL 2 TIMES DAILY
Qty: 180 TABLET | Refills: 3 | Status: SHIPPED | OUTPATIENT
Start: 2022-05-05 | End: 2022-08-22 | Stop reason: SDUPTHER

## 2022-05-05 NOTE — TELEPHONE ENCOUNTER
Requested Prescriptions     Pending Prescriptions Disp Refills    metoprolol tartrate (LOPRESSOR) 25 mg tablet 180 Tablet 3     Sig: Take 1 Tablet by mouth two (2) times a day.      Next appt: 8/22/22  Last refill: 5/4/21

## 2022-05-26 DIAGNOSIS — I48.0 PAF (PAROXYSMAL ATRIAL FIBRILLATION) (HCC): Chronic | ICD-10-CM

## 2022-05-26 NOTE — TELEPHONE ENCOUNTER
Last Refill: 2/28/22  Last Visit: 2/21/2022 Dr. Stan Leo  Next Visit: 8/22/22 Dr. Mine Saxena    Requested Prescriptions     Pending Prescriptions Disp Refills    apixaban (Eliquis) 5 mg tablet 180 Tablet 0     Sig: TAKE 1 TABLET BY MOUTH TWICE DAILY

## 2022-06-22 DIAGNOSIS — I10 ESSENTIAL HYPERTENSION: Chronic | ICD-10-CM

## 2022-06-22 RX ORDER — LOSARTAN POTASSIUM 100 MG/1
100 TABLET ORAL DAILY
Qty: 90 TABLET | Refills: 0 | Status: SHIPPED | OUTPATIENT
Start: 2022-06-22 | End: 2022-08-22 | Stop reason: SDUPTHER

## 2022-06-22 NOTE — TELEPHONE ENCOUNTER
RX refill request from the patient/pharmacy. Patient last seen 02- with labs, and next appt. scheduled for 08-  Requested Prescriptions     Pending Prescriptions Disp Refills    losartan (COZAAR) 100 mg tablet 90 Tablet 0     Sig: Take 1 Tablet by mouth daily. [facility]

## 2022-06-22 NOTE — TELEPHONE ENCOUNTER
PCP: Ibis De Dios MD    Last appt: 2/21/2022  Future Appointments   Date Time Provider Luis Angel Borden   7/18/2022 10:15 AM [facility]   8/22/2022 10:30 AM Claudia Figueroa MD [facility] BS AMB   4/20/2023 10:30 AM [facility], [facility] AM[facility]   4/20/2023 10:45 AM Anne Richards MD [facility] BS AMB       Requested Prescriptions     Pending Prescriptions Disp Refills    losartan (COZAAR) 100 mg tablet 90 Tablet 0     Sig: Take 1 Tablet by mouth daily.        Prior labs and Blood pressures:  BP Readings from Last 3 Encounters:   04/13/22 (!) 160/92   02/21/22 120/80   08/31/21 122/72     Lab Results   Component Value Date/Time    Sodium 144 08/17/2021 10:38 AM    Potassium 4.2 08/17/2021 10:38 AM    Chloride 111 (H) 08/17/2021 10:38 AM    CO2 32 08/17/2021 10:38 AM    Anion gap 1 (L) 08/17/2021 10:38 AM    Glucose 90 08/17/2021 10:38 AM    BUN 17 08/17/2021 10:38 AM    Creatinine 0.59 08/17/2021 10:38 AM    BUN/Creatinine ratio 29 (H) 08/17/2021 10:38 AM    GFR est AA >60 08/17/2021 10:38 AM    GFR est non-AA >60 08/17/2021 10:38 AM    Calcium 10.0 08/17/2021 10:38 AM     Lab Results   Component Value Date/Time    Hemoglobin A1c 6.4 (H) 12/28/2015 10:19 AM     Lab Results   Component Value Date/Time    Cholesterol, total 119 08/17/2021 10:38 AM    Cholesterol (POC) 127 08/21/2017 11:12 AM    HDL Cholesterol 55 08/17/2021 10:38 AM    HDL Cholesterol (POC) 52 08/21/2017 11:12 AM    LDL Cholesterol (POC) 61.6 08/21/2017 11:12 AM    LDL, calculated 48.4 08/17/2021 10:38 AM    VLDL, calculated 15.6 08/17/2021 10:38 AM    Triglyceride 78 08/17/2021 10:38 AM    Triglycerides (POC) 67 08/21/2017 11:12 AM    CHOL/HDL Ratio 2.2 08/17/2021 10:38 AM     No results found for: MEGAN Rader    Lab Results   Component Value Date/Time    TSH 0.70 08/17/2021 10:38 AM    TSH, 3rd generation 0.84 08/25/2020 01:30 PM

## 2022-07-18 ENCOUNTER — HOSPITAL ENCOUNTER (OUTPATIENT)
Dept: MAMMOGRAPHY | Age: 77
Discharge: HOME | End: 2022-07-18
Attending: INTERNAL MEDICINE
Payer: MEDICARE

## 2022-07-18 DIAGNOSIS — Z12.31 ENCOUNTER FOR SCREENING MAMMOGRAM FOR MALIGNANT NEOPLASM OF BREAST: ICD-10-CM

## 2022-07-18 PROCEDURE — 77063 BREAST TOMOSYNTHESIS BI: CPT

## 2022-07-25 DIAGNOSIS — E78.00 HYPERCHOLESTEROLEMIA: Chronic | ICD-10-CM

## 2022-07-26 RX ORDER — PRAVASTATIN SODIUM 20 MG/1
TABLET ORAL
Qty: 90 TABLET | Refills: 0 | Status: SHIPPED | OUTPATIENT
Start: 2022-07-26 | End: 2022-08-22 | Stop reason: SDUPTHER

## 2022-08-17 NOTE — PROGRESS NOTES
Subjective:     Chief Complaint   Patient presents with    [address] [place] Isela Higginbotham is a 76 y.o. F.  She has a past medical history of paroxysmal atrial fibrillation, hypercholesterolemia, and hypertension, as well as other medical problems. I reviewed and updated the medical record. This patient was seen by her previous primary care provider most recently in February. Her hypertension was felt to be well controlled at the time with the use of losartan and metoprolol, and she was felt to be tolerating pravastatin as well as fish oil for control of her cholesterol. History of tachycardia/bradycardia syndrome was also noted. Her heart rate was felt to be well controlled with the use of metoprolol. Physical examination at the time had been unremarkable. No changes were made to her medication regimen and she was advised to follow-up in 6 months. Subsequent mammography performed in July had been unremarkable with repeat mammography recommended in 1 year. Today, the patient comes in for Medicare wellness visit as well as follow-up on her chronic medical concerns. She reports feeling fine overall today and has no significant acute somatic complaints. Since her last visit, there have been no other significant clinical changes. She continues to be followed by her cardiovascular surgeon and cardiologist for her history of thoracoabdominal aortic aneurysm, status post previous a sending aneurysm repair. She reports feeling no recent chest pain, shortness of breath, or any further cardiopulmonary symptoms. She does not routinely check her blood pressure readings at home. She continues on Eliquis for a history of paroxysmal atrial fibrillation, and denies having had any recent bleeding or bruising episodes, and furthermore denies any recent palpitations or changes in exercise tolerance or shortness of breath.     She continues on losartan 100 mg daily in addition to metoprolol tartrate 25 mg twice daily.  With this she has had no lightheadedness or dizziness. She also continues on pravastatin 20 mg daily for a history of hypercholesterolemia in association with her atherosclerotic vascular disease. She denies any myalgias or GI symptoms with this. Lipid panel performed last year had demonstrated excellent LDL at target. She otherwise reports feeling well today and has no further somatic concerns. She is notably due for her pneumonia and COVID-19 boosters, and she is reminded about these today and advised to obtain these at an outside pharmacy. She is due for routine screening laboratory studies. Her review of systems is otherwise negative. Routine Healthcare Maintenance issues are reviewed and discussed with the patient as noted below. Orders to update gaps in healthcare maintenance were placed as noted below in the Assessment and Plan, where applicable. Medicare Wellness Questions: Patient lives at home and is completely independent with regard to activities and instrumental activities of daily living. Patient does not drink alcohol to excess. Patient denies recent problems with memory, vision, hearing, balance or gait. Ambulates without difficulty. No abuse concerns. Patient denies recent depression or anxiety concerns. Patient is not using safety equipment in the home. Past Medical History:  Past Medical History:   Diagnosis Date    Allergic conjunctivitis 07/26/2017    Aortic aneurysm ([address] 05/2018    Thoracic, abdominal    Arthritis     lower back    Asthma     Current use of long term anticoagulation     Essential hypertension 08/21/2017    GERD (gastroesophageal reflux disease)     History of aortic dissection     History of echocardiogram 09/2019    Left Ventricle: Normal cavity size, systolic function (ejection fraction normal) and diastolic function. Moderate concentric hypertrophy. Estimated left ventricular ejection fraction is 56 - 60%.  No regional wall motion abnormality noted. Small secundum atrial septal defect present. Left Atrium: Moderately dilated left atrium. Mitral Valve: Mitral valve thickening. Mild mitral valve regurgitation. History of vertigo 07/26/2017    positional    Hx of repair of aortic root     Hypercholesterolemia 08/21/2017    Intertrigo 07/26/2017    Long-term use of high-risk medication 07/26/2017    LVH (left ventricular hypertrophy) 07/26/2017    PAF (paroxysmal atrial fibrillation) ([facility] [address] 08/20/2018    Presence of permanent cardiac pacemaker     PUD (peptic ulcer disease)     Spinal stenosis, lumbar region, without neurogenic claudication 07/26/2017    SSS (sick sinus syndrome) ([facility] [address] 09/18/2018    Vitamin B12 deficiency     Vitamin D deficiency        Past Surgical Histor:  Past Surgical History:   Procedure Laterality Date    COLONOSCOPY N/A 7/12/2017    COLONOSCOPY WITH IV ANTIBIOTICS performed by Jennifer [facility]berg MD at [facility]  7/12/2017         HX GYN      hysterectomy    HX GYN      tubal ligation    HX HYSTERECTOMY      HX OTHER SURGICAL  2015    Type A Dissection Repair    KS COLONOSCOPY FLX DX W/COLLJ SPEC WHEN PFRMD  3/19/2012         UPPER GI ENDOSCOPY,BALL DIL,30MM  7/12/2017         UPPER GI ENDOSCOPY,BIOPSY  7/12/2017            Allergies:  No Known Allergies    Medications:  Current Outpatient Medications   Medication Sig Dispense Refill    pravastatin (PRAVACHOL) 20 mg tablet Take 1 Tablet by mouth daily for 360 days. 90 Tablet 3    losartan (COZAAR) 100 mg tablet Take 1 Tablet by mouth daily for 360 days. 90 Tablet 3    metoprolol tartrate (LOPRESSOR) 25 mg tablet Take 1 Tablet by mouth two (2) times a day for 360 days. 180 Tablet 3    apixaban (Eliquis) 5 mg tablet TAKE 1 TABLET BY MOUTH TWICE DAILY 180 Tablet 3    cyanocobalamin 1,000 mcg tablet Take 1,000 mcg by mouth daily. acetaminophen (TYLENOL) 650 mg TbER Take 650 mg by mouth every six (6) hours as needed for Pain.       omega-3 fatty acids-vitamin e 1,000 mg cap Take 1 Cap by mouth two (2) times a day. Takes irregularly         Social History:  Social History     Socioeconomic History    Marital status:    Tobacco Use    Smoking status: Never    Smokeless tobacco: Never   Vaping Use    Vaping Use: Never used   Substance and Sexual Activity    Alcohol use: Yes     Alcohol/week: 0.0 standard drinks     Comment: rare    Drug use: No       Family History:  Family History   Problem Relation Age of Onset    Cancer Father         colon cancer       Immunizations:  Immunization History   Administered Date(s) Administered    COVID-19, MODERNA BLUE border, Primary or Immunocompromised, (age 18y+), IM, 100 mcg/0.5mL 03/08/2021, 04/07/2021, 11/01/2021    Influenza High Dose Vaccine PF 08/07/2019, 08/01/2020, 11/30/2021    Influenza Vaccine (Tri) Adjuvanted (>65 Yrs FLUAD TRI 78917) 09/18/2018    Influenza, FLUARIX, FLULAVAL, (age 10 mo+) AND AFLURIA, FLUZONE (age 1 y+), PF 01/08/2016, 08/07/2019    Pneumococcal Polysaccharide (PPSV-23) 01/08/2016    Tdap 08/27/2019    Unknown Vaccine or Immune Globulin 08/07/2019    Zoster Recombinant 04/30/2019, 07/10/2019        Healthcare Maintenance:  Health Maintenance   Topic Date Due    Pneumococcal 65+ years (2 - PCV) 01/08/2017    COVID-19 Vaccine (4 - Booster for Moderna series) 03/01/2022    Lipid Screen  08/17/2022    Flu Vaccine (1) 09/01/2022    Depression Screen  04/13/2023    Medicare Yearly Exam  08/23/2023    DTaP/Tdap/Td series (2 - Td or Tdap) 08/27/2029    Hepatitis C Screening  Completed    Bone Densitometry (Dexa) Screening  Completed    Shingrix Vaccine Age 50>  Completed        Review of Systems:  ROS:  Review of Systems   Constitutional: Negative. HENT: Negative. Eyes: Negative. Respiratory: Negative. Cardiovascular: Negative. Gastrointestinal: Negative. Genitourinary: Negative. Musculoskeletal: Negative. Skin: Negative. Neurological: Negative. Endo/Heme/Allergies: Negative. Psychiatric/Behavioral: Negative. ROS otherwise negative      Objective:     Vital Signs:  Visit Vitals  /88 (BP 1 Location: Right arm, BP Patient Position: Sitting, BP Cuff Size: Adult)   Pulse 69   Temp 98.2 °F (36.8 °C) (Oral)   Resp 18   Ht 5' 5\" (1.651 m)   Wt 175 lb 8 oz (79.6 kg)   SpO2 96%   BMI 29.20 kg/m²       BMI:  Body mass index is 29.2 kg/m². Physical Examination:  Physical Exam  Constitutional:       Appearance: Normal appearance. She is normal weight. HENT:      Head: Normocephalic and atraumatic. Right Ear: External ear normal.      Left Ear: External ear normal.      Nose: Nose normal.      Mouth/Throat:      Mouth: Mucous membranes are moist.      Pharynx: Oropharynx is clear. No oropharyngeal exudate or posterior oropharyngeal erythema. Cardiovascular:      Rate and Rhythm: Normal rate and regular rhythm. Pulses: Normal pulses. Heart sounds: Murmur heard. No friction rub. No gallop. Pulmonary:      Effort: Pulmonary effort is normal. No respiratory distress. Breath sounds: Normal breath sounds. No wheezing, rhonchi or rales. Abdominal:      General: Abdomen is flat. Bowel sounds are normal. There is no distension. Palpations: Abdomen is soft. Tenderness: no abdominal tenderness There is no guarding. Musculoskeletal:         General: No swelling, tenderness or deformity. Normal range of motion. Cervical back: Normal range of motion and neck supple. No rigidity or tenderness. Skin:     General: Skin is warm and dry. Findings: No erythema, lesion or rash. Neurological:      General: No focal deficit present. Mental Status: She is alert and oriented to person, place, and time. Mental status is at baseline. Sensory: No sensory deficit. Motor: No weakness.       Gait: Gait normal.      Deep Tendon Reflexes: Reflexes normal.   Psychiatric:         Mood and Affect: Mood normal. Behavior: Behavior normal.         Judgment: Judgment normal.        Physical exam otherwise negative    Diagnostic Testing:    Laboratory Studies:  No visits with results within 1 Year(s) from this visit. Latest known visit with results is:   Office Visit on 08/17/2021   Component Date Value Ref Range Status    TSH 08/17/2021 0.70  0.36 - 3.74 uIU/mL Final    Comment:   Due to TSH heterogeneity, both structurally and degree of glycosylation,  monoclonal antibodies used in the TSH assay may not accurately quantitate TSH. Therefore, this result should be correlated with clinical findings as well as  with other assessments of thyroid function, e.g., free T4, free T3. Sodium 08/17/2021 144  136 - 145 mmol/L Final    Potassium 08/17/2021 4.2  3.5 - 5.1 mmol/L Final    Chloride 08/17/2021 111 (A) 97 - 108 mmol/L Final    CO2 08/17/2021 32  21 - 32 mmol/L Final    Anion gap 08/17/2021 1 (A) 5 - 15 mmol/L Final    Glucose 08/17/2021 90  65 - 100 mg/dL Final    BUN 08/17/2021 17  6 - 20 MG/DL Final    Creatinine 08/17/2021 0.59  0.55 - 1.02 MG/DL Final    BUN/Creatinine ratio 08/17/2021 29 (A) 12 - 20   Final    GFR est AA 08/17/2021 >60  >60 ml/min/1.73m2 Final    GFR est non-AA 08/17/2021 >60  >60 ml/min/1.73m2 Final    Comment: Estimated GFR is calculated using the IDMS-traceable Modification of Diet in  Renal Disease (MDRD) Study equation, reported for both  Americans  (GFRAA) and non- Americans (GFRNA), and normalized to 1.73m2 body  surface area. The physician must decide which value applies to the patient. Calcium 08/17/2021 10.0  8.5 - 10.1 MG/DL Final    Bilirubin, total 08/17/2021 0.5  0.2 - 1.0 MG/DL Final    ALT (SGPT) 08/17/2021 17  12 - 78 U/L Final    AST (SGOT) 08/17/2021 8 (A) 15 - 37 U/L Final    Alk.  phosphatase 08/17/2021 66  45 - 117 U/L Final    Protein, total 08/17/2021 7.0  6.4 - 8.2 g/dL Final    Albumin 08/17/2021 4.1  3.5 - 5.0 g/dL Final    Globulin 08/17/2021 2.9 2.0 - 4.0 g/dL Final    A-G Ratio 08/17/2021 1.4  1.1 - 2.2   Final    Cholesterol, total 08/17/2021 119  <200 MG/DL Final    Triglyceride 08/17/2021 78  <150 MG/DL Final    Comment: Based on NCEP-ATP III:  Triglycerides <150 mg/dL  is considered normal, 150-199  mg/dL  borderline high,  200-499 mg/dL high and  greater than or equal to 500  mg/dL very high. HDL Cholesterol 08/17/2021 55  MG/DL Final    Comment: Based on NCEP ATP III, HDL Cholesterol <40 mg/dL is considered low and >60  mg/dL is elevated. LDL, calculated 08/17/2021 48.4  0 - 100 MG/DL Final    Comment: Based on the NCEP-ATP: LDL-C concentrations are considered  optimal <100 mg/dL,  near optimal/above Normal 100-129 mg/dL Borderline High: 130-159, High: 160-189  mg/dL Very High: Greater than or equal to 190 mg/dL      VLDL, calculated 08/17/2021 15.6  MG/DL Final    CHOL/HDL Ratio 08/17/2021 2.2  0.0 - 5.0   Final    Hep C virus Ab Interp. 08/17/2021 NONREACTIVE  NONREACTIVE   Final    Method used is Siemens Advia Centaur    CK 08/17/2021 79  26 - 192 U/L Final    WBC 08/17/2021 4.2  3.6 - 11.0 K/uL Final    RBC 08/17/2021 4.44  3.80 - 5.20 M/uL Final    HGB 08/17/2021 12.9  11.5 - 16.0 g/dL Final    HCT 08/17/2021 42.1  35.0 - 47.0 % Final    MCV 08/17/2021 94.8  80.0 - 99.0 FL Final    MCH 08/17/2021 29.1  26.0 - 34.0 PG Final    MCHC 08/17/2021 30.6  30.0 - 36.5 g/dL Final    RDW 08/17/2021 14.2  11.5 - 14.5 % Final    PLATELET 73/80/1658 870  150 - 400 K/uL Final    MPV 08/17/2021 10.9  8.9 - 12.9 FL Final    NRBC 08/17/2021 0.0  0  WBC Final    ABSOLUTE NRBC 08/17/2021 0.00  0.00 - 0.01 K/uL Final    NEUTROPHILS 08/17/2021 41  32 - 75 % Final    LYMPHOCYTES 08/17/2021 42  12 - 49 % Final    MONOCYTES 08/17/2021 13  5 - 13 % Final    EOSINOPHILS 08/17/2021 3  0 - 7 % Final    BASOPHILS 08/17/2021 1  0 - 1 % Final    IMMATURE GRANULOCYTES 08/17/2021 0  0.0 - 0.5 % Final    ABS.  NEUTROPHILS 08/17/2021 1.8  1.8 - 8.0 K/UL Final ABS. LYMPHOCYTES 08/17/2021 1.8  0.8 - 3.5 K/UL Final    ABS. MONOCYTES 08/17/2021 0.5  0.0 - 1.0 K/UL Final    ABS. EOSINOPHILS 08/17/2021 0.1  0.0 - 0.4 K/UL Final    ABS. BASOPHILS 08/17/2021 0.0  0.0 - 0.1 K/UL Final    ABS. IMM. GRANS. 08/17/2021 0.0  0.00 - 0.04 K/UL Final    DF 08/17/2021 AUTOMATED    Final    Color 08/20/2021 YELLOW/STRAW    Final    Color Reference Range: Straw, Yellow or Dark Yellow    Appearance 08/20/2021 CLOUDY (A) CLEAR   Final    Specific gravity 08/20/2021 1.027  1.003 - 1.030   Final    pH (UA) 08/20/2021 5.0  5.0 - 8.0   Final    Protein 08/20/2021 Negative  Negative mg/dL Final    Glucose 08/20/2021 Negative  Negative mg/dL Final    Ketone 08/20/2021 TRACE (A) Negative mg/dL Final    Bilirubin 08/20/2021 Negative  Negative   Final    Blood 08/20/2021 MODERATE (A) Negative   Final    Urobilinogen 08/20/2021 0.2  0.2 - 1.0 EU/dL Final    Nitrites 08/20/2021 Positive (A) Negative   Final    Leukocyte Esterase 08/20/2021 MODERATE (A) Negative   Final    WBC 08/20/2021 10-20  0 - 4 /hpf Final    RBC 08/20/2021 0-5  0 - 5 /hpf Final    Epithelial cells 08/20/2021 MODERATE (A) FEW /lpf Final    Comment: Epithelial cell category consists of squamous cells and /or transitional  urothelial cells. Renal tubular cells, if present, are separately identified as  such.       Bacteria 08/20/2021 3+ (A) Negative /hpf Final    UA:UC IF INDICATED 08/20/2021 URINE CULTURE ORDERED (A) CULTURE NOT INDICATED BY UA RESULT   Final    Special Requests: 08/20/2021     Final                    Value:NO SPECIAL REQUESTS  Reflexed from [id number]      [place] Count 08/20/2021     Final                    Value:>100,000  COLONIES/mL      Culture result: 08/20/2021 ESCHERICHIA COLI (A)   Final         Radiographic Studies:  XR Results (most recent):  Results from Hospital Encounter encounter on 09/10/18    XR CHEST PORT    Narrative  EXAM:  XR CHEST PORT    INDICATION:  Pacemaker, hypertension, asthma    COMPARISON: 9/4/2018    FINDINGS: A portable AP radiograph of the chest was obtained at 1012 hours. The  patient is on a cardiac monitor. The left subclavian pacemaker has one lead  overlying the right atrium the other overlies right ventricle. There is mild  cardiomegaly in this patient status post median sternotomy. The lungs demonstrate low lung volumes. The lungs are clear. Impression  IMPRESSION:  1. No pneumothorax     BRANDIN Results (most recent):  Results from Hospital Encounter encounter on 07/18/22    BRANDIN 3D VIKY W MAMMO BI SCREENING INCL CAD    Narrative  STUDY: Bilateral digital screening mammogram with 3-D tomosynthesis    INDICATION:  Screening. COMPARISON: Priors dating back to 2018    BREAST COMPOSITION: There are scattered areas of fibroglandular density. FINDINGS: Bilateral digital screening mammography was performed and is  interpreted in conjunction with a computer assisted detection (CAD) system. Additionally, tomosynthesis of both breasts in the CC and MLO projections was  performed. No suspicious masses or calcifications are identified. There has been  no significant change. A pacemaker generator overlies the left axillary tail. Impression  BI-RADS 1: Negative. No mammographic evidence of malignancy. RECOMMENDATIONS:  Next screening mammogram is recommended in one year. The patient will be notified of these results. CT Results (most recent):  Results from Hospital Encounter encounter on 05/22/18    CTA ABDOMEN PELV W CONT    Narrative  EXAM:  CTA CHEST W OR W WO CONT, CTA ABDOMEN PELV W CONT    INDICATION:  Dyspnea on exertion. Aortic dissection. COMPARISON: CT angiography chest 5/8/2017. CT angiography chest/abdomen/pelvis  3/21/2016. TECHNIQUE: Helical thin section chest, abdomen, and pelvis CT following  uneventful intravenous administration of nonionic contrast according to  departmental PE protocol. Coronal and sagittal reformats were performed.  3D/MIP  post processing was performed. CT dose reduction was achieved through use of a  standardized protocol tailored for this examination and automatic exposure  control for dose modulation. Adaptive statistical iterative reconstruction  (ASIR) was utilized. FINDINGS:  CTA chest:  Surgical changes are again seen following aortic root repair. There is a stable  thoracic and abdominal aortic aneurysm extending into the right subclavian and  right brachiocephalic arteries with dilatation of the aortic arch measuring 4.8  cm in diameter, unchanged. The dissection extends into the abdomen and right common iliac artery,  unchanged. Abdominal aorta is normal in caliber. SMA and celiac are patent  and are fed by the true lumen. Left renal artery is patent and fed by the  true lumen. Right renal artery is patent and fed by the false lumen. CHRIS is  fed by the false lumen. There is stable dilatation of the main pulmonary artery measuring 4.1 cm in  diameter in keeping with pulmonary artery hypertension. The visualized thyroid gland is unremarkable. There is stable cardiomegaly. No  pericardial effusion. No lymphadenopathy by imaging size criteria. There is a trace right pleural effusion with bilateral dependent atelectasis. There is patchy opacity throughout the lungs predominantly in the lower lobes. There is no pneumothorax. Central airways are unremarkable. The esophagus is patulous and fluid-filled, unchanged. CTA abdomen and pelvis: The liver and spleen are unremarkable. The gallbladder is present without  biliary duct dilatation. The pancreas and adrenal glands are unremarkable. The kidneys enhance symmetrically without hydronephrosis. There are no enlarged lymph nodes. There are no dilated bowel loops. The appendix is unremarkable. There is no free  fluid or free air. The urinary bladder is unremarkable. There is no pelvic mass.     There are stable degenerative changes in the spine without aggressive bony  lesion. Impression  IMPRESSION:    1. Stable aortic dissection and surgical changes involving the aortic root. There is stable dilatation of the aortic arch measuring 4.8 cm in diameter. No  new vascular abnormality. 2. Trace right pleural effusion with patchy opacity throughout the lungs  predominantly in the lower lobes that may represent edema, atelectasis or  nonspecific infection or inflammation. 3. There is no other acute abnormality in the chest, abdomen, or pelvis. DEXA Results (most recent):  No results found for this or any previous visit. MRI Results (most recent):  No results found for this or any previous visit. Assessment/Plan:       ICD-10-CM ICD-9-CM    1. Hypercholesterolemia  E78.00 272.0 LIPID PANEL      pravastatin (PRAVACHOL) 20 mg tablet      LIPID PANEL      2. Medicare annual wellness visit, subsequent  Z00.00 V70.0       3. Essential hypertension  E87 522.9 METABOLIC PANEL, COMPREHENSIVE      MICROALBUMIN, UR, RAND W/ MICROALB/CREAT RATIO      losartan (COZAAR) 100 mg tablet      metoprolol tartrate (LOPRESSOR) 25 mg tablet      METABOLIC PANEL, COMPREHENSIVE      4. PAF (paroxysmal atrial fibrillation) (HCC)  I48.0 427.31 CBC WITH AUTOMATED DIFF      apixaban (Eliquis) 5 mg tablet      CBC WITH AUTOMATED DIFF      5. Thoracoabdominal aortic aneurysm (TAAA) without rupture (HCC)  I71.6 441.7       6. PAF (paroxysmal atrial fibrillation) (HCC)  I48.0 427.31 CBC WITH AUTOMATED DIFF      apixaban (Eliquis) 5 mg tablet      CBC WITH AUTOMATED DIFF    s/p PPM               Healthcare Maintenance:  - Preventive measures are reviewed as per above  - Up to date on routine interventions except as noted above  - Orders placed to update gaps as noted  - Notes: Fasting labs ordered. Aortic aneurysm s/p aortic root repair:    - Target -120 mmHg.  Not at target on initial measurement 132 mmHg SBP.   -Continue follow-up with cardiology, cardiovascular surgery, repeat blood pressure pending. If not at target, then anticipate potential addition of amlodipine or hydrochlorothiazide to regimen.  - Continue with yearly echocardiograms; she says that she had this done recently with her cardiologist although I do not see a record of this. Deferring this to her cardiologist.  Continue with pravastatin at current dosing. See below. Hyperlipidemia/Dyslipidemia:   - Summary of Cardiovascular Risks and Goals:     LDL goal is under 80  hypertension  hyperlipidemia  peripheral artery disease/claudication   -   Lab Results   Component Value Date/Time    LDL, calculated 48.4 08/17/2021 10:38 AM    HDL Cholesterol 55 08/17/2021 10:38 AM       - Relevant Cholesterol Meds:  Key Antihyperlipidemia Meds               pravastatin (PRAVACHOL) 20 mg tablet (Taking) Take 1 Tablet by mouth daily for 360 days. omega-3 fatty acids-vitamin e 1,000 mg cap (Taking) Take 1 Cap by mouth two (2) times a day. Takes irregularly              - Cholesterol at target: yes   - Does patient meet USPSTF and ACC/AHA indications for pharmacotherapy (e.g., statin): yes   - GI symptoms with meds: NO   - Muscle aches with meds: NO   - Other Adverse effects with meds: NO   - Medication Plan: continue   - Notes: Continuing with current care, repeat LDL pending    Essential Hypertension/Blood Pressure Management:   - Home BP Readings: not doing   - Current Control: high-normal   - Target BP: Systolic blood pressure 798 to 120 mmHg given history of aortic aneurysm status post ascending aorta/aortic root repair   - Relevant BP Meds:  Key CAD CHF Meds               pravastatin (PRAVACHOL) 20 mg tablet (Taking) Take 1 Tablet by mouth daily for 360 days. losartan (COZAAR) 100 mg tablet (Taking) Take 1 Tablet by mouth daily for 360 days. metoprolol tartrate (LOPRESSOR) 25 mg tablet (Taking) Take 1 Tablet by mouth two (2) times a day for 360 days.     apixaban (Eliquis) 5 mg tablet (Taking) TAKE 1 TABLET BY MOUTH TWICE DAILY    omega-3 fatty acids-vitamin e 1,000 mg cap (Taking) Take 1 Cap by mouth two (2) times a day. Takes irregularly               - Plan: continue current treatment regimen, continue current meds, dietary sodium restriction, regular aerobic exercise   - Notes: Awaiting repeat blood pressure, consider adding on amlodipine or hydrochlorothiazide if systolic blood pressure repeat is not at target    Addendum: Repeat blood pressure still not at target. Discussed this with the patient. Advised her that while we would not add on a new medication at this point for a single day of elevated blood pressure, she should monitor her blood pressure readings on a regular basis at home and call her blood pressure readings and after 2 weeks to the clinic with consideration for adding on amlodipine or hydrochlorothiazide if she is not at target consistently at home based upon her average readings. We discussed return precautions. She endorses agreement and understanding. Awaiting 2-week blood pressure check. Continuing with metoprolol and losartan at current dosing; target blood pressure 105 to 470 mmHg systolic. Paroxysmal atrial fibrillation   - Heart Rate Target: 60-90 bpm at rest   - Current Heart Rate Control: At target   - Current Symptoms: none   - Current heart rate control: B-blocker   - Current anticoagulation:   Key Anti-Platelet Anticoagulant Meds               apixaban (Eliquis) 5 mg tablet (Taking) TAKE 1 TABLET BY MOUTH TWICE DAILY          . Tolerating well without bleeding/bruising sequelae. - Medication plan: continue        I have reviewed the patient's medical history in detail and updated the computerized patient record. We had a prolonged discussion about these complex clinical issues and went over the various important aspects to consider. All questions were answered.       Advised the patient to call back or return to office if symptoms do not improve, change in nature, or persist.     The patient was given an after visit summary or informed of [facility] which includes patient instructions, diagnoses, current medications, & vitals. she expressed understanding with the diagnosis and plan. Ryan Kemp MD    Please note that this dictation was completed with ethology, the computer voice recognition software. Quite often unanticipated grammatical, syntax, homophones, and other interpretive errors are inadvertently transcribed by the computer software. Please disregard these errors. Please excuse any errors that have escaped final proofreading. This is the Subsequent Medicare Annual Wellness Exam, performed 12 months or more after the Initial AWV or the last Subsequent AWV    I have reviewed the patient's medical history in detail and updated the computerized patient record. Assessment/Plan   Education and counseling provided:  Are appropriate based on today's review and evaluation    1. Hypercholesterolemia  -     LIPID PANEL; Future  -     pravastatin (PRAVACHOL) 20 mg tablet; Take 1 Tablet by mouth daily for 360 days. , Normal, Disp-90 Tablet, R-3  2. Medicare annual wellness visit, subsequent  3. Essential hypertension  -     METABOLIC PANEL, COMPREHENSIVE; Future  -     MICROALBUMIN, UR, RAND W/ MICROALB/CREAT RATIO; Future  -     losartan (COZAAR) 100 mg tablet; Take 1 Tablet by mouth daily for 360 days. , Normal, Disp-90 Tablet, R-3  -     metoprolol tartrate (LOPRESSOR) 25 mg tablet; Take 1 Tablet by mouth two (2) times a day for 360 days. , Normal, Disp-180 Tablet, R-3Please consider 90 day supplies to promote better adherence  4. PAF (paroxysmal atrial fibrillation) (HCC)  -     CBC WITH AUTOMATED DIFF; Future  -     apixaban (Eliquis) 5 mg tablet; TAKE 1 TABLET BY MOUTH TWICE DAILY, Normal, Disp-180 Tablet, R-3  5. Thoracoabdominal aortic aneurysm (TAAA) without rupture ([facility] [address]  6.  PAF (paroxysmal atrial fibrillation) (HCC)  Comments:  s/p PPM  Orders:  -     CBC WITH AUTOMATED DIFF; Future  -     apixaban (Eliquis) 5 mg tablet; TAKE 1 TABLET BY MOUTH TWICE DAILY, Normal, Disp-180 Tablet, R-3       Depression Risk Factor Screening     3 most recent PHQ Screens 4/13/2022   Little interest or pleasure in doing things Not at all   Feeling down, depressed, irritable, or hopeless Not at all   Total Score PHQ 2 0       Alcohol & Drug Abuse Risk Screen    Do you average more than 1 drink per night or more than 7 drinks a week:  No    On any one occasion in the past three months have you have had more than 3 drinks containing alcohol:  No          Functional Ability and Level of Safety    Hearing: Hearing is good. Activities of Daily Living: The home contains: no safety equipment. Patient does total self care      Ambulation: with no difficulty     Fall Risk:  Fall Risk Assessment, last 12 mths 4/13/2022   Able to walk? Yes   Fall in past 12 months? 0   Do you feel unsteady?  0   Are you worried about falling 0      Abuse Screen:  Patient is not abused       Cognitive Screening    Has your family/caregiver stated any concerns about your memory: no     Cognitive Screening: Normal - Verbal Fluency Test    Health Maintenance Due     Health Maintenance Due   Topic Date Due    Pneumococcal 65+ years (2 - PCV) 01/08/2017    COVID-19 Vaccine (4 - Booster for Moderna series) 03/01/2022    Lipid Screen  08/17/2022       Patient Care Team   Patient Care Team:  King Karina MD as PCP - General (Internal Medicine Physician)  Rosario Horton MD as PCP - REHABILITATION [facility] Empaneled Provider  Lashae Haddad MD as Referring Provider (Cardiovascular Disease Physician)  Alana Dumont MD as Surgeon (Cardiothoracic Surgery)  Nicanor Dale MD as Surgeon (Cardiothoracic Surgery)  Kathy Chambers NP as Nurse Practitioner (Nurse Practitioner)  Bella Kohli NP as Nurse Practitioner (Cardiovascular Disease Physician)  Kirk Coelho MD as Physician (Cardiovascular Disease Physician)  Renetta Persaud, ANP as Nurse Practitioner (Nurse Practitioner)  KARIME Steward as Nurse Practitioner (Nurse Practitioner)    History     Patient Active Problem List   Diagnosis Code    S/P ascending aortic aneurysm repair Z98.890, Z86.79    Swelling of both ankles M25.471, M25.472    Heart palpitations R00.2    S/P cardiac cath Z98.890    Intertrigo L30.4    Positional vertigo [id number]    Spinal stenosis, lumbar region, without neurogenic claudication M48.061    Long-term use of high-risk medication Z79.899    LVH (left ventricular hypertrophy) I51.7    Sciatica of left side M54.32    Allergic conjunctivitis H10.10    Essential hypertension I10    Hypercholesterolemia E78.00    PAF (paroxysmal atrial fibrillation) ([facility]) I48.0    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    Pneumonia due to COVID-19 virus U07.1, J12.82    Acute respiratory failure with hypoxemia ([facility]) J96.01    Postviral fatigue syndrome G93.3     Past Medical History:   Diagnosis Date    Allergic conjunctivitis 07/26/2017    Aortic aneurysm ([facility] [address] 05/2018    Thoracic, abdominal    Arthritis     lower back    Asthma     Current use of long term anticoagulation     Essential hypertension 08/21/2017    GERD (gastroesophageal reflux disease)     History of aortic dissection     History of echocardiogram 09/2019    Left Ventricle: Normal cavity size, systolic function (ejection fraction normal) and diastolic function. Moderate concentric hypertrophy. Estimated left ventricular ejection fraction is 56 - 60%. No regional wall motion abnormality noted. Small secundum atrial septal defect present. Left Atrium: Moderately dilated left atrium. Mitral Valve: Mitral valve thickening. Mild mitral valve regurgitation.     History of vertigo 07/26/2017    positional    Hx of repair of aortic root     Hypercholesterolemia 08/21/2017    Intertrigo 07/26/2017    Long-term use of high-risk medication 07/26/2017    LVH (left ventricular hypertrophy) 07/26/2017    PAF (paroxysmal atrial fibrillation) ([facility] 08/20/2018    Presence of permanent cardiac pacemaker     PUD (peptic ulcer disease)     Spinal stenosis, lumbar region, without neurogenic claudication 07/26/2017    SSS (sick sinus syndrome) ([facility] 09/18/2018    Vitamin B12 deficiency     Vitamin D deficiency       Past Surgical History:   Procedure Laterality Date    COLONOSCOPY N/A 7/12/2017    COLONOSCOPY WITH IV ANTIBIOTICS performed by Maico Zuniga MD at [facility] [address] [facility] [place], [place]  7/12/2017         HX GYN      hysterectomy    HX GYN      tubal ligation    HX HYSTERECTOMY      HX OTHER SURGICAL  2015    Type A Dissection Repair    RI COLONOSCOPY FLX DX W/COLLJ SPEC WHEN PFRMD  3/19/2012         UPPER GI ENDOSCOPY,BALL DIL,30MM  7/12/2017         UPPER GI ENDOSCOPY,BIOPSY  7/12/2017          Current Outpatient Medications   Medication Sig Dispense Refill    pravastatin (PRAVACHOL) 20 mg tablet Take 1 Tablet by mouth daily for 360 days. 90 Tablet 3    losartan (COZAAR) 100 mg tablet Take 1 Tablet by mouth daily for 360 days. 90 Tablet 3    metoprolol tartrate (LOPRESSOR) 25 mg tablet Take 1 Tablet by mouth two (2) times a day for 360 days. 180 Tablet 3    apixaban (Eliquis) 5 mg tablet TAKE 1 TABLET BY MOUTH TWICE DAILY 180 Tablet 3    cyanocobalamin 1,000 mcg tablet Take 1,000 mcg by mouth daily. acetaminophen (TYLENOL) 650 mg TbER Take 650 mg by mouth every six (6) hours as needed for Pain. omega-3 fatty acids-vitamin e 1,000 mg cap Take 1 Cap by mouth two (2) times a day. Takes irregularly       No Known Allergies    Family History   Problem Relation Age of Onset    Cancer Father         colon cancer     Social History     Tobacco Use    Smoking status: Never    Smokeless tobacco: Never   Substance Use Topics    Alcohol use:  Yes     Alcohol/week: 0.0 standard drinks     Comment: rare         Bing Rivera MD         Follow-up and Dispositions    Return in about 6 months (around 2/22/2023), or if symptoms worsen or fail to improve.

## 2022-08-22 ENCOUNTER — OFFICE VISIT (OUTPATIENT)
Dept: INTERNAL MEDICINE CLINIC | Age: 77
End: 2022-08-22
Payer: MEDICARE

## 2022-08-22 VITALS
BODY MASS INDEX: 29.24 KG/M2 | HEIGHT: 65 IN | WEIGHT: 175.5 LBS | DIASTOLIC BLOOD PRESSURE: 88 MMHG | HEART RATE: 69 BPM | RESPIRATION RATE: 18 BRPM | OXYGEN SATURATION: 96 % | TEMPERATURE: 98.2 F | SYSTOLIC BLOOD PRESSURE: 132 MMHG

## 2022-08-22 DIAGNOSIS — I48.0 PAF (PAROXYSMAL ATRIAL FIBRILLATION) (HCC): Chronic | ICD-10-CM

## 2022-08-22 DIAGNOSIS — Z00.00 MEDICARE ANNUAL WELLNESS VISIT, SUBSEQUENT: ICD-10-CM

## 2022-08-22 DIAGNOSIS — E78.00 HYPERCHOLESTEROLEMIA: Primary | ICD-10-CM

## 2022-08-22 DIAGNOSIS — I10 ESSENTIAL HYPERTENSION: ICD-10-CM

## 2022-08-22 DIAGNOSIS — I48.0 PAF (PAROXYSMAL ATRIAL FIBRILLATION) (HCC): ICD-10-CM

## 2022-08-22 DIAGNOSIS — I71.60 THORACOABDOMINAL AORTIC ANEURYSM (TAAA) WITHOUT RUPTURE: ICD-10-CM

## 2022-08-22 PROCEDURE — 1123F ACP DISCUSS/DSCN MKR DOCD: CPT | Performed by: INTERNAL MEDICINE

## 2022-08-22 PROCEDURE — 99214 OFFICE O/P EST MOD 30 MIN: CPT | Performed by: INTERNAL MEDICINE

## 2022-08-22 PROCEDURE — G0439 PPPS, SUBSEQ VISIT: HCPCS | Performed by: INTERNAL MEDICINE

## 2022-08-22 RX ORDER — METOPROLOL TARTRATE 25 MG/1
25 TABLET, FILM COATED ORAL 2 TIMES DAILY
Qty: 180 TABLET | Refills: 3 | Status: SHIPPED | OUTPATIENT
Start: 2022-08-22 | End: 2023-08-17

## 2022-08-22 RX ORDER — PRAVASTATIN SODIUM 20 MG/1
20 TABLET ORAL DAILY
Qty: 90 TABLET | Refills: 3 | Status: SHIPPED | OUTPATIENT
Start: 2022-08-22 | End: 2022-10-27 | Stop reason: SDUPTHER

## 2022-08-22 RX ORDER — LOSARTAN POTASSIUM 100 MG/1
100 TABLET ORAL DAILY
Qty: 90 TABLET | Refills: 3 | Status: SHIPPED | OUTPATIENT
Start: 2022-08-22 | End: 2023-08-17

## 2022-08-22 NOTE — PATIENT INSTRUCTIONS
Please continue monitoring your blood pressure at home using your home blood pressure monitor. Please record your readings and bring these with you to your next visit. Please measure your blood pressure readings at least daily for the next 2 weeks and call in your blood pressure readings to the clinic so we can make a determination of additional medication is needed. Your target blood pressure is 105 to 219 mmHg systolic given her history of aortic aneurysm status post previous repair. DASH Diet: Care Instructions  Your Care Instructions     The DASH diet is an eating plan that can help lower your blood pressure. DASH stands for Dietary Approaches to Stop Hypertension. Hypertension is high blood pressure. The DASH diet focuses on eating foods that are high in calcium, potassium, and magnesium. These nutrients can lower blood pressure. The foods that are highest in these nutrients are fruits, vegetables, low-fat dairy products, nuts, seeds, and legumes. But taking calcium, potassium, and magnesium supplements instead of eating foods that are high in those nutrients does not have the same effect. The DASH diet also includes whole grains, fish, and poultry. The DASH diet is one of several lifestyle changes your doctor may recommend to lower your high blood pressure. Your doctor may also want you to decrease the amount of sodium in your diet. Lowering sodium while following the DASH diet can lower blood pressure even further than just the DASH diet alone. Follow-up care is a key part of your treatment and safety. Be sure to make and go to all appointments, and call your doctor if you are having problems. It's also a good idea to know your test results and keep a list of the medicines you take. How can you care for yourself at home? Following the DASH diet  Eat 4 to 5 servings of fruit each day.  A serving is 1 medium-sized piece of fruit, ½ cup chopped or canned fruit, 1/4 cup dried fruit, or 4 ounces (½ cup) of fruit juice. Choose fruit more often than fruit juice. Eat 4 to 5 servings of vegetables each day. A serving is 1 cup of lettuce or raw leafy vegetables, ½ cup of chopped or cooked vegetables, or 4 ounces (½ cup) of vegetable juice. Choose vegetables more often than vegetable juice. Get 2 to 3 servings of low-fat and fat-free dairy each day. A serving is 8 ounces of milk, 1 cup of yogurt, or 1 ½ ounces of cheese. Eat 6 to 8 servings of grains each day. A serving is 1 slice of bread, 1 ounce of dry cereal, or ½ cup of cooked rice, pasta, or cooked cereal. Try to choose whole-grain products as much as possible. Limit lean meat, poultry, and fish to 2 servings each day. A serving is 3 ounces, about the size of a deck of cards. Eat 4 to 5 servings of nuts, seeds, and legumes (cooked dried beans, lentils, and split peas) each week. A serving is 1/3 cup of nuts, 2 tablespoons of seeds, or ½ cup of cooked beans or peas. Limit fats and oils to 2 to 3 servings each day. A serving is 1 teaspoon of vegetable oil or 2 tablespoons of salad dressing. Limit sweets and added sugars to 5 servings or less a week. A serving is 1 tablespoon jelly or jam, ½ cup sorbet, or 1 cup of lemonade. Eat less than 2,300 milligrams (mg) of sodium a day. If you limit your sodium to 1,500 mg a day, you can lower your blood pressure even more. Be aware that all of these are the suggested number of servings for people who eat 1,800 to 2,000 calories a day. Your recommended number of servings may be different if you need more or fewer calories. Tips for success  Start small. Do not try to make dramatic changes to your diet all at once. You might feel that you are missing out on your favorite foods and then be more likely to not follow the plan. Make small changes, and stick with them. Once those changes become habit, add a few more changes.   Try some of the following:  Make it a goal to eat a fruit or vegetable at every meal and at snacks. This will make it easy to get the recommended amount of fruits and vegetables each day. Try yogurt topped with fruit and nuts for a snack or healthy dessert. Add lettuce, tomato, cucumber, and onion to sandwiches. Combine a ready-made pizza crust with low-fat mozzarella cheese and lots of vegetable toppings. Try using tomatoes, squash, spinach, broccoli, carrots, cauliflower, and onions. Have a variety of cut-up vegetables with a low-fat dip as an appetizer instead of chips and dip. Sprinkle sunflower seeds or chopped almonds over salads. Or try adding chopped walnuts or almonds to cooked vegetables. Try some vegetarian meals using beans and peas. Add garbanzo or kidney beans to salads. Make burritos and tacos with mashed preston beans or black beans. Where can you learn more? Go to http://www.simons.com/  Enter H967 in the search box to learn more about \"DASH Diet: Care Instructions. \"  Current as of: January 10, 2022               Content Version: 13.2  © 5886-1222 [employer]. Care instructions adapted under license by [employer] (which disclaims liability or warranty for this information). If you have questions about a medical condition or this instruction, always ask your healthcare professional. [place] [address] any warranty or liability for your use of this information. Medicare Wellness Visit, Female     The best way to live healthy is to have a lifestyle where you eat a well-balanced diet, exercise regularly, limit alcohol use, and quit all forms of tobacco/nicotine, if applicable. Regular preventive services are another way to keep healthy. Preventive services (vaccines, screening tests, monitoring & exams) can help personalize your care plan, which helps you manage your own care. Screening tests can find health problems at the earliest stages, when they are easiest to treat.    [facility] follows the current, evidence-based guidelines published by the [place] (USPSTF) when recommending preventive services for our patients. Because we follow these guidelines, sometimes recommendations change over time as research supports it. (For example, mammograms used to be recommended annually. Even though Medicare will still pay for an annual mammogram, the newer guidelines recommend a mammogram every two years for women of average risk). Of course, you and your doctor may decide to screen more often for some diseases, based on your risk and your co-morbidities (chronic disease you are already diagnosed with). Preventive services for you include:  - Medicare offers their members a free annual wellness visit, which is time for you and your primary care provider to discuss and plan for your preventive service needs. Take advantage of this benefit every year!  -All adults over the age of 72 should receive the recommended pneumonia vaccines. Current USPSTF guidelines recommend a series of two vaccines for the best pneumonia protection.   -All adults should have a flu vaccine yearly and a tetanus vaccine every 10 years.   -All adults age 48 and older should receive the shingles vaccines (series of two vaccines). -All adults age 38-68 who are overweight should have a diabetes screening test once every three years.   -All adults born between [birth year] and 1965 should be screened once for Hepatitis C.  -Other screening tests and preventive services for persons with diabetes include: an eye exam to screen for diabetic retinopathy, a kidney function test, a foot exam, and stricter control over your cholesterol.   -Cardiovascular screening for adults with routine risk involves an electrocardiogram (ECG) at intervals determined by your doctor.   -Colorectal cancer screenings should be done for adults age 54-65 with no increased risk factors for colorectal cancer.   There are a number of acceptable methods of screening for this type of cancer. Each test has its own benefits and drawbacks. Discuss with your doctor what is most appropriate for you during your annual wellness visit. The different tests include: colonoscopy (considered the best screening method), a fecal occult blood test, a fecal DNA test, and sigmoidoscopy.    -A bone mass density test is recommended when a woman turns 65 to screen for osteoporosis. This test is only recommended one time, as a screening. Some providers will use this same test as a disease monitoring tool if you already have osteoporosis. -Breast cancer screenings are recommended every other year for women of normal risk, age 54-69.  -Cervical cancer screenings for women over age 72 are only recommended with certain risk factors.      Here is a list of your current Health Maintenance items (your personalized list of preventive services) with a due date:  Health Maintenance Due   Topic Date Due    Pneumococcal Vaccine (2 - PCV) 01/08/2017    COVID-19 Vaccine (4 - Booster for Moderna series) 03/01/2022    Cholesterol Test   08/17/2022

## 2022-08-22 NOTE — PROGRESS NOTES
Chief Complaint   Patient presents with    Establish Care   Visit Vitals  /88 (BP 1 Location: Right arm, BP Patient Position: Sitting, BP Cuff Size: Adult)   Pulse 69   Temp 98.2 °F (36.8 °C) (Oral)   Resp 18   Ht 5' 5\" (1.651 m)   Wt 175 lb 8 oz (79.6 kg)   SpO2 96%   BMI 29.20 kg/m²         1. \"Have you been to the ER, urgent care clinic since your last visit? Hospitalized since your last visit? \" No    2. \"Have you seen or consulted any other health care providers outside of the [address] [place] [address] since your last visit? \" No     3. For patients aged 39-70: Has the patient had a colonoscopy / FIT/ Cologuard? No      If the patient is female:    4. For patients aged 41-77: Has the patient had a mammogram within the past 2 years? No      5. For patients aged 21-65: Has the patient had a pap smear?  No

## 2022-08-23 LAB
ALBUMIN SERPL-MCNC: 3.9 G/DL (ref 3.5–5)
ALBUMIN/GLOB SERPL: 1.3 {RATIO} (ref 1.1–2.2)
ALP SERPL-CCNC: 62 U/L (ref 45–117)
ALT SERPL-CCNC: 12 U/L (ref 12–78)
ANION GAP SERPL CALC-SCNC: 2 MMOL/L (ref 5–15)
AST SERPL-CCNC: 12 U/L (ref 15–37)
BASOPHILS # BLD: 0 K/UL (ref 0–0.1)
BASOPHILS NFR BLD: 1 % (ref 0–1)
BILIRUB SERPL-MCNC: 0.6 MG/DL (ref 0.2–1)
BUN SERPL-MCNC: 21 MG/DL (ref 6–20)
BUN/CREAT SERPL: 28 (ref 12–20)
CALCIUM SERPL-MCNC: 9.7 MG/DL (ref 8.5–10.1)
CHLORIDE SERPL-SCNC: 110 MMOL/L (ref 97–108)
CHOLEST SERPL-MCNC: 113 MG/DL
CO2 SERPL-SCNC: 29 MMOL/L (ref 21–32)
CREAT SERPL-MCNC: 0.76 MG/DL (ref 0.55–1.02)
CREAT UR-MCNC: 161 MG/DL
DIFFERENTIAL METHOD BLD: ABNORMAL
EOSINOPHIL # BLD: 0.1 K/UL (ref 0–0.4)
EOSINOPHIL NFR BLD: 2 % (ref 0–7)
ERYTHROCYTE [DISTWIDTH] IN BLOOD BY AUTOMATED COUNT: 14.1 % (ref 11.5–14.5)
GLOBULIN SER CALC-MCNC: 2.9 G/DL (ref 2–4)
GLUCOSE SERPL-MCNC: 87 MG/DL (ref 65–100)
HCT VFR BLD AUTO: 42.6 % (ref 35–47)
HDLC SERPL-MCNC: 55 MG/DL
HDLC SERPL: 2.1 {RATIO} (ref 0–5)
HGB BLD-MCNC: 13 G/DL (ref 11.5–16)
IMM GRANULOCYTES # BLD AUTO: 0 K/UL (ref 0–0.04)
IMM GRANULOCYTES NFR BLD AUTO: 0 % (ref 0–0.5)
LDLC SERPL CALC-MCNC: 42.6 MG/DL (ref 0–100)
LYMPHOCYTES # BLD: 2.1 K/UL (ref 0.8–3.5)
LYMPHOCYTES NFR BLD: 41 % (ref 12–49)
MCH RBC QN AUTO: 29.4 PG (ref 26–34)
MCHC RBC AUTO-ENTMCNC: 30.5 G/DL (ref 30–36.5)
MCV RBC AUTO: 96.4 FL (ref 80–99)
MICROALBUMIN UR-MCNC: 3.28 MG/DL
MICROALBUMIN/CREAT UR-RTO: 20 MG/G (ref 0–30)
MONOCYTES # BLD: 0.7 K/UL (ref 0–1)
MONOCYTES NFR BLD: 14 % (ref 5–13)
NEUTS SEG # BLD: 2.1 K/UL (ref 1.8–8)
NEUTS SEG NFR BLD: 42 % (ref 32–75)
NRBC # BLD: 0 K/UL (ref 0–0.01)
NRBC BLD-RTO: 0 PER 100 WBC
PLATELET # BLD AUTO: 165 K/UL (ref 150–400)
PMV BLD AUTO: 10.9 FL (ref 8.9–12.9)
POTASSIUM SERPL-SCNC: 4.4 MMOL/L (ref 3.5–5.1)
PROT SERPL-MCNC: 6.8 G/DL (ref 6.4–8.2)
RBC # BLD AUTO: 4.42 M/UL (ref 3.8–5.2)
SODIUM SERPL-SCNC: 141 MMOL/L (ref 136–145)
TRIGL SERPL-MCNC: 77 MG/DL (ref ?–150)
VLDLC SERPL CALC-MCNC: 15.4 MG/DL
WBC # BLD AUTO: 5 K/UL (ref 3.6–11)

## 2022-10-27 DIAGNOSIS — E78.00 HYPERCHOLESTEROLEMIA: ICD-10-CM

## 2022-10-27 RX ORDER — PRAVASTATIN SODIUM 20 MG/1
20 TABLET ORAL DAILY
Qty: 90 TABLET | Refills: 3 | Status: SHIPPED | OUTPATIENT
Start: 2022-10-27 | End: 2023-10-22

## 2022-10-27 NOTE — TELEPHONE ENCOUNTER
PCP: Kasandra Barnes MD    Last appt: 8/22/2022  Future Appointments   Date Time Provider Luis Angel Borden   2/22/2023 10:45 AM Kasandra Barnes MD [facility] AMB   4/20/2023 10:30 [facility]   4/20/2023 10:45 AM Sarkis Richards MD [facility] AMB       Requested Prescriptions     Pending Prescriptions Disp Refills    pravastatin (PRAVACHOL) 20 mg tablet 90 Tablet 3     Sig: Take 1 Tablet by mouth daily for 360 days.        Prior labs and Blood pressures:  BP Readings from Last 3 Encounters:   08/22/22 132/88   04/13/22 (!) 160/92   02/21/22 120/80     Lab Results   Component Value Date/Time    Sodium 141 08/22/2022 11:18 AM    Potassium 4.4 08/22/2022 11:18 AM    Chloride 110 (H) 08/22/2022 11:18 AM    CO2 29 08/22/2022 11:18 AM    Anion gap 2 (L) 08/22/2022 11:18 AM    Glucose 87 08/22/2022 11:18 AM    BUN 21 (H) 08/22/2022 11:18 AM    Creatinine 0.76 08/22/2022 11:18 AM    BUN/Creatinine ratio 28 (H) 08/22/2022 11:18 AM    GFR est AA >60 08/22/2022 11:18 AM    GFR est non-AA >60 08/22/2022 11:18 AM    Calcium 9.7 08/22/2022 11:18 AM     Lab Results   Component Value Date/Time    Hemoglobin A1c 6.4 (H) 12/28/2015 10:19 AM     Lab Results   Component Value Date/Time    Cholesterol, total 113 08/22/2022 11:18 AM    Cholesterol (POC) 127 08/21/2017 11:12 AM    HDL Cholesterol 55 08/22/2022 11:18 AM    HDL Cholesterol (POC) 52 08/21/2017 11:12 AM    LDL Cholesterol (POC) 61.6 08/21/2017 11:12 AM    LDL, calculated 42.6 08/22/2022 11:18 AM    VLDL, calculated 15.4 08/22/2022 11:18 AM    Triglyceride 77 08/22/2022 11:18 AM    Triglycerides (POC) 67 08/21/2017 11:12 AM    CHOL/HDL Ratio 2.1 08/22/2022 11:18 AM     No results found for: MEGAN Tamez    Lab Results   Component Value Date/Time    TSH 0.70 08/17/2021 10:38 AM    TSH, 3rd generation 0.84 08/25/2020 01:30 PM

## 2023-02-20 PROBLEM — I71.60: Status: ACTIVE | Noted: 2023-02-20

## 2023-02-20 NOTE — PROGRESS NOTES
ICD-10-CM ICD-9-CM    1. Routine adult health maintenance  Z00.00 V70.0       2. Thoracoabdominal aortic aneurysm (TAAA) without rupture, unspecified part  I71.60 441.7       3. PAF (paroxysmal atrial fibrillation) (HCC)  I48.0 427.31 ECHO ADULT COMPLETE      METABOLIC PANEL, COMPREHENSIVE      4. Essential hypertension  B47 446.1 METABOLIC PANEL, COMPREHENSIVE      5. Hypercholesterolemia  E78.00 272.0       6. Nonrheumatic mitral valve regurgitation  I34.0 424.0 ECHO ADULT COMPLETE      7. History of aortic root repair  Z98.890 V15.1 ECHO ADULT COMPLETE      8. Acute right ankle pain  M25.571 719.47 XR ANKLE RT MIN 3 V     338.19       9. Toe pain, right  M79.674 729.5 XR FOOT RT MIN 3 V               Subjective:     Chief Complaint   Patient presents with    Follow-up       Christiano Mckay is a 76 y.o. F.  she  has a past medical history of Allergic conjunctivitis (07/26/2017), Aortic aneurysm ([facility] (05/2018), Arthritis, Asthma, Current use of long term anticoagulation, Essential hypertension (08/21/2017), GERD (gastroesophageal reflux disease), History of aortic dissection, History of echocardiogram (09/2019), History of vertigo (07/26/2017), repair of aortic root, Hypercholesterolemia (08/21/2017), Intertrigo (07/26/2017), Long-term use of high-risk medication (07/26/2017), LVH (left ventricular hypertrophy) (07/26/2017), PAF (paroxysmal atrial fibrillation) ([address] (08/20/2018), Presence of permanent cardiac pacemaker, PUD (peptic ulcer disease), Spinal stenosis, lumbar region, without neurogenic claudication (07/26/2017), SSS (sick sinus syndrome) ([address] (09/18/2018), Vitamin B12 deficiency, and Vitamin D deficiency. She has no past medical history of Anemia, Autoimmune disease ([address] CAD (coronary artery disease), Cancer ([address] Chronic kidney disease, Chronic obstructive pulmonary disease ([address] Chronic pain, Congestive heart failure ([address] Depression, Diabetes ([address] Headache, or Thromboembolus ([facility] her last appointment in this clinic was 8/22/2022 . I reviewed and updated the medical record. At this patient's most recent appointment with me in August 2022, she had reported feeling generally fine. She was being followed by a cardiovascular surgeon and cardiologist given a history of thoracoabdominal aortic aneurysm, status post previous repair. She had reported feeling no recent cardiovascular symptoms at the time. She was noted to have good control of her blood pressure with a combination of losartan and Lopressor. Physical examination at the time had been generally unremarkable. No changes were made to her medication regimen at the time. Routine laboratory studies were ordered. She was continued on pravastatin for her hypercholesterolemia. She was advised to continue follow-up with her cardiologist for her routine echocardiography. Today, the patient comes in for routine follow-up on her chronic medical concerns. Since her last visit, there have been no significant clinical changes, although she did fall about 5 days ago, while at home, and had injured her right foot. She has been wearing a walking boot since that time, and wonders if she could have x-rays done to rule out fracture. She has had some mild ankle pain, but her main complaint relates to toe pain, which has been associate with some mild bruising along the medial aspect of the great toe. This has been generally improving over the past few days, and she has had no difficulty with bearing weight. She has otherwise been ambulating well using a boot and walking cane. She otherwise reports feeling the same as usual.  She has not yet followed up with cardiology, and has not had repeat echocardiogram.  She denies having had any recent chest pain, shortness breath, palpitations, or other cardiopulmonary concerns.     She has a history of hypertension in the context of a thoracoabdominal aortic aneurysm with a history of aortic root repair. She checks her blood pressure readings at home and typically gets her systolic blood pressure readings in the 120 mmHg range, although she is noted to be elevated in clinic today. She denies having had any orthopnea, paroxysmal nocturnal dyspnea, or any other changes in exercise tolerance. She has a history of atrial fibrillation, for which she continues on Lopressor 25 mg twice daily as well as Eliquis. No recent bleeding episodes although she has had a longstanding history of easy bruising with the medication. No palpitations or shortness of breath. She continues on pravastatin for her history of hypercholesterolemia. No muscle aches or GI symptoms with this medication, and her last LDL when checked in the summertime has been excellent. Her review of systems is otherwise negative. Routine Healthcare Maintenance issues are reviewed and discussed with the patient as noted below. Orders to update gaps in healthcare maintenance were placed as noted below in the Assessment and Plan, where applicable. Past Medical History:  Past Medical History:   Diagnosis Date    Allergic conjunctivitis 07/26/2017    Aortic aneurysm ([address] 05/2018    Thoracic, abdominal    Arthritis     lower back    Asthma     Current use of long term anticoagulation     Essential hypertension 08/21/2017    GERD (gastroesophageal reflux disease)     History of aortic dissection     History of echocardiogram 09/2019    Left Ventricle: Normal cavity size, systolic function (ejection fraction normal) and diastolic function. Moderate concentric hypertrophy. Estimated left ventricular ejection fraction is 56 - 60%. No regional wall motion abnormality noted. Small secundum atrial septal defect present. Left Atrium: Moderately dilated left atrium. Mitral Valve: Mitral valve thickening. Mild mitral valve regurgitation.     History of vertigo 07/26/2017    positional    Hx of repair of aortic root     Hypercholesterolemia 08/21/2017    Intertrigo 07/26/2017    Long-term use of high-risk medication 07/26/2017    LVH (left ventricular hypertrophy) 07/26/2017    PAF (paroxysmal atrial fibrillation) ([facility] 08/20/2018    Presence of permanent cardiac pacemaker     PUD (peptic ulcer disease)     Spinal stenosis, lumbar region, without neurogenic claudication 07/26/2017    SSS (sick sinus syndrome) ([facility] 09/18/2018    Vitamin B12 deficiency     Vitamin D deficiency        Past Surgical Histor:  Past Surgical History:   Procedure Laterality Date    COLONOSCOPY N/A 7/12/2017    COLONOSCOPY WITH IV ANTIBIOTICS performed by Maryann Howe MD at [email or web address]  7/12/2017         HX GYN      hysterectomy    HX GYN      tubal ligation    HX HYSTERECTOMY      HX OTHER SURGICAL  2015    Type A Dissection Repair    IA COLONOSCOPY FLX DX W/COLLJ SPEC WHEN PFRMD  3/19/2012         UPPER GI ENDOSCOPY,BALL DIL,30MM  7/12/2017         UPPER GI ENDOSCOPY,BIOPSY  7/12/2017            Allergies:  No Known Allergies    Medications:  Current Outpatient Medications   Medication Sig Dispense Refill    pravastatin (PRAVACHOL) 20 mg tablet Take 1 Tablet by mouth daily for 360 days. 90 Tablet 3    losartan (COZAAR) 100 mg tablet Take 1 Tablet by mouth daily for 360 days. 90 Tablet 3    metoprolol tartrate (LOPRESSOR) 25 mg tablet Take 1 Tablet by mouth two (2) times a day for 360 days. 180 Tablet 3    apixaban (Eliquis) 5 mg tablet TAKE 1 TABLET BY MOUTH TWICE DAILY 180 Tablet 3    cyanocobalamin 1,000 mcg tablet Take 1,000 mcg by mouth daily. acetaminophen (TYLENOL) 650 mg TbER Take 650 mg by mouth every six (6) hours as needed for Pain. omega-3 fatty acids-vitamin e 1,000 mg cap Take 1 Cap by mouth two (2) times a day.  Takes irregularly         Social History:  Social History     Socioeconomic History    Marital status:    Tobacco Use    Smoking status: Never    Smokeless tobacco: Never   Vaping Use    Vaping Use: Never used   Substance and Sexual Activity    Alcohol use: Yes     Alcohol/week: 0.0 standard drinks     Comment: rare    Drug use: No       Family History:  Family History   Problem Relation Age of Onset    Cancer Father         colon cancer       Immunizations:  Immunization History   Administered Date(s) Administered    COVID-19, MODERNA BLUE border, Primary or Immunocompromised, (age 18y+), IM, 100 mcg/0.5mL 03/08/2021, 04/07/2021, 11/01/2021    COVID-19, MODERNA Booster BLUE border, (age 18y+), IM, 50mcg/0.25mL 10/29/2022    Influenza High Dose Vaccine PF 08/07/2019, 08/01/2020, 11/30/2021    Influenza Vaccine (Tri) Adjuvanted (>65 Yrs FLUAD TRI 81621) 09/18/2018    Influenza, FLUARIX, FLULAVAL, FLUZONE (age 10 mo+) AND AFLURIA, (age 1 y+), PF, 0.5mL 01/08/2016, 08/07/2019    Pneumococcal Polysaccharide (PPSV-23) 01/08/2016    Tdap 08/27/2019    Unknown Vaccine or Immune Globulin 08/07/2019    Zoster Recombinant 04/30/2019, 07/10/2019        Healthcare Maintenance:  Health Maintenance   Topic Date Due    Flu Vaccine (1) 08/01/2022    COVID-19 Vaccine (5 - Booster for Moderna series) 12/24/2022    Depression Screen  04/13/2023    Lipid Screen  08/22/2023    Medicare Yearly Exam  08/23/2023    DTaP/Tdap/Td series (2 - Td or Tdap) 08/27/2029    Hepatitis C Screening  Completed    Bone Densitometry (Dexa) Screening  Completed    Shingles Vaccine  Completed    Pneumococcal 65+ years  Completed        Review of Systems:  ROS:  Review of Systems   Constitutional: Negative. HENT: Negative. Eyes: Negative. Respiratory: Negative. Cardiovascular: Negative. Gastrointestinal: Negative. Genitourinary: Negative. Musculoskeletal:  Positive for falls and joint pain. Skin: Negative. Neurological: Negative. Endo/Heme/Allergies: Negative. Psychiatric/Behavioral: Negative.       ROS otherwise negative      Objective:     Vital Signs:  Visit Vitals  BP (!) 150/92 (BP 1 Location: Left upper arm, BP Patient Position: Sitting, BP Cuff Size: Large adult)   Pulse 69   Temp 98.8 °F (37.1 °C) (Oral)   Resp 18   Ht 5' 5\" (1.651 m)   Wt 176 lb 3.2 oz (79.9 kg)   SpO2 99%   BMI 29.32 kg/m²       BMI:  Body mass index is 29.32 kg/m². Physical Examination:  Physical Exam  Constitutional:       Appearance: Normal appearance. She is obese. HENT:      Head: Normocephalic and atraumatic. Right Ear: External ear normal.      Left Ear: External ear normal.      Nose: Nose normal.      Mouth/Throat:      Mouth: Mucous membranes are moist.      Pharynx: Oropharynx is clear. No oropharyngeal exudate or posterior oropharyngeal erythema. Cardiovascular:      Rate and Rhythm: Normal rate and regular rhythm. Pulses: Normal pulses. Heart sounds: Normal heart sounds. No murmur heard. No friction rub. No gallop. Pulmonary:      Effort: Pulmonary effort is normal. No respiratory distress. Breath sounds: Normal breath sounds. No wheezing, rhonchi or rales. Abdominal:      General: Abdomen is flat. Bowel sounds are normal. There is no distension. Palpations: Abdomen is soft. Tenderness: There is no abdominal tenderness. There is no guarding. Musculoskeletal:         General: No swelling, tenderness or deformity. Normal range of motion. Cervical back: Normal range of motion and neck supple. No rigidity or tenderness. Right ankle: Normal.      Left ankle: Normal.      Right foot: Swelling (right great toe with mild bruising) present. Left foot: Normal.   Skin:     General: Skin is warm and dry. Findings: No erythema, lesion or rash. Neurological:      General: No focal deficit present. Mental Status: She is alert and oriented to person, place, and time. Mental status is at baseline. Sensory: No sensory deficit. Motor: No weakness.       Gait: Gait normal.      Deep Tendon Reflexes: Reflexes normal.   Psychiatric:         Mood and Affect: Mood normal.         Behavior: Behavior normal.         Judgment: Judgment normal.        Physical exam otherwise negative    Diagnostic Testing:    Laboratory Studies:  No visits with results within 6 Month(s) from this visit. Latest known visit with results is:   Office Visit on 08/22/2022   Component Date Value Ref Range Status    Cholesterol, total 08/22/2022 113  <200 MG/DL Final    Triglyceride 08/22/2022 77  <150 MG/DL Final    Based on NCEP-ATP III:  Triglycerides <150 mg/dL  is considered normal, 150-199 mg/dL  borderline high,  200-499 mg/dL high and  greater than or equal to 500 mg/dL very high. HDL Cholesterol 08/22/2022 55  MG/DL Final    Based on NCEP ATP III, HDL Cholesterol <40 mg/dL is considered low and >60 mg/dL is elevated. LDL, calculated 08/22/2022 42.6  0 - 100 MG/DL Final    Comment: Based on the NCEP-ATP: LDL-C concentrations are considered  optimal <100 mg/dL,  near optimal/above Normal 100-129 mg/dL  Borderline High: 130-159, High: 160-189 mg/dL  Very High: Greater than or equal to 190 mg/dL      VLDL, calculated 08/22/2022 15.4  MG/DL Final    CHOL/HDL Ratio 08/22/2022 2.1  0.0 - 5.0   Final    Sodium 08/22/2022 141  136 - 145 mmol/L Final    Potassium 08/22/2022 4.4  3.5 - 5.1 mmol/L Final    Chloride 08/22/2022 110 (A)  97 - 108 mmol/L Final    CO2 08/22/2022 29  21 - 32 mmol/L Final    Anion gap 08/22/2022 2 (A)  5 - 15 mmol/L Final    Glucose 08/22/2022 87  65 - 100 mg/dL Final    BUN 08/22/2022 21 (A)  6 - 20 MG/DL Final    Creatinine 08/22/2022 0.76  0.55 - 1.02 MG/DL Final    BUN/Creatinine ratio 08/22/2022 28 (A)  12 - 20   Final    GFR est AA 08/22/2022 >60  >60 ml/min/1.73m2 Final    GFR est non-AA 08/22/2022 >60  >60 ml/min/1.73m2 Final    Estimated GFR is calculated using the IDMS-traceable Modification of Diet in Renal Disease (MDRD) Study equation, reported for both  Americans (GFRAA) and non- Americans (GFRNA), and normalized to 1.73m2 body surface area.  The physician must decide which value applies to the patient. Calcium 08/22/2022 9.7  8.5 - 10.1 MG/DL Final    Bilirubin, total 08/22/2022 0.6  0.2 - 1.0 MG/DL Final    ALT (SGPT) 08/22/2022 12  12 - 78 U/L Final    AST (SGOT) 08/22/2022 12 (A)  15 - 37 U/L Final    Alk. phosphatase 08/22/2022 62  45 - 117 U/L Final    Protein, total 08/22/2022 6.8  6.4 - 8.2 g/dL Final    Albumin 08/22/2022 3.9  3.5 - 5.0 g/dL Final    Globulin 08/22/2022 2.9  2.0 - 4.0 g/dL Final    A-G Ratio 08/22/2022 1.3  1.1 - 2.2   Final    WBC 08/22/2022 5.0  3.6 - 11.0 K/uL Final    RBC 08/22/2022 4.42  3.80 - 5.20 M/uL Final    HGB 08/22/2022 13.0  11.5 - 16.0 g/dL Final    HCT 08/22/2022 42.6  35.0 - 47.0 % Final    MCV 08/22/2022 96.4  80.0 - 99.0 FL Final    MCH 08/22/2022 29.4  26.0 - 34.0 PG Final    MCHC 08/22/2022 30.5  30.0 - 36.5 g/dL Final    RDW 08/22/2022 14.1  11.5 - 14.5 % Final    PLATELET 21/15/2881 686  150 - 400 K/uL Final    MPV 08/22/2022 10.9  8.9 - 12.9 FL Final    NRBC 08/22/2022 0.0  0  WBC Final    ABSOLUTE NRBC 08/22/2022 0.00  0.00 - 0.01 K/uL Final    NEUTROPHILS 08/22/2022 42  32 - 75 % Final    LYMPHOCYTES 08/22/2022 41  12 - 49 % Final    MONOCYTES 08/22/2022 14 (A)  5 - 13 % Final    EOSINOPHILS 08/22/2022 2  0 - 7 % Final    BASOPHILS 08/22/2022 1  0 - 1 % Final    IMMATURE GRANULOCYTES 08/22/2022 0  0.0 - 0.5 % Final    ABS. NEUTROPHILS 08/22/2022 2.1  1.8 - 8.0 K/UL Final    ABS. LYMPHOCYTES 08/22/2022 2.1  0.8 - 3.5 K/UL Final    ABS. MONOCYTES 08/22/2022 0.7  0.0 - 1.0 K/UL Final    ABS. EOSINOPHILS 08/22/2022 0.1  0.0 - 0.4 K/UL Final    ABS. BASOPHILS 08/22/2022 0.0  0.0 - 0.1 K/UL Final    ABS. IMM. GRANS. 08/22/2022 0.0  0.00 - 0.04 K/UL Final    DF 08/22/2022 AUTOMATED    Final    Microalbumin,urine random 08/22/2022 3.28  MG/DL Final    No reference range has been established. Creatinine, urine random 08/22/2022 161.00  mg/dL Final    No reference range has been established.     Microalbumin/Creat ratio (mg/g cre* 08/22/2022 20  0 - 30 mg/g Final         Radiographic Studies:  XR Results (most recent):  Results from Hospital Encounter encounter on 09/10/18    XR CHEST PORT    Narrative  EXAM:  XR CHEST PORT    INDICATION:  Pacemaker, hypertension, asthma    COMPARISON:  9/4/2018    FINDINGS: A portable AP radiograph of the chest was obtained at 1012 hours. The  patient is on a cardiac monitor. The left subclavian pacemaker has one lead  overlying the right atrium the other overlies right ventricle. There is mild  cardiomegaly in this patient status post median sternotomy. The lungs demonstrate low lung volumes. The lungs are clear. Impression  IMPRESSION:  1. No pneumothorax     BRANDIN Results (most recent):  Results from Hospital Encounter encounter on 07/18/22    BRANDIN 3D VIKY W MAMMO BI SCREENING INCL CAD    Narrative  STUDY: Bilateral digital screening mammogram with 3-D tomosynthesis    INDICATION:  Screening. COMPARISON: Priors dating back to 2018    BREAST COMPOSITION: There are scattered areas of fibroglandular density. FINDINGS: Bilateral digital screening mammography was performed and is  interpreted in conjunction with a computer assisted detection (CAD) system. Additionally, tomosynthesis of both breasts in the CC and MLO projections was  performed. No suspicious masses or calcifications are identified. There has been  no significant change. A pacemaker generator overlies the left axillary tail. Impression  BI-RADS 1: Negative. No mammographic evidence of malignancy. RECOMMENDATIONS:  Next screening mammogram is recommended in one year. The patient will be notified of these results. CT Results (most recent):  Results from Hospital Encounter encounter on 05/22/18    CTA ABDOMEN PELV W CONT    Narrative  EXAM:  CTA CHEST W OR W WO CONT, CTA ABDOMEN PELV W CONT    INDICATION:  Dyspnea on exertion. Aortic dissection. COMPARISON: CT angiography chest 5/8/2017.  CT angiography chest/abdomen/pelvis  3/21/2016. TECHNIQUE: Helical thin section chest, abdomen, and pelvis CT following  uneventful intravenous administration of nonionic contrast according to  departmental PE protocol. Coronal and sagittal reformats were performed. 3D/MIP  post processing was performed. CT dose reduction was achieved through use of a  standardized protocol tailored for this examination and automatic exposure  control for dose modulation. Adaptive statistical iterative reconstruction  (ASIR) was utilized. FINDINGS:  CTA chest:  Surgical changes are again seen following aortic root repair. There is a stable  thoracic and abdominal aortic aneurysm extending into the right subclavian and  right brachiocephalic arteries with dilatation of the aortic arch measuring 4.8  cm in diameter, unchanged. The dissection extends into the abdomen and right common iliac artery,  unchanged. Abdominal aorta is normal in caliber. SMA and celiac are patent  and are fed by the true lumen. Left renal artery is patent and fed by the  true lumen. Right renal artery is patent and fed by the false lumen. CHRIS is  fed by the false lumen. There is stable dilatation of the main pulmonary artery measuring 4.1 cm in  diameter in keeping with pulmonary artery hypertension. The visualized thyroid gland is unremarkable. There is stable cardiomegaly. No  pericardial effusion. No lymphadenopathy by imaging size criteria. There is a trace right pleural effusion with bilateral dependent atelectasis. There is patchy opacity throughout the lungs predominantly in the lower lobes. There is no pneumothorax. Central airways are unremarkable. The esophagus is patulous and fluid-filled, unchanged. CTA abdomen and pelvis: The liver and spleen are unremarkable. The gallbladder is present without  biliary duct dilatation. The pancreas and adrenal glands are unremarkable.     The kidneys enhance symmetrically without hydronephrosis. There are no enlarged lymph nodes. There are no dilated bowel loops. The appendix is unremarkable. There is no free  fluid or free air. The urinary bladder is unremarkable. There is no pelvic mass. There are stable degenerative changes in the spine without aggressive bony  lesion. Impression  IMPRESSION:    1. Stable aortic dissection and surgical changes involving the aortic root. There is stable dilatation of the aortic arch measuring 4.8 cm in diameter. No  new vascular abnormality. 2. Trace right pleural effusion with patchy opacity throughout the lungs  predominantly in the lower lobes that may represent edema, atelectasis or  nonspecific infection or inflammation. 3. There is no other acute abnormality in the chest, abdomen, or pelvis. DEXA Results (most recent):  No results found for this or any previous visit. MRI Results (most recent):  No results found for this or any previous visit. Assessment/Plan:       ICD-10-CM ICD-9-CM    1. Routine adult health maintenance  Z00.00 V70.0       2. Thoracoabdominal aortic aneurysm (TAAA) without rupture, unspecified part  I71.60 441.7       3. PAF (paroxysmal atrial fibrillation) (HCC)  I48.0 427.31 ECHO ADULT COMPLETE      METABOLIC PANEL, COMPREHENSIVE      4. Essential hypertension  G67 515.5 METABOLIC PANEL, COMPREHENSIVE      5. Hypercholesterolemia  E78.00 272.0       6. Nonrheumatic mitral valve regurgitation  I34.0 424.0 ECHO ADULT COMPLETE      7. History of aortic root repair  Z98.890 V15.1 ECHO ADULT COMPLETE      8. Acute right ankle pain  M25.571 719.47 XR ANKLE RT MIN 3 V     338.19       9.  Toe pain, right  M79.674 729.5 XR FOOT RT MIN 3 V             Foot pain:  - Referral for x-rays  - Examination today does not suggest ongoing fracture  - Suspect simple bruise, additional referral to orthopedic surgery depending on findings of x-rays, doubt ankle fracture or toe fracture at this time        Follow-up and Dispositions    Return in about 6 months (around 8/22/2023). Healthcare Maintenance:  - Preventive measures are reviewed as per above  - Up to date on routine interventions except as noted above  - Orders placed to update gaps as noted  - Notes: repeat CMP    ASCVD:   - Type: History of aortic aneurysm (Thoracic, abdominal)   - Current Symptoms: No Symptoms, no angina   - History and Contributing Factors: elevated cholesterol, hypertension, and known history of coronary artery disease  Key CAD CHF Meds               pravastatin (PRAVACHOL) 20 mg tablet (Taking) Take 1 Tablet by mouth daily for 360 days. losartan (COZAAR) 100 mg tablet (Taking) Take 1 Tablet by mouth daily for 360 days. metoprolol tartrate (LOPRESSOR) 25 mg tablet (Taking) Take 1 Tablet by mouth two (2) times a day for 360 days. apixaban (Eliquis) 5 mg tablet (Taking) TAKE 1 TABLET BY MOUTH TWICE DAILY    omega-3 fatty acids-vitamin e 1,000 mg cap (Taking) Take 1 Cap by mouth two (2) times a day. Takes irregularly            - Target BP: < 130/80 mmHg; see Blood Pressure section   - Statin? YES   - Antiplatelet and/or Anticoagulant? YES   - ACEI/ARB? YES   - Beta Blocker? YES   - Notes: [facility]      Essential Hypertension/Blood Pressure Management:   - Home BP Readings: usual 120/80   - Current Control: stage 1   - Target BP: -120 mmHg (h/o aortic root repair, aneurysm)   - Relevant BP Meds:  Key CAD CHF Meds               pravastatin (PRAVACHOL) 20 mg tablet (Taking) Take 1 Tablet by mouth daily for 360 days. losartan (COZAAR) 100 mg tablet (Taking) Take 1 Tablet by mouth daily for 360 days. metoprolol tartrate (LOPRESSOR) 25 mg tablet (Taking) Take 1 Tablet by mouth two (2) times a day for 360 days. apixaban (Eliquis) 5 mg tablet (Taking) TAKE 1 TABLET BY MOUTH TWICE DAILY    omega-3 fatty acids-vitamin e 1,000 mg cap (Taking) Take 1 Cap by mouth two (2) times a day.  Takes irregularly           - Plan: continue current meds, dietary sodium restriction, regular aerobic exercise, weight loss   - Notes: [facility], home BP readings, needs BP cuff calibration, consider adding on amlodipine or HCTZ to achieve target if not at target at cuff calibration visit    Hyperlipidemia/Dyslipidemia:   - Summary of Cardiovascular Risks and Goals:     LDL goal is under 80  existing CAD  hypertension  hyperlipidemia  peripheral artery disease/claudication   -   Lab Results   Component Value Date/Time    LDL, calculated 42.6 08/22/2022 11:18 AM    HDL Cholesterol 55 08/22/2022 11:18 AM       - Relevant Cholesterol Meds:  Key Antihyperlipidemia Meds               pravastatin (PRAVACHOL) 20 mg tablet (Taking) Take 1 Tablet by mouth daily for 360 days. omega-3 fatty acids-vitamin e 1,000 mg cap (Taking) Take 1 Cap by mouth two (2) times a day. Takes irregularly              - Cholesterol at target: yes   - Does patient meet USPSTF and ACC/AHA indications for pharmacotherapy (e.g., statin): yes   - GI symptoms with meds: NO   - Muscle aches with meds: NO   - Other Adverse effects with meds: NO   - Medication Plan: continue   - Notes: [facility]      Paroxysmal atrial fibrillation   - Heart Rate Target: 60-90 bpm at rest   - Current Heart Rate Control: at target   - Current Symptoms: none   - Current heart rate control: B-blocker   - Current anticoagulation:   Key Anti-Platelet Anticoagulant Meds               apixaban (Eliquis) 5 mg tablet (Taking) TAKE 1 TABLET BY MOUTH TWICE DAILY          . Tolerating well without bleeding/bruising sequelae. - Medication plan: continue        I have reviewed the patient's medical history in detail and updated the computerized patient record. We had a prolonged discussion about these complex clinical issues and went over the various important aspects to consider. All questions were answered.       Advised the patient to call back or return to office if symptoms do not improve, change in nature, or persist.     The patient was given an after visit summary or informed of MyChart Access which includes patient instructions, diagnoses, current medications, & vitals. she expressed understanding with the diagnosis and plan. Vinson Lesches, MD    Please note that this dictation was completed with [employer], the computer voice recognition software. Quite often unanticipated grammatical, syntax, homophones, and other interpretive errors are inadvertently transcribed by the computer software. Please disregard these errors. Please excuse any errors that have escaped final proofreading.

## 2023-02-22 ENCOUNTER — OFFICE VISIT (OUTPATIENT)
Dept: INTERNAL MEDICINE CLINIC | Age: 78
End: 2023-02-22
Payer: MEDICARE

## 2023-02-22 VITALS
WEIGHT: 176.2 LBS | DIASTOLIC BLOOD PRESSURE: 92 MMHG | RESPIRATION RATE: 18 BRPM | HEART RATE: 69 BPM | SYSTOLIC BLOOD PRESSURE: 150 MMHG | HEIGHT: 65 IN | BODY MASS INDEX: 29.36 KG/M2 | TEMPERATURE: 98.8 F | OXYGEN SATURATION: 99 %

## 2023-02-22 DIAGNOSIS — M25.571 ACUTE RIGHT ANKLE PAIN: ICD-10-CM

## 2023-02-22 DIAGNOSIS — I71.60 THORACOABDOMINAL AORTIC ANEURYSM (TAAA) WITHOUT RUPTURE, UNSPECIFIED PART: ICD-10-CM

## 2023-02-22 DIAGNOSIS — E78.00 HYPERCHOLESTEROLEMIA: ICD-10-CM

## 2023-02-22 DIAGNOSIS — I10 ESSENTIAL HYPERTENSION: ICD-10-CM

## 2023-02-22 DIAGNOSIS — I34.0 NONRHEUMATIC MITRAL VALVE REGURGITATION: ICD-10-CM

## 2023-02-22 DIAGNOSIS — Z98.890 HISTORY OF AORTIC ROOT REPAIR: ICD-10-CM

## 2023-02-22 DIAGNOSIS — I48.0 PAF (PAROXYSMAL ATRIAL FIBRILLATION) (HCC): ICD-10-CM

## 2023-02-22 DIAGNOSIS — M79.674 TOE PAIN, RIGHT: ICD-10-CM

## 2023-02-22 DIAGNOSIS — Z00.00 ROUTINE ADULT HEALTH MAINTENANCE: Primary | ICD-10-CM

## 2023-02-22 NOTE — PROGRESS NOTES
Chief Complaint   Patient presents with    Follow-up       Visit Vitals  BP (!) 150/92 (BP 1 Location: Left upper arm, BP Patient Position: Sitting, BP Cuff Size: Large adult)   Pulse 69   Temp 98.8 °F (37.1 °C) (Oral)   Resp 18   Ht 5' 5\" (1.651 m)   Wt 176 lb 3.2 oz (79.9 kg)   SpO2 99%   BMI 29.32 kg/m²       1. \"Have you been to the ER, urgent care clinic since your last visit? Hospitalized since your last visit? \"  [facility] on [facility] for a fall    2. \"Have you seen or consulted any other health care providers outside of the [address] [place] [address] since your last visit? \" No     3. For patients aged 39-70: Has the patient had a colonoscopy / FIT/ Cologuard? No      If the patient is female:    4. For patients aged 41-77: Has the patient had a mammogram within the past 2 years? No      5. For patients aged 21-65: Has the patient had a pap smear?  No

## 2023-02-23 LAB
ALBUMIN SERPL-MCNC: 4.2 G/DL (ref 3.5–5)
ALBUMIN/GLOB SERPL: 1.4 (ref 1.1–2.2)
ALP SERPL-CCNC: 72 U/L (ref 45–117)
ALT SERPL-CCNC: 14 U/L (ref 12–78)
ANION GAP SERPL CALC-SCNC: 4 MMOL/L (ref 5–15)
AST SERPL-CCNC: 14 U/L (ref 15–37)
BILIRUB SERPL-MCNC: 0.4 MG/DL (ref 0.2–1)
BUN SERPL-MCNC: 20 MG/DL (ref 6–20)
BUN/CREAT SERPL: 24 (ref 12–20)
CALCIUM SERPL-MCNC: 10.6 MG/DL (ref 8.5–10.1)
CHLORIDE SERPL-SCNC: 107 MMOL/L (ref 97–108)
CO2 SERPL-SCNC: 33 MMOL/L (ref 21–32)
CREAT SERPL-MCNC: 0.84 MG/DL (ref 0.55–1.02)
GLOBULIN SER CALC-MCNC: 3.1 G/DL (ref 2–4)
GLUCOSE SERPL-MCNC: 89 MG/DL (ref 65–100)
POTASSIUM SERPL-SCNC: 4.8 MMOL/L (ref 3.5–5.1)
PROT SERPL-MCNC: 7.3 G/DL (ref 6.4–8.2)
SODIUM SERPL-SCNC: 144 MMOL/L (ref 136–145)

## 2023-02-27 ENCOUNTER — CLINICAL SUPPORT (OUTPATIENT)
Dept: INTERNAL MEDICINE CLINIC | Age: 78
End: 2023-02-27

## 2023-02-27 VITALS
DIASTOLIC BLOOD PRESSURE: 94 MMHG | WEIGHT: 176 LBS | SYSTOLIC BLOOD PRESSURE: 172 MMHG | RESPIRATION RATE: 18 BRPM | BODY MASS INDEX: 29.32 KG/M2 | HEIGHT: 65 IN | OXYGEN SATURATION: 98 % | HEART RATE: 70 BPM

## 2023-02-27 DIAGNOSIS — I10 ESSENTIAL HYPERTENSION: Primary | ICD-10-CM

## 2023-03-01 DIAGNOSIS — S92.424A CLOSED NONDISPLACED FRACTURE OF DISTAL PHALANX OF RIGHT GREAT TOE, INITIAL ENCOUNTER: Primary | ICD-10-CM

## 2023-03-02 ENCOUNTER — TELEPHONE (OUTPATIENT)
Dept: INTERNAL MEDICINE CLINIC | Age: 78
End: 2023-03-02

## 2023-03-08 ENCOUNTER — OFFICE VISIT (OUTPATIENT)
Dept: ORTHOPEDIC SURGERY | Age: 78
End: 2023-03-08

## 2023-03-08 VITALS
DIASTOLIC BLOOD PRESSURE: 90 MMHG | BODY MASS INDEX: 29.66 KG/M2 | SYSTOLIC BLOOD PRESSURE: 152 MMHG | HEART RATE: 75 BPM | OXYGEN SATURATION: 95 % | TEMPERATURE: 97.2 F | WEIGHT: 178 LBS | HEIGHT: 65 IN

## 2023-03-08 DIAGNOSIS — S92.424A CLOSED NONDISPLACED FRACTURE OF DISTAL PHALANX OF RIGHT GREAT TOE, INITIAL ENCOUNTER: Primary | ICD-10-CM

## 2023-03-08 RX ORDER — CALCIUM CARBONATE 600 MG
600 TABLET ORAL 2 TIMES DAILY
COMMUNITY

## 2023-03-08 NOTE — PROGRESS NOTES
Identified pt with two pt identifiers (name and ). Reviewed chart in preparation for visit and have obtained necessary documentation. Francisco Carreon is a 76 y.o. female  Chief Complaint   Patient presents with    Toe Pain     RT Big Toe Fx     Visit Vitals  BP (!) 152/90 (BP 1 Location: Right arm, BP Patient Position: Sitting, BP Cuff Size: Large adult)   Pulse 75   Temp 97.2 °F (36.2 °C) (Tympanic)   Ht 5' 5\" (1.651 m)   Wt 178 lb (80.7 kg)   SpO2 95%   BMI 29.62 kg/m²     1. Have you been to the ER, urgent care clinic since your last visit? Hospitalized since your last visit? No    2. Have you seen or consulted any other health care providers outside of the [address] [place] [address] since your last visit? Include any pap smears or colon screening.  Yes Where: [facility]

## 2023-03-08 NOTE — PROGRESS NOTES
3/8/2023      CC: right foot pain    HPI:      This is a 76y.o. year old female who fell, hitting her right great toe a month ago. She had pain and swelling, she was seen in the ED, given a postop shoe and she deferred pain medication. She presents for follow up. PMH:  Past Medical History:   Diagnosis Date    Allergic conjunctivitis 07/26/2017    Aortic aneurysm ([facility] 05/2018    Thoracic, abdominal    Arthritis     lower back    Asthma     Current use of long term anticoagulation     Essential hypertension 08/21/2017    GERD (gastroesophageal reflux disease)     History of aortic dissection     History of echocardiogram 09/2019    Left Ventricle: Normal cavity size, systolic function (ejection fraction normal) and diastolic function. Moderate concentric hypertrophy. Estimated left ventricular ejection fraction is 56 - 60%. No regional wall motion abnormality noted. Small secundum atrial septal defect present. Left Atrium: Moderately dilated left atrium. Mitral Valve: Mitral valve thickening. Mild mitral valve regurgitation.     History of vertigo 07/26/2017    positional    Hx of repair of aortic root     Hypercholesterolemia 08/21/2017    Intertrigo 07/26/2017    Long-term use of high-risk medication 07/26/2017    LVH (left ventricular hypertrophy) 07/26/2017    PAF (paroxysmal atrial fibrillation) ([facility] 08/20/2018    Presence of permanent cardiac pacemaker     PUD (peptic ulcer disease)     Spinal stenosis, lumbar region, without neurogenic claudication 07/26/2017    SSS (sick sinus syndrome) ([facility] 09/18/2018    Vitamin B12 deficiency     Vitamin D deficiency        PSxHx:  Past Surgical History:   Procedure Laterality Date    COLONOSCOPY N/A 7/12/2017    COLONOSCOPY WITH IV ANTIBIOTICS performed by Cecille Herrmann MD at [facility]  7/12/2017         HX GYN      hysterectomy    HX GYN      tubal ligation    HX HYSTERECTOMY      HX OTHER SURGICAL  2015    Type A Dissection Repair NE COLONOSCOPY FLX DX W/COLLJ SPEC WHEN PFRMD  3/19/2012         UPPER GI ENDOSCOPY,BALL DIL,30MM  7/12/2017         UPPER GI ENDOSCOPY,BIOPSY  7/12/2017            Meds:    Current Outpatient Medications:     calcium carbonate (CALTREX) 600 mg calcium (1,500 mg) tablet, Take 600 mg by mouth two (2) times a day., Disp: , Rfl:     pravastatin (PRAVACHOL) 20 mg tablet, Take 1 Tablet by mouth daily for 360 days. , Disp: 90 Tablet, Rfl: 3    losartan (COZAAR) 100 mg tablet, Take 1 Tablet by mouth daily for 360 days. , Disp: 90 Tablet, Rfl: 3    metoprolol tartrate (LOPRESSOR) 25 mg tablet, Take 1 Tablet by mouth two (2) times a day for 360 days. , Disp: 180 Tablet, Rfl: 3    apixaban (Eliquis) 5 mg tablet, TAKE 1 TABLET BY MOUTH TWICE DAILY, Disp: 180 Tablet, Rfl: 3    cyanocobalamin 1,000 mcg tablet, Take 1,000 mcg by mouth daily. , Disp: , Rfl:     acetaminophen (TYLENOL) 650 mg TbER, Take 650 mg by mouth every six (6) hours as needed for Pain., Disp: , Rfl:     omega-3 fatty acids-vitamin e 1,000 mg cap, Take 1 Cap by mouth two (2) times a day. Takes irregularly, Disp: , Rfl:     All:  No Known Allergies    Social Hx:  Social History     Socioeconomic History    Marital status:    Tobacco Use    Smoking status: Never    Smokeless tobacco: Never   Vaping Use    Vaping Use: Never used   Substance and Sexual Activity    Alcohol use:  Yes     Alcohol/week: 0.0 standard drinks     Comment: rare    Drug use: No       Family Hx:  Family History   Problem Relation Age of Onset    Cancer Father         colon cancer         Review of Systems:       General: Denies headache, lethargy, fever, weight loss  Ears/Nose/Throat: Denies ear discharge, drainage, nosebleeds, hoarse voice, dental problems  Cardiovascular: Denies chest pain, shortness of breath  Lungs: Denies chest pain, breathing problems, wheezing, pneumonia  Stomach: Denies stomach pain, heartburn, constipation, irritable bowel  Skin: Denies rash, sores, open wounds  Musculoskeletal: right foot pain  Genitourinary: Denies dysuria, hematuria, polyuria  Gastrointestinal: Denies constipation, obstipation, diarrhea  Neurological: Denies changes in sight, smell, hearing, taste, seizures. Denies loss of consciousness. Psychiatric: Denies depression, sleep pattern changes, anxiety, change in personality  Endocrine: Denies mood swings, heat or cold intolerance  Hematologic/Lymphatic: Denies anemia, purpura, petechia  Allergic/Immunologic: Denies swelling of throat, pain or swelling at lymph nodes      Physical Examination:    Visit Vitals  BP (!) 152/90 (BP 1 Location: Right arm, BP Patient Position: Sitting, BP Cuff Size: Large adult)   Pulse 75   Temp 97.2 °F (36.2 °C) (Tympanic)   Ht 5' 5\" (1.651 m)   Wt 178 lb (80.7 kg)   SpO2 95%   BMI 29.62 kg/m²        General: AOX3, no apparent distress  Psychiatric: mood and affect appropriate  Lungs: breathing is symmetric and unlabored bilaterally  Heart: regular rate and rhythm  Abdomen: no guarding  Head: normocephalic, atraumatic  Skin: No significant abnormalities, good turgor  Sensation intact to light touch: C5-T1 dermatomes  Muscular exam: 5/5 strength in all major muscle groups unless noted in specialty exam.    Extremities          Right lower extremity: No gross deformity. Minor difffuse swelling with some TTP distal phalanx. No restriction to range of motion of the hip, knee, ankle. Muscle bulk is appropriate without wasting. Sensation is intact to light touch in the L1-S1 dermatomes. Capillary refill is less than 2 seconds in the fingers. Strength testing is 5/5 at the major muscle groups of the hip, knee, ankle. Left lower extremity:  No gross deformity. No restriction to range of motion of the hip, knee, ankle. Muscle bulk is appropriate without wasting. Sensation is intact to light touch in the L1-S1 dermatomes. Capillary refill is less than 2 seconds in the fingers.   Strength testing is 5/5 at the major muscle groups of the hip, knee, ankle. Diagnostics:    Pertinent Diagnostics: Xrays of the right foot indicate small nondisplaced articular sided distal phalanx fracture, diffuse degenerative changes, otherwise indicate no fractures, osseus lesions, abnormalities, cartilage space is well maintained. Overall alignment is within normal limits, no effusion or other soft tissue abnormality. Assessment: right distal phalanx great toe fracture  Plan:    We did discuss the risks and benefits of treatment of nonoperative management of fractures. This does include, but are not limited to: stiffness, later displacement of fracture, potential for prolonged immobilization and longer recovery times, possible stiffness of associated joints, skin and deep tissue irritation or breakdown. As with all fractures good clinical outcome is dependent on bone healing, and mobilization of the soft tissues in a regimented and reasonable manner. Postop shoe and wean to regular shoes. Follow up if no significant improvement in 4 weeks. Ms. Ana Rangel has a reminder for a \"due or due soon\" health maintenance. I have asked that she contact her primary care provider for follow-up on this health maintenance.

## 2023-04-21 ENCOUNTER — HOSPITAL ENCOUNTER (OUTPATIENT)
Age: 78
Setting detail: OUTPATIENT SURGERY
Discharge: HOME | End: 2023-04-21
Attending: STUDENT IN AN ORGANIZED HEALTH CARE EDUCATION/TRAINING PROGRAM | Admitting: STUDENT IN AN ORGANIZED HEALTH CARE EDUCATION/TRAINING PROGRAM
Payer: MEDICARE

## 2023-04-21 VITALS
HEART RATE: 70 BPM | DIASTOLIC BLOOD PRESSURE: 89 MMHG | OXYGEN SATURATION: 92 % | RESPIRATION RATE: 14 BRPM | BODY MASS INDEX: 28.82 KG/M2 | TEMPERATURE: 98.6 F | WEIGHT: 173 LBS | SYSTOLIC BLOOD PRESSURE: 150 MMHG | HEIGHT: 65 IN

## 2023-04-21 DIAGNOSIS — R07.9 CHEST PAIN, UNSPECIFIED TYPE: ICD-10-CM

## 2023-04-21 PROCEDURE — 74011000636 HC RX REV CODE- 636: Performed by: STUDENT IN AN ORGANIZED HEALTH CARE EDUCATION/TRAINING PROGRAM

## 2023-04-21 PROCEDURE — 93460 R&L HRT ART/VENTRICLE ANGIO: CPT | Performed by: STUDENT IN AN ORGANIZED HEALTH CARE EDUCATION/TRAINING PROGRAM

## 2023-04-21 PROCEDURE — 77030019698 HC SYR ANGI MDLON MRTM -A: Performed by: STUDENT IN AN ORGANIZED HEALTH CARE EDUCATION/TRAINING PROGRAM

## 2023-04-21 PROCEDURE — 99153 MOD SED SAME PHYS/QHP EA: CPT | Performed by: STUDENT IN AN ORGANIZED HEALTH CARE EDUCATION/TRAINING PROGRAM

## 2023-04-21 PROCEDURE — 77030030195 HC CATH ANGI DX PRF4 MRTM -A: Performed by: STUDENT IN AN ORGANIZED HEALTH CARE EDUCATION/TRAINING PROGRAM

## 2023-04-21 PROCEDURE — 74011250637 HC RX REV CODE- 250/637: Performed by: STUDENT IN AN ORGANIZED HEALTH CARE EDUCATION/TRAINING PROGRAM

## 2023-04-21 PROCEDURE — 74011250636 HC RX REV CODE- 250/636: Performed by: STUDENT IN AN ORGANIZED HEALTH CARE EDUCATION/TRAINING PROGRAM

## 2023-04-21 PROCEDURE — 74011000250 HC RX REV CODE- 250: Performed by: STUDENT IN AN ORGANIZED HEALTH CARE EDUCATION/TRAINING PROGRAM

## 2023-04-21 PROCEDURE — 99152 MOD SED SAME PHYS/QHP 5/>YRS: CPT | Performed by: STUDENT IN AN ORGANIZED HEALTH CARE EDUCATION/TRAINING PROGRAM

## 2023-04-21 PROCEDURE — 2709999900 HC NON-CHARGEABLE SUPPLY: Performed by: STUDENT IN AN ORGANIZED HEALTH CARE EDUCATION/TRAINING PROGRAM

## 2023-04-21 PROCEDURE — C1751 CATH, INF, PER/CENT/MIDLINE: HCPCS | Performed by: STUDENT IN AN ORGANIZED HEALTH CARE EDUCATION/TRAINING PROGRAM

## 2023-04-21 PROCEDURE — 77030015766: Performed by: STUDENT IN AN ORGANIZED HEALTH CARE EDUCATION/TRAINING PROGRAM

## 2023-04-21 PROCEDURE — C1769 GUIDE WIRE: HCPCS | Performed by: STUDENT IN AN ORGANIZED HEALTH CARE EDUCATION/TRAINING PROGRAM

## 2023-04-21 PROCEDURE — 77030008543 HC TBNG MON PRSS MRTM -A: Performed by: STUDENT IN AN ORGANIZED HEALTH CARE EDUCATION/TRAINING PROGRAM

## 2023-04-21 PROCEDURE — 76937 US GUIDE VASCULAR ACCESS: CPT | Performed by: STUDENT IN AN ORGANIZED HEALTH CARE EDUCATION/TRAINING PROGRAM

## 2023-04-21 PROCEDURE — C1894 INTRO/SHEATH, NON-LASER: HCPCS | Performed by: STUDENT IN AN ORGANIZED HEALTH CARE EDUCATION/TRAINING PROGRAM

## 2023-04-21 PROCEDURE — 77030010221 HC SPLNT WR POS TELE -B: Performed by: STUDENT IN AN ORGANIZED HEALTH CARE EDUCATION/TRAINING PROGRAM

## 2023-04-21 PROCEDURE — 77030019569 HC BND COMPR RAD TERU -B: Performed by: STUDENT IN AN ORGANIZED HEALTH CARE EDUCATION/TRAINING PROGRAM

## 2023-04-21 RX ORDER — ASPIRIN 325 MG
TABLET ORAL AS NEEDED
Status: DISCONTINUED | OUTPATIENT
Start: 2023-04-21 | End: 2023-04-21 | Stop reason: HOSPADM

## 2023-04-21 RX ORDER — SODIUM CHLORIDE 0.9 % (FLUSH) 0.9 %
5-40 SYRINGE (ML) INJECTION EVERY 8 HOURS
Status: CANCELLED | OUTPATIENT
Start: 2023-04-21

## 2023-04-21 RX ORDER — VERAPAMIL HYDROCHLORIDE 2.5 MG/ML
INJECTION, SOLUTION INTRAVENOUS AS NEEDED
Status: DISCONTINUED | OUTPATIENT
Start: 2023-04-21 | End: 2023-04-21 | Stop reason: HOSPADM

## 2023-04-21 RX ORDER — FENTANYL CITRATE 50 UG/ML
INJECTION, SOLUTION INTRAMUSCULAR; INTRAVENOUS AS NEEDED
Status: DISCONTINUED | OUTPATIENT
Start: 2023-04-21 | End: 2023-04-21 | Stop reason: HOSPADM

## 2023-04-21 RX ORDER — SODIUM CHLORIDE 0.9 % (FLUSH) 0.9 %
5-40 SYRINGE (ML) INJECTION AS NEEDED
Status: CANCELLED | OUTPATIENT
Start: 2023-04-21

## 2023-04-21 RX ORDER — HEPARIN SODIUM 200 [USP'U]/100ML
INJECTION, SOLUTION INTRAVENOUS
Status: DISCONTINUED | OUTPATIENT
Start: 2023-04-21 | End: 2023-04-21 | Stop reason: HOSPADM

## 2023-04-21 RX ORDER — HEPARIN SODIUM 1000 [USP'U]/ML
INJECTION, SOLUTION INTRAVENOUS; SUBCUTANEOUS AS NEEDED
Status: DISCONTINUED | OUTPATIENT
Start: 2023-04-21 | End: 2023-04-21 | Stop reason: HOSPADM

## 2023-04-21 RX ORDER — LIDOCAINE HYDROCHLORIDE 10 MG/ML
INJECTION, SOLUTION EPIDURAL; INFILTRATION; INTRACAUDAL; PERINEURAL AS NEEDED
Status: DISCONTINUED | OUTPATIENT
Start: 2023-04-21 | End: 2023-04-21 | Stop reason: HOSPADM

## 2023-04-21 RX ORDER — DIPHENHYDRAMINE HYDROCHLORIDE 50 MG/ML
25 INJECTION, SOLUTION INTRAMUSCULAR; INTRAVENOUS
Status: CANCELLED | OUTPATIENT
Start: 2023-04-21

## 2023-04-21 RX ORDER — MIDAZOLAM HYDROCHLORIDE 1 MG/ML
INJECTION, SOLUTION INTRAMUSCULAR; INTRAVENOUS AS NEEDED
Status: DISCONTINUED | OUTPATIENT
Start: 2023-04-21 | End: 2023-04-21 | Stop reason: HOSPADM

## 2023-04-21 NOTE — PROGRESS NOTES
1330: Patient ambulated in the hallway without difficulty. No c/o pain. Right radial incision no hematoma and no bleeding, dressing CDI. 1340: PIV removed. I have reviewed discharge instructions with the patient. Post radial dc instructions reviewed and given to the patient. Informed to call and schedule follow-up appointment with Dr. Joselo Novak. The patient verbalized understanding. 1345: Patient voids without difficulty. Patient discharged escorted to car via wheelchair accompanied by son.

## 2023-04-21 NOTE — PROGRESS NOTES
Problem: Patient Education: Go to Patient Education Activity  Goal: Patient/Family Education  Outcome: Progressing Towards Goal     Problem: Cath Lab Procedures: Pre-Procedure  Goal: Off Pathway (Use only if patient is Off Pathway)  Outcome: Progressing Towards Goal  Goal: Activity/Safety  Outcome: Progressing Towards Goal  Goal: Consults, if ordered  Outcome: Progressing Towards Goal  Goal: Diagnostic Test/Procedures  Outcome: Progressing Towards Goal  Goal: Nutrition/Diet  Outcome: Progressing Towards Goal  Goal: Discharge Planning  Outcome: Progressing Towards Goal  Goal: Medications  Outcome: Progressing Towards Goal  Goal: Respiratory  Outcome: Progressing Towards Goal  Goal: Treatments/Interventions/Procedures  Outcome: Progressing Towards Goal  Goal: Psychosocial  Outcome: Progressing Towards Goal  Goal: *Verbalize description of procedure  Outcome: Progressing Towards Goal  Goal: *Consent signed  Outcome: Progressing Towards Goal     Problem: Cath Lab Procedures: Post-Cath Day of Procedure (Initiate SCIP Measures for Post-Op Care)  Goal: Off Pathway (Use only if patient is Off Pathway)  Outcome: Progressing Towards Goal  Goal: Activity/Safety  Outcome: Progressing Towards Goal  Goal: Consults, if ordered  Outcome: Progressing Towards Goal  Goal: Diagnostic Test/Procedures  Outcome: Progressing Towards Goal  Goal: Nutrition/Diet  Outcome: Progressing Towards Goal  Goal: Discharge Planning  Outcome: Progressing Towards Goal  Goal: Medications  Outcome: Progressing Towards Goal  Goal: Respiratory  Outcome: Progressing Towards Goal  Goal: Treatments/Interventions/Procedures  Outcome: Progressing Towards Goal  Goal: Psychosocial  Outcome: Progressing Towards Goal  Goal: *Procedure site is without bleeding and signs of infection six hours post sheath removal  Outcome: Progressing Towards Goal  Goal: *Hemodynamically stable  Outcome: Progressing Towards Goal  Goal: *Optimal pain control at patient's stated goal  Outcome: Progressing Towards Goal

## 2023-04-21 NOTE — PROGRESS NOTES
Primary Nurse Rohit Bañuelos RN and ASHOK Shelby performed a dual skin assessment on this patient No impairment noted  Chacho score is 23    Mepilex applied to sacrum

## 2023-04-21 NOTE — PROGRESS NOTES
Cardiac Cath Lab Recovery Arrival Note:      Bessy Hernandez arrived to Cardiac Cath Lab, Recovery Area. Staff introduced to patient. Patient identifiers verified with NAME and DATE OF BIRTH. Procedure verified with patient. Consent forms reviewed and signed by patient or authorized representative and verified. Allergies verified. Patient and family oriented to department. Patient and family informed of procedure and plan of care. Questions answered with review. Patient prepped for procedure, per orders from physician, prior to arrival.    Patient on cardiac monitor, non-invasive blood pressure, SPO2 monitor. On room air. Patient is A&Ox 4. Patient reports no complaints. Patient in stretcher, in low position, with side rails up, call bell within reach, patient instructed to call if assistance as needed. Patient prep in: [address], [place] [address]. Patient family has pager # n/a  Family in: Kareentheodore Zaragoza () in [employer].    Prep by: Jada Li

## 2023-04-21 NOTE — PROGRESS NOTES
POST PROCEDURE NOTE     Karena Gonzalez  1945  [id number]    Date of Procedure: 4/21/2023    Preoperative diagnosis: Shortness of breath    Postoperative diagnosis: No significant coronary artery disease    Procedure: Procedure(s):  LEFT HEART CATH / CORONARY ANGIOGRAPHY  RIGHT HEART CATH    :  Dr. Wade Stovall MD    Assistant(s):  None    EBL: 30 mL    Specimens: * No specimens in log *    Findings: See dictated procedure note    Complications: None    Dr. Wade Stovall MD  4/21/2023  10:46 AM

## 2023-04-21 NOTE — PROGRESS NOTES
1045: TRANSFER - IN REPORT:    Verbal report received from Darrell Tai on Inocente Rock  being received from [facility] for routine progression of care. Report consisted of patients Situation, Background, Assessment and Recommendations(SBAR). Information from the following report(s) Procedure Summary was reviewed with the receiving clinician. Opportunity for questions and clarification was provided. Assessment completed upon patients arrival to [address] [place] [address] and care assumed. Cardiac Cath Lab Recovery Arrival Note:    Inocente Rock arrived to [facility] recovery area. Patient procedure= LHC. Patient on cardiac monitor, non-invasive blood pressure, SPO2 monitor. On O2 @ 2 lpm via NS. IV  of NS on pump at 50 ml/hr. Patient status doing well without problems. Patient is A&Ox 4. Patient reports no complaints. PROCEDURE SITE CHECK:    Procedure site:without any bleeding and no hematoma, No pain/discomfort reported at procedure site. No change in patient status. Continue to monitor patient and status.

## 2023-04-25 NOTE — CARDIO/PULMONARY
Cardiopulmonary Rehab:    Chart reviewed. Pt is a 76 y.o. F admitted with Chest pain, unspecified type [R07.9]. Smoking history assessed. Patient is a non smoker. Smoking Cessation Program link has not been added to the AVS.     EF 60-65%  on 4/6/23 per echo. S/p cardiac cath on 4/21/23, no interventions indicated. Patient does not currently qualify for CP rehab.

## 2023-06-29 ENCOUNTER — TRANSCRIBE ORDERS (OUTPATIENT)
Facility: HOSPITAL | Age: 78
End: 2023-06-29

## 2023-06-29 DIAGNOSIS — Z12.31 BREAST CANCER SCREENING BY MAMMOGRAM: Primary | ICD-10-CM

## 2023-07-11 ENCOUNTER — HOSPITAL ENCOUNTER (OUTPATIENT)
Facility: HOSPITAL | Age: 78
Discharge: HOME | End: 2023-07-14
Payer: MEDICARE

## 2023-07-11 DIAGNOSIS — I27.20 PULMONARY HYPERTENSION (HCC): ICD-10-CM

## 2023-07-11 PROCEDURE — 71046 X-RAY EXAM CHEST 2 VIEWS: CPT

## 2023-07-25 ENCOUNTER — HOSPITAL ENCOUNTER (OUTPATIENT)
Facility: HOSPITAL | Age: 78
Discharge: HOME | End: 2023-07-28
Payer: MEDICARE

## 2023-07-25 DIAGNOSIS — Q21.10 ATRIAL SEPTAL DEFECT OF FOSSA OVALIS: ICD-10-CM

## 2023-07-25 LAB
LV EF: 65 %
LVEF MODALITY: NORMAL

## 2023-07-25 PROCEDURE — 75557 CARDIAC MRI FOR MORPH: CPT

## 2023-07-25 PROCEDURE — 75557 CARDIAC MRI FOR MORPH: CPT | Performed by: INTERNAL MEDICINE

## 2023-08-01 ENCOUNTER — OFFICE VISIT (OUTPATIENT)
Facility: CLINIC | Age: 78
End: 2023-08-01
Payer: MEDICARE

## 2023-08-01 VITALS
SYSTOLIC BLOOD PRESSURE: 128 MMHG | RESPIRATION RATE: 16 BRPM | WEIGHT: 170 LBS | HEIGHT: 65 IN | DIASTOLIC BLOOD PRESSURE: 77 MMHG | OXYGEN SATURATION: 97 % | BODY MASS INDEX: 28.32 KG/M2 | TEMPERATURE: 97.8 F | HEART RATE: 71 BPM

## 2023-08-01 DIAGNOSIS — Z01.818 PRE-OP EVALUATION: ICD-10-CM

## 2023-08-01 DIAGNOSIS — I48.0 PAF (PAROXYSMAL ATRIAL FIBRILLATION) (HCC): ICD-10-CM

## 2023-08-01 DIAGNOSIS — Z76.89 ENCOUNTER TO ESTABLISH CARE: Primary | ICD-10-CM

## 2023-08-01 DIAGNOSIS — Z95.0 CARDIAC PACEMAKER IN SITU: ICD-10-CM

## 2023-08-01 DIAGNOSIS — I10 ESSENTIAL HYPERTENSION: ICD-10-CM

## 2023-08-01 DIAGNOSIS — H26.9 CATARACT, UNSPECIFIED CATARACT TYPE, UNSPECIFIED LATERALITY: ICD-10-CM

## 2023-08-01 DIAGNOSIS — E78.00 HYPERCHOLESTEROLEMIA: ICD-10-CM

## 2023-08-01 PROCEDURE — 3074F SYST BP LT 130 MM HG: CPT | Performed by: INTERNAL MEDICINE

## 2023-08-01 PROCEDURE — 1090F PRES/ABSN URINE INCON ASSESS: CPT | Performed by: INTERNAL MEDICINE

## 2023-08-01 PROCEDURE — 1123F ACP DISCUSS/DSCN MKR DOCD: CPT | Performed by: INTERNAL MEDICINE

## 2023-08-01 PROCEDURE — 3078F DIAST BP <80 MM HG: CPT | Performed by: INTERNAL MEDICINE

## 2023-08-01 PROCEDURE — G8419 CALC BMI OUT NRM PARAM NOF/U: HCPCS | Performed by: INTERNAL MEDICINE

## 2023-08-01 PROCEDURE — 99215 OFFICE O/P EST HI 40 MIN: CPT | Performed by: INTERNAL MEDICINE

## 2023-08-01 PROCEDURE — 1036F TOBACCO NON-USER: CPT | Performed by: INTERNAL MEDICINE

## 2023-08-01 PROCEDURE — G8427 DOCREV CUR MEDS BY ELIG CLIN: HCPCS | Performed by: INTERNAL MEDICINE

## 2023-08-01 PROCEDURE — G8400 PT W/DXA NO RESULTS DOC: HCPCS | Performed by: INTERNAL MEDICINE

## 2023-08-01 RX ORDER — PREDNISOLONE ACETATE 10 MG/ML
SUSPENSION/ DROPS OPHTHALMIC
COMMUNITY
Start: 2023-07-17

## 2023-08-01 RX ORDER — FUROSEMIDE 20 MG/1
20 TABLET ORAL DAILY
COMMUNITY
Start: 2023-05-05

## 2023-08-01 RX ORDER — OFLOXACIN 3 MG/ML
SOLUTION/ DROPS OPHTHALMIC
COMMUNITY
Start: 2023-07-17

## 2023-08-01 SDOH — ECONOMIC STABILITY: INCOME INSECURITY: HOW HARD IS IT FOR YOU TO PAY FOR THE VERY BASICS LIKE FOOD, HOUSING, MEDICAL CARE, AND HEATING?: NOT VERY HARD

## 2023-08-01 SDOH — ECONOMIC STABILITY: FOOD INSECURITY: WITHIN THE PAST 12 MONTHS, THE FOOD YOU BOUGHT JUST DIDN'T LAST AND YOU DIDN'T HAVE MONEY TO GET MORE.: NEVER TRUE

## 2023-08-01 SDOH — ECONOMIC STABILITY: HOUSING INSECURITY
IN THE LAST 12 MONTHS, WAS THERE A TIME WHEN YOU DID NOT HAVE A STEADY PLACE TO SLEEP OR SLEPT IN A SHELTER (INCLUDING NOW)?: NO

## 2023-08-01 SDOH — ECONOMIC STABILITY: FOOD INSECURITY: WITHIN THE PAST 12 MONTHS, YOU WORRIED THAT YOUR FOOD WOULD RUN OUT BEFORE YOU GOT MONEY TO BUY MORE.: NEVER TRUE

## 2023-08-01 NOTE — PROGRESS NOTES
Chief Complaint   Patient presents with    [address] [place] [address] [address] Yun James is a 78 y.o. female    Presents to establish care and for pre-op evaluation. Her former PCP was Dr. Hortencia Brar at [address] [place] [address]. 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 Presents for pre-operative consultation requested by Dr. Amanda Schultz prior to cataract surgery scheduled on 8/3/23. No complaints. Denies headaches, CP, SOB, dizziness, heart palpitations, muscle cramps, or leg swelling. Other Providers: Dr. Anna Aguilar NP (Cardiology), Dr. Denzel Mascorro (Electrophysiology), Dr. Uday Peterson (Pulmonary)    Hx of Preoperative Complications  Anesthesia Reactions: no  Malignant Hypertension: no  Bleeding excessively: no  Transfusion: no  DVT/PE Hx: no     Latex allergy: no     Physical Activity Capacity:   Greater than 4 METS (e.g., walking > 4 mph, 1 flight of stairs, moderate housework or exercise)     Medication Considerations:  Patient is taking Eliquis. Soc Hx  . 1 son. No grandchildren. Retired [employer] . Never smoker. No alcohol. Gets exercise.      Patient Active Problem List   Diagnosis    Pneumonia due to COVID-19 virus    Allergic conjunctivitis    Swelling of both ankles    Intertrigo    PAF (paroxysmal atrial fibrillation) (HCC)    Heart palpitations    Essential hypertension    Postviral fatigue syndrome    Acute respiratory failure with hypoxemia (HCC)    S/P ascending aortic aneurysm repair    LVH (left ventricular hypertrophy)    Tachy-abbie syndrome (HCC)    Spinal stenosis, lumbar region, without neurogenic claudication    Long-term use of high-risk medication    S/P cardiac cath    Sciatica of left side    Hypercholesterolemia    Thoracoabdominal aortic aneurysm (TAAA) without rupture, unspecified part ([address])    ASD (atrial septal defect)     Past Medical History:   Diagnosis Date    Allergic

## 2023-08-01 NOTE — PROGRESS NOTES
CC: HISTORY OF PRESENT ILLNESS  Daniella Friedman is a 78 y.o. female    P    No SOB and resents for evaluation of   Pulonary: Dr. Jai Logan, Dr. Senia Ch for PPM    Surgeries: ahmet Sullivan with a cane     Presents for pre-operative consultation requested by Dr. Hamilton Miller prior to ***surgery scheduled on ***. He is to have surgery for ***. He has ***. Today he complains of ***    Hx of Preoperative Complications  Anesthesia Reactions: no  Malignant Hypertension: no  Bleeding excessively: no  Transfusion: no  DVT/PE Hx: no     Latex allergy: no     Physical Activity Capacity:   Greater than 4 METS (e.g., walking > 4 mph, 1 flight of stairs, moderate housework or exercise)     Medication Considerations:  Patient is not taking aspirin, warfarin, or other anticoagulant medication daily. Soc Hx  . 1 son. No grandchildren. Worked at [employer]. Never smoker. No alcohol. Gets exercise.      ROS  Constitutional: negative for fevers, chills, night sweats, fatigue, weight loss  Eyes:   negative for visual disturbance and irritation  ENT:   negative for tinnitus, sore throat, nasal congestion, ear pains, hoarseness  Respiratory:  negative for cough, hemoptysis, dyspnea, wheezing  CV:   negative for chest pain, palpitations, lower extremity edema  GI:   negative for heartburn, abd pain, nausea, vomiting, diarrhea, constipation  Endo:               negative for polyuria, polydipsia, polyphagia, cold/heat intolerance  Genitourinary: negative for frequency, dysuria, hematuria  Integument:  negative for rash and pruritus  Hematologic:  negative for easy bruising and gum/nose bleeding  Musculoskel: negative for myalgias, joint pain, back pain, muscle weakness  Neurological:  negative for headaches, dizziness, gait problems, memory problems   Behavl/Psych: negative for feelings of anxiety, depression, mood change    Patient Active Problem List   Diagnosis    Pneumonia due to COVID-19 virus

## 2023-08-02 ENCOUNTER — TELEPHONE (OUTPATIENT)
Facility: CLINIC | Age: 78
End: 2023-08-02

## 2023-08-02 NOTE — TELEPHONE ENCOUNTER
[facility] called for pt, pre op form was missing pt's medication list, medical history, and allergies. Please re-send fax to [facility].    Fax #:[phone]  Phone #: [phone]

## 2023-08-22 PROBLEM — I27.20 PULMONARY HYPERTENSION (HCC): Status: ACTIVE | Noted: 2023-08-22

## 2023-08-22 PROBLEM — I48.0 PAF (PAROXYSMAL ATRIAL FIBRILLATION) (HCC): Status: ACTIVE | Noted: 2018-08-20

## 2023-08-22 PROBLEM — Z95.0 CARDIAC PACEMAKER IN SITU: Status: ACTIVE | Noted: 2023-08-22

## 2023-08-22 PROBLEM — I34.0 MR (MITRAL REGURGITATION): Status: ACTIVE | Noted: 2023-08-22

## 2023-08-22 PROBLEM — I07.1 TRICUSPID REGURGITATION: Status: ACTIVE | Noted: 2023-08-22

## 2023-09-05 NOTE — TELEPHONE ENCOUNTER
Advance Care Planning     Advance Care Planning Activator (Inpatient)  Conversation Note      Date of ACP Conversation: 7/31/2023     Conversation Conducted with: Patient with Decision Making Capacity    ACP Activator: Chuy Glover RN    Health Care Decision Maker:     Current Designated Health Care Decision Maker:     Primary Decision Maker: Christiane Diaz - Child - [phone]    Primary Decision Maker: Shanna Longoria - Child - [phone]    Care Preferences    Ventilation: \"If you were in your present state of health and suddenly became very ill and were unable to breathe on your own, what would your preference be about the use of a ventilator (breathing machine) if it were available to you? \"      Would the patient desire the use of ventilator (breathing machine)?: yes    \"If your health worsens and it becomes clear that your chance of recovery is unlikely, what would your preference be about the use of a ventilator (breathing machine) if it were available to you? \"     Would the patient desire the use of ventilator (breathing machine)?: Unsure      Resuscitation  \"CPR works best to restart the heart when there is a sudden event, like a heart attack, in someone who is otherwise healthy. Unfortunately, CPR does not typically restart the heart for people who have serious health conditions or who are very sick. \"    \"In the event your heart stopped as a result of an underlying serious health condition, would you want attempts to be made to restart your heart (answer \"yes\" for attempt to resuscitate) or would you prefer a natural death (answer \"no\" for do not attempt to resuscitate)? \" yes       [] Yes   [x] No   Educated Patient / Donnamae Ascencion regarding differences between Advance Directives and portable DNR orders.     Length of ACP Conversation in minutes:  5 minutes    Conversation Outcomes:  ACP discussion completed    Follow-up plan:    [] Schedule follow-up conversation to continue planning  [] Referred apixaban (ELIQUIS) 5 MG TABS tablet   [facility] [place] individual to Provider for additional questions/concerns   [] Advised patient/agent/surrogate to review completed ACP document and update if needed with changes in condition, patient preferences or care setting    [x] This note routed to one or more involved healthcare providers  Electronically signed by Afsaneh Costello RN Case Management on 7/31/2023 at 11:46 AM

## 2023-09-05 NOTE — TELEPHONE ENCOUNTER
PCP: Mckenzie Strauss MD     Last appt: 8/1/2023    Future Appointments   Date Time Provider [address]   9/7/2023 10:00 AM [facility]   11/2/2023 11:10 AM Mckenzie Strauss MD [facility]          Requested Prescriptions     Pending Prescriptions Disp Refills    apixaban (ELIQUIS) 5 MG TABS tablet 180 tablet 1     Sig: Take 1 tablet by mouth 2 times daily

## 2023-09-14 DIAGNOSIS — I10 ESSENTIAL HYPERTENSION: Primary | ICD-10-CM

## 2023-09-14 RX ORDER — LOSARTAN POTASSIUM 100 MG/1
100 TABLET ORAL DAILY
Qty: 90 TABLET | Refills: 3 | Status: SHIPPED | OUTPATIENT
Start: 2023-09-14 | End: 2024-09-08

## 2023-09-14 NOTE — TELEPHONE ENCOUNTER
PCP: Clare Oconnor MD     Last appt:  8/1/2023    Future Appointments   Date Time Provider [address]   9/27/2023  1:15  Kelsea Valdivia,[address] [facility] [address]ia,[address]   11/2/2023 11:10 AM Clare Oconnor MD [facility] BS AMB          Requested Prescriptions     Pending Prescriptions Disp Refills    losartan (COZAAR) 100 MG tablet 30 tablet 3     Sig: Take 1 tablet by mouth daily

## 2023-09-27 ENCOUNTER — HOSPITAL ENCOUNTER (OUTPATIENT)
Facility: HOSPITAL | Age: 78
Discharge: HOME | End: 2023-09-30
Payer: MEDICARE

## 2023-09-27 VITALS — BODY MASS INDEX: 28.32 KG/M2 | HEIGHT: 65 IN | WEIGHT: 170 LBS

## 2023-09-27 DIAGNOSIS — Z12.31 SCREENING MAMMOGRAM FOR BREAST CANCER: ICD-10-CM

## 2023-09-27 PROCEDURE — 77063 BREAST TOMOSYNTHESIS BI: CPT

## 2023-10-03 RX ORDER — PRAVASTATIN SODIUM 20 MG
20 TABLET ORAL DAILY
Qty: 90 TABLET | Refills: 3 | Status: SHIPPED | OUTPATIENT
Start: 2023-10-03 | End: 2024-09-27

## 2023-10-03 NOTE — TELEPHONE ENCOUNTER
PCP: Kaitlynn Nava MD     Last appt:  8/1/2023      Future Appointments   Date Time Provider [address]   11/2/2023 11:10 AM Kaitlynn Nava MD [facility] BS AMB          Requested Prescriptions     Pending Prescriptions Disp Refills    pravastatin (PRAVACHOL) 20 MG tablet 90 tablet 3     Sig: Take 1 tablet by mouth daily

## 2023-11-01 SDOH — HEALTH STABILITY: PHYSICAL HEALTH: ON AVERAGE, HOW MANY DAYS PER WEEK DO YOU ENGAGE IN MODERATE TO STRENUOUS EXERCISE (LIKE A BRISK WALK)?: 2 DAYS

## 2023-11-01 SDOH — HEALTH STABILITY: PHYSICAL HEALTH: ON AVERAGE, HOW MANY MINUTES DO YOU ENGAGE IN EXERCISE AT THIS LEVEL?: 30 MIN

## 2023-11-01 ASSESSMENT — PATIENT HEALTH QUESTIONNAIRE - PHQ9
SUM OF ALL RESPONSES TO PHQ QUESTIONS 1-9: 0
SUM OF ALL RESPONSES TO PHQ QUESTIONS 1-9: 0
1. LITTLE INTEREST OR PLEASURE IN DOING THINGS: 0
SUM OF ALL RESPONSES TO PHQ QUESTIONS 1-9: 0
SUM OF ALL RESPONSES TO PHQ QUESTIONS 1-9: 0
SUM OF ALL RESPONSES TO PHQ9 QUESTIONS 1 & 2: 0
2. FEELING DOWN, DEPRESSED OR HOPELESS: 0

## 2023-11-01 ASSESSMENT — LIFESTYLE VARIABLES
HOW OFTEN DO YOU HAVE A DRINK CONTAINING ALCOHOL: NEVER
HOW OFTEN DO YOU HAVE SIX OR MORE DRINKS ON ONE OCCASION: 1
HOW MANY STANDARD DRINKS CONTAINING ALCOHOL DO YOU HAVE ON A TYPICAL DAY: 0
HOW MANY STANDARD DRINKS CONTAINING ALCOHOL DO YOU HAVE ON A TYPICAL DAY: PATIENT DOES NOT DRINK
HOW OFTEN DO YOU HAVE A DRINK CONTAINING ALCOHOL: 1

## 2023-11-02 ENCOUNTER — OFFICE VISIT (OUTPATIENT)
Facility: CLINIC | Age: 78
End: 2023-11-02
Payer: MEDICARE

## 2023-11-02 VITALS
TEMPERATURE: 97.7 F | BODY MASS INDEX: 28.42 KG/M2 | HEART RATE: 70 BPM | SYSTOLIC BLOOD PRESSURE: 122 MMHG | HEIGHT: 65 IN | WEIGHT: 170.6 LBS | RESPIRATION RATE: 16 BRPM | OXYGEN SATURATION: 92 % | DIASTOLIC BLOOD PRESSURE: 75 MMHG

## 2023-11-02 DIAGNOSIS — M25.551 RIGHT HIP PAIN: ICD-10-CM

## 2023-11-02 DIAGNOSIS — I27.20 PULMONARY HYPERTENSION (HCC): ICD-10-CM

## 2023-11-02 DIAGNOSIS — I10 ESSENTIAL HYPERTENSION: ICD-10-CM

## 2023-11-02 DIAGNOSIS — I48.0 PAF (PAROXYSMAL ATRIAL FIBRILLATION) (HCC): ICD-10-CM

## 2023-11-02 DIAGNOSIS — Z00.00 MEDICARE ANNUAL WELLNESS VISIT, SUBSEQUENT: Primary | ICD-10-CM

## 2023-11-02 DIAGNOSIS — Z23 NEED FOR PROPHYLACTIC VACCINATION AGAINST STREPTOCOCCUS PNEUMONIAE (PNEUMOCOCCUS): ICD-10-CM

## 2023-11-02 DIAGNOSIS — E78.00 HYPERCHOLESTEROLEMIA: ICD-10-CM

## 2023-11-02 PROCEDURE — G0439 PPPS, SUBSEQ VISIT: HCPCS | Performed by: INTERNAL MEDICINE

## 2023-11-02 PROCEDURE — 3078F DIAST BP <80 MM HG: CPT | Performed by: INTERNAL MEDICINE

## 2023-11-02 PROCEDURE — 3074F SYST BP LT 130 MM HG: CPT | Performed by: INTERNAL MEDICINE

## 2023-11-02 PROCEDURE — 99214 OFFICE O/P EST MOD 30 MIN: CPT | Performed by: INTERNAL MEDICINE

## 2023-11-02 PROCEDURE — 1123F ACP DISCUSS/DSCN MKR DOCD: CPT | Performed by: INTERNAL MEDICINE

## 2023-11-03 LAB
ALBUMIN SERPL-MCNC: 4 G/DL (ref 3.5–5)
ALBUMIN/GLOB SERPL: 1.3 (ref 1.1–2.2)
ALP SERPL-CCNC: 71 U/L (ref 45–117)
ALT SERPL-CCNC: 13 U/L (ref 12–78)
ANION GAP SERPL CALC-SCNC: 4 MMOL/L (ref 5–15)
AST SERPL-CCNC: 10 U/L (ref 15–37)
BASOPHILS # BLD: 0 K/UL (ref 0–0.1)
BASOPHILS NFR BLD: 1 % (ref 0–1)
BILIRUB SERPL-MCNC: 2.4 MG/DL (ref 0.2–1)
BUN SERPL-MCNC: 18 MG/DL (ref 6–20)
BUN/CREAT SERPL: 23 (ref 12–20)
CALCIUM SERPL-MCNC: 9.8 MG/DL (ref 8.5–10.1)
CHLORIDE SERPL-SCNC: 109 MMOL/L (ref 97–108)
CHOLEST SERPL-MCNC: 114 MG/DL
CO2 SERPL-SCNC: 30 MMOL/L (ref 21–32)
CREAT SERPL-MCNC: 0.77 MG/DL (ref 0.55–1.02)
DIFFERENTIAL METHOD BLD: ABNORMAL
EOSINOPHIL # BLD: 0.2 K/UL (ref 0–0.4)
EOSINOPHIL NFR BLD: 3 % (ref 0–7)
ERYTHROCYTE [DISTWIDTH] IN BLOOD BY AUTOMATED COUNT: 13.5 % (ref 11.5–14.5)
GLOBULIN SER CALC-MCNC: 3.2 G/DL (ref 2–4)
GLUCOSE SERPL-MCNC: 94 MG/DL (ref 65–100)
HCT VFR BLD AUTO: 38.5 % (ref 35–47)
HDLC SERPL-MCNC: 52 MG/DL
HDLC SERPL: 2.2 (ref 0–5)
HGB BLD-MCNC: 11.5 G/DL (ref 11.5–16)
IMM GRANULOCYTES # BLD AUTO: 0 K/UL (ref 0–0.04)
IMM GRANULOCYTES NFR BLD AUTO: 0 % (ref 0–0.5)
LDLC SERPL CALC-MCNC: 48.6 MG/DL (ref 0–100)
LYMPHOCYTES # BLD: 1.9 K/UL (ref 0.8–3.5)
LYMPHOCYTES NFR BLD: 38 % (ref 12–49)
MCH RBC QN AUTO: 28.7 PG (ref 26–34)
MCHC RBC AUTO-ENTMCNC: 29.9 G/DL (ref 30–36.5)
MCV RBC AUTO: 96 FL (ref 80–99)
MONOCYTES # BLD: 0.8 K/UL (ref 0–1)
MONOCYTES NFR BLD: 16 % (ref 5–13)
NEUTS SEG # BLD: 2 K/UL (ref 1.8–8)
NEUTS SEG NFR BLD: 42 % (ref 32–75)
NRBC # BLD: 0 K/UL (ref 0–0.01)
NRBC BLD-RTO: 0 PER 100 WBC
PLATELET # BLD AUTO: 194 K/UL (ref 150–400)
PMV BLD AUTO: 11.2 FL (ref 8.9–12.9)
POTASSIUM SERPL-SCNC: 4.4 MMOL/L (ref 3.5–5.1)
PROT SERPL-MCNC: 7.2 G/DL (ref 6.4–8.2)
RBC # BLD AUTO: 4.01 M/UL (ref 3.8–5.2)
SODIUM SERPL-SCNC: 143 MMOL/L (ref 136–145)
TRIGL SERPL-MCNC: 67 MG/DL
TSH SERPL DL<=0.05 MIU/L-ACNC: 0.83 UIU/ML (ref 0.36–3.74)
VLDLC SERPL CALC-MCNC: 13.4 MG/DL
WBC # BLD AUTO: 4.8 K/UL (ref 3.6–11)

## 2023-11-05 DIAGNOSIS — R17 SERUM TOTAL BILIRUBIN ELEVATED: Primary | ICD-10-CM

## 2023-11-06 PROCEDURE — G0009 ADMIN PNEUMOCOCCAL VACCINE: HCPCS | Performed by: INTERNAL MEDICINE

## 2023-11-06 PROCEDURE — 90677 PCV20 VACCINE IM: CPT | Performed by: INTERNAL MEDICINE

## 2023-12-12 NOTE — TELEPHONE ENCOUNTER
PCP: Jose R Galan MD     Last appt:  11/2/2023      Future Appointments   Date Time Provider [address]   5/2/2024 11:30 AM Shon Mendoza MD [facility] BS AMB          Requested Prescriptions     Pending Prescriptions Disp Refills    metoprolol tartrate (LOPRESSOR) 25 MG tablet 180 tablet 1     Sig: Take 1 tablet by mouth 2 times daily

## 2024-02-06 NOTE — TELEPHONE ENCOUNTER
Patient has significant bruising from eliquis and wanted to talk about it. Please advise.   Thanks, [employer] Marian Pt notified of results. Verbalized understanding    Pharmacy: [facility]

## 2024-03-04 DIAGNOSIS — I48.0 PAF (PAROXYSMAL ATRIAL FIBRILLATION) (HCC): Primary | ICD-10-CM

## 2024-03-04 RX ORDER — APIXABAN 5 MG/1
5 TABLET, FILM COATED ORAL 2 TIMES DAILY
Qty: 180 TABLET | Refills: 3 | Status: SHIPPED | OUTPATIENT
Start: 2024-03-04

## 2024-03-25 DIAGNOSIS — R17 SERUM TOTAL BILIRUBIN ELEVATED: ICD-10-CM

## 2024-03-26 LAB
ALBUMIN SERPL-MCNC: 3.6 G/DL (ref 3.5–5)
ALBUMIN/GLOB SERPL: 1.3 (ref 1.1–2.2)
ALP SERPL-CCNC: 68 U/L (ref 45–117)
ALT SERPL-CCNC: 24 U/L (ref 12–78)
AST SERPL-CCNC: 14 U/L (ref 15–37)
BILIRUB DIRECT SERPL-MCNC: 0.4 MG/DL (ref 0–0.2)
BILIRUB SERPL-MCNC: 0.9 MG/DL (ref 0.2–1)
GLOBULIN SER CALC-MCNC: 2.8 G/DL (ref 2–4)
PROT SERPL-MCNC: 6.4 G/DL (ref 6.4–8.2)

## 2024-04-11 ENCOUNTER — HOSPITAL ENCOUNTER (INPATIENT)
Facility: HOSPITAL | Age: 79
LOS: 8 days | Discharge: HOME | DRG: 266 | End: 2024-04-19
Attending: GENERAL ACUTE CARE HOSPITAL | Admitting: INTERNAL MEDICINE
Payer: MEDICARE

## 2024-04-11 ENCOUNTER — APPOINTMENT (OUTPATIENT)
Facility: HOSPITAL | Age: 79
DRG: 266 | End: 2024-04-11
Attending: GENERAL ACUTE CARE HOSPITAL
Payer: MEDICARE

## 2024-04-11 DIAGNOSIS — I50.9 ACUTE ON CHRONIC HEART FAILURE, UNSPECIFIED HEART FAILURE TYPE (HCC): ICD-10-CM

## 2024-04-11 DIAGNOSIS — Z95.2 S/P TRANSCATHETER MITRAL VALVE REPLACEMENT (TMVR): ICD-10-CM

## 2024-04-11 DIAGNOSIS — I50.82 BIVENTRICULAR HEART FAILURE (HCC): ICD-10-CM

## 2024-04-11 DIAGNOSIS — I07.1 TRICUSPID REGURGITATION: ICD-10-CM

## 2024-04-11 DIAGNOSIS — I50.43 CHF (CONGESTIVE HEART FAILURE), NYHA CLASS I, ACUTE ON CHRONIC, COMBINED (HCC): ICD-10-CM

## 2024-04-11 DIAGNOSIS — I10 ESSENTIAL HYPERTENSION: ICD-10-CM

## 2024-04-11 DIAGNOSIS — I34.0 NONRHEUMATIC MITRAL VALVE REGURGITATION: Primary | ICD-10-CM

## 2024-04-11 DIAGNOSIS — I50.9 HEART FAILURE (HCC): ICD-10-CM

## 2024-04-11 DIAGNOSIS — M79.89 LEFT UPPER EXTREMITY SWELLING: ICD-10-CM

## 2024-04-11 PROBLEM — I95.0 IDIOPATHIC HYPOTENSION: Status: ACTIVE | Noted: 2024-04-11

## 2024-04-11 PROBLEM — I50.33 ACUTE ON CHRONIC DIASTOLIC HEART FAILURE (HCC): Status: ACTIVE | Noted: 2024-04-11

## 2024-04-11 LAB
ALBUMIN SERPL-MCNC: 3.4 G/DL (ref 3.5–5)
ALBUMIN/GLOB SERPL: 1 (ref 1.1–2.2)
ALP SERPL-CCNC: 58 U/L (ref 45–117)
ALT SERPL-CCNC: 19 U/L (ref 12–78)
ANION GAP SERPL CALC-SCNC: 8 MMOL/L (ref 5–15)
AST SERPL-CCNC: 17 U/L (ref 15–37)
BASOPHILS # BLD: 0 K/UL (ref 0–0.1)
BASOPHILS NFR BLD: 1 % (ref 0–1)
BILIRUB SERPL-MCNC: 1 MG/DL (ref 0.2–1)
BUN SERPL-MCNC: 22 MG/DL (ref 6–20)
BUN/CREAT SERPL: 20 (ref 12–20)
CALCIUM SERPL-MCNC: 9.8 MG/DL (ref 8.5–10.1)
CHLORIDE SERPL-SCNC: 112 MMOL/L (ref 97–108)
CO2 SERPL-SCNC: 22 MMOL/L (ref 21–32)
CREAT SERPL-MCNC: 1.08 MG/DL (ref 0.55–1.02)
DIFFERENTIAL METHOD BLD: ABNORMAL
EOSINOPHIL # BLD: 0.2 K/UL (ref 0–0.4)
EOSINOPHIL NFR BLD: 3 % (ref 0–7)
ERYTHROCYTE [DISTWIDTH] IN BLOOD BY AUTOMATED COUNT: 14.5 % (ref 11.5–14.5)
GLOBULIN SER CALC-MCNC: 3.5 G/DL (ref 2–4)
GLUCOSE SERPL-MCNC: 74 MG/DL (ref 65–100)
HCT VFR BLD AUTO: 39.9 % (ref 35–47)
HGB BLD-MCNC: 11.9 G/DL (ref 11.5–16)
IMM GRANULOCYTES # BLD AUTO: 0 K/UL (ref 0–0.04)
IMM GRANULOCYTES NFR BLD AUTO: 0 % (ref 0–0.5)
LYMPHOCYTES # BLD: 2.4 K/UL (ref 0.8–3.5)
LYMPHOCYTES NFR BLD: 39 % (ref 12–49)
MCH RBC QN AUTO: 27.6 PG (ref 26–34)
MCHC RBC AUTO-ENTMCNC: 29.8 G/DL (ref 30–36.5)
MCV RBC AUTO: 92.6 FL (ref 80–99)
MONOCYTES # BLD: 0.9 K/UL (ref 0–1)
MONOCYTES NFR BLD: 15 % (ref 5–13)
NEUTS SEG # BLD: 2.5 K/UL (ref 1.8–8)
NEUTS SEG NFR BLD: 42 % (ref 32–75)
NRBC # BLD: 0 K/UL (ref 0–0.01)
NRBC BLD-RTO: 0 PER 100 WBC
NT PRO BNP: 2805 PG/ML
PLATELET # BLD AUTO: 197 K/UL (ref 150–400)
PMV BLD AUTO: 10.6 FL (ref 8.9–12.9)
POTASSIUM SERPL-SCNC: 3.4 MMOL/L (ref 3.5–5.1)
PROT SERPL-MCNC: 6.9 G/DL (ref 6.4–8.2)
RBC # BLD AUTO: 4.31 M/UL (ref 3.8–5.2)
SODIUM SERPL-SCNC: 142 MMOL/L (ref 136–145)
WBC # BLD AUTO: 6 K/UL (ref 3.6–11)

## 2024-04-11 PROCEDURE — 71045 X-RAY EXAM CHEST 1 VIEW: CPT

## 2024-04-11 PROCEDURE — 6360000002 HC RX W HCPCS: Performed by: INTERNAL MEDICINE

## 2024-04-11 PROCEDURE — P9047 ALBUMIN (HUMAN), 25%, 50ML: HCPCS | Performed by: NURSE PRACTITIONER

## 2024-04-11 PROCEDURE — 80053 COMPREHEN METABOLIC PANEL: CPT

## 2024-04-11 PROCEDURE — 2060000000 HC ICU INTERMEDIATE R&B

## 2024-04-11 PROCEDURE — 36415 COLL VENOUS BLD VENIPUNCTURE: CPT

## 2024-04-11 PROCEDURE — 6370000000 HC RX 637 (ALT 250 FOR IP): Performed by: NURSE PRACTITIONER

## 2024-04-11 PROCEDURE — 6360000002 HC RX W HCPCS: Performed by: NURSE PRACTITIONER

## 2024-04-11 PROCEDURE — 2580000003 HC RX 258: Performed by: INTERNAL MEDICINE

## 2024-04-11 PROCEDURE — 83880 ASSAY OF NATRIURETIC PEPTIDE: CPT

## 2024-04-11 PROCEDURE — 85025 COMPLETE CBC W/AUTO DIFF WBC: CPT

## 2024-04-11 RX ORDER — ACETAMINOPHEN 325 MG/1
650 TABLET ORAL EVERY 6 HOURS PRN
Status: DISCONTINUED | OUTPATIENT
Start: 2024-04-11 | End: 2024-04-19 | Stop reason: HOSPADM

## 2024-04-11 RX ORDER — BUMETANIDE 0.25 MG/ML
1 INJECTION INTRAMUSCULAR; INTRAVENOUS 2 TIMES DAILY
Status: DISCONTINUED | OUTPATIENT
Start: 2024-04-11 | End: 2024-04-11

## 2024-04-11 RX ORDER — ENOXAPARIN SODIUM 100 MG/ML
70 INJECTION SUBCUTANEOUS 2 TIMES DAILY
Status: DISCONTINUED | OUTPATIENT
Start: 2024-04-11 | End: 2024-04-16

## 2024-04-11 RX ORDER — POLYETHYLENE GLYCOL 3350 17 G/17G
17 POWDER, FOR SOLUTION ORAL DAILY PRN
Status: DISCONTINUED | OUTPATIENT
Start: 2024-04-11 | End: 2024-04-19 | Stop reason: HOSPADM

## 2024-04-11 RX ORDER — BUMETANIDE 0.25 MG/ML
1 INJECTION INTRAMUSCULAR; INTRAVENOUS 2 TIMES DAILY
Status: DISCONTINUED | OUTPATIENT
Start: 2024-04-11 | End: 2024-04-12

## 2024-04-11 RX ORDER — SODIUM CHLORIDE 9 MG/ML
INJECTION, SOLUTION INTRAVENOUS PRN
Status: DISCONTINUED | OUTPATIENT
Start: 2024-04-11 | End: 2024-04-19 | Stop reason: HOSPADM

## 2024-04-11 RX ORDER — ALBUMIN (HUMAN) 12.5 G/50ML
25 SOLUTION INTRAVENOUS ONCE
Status: COMPLETED | OUTPATIENT
Start: 2024-04-11 | End: 2024-04-11

## 2024-04-11 RX ORDER — ACETAMINOPHEN 650 MG/1
650 SUPPOSITORY RECTAL EVERY 6 HOURS PRN
Status: DISCONTINUED | OUTPATIENT
Start: 2024-04-11 | End: 2024-04-19 | Stop reason: HOSPADM

## 2024-04-11 RX ORDER — ONDANSETRON 4 MG/1
4 TABLET, ORALLY DISINTEGRATING ORAL EVERY 8 HOURS PRN
Status: DISCONTINUED | OUTPATIENT
Start: 2024-04-11 | End: 2024-04-19 | Stop reason: HOSPADM

## 2024-04-11 RX ORDER — SODIUM CHLORIDE 0.9 % (FLUSH) 0.9 %
5-40 SYRINGE (ML) INJECTION EVERY 12 HOURS SCHEDULED
Status: DISCONTINUED | OUTPATIENT
Start: 2024-04-11 | End: 2024-04-19 | Stop reason: HOSPADM

## 2024-04-11 RX ORDER — PRAVASTATIN SODIUM 10 MG
20 TABLET ORAL DAILY
Status: DISCONTINUED | OUTPATIENT
Start: 2024-04-12 | End: 2024-04-19 | Stop reason: HOSPADM

## 2024-04-11 RX ORDER — SODIUM CHLORIDE 0.9 % (FLUSH) 0.9 %
5-40 SYRINGE (ML) INJECTION PRN
Status: DISCONTINUED | OUTPATIENT
Start: 2024-04-11 | End: 2024-04-19 | Stop reason: HOSPADM

## 2024-04-11 RX ORDER — ONDANSETRON 2 MG/ML
4 INJECTION INTRAMUSCULAR; INTRAVENOUS EVERY 6 HOURS PRN
Status: DISCONTINUED | OUTPATIENT
Start: 2024-04-11 | End: 2024-04-19 | Stop reason: HOSPADM

## 2024-04-11 RX ADMIN — ENOXAPARIN SODIUM 70 MG: 100 INJECTION SUBCUTANEOUS at 21:14

## 2024-04-11 RX ADMIN — SODIUM CHLORIDE, PRESERVATIVE FREE 10 ML: 5 INJECTION INTRAVENOUS at 21:30

## 2024-04-11 RX ADMIN — POTASSIUM BICARBONATE 40 MEQ: 782 TABLET, EFFERVESCENT ORAL at 19:51

## 2024-04-11 RX ADMIN — BUMETANIDE 1 MG: 0.25 INJECTION INTRAMUSCULAR; INTRAVENOUS at 18:10

## 2024-04-11 RX ADMIN — METOPROLOL TARTRATE 12.5 MG: 25 TABLET, FILM COATED ORAL at 22:38

## 2024-04-11 RX ADMIN — ALBUMIN (HUMAN) 25 G: 0.25 INJECTION, SOLUTION INTRAVENOUS at 22:47

## 2024-04-11 NOTE — H&P
Hospitalist Admission Note    NAME:   Azucena Myrick   : 1945   MRN: [id number]     Date/Time: 2024 4:34 PM    Patient PCP: Bhavana Mendoza MD    ______________________________________________________________________  Given the patient's current clinical presentation, I have a high level of concern for decompensation if discharged from the emergency department.  Complex decision making was performed, which includes reviewing the patient's available past medical records, laboratory results, and x-ray films.       My assessment of this patient's clinical condition and my plan of care is as follows.    Assessment / Plan:      Acute on chronic CHF  Severe TR  Mitral valve regurgitation    -Echocardiogram 2023, EF 60 to 65%, moderate mitral valve regurgitation, severe tricuspid regurgitation  -Sent from CTS surgery office for admission for valve surgery consideration  -Need to optimize volume status  -Stepdown monitoring  -TTE  -Cardiology and CTS on board  -Bumex 1 mg IV twice daily, strict I's and O's, daily weight  -Check CBC/CMP/proBNP  CXR      PAF  ASD  S/p ascending aortic aneurysm repair    -TTE pending, will need ATUL and right heart cath to assess shunt level  -Takes Eliquis at home, switch to Lovenox  -Continue metoprolol    HTN  -Recently discontinued amlodipine  -Continue metoprolol  -Blood pressure on softer side, holding losartan                Medical Decision Making:   I personally reviewed labs: cbc/bmp  I personally reviewed imaging:  I personally reviewed EKG:  Toxic drug monitoring:   Discussed case with: ED provider. After discussion I am in agreement that acuity of patient's medical condition necessitates hospital stay.      Code Status: Full code  DVT Prophylaxis: Lovenox  Baseline:     Subjective:   CHIEF COMPLAINT: Shortness of breath    HISTORY OF PRESENT ILLNESS:     Azucena Myrick is a 78 y.o.  female with PMHx significant for chronic heart failure, thoracic aortic

## 2024-04-11 NOTE — PROGRESS NOTES
1300  Patient received as direct admit from doctor's office into CVICU as Intermediate Care / Stepdown status.  Admitted to monitor, CHG bath completed, oriented to room and CVICU routine. Intensivist and PERCY Marino NP paged for admission orders.   Paced rhythm with atrial fib, occasional sinus beats noted on monitor. PIV (saline lock) started left forearm by Megan Vidal RN.   Patient's  now at the bedside.    1400  Still awaiting admission orders from MD.     1500  Continues up in the chair. Orders now placed, but patient now NPO.  Message sent to Dr. PERCY Sampson for clarification.    1530  Assisted to bathroom to void.     1545  Dr. PERCY Sampson now here to see patient.     1600  Reassessment completed.    1700  Continues up in the chair. Attempting to draw ordered blood work.     1800 Took diet well.     1900  Assisted to bathroom, voided, then back to recliner chair at the bedside.    1910  Bedside and verbal report given to Aletha Watts RN

## 2024-04-11 NOTE — PROGRESS NOTES
Nursing contacted Nocturnist/cross cover provider and notified patient lab result of k 3.4. VSS. No other reported concerns at this time. Ordered 40meq po kcl x1. Also added cbc and bmp w/mag reflex to am labs.  Will defer further evaluation/management to the day shift primary care team. Patient denies any further complaints or concerns. No acute distress reported. Nursing to notify Hospitalist for further/continued concerns. Will remain available overnight for further concerns if nursing/patient needs.     Update  2220 nurse reported pt bp 85/66, due metoprolol tartrate 25mg now. Asked nurse to give the albumin iv x1 dose ordered and then give 12.5mg metoprolol po x1 s/p albumin iv. No other concerns reported. No acute distress reported. Asymptomatic at this time. Has afib rvr rate low 100s and want to avoid holding beta blocker if possible so rate doesn't continue to rise overnight which would likely contribute to further hypotension.     Non-billable note.

## 2024-04-12 ENCOUNTER — APPOINTMENT (OUTPATIENT)
Facility: HOSPITAL | Age: 79
DRG: 266 | End: 2024-04-12
Attending: INTERNAL MEDICINE
Payer: MEDICARE

## 2024-04-12 ENCOUNTER — APPOINTMENT (OUTPATIENT)
Facility: HOSPITAL | Age: 79
DRG: 266 | End: 2024-04-12
Attending: GENERAL ACUTE CARE HOSPITAL
Payer: MEDICARE

## 2024-04-12 LAB
ANION GAP SERPL CALC-SCNC: 5 MMOL/L (ref 5–15)
BASOPHILS # BLD: 0 K/UL (ref 0–0.1)
BASOPHILS NFR BLD: 1 % (ref 0–1)
BUN SERPL-MCNC: 21 MG/DL (ref 6–20)
BUN/CREAT SERPL: 20 (ref 12–20)
CALCIUM SERPL-MCNC: 9.7 MG/DL (ref 8.5–10.1)
CHLORIDE SERPL-SCNC: 108 MMOL/L (ref 97–108)
CO2 SERPL-SCNC: 29 MMOL/L (ref 21–32)
CREAT SERPL-MCNC: 1.03 MG/DL (ref 0.55–1.02)
DIFFERENTIAL METHOD BLD: ABNORMAL
EOSINOPHIL # BLD: 0.2 K/UL (ref 0–0.4)
EOSINOPHIL NFR BLD: 3 % (ref 0–7)
ERYTHROCYTE [DISTWIDTH] IN BLOOD BY AUTOMATED COUNT: 14.5 % (ref 11.5–14.5)
GLUCOSE SERPL-MCNC: 93 MG/DL (ref 65–100)
HCT VFR BLD AUTO: 35.8 % (ref 35–47)
HGB BLD-MCNC: 10.6 G/DL (ref 11.5–16)
IMM GRANULOCYTES # BLD AUTO: 0 K/UL (ref 0–0.04)
IMM GRANULOCYTES NFR BLD AUTO: 0 % (ref 0–0.5)
LYMPHOCYTES # BLD: 1.7 K/UL (ref 0.8–3.5)
LYMPHOCYTES NFR BLD: 32 % (ref 12–49)
MCH RBC QN AUTO: 26.9 PG (ref 26–34)
MCHC RBC AUTO-ENTMCNC: 29.6 G/DL (ref 30–36.5)
MCV RBC AUTO: 90.9 FL (ref 80–99)
MONOCYTES # BLD: 0.9 K/UL (ref 0–1)
MONOCYTES NFR BLD: 17 % (ref 5–13)
NEUTS SEG # BLD: 2.5 K/UL (ref 1.8–8)
NEUTS SEG NFR BLD: 47 % (ref 32–75)
NRBC # BLD: 0 K/UL (ref 0–0.01)
NRBC BLD-RTO: 0 PER 100 WBC
PLATELET # BLD AUTO: 181 K/UL (ref 150–400)
PMV BLD AUTO: 10.3 FL (ref 8.9–12.9)
POTASSIUM SERPL-SCNC: 3.6 MMOL/L (ref 3.5–5.1)
RBC # BLD AUTO: 3.94 M/UL (ref 3.8–5.2)
SODIUM SERPL-SCNC: 142 MMOL/L (ref 136–145)
WBC # BLD AUTO: 5.3 K/UL (ref 3.6–11)

## 2024-04-12 PROCEDURE — 2580000003 HC RX 258: Performed by: INTERNAL MEDICINE

## 2024-04-12 PROCEDURE — 36415 COLL VENOUS BLD VENIPUNCTURE: CPT

## 2024-04-12 PROCEDURE — 80048 BASIC METABOLIC PNL TOTAL CA: CPT

## 2024-04-12 PROCEDURE — 6360000002 HC RX W HCPCS: Performed by: INTERNAL MEDICINE

## 2024-04-12 PROCEDURE — 76981 USE PARENCHYMA: CPT

## 2024-04-12 PROCEDURE — 85025 COMPLETE CBC W/AUTO DIFF WBC: CPT

## 2024-04-12 PROCEDURE — 6370000000 HC RX 637 (ALT 250 FOR IP)

## 2024-04-12 PROCEDURE — 2060000000 HC ICU INTERMEDIATE R&B

## 2024-04-12 PROCEDURE — 93306 TTE W/DOPPLER COMPLETE: CPT

## 2024-04-12 PROCEDURE — 6360000002 HC RX W HCPCS

## 2024-04-12 PROCEDURE — 6370000000 HC RX 637 (ALT 250 FOR IP): Performed by: INTERNAL MEDICINE

## 2024-04-12 RX ORDER — MAGNESIUM SULFATE IN WATER 40 MG/ML
2000 INJECTION, SOLUTION INTRAVENOUS PRN
Status: DISCONTINUED | OUTPATIENT
Start: 2024-04-12 | End: 2024-04-19 | Stop reason: HOSPADM

## 2024-04-12 RX ORDER — BUMETANIDE 0.25 MG/ML
2 INJECTION INTRAMUSCULAR; INTRAVENOUS ONCE
Status: COMPLETED | OUTPATIENT
Start: 2024-04-12 | End: 2024-04-12

## 2024-04-12 RX ORDER — POTASSIUM CHLORIDE 7.45 MG/ML
10 INJECTION INTRAVENOUS PRN
Status: DISCONTINUED | OUTPATIENT
Start: 2024-04-12 | End: 2024-04-19 | Stop reason: HOSPADM

## 2024-04-12 RX ORDER — MAGNESIUM SULFATE 1 G/100ML
1000 INJECTION INTRAVENOUS ONCE
Status: COMPLETED | OUTPATIENT
Start: 2024-04-12 | End: 2024-04-12

## 2024-04-12 RX ORDER — BUMETANIDE 0.25 MG/ML
2 INJECTION INTRAMUSCULAR; INTRAVENOUS 2 TIMES DAILY
Status: DISCONTINUED | OUTPATIENT
Start: 2024-04-12 | End: 2024-04-15

## 2024-04-12 RX ORDER — POTASSIUM CHLORIDE 20 MEQ/1
40 TABLET, EXTENDED RELEASE ORAL PRN
Status: DISCONTINUED | OUTPATIENT
Start: 2024-04-12 | End: 2024-04-19 | Stop reason: HOSPADM

## 2024-04-12 RX ORDER — POTASSIUM CHLORIDE 20 MEQ/1
40 TABLET, EXTENDED RELEASE ORAL 2 TIMES DAILY
Status: DISCONTINUED | OUTPATIENT
Start: 2024-04-12 | End: 2024-04-19 | Stop reason: HOSPADM

## 2024-04-12 RX ADMIN — BUMETANIDE 2 MG: 0.25 INJECTION INTRAMUSCULAR; INTRAVENOUS at 17:14

## 2024-04-12 RX ADMIN — METOPROLOL TARTRATE 25 MG: 25 TABLET, FILM COATED ORAL at 09:36

## 2024-04-12 RX ADMIN — POTASSIUM CHLORIDE 40 MEQ: 1500 TABLET, EXTENDED RELEASE ORAL at 17:15

## 2024-04-12 RX ADMIN — PRAVASTATIN SODIUM 20 MG: 10 TABLET ORAL at 09:36

## 2024-04-12 RX ADMIN — POTASSIUM CHLORIDE 40 MEQ: 1500 TABLET, EXTENDED RELEASE ORAL at 09:37

## 2024-04-12 RX ADMIN — SODIUM CHLORIDE, PRESERVATIVE FREE 10 ML: 5 INJECTION INTRAVENOUS at 09:36

## 2024-04-12 RX ADMIN — ENOXAPARIN SODIUM 70 MG: 100 INJECTION SUBCUTANEOUS at 09:36

## 2024-04-12 RX ADMIN — SODIUM CHLORIDE, PRESERVATIVE FREE 10 ML: 5 INJECTION INTRAVENOUS at 20:47

## 2024-04-12 RX ADMIN — BUMETANIDE 2 MG: 0.25 INJECTION INTRAMUSCULAR; INTRAVENOUS at 11:24

## 2024-04-12 RX ADMIN — MAGNESIUM SULFATE HEPTAHYDRATE 1000 MG: 1 INJECTION, SOLUTION INTRAVENOUS at 09:53

## 2024-04-12 RX ADMIN — ENOXAPARIN SODIUM 70 MG: 100 INJECTION SUBCUTANEOUS at 20:44

## 2024-04-12 NOTE — CARE COORDINATION
Care Management Initial Assessment       RUR: 11%  Readmission? No  1st IM letter given? No  1st  letter given: No      CM introduced self and role to pt, verified pt demographics, insurance info,emergency contact and ACD.   Pt lives with   in two story home with pt living in lower level. Pt independent with ADL's, ambulates with a cane when put outside home and drives.  DME: cane  HH: none SNF: Yes, in the past.  Uses [facility] in [place], [place].    Disposition:    Home  Son to provide transportation    No CM needs idenified       04/12/24 1312   Service Assessment   Patient Orientation Alert and Oriented   Cognition Alert   History Provided By Patient   Primary Caregiver Self   Support Systems Spouse/Significant Other;Children  (Ramez Cao (Spouse))   Patient's Healthcare Decision Maker is: Legal Next of Kin   PCP Verified by CM Yes   Last Visit to PCP Within last 3 months   Prior Functional Level Independent in ADLs/IADLs   Current Functional Level Independent in ADLs/IADLs   Can patient return to prior living arrangement Yes   Ability to make needs known: Good   Family able to assist with home care needs: Yes   Community Resources None   Social/Functional History   Lives With Spouse   Type of Home House   Home Layout Multi-level;Able to Live on Main level with bedroom/bathroom   Bathroom Equipment None   Home Equipment Cane   Receives Help From Family   ADL Assistance Independent   Homemaking Assistance Independent   Ambulation Assistance Independent   Transfer Assistance Independent   Active  Yes   Mode of Transportation Car   Discharge Planning   Type of Residence Shelter   Living Arrangements Spouse/Significant Other   Current Services Prior To Admission None   Potential Assistance Needed N/A   DME Ordered? No   Type of Home Care Services None   Patient expects to be discharged to: House       Advance Care Planning     General Advance Care Planning (ACP) Conversation    Date of  Conversation: 4/12/2024  Conducted with: Patient with Decision Making Capacity    Healthcare Decision Maker:    Primary Decision Maker: Ramez MENDEZ - Spouse - [phone]  Click here to complete Healthcare Decision Makers including selection of the Healthcare Decision Maker Relationship (ie \"Primary\").   Today we documented Decision Maker(s) consistent with Legal Next of Kin hierarchy.    Content/Action Overview:  Has ACP document(s) on file - reflects the patient's care preferences  Reviewed DNR/DNI and patient elects Full Code (Attempt Resuscitation)    Length of Voluntary ACP Conversation in minutes:  16 minutes    Susannah Mancini, RN         SYL SalomonN,RN  Care   [facility]  Phone: [phone]

## 2024-04-12 NOTE — PROGRESS NOTES
[facility]  [address] [place] [address], [place] [id number] [phone]  [email or web address]  CARDIOLOGY PROGRESS NOTE    4/12/2024 1:38 PM    Admit Date: 4/11/2024    Admit Diagnosis:   Acute on chronic heart failure, unspecified heart failure type (HCC) [I50.9]  CHF (congestive heart failure), NYHA class I, acute on chronic, combined (HCC) [I50.43]    Subjective:     Azucena Myrick was seen and examined at bedside.  Breathing is improved.        4/12/2024     8:54 AM 4/11/2024     1:11 PM 4/11/2024     9:45 AM 11/2/2023    10:53 AM 9/27/2023     1:38 PM 8/1/2023     1:35 PM 4/21/2023     8:43 AM   Weight Metrics   Weight 159 lb 13.3 oz 159 lb 13.3 oz 158 lb 170 lb 9.6 oz 170 lb 170 lb 173 lb   BMI (Calculated) 26.7 kg/m2 26.7 kg/m2 26.3 kg/m2 28.4 kg/m2 28.3 kg/m2 28.3 kg/m2 28.8 kg/m2     /80   Pulse 86   Temp 98 °F (36.7 °C) (Oral)   Resp 19   Ht 1.651 m (5' 5\")   Wt 72.5 kg (159 lb 13.3 oz)   SpO2 96%   BMI 26.60 kg/m²     Current Facility-Administered Medications   Medication Dose Route Frequency    bumetanide (BUMEX) injection 2 mg  2 mg IntraVENous BID    potassium chloride (KLOR-CON M) extended release tablet 40 mEq  40 mEq Oral BID    sodium chloride flush 0.9 % injection 5-40 mL  5-40 mL IntraVENous 2 times per day    sodium chloride flush 0.9 % injection 5-40 mL  5-40 mL IntraVENous PRN    0.9 % sodium chloride infusion   IntraVENous PRN    ondansetron (ZOFRAN-ODT) disintegrating tablet 4 mg  4 mg Oral Q8H PRN    Or    ondansetron (ZOFRAN) injection 4 mg  4 mg IntraVENous Q6H PRN    polyethylene glycol (GLYCOLAX) packet 17 g  17 g Oral Daily PRN    acetaminophen (TYLENOL) tablet 650 mg  650 mg Oral Q6H PRN    Or    acetaminophen (TYLENOL) suppository 650 mg  650 mg Rectal Q6H PRN    metoprolol tartrate (LOPRESSOR) tablet 25 mg  25 mg Oral BID    pravastatin (PRAVACHOL) tablet 20 mg  20 mg Oral Daily    enoxaparin (LOVENOX) injection 70 mg  70 mg

## 2024-04-12 NOTE — PROGRESS NOTES
0700: Bedside shift report received from MARQUIS Pompa    0800: Shift assessment completed and documented.     0830: Dr. Nguyen at bedside talking to patient. Noted that the Diureses is working and plan for workup on Monday.     0900: Bedside Echo completed    1230: Ultrasound Organ elastography done at bedside.     1600: PT walked on unit for two laps. Tolerated well     1545: NP Amalia at bedside to do rounds    1900: Bedside report given to MARQUIS Zeng

## 2024-04-12 NOTE — PROGRESS NOTES
Hospitalist Progress Note    NAME:   Azucena Myrick   : 1945   MRN: [id number]     Date/Time: 2024 3:51 PM  Patient PCP: Bhavana Mendoza MD    Estimated discharge date: TBD  Barriers: CTS surgery likely Monday and clearance      Assessment / Plan:    Acute on chronic CHF with preserved ejection fraction  Severe TR  Mitral valve regurgitation  AST     -Echocardiogram 2023, EF 60 to 65%, moderate mitral valve regurgitation, severe tricuspid regurgitation  -Sent from CTS surgery office for admission for valve surgery consideration  -Need to optimize volume status  -Stepdown monitoring  -TTE  -Cardiology and CTS on board  -Bumex 1 mg IV twice daily, strict I's and O's, daily weight  Appreciate cardiology and CTS recommendation  Plan for RHC and ATUL Monday          PAF  ASD  S/p ascending aortic aneurysm repair     -TTE pending, will need ATUL and right heart cath to assess shunt level  -Takes Eliquis at home, switch to Lovenox  -Continue metoprolol  May consider digoxin for continued tachycardia      HTN  -Recently discontinued amlodipine  -Continue metoprolol  -Blood pressure on softer side, holding losartan             Medical Decision Making:   I personally reviewed labs: WBC BMP  I personally reviewed imaging: X-ray  I personally reviewed EKG:  Toxic drug monitoring:   Discussed case with: Patient RN        Code Status: Full code  DVT Prophylaxis:   GI Prophylaxis:    Subjective:     Chief Complaint / Reason for Physician Visit  \" Followed up for CHF patient on 2 L nasal cannula stated she is feeling better than yesterday\".  Discussed with RN events overnight.       Objective:     VITALS:   Last 24hrs VS reviewed since prior progress note. Most recent are:  Patient Vitals for the past 24 hrs:   BP Temp Temp src Pulse Resp SpO2 Height Weight   24 0854 102/80 -- -- -- -- -- 1.651 m (5' 5\") 72.5 kg (159 lb 13.3 oz)   24 0800 93/69 98 °F (36.7 °C) Oral 86 19 -- -- --   24 0728  102/80 98 °F (36.7 °C) Oral 87 24 -- -- --   04/12/24 0700 95/74 -- -- 97 26 -- -- --   04/12/24 0600 102/68 -- -- 92 15 96 % -- --   04/12/24 0500 (!) 85/66 -- -- 86 14 97 % -- --   04/12/24 0400 102/69 98.1 °F (36.7 °C) Oral 100 15 96 % -- --   04/12/24 0308 97/75 -- -- 86 15 95 % -- --   04/12/24 0200 102/88 -- -- (!) 110 17 95 % -- --   04/12/24 0100 100/77 -- -- 87 19 95 % -- --   04/12/24 0027 107/75 98.1 °F (36.7 °C) Oral (!) 107 30 (!) 89 % -- --   04/11/24 2300 93/79 -- -- (!) 102 19 96 % -- --   04/11/24 2200 (!) 84/66 -- -- 89 18 98 % -- --   04/11/24 2100 (!) 85/66 -- -- 92 18 97 % -- --   04/11/24 2015 (!) 88/65 -- -- (!) 103 24 96 % -- --   04/11/24 2002 96/69 98.3 °F (36.8 °C) Oral (!) 114 20 -- -- --   04/11/24 1900 -- -- -- (!) 122 22 (!) 86 % -- --   04/11/24 1800 -- -- -- (!) 111 (!) 31 (!) 83 % -- --   04/11/24 1700 (!) 100/59 -- -- 94 28 94 % -- --   04/11/24 1600 103/74 98 °F (36.7 °C) Oral 82 25 94 % -- --         Intake/Output Summary (Last 24 hours) at 4/12/2024 1551  Last data filed at 4/12/2024 1258  Gross per 24 hour   Intake 526.05 ml   Output 1350 ml   Net -823.95 ml        I had a face to face encounter and independently examined this patient on 4/12/2024, as outlined below:  PHYSICAL EXAM:  General: Alert, cooperative  EENT:  EOMI. Anicteric sclerae.  Resp:  CTA bilaterally, no wheezing or rales.  No accessory muscle use  CV:  Regular  rhythm, pitting pedal edema a GI:  Soft, Non distended, Non tender.  +Bowel sounds  Neurologic:  Alert and oriented X 3, normal speech,   Psych:   Good insight. Not anxious nor agitated  Skin:  No rashes.  No jaundice    Reviewed most current lab test results and cultures  YES  Reviewed most current radiology test results   YES  Review and summation of old records today    NO  Reviewed patient's current orders and MAR    YES  PMH/SH reviewed - no change compared to H&P    Procedures: see electronic medical records for all procedures/Xrays and details

## 2024-04-12 NOTE — PROGRESS NOTES
1930: Received bedside/verbal report from Gerson CHO. Assessment complete.  2126: /73. Will hold Metoprolol as ordered within parameters.  0515: Morning blood labs obtained. Left UE edema noted. Elevated arm. Positive pulse. Denies discomforts in arm.  0700: Bedside/verbal report given to Gerson CHO.

## 2024-04-12 NOTE — CONSULTS
CSS   History and Physical    Subjective:      Azucena Myrick is a 78 y.o. female with a past medical history of TR, MR, ASD, PAF on Eliquis, chronic diastolic HF, SSS s/p PPM (2023), pulmonary HTN, HTN, HLD, LVH, type a aortic dissection 2015 post emergent surgical intervention with residual arch and descending aorta dissection (dissection goes to right subclavian, one of the kidneys fills from the false lumen), GERD/PUD, spinal stenosis, who was referred for cardiac evaluation by Dr. Nguyen for severe TR.     Patient was seen in Dr. Nguyen's office 4/11/24 for evaluation for worsening dyspnea, tricuspid regurgitation, mitral regurgitation, atrial septal defect. Patient reported she has been having worsening dyspnea exertion over the last 2 months with partial relief with up titration of diuretic regimen.  Patient reported feeling fullness and decrease in appetite. She was noted to be dyspneic walking from waiting room to the exam room and reported orthopnea and worsening LE edema. She was directly admitted to the hospital for HF exacerbation and planning of intervention for tricuspid valve.     For me today, she endorses fatigue, palpitations, SOB, and LE edema. She states symptoms have been going on for 1-2 months. SOB has been a long-term issue for her, but has been worse over the past 1-2 months; it is worse with activity and is intermittent in nature. Swelling has been going on for >1.5 months- Dr. Palacio has tried increasing her Lasix, discontinuing her Norvasc and adding Farxiga without much relief. She has noticed that she has been more fatigued for approximately the same amount of time as her legs have been swollen, and has been taking more naps throughout the day. Denies CP, PND, orthopnea, syncope, and dizziness. She says that her appetite and energy level have improved about 50% since admission but her breathing is still about the same.    Past medical/surgical history positive for difficulty  Eliquis PTA. Continue BB.  On therapeutic Lovenox instead of Eliquis pending procedure.  Maintain K+ >4.0 and magnesium >2.0. Given 1 gram of magnesium sulfate this AM. Consider digoxin for continued tachycardia per Dr. Nguyen.   SSS s/p PPM: Noted.   Type a aortic dissection 2015 post emergent surgical intervention with residual arch and descending aorta dissection (dissection goes to right subclavian, one of the kidneys fills from the false lumen). Tight blood pressure control.   HTN: On Lasix, Cozaar, Lopressor PTA; pressures were soft overnight requiring albumin- continue Lopressor with hold parameters and diuresis for now.   HLD: on statin PTA; continue.   Grade II pulmonary HTN: Diuresis as above.  GERD/PUD: No meds PTA.  Spinal stenosis,  arthritis: Supportive care. Keep OOB to chair all meals.   Overweight: BMI 26.6. Diet and exercise counseling when appropriate.     Time spent: 75 minutes    Signed By: MICHAEL Dave - NP     April 12, 2024

## 2024-04-13 LAB
ALBUMIN SERPL-MCNC: 3.1 G/DL (ref 3.5–5)
ALBUMIN/GLOB SERPL: 0.9 (ref 1.1–2.2)
ALP SERPL-CCNC: 55 U/L (ref 45–117)
ALT SERPL-CCNC: 16 U/L (ref 12–78)
ANION GAP SERPL CALC-SCNC: 1 MMOL/L (ref 5–15)
AST SERPL-CCNC: 15 U/L (ref 15–37)
BASOPHILS # BLD: 0 K/UL (ref 0–0.1)
BASOPHILS NFR BLD: 0 % (ref 0–1)
BILIRUB SERPL-MCNC: 0.8 MG/DL (ref 0.2–1)
BUN SERPL-MCNC: 18 MG/DL (ref 6–20)
BUN/CREAT SERPL: 17 (ref 12–20)
CALCIUM SERPL-MCNC: 9.7 MG/DL (ref 8.5–10.1)
CHLORIDE SERPL-SCNC: 108 MMOL/L (ref 97–108)
CO2 SERPL-SCNC: 32 MMOL/L (ref 21–32)
CREAT SERPL-MCNC: 1.09 MG/DL (ref 0.55–1.02)
DIFFERENTIAL METHOD BLD: ABNORMAL
EKG DIAGNOSIS: NORMAL
EKG Q-T INTERVAL: 288 MS
EKG QRS DURATION: 86 MS
EKG QTC CALCULATION (BAZETT): 410 MS
EKG R AXIS: 38 DEGREES
EKG T AXIS: 35 DEGREES
EKG VENTRICULAR RATE: 122 BPM
EOSINOPHIL # BLD: 0.2 K/UL (ref 0–0.4)
EOSINOPHIL NFR BLD: 4 % (ref 0–7)
ERYTHROCYTE [DISTWIDTH] IN BLOOD BY AUTOMATED COUNT: 14.6 % (ref 11.5–14.5)
EST. AVERAGE GLUCOSE BLD GHB EST-MCNC: 117 MG/DL
GLOBULIN SER CALC-MCNC: 3.4 G/DL (ref 2–4)
GLUCOSE SERPL-MCNC: 94 MG/DL (ref 65–100)
HBA1C MFR BLD: 5.7 % (ref 4–5.6)
HCT VFR BLD AUTO: 37.7 % (ref 35–47)
HGB BLD-MCNC: 11.2 G/DL (ref 11.5–16)
IMM GRANULOCYTES # BLD AUTO: 0 K/UL (ref 0–0.04)
IMM GRANULOCYTES NFR BLD AUTO: 0 % (ref 0–0.5)
INR PPP: 1.2 (ref 0.9–1.1)
LYMPHOCYTES # BLD: 2.1 K/UL (ref 0.8–3.5)
LYMPHOCYTES NFR BLD: 41 % (ref 12–49)
MAGNESIUM SERPL-MCNC: 2.6 MG/DL (ref 1.6–2.4)
MCH RBC QN AUTO: 26.9 PG (ref 26–34)
MCHC RBC AUTO-ENTMCNC: 29.7 G/DL (ref 30–36.5)
MCV RBC AUTO: 90.6 FL (ref 80–99)
MONOCYTES # BLD: 0.8 K/UL (ref 0–1)
MONOCYTES NFR BLD: 16 % (ref 5–13)
NEUTS SEG # BLD: 2 K/UL (ref 1.8–8)
NEUTS SEG NFR BLD: 39 % (ref 32–75)
NRBC # BLD: 0 K/UL (ref 0–0.01)
NRBC BLD-RTO: 0 PER 100 WBC
PHOSPHATE SERPL-MCNC: 3 MG/DL (ref 2.6–4.7)
PLATELET # BLD AUTO: 188 K/UL (ref 150–400)
PMV BLD AUTO: 10.4 FL (ref 8.9–12.9)
POTASSIUM SERPL-SCNC: 4.2 MMOL/L (ref 3.5–5.1)
PROT SERPL-MCNC: 6.5 G/DL (ref 6.4–8.2)
PROTHROMBIN TIME: 12.6 SEC (ref 9–11.1)
RBC # BLD AUTO: 4.16 M/UL (ref 3.8–5.2)
SODIUM SERPL-SCNC: 141 MMOL/L (ref 136–145)
WBC # BLD AUTO: 5.1 K/UL (ref 3.6–11)

## 2024-04-13 PROCEDURE — 36415 COLL VENOUS BLD VENIPUNCTURE: CPT

## 2024-04-13 PROCEDURE — 85025 COMPLETE CBC W/AUTO DIFF WBC: CPT

## 2024-04-13 PROCEDURE — 2060000000 HC ICU INTERMEDIATE R&B

## 2024-04-13 PROCEDURE — 80053 COMPREHEN METABOLIC PANEL: CPT

## 2024-04-13 PROCEDURE — 6370000000 HC RX 637 (ALT 250 FOR IP)

## 2024-04-13 PROCEDURE — 84100 ASSAY OF PHOSPHORUS: CPT

## 2024-04-13 PROCEDURE — 85610 PROTHROMBIN TIME: CPT

## 2024-04-13 PROCEDURE — 6360000002 HC RX W HCPCS: Performed by: INTERNAL MEDICINE

## 2024-04-13 PROCEDURE — 2580000003 HC RX 258: Performed by: INTERNAL MEDICINE

## 2024-04-13 PROCEDURE — 6360000002 HC RX W HCPCS: Performed by: NURSE PRACTITIONER

## 2024-04-13 PROCEDURE — 6370000000 HC RX 637 (ALT 250 FOR IP): Performed by: INTERNAL MEDICINE

## 2024-04-13 PROCEDURE — 83735 ASSAY OF MAGNESIUM: CPT

## 2024-04-13 PROCEDURE — 83036 HEMOGLOBIN GLYCOSYLATED A1C: CPT

## 2024-04-13 PROCEDURE — 2700000000 HC OXYGEN THERAPY PER DAY

## 2024-04-13 RX ORDER — DIGOXIN 0.25 MG/ML
250 INJECTION INTRAMUSCULAR; INTRAVENOUS ONCE
Status: COMPLETED | OUTPATIENT
Start: 2024-04-13 | End: 2024-04-13

## 2024-04-13 RX ORDER — DIGOXIN 0.25 MG/ML
250 INJECTION INTRAMUSCULAR; INTRAVENOUS ONCE
Status: COMPLETED | OUTPATIENT
Start: 2024-04-14 | End: 2024-04-14

## 2024-04-13 RX ORDER — DIGOXIN 0.25 MG/ML
500 INJECTION INTRAMUSCULAR; INTRAVENOUS ONCE
Status: COMPLETED | OUTPATIENT
Start: 2024-04-13 | End: 2024-04-13

## 2024-04-13 RX ADMIN — DIGOXIN 250 MCG: 250 INJECTION, SOLUTION INTRAMUSCULAR; INTRAVENOUS at 20:34

## 2024-04-13 RX ADMIN — METOPROLOL TARTRATE 25 MG: 25 TABLET, FILM COATED ORAL at 14:20

## 2024-04-13 RX ADMIN — DIGOXIN 500 MCG: 0.25 INJECTION INTRAMUSCULAR; INTRAVENOUS at 13:03

## 2024-04-13 RX ADMIN — POTASSIUM CHLORIDE 40 MEQ: 1500 TABLET, EXTENDED RELEASE ORAL at 08:12

## 2024-04-13 RX ADMIN — BUMETANIDE 2 MG: 0.25 INJECTION INTRAMUSCULAR; INTRAVENOUS at 17:30

## 2024-04-13 RX ADMIN — POTASSIUM CHLORIDE 40 MEQ: 1500 TABLET, EXTENDED RELEASE ORAL at 17:30

## 2024-04-13 RX ADMIN — SODIUM CHLORIDE, PRESERVATIVE FREE 10 ML: 5 INJECTION INTRAVENOUS at 08:12

## 2024-04-13 RX ADMIN — ENOXAPARIN SODIUM 70 MG: 100 INJECTION SUBCUTANEOUS at 20:45

## 2024-04-13 RX ADMIN — SODIUM CHLORIDE, PRESERVATIVE FREE 10 ML: 5 INJECTION INTRAVENOUS at 20:44

## 2024-04-13 RX ADMIN — PRAVASTATIN SODIUM 20 MG: 10 TABLET ORAL at 08:12

## 2024-04-13 RX ADMIN — ENOXAPARIN SODIUM 70 MG: 100 INJECTION SUBCUTANEOUS at 08:12

## 2024-04-13 RX ADMIN — BUMETANIDE 2 MG: 0.25 INJECTION INTRAMUSCULAR; INTRAVENOUS at 08:12

## 2024-04-13 NOTE — PROGRESS NOTES
Previous Surgery: Pacemaker / ICD; Aorta surgery     Procedure Type: Isolated TV Repair   PERIOPERATIVE OUTCOME ESTIMATE %   Operative Mortality 8.94%   Morbidity & Mortality 27.1%   Stroke 0.688%   Renal Failure 8.99%   Reoperation 6.45%   Prolonged Ventilation 20.7%   Deep Sternal Wound Infection NA   Long Hospital Stay (>14 days) 34.7%   Short Hospital Stay (<6 days)* 7.65%     Clinical Summary       Planned Surgery: Isolated TV Repair, Urgent, First cardiovascular reoperation   Demographics: 78 year old, Black/, female, 70.9kg, 165.1cm, BMI: 26 kg/m², BSA: 1.78m²   Insurance/Payor: Medicare   Lab Values: Creatinine: 1.09 mg/dL, Hematocrit: 37.7 %, WBC Count: 5.1 10³/?L, Platelet Count: 732634 cells/?L, Total Albumin: 3.1 g/dL, Total Bilirubin: 0.8 mg/dL, INR: 1.2, MELD score: 9   Substance Abuse: Never smoker   Risk Factors / Comorbidities: Liver Disease, Hypertension   Pulmonary RF: Unknown CLD   Cardiac Status: Acute and chronic heart failure, NYHA Class III, Ejection Fraction = 60%   Valve Disease: Moderate MR, Severe TR   Arrhythmia: Recent A-fib, Paroxysmal, Remote Sick Sinus Syndrome   Previous Surgery: Pacemaker / ICD; Aorta surgery       Admission Weight: Last Weight   Weight - Scale: 72.5 kg (159 lb 13.3 oz) Weight - Scale: 70.9 kg (156 lb 4.9 oz)       EXAM:     General: awake, alert, and oriented   Lungs:    diminished breath sounds bilaterally   Incision:  N/A   Heart:   Irregular rhythm and grade II murmur    Abdomen:    soft, not-distended, non-tender and active bowel sounds   Extremities:  1-2+ pitting edema on LLE; trace to 1+ pitting edema on RLE   Neurologic:  Gross motor and sensory apparatus intact       Lab Data Reviewed:   Recent Labs     04/13/24  0516   WBC 5.1   HGB 11.2*   HCT 37.7         K 4.2   BUN 18   INR 1.2*         Assessment:     Patient Active Problem List   Diagnosis    Pneumonia due to COVID-19 virus    Allergic conjunctivitis    Swelling of  dissection (dissection goes to right subclavian, one of the kidneys fills from the false lumen). Tight blood pressure control.   HTN: On Lasix, Cozaar, Lopressor PTA; pressures intermittently soft- continue Lopressor with hold parameters and diuresis for now.   HLD: on statin PTA; continue.   Grade II pulmonary HTN: Diuresis as above.  Evidence of moderate fibrosis on elastography: LFTs unremarkable.   LUE swelling: On therapeutic Lovenox, but will check venous duplex L arm.   GERD/PUD: No meds PTA.  Spinal stenosis,  arthritis: Supportive care. Keep OOB to chair all meals.   Overweight: BMI 26.6. Diet and exercise counseling when appropriate.      Fluid and electrolytes: Per primary team.  Maintain K+ >4 and magnesium level >2.  Nutrition: Per primary team.   Activity: OOB all meals and ambulate in halls TID; PT/OT; IS 10-15 x an hour while awake.  Bowel Regimen: Per primary team.   GI ppx: Per primary team.  DVT ppx: Lovenox  Dispo:  Remain on stepdown status.   Case discussed with: Primary RN    Signed By: Amalia Amaya APRN - NP

## 2024-04-13 NOTE — PROGRESS NOTES
[facility]  [address], [place] [id number] [phone]  [email or web address]  CARDIOLOGY PROGRESS NOTE    4/13/2024 12:40 PM    Admit Date: 4/11/2024    Admit Diagnosis:   Acute on chronic heart failure, unspecified heart failure type (HCC) [I50.9]  CHF (congestive heart failure), NYHA class I, acute on chronic, combined (HCC) [I50.43]    Subjective:     Azucena Myrick was seen and examined at bedside.  Breathing is improved.    Interim:  Up on bedside eating. Notes DE LEÓN with ambulation.         4/13/2024     7:00 AM 4/12/2024     8:54 AM 4/11/2024     1:11 PM 4/11/2024     9:45 AM 11/2/2023    10:53 AM 9/27/2023     1:38 PM 8/1/2023     1:35 PM   Weight Metrics   Weight 156 lb 4.9 oz 159 lb 13.3 oz 159 lb 13.3 oz 158 lb 170 lb 9.6 oz 170 lb 170 lb   BMI (Calculated) 26.1 kg/m2 26.7 kg/m2 26.7 kg/m2 26.3 kg/m2 28.4 kg/m2 28.3 kg/m2 28.3 kg/m2     BP (!) 72/60   Pulse (!) 115   Temp 98 °F (36.7 °C) (Oral)   Resp 18   Ht 1.651 m (5' 5\")   Wt 70.9 kg (156 lb 4.9 oz)   SpO2 (!) 79%   BMI 26.01 kg/m²     Current Facility-Administered Medications   Medication Dose Route Frequency    bumetanide (BUMEX) injection 2 mg  2 mg IntraVENous BID    potassium chloride (KLOR-CON M) extended release tablet 40 mEq  40 mEq Oral BID    magnesium sulfate 2000 mg in 50 mL IVPB premix  2,000 mg IntraVENous PRN    potassium chloride (KLOR-CON M) extended release tablet 40 mEq  40 mEq Oral PRN    Or    potassium bicarb-citric acid (EFFER-K) effervescent tablet 40 mEq  40 mEq Oral PRN    Or    potassium chloride 10 mEq/100 mL IVPB (Peripheral Line)  10 mEq IntraVENous PRN    sodium phosphate 15 mmol in sodium chloride 0.9 % 250 mL IVPB  15 mmol IntraVENous PRN    sodium chloride flush 0.9 % injection 5-40 mL  5-40 mL IntraVENous 2 times per day    sodium chloride flush 0.9 % injection 5-40 mL  5-40 mL IntraVENous PRN    0.9 % sodium chloride infusion   IntraVENous PRN

## 2024-04-13 NOTE — PROGRESS NOTES
Hospitalist Progress Note    NAME:   Azucena Myrick   : 1945   MRN: [id number]     Date/Time: 2024 8:48 AM  Patient PCP: Bhavana Mendoza MD    Estimated discharge date: TBD  Barriers: CTS surgery likely Monday and clearance      Assessment / Plan:    Acute on chronic CHF with preserved ejection fraction  Severe TR  Mitral valve regurgitation  AST     -Echocardiogram 2023, EF 60 to 65%, moderate mitral valve regurgitation, severe tricuspid regurgitation  -Sent from CTS surgery office for admission for valve surgery consideration  -Need to optimize volume status  -Stepdown monitoring  -TTE reading pending  -Cardiology and CTS on board  strict I's and O's, daily weight  Appreciate cardiology and CTS recommendation  Plan for RHC and ATUL Monday  -On Bumex 2 mg IV twice daily, diuresing well  -Patient hypotensive    -Will discuss with cardiology regarding adding midodrine to help with blood pressure (perfect served -ANILA guerin)          PAF  ASD  S/p ascending aortic aneurysm repair     -TTE pending, will need ATLU and right heart cath to assess shunt level  -Takes Eliquis at home, switch to Lovenox  -Continue metoprolol  Heart rate uncontrolled, metoprolol with holding parameters  Patient hypotensive limiting beta-blocker use  Cardiology planning digoxin load     HTN  -Recently discontinued amlodipine  -Continue metoprolol with holding parameters  -Blood pressure on softer side, holding losartan             Medical Decision Making:   I personally reviewed labs: WBC BMP  I personally reviewed imaging: X-ray  I personally reviewed EKG:  Toxic drug monitoring:   Discussed case with: Patient RN        Code Status: Full code  DVT Prophylaxis:   GI Prophylaxis:    Subjective:     Chief Complaint / Reason for Physician Visit  Patient heart rate in 130s after going to the bathroom, blood pressure low denies any dizziness chest pain at the time.  Patient diuresing well still overloaded pedal edema.   Cardiology starting on digoxin load.  Discussed with RN events overnight.       Objective:     VITALS:   Last 24hrs VS reviewed since prior progress note. Most recent are:  Patient Vitals for the past 24 hrs:   BP Temp Temp src Pulse Resp SpO2 Height Weight   04/13/24 0818 92/70 -- -- -- -- -- -- --   04/13/24 0705 99/69 97.7 °F (36.5 °C) Oral (!) 105 18 92 % -- --   04/13/24 0700 -- -- -- -- -- -- -- 70.9 kg (156 lb 4.9 oz)   04/13/24 0515 102/68 -- -- 100 29 91 % -- --   04/13/24 0400 (!) 85/73 98 °F (36.7 °C) Oral (!) 114 18 98 % -- --   04/13/24 0000 102/72 98.2 °F (36.8 °C) Oral (!) 118 26 93 % -- --   04/12/24 2120 101/73 -- -- (!) 122 20 -- -- --   04/12/24 2000 94/70 98.4 °F (36.9 °C) Oral (!) 114 23 98 % -- --   04/12/24 1600 104/81 98 °F (36.7 °C) Oral (!) 103 18 (!) 82 % -- --   04/12/24 1200 97/74 98.4 °F (36.9 °C) Oral 81 26 -- -- --   04/12/24 0854 102/80 -- -- -- -- -- 1.651 m (5' 5\") 72.5 kg (159 lb 13.3 oz)           Intake/Output Summary (Last 24 hours) at 4/13/2024 0848  Last data filed at 4/13/2024 0530  Gross per 24 hour   Intake 717.08 ml   Output 2150 ml   Net -1432.92 ml          I had a face to face encounter and independently examined this patient on 4/13/2024, as outlined below:  PHYSICAL EXAM:  General: Alert, cooperative  EENT:  EOMI. Anicteric sclerae.  Resp:  CTA bilaterally, no wheezing or rales.  No accessory muscle use  CV:  Regular  rhythm, pitting pedal edema a GI:  Soft, Non distended, Non tender.  +Bowel sounds  Neurologic:  Alert and oriented X 3, normal speech,   Psych:   Good insight. Not anxious nor agitated  Skin:  No rashes.  No jaundice    Reviewed most current lab test results and cultures  YES  Reviewed most current radiology test results   YES  Review and summation of old records today    NO  Reviewed patient's current orders and MAR    YES  PMH/SH reviewed - no change compared to H&P    Procedures: see electronic medical records for all procedures/Xrays and details  which were not copied into this note but were reviewed prior to creation of Plan.      LABS:  I reviewed today's most current labs and imaging studies.  Pertinent labs include:  Recent Labs     04/11/24 1745 04/12/24 0422 04/13/24 0516   WBC 6.0 5.3 5.1   HGB 11.9 10.6* 11.2*   HCT 39.9 35.8 37.7    181 188       Recent Labs     04/11/24 1745 04/12/24 0422 04/13/24  0516    142 141   K 3.4* 3.6 4.2   * 108 108   CO2 22 29 32   GLUCOSE 74 93 94   BUN 22* 21* 18   CREATININE 1.08* 1.03* 1.09*   CALCIUM 9.8 9.7 9.7   MG  --   --  2.6*   PHOS  --   --  3.0   LABALBU 3.4*  --  3.1*   BILITOT 1.0  --  0.8   AST 17  --  15   ALT 19  --  16   INR  --   --  1.2*         Signed: Katherine Barragan MD

## 2024-04-13 NOTE — PROGRESS NOTES
0700 - Report received from MARQUIS Zeng    0830 - Amalia NP at bedside with patient for rounds and update patient.     1130 - Bedside EKG completed   - Dr. Murphy rounding with RN at bedside    1900 - Report given To MARQUIS Velez

## 2024-04-14 LAB
ALBUMIN SERPL-MCNC: 3.3 G/DL (ref 3.5–5)
ALBUMIN/GLOB SERPL: 1.1 (ref 1.1–2.2)
ALP SERPL-CCNC: 57 U/L (ref 45–117)
ALT SERPL-CCNC: 16 U/L (ref 12–78)
ANION GAP SERPL CALC-SCNC: 3 MMOL/L (ref 5–15)
AST SERPL-CCNC: 15 U/L (ref 15–37)
BASOPHILS # BLD: 0 K/UL (ref 0–0.1)
BASOPHILS NFR BLD: 1 % (ref 0–1)
BILIRUB SERPL-MCNC: 0.8 MG/DL (ref 0.2–1)
BUN SERPL-MCNC: 19 MG/DL (ref 6–20)
BUN/CREAT SERPL: 16 (ref 12–20)
CALCIUM SERPL-MCNC: 10.2 MG/DL (ref 8.5–10.1)
CHLORIDE SERPL-SCNC: 104 MMOL/L (ref 97–108)
CO2 SERPL-SCNC: 33 MMOL/L (ref 21–32)
CREAT SERPL-MCNC: 1.18 MG/DL (ref 0.55–1.02)
DIFFERENTIAL METHOD BLD: ABNORMAL
ECHO AO ASC DIAM: 3.1 CM
ECHO AO ASCENDING AORTA INDEX: 1.72 CM/M2
ECHO AV AREA PEAK VELOCITY: 2.9 CM2
ECHO AV AREA VTI: 2.9 CM2
ECHO AV AREA/BSA PEAK VELOCITY: 1.6 CM2/M2
ECHO AV AREA/BSA VTI: 1.6 CM2/M2
ECHO AV MEAN GRADIENT: 5 MMHG
ECHO AV MEAN VELOCITY: 1 M/S
ECHO AV PEAK GRADIENT: 8 MMHG
ECHO AV PEAK VELOCITY: 1.4 M/S
ECHO AV VELOCITY RATIO: 0.79
ECHO AV VTI: 19.7 CM
ECHO BSA: 1.82 M2
ECHO LA DIAMETER INDEX: 3.5 CM/M2
ECHO LA DIAMETER: 6.3 CM
ECHO LA VOL A-L A2C: 127 ML (ref 22–52)
ECHO LA VOL A-L A4C: 166 ML (ref 22–52)
ECHO LA VOL MOD A2C: 128 ML (ref 22–52)
ECHO LA VOL MOD A4C: 157 ML (ref 22–52)
ECHO LA VOLUME AREA LENGTH: 146 ML
ECHO LA VOLUME INDEX A-L A2C: 71 ML/M2 (ref 16–34)
ECHO LA VOLUME INDEX A-L A4C: 92 ML/M2 (ref 16–34)
ECHO LA VOLUME INDEX AREA LENGTH: 81 ML/M2 (ref 16–34)
ECHO LA VOLUME INDEX MOD A2C: 71 ML/M2 (ref 16–34)
ECHO LA VOLUME INDEX MOD A4C: 87 ML/M2 (ref 16–34)
ECHO LV E' LATERAL VELOCITY: 17 CM/S
ECHO LV E' SEPTAL VELOCITY: 10 CM/S
ECHO LV EDV A4C: 84 ML
ECHO LV EDV INDEX A4C: 47 ML/M2
ECHO LV EJECTION FRACTION A4C: 72 %
ECHO LV ESV A4C: 23 ML
ECHO LV ESV INDEX A4C: 13 ML/M2
ECHO LV FRACTIONAL SHORTENING: 29 % (ref 28–44)
ECHO LV INTERNAL DIMENSION DIASTOLE INDEX: 2.11 CM/M2
ECHO LV INTERNAL DIMENSION DIASTOLIC: 3.8 CM (ref 3.9–5.3)
ECHO LV INTERNAL DIMENSION SYSTOLIC INDEX: 1.5 CM/M2
ECHO LV INTERNAL DIMENSION SYSTOLIC: 2.7 CM
ECHO LV IVSD: 1.4 CM (ref 0.6–0.9)
ECHO LV MASS 2D: 183.4 G (ref 67–162)
ECHO LV MASS INDEX 2D: 101.9 G/M2 (ref 43–95)
ECHO LV POSTERIOR WALL DIASTOLIC: 1.3 CM (ref 0.6–0.9)
ECHO LV RELATIVE WALL THICKNESS RATIO: 0.68
ECHO LVOT AREA: 3.8 CM2
ECHO LVOT AV VTI INDEX: 0.77
ECHO LVOT DIAM: 2.2 CM
ECHO LVOT MEAN GRADIENT: 2 MMHG
ECHO LVOT PEAK GRADIENT: 5 MMHG
ECHO LVOT PEAK VELOCITY: 1.1 M/S
ECHO LVOT STROKE VOLUME INDEX: 32.1 ML/M2
ECHO LVOT SV: 57.8 ML
ECHO LVOT VTI: 15.2 CM
ECHO MV AREA VTI: 2.9 CM2
ECHO MV E DECELERATION TIME (DT): 179.8 MS
ECHO MV E VELOCITY: 1.14 M/S
ECHO MV E/E' LATERAL: 6.71
ECHO MV E/E' RATIO (AVERAGED): 9.05
ECHO MV EROA PISA: 0.4 CM2
ECHO MV LVOT VTI INDEX: 1.32
ECHO MV MAX VELOCITY: 1.3 M/S
ECHO MV MEAN GRADIENT: 3 MMHG
ECHO MV MEAN VELOCITY: 0.8 M/S
ECHO MV PEAK GRADIENT: 7 MMHG
ECHO MV REGURGITANT ALIASING (NYQUIST) VELOCITY: 31 CM/S
ECHO MV REGURGITANT RADIUS PISA: 1.06 CM
ECHO MV REGURGITANT VELOCITY PISA: 5.2 M/S
ECHO MV REGURGITANT VOLUME PISA: 57.25 ML
ECHO MV REGURGITANT VTIA: 136.1 CM
ECHO MV VTI: 20 CM
ECHO PULMONARY ARTERY END DIASTOLIC PRESSURE: 12 MMHG
ECHO PV REGURGITANT MAX VELOCITY: 1.8 M/S
ECHO RV FREE WALL PEAK S': 7 CM/S
ECHO RV TAPSE: 1.8 CM (ref 1.7–?)
ECHO TV REGURGITANT MAX VELOCITY: 3.16 M/S
ECHO TV REGURGITANT PEAK GRADIENT: 40 MMHG
EOSINOPHIL # BLD: 0.2 K/UL (ref 0–0.4)
EOSINOPHIL NFR BLD: 3 % (ref 0–7)
ERYTHROCYTE [DISTWIDTH] IN BLOOD BY AUTOMATED COUNT: 14.3 % (ref 11.5–14.5)
GLOBULIN SER CALC-MCNC: 3.1 G/DL (ref 2–4)
GLUCOSE BLD STRIP.AUTO-MCNC: 101 MG/DL (ref 65–117)
GLUCOSE BLD STRIP.AUTO-MCNC: 119 MG/DL (ref 65–117)
GLUCOSE BLD STRIP.AUTO-MCNC: 96 MG/DL (ref 65–117)
GLUCOSE SERPL-MCNC: 89 MG/DL (ref 65–100)
HCT VFR BLD AUTO: 38.1 % (ref 35–47)
HGB BLD-MCNC: 11.5 G/DL (ref 11.5–16)
IMM GRANULOCYTES # BLD AUTO: 0 K/UL (ref 0–0.04)
IMM GRANULOCYTES NFR BLD AUTO: 0 % (ref 0–0.5)
LYMPHOCYTES # BLD: 2.2 K/UL (ref 0.8–3.5)
LYMPHOCYTES NFR BLD: 40 % (ref 12–49)
MAGNESIUM SERPL-MCNC: 2.4 MG/DL (ref 1.6–2.4)
MCH RBC QN AUTO: 27.1 PG (ref 26–34)
MCHC RBC AUTO-ENTMCNC: 30.2 G/DL (ref 30–36.5)
MCV RBC AUTO: 89.9 FL (ref 80–99)
MONOCYTES # BLD: 1.1 K/UL (ref 0–1)
MONOCYTES NFR BLD: 19 % (ref 5–13)
NEUTS SEG # BLD: 2 K/UL (ref 1.8–8)
NEUTS SEG NFR BLD: 37 % (ref 32–75)
NRBC # BLD: 0 K/UL (ref 0–0.01)
NRBC BLD-RTO: 0 PER 100 WBC
PHOSPHATE SERPL-MCNC: 3.2 MG/DL (ref 2.6–4.7)
PLATELET # BLD AUTO: 199 K/UL (ref 150–400)
PMV BLD AUTO: 10.6 FL (ref 8.9–12.9)
POTASSIUM SERPL-SCNC: 4.6 MMOL/L (ref 3.5–5.1)
PROT SERPL-MCNC: 6.4 G/DL (ref 6.4–8.2)
RBC # BLD AUTO: 4.24 M/UL (ref 3.8–5.2)
SERVICE CMNT-IMP: ABNORMAL
SERVICE CMNT-IMP: NORMAL
SERVICE CMNT-IMP: NORMAL
SODIUM SERPL-SCNC: 140 MMOL/L (ref 136–145)
WBC # BLD AUTO: 5.5 K/UL (ref 3.6–11)

## 2024-04-14 PROCEDURE — 36415 COLL VENOUS BLD VENIPUNCTURE: CPT

## 2024-04-14 PROCEDURE — 83735 ASSAY OF MAGNESIUM: CPT

## 2024-04-14 PROCEDURE — 2580000003 HC RX 258: Performed by: INTERNAL MEDICINE

## 2024-04-14 PROCEDURE — 2700000000 HC OXYGEN THERAPY PER DAY

## 2024-04-14 PROCEDURE — 2580000003 HC RX 258

## 2024-04-14 PROCEDURE — 84100 ASSAY OF PHOSPHORUS: CPT

## 2024-04-14 PROCEDURE — 85025 COMPLETE CBC W/AUTO DIFF WBC: CPT

## 2024-04-14 PROCEDURE — 2060000000 HC ICU INTERMEDIATE R&B

## 2024-04-14 PROCEDURE — 6370000000 HC RX 637 (ALT 250 FOR IP): Performed by: INTERNAL MEDICINE

## 2024-04-14 PROCEDURE — 80053 COMPREHEN METABOLIC PANEL: CPT

## 2024-04-14 PROCEDURE — 6360000002 HC RX W HCPCS: Performed by: NURSE PRACTITIONER

## 2024-04-14 PROCEDURE — 6370000000 HC RX 637 (ALT 250 FOR IP)

## 2024-04-14 PROCEDURE — 82962 GLUCOSE BLOOD TEST: CPT

## 2024-04-14 PROCEDURE — 6360000002 HC RX W HCPCS: Performed by: INTERNAL MEDICINE

## 2024-04-14 PROCEDURE — 6360000002 HC RX W HCPCS

## 2024-04-14 RX ORDER — MIDODRINE HYDROCHLORIDE 5 MG/1
2.5 TABLET ORAL 3 TIMES DAILY PRN
Status: DISCONTINUED | OUTPATIENT
Start: 2024-04-14 | End: 2024-04-19 | Stop reason: HOSPADM

## 2024-04-14 RX ADMIN — ONDANSETRON 4 MG: 2 INJECTION INTRAMUSCULAR; INTRAVENOUS at 11:11

## 2024-04-14 RX ADMIN — ONDANSETRON 4 MG: 2 INJECTION INTRAMUSCULAR; INTRAVENOUS at 23:01

## 2024-04-14 RX ADMIN — ENOXAPARIN SODIUM 70 MG: 100 INJECTION SUBCUTANEOUS at 21:03

## 2024-04-14 RX ADMIN — ONDANSETRON 4 MG: 2 INJECTION INTRAMUSCULAR; INTRAVENOUS at 16:55

## 2024-04-14 RX ADMIN — BUMETANIDE 2 MG: 0.25 INJECTION INTRAMUSCULAR; INTRAVENOUS at 08:59

## 2024-04-14 RX ADMIN — POTASSIUM CHLORIDE 40 MEQ: 1500 TABLET, EXTENDED RELEASE ORAL at 16:28

## 2024-04-14 RX ADMIN — METOPROLOL TARTRATE 25 MG: 25 TABLET, FILM COATED ORAL at 20:12

## 2024-04-14 RX ADMIN — DIGOXIN 250 MCG: 0.25 INJECTION INTRAMUSCULAR; INTRAVENOUS at 02:14

## 2024-04-14 RX ADMIN — PRAVASTATIN SODIUM 20 MG: 10 TABLET ORAL at 09:05

## 2024-04-14 RX ADMIN — SODIUM CHLORIDE, PRESERVATIVE FREE 10 ML: 5 INJECTION INTRAVENOUS at 09:04

## 2024-04-14 RX ADMIN — POTASSIUM CHLORIDE 40 MEQ: 1500 TABLET, EXTENDED RELEASE ORAL at 09:07

## 2024-04-14 RX ADMIN — ENOXAPARIN SODIUM 70 MG: 100 INJECTION SUBCUTANEOUS at 09:04

## 2024-04-14 RX ADMIN — SODIUM CHLORIDE, PRESERVATIVE FREE 10 ML: 5 INJECTION INTRAVENOUS at 20:00

## 2024-04-14 RX ADMIN — ACETAZOLAMIDE SODIUM 250 MG: 500 INJECTION, POWDER, LYOPHILIZED, FOR SOLUTION INTRAVENOUS at 08:58

## 2024-04-14 RX ADMIN — BUMETANIDE 2 MG: 0.25 INJECTION INTRAMUSCULAR; INTRAVENOUS at 16:29

## 2024-04-14 NOTE — PROGRESS NOTES
2 2000, Bedside and Verbal shift change report given to MARQUIS Velez (oncoming nurse) by MARQUIS Nieto (offgoing nurse). Report included the following information SBAR, Kardex, Intake/Output, MAR, Accordion, Recent Results, Med Rec Status and Cardiac Rhythm A Fib/V Pacing.   Temp;  Afebrile  Neuro; Alert & Oriented by 4, follow command, eyes contact, pupils   GCS;  Eye; 4, verbal; 5, motor; 6. Walk with walker,   Reassessment;  multi trips to bathroom for voiding, she napping from time to time,      Respiratory;  Sat 88-91% on NC at 2 l/min from 1 l/min, crackles at L>R diminished lung sounds. SOB with exertion, I/S 500 ml > need encourage.      Cardiac; A Fib/ AV Pacing,  B/min, NIBP /57 mmHg,   Reassessment; Digoxin 250 mcg IV schedule for 2 doses given overnight, Metoprolol held for soft BP,   GI; poor appetite > complained from abdominal discomfort due to hungry > ready meal provided and she eat it all, Abd soft with active bowel sound,      Renal; bathroom privilege with adequate urine out put   Reassessment;  I/O last 3 completed shifts:  In: 1127.1 [P.O.:1020; I.V.:107.1]  Out: 3650 [Urine:3650]  I/O this shift:  In: 200 [P.O.:200]  Out: 1150 [Urine:1150]  Weight change: -2.8 kg (-6 lb 2.8 oz)        Skin; edema at both lower extremities,  edema at left upper extremity > pulses felt well patient dines any discomfort > elevated over pillow > Dopplex scan been order > pending  .   Endo; AC/HS,   Lines; PIV X2.  Code; Full.  Lab; ,  DVT; Lovenox 70 mg SQ BID.  Plan; for ATUL & RHC on Monday ,       0700  Bedside and Verbal shift change report given to MARQUIS Uribe (oncoming nurse) by MARQUIS Velez (offgoing nurse). Report included the following information SBAR, Kardex, Intake/Output, MAR, Accordion, Recent Results, Med Rec Status and Cardiac Rhythm A Fib/AV pacing.

## 2024-04-14 NOTE — PROGRESS NOTES
[facility]  [address] [place] [address]  [phone]  [email or web address]  CARDIOLOGY PROGRESS NOTE    4/14/2024 10:24 AM    Admit Date: 4/11/2024    Admit Diagnosis:   Acute on chronic heart failure, unspecified heart failure type (HCC) [I50.9]  CHF (congestive heart failure), NYHA class I, acute on chronic, combined (HCC) [I50.43]    Subjective:     Azucena Myrick was seen and examined at bedside.  Breathing is improved.    Interim:  Up on bedside eating. Notes DE LEÓN with ambulation.     4/14/2024:  DE LEÓN with ambulation   Appetite not great. Does not like food here.         4/14/2024     6:00 AM 4/13/2024     7:00 AM 4/12/2024     8:54 AM 4/11/2024     1:11 PM 4/11/2024     9:45 AM 11/2/2023    10:53 AM 9/27/2023     1:38 PM   Weight Metrics   Weight 153 lb 10.6 oz 156 lb 4.9 oz 159 lb 13.3 oz 159 lb 13.3 oz 158 lb 170 lb 9.6 oz 170 lb   BMI (Calculated) 25.6 kg/m2 26.1 kg/m2 26.7 kg/m2 26.7 kg/m2 26.3 kg/m2 28.4 kg/m2 28.3 kg/m2     BP 99/72   Pulse 73   Temp 97.8 °F (36.6 °C) (Oral)   Resp 16   Ht 1.651 m (5' 5\")   Wt 69.7 kg (153 lb 10.6 oz)   SpO2 94%   BMI 25.57 kg/m²     Current Facility-Administered Medications   Medication Dose Route Frequency    bumetanide (BUMEX) injection 2 mg  2 mg IntraVENous BID    potassium chloride (KLOR-CON M) extended release tablet 40 mEq  40 mEq Oral BID    magnesium sulfate 2000 mg in 50 mL IVPB premix  2,000 mg IntraVENous PRN    potassium chloride (KLOR-CON M) extended release tablet 40 mEq  40 mEq Oral PRN    Or    potassium bicarb-citric acid (EFFER-K) effervescent tablet 40 mEq  40 mEq Oral PRN    Or    potassium chloride 10 mEq/100 mL IVPB (Peripheral Line)  10 mEq IntraVENous PRN    sodium phosphate 15 mmol in sodium chloride 0.9 % 250 mL IVPB  15 mmol IntraVENous PRN    sodium chloride flush 0.9 % injection 5-40 mL  5-40 mL IntraVENous 2 times per day    sodium chloride flush 0.9 % injection 5-40 mL  5-40  mL IntraVENous PRN    0.9 % sodium chloride infusion   IntraVENous PRN    ondansetron (ZOFRAN-ODT) disintegrating tablet 4 mg  4 mg Oral Q8H PRN    Or    ondansetron (ZOFRAN) injection 4 mg  4 mg IntraVENous Q6H PRN    polyethylene glycol (GLYCOLAX) packet 17 g  17 g Oral Daily PRN    acetaminophen (TYLENOL) tablet 650 mg  650 mg Oral Q6H PRN    Or    acetaminophen (TYLENOL) suppository 650 mg  650 mg Rectal Q6H PRN    metoprolol tartrate (LOPRESSOR) tablet 25 mg  25 mg Oral BID    pravastatin (PRAVACHOL) tablet 20 mg  20 mg Oral Daily    enoxaparin (LOVENOX) injection 70 mg  70 mg SubCUTAneous BID         Objective:      Physical Exam  Physical Exam  Constitutional:       General: She is not in acute distress.  HENT:      Head: Normocephalic and atraumatic.   Neck:      Vascular: JVD (Borderline) present.   Cardiovascular:      Rate and Rhythm: Regular rhythm. Tachycardia present.      Heart sounds: Murmur heard.      Comments: holosystolic  Pulmonary:      Effort: Pulmonary effort is normal.   Musculoskeletal:      Cervical back: Neck supple.   Skin:     General: Skin is dry.   Neurological:      Mental Status: She is alert.            Data Review:   Recent Labs     04/12/24  0422 04/13/24  0516 04/14/24  0431   WBC 5.3 5.1 5.5   HGB 10.6* 11.2* 11.5   HCT 35.8 37.7 38.1    188 199       Recent Labs     04/11/24  1745 04/12/24  0422 04/13/24  0516 04/14/24  0431    142 141 140   K 3.4* 3.6 4.2 4.6   * 108 108 104   CO2 22 29 32 33*   BUN 22* 21* 18 19   CREATININE 1.08* 1.03* 1.09* 1.18*   MG  --   --  2.6* 2.4   PHOS  --   --  3.0 3.2   ALT 19  --  16 16   INR  --   --  1.2*  --          Intake/Output Summary (Last 24 hours) at 4/14/2024 1024  Last data filed at 4/14/2024 0933  Gross per 24 hour   Intake 796 ml   Output 2775 ml   Net -1979 ml          Telemetry: AF, pacing    Assessment:     Principal Problem:    Acute on chronic heart failure, unspecified heart failure type (HCC)  Active  Problems:    CHF (congestive heart failure), NYHA class I, acute on chronic, combined (HCC)  Resolved Problems:    * No resolved hospital problems. *      Plan:     Intravenous diuresis per structural heart. Diuresing well, down 1690ml last 24 hours    HR has been <100 sine 8 pm last night with dig load, Bps soft but stable with SBP 90-100s  Cr slight bump, 1.18. Diuresed 2 L yest.   NPO after MN.      Dr Nguyen to follow.    Kirstin King, ANP

## 2024-04-14 NOTE — PROGRESS NOTES
0705: Bedside shift report given to oRny RN by Rosie CHO.     0740: Pt ambulated to bathroom with walker, brushed teeth, washed face, returned to chair. Pt slightly SOB during ambulation. O2 85% after ambulation pt replaced on 2L NC.     0800: Shift assessment completed, see flow sheets.     1200: Reassessment completed, see flow sheets.     1345: Notified Laurent HAYES of pts -140 during ambulating to bathroom. This has increased with each trip today. Pt has not received metoprolol since yesterday d/t low/soft BP. No new orders at this time.     1600: Reassessment completed, see flow sheets.     1813: Notified Laurent HAYES of pts BP 80/60s after IVP bumex but MAP >65.  I see note says midodrine PRN but there are no orders. Requested orders for this. No new orders at this time.

## 2024-04-14 NOTE — PROGRESS NOTES
Addended by: COLIN MOHAMUD on: 9/14/2023 04:08 PM     Modules accepted: Orders     \[facility]\"" FLOOR (Pre-op) Progress Note    Admit Date: 2024  POD: * No surgery date entered *      Procedure:  Procedure(s):  Right heart cath      Subjective/overnight events:   Pt  in bed.  Still alternating between AV paced A-fib, on 2 liters via nasal cannula, afebrile. Pressures remain on soft side. No events overnight.  States that breathing, appetite and lower extremity swelling continue to improve.    Objective:     BP 99/72   Pulse 73   Temp 97.8 °F (36.6 °C) (Oral)   Resp 16   Ht 1.651 m (5' 5\")   Wt 69.7 kg (153 lb 10.6 oz)   SpO2 94%   BMI 25.57 kg/m²   Temp (24hrs), Av °F (36.7 °C), Min:97.8 °F (36.6 °C), Max:98.2 °F (36.8 °C)      Last 24hr Input/Output:    Intake/Output Summary (Last 24 hours) at 2024 0807  Last data filed at 2024 0400  Gross per 24 hour   Intake 460 ml   Output 2650 ml   Net -2190 ml        Pre-op Workup    Carotid duplex: Would need if decision is made for surgery.    Cardiac cath: Pending.    Echocardiogram: Pending.    EKG/Rhythm: Pending.    CXR/CT chest: Xray Result (most recent):  XR CHEST PORTABLE 2024    Narrative  EXAM:  XR CHEST PORTABLE    INDICATION: chf    COMPARISON: 2023    TECHNIQUE: portable chest AP view    FINDINGS: Mild cardiomegaly is noted. The patient is status post median  sternotomy. Pacer leads are unchanged in position. The pulmonary vasculature is  within normal limits.    Mild pulmonary edema is noted. There are trace bilateral pleural effusions. The  visualized bones and upper abdomen are age-appropriate.    Impression  Mild pulmonary edema and trace bilateral pleural effusions.       Pulmonary function test: Would need if decision is made for surgery.    US ORGAN ELASTOGRAPHY 2024    Narrative  ULTRASOUND ELASTOGRAPHY OF THE LIVER.    INDICATION: Severe tricuspid regurgitation, preop evaluation of liver.    TECHNIQUE: 2-D shear wave elastography, with colormetric maps, was performed of  the liver.    FINDINGS: Liver  stiffness ranges from 6.6-13.0 kPa, with a median value of 10.2  kPa and interquartile range of 2.0 kPa. The IQR/median value is 19.9%,  indicating little variability and good quality measurements. These values  indicate at least moderate hepatic fibrosis.  Median values:  >= 8.29 kPa - stage 2 fibrosis  >= 9.40 kPa - stage 3 fibrosis  >= 11.9 kPa - stage 4 fibrosis/cirrhosis    Impression  At least moderate hepatic fibrosis.      KAMRAN: N/A    Vein mapping: N/A    Labs:  CMP:   Lab Results   Component Value Date     04/14/2024    K 4.6 04/14/2024     04/14/2024    CO2 33 (H) 04/14/2024    BUN 19 04/14/2024    CREATININE 1.18 (H) 04/14/2024    GLUCOSE 89 04/14/2024    CALCIUM 10.2 (H) 04/14/2024    PROT 6.4 04/14/2024    LABALBU 3.3 (L) 04/14/2024    BILITOT 0.8 04/14/2024    ALKPHOS 57 04/14/2024    AST 15 04/14/2024    ALT 16 04/14/2024    LABGLOM 47 (L) 04/14/2024    GFRAA >60 08/22/2022    AGRATIO 1.1 04/14/2024    GLOB 3.1 04/14/2024          Mag:   Lab Results   Component Value Date/Time    MG 2.4 04/14/2024 04:31 AM      CBC:   Lab Results   Component Value Date    WBC 5.5 04/14/2024    HGB 11.5 04/14/2024    HCT 38.1 04/14/2024    MCV 89.9 04/14/2024     04/14/2024      TSH:   Lab Results   Component Value Date    TSH 0.70 08/17/2021    CAU1ROL 0.83 11/02/2023      A1C: 5.7  NT pro-BNP: 2805  UA: N/A, unless symptomatic   ABG: Would need if decision is made for open surgery  Type and cross: Will need when decision is made for intervention    Updated STS with coags and CMP:   Procedure Type: Isolated TV Replacement   PERIOPERATIVE OUTCOME ESTIMATE %   Operative Mortality 12.3%   Morbidity & Mortality 33.4%   Stroke 0.665%   Renal Failure 11.3%   Reoperation 7.41%   Prolonged Ventilation 25.9%   Deep Sternal Wound Infection NA   Long Hospital Stay (>14 days) 38.8%   Short Hospital Stay (<6 days)* 4.59%     Clinical Summary       Planned Surgery: Isolated TV Replacement, Urgent, First

## 2024-04-14 NOTE — PROGRESS NOTES
Hospitalist Progress Note    NAME:   Azucena Myrick   : 1945   MRN: [id number]     Date/Time: 2024 4:27 PM  Patient PCP: Bhavana Mendoza MD    Estimated discharge date: TBD  Barriers: planning ATUL and RHC Monday       Assessment / Plan:    Acute on chronic CHF with preserved ejection fraction  Severe TR  Mitral valve regurgitation  -Echocardiogram 2023, EF 60 to 65%, moderate mitral valve regurgitation, severe tricuspid regurgitation  -Sent from CTS surgery office for admission for valve surgery consideration  -Need to optimize volume status  -Appreciate cardiology and CTS recommendation. Plan for RHC and ATUL Monday  -On Bumex 2 mg IV twice daily, diuresing well  -BP soft.  Midodrine prn      PAF  ASD  S/p ascending aortic aneurysm repair  -TTE EF 65-70%.  Mod to severe TR and MR.  Mod PH.  pending, will need ATUL and right heart cath to assess shunt level  -Takes Eliquis at home, switch to Lovenox  -Continue metoprolol  -s/p digoxin load  -VR still >100 with minor activity, just under 100 resting     HTN, currently low BP  -Recently discontinued amlodipine  -Continue metoprolol with holding parameters  -Blood pressure on softer side, holding losartan  -add midodrine prn      Medical Decision Making:   I personally reviewed labs: WBC BMP  I personally reviewed imaging: X-ray  I personally reviewed EKG:  Toxic drug monitoring:   Discussed case with: Patient RN        Code Status: Full code  DVT Prophylaxis:   GI Prophylaxis:    Subjective:     Chief Complaint / Reason for Physician Visit  Follow up Severe TR, MR, Afib, low BP      Objective:     VITALS:   Last 24hrs VS reviewed since prior progress note. Most recent are:  Patient Vitals for the past 24 hrs:   BP Temp Temp src Pulse Resp SpO2 Weight   24 1600 111/73 97.8 °F (36.6 °C) Oral 100 18 90 % --   24 1500 100/69 -- -- 81 -- -- --   24 1400 90/62 -- -- 88 -- -- --   24 1300 (!) 83/59 -- -- 95 -- -- --   24

## 2024-04-15 ENCOUNTER — APPOINTMENT (OUTPATIENT)
Facility: HOSPITAL | Age: 79
DRG: 266 | End: 2024-04-15
Attending: GENERAL ACUTE CARE HOSPITAL
Payer: MEDICARE

## 2024-04-15 LAB
ALBUMIN SERPL-MCNC: 3.2 G/DL (ref 3.5–5)
ALBUMIN/GLOB SERPL: 0.9 (ref 1.1–2.2)
ALP SERPL-CCNC: 54 U/L (ref 45–117)
ALT SERPL-CCNC: 16 U/L (ref 12–78)
ANION GAP SERPL CALC-SCNC: 3 MMOL/L (ref 5–15)
AST SERPL-CCNC: 26 U/L (ref 15–37)
BASOPHILS # BLD: 0 K/UL (ref 0–0.1)
BASOPHILS NFR BLD: 1 % (ref 0–1)
BILIRUB SERPL-MCNC: 0.9 MG/DL (ref 0.2–1)
BUN SERPL-MCNC: 18 MG/DL (ref 6–20)
BUN/CREAT SERPL: 14 (ref 12–20)
CALCIUM SERPL-MCNC: 10.1 MG/DL (ref 8.5–10.1)
CHLORIDE SERPL-SCNC: 104 MMOL/L (ref 97–108)
CO2 SERPL-SCNC: 31 MMOL/L (ref 21–32)
CREAT SERPL-MCNC: 1.33 MG/DL (ref 0.55–1.02)
DIFFERENTIAL METHOD BLD: ABNORMAL
ECHO BSA: 1.82 M2
ECHO BSA: 1.82 M2
EOSINOPHIL # BLD: 0.1 K/UL (ref 0–0.4)
EOSINOPHIL NFR BLD: 2 % (ref 0–7)
ERYTHROCYTE [DISTWIDTH] IN BLOOD BY AUTOMATED COUNT: 14.3 % (ref 11.5–14.5)
GLOBULIN SER CALC-MCNC: 3.4 G/DL (ref 2–4)
GLUCOSE BLD STRIP.AUTO-MCNC: 102 MG/DL (ref 65–117)
GLUCOSE BLD STRIP.AUTO-MCNC: 116 MG/DL (ref 65–117)
GLUCOSE SERPL-MCNC: 103 MG/DL (ref 65–100)
HCT VFR BLD AUTO: 38.1 % (ref 35–47)
HGB BLD-MCNC: 11.5 G/DL (ref 11.5–16)
HISTORY CHECK: NORMAL
IMM GRANULOCYTES # BLD AUTO: 0 K/UL (ref 0–0.04)
IMM GRANULOCYTES NFR BLD AUTO: 0 % (ref 0–0.5)
LYMPHOCYTES # BLD: 2.2 K/UL (ref 0.8–3.5)
LYMPHOCYTES NFR BLD: 35 % (ref 12–49)
MAGNESIUM SERPL-MCNC: 2.3 MG/DL (ref 1.6–2.4)
MCH RBC QN AUTO: 27.3 PG (ref 26–34)
MCHC RBC AUTO-ENTMCNC: 30.2 G/DL (ref 30–36.5)
MCV RBC AUTO: 90.5 FL (ref 80–99)
MONOCYTES # BLD: 1.2 K/UL (ref 0–1)
MONOCYTES NFR BLD: 19 % (ref 5–13)
NEUTS SEG # BLD: 2.7 K/UL (ref 1.8–8)
NEUTS SEG NFR BLD: 43 % (ref 32–75)
NRBC # BLD: 0 K/UL (ref 0–0.01)
NRBC BLD-RTO: 0 PER 100 WBC
PHOSPHATE SERPL-MCNC: 3.5 MG/DL (ref 2.6–4.7)
PLATELET # BLD AUTO: 194 K/UL (ref 150–400)
PMV BLD AUTO: 10.4 FL (ref 8.9–12.9)
POTASSIUM SERPL-SCNC: 5.1 MMOL/L (ref 3.5–5.1)
PROT SERPL-MCNC: 6.6 G/DL (ref 6.4–8.2)
RBC # BLD AUTO: 4.21 M/UL (ref 3.8–5.2)
SERVICE CMNT-IMP: NORMAL
SERVICE CMNT-IMP: NORMAL
SODIUM SERPL-SCNC: 138 MMOL/L (ref 136–145)
WBC # BLD AUTO: 6.2 K/UL (ref 3.6–11)

## 2024-04-15 PROCEDURE — 93971 EXTREMITY STUDY: CPT

## 2024-04-15 PROCEDURE — 93312 ECHO TRANSESOPHAGEAL: CPT

## 2024-04-15 PROCEDURE — 93320 DOPPLER ECHO COMPLETE: CPT | Performed by: INTERNAL MEDICINE

## 2024-04-15 PROCEDURE — 6360000002 HC RX W HCPCS

## 2024-04-15 PROCEDURE — 2060000000 HC ICU INTERMEDIATE R&B

## 2024-04-15 PROCEDURE — 85025 COMPLETE CBC W/AUTO DIFF WBC: CPT

## 2024-04-15 PROCEDURE — 6360000002 HC RX W HCPCS: Performed by: INTERNAL MEDICINE

## 2024-04-15 PROCEDURE — 2580000003 HC RX 258: Performed by: NURSE PRACTITIONER

## 2024-04-15 PROCEDURE — 2709999900 HC NON-CHARGEABLE SUPPLY: Performed by: INTERNAL MEDICINE

## 2024-04-15 PROCEDURE — 6370000000 HC RX 637 (ALT 250 FOR IP)

## 2024-04-15 PROCEDURE — C9113 INJ PANTOPRAZOLE SODIUM, VIA: HCPCS

## 2024-04-15 PROCEDURE — 6360000002 HC RX W HCPCS: Performed by: NURSE PRACTITIONER

## 2024-04-15 PROCEDURE — 2580000003 HC RX 258: Performed by: INTERNAL MEDICINE

## 2024-04-15 PROCEDURE — C9113 INJ PANTOPRAZOLE SODIUM, VIA: HCPCS | Performed by: NURSE PRACTITIONER

## 2024-04-15 PROCEDURE — 6370000000 HC RX 637 (ALT 250 FOR IP): Performed by: INTERNAL MEDICINE

## 2024-04-15 PROCEDURE — C1894 INTRO/SHEATH, NON-LASER: HCPCS | Performed by: INTERNAL MEDICINE

## 2024-04-15 PROCEDURE — 82962 GLUCOSE BLOOD TEST: CPT

## 2024-04-15 PROCEDURE — 2580000003 HC RX 258

## 2024-04-15 PROCEDURE — 83735 ASSAY OF MAGNESIUM: CPT

## 2024-04-15 PROCEDURE — 93312 ECHO TRANSESOPHAGEAL: CPT | Performed by: INTERNAL MEDICINE

## 2024-04-15 PROCEDURE — 36415 COLL VENOUS BLD VENIPUNCTURE: CPT

## 2024-04-15 PROCEDURE — 80053 COMPREHEN METABOLIC PANEL: CPT

## 2024-04-15 PROCEDURE — 93325 DOPPLER ECHO COLOR FLOW MAPG: CPT | Performed by: INTERNAL MEDICINE

## 2024-04-15 PROCEDURE — C1725 CATH, TRANSLUMIN NON-LASER: HCPCS | Performed by: INTERNAL MEDICINE

## 2024-04-15 PROCEDURE — 99152 MOD SED SAME PHYS/QHP 5/>YRS: CPT | Performed by: INTERNAL MEDICINE

## 2024-04-15 PROCEDURE — A4216 STERILE WATER/SALINE, 10 ML: HCPCS

## 2024-04-15 PROCEDURE — 84100 ASSAY OF PHOSPHORUS: CPT

## 2024-04-15 PROCEDURE — 4A023N6 MEASUREMENT OF CARDIAC SAMPLING AND PRESSURE, RIGHT HEART, PERCUTANEOUS APPROACH: ICD-10-PCS | Performed by: INTERNAL MEDICINE

## 2024-04-15 PROCEDURE — 2700000000 HC OXYGEN THERAPY PER DAY

## 2024-04-15 PROCEDURE — A4216 STERILE WATER/SALINE, 10 ML: HCPCS | Performed by: NURSE PRACTITIONER

## 2024-04-15 PROCEDURE — B246ZZ4 ULTRASONOGRAPHY OF RIGHT AND LEFT HEART, TRANSESOPHAGEAL: ICD-10-PCS | Performed by: INTERNAL MEDICINE

## 2024-04-15 PROCEDURE — 93451 RIGHT HEART CATH: CPT | Performed by: INTERNAL MEDICINE

## 2024-04-15 PROCEDURE — 99153 MOD SED SAME PHYS/QHP EA: CPT | Performed by: INTERNAL MEDICINE

## 2024-04-15 RX ORDER — LANOLIN ALCOHOL/MO/W.PET/CERES
3 CREAM (GRAM) TOPICAL NIGHTLY PRN
Status: DISCONTINUED | OUTPATIENT
Start: 2024-04-15 | End: 2024-04-19 | Stop reason: HOSPADM

## 2024-04-15 RX ORDER — SODIUM CHLORIDE 9 MG/ML
INJECTION, SOLUTION INTRAVENOUS PRN
Status: DISCONTINUED | OUTPATIENT
Start: 2024-04-15 | End: 2024-04-19 | Stop reason: HOSPADM

## 2024-04-15 RX ORDER — CALCIUM CARBONATE 500 MG/1
500 TABLET, CHEWABLE ORAL 3 TIMES DAILY PRN
Status: DISCONTINUED | OUTPATIENT
Start: 2024-04-15 | End: 2024-04-19 | Stop reason: HOSPADM

## 2024-04-15 RX ORDER — LIDOCAINE HYDROCHLORIDE 20 MG/ML
SOLUTION OROPHARYNGEAL PRN
Status: DISCONTINUED | OUTPATIENT
Start: 2024-04-15 | End: 2024-04-15 | Stop reason: HOSPADM

## 2024-04-15 RX ORDER — MIDAZOLAM HYDROCHLORIDE 1 MG/ML
INJECTION INTRAMUSCULAR; INTRAVENOUS PRN
Status: DISCONTINUED | OUTPATIENT
Start: 2024-04-15 | End: 2024-04-15 | Stop reason: HOSPADM

## 2024-04-15 RX ORDER — DIGOXIN 125 MCG
125 TABLET ORAL DAILY
Status: DISCONTINUED | OUTPATIENT
Start: 2024-04-15 | End: 2024-04-16

## 2024-04-15 RX ORDER — FUROSEMIDE 10 MG/ML
INJECTION INTRAMUSCULAR; INTRAVENOUS PRN
Status: DISCONTINUED | OUTPATIENT
Start: 2024-04-15 | End: 2024-04-15 | Stop reason: HOSPADM

## 2024-04-15 RX ORDER — ONDANSETRON 2 MG/ML
INJECTION INTRAMUSCULAR; INTRAVENOUS PRN
Status: DISCONTINUED | OUTPATIENT
Start: 2024-04-15 | End: 2024-04-15 | Stop reason: HOSPADM

## 2024-04-15 RX ORDER — FENTANYL CITRATE 50 UG/ML
INJECTION, SOLUTION INTRAMUSCULAR; INTRAVENOUS PRN
Status: DISCONTINUED | OUTPATIENT
Start: 2024-04-15 | End: 2024-04-15 | Stop reason: HOSPADM

## 2024-04-15 RX ORDER — BUMETANIDE 1 MG/1
2 TABLET ORAL 2 TIMES DAILY
Status: DISCONTINUED | OUTPATIENT
Start: 2024-04-15 | End: 2024-04-19

## 2024-04-15 RX ORDER — PANTOPRAZOLE SODIUM 40 MG/1
40 TABLET, DELAYED RELEASE ORAL
Status: DISCONTINUED | OUTPATIENT
Start: 2024-04-16 | End: 2024-04-19 | Stop reason: HOSPADM

## 2024-04-15 RX ADMIN — SODIUM CHLORIDE, PRESERVATIVE FREE 10 ML: 5 INJECTION INTRAVENOUS at 20:53

## 2024-04-15 RX ADMIN — POTASSIUM CHLORIDE 40 MEQ: 1500 TABLET, EXTENDED RELEASE ORAL at 09:32

## 2024-04-15 RX ADMIN — PANTOPRAZOLE SODIUM 40 MG: 40 INJECTION, POWDER, FOR SOLUTION INTRAVENOUS at 00:45

## 2024-04-15 RX ADMIN — BUMETANIDE 2 MG: 0.25 INJECTION INTRAMUSCULAR; INTRAVENOUS at 08:03

## 2024-04-15 RX ADMIN — ENOXAPARIN SODIUM 70 MG: 100 INJECTION SUBCUTANEOUS at 20:53

## 2024-04-15 RX ADMIN — METOPROLOL TARTRATE 25 MG: 25 TABLET, FILM COATED ORAL at 16:56

## 2024-04-15 RX ADMIN — DIGOXIN 125 MCG: 125 TABLET ORAL at 09:31

## 2024-04-15 RX ADMIN — PRAVASTATIN SODIUM 20 MG: 10 TABLET ORAL at 09:31

## 2024-04-15 RX ADMIN — PANTOPRAZOLE SODIUM 40 MG: 40 INJECTION, POWDER, FOR SOLUTION INTRAVENOUS at 09:31

## 2024-04-15 RX ADMIN — SODIUM CHLORIDE, PRESERVATIVE FREE 10 ML: 5 INJECTION INTRAVENOUS at 08:03

## 2024-04-15 NOTE — PROGRESS NOTES
Nursing contacted Nocturnist/cross cover provider and notified patient woke up c/o heart burn then vomited, nurse reported pt denies chest pain, is reporting pain like a belt over the lower abdomen, nurse already gave the prn ordered zofran. No other concerns reported. VSS. Ordered protonix 40mg iv x1. Will defer further evaluation/management to the day shift primary care team. Patient denies any further complaints or concerns. No acute distress reported. Nursing to notify Hospitalist for further/continued concerns. Will remain available overnight for further concerns if nursing/patient needs.     Non-billable note.

## 2024-04-15 NOTE — CONSENT
I have discussed with the patient the rationale for blood component transfusion; its benefits in treating or preventing fatigue, organ damage, or death; and its risk which includes mild transfusion reactions, rare risk of blood borne infection, or more serious but rare reactions. I have discussed the alternatives to transfusion, including the risk and consequences of not receiving transfusion. The patient had an opportunity to ask questions and had agreed to proceed with transfusion of blood components as needed.

## 2024-04-15 NOTE — PROGRESS NOTES
0710: Bedside shift report given to Rony RN by Rosie CHO.     0800: Shift assessment completed, see flow sheets.     1155: Pt ambulated to bathroom. Voided. Returned to bed then left unit to CCL.     1336: Pt arrived back to 2408 from CCL. Pt alert but drowsy/ Returned on 3L NC. Sheath pulled from RIJ by Pra[facility]. Hand held pressure for about 5 min.     1505: Josephine NP at bedside. Notified her of IVP lasix in CCL. Wants to hold this afternoon dose of bumex and potassium K+ this AM was 5.1.     1600: Reassessment completed, see flow sheets.     1635: Spoke with Josephine HIGH regarding persistent HR 110s at rest. Verbal orders for 1 time dose of metoprolol now and hold tonight's dose. OK to give one time dose overnight if needed, but will chart against 2100 dose.

## 2024-04-15 NOTE — PROGRESS NOTES
adequate  Neck: JVD up but improved  Lungs: clear, no rales, no rhonchi   Cardiac:  irRegular Rhythm, No murmur, Gallops or Rubs  Abdomen: soft, nl bowel sounds  Ext: Edema is absent  Neuro: Alert and oriented; Nonfocal    Labs:    Chemistry Profile   Recent Labs     04/13/24 0516 04/14/24  0431 04/15/24  0558    140 138   K 4.2 4.6 5.1   MG 2.6* 2.4 2.3   BUN 18 19 18   CREATININE 1.09* 1.18* 1.33*    104 104   CO2 32 33* 31   GLUCOSE 94 89 103*   PHOS 3.0 3.2 3.5        CBC w/Diff    Recent Labs     04/13/24  0516 04/14/24  0431 04/15/24  0558   WBC 5.1 5.5 6.2   RBC 4.16 4.24 4.21   HGB 11.2* 11.5 11.5   HCT 37.7 38.1 38.1   MCV 90.6 89.9 90.5   MCH 26.9 27.1 27.3   MCHC 29.7* 30.2 30.2   RDW 14.6* 14.3 14.3    199 194   MPV 10.4 10.6 10.4    No results for input(s): \"RIVAS\", \"BANDS\", \"MONOS\", \"METAS\", \"BLAST\" in the last 72 hours.    Invalid input(s): \"ANEU\", \"ABL\", \"ABM\", \"ABB\", \"GRANS\", \"LYMPH\", \"EOS\", \"BASOS\", \"MYELO\"     Cardiac Enzymes   No results for input(s): \"TROPHS\" in the last 72 hours.  No results for input(s): \"NTPROBNP\" in the last 72 hours.     Coagulation   Recent Labs     04/13/24 0516   INR 1.2*        Lab Results   Component Value Date/Time    HGB 11.5 04/15/2024 05:58 AM    HGB 11.5 04/14/2024 04:31 AM    HGB 11.2 04/13/2024 05:16 AM     Lab Results   Component Value Date/Time    HCT 38.1 04/15/2024 05:58 AM    HCT 38.1 04/14/2024 04:31 AM    HCT 37.7 04/13/2024 05:16 AM       Medications Reviewed:  Current Facility-Administered Medications   Medication Dose Route Frequency Provider Last Rate Last Admin    bumetanide (BUMEX) tablet 2 mg  2 mg Oral BID Amalia Amaya APRN - NP        digoxin (LANOXIN) tablet 125 mcg  125 mcg Oral Daily Amalia Amaya APRN - NP   125 mcg at 04/15/24 0931    midodrine (PROAMATINE) tablet 2.5 mg  2.5 mg Oral TID PRN Bipin Mancilla MD        potassium chloride (KLOR-CON M) extended release tablet 40 mEq  40 mEq Oral BID Monique  MICHAEL Joe NP   40 mEq at 04/15/24 0932    magnesium sulfate 2000 mg in 50 mL IVPB premix  2,000 mg IntraVENous PRN Amalia Amaya APRN - NP        potassium chloride (KLOR-CON M) extended release tablet 40 mEq  40 mEq Oral PRN Amalia Amaya APRN - NP        Or    potassium bicarb-citric acid (EFFER-K) effervescent tablet 40 mEq  40 mEq Oral PRN Amalia Amaya APRN - NP        Or    potassium chloride 10 mEq/100 mL IVPB (Peripheral Line)  10 mEq IntraVENous PRN Amalia Amaya APRN - NP        sodium phosphate 15 mmol in sodium chloride 0.9 % 250 mL IVPB  15 mmol IntraVENous PRN Amalia Amaya APRN - NP        sodium chloride flush 0.9 % injection 5-40 mL  5-40 mL IntraVENous 2 times per day Loki Sampson MD   10 mL at 04/15/24 0803    sodium chloride flush 0.9 % injection 5-40 mL  5-40 mL IntraVENous PRN Loki Sampson MD        0.9 % sodium chloride infusion   IntraVENous PRN Loki Sampson MD        ondansetron (ZOFRAN-ODT) disintegrating tablet 4 mg  4 mg Oral Q8H PRN Loki Sampson MD        Or    ondansetron (ZOFRAN) injection 4 mg  4 mg IntraVENous Q6H PRN Loki Sampson MD   4 mg at 04/14/24 2301    polyethylene glycol (GLYCOLAX) packet 17 g  17 g Oral Daily PRN Loki Sampson MD        acetaminophen (TYLENOL) tablet 650 mg  650 mg Oral Q6H PRN Loki Sampson MD        Or    acetaminophen (TYLENOL) suppository 650 mg  650 mg Rectal Q6H PRN Loki Sampson MD        metoprolol tartrate (LOPRESSOR) tablet 25 mg  25 mg Oral BID Amalia Amaya APRN - NP   25 mg at 04/14/24 2012    pravastatin (PRAVACHOL) tablet 20 mg  20 mg Oral Daily Loki Sampson MD   20 mg at 04/15/24 0931    enoxaparin (LOVENOX) injection 70 mg  70 mg SubCUTAneous BID Loki Sampson MD   70 mg at 04/14/24 2103           BRIANNA PATEL MD   4/15/2024  11:52 AM

## 2024-04-15 NOTE — CARE COORDINATION
Transition of Care Plan:    RUR: 12%  Prior Level of Functioning: Independnet  Disposition: Home vs Home with HH -pending clinical progress  If SNF or IPR: Date FOC offered:   Date FOC received:   Accepting facility:   Date authorization started with reference number:   Date authorization received and expires:   Follow up appointments: PCP, Specialist  DME needed: none  Transportation at discharge: Family to provide transportation   IM/IMM Medicare/ letter given: 2nd IM to be given  Is patient a [employer] [place] and connected with [facility]? N/A   If yes, was  transfer form completed and VA notified?   Caregiver Contact: Nash Myrick- Son [phone]  Discharge Caregiver contacted prior to discharge?   CM to discuss with pt.   Care Conference needed? none  Barriers to discharge:  Cardiothoracic clearance    CM reviewed pt chart. Pt not medically stable for d/c at this time.  Pt scheduled for ATUL and RHC today.  Has plans for valve surgery.    JOHN Salomon,RN  Care   [facility]  Phone: [phone]

## 2024-04-15 NOTE — PROGRESS NOTES
[id number]  patient woke up from heart burn then vomited > Zofran PRN IV given, now feel like tight belt over her lower sterum to left, patient denies chest pain > on call paged,  [phone]  Patient sleeping right now, HR and BP stable will follow up,   0040  Patient woke up and want to returned back to bed, denies heart burn or tightness, Protonix 40 mg IV once order given as per on call and patient back to sleep,     0315  Patient slept for a while, woke up and used the bathroom, CHG wipes > feel itchy after that at groins > she said Zinc application help at day time > has slight scratches at right side when she itched her self,         [id number]  Left patient to sleep and that help her to feel better, blood sample sent, KF worsening,   Bedside and Verbal shift change report given to MARQUIS Uribe (oncoming nurse) by MARQUIS Velez (offgoing nurse). Report included the following information SBAR, Kardex, Intake/Output, MAR, Accordion, Recent Results, Med Rec Status and Cardiac Rhythm A Fib/AV Pacing.

## 2024-04-15 NOTE — PROGRESS NOTES
hospitals FLOOR (Pre-op) Progress Note    Admit Date: 2024  POD: * No surgery date entered *      Procedure:  Procedure(s):  Right heart cath      Subjective/overnight events:   Pt seen with Dr. Nguyen,  in bed. Still alternating between AF and paced, on 2 liters via nasal cannula, afebrile.  Pressures still intermittently soft.  No events overnight. NPO for cath- hungry.      Objective:     /72   Pulse 78   Temp 98 °F (36.7 °C) (Oral)   Resp 18   Ht 1.651 m (5' 5\")   Wt 67.5 kg (148 lb 13 oz)   SpO2 94%   BMI 24.76 kg/m²   Temp (24hrs), Av °F (36.7 °C), Min:97.8 °F (36.6 °C), Max:98.6 °F (37 °C)      Last 24hr Input/Output:    Intake/Output Summary (Last 24 hours) at 4/15/2024 0724  Last data filed at 4/15/2024 0315  Gross per 24 hour   Intake 746 ml   Output 2825 ml   Net -2079 ml        Pre-op Workup    Carotid duplex: Would need if decision is made for surgery.    Cardiac cath: Pending.    Echocardiogram: Pending.    EKG/Rhythm: Pending.    CXR/CT chest: Xray Result (most recent):  XR CHEST PORTABLE 2024    Narrative  EXAM:  XR CHEST PORTABLE    INDICATION: chf    COMPARISON: 2023    TECHNIQUE: portable chest AP view    FINDINGS: Mild cardiomegaly is noted. The patient is status post median  sternotomy. Pacer leads are unchanged in position. The pulmonary vasculature is  within normal limits.    Mild pulmonary edema is noted. There are trace bilateral pleural effusions. The  visualized bones and upper abdomen are age-appropriate.    Impression  Mild pulmonary edema and trace bilateral pleural effusions.       Pulmonary function test: Would need if decision is made for surgery.    US ORGAN ELASTOGRAPHY 2024    Narrative  ULTRASOUND ELASTOGRAPHY OF THE LIVER.    INDICATION: Severe tricuspid regurgitation, preop evaluation of liver.    TECHNIQUE: 2-D shear wave elastography, with colormetric maps, was performed of  the liver.    FINDINGS: Liver stiffness ranges from 6.6-13.0 kPa,  awake, alert, and oriented   Lungs:    diminished breath sounds bilaterally   Incision:  N/A   Heart:   Irregular rhythm and grade II murmur    Abdomen:    soft, not-distended, non-tender and active bowel sounds   Extremities: No edema BLE; extremities are warm and well perfused   Neurologic:  Gross motor and sensory apparatus intact       Lab Data Reviewed:   Recent Labs     04/13/24  0516 04/14/24  0431 04/15/24  0558   WBC 5.1   < > 6.2   HGB 11.2*   < > 11.5   HCT 37.7   < > 38.1      < > 194      < > 138   K 4.2   < > 5.1   BUN 18   < > 18   INR 1.2*  --   --     < > = values in this interval not displayed.         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[facility])    Spinal stenosis, lumbar region, without neurogenic claudication    Long-term use of high-risk medication    S/P cardiac cath    Sciatica of left side    Hypercholesterolemia    Thoracoabdominal aortic aneurysm (TAAA) without rupture, unspecified part ([facility])    ASD (atrial septal defect)    Pulmonary hypertension ([facility])    Tricuspid regurgitation    MR (mitral regurgitation)    Cardiac pacemaker in situ    Acute on chronic diastolic heart failure ([facility])    Idiopathic hypotension    Acute on chronic heart failure, unspecified heart failure type ([facility])    CHF (congestive heart failure), NYHA class I, acute on chronic, combined ([facility])         Plan/Recommendations/Medical Decision Making:     Non-rheumatic, torrential TR, non-rheumatic MR, ASD: Plan for RHC and ATUL today, 4/15/24 to plan interventions.    Acute on chronic HFpEF, NYHA class III, LVEF 60-65%,  improving: on 2 liters via nasal cannula. On Lopressor, Cozaar and Lasix and Farxiga PTA.  Change to PO diuresis today. Continue strict I&O. Daily standing weights.   Hypoxic respiratory insuffiency secondary to pulmonary edema and pleural effusions, continued: on 2 liters via nasal cannula. OOB all meals, IS, ambulate. Wean O2 as able to maintain sats >92%.  Diuresis as above.   Paroxysmal AF: Alternating between AV paced and AF. On Lopressor and Eliquis PTA. Continue BB.  On therapeutic Lovenox instead of Eliquis pending procedure.  Maintain K+ >4.0 and magnesium >2.0.  Status post dig load 4/13/24- start 125 mcg po digoxin daily per Dr. Nguyen.    SSS s/p PPM: Noted.   Type a aortic dissection 2015 post emergent surgical intervention with residual arch and descending aorta dissection (dissection goes to right subclavian, one of the kidneys fills from the false lumen). Tight blood pressure control.   HTN: On Lasix, Cozaar, Lopressor PTA; pressures intermittently soft- continue Lopressor with hold parameters and diuresis for now with prn midodrine added by Hospitalists.   HLD: on statin PTA; continue.   Grade II pulmonary HTN: Diuresis as above.  Evidence of moderate fibrosis on elastography: LFTs unremarkable.   LUE swelling: On therapeutic Lovenox.  Venous duplex of left arm still pending.  GERD/PUD: No meds PTA.  Spinal stenosis, arthritis: Supportive care. Keep OOB to chair all meals.   Overweight: BMI 26.6. Diet and exercise counseling when appropriate.      Fluid and electrolytes: Per primary team.  Maintain K+ >4 and magnesium level >2.  Nutrition: Per primary team.   Activity: OOB all meals and ambulate in halls TID; PT/OT; IS 10-15 x an hour while awake.  Bowel Regimen: Per primary team.   GI ppx: Per primary team.  DVT ppx: Lovenox  Dispo:  Remain on stepdown status.   Case discussed with: Primary RN  Time spent: 30 minutes.  Signed By: MICHAEL Dave - NP

## 2024-04-16 LAB
ALBUMIN SERPL-MCNC: 3.4 G/DL (ref 3.5–5)
ALBUMIN/GLOB SERPL: 0.9 (ref 1.1–2.2)
ALP SERPL-CCNC: 58 U/L (ref 45–117)
ALT SERPL-CCNC: 22 U/L (ref 12–78)
ANION GAP SERPL CALC-SCNC: 4 MMOL/L (ref 5–15)
AST SERPL-CCNC: 31 U/L (ref 15–37)
BASOPHILS # BLD: 0 K/UL (ref 0–0.1)
BASOPHILS NFR BLD: 0 % (ref 0–1)
BILIRUB SERPL-MCNC: 0.8 MG/DL (ref 0.2–1)
BUN SERPL-MCNC: 25 MG/DL (ref 6–20)
BUN/CREAT SERPL: 16 (ref 12–20)
CALCIUM SERPL-MCNC: 10.9 MG/DL (ref 8.5–10.1)
CHLORIDE SERPL-SCNC: 100 MMOL/L (ref 97–108)
CO2 SERPL-SCNC: 32 MMOL/L (ref 21–32)
CREAT SERPL-MCNC: 1.52 MG/DL (ref 0.55–1.02)
DIFFERENTIAL METHOD BLD: ABNORMAL
ECHO BSA: 1.82 M2
EOSINOPHIL # BLD: 0.1 K/UL (ref 0–0.4)
EOSINOPHIL NFR BLD: 2 % (ref 0–7)
ERYTHROCYTE [DISTWIDTH] IN BLOOD BY AUTOMATED COUNT: 14.3 % (ref 11.5–14.5)
GLOBULIN SER CALC-MCNC: 3.6 G/DL (ref 2–4)
GLUCOSE BLD STRIP.AUTO-MCNC: 105 MG/DL (ref 65–117)
GLUCOSE SERPL-MCNC: 97 MG/DL (ref 65–100)
HCT VFR BLD AUTO: 43.7 % (ref 35–47)
HGB BLD-MCNC: 12.7 G/DL (ref 11.5–16)
IMM GRANULOCYTES # BLD AUTO: 0 K/UL (ref 0–0.04)
IMM GRANULOCYTES NFR BLD AUTO: 0 % (ref 0–0.5)
LYMPHOCYTES # BLD: 2.6 K/UL (ref 0.8–3.5)
LYMPHOCYTES NFR BLD: 39 % (ref 12–49)
MAGNESIUM SERPL-MCNC: 2.5 MG/DL (ref 1.6–2.4)
MCH RBC QN AUTO: 27.2 PG (ref 26–34)
MCHC RBC AUTO-ENTMCNC: 29.1 G/DL (ref 30–36.5)
MCV RBC AUTO: 93.6 FL (ref 80–99)
MONOCYTES # BLD: 1 K/UL (ref 0–1)
MONOCYTES NFR BLD: 15 % (ref 5–13)
NEUTS SEG # BLD: 3 K/UL (ref 1.8–8)
NEUTS SEG NFR BLD: 44 % (ref 32–75)
NRBC # BLD: 0.02 K/UL (ref 0–0.01)
NRBC BLD-RTO: 0.3 PER 100 WBC
PHOSPHATE SERPL-MCNC: 3.7 MG/DL (ref 2.6–4.7)
PLATELET # BLD AUTO: 194 K/UL (ref 150–400)
PMV BLD AUTO: 10.8 FL (ref 8.9–12.9)
POTASSIUM SERPL-SCNC: 4.7 MMOL/L (ref 3.5–5.1)
PROT SERPL-MCNC: 7 G/DL (ref 6.4–8.2)
RBC # BLD AUTO: 4.67 M/UL (ref 3.8–5.2)
SERVICE CMNT-IMP: NORMAL
SODIUM SERPL-SCNC: 136 MMOL/L (ref 136–145)
WBC # BLD AUTO: 6.8 K/UL (ref 3.6–11)

## 2024-04-16 PROCEDURE — 85025 COMPLETE CBC W/AUTO DIFF WBC: CPT

## 2024-04-16 PROCEDURE — 6360000002 HC RX W HCPCS: Performed by: INTERNAL MEDICINE

## 2024-04-16 PROCEDURE — 6370000000 HC RX 637 (ALT 250 FOR IP): Performed by: GENERAL ACUTE CARE HOSPITAL

## 2024-04-16 PROCEDURE — 86901 BLOOD TYPING SEROLOGIC RH(D): CPT

## 2024-04-16 PROCEDURE — 83735 ASSAY OF MAGNESIUM: CPT

## 2024-04-16 PROCEDURE — 2060000000 HC ICU INTERMEDIATE R&B

## 2024-04-16 PROCEDURE — 86850 RBC ANTIBODY SCREEN: CPT

## 2024-04-16 PROCEDURE — 80053 COMPREHEN METABOLIC PANEL: CPT

## 2024-04-16 PROCEDURE — 86900 BLOOD TYPING SEROLOGIC ABO: CPT

## 2024-04-16 PROCEDURE — 36415 COLL VENOUS BLD VENIPUNCTURE: CPT

## 2024-04-16 PROCEDURE — 6370000000 HC RX 637 (ALT 250 FOR IP): Performed by: INTERNAL MEDICINE

## 2024-04-16 PROCEDURE — 86923 COMPATIBILITY TEST ELECTRIC: CPT

## 2024-04-16 PROCEDURE — 84100 ASSAY OF PHOSPHORUS: CPT

## 2024-04-16 PROCEDURE — 2580000003 HC RX 258: Performed by: INTERNAL MEDICINE

## 2024-04-16 PROCEDURE — 82962 GLUCOSE BLOOD TEST: CPT

## 2024-04-16 PROCEDURE — 6370000000 HC RX 637 (ALT 250 FOR IP)

## 2024-04-16 RX ORDER — SENNA AND DOCUSATE SODIUM 50; 8.6 MG/1; MG/1
1 TABLET, FILM COATED ORAL DAILY
Status: DISCONTINUED | OUTPATIENT
Start: 2024-04-16 | End: 2024-04-19 | Stop reason: HOSPADM

## 2024-04-16 RX ORDER — ENOXAPARIN SODIUM 100 MG/ML
70 INJECTION SUBCUTANEOUS EVERY 24 HOURS
Status: DISCONTINUED | OUTPATIENT
Start: 2024-04-17 | End: 2024-04-19

## 2024-04-16 RX ORDER — DIGOXIN 125 MCG
62.5 TABLET ORAL
Status: DISCONTINUED | OUTPATIENT
Start: 2024-04-18 | End: 2024-04-19

## 2024-04-16 RX ADMIN — POTASSIUM CHLORIDE 40 MEQ: 1500 TABLET, EXTENDED RELEASE ORAL at 09:31

## 2024-04-16 RX ADMIN — BUMETANIDE 2 MG: 1 TABLET ORAL at 09:30

## 2024-04-16 RX ADMIN — BUMETANIDE 2 MG: 1 TABLET ORAL at 16:48

## 2024-04-16 RX ADMIN — PANTOPRAZOLE SODIUM 40 MG: 40 TABLET, DELAYED RELEASE ORAL at 06:07

## 2024-04-16 RX ADMIN — PRAVASTATIN SODIUM 20 MG: 10 TABLET ORAL at 09:29

## 2024-04-16 RX ADMIN — POTASSIUM CHLORIDE 40 MEQ: 1500 TABLET, EXTENDED RELEASE ORAL at 16:48

## 2024-04-16 RX ADMIN — DIGOXIN 125 MCG: 125 TABLET ORAL at 09:28

## 2024-04-16 RX ADMIN — SENNOSIDES AND DOCUSATE SODIUM 1 TABLET: 50; 8.6 TABLET ORAL at 20:45

## 2024-04-16 RX ADMIN — SODIUM CHLORIDE, PRESERVATIVE FREE 10 ML: 5 INJECTION INTRAVENOUS at 20:46

## 2024-04-16 RX ADMIN — ENOXAPARIN SODIUM 70 MG: 100 INJECTION SUBCUTANEOUS at 09:31

## 2024-04-16 RX ADMIN — SODIUM CHLORIDE, PRESERVATIVE FREE 10 ML: 5 INJECTION INTRAVENOUS at 16:37

## 2024-04-16 ASSESSMENT — PAIN SCALES - GENERAL
PAINLEVEL_OUTOF10: 0

## 2024-04-16 NOTE — PROGRESS NOTES
0845 AM assessment complete. Pt feeling much better this AM. Not SOB when walking to bathroom, which is much improved from yesterday. Pt still has fine crackles in bilateral lower lung fields posteriorly.    1230 Reassessment complete. No changes. Pt ambulating to bathroom with walker and voiding. Several episodes unmeasurable.    1430 Pt has poor appetite, but ordered a salad for lunch and ate entire salad and drank ensure.    1600  Pt remains on 1LPM NC and tolerating ambulation in room without SOB.    1800  Pt had a few small BM's today, but feels like she needs a stool softener. Order received.

## 2024-04-16 NOTE — PROGRESS NOTES
2000, Bedside and Verbal shift change report given to MARQUIS Velez (oncoming nurse) by MARQUIS Uribe (offgoing nurse). Report included the following information SBAR, Kardex, Intake/Output, MAR, Accordion, Recent Results, Med Rec Status and Cardiac Rhythm A Fib/V Pacing.   Temp;  Afebrile  Neuro; Alert & Oriented by 4, follow command, eyes contact, pupils   GCS;  Eye; 4, verbal; 5, motor; 6. Walk with walker,   Reassessment;  slept for a while,     Respiratory;  Sat 95% on NC at 1 l/min, fine crackles at bases diminished lung sounds. SOB with exertion, I/S 500 ml > need encourage.      Cardiac; A Fib/ AV Pacing,  B/min, NIBP /57 mmHg,   Reassessment; no changes    GI; poor appetite, Abd soft with active bowel sound,     Renal; bathroom privilege with adequate urine out put   Reassessment;    Skin; edema at both lower extremities > improved now +2,  edema at left upper extremity > pulses felt well patient dines any discomfort > elevated over pillow > Dopplex scan done > no DVT  .   Endo; AC/HS,   Lines; PIV X2.  Code; Full.  Lab; ,  DVT; Lovenox 70 mg SQ BID.  Plan; for  Mitral valve clip and ASD closures on Wednesday , 0030  Bedside and Verbal shift change report given to MARQUIS Morgan (oncoming nurse) by MARQUIS Velez (offgoing nurse). Report included the following information SBAR, Kardex, Intake/Output, MAR, Accordion, Recent Results, Med Rec Status and Cardiac Rhythm A Fib/AV pacing.

## 2024-04-16 NOTE — PROGRESS NOTES
Hospitalist Progress Note    NAME:   Azucena Myrick   : 1945   MRN: [id number]     Patient PCP: Bhavana Mendoza MD    Estimated discharge date: TBD  Barriers: planning ATUL and RHC Monday       Assessment / Plan:    Acute on chronic CHF with preserved ejection fraction  Severe TR  Mitral valve regurgitation  ASD  -Echocardiogram 2023, EF 60 to 65%, moderate mitral valve regurgitation, severe tricuspid regurgitation  -Sent from CTS surgery office for admission for valve surgery consideration  -Need to optimize volume status  RHC/ATUL: severe degenerative nonrheumatic mitral regurgitation eccentric jet . Moderate tricuspid regurgitation. Atrial septal defect with significant change from left to right, especially the mitral regurgitation jet.   -Appreciate cardiology and CTS recommends mitral valve clip and ASD closure  -Change IV Bumex to p.o. today  -BP soft.  Midodrine prn      PAF  ASD  S/p ascending aortic aneurysm repair  -TTE EF 65-70%.  Mod to severe TR and MR.  Mod PH.  pending, will need ATUL and right heart cath to assess shunt level  -Takes Eliquis at home, switch to Lovenox  -Continue metoprolol  -s/p digoxin load, continue 125 mcg daily  -VR still >100 with minor activity, just under 100 resting     HTN, currently low BP  -Recently discontinued amlodipine  -Continue metoprolol with holding parameters  -Blood pressure on softer side, holding losartan  -add midodrine prn    Medical Decision Making:   I personally reviewed labs: WBC BMP  I personally reviewed imaging: X-ray  I personally reviewed EKG:  Toxic drug monitoring:   Discussed case with: Patient RN    Code Status: Full code  DVT Prophylaxis:   GI Prophylaxis:    Subjective:     Chief Complaint / Reason for Physician Visit  Follow up Severe TR, MR, Afib, low BP  Complains of heartburn  No bleeding issues  Afebrile  Trouble sleeping at night  No dizziness  No shortness of breath or chest pain    Objective:     VITALS:   Last 24hrs VS  reviewed since prior progress note. Most recent are:  Patient Vitals for the past 24 hrs:   BP Temp Temp src Pulse Resp SpO2   04/16/24 0500 105/73 -- -- 86 13 91 %   04/16/24 0430 94/67 -- -- 83 14 94 %   04/16/24 0300 102/71 97.7 °F (36.5 °C) Oral 90 17 93 %   04/16/24 0200 90/65 -- -- 84 10 95 %   04/16/24 0100 96/70 -- -- 84 16 92 %   04/16/24 0000 97/72 98.1 °F (36.7 °C) Oral 82 16 95 %   04/15/24 2328 102/67 98.6 °F (37 °C) Oral 92 30 (!) 87 %   04/15/24 2300 (!) 86/64 -- -- 89 24 92 %   04/15/24 2200 96/69 -- -- 83 20 92 %   04/15/24 2100 92/64 -- -- 76 23 96 %   04/15/24 2000 93/64 98.3 °F (36.8 °C) Oral 83 21 95 %   04/15/24 1900 105/76 -- -- (!) 102 -- 93 %   04/15/24 1800 104/74 -- -- (!) 115 -- 94 %   04/15/24 1700 109/66 -- -- (!) 109 -- 94 %   04/15/24 1600 108/74 97.7 °F (36.5 °C) Oral (!) 109 16 95 %   04/15/24 1500 106/74 -- -- (!) 121 -- --   04/15/24 1400 117/77 -- -- 93 -- --   04/15/24 1351 117/71 -- -- 85 -- 98 %   04/15/24 1336 111/70 97.7 °F (36.5 °C) Oral 93 16 93 %   04/15/24 1219 -- -- -- -- -- 95 %   04/15/24 1100 107/75 -- -- 95 -- 95 %   04/15/24 1000 111/69 -- -- 96 -- 97 %   04/15/24 0900 107/75 -- -- 85 -- 91 %   04/15/24 0800 108/73 97.7 °F (36.5 °C) Oral (!) 113 16 91 %   04/15/24 0700 96/67 -- -- 76 12 (!) 79 %   04/15/24 0600 103/72 -- -- 78 18 94 %           Intake/Output Summary (Last 24 hours) at 4/16/2024 0526  Last data filed at 4/16/2024 0430  Gross per 24 hour   Intake 386 ml   Output 1650 ml   Net -1264 ml          I had a face to face encounter and independently examined this patient as outlined below:  PHYSICAL EXAM:  General: Alert, cooperative  EENT:  EOMI. Anicteric sclerae.  Resp:  CTA bilaterally, no wheezing or rales.  No accessory muscle use  CV:  Regular  rhythm, pitting pedal edema a , + murmur   GI:  Soft, Non distended, Non tender.  +Bowel sounds  Neurologic:  Alert and oriented X 3, normal speech,   Psych:   Good insight. Not anxious nor agitated  Skin:  No

## 2024-04-16 NOTE — PROGRESS NOTES
Hospitalist Progress Note    NAME:   Azucena Myrick   : 1945   MRN: [id number]     Patient PCP: Bhavana Mendoza MD    Estimated discharge date: TBD  Barriers: mitral clip tomorrow       Assessment / Plan:    Acute on chronic CHF with preserved ejection fraction  Severe TR  Mitral valve regurgitation  ASD  -Echocardiogram 2023, EF 60 to 65%, moderate mitral valve regurgitation, severe tricuspid regurgitation  -Sent from CTS surgery office for admission for valve surgery consideration  -Need to optimize volume status  RHC/ATUL: severe degenerative nonrheumatic mitral regurgitation eccentric jet . Moderate tricuspid regurgitation. Atrial septal defect with significant change from left to right, especially the mitral regurgitation jet.   -Appreciate cardiology and CTS recommends mitral valve clip and ASD closure  -Change IV Bumex to p.o.  -BP soft.  Midodrine prn    : Plan for MitraClip tomorrow by CT surgery  PAF  ASD  S/p ascending aortic aneurysm repair  -TTE EF 65-70%.  Mod to severe TR and MR.  Mod PH.  pending, will need ATUL and right heart cath to assess shunt level  -Takes Eliquis at home, switch to Lovenox  -Continue metoprolol  -s/p digoxin load, continue 125 mcg daily  -VR still >100 with minor activity, just under 100 resting     HTN, currently low BP  -Recently discontinued amlodipine  -Continue metoprolol with holding parameters  -Blood pressure on softer side, holding losartan  -add midodrine prn    Medical Decision Making:   I personally reviewed labs: WBC BMP  I personally reviewed imaging: X-ray  I personally reviewed EKG:  Toxic drug monitoring:   Discussed case with: Patient RN    Code Status: Full code  DVT Prophylaxis:   GI Prophylaxis:    Subjective:     Chief Complaint / Reason for Physician Visit  Follow up Severe TR, MR, Afib, low BP  No acute issues, less short of breath  Objective:     VITALS:   Last 24hrs VS reviewed since prior progress note. Most recent  jaundice    Reviewed most current lab test results and cultures  YES  Reviewed most current radiology test results   YES  Review and summation of old records today    NO  Reviewed patient's current orders and MAR    YES  PMH/SH reviewed - no change compared to H&P    Procedures: see electronic medical records for all procedures/Xrays and details which were not copied into this note but were reviewed prior to creation of Plan.      LABS:  I reviewed today's most current labs and imaging studies.  Pertinent labs include:  Recent Labs     04/14/24  0431 04/15/24  0558 04/16/24  0625   WBC 5.5 6.2 6.8   HGB 11.5 11.5 12.7   HCT 38.1 38.1 43.7    194 194       Recent Labs     04/14/24  0431 04/15/24  0558 04/16/24  0625    138 136   K 4.6 5.1 4.7    104 100   CO2 33* 31 32   GLUCOSE 89 103* 97   BUN 19 18 25*   CREATININE 1.18* 1.33* 1.52*   CALCIUM 10.2* 10.1 10.9*   MG 2.4 2.3 2.5*   PHOS 3.2 3.5 3.7   BILITOT 0.8 0.9 0.8   AST 15 26 31   ALT 16 16 22         Signed: Pino Castellanos MD

## 2024-04-16 NOTE — PROGRESS NOTES
Bedside shift change report given to Cathy Farley RN (oncoming nurse) by Rosie Rebollar RN  (offgoing nurse). Report included the following information Nurse Handoff Report, Index, Adult Overview, Surgery Report, Intake/Output, MAR, Recent Results, Med Rec Status, Cardiac Rhythm A-Fib/ V-Paced, PVCs, and Alarm Parameters.  Pt. Is asymptomatic.  Vital signs are stable.

## 2024-04-16 NOTE — PLAN OF CARE
Problem: ABCDS Injury Assessment  Goal: Absence of physical injury  Outcome: Progressing  Flowsheets (Taken 4/16/2024 0030)  Absence of Physical Injury: Implement safety measures based on patient assessment     Problem: Safety - Adult  Goal: Free from fall injury  Outcome: Progressing  Flowsheets (Taken 4/16/2024 0030)  Free From Fall Injury: Instruct family/caregiver on patient safety     Problem: Chronic Conditions and Co-morbidities  Goal: Patient's chronic conditions and co-morbidity symptoms are monitored and maintained or improved  Outcome: Progressing  Flowsheets  Taken 4/16/2024 0030 by Cathy Farley RN  Care Plan - Patient's Chronic Conditions and Co-Morbidity Symptoms are Monitored and Maintained or Improved: Monitor and assess patient's chronic conditions and comorbid symptoms for stability, deterioration, or improvement  Taken 4/15/2024 2000 by Rosie Rebollar RN  Care Plan - Patient's Chronic Conditions and Co-Morbidity Symptoms are Monitored and Maintained or Improved:   Monitor and assess patient's chronic conditions and comorbid symptoms for stability, deterioration, or improvement   Collaborate with multidisciplinary team to address chronic and comorbid conditions and prevent exacerbation or deterioration   Update acute care plan with appropriate goals if chronic or comorbid symptoms are exacerbated and prevent overall improvement and discharge     Problem: Skin/Tissue Integrity  Goal: Absence of new skin breakdown  Description: 1.  Monitor for areas of redness and/or skin breakdown  2.  Assess vascular access sites hourly  3.  Every 4-6 hours minimum:  Change oxygen saturation probe site  4.  Every 4-6 hours:  If on nasal continuous positive airway pressure, respiratory therapy assess nares and determine need for appliance change or resting period.  Outcome: Progressing     Problem: Respiratory - Adult  Goal: Achieves optimal ventilation and oxygenation  Outcome:  Progressing  Flowsheets  Taken 4/16/2024 0030 by Cathy Farley RN  Achieves optimal ventilation and oxygenation: Assess and instruct to report shortness of breath or any respiratory difficulty  Taken 4/15/2024 2000 by Rosie Rebollar RN  Achieves optimal ventilation and oxygenation:   Assess for changes in mentation and behavior   Assess for changes in respiratory status   Position to facilitate oxygenation and minimize respiratory effort   Initiate smoking cessation protocol as indicated   Oxygen supplementation based on oxygen saturation or arterial blood gases   Assess the need for suctioning and aspirate as needed   Encourage broncho-pulmonary hygiene including cough, deep breathe, incentive spirometry   Assess and instruct to report shortness of breath or any respiratory difficulty   Respiratory therapy support as indicated     Problem: Cardiovascular - Adult  Goal: Maintains optimal cardiac output and hemodynamic stability  Outcome: Progressing  Flowsheets  Taken 4/16/2024 0030 by Cathy Farley RN  Maintains optimal cardiac output and hemodynamic stability: Monitor blood pressure and heart rate  Taken 4/15/2024 2000 by Rosie Rebollar RN  Maintains optimal cardiac output and hemodynamic stability:   Monitor blood pressure and heart rate   Monitor urine output and notify Licensed Independent Practitioner for values outside of normal range   Assess for signs of decreased cardiac output   Administer fluid and/or volume expanders as ordered   Administer vasoactive medications as ordered  Goal: Absence of cardiac dysrhythmias or at baseline  Outcome: Progressing  Flowsheets  Taken 4/16/2024 0030 by Cathy Farley RN  Absence of cardiac dysrhythmias or at baseline: Assess for signs of decreased cardiac output  Taken 4/15/2024 2000 by Rosie Rebollar RN  Absence of cardiac dysrhythmias or at baseline:   Assess for signs of decreased cardiac output   Monitor cardiac rate and rhythm   Administer

## 2024-04-16 NOTE — PROGRESS NOTES
Bedside shift change report given to Megan Villarreal RN  (oncoming nurse) by Cathy Farley RN  (offgoing nurse). Report included the following information Nurse Handoff Report, Index, Adult Overview, Surgery Report, Intake/Output, MAR, Recent Results, Med Rec Status, Cardiac Rhythm A-Fib/ V-Paced, PVCs, and Alarm Parameters.

## 2024-04-17 ENCOUNTER — ANESTHESIA EVENT (OUTPATIENT)
Facility: HOSPITAL | Age: 79
End: 2024-04-17
Payer: MEDICARE

## 2024-04-17 ENCOUNTER — HOSPITAL ENCOUNTER (OUTPATIENT)
Facility: HOSPITAL | Age: 79
Discharge: HOME | End: 2024-04-19
Attending: INTERNAL MEDICINE

## 2024-04-17 ENCOUNTER — ANESTHESIA (OUTPATIENT)
Facility: HOSPITAL | Age: 79
End: 2024-04-17
Payer: MEDICARE

## 2024-04-17 LAB
25(OH)D3 SERPL-MCNC: 34.4 NG/ML (ref 30–100)
ALBUMIN SERPL-MCNC: 3.5 G/DL (ref 3.5–5)
ALBUMIN/GLOB SERPL: 0.9 (ref 1.1–2.2)
ALP SERPL-CCNC: 71 U/L (ref 45–117)
ALT SERPL-CCNC: 23 U/L (ref 12–78)
ANION GAP SERPL CALC-SCNC: 5 MMOL/L (ref 5–15)
AST SERPL-CCNC: 35 U/L (ref 15–37)
BASOPHILS # BLD: 0 K/UL (ref 0–0.1)
BASOPHILS NFR BLD: 1 % (ref 0–1)
BILIRUB SERPL-MCNC: 0.7 MG/DL (ref 0.2–1)
BUN SERPL-MCNC: 33 MG/DL (ref 6–20)
BUN/CREAT SERPL: 21 (ref 12–20)
CALCIUM SERPL-MCNC: 10.6 MG/DL (ref 8.5–10.1)
CALCIUM SERPL-MCNC: 11 MG/DL (ref 8.5–10.1)
CHLORIDE SERPL-SCNC: 102 MMOL/L (ref 97–108)
CO2 SERPL-SCNC: 32 MMOL/L (ref 21–32)
CREAT SERPL-MCNC: 1.55 MG/DL (ref 0.55–1.02)
DIFFERENTIAL METHOD BLD: ABNORMAL
ECHO BSA: 1.74 M2
ECHO BSA: 1.74 M2
EOSINOPHIL # BLD: 0.2 K/UL (ref 0–0.4)
EOSINOPHIL NFR BLD: 3 % (ref 0–7)
ERYTHROCYTE [DISTWIDTH] IN BLOOD BY AUTOMATED COUNT: 14.1 % (ref 11.5–14.5)
GLOBULIN SER CALC-MCNC: 4 G/DL (ref 2–4)
GLUCOSE BLD STRIP.AUTO-MCNC: 95 MG/DL (ref 65–117)
GLUCOSE SERPL-MCNC: 111 MG/DL (ref 65–100)
HCT VFR BLD AUTO: 42.6 % (ref 35–47)
HGB BLD-MCNC: 12.9 G/DL (ref 11.5–16)
IMM GRANULOCYTES # BLD AUTO: 0 K/UL (ref 0–0.04)
IMM GRANULOCYTES NFR BLD AUTO: 0 % (ref 0–0.5)
LYMPHOCYTES # BLD: 2.4 K/UL (ref 0.8–3.5)
LYMPHOCYTES NFR BLD: 39 % (ref 12–49)
MAGNESIUM SERPL-MCNC: 2.6 MG/DL (ref 1.6–2.4)
MCH RBC QN AUTO: 28 PG (ref 26–34)
MCHC RBC AUTO-ENTMCNC: 30.3 G/DL (ref 30–36.5)
MCV RBC AUTO: 92.4 FL (ref 80–99)
MONOCYTES # BLD: 1 K/UL (ref 0–1)
MONOCYTES NFR BLD: 16 % (ref 5–13)
NEUTS SEG # BLD: 2.5 K/UL (ref 1.8–8)
NEUTS SEG NFR BLD: 41 % (ref 32–75)
NRBC # BLD: 0 K/UL (ref 0–0.01)
NRBC BLD-RTO: 0 PER 100 WBC
PHOSPHATE SERPL-MCNC: 3 MG/DL (ref 2.6–4.7)
PLATELET # BLD AUTO: 204 K/UL (ref 150–400)
PMV BLD AUTO: 10.9 FL (ref 8.9–12.9)
POTASSIUM SERPL-SCNC: 5 MMOL/L (ref 3.5–5.1)
PROT SERPL-MCNC: 7.5 G/DL (ref 6.4–8.2)
PTH-INTACT SERPL-MCNC: 101.9 PG/ML (ref 18.4–88)
RBC # BLD AUTO: 4.61 M/UL (ref 3.8–5.2)
SERVICE CMNT-IMP: NORMAL
SODIUM SERPL-SCNC: 139 MMOL/L (ref 136–145)
WBC # BLD AUTO: 6.2 K/UL (ref 3.6–11)

## 2024-04-17 PROCEDURE — 7100000000 HC PACU RECOVERY - FIRST 15 MIN: Performed by: INTERNAL MEDICINE

## 2024-04-17 PROCEDURE — 02RF38Z REPLACEMENT OF AORTIC VALVE WITH ZOOPLASTIC TISSUE, PERCUTANEOUS APPROACH: ICD-10-PCS | Performed by: INTERNAL MEDICINE

## 2024-04-17 PROCEDURE — 02Q50ZZ REPAIR ATRIAL SEPTUM, OPEN APPROACH: ICD-10-PCS | Performed by: INTERNAL MEDICINE

## 2024-04-17 PROCEDURE — 02UG3JZ SUPPLEMENT MITRAL VALVE WITH SYNTHETIC SUBSTITUTE, PERCUTANEOUS APPROACH: ICD-10-PCS | Performed by: INTERNAL MEDICINE

## 2024-04-17 PROCEDURE — 82652 VIT D 1 25-DIHYDROXY: CPT

## 2024-04-17 PROCEDURE — 85025 COMPLETE CBC W/AUTO DIFF WBC: CPT

## 2024-04-17 PROCEDURE — 2500000003 HC RX 250 WO HCPCS

## 2024-04-17 PROCEDURE — 6370000000 HC RX 637 (ALT 250 FOR IP): Performed by: INTERNAL MEDICINE

## 2024-04-17 PROCEDURE — 2060000000 HC ICU INTERMEDIATE R&B

## 2024-04-17 PROCEDURE — 6370000000 HC RX 637 (ALT 250 FOR IP): Performed by: GENERAL ACUTE CARE HOSPITAL

## 2024-04-17 PROCEDURE — 82962 GLUCOSE BLOOD TEST: CPT

## 2024-04-17 PROCEDURE — 2720000010 HC SURG SUPPLY STERILE: Performed by: INTERNAL MEDICINE

## 2024-04-17 PROCEDURE — 3700000001 HC ADD 15 MINUTES (ANESTHESIA): Performed by: INTERNAL MEDICINE

## 2024-04-17 PROCEDURE — 2709999900 HC NON-CHARGEABLE SUPPLY: Performed by: INTERNAL MEDICINE

## 2024-04-17 PROCEDURE — C1760 CLOSURE DEV, VASC: HCPCS | Performed by: INTERNAL MEDICINE

## 2024-04-17 PROCEDURE — 2500000003 HC RX 250 WO HCPCS: Performed by: NURSE ANESTHETIST, CERTIFIED REGISTERED

## 2024-04-17 PROCEDURE — 83970 ASSAY OF PARATHORMONE: CPT

## 2024-04-17 PROCEDURE — 93580 TRANSCATH CLOSURE OF ASD: CPT | Performed by: INTERNAL MEDICINE

## 2024-04-17 PROCEDURE — 03HY32Z INSERTION OF MONITORING DEVICE INTO UPPER ARTERY, PERCUTANEOUS APPROACH: ICD-10-PCS | Performed by: ANESTHESIOLOGY

## 2024-04-17 PROCEDURE — 76937 US GUIDE VASCULAR ACCESS: CPT | Performed by: INTERNAL MEDICINE

## 2024-04-17 PROCEDURE — 36415 COLL VENOUS BLD VENIPUNCTURE: CPT

## 2024-04-17 PROCEDURE — 6360000002 HC RX W HCPCS: Performed by: ANESTHESIOLOGY

## 2024-04-17 PROCEDURE — 6360000002 HC RX W HCPCS

## 2024-04-17 PROCEDURE — 84165 PROTEIN E-PHORESIS SERUM: CPT

## 2024-04-17 PROCEDURE — C1769 GUIDE WIRE: HCPCS | Performed by: INTERNAL MEDICINE

## 2024-04-17 PROCEDURE — 83521 IG LIGHT CHAINS FREE EACH: CPT

## 2024-04-17 PROCEDURE — C1889 IMPLANT/INSERT DEVICE, NOC: HCPCS | Performed by: INTERNAL MEDICINE

## 2024-04-17 PROCEDURE — 3700000000 HC ANESTHESIA ATTENDED CARE: Performed by: INTERNAL MEDICINE

## 2024-04-17 PROCEDURE — 83735 ASSAY OF MAGNESIUM: CPT

## 2024-04-17 PROCEDURE — 2700000000 HC OXYGEN THERAPY PER DAY

## 2024-04-17 PROCEDURE — 85347 COAGULATION TIME ACTIVATED: CPT

## 2024-04-17 PROCEDURE — 80053 COMPREHEN METABOLIC PANEL: CPT

## 2024-04-17 PROCEDURE — 84100 ASSAY OF PHOSPHORUS: CPT

## 2024-04-17 PROCEDURE — C1725 CATH, TRANSLUMIN NON-LASER: HCPCS | Performed by: INTERNAL MEDICINE

## 2024-04-17 PROCEDURE — 7100000001 HC PACU RECOVERY - ADDTL 15 MIN: Performed by: INTERNAL MEDICINE

## 2024-04-17 PROCEDURE — C1894 INTRO/SHEATH, NON-LASER: HCPCS | Performed by: INTERNAL MEDICINE

## 2024-04-17 PROCEDURE — 2580000003 HC RX 258: Performed by: INTERNAL MEDICINE

## 2024-04-17 PROCEDURE — 33418 REPAIR TCAT MITRAL VALVE: CPT | Performed by: INTERNAL MEDICINE

## 2024-04-17 PROCEDURE — 6370000000 HC RX 637 (ALT 250 FOR IP)

## 2024-04-17 PROCEDURE — 6360000002 HC RX W HCPCS: Performed by: INTERNAL MEDICINE

## 2024-04-17 PROCEDURE — 82306 VITAMIN D 25 HYDROXY: CPT

## 2024-04-17 PROCEDURE — 2580000003 HC RX 258

## 2024-04-17 RX ORDER — FENTANYL CITRATE 50 UG/ML
INJECTION, SOLUTION INTRAMUSCULAR; INTRAVENOUS PRN
Status: DISCONTINUED | OUTPATIENT
Start: 2024-04-17 | End: 2024-04-17 | Stop reason: SDUPTHER

## 2024-04-17 RX ORDER — HEPARIN SODIUM 1000 [USP'U]/ML
INJECTION, SOLUTION INTRAVENOUS; SUBCUTANEOUS PRN
Status: DISCONTINUED | OUTPATIENT
Start: 2024-04-17 | End: 2024-04-17 | Stop reason: SDUPTHER

## 2024-04-17 RX ORDER — FENTANYL CITRATE 50 UG/ML
25 INJECTION, SOLUTION INTRAMUSCULAR; INTRAVENOUS EVERY 5 MIN PRN
Status: DISCONTINUED | OUTPATIENT
Start: 2024-04-17 | End: 2024-04-17 | Stop reason: HOSPADM

## 2024-04-17 RX ORDER — DEXAMETHASONE SODIUM PHOSPHATE 4 MG/ML
INJECTION, SOLUTION INTRA-ARTICULAR; INTRALESIONAL; INTRAMUSCULAR; INTRAVENOUS; SOFT TISSUE PRN
Status: DISCONTINUED | OUTPATIENT
Start: 2024-04-17 | End: 2024-04-17 | Stop reason: SDUPTHER

## 2024-04-17 RX ORDER — SODIUM CHLORIDE 9 MG/ML
INJECTION, SOLUTION INTRAVENOUS PRN
Status: DISCONTINUED | OUTPATIENT
Start: 2024-04-17 | End: 2024-04-19 | Stop reason: HOSPADM

## 2024-04-17 RX ORDER — CEFAZOLIN SODIUM/WATER 2 G/20 ML
SYRINGE (ML) INTRAVENOUS PRN
Status: DISCONTINUED | OUTPATIENT
Start: 2024-04-17 | End: 2024-04-17 | Stop reason: SDUPTHER

## 2024-04-17 RX ORDER — FENTANYL CITRATE 50 UG/ML
INJECTION, SOLUTION INTRAMUSCULAR; INTRAVENOUS
Status: COMPLETED
Start: 2024-04-17 | End: 2024-04-17

## 2024-04-17 RX ORDER — PHENYLEPHRINE HCL IN 0.9% NACL 0.4MG/10ML
SYRINGE (ML) INTRAVENOUS PRN
Status: DISCONTINUED | OUTPATIENT
Start: 2024-04-17 | End: 2024-04-17 | Stop reason: SDUPTHER

## 2024-04-17 RX ORDER — SODIUM CHLORIDE, SODIUM LACTATE, POTASSIUM CHLORIDE, CALCIUM CHLORIDE 600; 310; 30; 20 MG/100ML; MG/100ML; MG/100ML; MG/100ML
INJECTION, SOLUTION INTRAVENOUS CONTINUOUS PRN
Status: DISCONTINUED | OUTPATIENT
Start: 2024-04-17 | End: 2024-04-17 | Stop reason: SDUPTHER

## 2024-04-17 RX ORDER — DIGOXIN 0.25 MG/ML
250 INJECTION INTRAMUSCULAR; INTRAVENOUS ONCE
Status: COMPLETED | OUTPATIENT
Start: 2024-04-17 | End: 2024-04-17

## 2024-04-17 RX ORDER — ROCURONIUM BROMIDE 10 MG/ML
INJECTION, SOLUTION INTRAVENOUS PRN
Status: DISCONTINUED | OUTPATIENT
Start: 2024-04-17 | End: 2024-04-17 | Stop reason: SDUPTHER

## 2024-04-17 RX ORDER — SODIUM CHLORIDE 0.9 % (FLUSH) 0.9 %
5-40 SYRINGE (ML) INJECTION PRN
Status: DISCONTINUED | OUTPATIENT
Start: 2024-04-17 | End: 2024-04-19 | Stop reason: HOSPADM

## 2024-04-17 RX ORDER — LIDOCAINE HYDROCHLORIDE 20 MG/ML
INJECTION, SOLUTION EPIDURAL; INFILTRATION; INTRACAUDAL; PERINEURAL PRN
Status: DISCONTINUED | OUTPATIENT
Start: 2024-04-17 | End: 2024-04-17 | Stop reason: SDUPTHER

## 2024-04-17 RX ADMIN — FENTANYL CITRATE 25 MCG: 50 INJECTION INTRAMUSCULAR; INTRAVENOUS at 17:45

## 2024-04-17 RX ADMIN — METOPROLOL TARTRATE 25 MG: 25 TABLET, FILM COATED ORAL at 22:36

## 2024-04-17 RX ADMIN — SODIUM CHLORIDE, POTASSIUM CHLORIDE, SODIUM LACTATE AND CALCIUM CHLORIDE: 600; 310; 30; 20 INJECTION, SOLUTION INTRAVENOUS at 13:42

## 2024-04-17 RX ADMIN — DIGOXIN 250 MCG: 0.25 INJECTION INTRAMUSCULAR; INTRAVENOUS at 17:58

## 2024-04-17 RX ADMIN — ROCURONIUM BROMIDE 10 MG: 10 INJECTION INTRAVENOUS at 14:28

## 2024-04-17 RX ADMIN — DEXAMETHASONE SODIUM PHOSPHATE 4 MG: 4 INJECTION, SOLUTION INTRAMUSCULAR; INTRAVENOUS at 14:02

## 2024-04-17 RX ADMIN — PANTOPRAZOLE SODIUM 40 MG: 40 TABLET, DELAYED RELEASE ORAL at 06:31

## 2024-04-17 RX ADMIN — FENTANYL CITRATE 50 MCG: 50 INJECTION, SOLUTION INTRAMUSCULAR; INTRAVENOUS at 16:27

## 2024-04-17 RX ADMIN — SODIUM CHLORIDE, PRESERVATIVE FREE 10 ML: 5 INJECTION INTRAVENOUS at 20:39

## 2024-04-17 RX ADMIN — ACETAMINOPHEN 650 MG: 325 TABLET ORAL at 20:37

## 2024-04-17 RX ADMIN — ROCURONIUM BROMIDE 10 MG: 10 INJECTION INTRAVENOUS at 14:57

## 2024-04-17 RX ADMIN — Medication 80 MCG: at 13:49

## 2024-04-17 RX ADMIN — BUMETANIDE 2 MG: 1 TABLET ORAL at 08:00

## 2024-04-17 RX ADMIN — PROPOFOL 100 MG: 10 INJECTION, EMULSION INTRAVENOUS at 13:42

## 2024-04-17 RX ADMIN — Medication 2000 MG: at 13:45

## 2024-04-17 RX ADMIN — PRAVASTATIN SODIUM 20 MG: 10 TABLET ORAL at 08:00

## 2024-04-17 RX ADMIN — SENNOSIDES AND DOCUSATE SODIUM 1 TABLET: 50; 8.6 TABLET ORAL at 08:00

## 2024-04-17 RX ADMIN — SUGAMMADEX 200 MG: 100 INJECTION, SOLUTION INTRAVENOUS at 16:31

## 2024-04-17 RX ADMIN — CALCIUM CARBONATE (ANTACID) CHEW TAB 500 MG 500 MG: 500 CHEW TAB at 20:38

## 2024-04-17 RX ADMIN — FENTANYL CITRATE 25 MCG: 50 INJECTION INTRAMUSCULAR; INTRAVENOUS at 17:34

## 2024-04-17 RX ADMIN — HEPARIN SODIUM 7000 UNITS: 1000 INJECTION, SOLUTION INTRAVENOUS; SUBCUTANEOUS at 14:04

## 2024-04-17 RX ADMIN — PHENYLEPHRINE HYDROCHLORIDE 40 MCG/MIN: 10 INJECTION INTRAVENOUS at 13:53

## 2024-04-17 RX ADMIN — POTASSIUM CHLORIDE 40 MEQ: 1500 TABLET, EXTENDED RELEASE ORAL at 08:00

## 2024-04-17 RX ADMIN — ROCURONIUM BROMIDE 10 MG: 10 INJECTION INTRAVENOUS at 14:02

## 2024-04-17 RX ADMIN — SODIUM CHLORIDE, PRESERVATIVE FREE 10 ML: 5 INJECTION INTRAVENOUS at 08:01

## 2024-04-17 RX ADMIN — ROCURONIUM BROMIDE 20 MG: 10 INJECTION INTRAVENOUS at 14:12

## 2024-04-17 RX ADMIN — ROCURONIUM BROMIDE 40 MG: 10 INJECTION INTRAVENOUS at 13:43

## 2024-04-17 RX ADMIN — HEPARIN SODIUM 5000 UNITS: 1000 INJECTION, SOLUTION INTRAVENOUS; SUBCUTANEOUS at 14:22

## 2024-04-17 RX ADMIN — FENTANYL CITRATE 50 MCG: 50 INJECTION, SOLUTION INTRAMUSCULAR; INTRAVENOUS at 13:42

## 2024-04-17 RX ADMIN — HEPARIN SODIUM 3000 UNITS: 1000 INJECTION, SOLUTION INTRAVENOUS; SUBCUTANEOUS at 14:37

## 2024-04-17 RX ADMIN — LIDOCAINE HYDROCHLORIDE 80 MG: 20 INJECTION, SOLUTION EPIDURAL; INFILTRATION; INTRACAUDAL; PERINEURAL at 13:42

## 2024-04-17 ASSESSMENT — PAIN DESCRIPTION - ORIENTATION
ORIENTATION: LEFT
ORIENTATION: LEFT

## 2024-04-17 ASSESSMENT — ENCOUNTER SYMPTOMS: SHORTNESS OF BREATH: 1

## 2024-04-17 ASSESSMENT — PAIN SCALES - GENERAL
PAINLEVEL_OUTOF10: 6
PAINLEVEL_OUTOF10: 0
PAINLEVEL_OUTOF10: 3

## 2024-04-17 ASSESSMENT — PAIN DESCRIPTION - DESCRIPTORS
DESCRIPTORS: ACHING
DESCRIPTORS: ACHING

## 2024-04-17 ASSESSMENT — PAIN DESCRIPTION - LOCATION
LOCATION: SHOULDER
LOCATION: SHOULDER

## 2024-04-17 ASSESSMENT — PAIN DESCRIPTION - FREQUENCY: FREQUENCY: INTERMITTENT

## 2024-04-17 ASSESSMENT — PAIN DESCRIPTION - ONSET: ONSET: GRADUAL

## 2024-04-17 ASSESSMENT — PAIN - FUNCTIONAL ASSESSMENT: PAIN_FUNCTIONAL_ASSESSMENT: ACTIVITIES ARE NOT PREVENTED

## 2024-04-17 ASSESSMENT — PAIN DESCRIPTION - PAIN TYPE: TYPE: CHRONIC PAIN

## 2024-04-17 NOTE — PROGRESS NOTES
hospitals FLOOR (Pre-op) Progress Note    Admit Date: 2024  POD: 2 Days Post-Op      Procedure:  Procedure(s):  Right heart cath  [place] during cath case      Subjective/overnight events:   Pt seen in bed. Still alternates between paced and AF, on 1 liters via nasal cannula, afebrile.  Pressures remain on soft side.  No events overnight. States breathing is about the same.      Objective:     BP (!) 103/59   Pulse 91   Temp 97.7 °F (36.5 °C) (Oral)   Resp 16   Ht 1.651 m (5' 5\")   Wt 66.3 kg (146 lb 2.6 oz)   SpO2 91%   BMI 24.32 kg/m²   Temp (24hrs), Av.1 °F (36.7 °C), Min:97.7 °F (36.5 °C), Max:98.4 °F (36.9 °C)      Last 24hr Input/Output:    Intake/Output Summary (Last 24 hours) at 2024 0913  Last data filed at 2024 0800  Gross per 24 hour   Intake 580 ml   Output 1150 ml   Net -570 ml        Pre-op Workup    Carotid duplex: Would need if decision is made for surgery.    Cardiac cath: 24    CARDIAC PROCEDURE 04/15/2024  1:25 PM (Final)    Conclusion  Table formatting from the original result was not included.      Cardiac Catheterization Procedure Note      Patient: Azucena Myrick Age: 78 y.o. Sex: female  YOB: 1945 Admit Date: [unfilled] PCP: @PCP@  MRN: [id number]  [facility]: [id number]      Date of Procedure: 4/15/2024  Referring Physician: Amalia Amaya AP*  Pre-procedure Diagnosis: Acute decompensated biventricular systolic failure post diuresis  Post-procedure Diagnosis: Acute decompensated biventricular systolic failure    Procedures performed    Ultrasound-guided access of right internal jugular vein  Right heart catheterization      Description of Procedure:  After obtaining informed consent, the patient was brought to the cardiac catheterization laboratory.  The right internal jugular area was prepped and draped in sterile fashion.  After administration of 1% lidocaine to the access site, ultrasound guidance was used to access the right internal jugular vein with a

## 2024-04-17 NOTE — ANESTHESIA PROCEDURE NOTES
Procedure Performed: ATUL       Start Time:        End Time:      Preanesthesia Checklist:  Patient identified, IV assessed, risks and benefits discussed, monitors and equipment assessed, procedure being performed at surgeon's request and anesthesia consent obtained.    General Procedure Information  Diagnostic Indications for Echo:  assessment of surgical repair and assessment of valve function  Location performed:  OR  Intubated  Heart visualized  Probe Type:  3D and mulitplane  Modalities:  2D, 3D, continuous wave Doppler, color flow mapping and pulse wave Doppler    Echocardiographic and Doppler Measurements    Ventricles    Right Ventricle:  Cavity size dilated.  Hypertrophy not present.  Thrombus not present.  Global function mildly impaired.    Left Ventricle:  Cavity size dilated.  Hypertrophy not present.  Thrombus not present.  Global Function moderately impaired.  Ejection Fraction 45%.      Ventricular Regional Function:  2- Basal Anterior:  hypokinetic  3- Basal Anterolateral:  hypokinetic  4- Basal Inferolateral:  hypokinetic  5- Basal Inferior:  hypokinetic  6- Basal Inferoseptal:  hypokinetic  7- Mid Anteroseptal:  hypokinetic  8- Mid Anterior:  hypokinetic  9- Mid Anterolateral:  hypokinetic  10- Mid Inferolateral:  hypokinetic  11- Mid Inferior:  hypokinetic  12- Mid Inferoseptal:  hypokinetic  13- Apical Anterior:  hypokinetic  14- Apical Lateral:  hypokinetic  15- Apical Inferior:  hypokinetic  16- Apical Septal:  hypokinetic  17- [place]:  hypokinetic      Valves    Aortic Valve:  Stenosis not present.  Regurgitation mild.  Leaflets thickened.  Leaflet motions normal.      Mitral Valve:  Annulus dilated.  Stenosis not present.  Regurgitation severe.  Leaflets thickened.  Leaflet motions prolapsed.  Specific leaflet segments with abnormal motions are described in the following comments:       A2    Tricuspid Valve:  Annulus dilated.  Stenosis not present.  Regurgitation moderate.  Leaflets normal.

## 2024-04-17 NOTE — ANESTHESIA PRE PROCEDURE
HYSTERECTOMY (CERVIX STATUS UNKNOWN)     • OTHER SURGICAL HISTORY  2015    Type A Dissection Repair   • UPPER GI ENDOSCOPY,BALL DIL,30MM  7/12/2017        • UPPER GI ENDOSCOPY,BIOPSY  7/12/2017            Social History:    Social History     Tobacco Use   • Smoking status: Never   • Smokeless tobacco: Never   Substance Use Topics   • Alcohol use: Yes     Alcohol/week: 0.0 standard drinks of alcohol                                Counseling given: Not Answered      Vital Signs (Current):   Vitals:    04/17/24 0400 04/17/24 0600 04/17/24 0800 04/17/24 1200   BP: 104/74 96/71 (!) 103/59 91/61   Pulse: 95 92 91 97   Resp: 18  16 18   Temp: 98.3 °F (36.8 °C)  97.7 °F (36.5 °C) 98 °F (36.7 °C)   TempSrc: Oral  Oral Oral   SpO2: 94% 93% 91% 96%   Weight:       Height:                                                  BP Readings from Last 3 Encounters:   04/17/24 91/61   04/11/24 92/68   11/02/23 122/75       NPO Status:                                                                                 BMI:   Wt Readings from Last 3 Encounters:   04/17/24 66.3 kg (146 lb 2.6 oz)   04/11/24 71.7 kg (158 lb)   11/02/23 77.4 kg (170 lb 9.6 oz)     Body mass index is 24.32 kg/m².    CBC:   Lab Results   Component Value Date/Time    WBC 6.2 04/17/2024 03:51 AM    RBC 4.61 04/17/2024 03:51 AM    HGB 12.9 04/17/2024 03:51 AM    HCT 42.6 04/17/2024 03:51 AM    MCV 92.4 04/17/2024 03:51 AM    RDW 14.1 04/17/2024 03:51 AM     04/17/2024 03:51 AM       CMP:   Lab Results   Component Value Date/Time     04/17/2024 03:51 AM    K 5.0 04/17/2024 03:51 AM     04/17/2024 03:51 AM    CO2 32 04/17/2024 03:51 AM    BUN 33 04/17/2024 03:51 AM    CREATININE 1.55 04/17/2024 03:51 AM    GFRAA >60 08/22/2022 11:18 AM    AGRATIO 0.9 04/17/2024 03:51 AM    AGRATIO 1.4 02/22/2023 11:48 AM    LABGLOM 34 04/17/2024 03:51 AM    GLUCOSE 111 04/17/2024 03:51 AM    PROT 7.5 04/17/2024 03:51 AM    CALCIUM 10.6 04/17/2024 03:51 AM    BILITOT

## 2024-04-17 NOTE — PROGRESS NOTES
Hospitalist Progress Note    NAME:   Azucena Myrick   : 1945   MRN: [id number]     Date/Time: 2024 9:47 AM  Patient PCP: Bhavana Mendoza MD    Estimated discharge date: 2 days  Barriers: MitraClip on , cardiac clearance, cardiac surgery clearance, improvement in kidney function, nephrology clearance      Assessment / Plan:  Acute on chronic CHF with preserved ejection fraction  Severe TR  Mitral valve regurgitation  ASD  -Echocardiogram 2023, EF 60 to 65%, moderate mitral valve regurgitation, severe tricuspid regurgitation  -Sent from CTS surgery office for admission for valve surgery consideration  -Need to optimize volume status  RHC/ATUL: severe degenerative nonrheumatic mitral regurgitation eccentric jet . Moderate tricuspid regurgitation. Atrial septal defect with significant change from left to right, especially the mitral regurgitation jet.   -Appreciate cardiology and CTS recommends mitral valve clip and ASD closure  -Change IV Bumex to p.o.  -BP soft.  Midodrine prn    : Plan for MitraClip tomorrow by CT surgery  : The patient is planned for MitraClip today.    ADOLFO on CKD  BUN/creatinine of 35/1.5. Patient's baseline creatinine is between 1.0-1.1 and during this admission, it has trended up 1.3, 1.5 and stayed there.  I consulted nephrology and spoke with Dr. Ramirez.  Bumex is on hold for now.  Will wait for further recommendations from nephrology.    Paroxysmal A-fib on Eliquis  ASD  S/p ascending aortic aneurysm repair  -TTE EF 65-70%.  Mod to severe TR and MR.  Mod PH.  pending, will need ATUL and right heart cath to assess shunt level  -Takes Eliquis at home, switch to Lovenox  -Continue metoprolol  -s/p digoxin load, continue 125 mcg daily  -VR still >100 with minor activity, just under 100 resting     HTN, currently low BP  -Recently discontinued amlodipine  -Continue metoprolol with holding parameters  -Blood pressure on softer side, holding losartan  -add

## 2024-04-17 NOTE — PROGRESS NOTES
0715: Bedside shift report given to Rony CHO by Sesar CHO.     0800: Shift assessment completed, see flow sheets.     0945: Pt ambulated to bathroom. Voided. Up to chair.     0958: Nephrology consult perfect served to Ashley HAYES.     1200: Reassessment completed, see flow sheets.     1315: Pt off unit to [facility] for kg clip and ASD repair.     1555: Transfer report called to Claud[facility] CHO [facility]. Pt belongings and  taken to 2215.

## 2024-04-17 NOTE — CONSULTS
Consultation Note    NAME: Azucena Myrick   :  1945   MRN:  [id number]     Date/Time:  2024 10:41 AM    I have been asked to see this patient by Dr. Raymundo  for advice/opinion re: adolfo.     Assessment :    Plan:  ADOLFO  Hypercalcemia  MR  H/o HTN, now low BP Creatinine nml as of  (0.8), now up to 1.55 (1.1 on admission and a gentle rise since admission)    Recheck urine sediment, upc and fena; w/u high calcium (check PTH, VD, SPEP/FLC ratio as a start)    On midodrine    For ASD closure and MitraClip today    ADOLFO likely likely hemodynamic        Subjective:   CHIEF COMPLAINT:  \"adolfo    HISTORY OF PRESENT ILLNESS:     Azucena is a 78 y.o.   female who has a history of the below. Admitted with sob, diane, orthopnea that had failed outpatient management. Chart reviewed in detail. Oob in chair with no complaints at the time of my visit.     Past Medical History:   Diagnosis Date    Acute on chronic heart failure, unspecified heart failure type (HCC) 2024    Allergic conjunctivitis 2017    Aortic aneurysm (HCC) 2018    Thoracic, abdominal    Arthritis     lower back    Asthma     Current use of long term anticoagulation     Essential hypertension 2017    GERD (gastroesophageal reflux disease)     History of aortic dissection     History of echocardiogram 2019    Left Ventricle: Normal cavity size, systolic function (ejection fraction normal) and diastolic function. Moderate concentric hypertrophy. Estimated left ventricular ejection fraction is 56 - 60%. No regional wall motion abnormality noted.Small secundum atrial septal defect present. Left Atrium: Moderately dilated left atrium. Mitral Valve: Mitral valve thickening. Mild mitral valve regurgitation.    History of echocardiogram 2023    LVEF 60-65%. diastolic dysfunction. Mild MAC. Moderate MR. mild ASCL w/o AS. mod LAE.    History of vertigo 2017    positional    Hx of repair of aortic root     Hypercholesterolemia

## 2024-04-17 NOTE — OP NOTE
Operative Note      Patient: Azucena Myrick Age: 78 y.o. Sex: female    YOB: 1945 Admit Date: 4/11/2024 PCP: Bhavana Mendoza MD   MRN: [id number]  [facility]: [id number]         Date of Procedure: 4/17/2024   Primary care physician: Bhavana Mendoza MD  Primary cardiologist: Dr. Palacio  Pre-procedure Diagnosis: Severe degenerative nonrheumatic mitral regurgitation with congestive heart failure class III, atrial septal defect  Post-procedure Diagnosis: Severe degenerative nonrheumatic mitral regurgitation with congestive heart failure class III, atrial septal defect  Indication: Severe degenerative nonrheumatic mitral regurgitation with congestive heart failure and hospitalization, heart team discussion recommended percutaneous option due to patient advanced age, prior sternotomy, residual aortic dissection.    Primary Surgeon: Giovanni Nguyen MD, [facility], [facility]  Anesthesiologist/structural imager: Austin Ambrose MD    Procedures performed  Transcatheter [id number] repair mitral regurgitation with MitraClip x 3 (XT W, XT W, NTW) with residual mild mitral regurgitation and mean diastolic mitral gradient 3 mmHg at heart rate of 80 bpm    Percutaneous closure the atrial septal defect with 12 mm Amplatz ASD occluder    Ultrasound guidance to access the right common femoral vein.      Description of Procedure:  After informed consent, patient was brought to the cardiac catheterization laboratories at [facility].  Detailed timeout was performed.  Prophylactic antibiotics given.  We access patient's right common femoral vein using modified technique and ultrasound and fluoroscopic guidance using micropuncture set.  2 Pro-glide suture mid vessel repeat deployed followed by placement of 10 [place] sheath.  Patient had a prior atrial septal defect, we elected to Use the defect for MitraClip procedure today.  Patient is fully heparinized.  Atrial septal defect was crossed with a JR4 4 and a J-tip  wire.  A Confida wire was placed in the left atrium under ATUL and fluoroscopic guidance.  MitraClip guiding catheter was inserted to the left atrium under fluoroscopic and ATUL guidance.  A MitraClip XT W system was advanced and steered appropriately.  We elected to treat the medial pathology with most degenerative component first.  A grasp was performed and her excellent structural imaging team to confirm leaflet insertion tissue bridge stable daily.  After thorough investigation the clip was deployed.  At this time we had a lateral pathology left to treat.  We took a second XT W MitraClip and placed lateral to to the initial clip.  Thorough investigation was done by ATUL imaging team.  At this time we have mild to moderate mitral regurgitation and the clip was deployed.  After deployment see additional discharge lateral to the second clip.  We elected to go with third MitraClip NT W at this time.  Again this appropriate staining was done and the clip was deployed lateral to clip #2.  Needs insertion, tissue bridge other parameters were thoroughly investigated by her imaging team.  Clip was deployed and delivery catheter was removed from patient's cardiac chambers.  At this time your mild mitral regurgitation with mean gradient of 3 mmHg.  We had planned to close the atrial septal defect preprocedurally.  The MitraClip guiding catheter was exchanged with a [place] dry seal 26 mm sheath.  The defect was again crossed and a Confida wire was placed in the left atrium.  Amplatz delivery system was advanced and placed in the left atrium.  12 mm occluder was advanced and placed in excellent location.  Tug test was performed.  [place] wiggles were performed.  After appropriate imaging device was deployed.  At this time all the catheters wires removed patient's cardiac chambers.  Hemostasis was achieved with prior Perclose and a figure-of-eight stitch.  Patient tolerated the procedure well there is no immediate  complication    Complication: None  Blood loss: Minimal    We really appreciate you allowing us to participate in and Azucena Myrick's care, if you have further questions feel free to contact me my cell phone #[phone].  Thank you.  Sincerely,  Giovanni Nguyen MD [facility]  Interventional & Structural Cardiology

## 2024-04-17 NOTE — PROGRESS NOTES
TRANSFER - OUT REPORT:    Verbal procedural report given to [facility] on Azucena Myrick being transferred to PACU post procedure for post MITRAL VALVE REPAIR, CLOSURE OF ASD       Report consisted of patient’s Situation, Background, Assessment and   Recommendations(SBAR).     Information from the following report(s) PROCEDURE, MEDICATION GIVEN , VS, RIGHT FEMORAL VEIN ACCESS SITE was reviewed with the receiving nurse.    Opportunity for questions and clarification was provided.

## 2024-04-17 NOTE — PLAN OF CARE
Aox4, on 1L NC, voids, ambulatory with bathroom privileges.    A-Fib/paced, BP stable.    NPO since midnight. Pending cath lab procedure (transcath. TV/MV repair, ASD repair) planned later today at 12:30 pm.    Denies pain, no acute events overnight                    Problem: Safety - Adult  Goal: Free from fall injury  Outcome: Progressing     Problem: Chronic Conditions and Co-morbidities  Goal: Patient's chronic conditions and co-morbidity symptoms are monitored and maintained or improved  Outcome: Progressing  Flowsheets (Taken 4/16/2024 2000)  Care Plan - Patient's Chronic Conditions and Co-Morbidity Symptoms are Monitored and Maintained or Improved: Monitor and assess patient's chronic conditions and comorbid symptoms for stability, deterioration, or improvement     Problem: Skin/Tissue Integrity  Goal: Absence of new skin breakdown  Description: 1.  Monitor for areas of redness and/or skin breakdown  2.  Assess vascular access sites hourly  3.  Every 4-6 hours minimum:  Change oxygen saturation probe site  4.  Every 4-6 hours:  If on nasal continuous positive airway pressure, respiratory therapy assess nares and determine need for appliance change or resting period.  Outcome: Progressing     Problem: Respiratory - Adult  Goal: Achieves optimal ventilation and oxygenation  Outcome: Progressing  Flowsheets (Taken 4/16/2024 2000)  Achieves optimal ventilation and oxygenation:   Assess for changes in respiratory status   Assess for changes in mentation and behavior     Problem: Cardiovascular - Adult  Goal: Maintains optimal cardiac output and hemodynamic stability  Outcome: Progressing  Flowsheets (Taken 4/16/2024 2000)  Maintains optimal cardiac output and hemodynamic stability:   Monitor blood pressure and heart rate   Monitor urine output and notify Licensed Independent Practitioner for values outside of normal range  Goal: Absence of cardiac dysrhythmias or at baseline  Outcome: Progressing  Flowsheets  (Taken 4/16/2024 2000)  Absence of cardiac dysrhythmias or at baseline: Monitor cardiac rate and rhythm     Problem: Pain  Goal: Verbalizes/displays adequate comfort level or baseline comfort level  Outcome: Progressing  Flowsheets (Taken 4/16/2024 2000)  Verbalizes/displays adequate comfort level or baseline comfort level: Assess pain using appropriate pain scale

## 2024-04-17 NOTE — ANESTHESIA PROCEDURE NOTES
Arterial Line:    An arterial line was placed using ultrasound guidance, in the pre-op for the following indication(s): continuous blood pressure monitoring and blood sampling needed.    A 20 gauge (size), 1 and 3/8 inch (length), Arrow (type) catheter was placed, Seldinger technique used, into the right radial artery, secured by tape and Tegaderm.  Anesthesia type: Local  Local infiltration: Injection    Events:  patient tolerated procedure well with no complications.  Performed: Anesthesiologist   Preanesthetic Checklist  Completed: patient identified, IV checked, site marked, risks and benefits discussed, surgical/procedural consents, equipment checked, pre-op evaluation, timeout performed, anesthesia consent given, oxygen available and monitors applied/VS acknowledged

## 2024-04-18 ENCOUNTER — APPOINTMENT (OUTPATIENT)
Facility: HOSPITAL | Age: 79
DRG: 266 | End: 2024-04-18
Attending: GENERAL ACUTE CARE HOSPITAL
Payer: MEDICARE

## 2024-04-18 DIAGNOSIS — Z98.890 PERSONAL HISTORY OF SURGERY TO HEART AND GREAT VESSELS, PRESENTING HAZARDS TO HEALTH: Primary | ICD-10-CM

## 2024-04-18 LAB
1,25(OH)2D3 SERPL-MCNC: 24.1 PG/ML (ref 24.8–81.5)
ALBUMIN SERPL-MCNC: 3.4 G/DL (ref 3.5–5)
ALBUMIN/GLOB SERPL: 0.9 (ref 1.1–2.2)
ALP SERPL-CCNC: 60 U/L (ref 45–117)
ALT SERPL-CCNC: 20 U/L (ref 12–78)
ANION GAP SERPL CALC-SCNC: 6 MMOL/L (ref 5–15)
AST SERPL-CCNC: 29 U/L (ref 15–37)
BILIRUB SERPL-MCNC: 0.6 MG/DL (ref 0.2–1)
BUN SERPL-MCNC: 37 MG/DL (ref 6–20)
BUN/CREAT SERPL: 24 (ref 12–20)
CALCIUM SERPL-MCNC: 10.6 MG/DL (ref 8.5–10.1)
CHLORIDE SERPL-SCNC: 103 MMOL/L (ref 97–108)
CO2 SERPL-SCNC: 30 MMOL/L (ref 21–32)
CREAT SERPL-MCNC: 1.53 MG/DL (ref 0.55–1.02)
ECHO AO ASC DIAM: 3 CM
ECHO AO ASCENDING AORTA INDEX: 1.74 CM/M2
ECHO AO ROOT DIAM: 3.6 CM
ECHO AO ROOT INDEX: 2.09 CM/M2
ECHO AV AREA PEAK VELOCITY: 2.1 CM2
ECHO AV AREA/BSA PEAK VELOCITY: 1.2 CM2/M2
ECHO AV MEAN GRADIENT: 5 MMHG
ECHO AV MEAN VELOCITY: 1.1 M/S
ECHO AV PEAK GRADIENT: 9 MMHG
ECHO AV PEAK VELOCITY: 1.5 M/S
ECHO AV VELOCITY RATIO: 0.6
ECHO AV VTI: 17 CM
ECHO BSA: 1.73 M2
ECHO EST RA PRESSURE: 15 MMHG
ECHO LA DIAMETER INDEX: 3.72 CM/M2
ECHO LA DIAMETER: 6.4 CM
ECHO LA TO AORTIC ROOT RATIO: 1.78
ECHO LV E' LATERAL VELOCITY: 7 CM/S
ECHO LV FRACTIONAL SHORTENING: 32 % (ref 28–44)
ECHO LV INTERNAL DIMENSION DIASTOLE INDEX: 2.38 CM/M2
ECHO LV INTERNAL DIMENSION DIASTOLIC: 4.1 CM (ref 3.9–5.3)
ECHO LV INTERNAL DIMENSION SYSTOLIC INDEX: 1.63 CM/M2
ECHO LV INTERNAL DIMENSION SYSTOLIC: 2.8 CM
ECHO LV IVSD: 1.3 CM (ref 0.6–0.9)
ECHO LV MASS 2D: 204.9 G (ref 67–162)
ECHO LV MASS INDEX 2D: 119.1 G/M2 (ref 43–95)
ECHO LV POSTERIOR WALL DIASTOLIC: 1.4 CM (ref 0.6–0.9)
ECHO LV RELATIVE WALL THICKNESS RATIO: 0.68
ECHO LVOT AREA: 3.5 CM2
ECHO LVOT DIAM: 2.1 CM
ECHO LVOT PEAK GRADIENT: 3 MMHG
ECHO LVOT PEAK VELOCITY: 0.9 M/S
ECHO MV MAX VELOCITY: 1.9 M/S
ECHO MV MEAN GRADIENT: 6 MMHG
ECHO MV MEAN VELOCITY: 1.2 M/S
ECHO MV PEAK GRADIENT: 15 MMHG
ECHO MV VTI: 45.2 CM
ECHO PV MAX VELOCITY: 1.5 M/S
ECHO PV PEAK GRADIENT: 8 MMHG
ECHO RIGHT VENTRICULAR SYSTOLIC PRESSURE (RVSP): 47 MMHG
ECHO RV FREE WALL PEAK S': 8 CM/S
ECHO RV TAPSE: 1.5 CM (ref 1.7–?)
ECHO TV REGURGITANT MAX VELOCITY: 2.81 M/S
ECHO TV REGURGITANT PEAK GRADIENT: 32 MMHG
ERYTHROCYTE [DISTWIDTH] IN BLOOD BY AUTOMATED COUNT: 14.2 % (ref 11.5–14.5)
GLOBULIN SER CALC-MCNC: 3.6 G/DL (ref 2–4)
GLUCOSE SERPL-MCNC: 103 MG/DL (ref 65–100)
HCT VFR BLD AUTO: 41.1 % (ref 35–47)
HGB BLD-MCNC: 12.1 G/DL (ref 11.5–16)
KAPPA LC FREE SER-MCNC: 49.7 MG/L (ref 3.3–19.4)
KAPPA LC FREE/LAMBDA FREE SER: 2.15 (ref 0.26–1.65)
LAMBDA LC FREE SERPL-MCNC: 23.1 MG/L (ref 5.7–26.3)
MAGNESIUM SERPL-MCNC: 2.4 MG/DL (ref 1.6–2.4)
MCH RBC QN AUTO: 26.9 PG (ref 26–34)
MCHC RBC AUTO-ENTMCNC: 29.4 G/DL (ref 30–36.5)
MCV RBC AUTO: 91.5 FL (ref 80–99)
NRBC # BLD: 0 K/UL (ref 0–0.01)
NRBC BLD-RTO: 0 PER 100 WBC
PHOSPHATE SERPL-MCNC: 3.7 MG/DL (ref 2.6–4.7)
PLATELET # BLD AUTO: 169 K/UL (ref 150–400)
PMV BLD AUTO: 11.1 FL (ref 8.9–12.9)
POTASSIUM SERPL-SCNC: 4.7 MMOL/L (ref 3.5–5.1)
PROT SERPL-MCNC: 7 G/DL (ref 6.4–8.2)
RBC # BLD AUTO: 4.49 M/UL (ref 3.8–5.2)
SODIUM SERPL-SCNC: 139 MMOL/L (ref 136–145)
WBC # BLD AUTO: 9.3 K/UL (ref 3.6–11)

## 2024-04-18 PROCEDURE — 6370000000 HC RX 637 (ALT 250 FOR IP): Performed by: GENERAL ACUTE CARE HOSPITAL

## 2024-04-18 PROCEDURE — 2060000000 HC ICU INTERMEDIATE R&B

## 2024-04-18 PROCEDURE — 6360000002 HC RX W HCPCS: Performed by: THORACIC SURGERY (CARDIOTHORACIC VASCULAR SURGERY)

## 2024-04-18 PROCEDURE — 85027 COMPLETE CBC AUTOMATED: CPT

## 2024-04-18 PROCEDURE — 83735 ASSAY OF MAGNESIUM: CPT

## 2024-04-18 PROCEDURE — 6370000000 HC RX 637 (ALT 250 FOR IP): Performed by: INTERNAL MEDICINE

## 2024-04-18 PROCEDURE — 2580000003 HC RX 258: Performed by: INTERNAL MEDICINE

## 2024-04-18 PROCEDURE — 97535 SELF CARE MNGMENT TRAINING: CPT

## 2024-04-18 PROCEDURE — 93306 TTE W/DOPPLER COMPLETE: CPT

## 2024-04-18 PROCEDURE — 6370000000 HC RX 637 (ALT 250 FOR IP)

## 2024-04-18 PROCEDURE — 2500000003 HC RX 250 WO HCPCS: Performed by: INTERNAL MEDICINE

## 2024-04-18 PROCEDURE — 97162 PT EVAL MOD COMPLEX 30 MIN: CPT

## 2024-04-18 PROCEDURE — 36415 COLL VENOUS BLD VENIPUNCTURE: CPT

## 2024-04-18 PROCEDURE — 84100 ASSAY OF PHOSPHORUS: CPT

## 2024-04-18 PROCEDURE — 2700000000 HC OXYGEN THERAPY PER DAY

## 2024-04-18 PROCEDURE — 80053 COMPREHEN METABOLIC PANEL: CPT

## 2024-04-18 PROCEDURE — 97116 GAIT TRAINING THERAPY: CPT

## 2024-04-18 PROCEDURE — 97166 OT EVAL MOD COMPLEX 45 MIN: CPT

## 2024-04-18 RX ORDER — BUMETANIDE 2 MG/1
1 TABLET ORAL DAILY
Qty: 30 TABLET | Refills: 3 | Status: SHIPPED | OUTPATIENT
Start: 2024-04-18 | End: 2024-04-18

## 2024-04-18 RX ORDER — BUMETANIDE 1 MG/1
1 TABLET ORAL DAILY
Qty: 30 TABLET | Refills: 1 | Status: ON HOLD | OUTPATIENT
Start: 2024-04-18 | End: 2024-04-24

## 2024-04-18 RX ORDER — DAPAGLIFLOZIN 10 MG/1
10 TABLET, FILM COATED ORAL EVERY MORNING
Qty: 30 TABLET | Refills: 3 | Status: ON HOLD
Start: 2024-04-20

## 2024-04-18 RX ORDER — LANOLIN ALCOHOL/MO/W.PET/CERES
3 CREAM (GRAM) TOPICAL NIGHTLY PRN
Status: ON HOLD | COMMUNITY
Start: 2024-04-18

## 2024-04-18 RX ORDER — IBUPROFEN 200 MG
TABLET ORAL 2 TIMES DAILY
Status: DISCONTINUED | OUTPATIENT
Start: 2024-04-18 | End: 2024-04-19 | Stop reason: HOSPADM

## 2024-04-18 RX ORDER — LOSARTAN POTASSIUM 100 MG/1
50 TABLET ORAL DAILY
Qty: 90 TABLET | Refills: 1 | Status: ON HOLD | OUTPATIENT
Start: 2024-04-18 | End: 2024-04-24 | Stop reason: HOSPADM

## 2024-04-18 RX ORDER — METOPROLOL TARTRATE 1 MG/ML
2.5 INJECTION, SOLUTION INTRAVENOUS ONCE
Status: COMPLETED | OUTPATIENT
Start: 2024-04-18 | End: 2024-04-18

## 2024-04-18 RX ADMIN — SENNOSIDES AND DOCUSATE SODIUM 1 TABLET: 50; 8.6 TABLET ORAL at 08:34

## 2024-04-18 RX ADMIN — ENOXAPARIN SODIUM 70 MG: 100 INJECTION SUBCUTANEOUS at 08:34

## 2024-04-18 RX ADMIN — MELATONIN 3 MG: at 21:24

## 2024-04-18 RX ADMIN — POTASSIUM CHLORIDE 40 MEQ: 1500 TABLET, EXTENDED RELEASE ORAL at 08:34

## 2024-04-18 RX ADMIN — SODIUM CHLORIDE, PRESERVATIVE FREE 10 ML: 5 INJECTION INTRAVENOUS at 21:27

## 2024-04-18 RX ADMIN — POTASSIUM CHLORIDE 40 MEQ: 1500 TABLET, EXTENDED RELEASE ORAL at 15:15

## 2024-04-18 RX ADMIN — METOPROLOL TARTRATE 25 MG: 25 TABLET, FILM COATED ORAL at 22:52

## 2024-04-18 RX ADMIN — ACETAMINOPHEN 650 MG: 325 TABLET ORAL at 05:34

## 2024-04-18 RX ADMIN — BACITRACIN ZINC, NEOMYCIN, POLYMYXIN B 1 G: 400; 3.5; 5 OINTMENT TOPICAL at 13:39

## 2024-04-18 RX ADMIN — ACETAMINOPHEN 650 MG: 325 TABLET ORAL at 20:10

## 2024-04-18 RX ADMIN — METOPROLOL TARTRATE 2.5 MG: 5 INJECTION INTRAVENOUS at 16:11

## 2024-04-18 RX ADMIN — BACITRACIN ZINC, NEOMYCIN, POLYMYXIN B: 400; 3.5; 5 OINTMENT TOPICAL at 21:25

## 2024-04-18 RX ADMIN — PRAVASTATIN SODIUM 20 MG: 10 TABLET ORAL at 08:34

## 2024-04-18 RX ADMIN — CALCIUM CARBONATE (ANTACID) CHEW TAB 500 MG 500 MG: 500 CHEW TAB at 20:09

## 2024-04-18 RX ADMIN — PANTOPRAZOLE SODIUM 40 MG: 40 TABLET, DELAYED RELEASE ORAL at 05:35

## 2024-04-18 ASSESSMENT — PAIN DESCRIPTION - LOCATION
LOCATION: BACK
LOCATION: THROAT

## 2024-04-18 ASSESSMENT — PAIN SCALES - GENERAL
PAINLEVEL_OUTOF10: 0
PAINLEVEL_OUTOF10: 1
PAINLEVEL_OUTOF10: 0
PAINLEVEL_OUTOF10: 0
PAINLEVEL_OUTOF10: 1
PAINLEVEL_OUTOF10: 0

## 2024-04-18 ASSESSMENT — PAIN DESCRIPTION - DESCRIPTORS
DESCRIPTORS: SORE
DESCRIPTORS: ACHING

## 2024-04-18 NOTE — PROGRESS NOTES
[facility] FLOOR Progress Note    Admit Date: 2024  POD: 1 Day Post-Op      Procedure:  Procedure(s):  Transcatheter mitral valve repair  [place] during tavr case  Ultrasound guided vascular access  Atrial septal defect closure    Subjective/overnight events:   Pt seen with Dr. Nguyen, up in the chair. Still alternating between AV paced and AF,  taken down to RA at bedside,  afebrile.     Vital signs stable. No events overnight. Right groin site stable, EKG shows AF, labs stable, eating, drinking, ambulating at baseline, and voiding. No complaints at present.  Objective:     /73   Pulse 79   Temp 98.3 °F (36.8 °C)   Resp 18   Ht 1.651 m (5' 5\")   Wt 65.1 kg (143 lb 8.3 oz)   SpO2 99%   BMI 23.88 kg/m²   Temp (24hrs), Av.2 °F (36.8 °C), Min:97.7 °F (36.5 °C), Max:98.5 °F (36.9 °C)      Last 24hr Input/Output:    Intake/Output Summary (Last 24 hours) at 2024 1021  Last data filed at 2024 0651  Gross per 24 hour   Intake 100 ml   Output 700 ml   Net -600 ml        EKG/Rhythm:   Encounter Date: 24   EKG 12 Lead   Result Value    Ventricular Rate 122    QRS Duration 86    Q-T Interval 288    QTc Calculation (Bazett) 410    R Axis 38    T Axis 35    Diagnosis      Atrial fibrillation with rapid ventricular response with frequent   ventricular-paced complexes  Abnormal ECG  When compared with ECG of 11-SEP-2018 03:39,  Electronic ventricular pacemaker has replaced Sinus rhythm  Vent. rate has increased BY  56 BPM         Oxygen: As above    CXR: Xray Result (most recent):  XR CHEST PORTABLE 2024    Narrative  EXAM:  XR CHEST PORTABLE    INDICATION: chf    COMPARISON: 2023    TECHNIQUE: portable chest AP view    FINDINGS: Mild cardiomegaly is noted. The patient is status post median  sternotomy. Pacer leads are unchanged in position. The pulmonary vasculature is  within normal limits.    Mild pulmonary edema is noted. There are trace bilateral pleural effusions. The  visualized bones and

## 2024-04-18 NOTE — PROGRESS NOTES
Pt. Ambulates to bathroom and in the beth with his cane without difficulty.  Pt. Is asymptomaitc. Pt. Denies SOB, CP, dizziness and palpitation.  Vital signs are stable.  Right groin procedure site dressing intact, no bleeding/ no hematoma noted.     Pt. Is in recliner.

## 2024-04-18 NOTE — PLAN OF CARE
Problem: ABCDS Injury Assessment  Goal: Absence of physical injury  Outcome: Progressing  Flowsheets (Taken 4/17/2024 1910)  Absence of Physical Injury: Implement safety measures based on patient assessment     Problem: Safety - Adult  Goal: Free from fall injury  Outcome: Progressing  Flowsheets (Taken 4/17/2024 1910)  Free From Fall Injury:   Based on caregiver fall risk screen, instruct family/caregiver to ask for assistance with transferring infant if caregiver noted to have fall risk factors   Instruct family/caregiver on patient safety     Problem: Chronic Conditions and Co-morbidities  Goal: Patient's chronic conditions and co-morbidity symptoms are monitored and maintained or improved  Outcome: Progressing  Flowsheets (Taken 4/17/2024 1910)  Care Plan - Patient's Chronic Conditions and Co-Morbidity Symptoms are Monitored and Maintained or Improved: Monitor and assess patient's chronic conditions and comorbid symptoms for stability, deterioration, or improvement     Problem: Skin/Tissue Integrity  Goal: Absence of new skin breakdown  Description: 1.  Monitor for areas of redness and/or skin breakdown  2.  Assess vascular access sites hourly  3.  Every 4-6 hours minimum:  Change oxygen saturation probe site  4.  Every 4-6 hours:  If on nasal continuous positive airway pressure, respiratory therapy assess nares and determine need for appliance change or resting period.  Outcome: Progressing     Problem: Respiratory - Adult  Goal: Achieves optimal ventilation and oxygenation  Outcome: Progressing  Flowsheets (Taken 4/17/2024 1910)  Achieves optimal ventilation and oxygenation:   Assess and instruct to report shortness of breath or any respiratory difficulty   Assess for changes in mentation and behavior     Problem: Cardiovascular - Adult  Goal: Maintains optimal cardiac output and hemodynamic stability  Outcome: Progressing  Flowsheets (Taken 4/17/2024 1910)  Maintains optimal cardiac output and hemodynamic  stability: Monitor blood pressure and heart rate  Goal: Absence of cardiac dysrhythmias or at baseline  Outcome: Progressing  Flowsheets (Taken 4/17/2024 1910)  Absence of cardiac dysrhythmias or at baseline: Monitor cardiac rate and rhythm     Problem: Pain  Goal: Verbalizes/displays adequate comfort level or baseline comfort level  Outcome: Progressing     Problem: Discharge Planning  Goal: Discharge to home or other facility with appropriate resources  Outcome: Progressing  Flowsheets (Taken 4/17/2024 1910)  Discharge to home or other facility with appropriate resources: Identify barriers to discharge with patient and caregiver

## 2024-04-18 NOTE — DISCHARGE SUMMARY
[facility] Discharge Summary     Patient ID:  Azucena Myrick  [id number]  78 y.o.  1945    Admit date: 4/11/2024    Discharge date: 4/19/2024     Admitting Physician: Loki Sampson MD     Referring Cardiologist:      PCP:  Bhavana Mendoza MD    Admitting Diagnoses:   Acute on chronic HFrEF  Non-rheumatic MR  ASD  Non-rheumatic TR  Acute hypoxic respiratory failure  PAF  SSS s/p PPM  Type a aortic dissection 2015 post emergent surgical intervention with residual arch and descending aorta dissection   HTN  HLD  Grade II pulm HTN  Liver fibrosis  GERD/PUD  Spinal stenosis, arthritis      Discharge Diagnoses:   As above, s/p GINA and ASD closure        Discharged Condition: Stable    Disposition: home, see patient instructions for treatment and plan    Procedures for this admission:  Procedure(s):  Transcatheter mitral valve repair  Kodi during tavr case  Ultrasound guided vascular access  Atrial septal defect closure    Discharge Medications:      Medication List        START taking these medications      bumetanide 1 MG tablet  Commonly known as: BUMEX  Take 1 tablet by mouth daily     melatonin 3 MG Tabs tablet  Take 1 tablet by mouth nightly as needed (sleep)            CHANGE how you take these medications      losartan 100 MG tablet  Commonly known as: COZAAR  Take 0.5 tablets by mouth daily  What changed: how much to take     metoprolol tartrate 25 MG tablet  Commonly known as: LOPRESSOR  Take 0.5 tablets by mouth 2 times daily  What changed: how much to take            CONTINUE taking these medications      acetaminophen 650 MG extended release tablet  Commonly known as: TYLENOL     calcium carbonate 1500 (600 Ca) MG Tabs tablet     dapagliflozin 10 MG tablet  Commonly known as: FARXIGA  Take 1 tablet by mouth every morning  Start taking on: April 20, 2024     Eliquis 5 MG Tabs tablet  Generic drug: apixaban  Take 1 tablet by mouth twice daily     pravastatin 20 MG tablet  Commonly known as: PRAVACHOL  Take 1  discharging on POD 1 with the following plan:     Non-rheumatic, MR, ASD s/p GINA and ASD closure: Doing well. D/c home- has walker at home.   Non-rheumatic TR: Now mild-moderate per Dr. Nguyen. OP follow up. Diuresis plan as below.   Acute on chronic HFrEF, NYHA class III, LVEF 60-65% previously, now 40-45%, improving: on 1 liters via nasal cannula. On Lopressor, Cozaar and Lasix and Farxiga PTA.  Will d/c on 1 mg of po Bumex BID at home with OP follow up with Nephrology. Can resume Farxiga on Saturday.   Hypoxic respiratory insuffiency secondary to pulmonary edema and pleural effusions, resolved: on RA. OOB all meals, IS, ambulate. Diuresis as above.   Paroxysmal AF: Alternating between AV paced and AF. On Lopressor and Eliquis PTA. continue BB, Eliquis.      SSS s/p PPM: Noted.   Type a aortic dissection 2015 post emergent surgical intervention with residual arch and descending aorta dissection (dissection goes to right subclavian, one of the kidneys fills from the false lumen). Tight blood pressure control.   HTN, with soft Bps currently: On Lasix, Cozaar, Lopressor PTA; continue Lopressor, diuretic as above. Stop Cozaar for now.   HLD: on statin PTA; continue.   Grade II pulmonary HTN: Diuresis as above.  Evidence of moderate fibrosis on elastography: LFTs unremarkable.   LUE swelling: On therapeutic Lovenox.  Venous duplex of left arm negative.  GERD/PUD: No meds PTA.  Spinal stenosis, arthritis: Supportive care.   Overweight: BMI 26.6. Diet and exercise counseling when appropriate.     Discharge Vital Signs:   Vitals:    04/19/24 1050   BP: 105/70   Pulse:    Resp:    Temp:    SpO2:        Labs:   Recent Labs     04/19/24  0605   WBC 7.2   HGB 11.7   HCT 40.1         K 5.1   BUN 33*       Diagnostics:     Encounter Date: 04/11/24   EKG 12 Lead   Result Value    Ventricular Rate 122    QRS Duration 86    Q-T Interval 288    QTc Calculation (Bazett) 410    R Axis 38    T Axis 35    Diagnosis

## 2024-04-18 NOTE — PROGRESS NOTES
EKG completed, tubed blood specimen to lab, weight and I&O documented.    Pt. Is sitting in recliner.

## 2024-04-18 NOTE — PROGRESS NOTES
Physician Progress Note      PATIENT:               SUSY GRAF  CSN #:                  [id number]  :                       1945  ADMIT DATE:       2024 12:50 PM  DISCH DATE:  RESPONDING  PROVIDER #:        Gail Raymundo MD        QUERY TEXT:    Stage of Chronic Kidney Disease: Please provide further specificity, if known.    Clinical indicators include: ckd, un/creatinine, creatinine, bun  Options provided:  -- Chronic kidney disease stage 1  -- Chronic kidney disease stage 2  -- Chronic kidney disease stage 3  -- Chronic kidney disease stage 3a  -- Chronic kidney disease stage 3b  -- Chronic kidney disease stage 4  -- Chronic kidney disease stage 5  -- Chronic kidney disease stage 5, requiring dialysis  -- End stage renal disease  -- Other - I will add my own diagnosis  -- Disagree - Not applicable / Not valid  -- Disagree - Clinically Unable to determine / Unknown        PROVIDER RESPONSE TEXT:    The patient has chronic kidney disease stage 3.      Electronically signed by:  Gail Raymundo MD 2024 2:00 PM

## 2024-04-18 NOTE — PROGRESS NOTES
Notified by nursing that patient's son was not ready for her discharge today and pt's spouse has S/S of GI infection- will hold discharge today.

## 2024-04-18 NOTE — PROGRESS NOTES
Bedside shift change report given to MARQUIS Perrin (oncoming nurse) by Cathy Farley RN (offgoing nurse). Report included the following information Nurse Handoff Report, Index, Adult Overview, Surgery Report, Intake/Output, MAR, Recent Results, Med Rec Status, Cardiac Rhythm A-Fib/ V-Paced, and Alarm Parameters.

## 2024-04-18 NOTE — PROGRESS NOTES
goals): No skilled occupational therapy    Other factors to consider for discharge: no additional factors    IF patient discharges home will need the following DME: patient owns DME required for discharge     SUBJECTIVE:   Patient stated, “I feel better walking with the walker.”    OBJECTIVE DATA SUMMARY:     Past Medical History:   Diagnosis Date    Acute on chronic heart failure, unspecified heart failure type ([facility]) 4/11/2024    Allergic conjunctivitis 07/26/2017    Aortic aneurysm ([facility]) 05/2018    Thoracic, abdominal    Arthritis     lower back    Asthma     Current use of long term anticoagulation     Essential hypertension 08/21/2017    GERD (gastroesophageal reflux disease)     History of aortic dissection     History of echocardiogram 09/2019    Left Ventricle: Normal cavity size, systolic function (ejection fraction normal) and diastolic function. Moderate concentric hypertrophy. Estimated left ventricular ejection fraction is 56 - 60%. No regional wall motion abnormality noted.Small secundum atrial septal defect present. Left Atrium: Moderately dilated left atrium. Mitral Valve: Mitral valve thickening. Mild mitral valve regurgitation.    History of echocardiogram 04/2023    LVEF 60-65%. diastolic dysfunction. Mild MAC. Moderate MR. mild ASCL w/o AS. mod LAE.    History of vertigo 07/26/2017    positional    Hx of repair of aortic root     Hypercholesterolemia 08/21/2017    Intertrigo 07/26/2017    Long-term use of high-risk medication 07/26/2017    LVH (left ventricular hypertrophy) 07/26/2017    Moderate mitral regurgitation 04/2023    PAF (paroxysmal atrial fibrillation) ([facility]) 08/20/2018    Presence of permanent cardiac pacemaker     PUD (peptic ulcer disease)     Spinal stenosis, lumbar region, without neurogenic claudication 07/26/2017    SSS (sick sinus syndrome) ([facility]) 09/18/2018    Vitamin B12 deficiency     Vitamin D deficiency      Past Surgical History:   Procedure Laterality Date    CARDIAC  toilet, bedpan or urinal? [] 1 []  2 []  3 [x]  4   4.  Putting on and taking off regular upper body clothing? []  1 []  2 []  3 [x]  4   5.  Taking care of personal grooming such as brushing teeth? []  1 []  2 []  3 [x]  4   6.  Eating meals? []  1 []  2 []  3 [x]  4   © , Trustees of [facility], under license to [employer]. All rights reserved     Score: 24/24     Interpretation of Tool:  Represents clinically-significant functional categories (i.e. Activities of daily living).    Cutoff score 39.4 (19) correlates to a good likelihood of discharging home versus a facility  Jackie Chavis, Azucena De La Cruz, Antonio Del Castillo, Johanna Valencia, Surya Najera, Edgar Chavis, -PAC “6-Clicks” Functional Assessment Scores Predict Acute Care Hospital Discharge Destination, Physical Therapy, Volume 94, Issue 9, 2014, Pages 2647-3566, [email or web address]://doi.org/10.2522/[email or web address]    Pain Ratin/10   Pain Intervention(s):   pain is at a level acceptable to the patient    Activity Tolerance:   Good, requires rest breaks, and SpO2 stable on room air    After treatment:   Patient left in no apparent distress sitting up in chair and Call bell within reach    COMMUNICATION/EDUCATION:   The patient's plan of care was discussed with: physical therapist and registered nurse    Patient Education  Education Given To: Patient  Education Provided: Role of Therapy;Plan of Care  Education Method: Verbal  Barriers to Learning: None  Education Outcome: Verbalized understanding;Demonstrated understanding    Thank you for this referral.  Miroslava Contreras OT  Minutes: 22    Occupational Therapy Evaluation Charge Determination   History Examination Decision-Making   MEDIUM Complexity : Expanded review of history including physical, cognitive and psychocial history  MEDIUM Complexity: 3-5 Performance deficits relating to physical, cognitive, or psychosocial skills that result in activity limitations and/or participation  restrictions MEDIUM Complexity: Patient may present with comorbidities that affect occupational performance. Minimal to moderate modifications of tasks or assist (eg. physical or verbal) with assist is necessary to enable pt to complete eval   Based on the above components, the patient evaluation is determined to be of the following complexity level: Medium

## 2024-04-18 NOTE — PROGRESS NOTES
Giovanni Nguyen MD [facility]  Interventional & Structural Cardiology                         [facility]  [place], [place]                                                                     Interventional & Structural Heart Care Note      Patient Name: Azucena Myrick - :1945 - MRN:[id number]  Primary Cardiologist: Dr. Palacio  Consulting Cardiologist: Dr. Nguyen  PCP: Bhavana Mendoza MD     Reason for encounter: Mitral regurgitation - GINA     Date:2024    Assessment and Plan   Mitral regurgitation / ASD- Echocardiogram 2023, EF 60 to 65% now 40-45%, severe degenerative nonrheumatic mitral regurgitation eccentric jet GINA x 3   now with MV mean gradient of 6 mm/hg, mild regurgitation, ASD occluder well positioned, EF 50-55%   PPM/PAF/Afib, -digoxin yesterday, on metoprolol , currently rate controlled, monitor lytes, resume eliquis   Hx PAH/HTN/idiopathic hypotension- improved,stable  Acute on Chronic HFpEF- diuresis , on losartan, metoprolol, farxiga, will be sent home with oral bumex, off    5.  S/p ascending aortic aneurysm repair , BP controlled  6.  ADOLFO in setting of diuretics, HFpEF, Creatine stable, electrolyte replacement.   7.  Hypoxia - on    8.  Hypercholesterolemia - on statin   9.   Arthritis /spinal stenosis - pain controlled per pt, tylenol  10. GERD -diet controlled, no home meds    ______________________________________    HPI:     78-year-old very pleasant woman with history of type a aortic dissection  post emergent surgical intervention with residual arch and descending aorta dissection (dissection goes to right subclavian, one of the kidneys fills from the false lumen), atrial fibrillation on Eliquis, tachy-abbie status post permanent pacemaker (), atrial septal defect, right ventricle dysfunction, normal coronary angiography in , hypertension, chronic diastolic heart failure, at least moderate  echocardiogram 04/2023    LVEF 60-65%. diastolic dysfunction. Mild MAC. Moderate MR. mild ASCL w/o AS. mod LAE.    History of vertigo 07/26/2017    positional    Hx of repair of aortic root     Hypercholesterolemia 08/21/2017    Intertrigo 07/26/2017    Long-term use of high-risk medication 07/26/2017    LVH (left ventricular hypertrophy) 07/26/2017    Moderate mitral regurgitation 04/2023    PAF (paroxysmal atrial fibrillation) ([facility]) 08/20/2018    Presence of permanent cardiac pacemaker     PUD (peptic ulcer disease)     Spinal stenosis, lumbar region, without neurogenic claudication 07/26/2017    SSS (sick sinus syndrome) ([facility]) 09/18/2018    Vitamin B12 deficiency     Vitamin D deficiency      Past Surgical History:   Procedure Laterality Date    CARDIAC PROCEDURE N/A 4/15/2024    Right heart cath performed by Giovanni Nguyen MD at [facility] CARDIAC CATH LAB    CARDIAC PROCEDURE N/A 4/15/2024    Kodi during cath case performed by Giovanni Nguyen MD at [facility] CARDIAC CATH LAB    COLONOSCOPY N/A 7/12/2017    COLONOSCOPY WITH IV ANTIBIOTICS performed by Jun Singh MD at [facility] ENDOSCOPY    COLONOSCOPY FLX DX W/COLLJ SPEC WHEN PFRMD  3/19/2012         COLONOSCOPY,DIAGNOSTIC  7/12/2017         GYN      tubal ligation    HYSTERECTOMY (CERVIX STATUS UNKNOWN)      OTHER SURGICAL HISTORY  2015    Type A Dissection Repair    UPPER GI ENDOSCOPY,BALL DIL,30MM  7/12/2017         UPPER GI ENDOSCOPY,BIOPSY  7/12/2017          Social Hx:  reports that she has never smoked. She has never used smokeless tobacco. She reports current alcohol use. She reports that she does not use drugs.  Family Hx: family history includes Cancer in her father.  No Known Allergies     Home Medications:  Prior to Admission medications    Medication Sig Start Date End Date Taking? Authorizing Provider   dapagliflozin (FARXIGA) 10 MG tablet Take 1 tablet by mouth every morning 4/20/24  Yes Amalia Amaya, APRN - NP   losartan (COZAAR) 100 MG  tablet Take 0.5 tablets by mouth daily 4/18/24 4/13/25 Yes Amalia Amaya APRN - NP   melatonin 3 MG TABS tablet Take 1 tablet by mouth nightly as needed (sleep) 4/18/24  Yes Amalia Amaya APRN - NP   bumetanide (BUMEX) 1 MG tablet Take 1 tablet by mouth daily 4/18/24  Yes Amalia Amaya APRN - NP   apixaban (ELIQUIS) 5 MG TABS tablet Take 1 tablet by mouth twice daily 3/4/24   Bhavana Mendoza MD   metoprolol tartrate (LOPRESSOR) 25 MG tablet Take 1 tablet by mouth 2 times daily 12/12/23 6/9/24  Bhavana Mendoza MD   pravastatin (PRAVACHOL) 20 MG tablet Take 1 tablet by mouth daily 10/3/23 9/27/24  Bhavana Mendoza MD   acetaminophen (TYLENOL) 650 MG extended release tablet Take by mouth every 6 hours as needed    Automatic Reconciliation, Ar   calcium carbonate 1500 (600 Ca) MG TABS tablet Take by mouth 2 times daily    Automatic Reconciliation, Ar         OBJECTIVE:  Wt Readings from Last 3 Encounters:   04/18/24 65.1 kg (143 lb 8.3 oz)   04/11/24 71.7 kg (158 lb)   11/02/23 77.4 kg (170 lb 9.6 oz)       Physical Exam:  General: Well appearing, no acute distress, alert and oriented x 3  Neuro:  CN 2-12 grossly normal  HEENT: EOM intact, no JVD, no carotid bruit, no swelling/adenopathy  Cardiovascular: irregular rhythm- AF with PVCs/Paced,  S1 and S2,  no murmur,  DP pulses 1 +, no edema  Resp: Clear to auscultation, no distress,bibasilar faint rales, no wheezes  Abdomen/GI: + BS,  soft, no noted organomegaly  : deferred  MSK: GONZALEZ, no gross deformity  Skin: No rashes, no overt bleeding, right groin site soft no hematoma, right neck no hematoma/soft no drainage    Psych: Pleasant, normal affect      Xray Result (most recent):  XR CHEST PORTABLE 04/11/2024    Narrative  EXAM:  XR CHEST PORTABLE    INDICATION: chf    COMPARISON: 7/11/2023    TECHNIQUE: portable chest AP view    FINDINGS: Mild cardiomegaly is noted. The patient is status post median  sternotomy. Pacer leads are unchanged in position.

## 2024-04-18 NOTE — PROGRESS NOTES
Hospitalist Progress Note    NAME:   Azucena Myrick   : 1945   MRN: [id number]     Date/Time: 2024 8:23 AM  Patient PCP: Bhavana Mendoza MD    Estimated discharge date: medically stable  Barriers: cardiac surgery clearance, improvement in kidney function      Assessment / Plan:  Acute on chronic CHF with preserved ejection fraction  Severe TR  Mitral valve regurgitation  ASD  -Echocardiogram 2023, EF 60 to 65%, moderate mitral valve regurgitation, severe tricuspid regurgitation  -Sent from [facility]TS surgery office for admission for valve surgery consideration  -Need to optimize volume status  RHC/ATUL: severe degenerative nonrheumatic mitral regurgitation eccentric jet . Moderate tricuspid regurgitation. Atrial septal defect with significant change from left to right, especially the mitral regurgitation jet.   -Appreciate cardiology and CTS recommends mitral valve clip and ASD closure  -Change IV Bumex to p.o.  -BP soft.  Midodrine prn    : Plan for MitraClip tomorrow by CT surgery  : The patient is planned for MitraClip today.  : patient had Mitraclip yesterday and tolerated procedure. I spoke with cardiothoracic MICHAEL Holland and she recommended that patient is stable to be discharged at this point.    ADOLFO on CKD  Hypercalcemia  BUN/creatinine of 35/1.5. Patient's baseline creatinine is between 1.0-1.1 and during this admission, it has trended up 1.3, 1.5 and stayed there.  I consulted nephrology and spoke with Dr. Ramirez.  Bumex is on hold for now.  Will wait for further recommendations from nephrology.  : Cr has remained elevated at 1.5.  Appreciate input from nephrology. If patient is discharged, she will need to f/u with nephrology to f/u with the labs and monitor her renal function.    Paroxysmal A-fib on Eliquis  ASD  S/p ascending aortic aneurysm repair  -TTE EF 65-70%.  Mod to severe TR and MR.  Mod PH.  pending, will need ATUL and right heart cath to assess shunt  results and cultures  YES  Reviewed most current radiology test results   YES  Review and summation of old records today    NO  Reviewed patient's current orders and MAR    YES  PMH/SH reviewed - no change compared to H&P    Procedures: see electronic medical records for all procedures/Xrays and details which were not copied into this note but were reviewed prior to creation of Plan.      LABS:  I reviewed today's most current labs and imaging studies.  Pertinent labs include:  Recent Labs     04/16/24  0625 04/17/24  0351 04/18/24  0600   WBC 6.8 6.2 9.3   HGB 12.7 12.9 12.1   HCT 43.7 42.6 41.1    204 169     Recent Labs     04/16/24  0625 04/17/24  0351 04/17/24  1145 04/18/24  0600    139  --  139   K 4.7 5.0  --  4.7    102  --  103   CO2 32 32  --  30   GLUCOSE 97 111*  --  103*   BUN 25* 33*  --  37*   CREATININE 1.52* 1.55*  --  1.53*   CALCIUM 10.9* 10.6* 11.0* 10.6*   MG 2.5* 2.6*  --  2.4   PHOS 3.7 3.0  --  3.7   BILITOT 0.8 0.7  --  0.6   AST 31 35  --  29   ALT 22 23  --  20       Signed: Gail Raymundo MD

## 2024-04-18 NOTE — PROGRESS NOTES
1910 - Bedside shift change report given to Cathy Farley RN (oncoming nurse) by MARQUIS Richardson (offgoing nurse). Report included the following information Nurse Handoff Report, Index, Adult Overview, Surgery Report, Intake/Output, MAR, Recent Results, Med Rec Status, Cardiac Rhythm A-Fib/ V-Paced, and Alarm Parameters.      2100 - Removed suture from right groin incision site, then apply quick clot and Tegaderm.    2359 - Pt. Agree to ambulate in the beth at his time.  Pt. Ambulate with a walker to bathroom and in the beth without difficulty.  Pt. Is asymptomatic, denies SOB, CP, dizziness and palpitation.  Vital signs are stable.  Right groin procedure site dressing in place, no bleeding and hematoma.

## 2024-04-18 NOTE — DISCHARGE INSTRUCTIONS
Cardiac [facility] - [facility]    [address]      [place] [address]  Office- [phone]  Fax- [phone]  _____________________________________________________________  Dr. Zeus Cramer, [facility]   Dr. Giovanni Nguyen, [facility]   Dr. Gabe Pak, [facility]    Priya Amaya, MSN, ACNPC-AG  Faye Oropeza, MSN, AGPCNP-BC  ___________________________________________________________     Surgery & Date: Procedure(s):  Transcatheter mitral valve repair  Kodi during tavr case  Ultrasound guided vascular access  Atrial septal defect closure    Patient ID:  Azucena Myrick  [id number]  78 y.o.  1945    Admit Date: 4/11/2024    Discharge Date: 4/18/2024     Admitting Physician: [unfilled]     Discharge Physician: [unfilled]    Admission Diagnoses:   Acute on chronic heart failure, unspecified heart failure type (HCC) [I50.9]  CHF (congestive heart failure), NYHA class I, acute on chronic, combined (HCC) [I50.43]    Discharge Diagnoses:   [unfilled]    Discharge Condition: {condition:20621892}    Cardiology Procedures this Admission:  {CARDIOLOGY PROCEDURES:33181876}    Disposition: {disposition:12040}    Reference discharge instructions provided by nursing for diet and activity.    Signed:  MICHAEL Dave NP  4/18/2024  10:41 AM        MEDICATIONS:  Please refer to your After Visit Summary for your medication list.           Guidelines to Follow After Your GINA  The purpose of these instructions is to provide you with information on how to care for yourself after your GINA. If you have any questions after your discharge, please call us at [phone].  Prior to discharge, you will receive a temporary implant card. A permanent card will be sent to you in approximately 6 to 8 weeks. Share this card with your  but wear your seatbelt.    Generally, you may return to work about 1 week after being discharged, but you surgeon/cardiologist will verify this depending on your needs and type or work.    Medications   Take all of your medications as prescribed. If you need to stop any of your medications for any reason, please call and tell us. When you go home, you may be prescribed different medications than what you were taking prior to your procedure. Please take these medications as directed. They may be adjusted by the Valve Clinic or your primary cardiologist at your follow-up appointment. Keep a current list of your medication, dosages, and times to be taken in your wallet or purse.    Anti-platelet medications (aspirin) is usually prescribed to prevent blood clots from forming on your new valve, which can cause a stroke. This medicine raises your chance of bleeding. You will take aspirin for the remainder of your life.   Please notify your cardiologist for any of the following signs of bleeding: black or tarry stools, red/pink urine, black or dark brown vomit, nose bleeds, increased bruising, or bleeding gums.     Diet   You should resume your preoperative diet. A low-fat and low-salt diet is recommended. Foods that are high in salt can lead to fluid retention and may cause congestive heart failure. Avoid adding salt in cooking or at the table. Avoid canned, processed, or frozen foods, as they have a lot of salt added to them.    Site Incision Care   Keep your incision sites clean and dry. You may remove your dressings and take a shower when you are home.   Pat your incision sites dry with a clean towel. Do not rub them. Do not soak or lay in water until all of your incisions are fully healed. Do not apply any lotions, creams, powders, or ointments to the incision until the scab has fallen off.   Bruising is common around the incision site. You may also notice a small pea-sized lump. This is normal and usually disappears  your medication bottles to each appointment.  Please call Cardiac Rehab at [phone] if you do not already have an appointment.  Please sign up for My Chart.     CALL [phone] FOR ANY QUESTIONS OR PROBLEMS.    Cardiac rehab intake appointment scheduled for May 21, 2024  10:30am  Please call [phone] with any questions.

## 2024-04-18 NOTE — ANESTHESIA POSTPROCEDURE EVALUATION
Department of Anesthesiology  Postprocedure Note    Patient: Azucena Myrick  MRN: [id number]  YOB: 1945  Date of evaluation: 4/18/2024    Procedure Summary       Date: 04/17/24 Room / Location: [facility] CATH LAB 1 / [facility] CARDIAC CATH LAB    Anesthesia Start: 1330 Anesthesia Stop: 1708    Procedures:       Transcatheter mitral valve repair      Kodi during tavr case      Ultrasound guided vascular access      Atrial septal defect closure Diagnosis: S/P transcatheter mitral valve replacement (TMVR)    Providers: Giovanni Nguyen MD Responsible Provider: Austin Ambrose MD    Anesthesia Type: General ASA Status: 4            Anesthesia Type: General    Ashleigh Phase I: Ashleigh Score: 8    Ashleigh Phase II:      Anesthesia Post Evaluation    Patient location during evaluation: floor  Patient participation: complete - patient participated  Level of consciousness: awake and alert  Pain score: 0  Airway patency: patent  Nausea & Vomiting: no nausea and no vomiting  Cardiovascular status: hemodynamically stable  Respiratory status: acceptable  Hydration status: euvolemic  Multimodal analgesia pain management approach  Pain management: adequate    There were no known notable events for this encounter.

## 2024-04-18 NOTE — PLAN OF CARE
Mobility Training: Yes  Rolling: Stand-by assistance  Supine to Sit: Stand-by assistance  Scooting: Stand-by assistance  Transfers:     Transfer Training  Transfer Training: Yes  Sit to Stand: Contact-guard assistance  Stand to Sit: Contact-guard assistance  Bed to Chair: Contact-guard assistance  Balance:     Balance  Sitting: Intact  Standing: Impaired  Standing - Static: Good;Constant support  Standing - Dynamic: Good;Fair;Constant support  Ambulation/Gait Training:    Gait  Gait Training: Yes  Overall Level of Assistance: Contact-guard assistance  Distance (ft): 60 Feet  Assistive Device: Gait belt;Walker, rolling  Interventions: Verbal cues  Speed/Misty: Pace decreased (< 100 feet/min);Slow  Step Length: Left shortened;Right shortened  Gait Abnormalities: Decreased step clearance;Other (comment) (increased trunk flexion with head down)                                                                                                                                                                                                                                 Tinetti test:    Tinetti  Sitting Balance: Steady, safe  Arises: Able, uses arms to help  Attempts to Arise: Able to arise, one attempt  Immediate Standing Balance (First 5 Seconds): Steady but uses walker or other support  Standing Balance: Steady but wide stance, uses cane or other support  Nudged: Staggers, grabs, catches self  Eyes Closed: Unsteady  Turned 360 Degrees: Steadiness: Unsteady (grabs, staggers)  Turned 360 Degrees: Continuity of Steps: Discontinuous steps  Sitting Down: Safe, smooth motion  Balance Score: 9  Initiation of Gait: No hesitancy  Step Height: R Swing Foot: Right foot complete clears floor  Step Length: R Swing Foot: Passes left stance foot  Step Height: L Swing Foot: Left foot complete clears floor  Step Length: L Swing Foot: Passes right stance foot  Step Symmetry: Right and left step appear equal  Step Continuity: Steps appear  continuous  Path: Mild/moderate deviation or uses walking aid  Trunk: Marked sway or uses walking aid  Walking Time: Heels almost touching while walking  Gait Score: 9  Tinetti Total Score: 18           Tinetti Tool Score Risk of Falls  <19 = High Fall Risk  19-24 = Moderate Fall Risk  25-28 = Low Fall Risk  Tinetti ME. Performance-Oriented Assessment of Mobility Problems in Elderly Patients. JAGS 1986; 34:119-126. (Scoring Description: PT Bulletin Feb. 10, 1993)    Older adults: (Ko et al, 2009; n = 1000 [place] elderly evaluated with ABC, RAMSEY, ADL, and IADL)  · Mean RAMSEY score for males aged 65-79 years = 26.21(3.40)  · Mean RAMSEY score for females age 65-79 years = 25.16(4.30)  · Mean RAMSEY score for males over 80 years = 23.29(6.02)  · Mean RAMSEY score for females over 80 years = 17.20(8.32)                                                                                                                                                                                                                               Pain Ratin/10     Activity Tolerance:   Fair     After treatment:   Patient left in no apparent distress sitting up in chair and Call bell within reach    COMMUNICATION/EDUCATION:   The patient's plan of care was discussed with: registered nurse    Patient Education  Education Given To: Patient  Education Provided: Role of Therapy;Plan of Care  Education Method: Verbal  Barriers to Learning: None  Education Outcome: Verbalized understanding    Thank you for this referral.  Anita Palacios, PT  Minutes: 23      Physical Therapy Evaluation Charge Determination   History Examination Presentation Decision-Making   MEDIUM  Complexity : 1-2 comorbidities / personal factors will impact the outcome/ POC  MEDIUM Complexity : 3 Standardized tests and measures addressin body structure, function, activity limitation and / or participation in recreation  MEDIUM Complexity : Evolving with changing characteristics   Tinetti Gait and Balance  MEDIUM   Based on the above components, the patient evaluation is determined to be of the following complexity level: Medium

## 2024-04-18 NOTE — CARE COORDINATION
Transition of Care Plan:     RUR: 14%  Prior Level of Functioning: Independnet  Disposition: Home   If SNF or IPR: Date FOC offered:   Date FOC received:   Accepting facility:   Date authorization started with reference number:   Date authorization received and expires:   Follow up appointments: PCP, Specialist  DME needed: none  Transportation at discharge: son  IM/IMM Medicare/ letter given: 2nd IM provided  Is patient a  and connected with VA? N/A              If yes, was  transfer form completed and VA notified?   Caregiver Contact: Nash Myrick- Son [phone]  Discharge Caregiver contacted prior to discharge?   CM to discuss with pt.   Care Conference needed? none  Barriers to discharge:  PT/OT     CM met with pt at bedside to discuss d/c and review 2IM.  Signed copy placed on bedside chart.  Pt's son in the room stating he cannot bring pt home today as pt's spouse has the flu.  CM informed pt and son that the d/c order is in but will relay to attending.  CM explained appeal rights as well.         Ira Lazaro, MADELINE  Supervisee in Social Work  Care Management, [facility]  [phone]

## 2024-04-18 NOTE — PROGRESS NOTES
NAME: Azucena Myrick        :  1945        MRN:  [id number]                    Assessment   :                                               Plan:  ADOLFO  Hypercalcemia  MR  H/o HTN, now low BP  Primary HPT Creatinine nml as of  (0.8), now stable at ~ 1.5  (1.1 on admission and a gentle rise since admission)     ordered urine sediment, upc and fena; w/u high calcium (PTH high at 102, 25 VD nml, pending 1,25 VD/SPEP/FLC ratio). Trend for now, might add sensipar if Ca rises further     On midodrine     For ASD closure and MitraClip today     ADOLFO likely likely hemodynamic        Subjective:     Chief Complaint:  oob in chair. So, so appetite. Otherwise no complaint.    Review of Systems:    Symptom Y/N Comments  Symptom Y/N Comments   Fever/Chills    Chest Pain     Poor Appetite    Edema     Cough    Abdominal Pain     Sputum    Joint Pain     SOB/DE LEÓN    Pruritis/Rash     Nausea/vomit    Tolerating PT/OT     Diarrhea    Tolerating Diet     Constipation    Other       Could not obtain due to:      Objective:     VITALS:   Last 24hrs VS reviewed since prior progress note. Most recent are:  Vitals:    24 0500   BP: 100/71   Pulse: 80   Resp: 18   Temp:    SpO2: 100%       Intake/Output Summary (Last 24 hours) at 2024 0607  Last data filed at 2024 0956  Gross per 24 hour   Intake 110 ml   Output 300 ml   Net -190 ml      Telemetry Reviewed:     PHYSICAL EXAM:  General: NAD      Lab Data Reviewed: (see below)    Medications Reviewed: (see below)    PMH/SH reviewed - no change compared to H&P  ________________________________________________________________________  Care Plan discussed with:  Patient     Family      RN     Care Manager                    Consultant:          Comments   >50% of visit spent in counseling and coordination of care        ________________________________________________________________________  Azucena Ramirez MD     Procedures: see electronic medical records for all procedures/Xrays and details which  were not copied into this note but were reviewed prior to creation of Plan.      LABS:  Recent Labs     04/16/24  0625 04/17/24  0351   WBC 6.8 6.2   HGB 12.7 12.9   HCT 43.7 42.6    204     Recent Labs     04/16/24  0625 04/17/24  0351    139   K 4.7 5.0    102   CO2 32 32   BUN 25* 33*   MG 2.5* 2.6*   PHOS 3.7 3.0     Recent Labs     04/16/24  0625 04/17/24  0351   GLOB 3.6 4.0     No results for input(s): \"INR\", \"APTT\" in the last 72 hours.    Invalid input(s): \"PTP\"   No results for input(s): \"TIBC\", \"FERR\" in the last 72 hours.    Invalid input(s): \"FE\", \"PSAT\"   No results found for: \"FOL\", \"RBCF\"   No results for input(s): \"PH\", \"PCO2\", \"PO2\" in the last 72 hours.  No results for input(s): \"CPK\", \"CKMB\", \"TROPONINI\" in the last 72 hours.  No components found for: \"GLPOC\"  @labua@    MEDICATIONS:  Current Facility-Administered Medications   Medication Dose Route Frequency    0.9 % sodium chloride infusion   IntraVENous PRN    sodium chloride flush 0.9 % injection 5-40 mL  5-40 mL IntraVENous PRN    digoxin (LANOXIN) tablet 62.5 mcg  62.5 mcg Oral Q48H    enoxaparin (LOVENOX) injection 70 mg  70 mg SubCUTAneous Q24H    sennosides-docusate sodium (SENOKOT-S) 8.6-50 MG tablet 1 tablet  1 tablet Oral Daily    [Held by provider] bumetanide (BUMEX) tablet 2 mg  2 mg Oral BID    pantoprazole (PROTONIX) tablet 40 mg  40 mg Oral QAM AC    calcium carbonate (TUMS) chewable tablet 500 mg  500 mg Oral TID PRN    melatonin tablet 3 mg  3 mg Oral Nightly PRN    0.9 % sodium chloride infusion   IntraVENous PRN    midodrine (PROAMATINE) tablet 2.5 mg  2.5 mg Oral TID PRN    potassium chloride (KLOR-CON M) extended release tablet 40 mEq  40 mEq Oral BID    magnesium sulfate 2000 mg in 50 mL IVPB premix  2,000 mg IntraVENous

## 2024-04-18 NOTE — CARDIO/PULMONARY
Chart reviewed: Patient is 78 y.o. female admitted with Acute on chronic heart failure, unspecified heart failure type (HCC) [I50.9]  CHF (congestive heart failure), NYHA class I, acute on chronic, combined (HCC) [I50.43]    Education: Trans-catheter education folder at the bedside.  Met with Azucena Myrick.     Educated using teach back method. Reviewed daily weights and temperatures, signs and symptoms of infection at surgery sites, valve type and card, and use of incentive spirometer.  Smoking history assessed. Patient is a non smoker.   Discussed Cardiac Rehab Program benefits, format, and encouraged participation. Initial Cardiac Rehab Program intake appointment scheduled for May 21, 2024  10:30am and appointment information is on the AVS. General questions answered. Azucena Myrick verbalized understanding.     Will continue to follow for educational needs and enrollment in the Cardiac Rehab Program.     Bridgette Harper RN

## 2024-04-19 VITALS
SYSTOLIC BLOOD PRESSURE: 105 MMHG | DIASTOLIC BLOOD PRESSURE: 67 MMHG | BODY MASS INDEX: 23.95 KG/M2 | HEART RATE: 75 BPM | HEIGHT: 65 IN | OXYGEN SATURATION: 100 % | WEIGHT: 143.74 LBS | RESPIRATION RATE: 16 BRPM | TEMPERATURE: 98.2 F

## 2024-04-19 LAB
ANION GAP SERPL CALC-SCNC: 4 MMOL/L (ref 5–15)
BUN SERPL-MCNC: 33 MG/DL (ref 6–20)
BUN/CREAT SERPL: 28 (ref 12–20)
CALCIUM SERPL-MCNC: 10.7 MG/DL (ref 8.5–10.1)
CHLORIDE SERPL-SCNC: 106 MMOL/L (ref 97–108)
CO2 SERPL-SCNC: 29 MMOL/L (ref 21–32)
CREAT SERPL-MCNC: 1.2 MG/DL (ref 0.55–1.02)
ERYTHROCYTE [DISTWIDTH] IN BLOOD BY AUTOMATED COUNT: 14.4 % (ref 11.5–14.5)
GLUCOSE SERPL-MCNC: 93 MG/DL (ref 65–100)
HCT VFR BLD AUTO: 40.1 % (ref 35–47)
HGB BLD-MCNC: 11.7 G/DL (ref 11.5–16)
MAGNESIUM SERPL-MCNC: 2.4 MG/DL (ref 1.6–2.4)
MCH RBC QN AUTO: 27 PG (ref 26–34)
MCHC RBC AUTO-ENTMCNC: 29.2 G/DL (ref 30–36.5)
MCV RBC AUTO: 92.6 FL (ref 80–99)
NRBC # BLD: 0 K/UL (ref 0–0.01)
NRBC BLD-RTO: 0 PER 100 WBC
PHOSPHATE SERPL-MCNC: 2.7 MG/DL (ref 2.6–4.7)
PLATELET # BLD AUTO: 168 K/UL (ref 150–400)
PMV BLD AUTO: 11.3 FL (ref 8.9–12.9)
POTASSIUM SERPL-SCNC: 5.1 MMOL/L (ref 3.5–5.1)
RBC # BLD AUTO: 4.33 M/UL (ref 3.8–5.2)
SODIUM SERPL-SCNC: 139 MMOL/L (ref 136–145)
WBC # BLD AUTO: 7.2 K/UL (ref 3.6–11)

## 2024-04-19 PROCEDURE — 80048 BASIC METABOLIC PNL TOTAL CA: CPT

## 2024-04-19 PROCEDURE — 36415 COLL VENOUS BLD VENIPUNCTURE: CPT

## 2024-04-19 PROCEDURE — 84100 ASSAY OF PHOSPHORUS: CPT

## 2024-04-19 PROCEDURE — 85027 COMPLETE CBC AUTOMATED: CPT

## 2024-04-19 PROCEDURE — 6370000000 HC RX 637 (ALT 250 FOR IP): Performed by: INTERNAL MEDICINE

## 2024-04-19 PROCEDURE — 6370000000 HC RX 637 (ALT 250 FOR IP): Performed by: GENERAL ACUTE CARE HOSPITAL

## 2024-04-19 PROCEDURE — 83735 ASSAY OF MAGNESIUM: CPT

## 2024-04-19 PROCEDURE — 97530 THERAPEUTIC ACTIVITIES: CPT

## 2024-04-19 PROCEDURE — 6370000000 HC RX 637 (ALT 250 FOR IP)

## 2024-04-19 PROCEDURE — 97116 GAIT TRAINING THERAPY: CPT

## 2024-04-19 RX ORDER — LACTULOSE 10 G/15ML
20 SOLUTION ORAL 3 TIMES DAILY
Status: DISCONTINUED | OUTPATIENT
Start: 2024-04-19 | End: 2024-04-19 | Stop reason: HOSPADM

## 2024-04-19 RX ORDER — BUMETANIDE 1 MG/1
1 TABLET ORAL 2 TIMES DAILY
Status: DISCONTINUED | OUTPATIENT
Start: 2024-04-19 | End: 2024-04-19 | Stop reason: HOSPADM

## 2024-04-19 RX ORDER — BISACODYL 10 MG
10 SUPPOSITORY, RECTAL RECTAL ONCE
Status: COMPLETED | OUTPATIENT
Start: 2024-04-19 | End: 2024-04-19

## 2024-04-19 RX ADMIN — PRAVASTATIN SODIUM 20 MG: 10 TABLET ORAL at 08:30

## 2024-04-19 RX ADMIN — METOPROLOL TARTRATE 25 MG: 25 TABLET, FILM COATED ORAL at 08:57

## 2024-04-19 RX ADMIN — PANTOPRAZOLE SODIUM 40 MG: 40 TABLET, DELAYED RELEASE ORAL at 06:04

## 2024-04-19 RX ADMIN — SENNOSIDES AND DOCUSATE SODIUM 1 TABLET: 50; 8.6 TABLET ORAL at 08:30

## 2024-04-19 RX ADMIN — POTASSIUM CHLORIDE 40 MEQ: 1500 TABLET, EXTENDED RELEASE ORAL at 08:30

## 2024-04-19 RX ADMIN — BISACODYL 10 MG: 10 SUPPOSITORY RECTAL at 08:34

## 2024-04-19 RX ADMIN — BUMETANIDE 1 MG: 1 TABLET ORAL at 08:30

## 2024-04-19 RX ADMIN — APIXABAN 2.5 MG: 2.5 TABLET, FILM COATED ORAL at 08:30

## 2024-04-19 RX ADMIN — BACITRACIN ZINC, NEOMYCIN, POLYMYXIN B: 400; 3.5; 5 OINTMENT TOPICAL at 08:30

## 2024-04-19 ASSESSMENT — PAIN SCALES - GENERAL
PAINLEVEL_OUTOF10: 0
PAINLEVEL_OUTOF10: 0

## 2024-04-19 NOTE — PROGRESS NOTES
\[facility]\"" FLOOR Progress Note    Admit Date: 2024  POD: 2 Days Post-Op      Procedure:  Procedure(s):  Transcatheter mitral valve repair  Kodi during tavr case  Ultrasound guided vascular access  Atrial septal defect closure    Subjective/overnight events:   Pt up in the chair.. Still alternating between AF and , on room air, afebrile. Vital signs stable. No events overnight. Sleepy.    Objective:     BP (!) 94/58   Pulse 76   Temp 98.1 °F (36.7 °C)   Resp 12   Ht 1.651 m (5' 5\")   Wt 65.2 kg (143 lb 11.8 oz)   SpO2 96%   BMI 23.92 kg/m²   Temp (24hrs), Av.2 °F (36.8 °C), Min:97.9 °F (36.6 °C), Max:99.1 °F (37.3 °C)      Last 24hr Input/Output:    Intake/Output Summary (Last 24 hours) at 2024 0818  Last data filed at 2024 0630  Gross per 24 hour   Intake 100 ml   Output 550 ml   Net -450 ml        EKG/Rhythm:   Encounter Date: 24   EKG 12 Lead   Result Value    Ventricular Rate 122    QRS Duration 86    Q-T Interval 288    QTc Calculation (Bazett) 410    R Axis 38    T Axis 35    Diagnosis      Atrial fibrillation with rapid ventricular response with frequent   ventricular-paced complexes  Abnormal ECG  When compared with ECG of 11-SEP-2018 03:39,  Electronic ventricular pacemaker has replaced Sinus rhythm  Vent. rate has increased BY  56 BPM         Oxygen: As above    CXR: Xray Result (most recent):  XR CHEST PORTABLE 2024    Narrative  EXAM:  XR CHEST PORTABLE    INDICATION: chf    COMPARISON: 2023    TECHNIQUE: portable chest AP view    FINDINGS: Mild cardiomegaly is noted. The patient is status post median  sternotomy. Pacer leads are unchanged in position. The pulmonary vasculature is  within normal limits.    Mild pulmonary edema is noted. There are trace bilateral pleural effusions. The  visualized bones and upper abdomen are age-appropriate.    Impression  Mild pulmonary edema and trace bilateral pleural effusions.      Echo, 24: Report still pending read, but per  Dr. Nguyen- mild MR, mild-mod TR, ASD closure looks good          Admission Weight: Last Weight   Weight - Scale: 72.5 kg (159 lb 13.3 oz) Weight - Scale: 65.2 kg (143 lb 11.8 oz)       EXAM:     General: awake, alert, and oriented   Lungs:    rales bilaterally   Incision:  Right groin site without bleeding or hematoma   Heart:   Irregular rhythm and no murmurs, rubs or gallops   Abdomen:    soft, not-distended, non-tender and active bowel sounds   Extremities:  no peripheral edema   Neurologic:  Gross motor and sensory apparatus intact       Lab Data Reviewed:   Recent Labs     04/19/24  0605   WBC 7.2   HGB 11.7   HCT 40.1         K 5.1   BUN 33*         Assessment:     Principal Problem:    Acute on chronic heart failure, unspecified heart failure type (HCC)  Active Problems:    CHF (congestive heart failure), NYHA class I, acute on chronic, combined (HCC)  Resolved Problems:    * No resolved hospital problems. *        Plan/Recommendations/Medical Decision Making:     Non-rheumatic, MR, ASD s/p GINA x3 and ASD closure: Medically ready for discharge if family is able to take her today.  Non-rheumatic TR: Now mild-moderate per Dr. Nguyen. OP follow up. Diuresis plan as below.   Acute on chronic HFrEF, NYHA class III, LVEF 60-65% previously, now 40-45%, improving: on room air. On Lopressor, Cozaar and Lasix and Farxiga PTA.  Will d/c on 1 mg of po Bumex BID at home with OP follow up with Nephrology.   Hypoxic respiratory insuffiency secondary to pulmonary edema and pleural effusions, resolved: on room air. OOB all meals, IS, ambulate. Diuresis as above.   Paroxysmal AF: Alternating between AV paced and AF. On Lopressor and Eliquis PTA. Continue  BB,  Eliquis.  Maintain K+ >4.0 and magnesium >2.0.    SSS s/p PPM: Noted.   Type a aortic dissection 2015 post emergent surgical intervention with residual arch and descending aorta dissection (dissection goes to right subclavian, one of the kidneys fills from

## 2024-04-19 NOTE — CARE COORDINATION
Pt is cleared for d/c from a CM standpoint.     Transition of Care Plan:     RUR: 12%  Prior Level of Functioning: Independnet  Disposition: Home   If SNF or IPR: Date FOC offered:   Date FOC received:   Accepting facility:   Date authorization started with reference number:   Date authorization received and expires:   Follow up appointments: PCP, Specialist  DME needed: none  Transportation at discharge: son  IM/IMM Medicare/ letter given: 2nd IM provided  Is patient a  and connected with VA? N/A              If yes, was [place] transfer form completed and VA notified?   Caregiver Contact: Nash Myrick- Son [phone]  Discharge Caregiver contacted prior to discharge?   CM to discuss with pt.   Care Conference needed? none  Barriers to discharge:      CM acknowledged d/c order.  2IM provided yesterday.  CM met with pt at bedside.  Pt's son will transport her home today around 2 p.m.   Pt has follow up appts scheduled.        04/19/24 1218   Services At/After Discharge   Transition of Care Consult (CM Consult) Discharge Planning   Services At/After Discharge None   Mode of Transport at Discharge Other (see comment)  (son)         Ira Lazaro, MADELINE  Supervisee in Social Work  Care Management, [facility]  [phone]

## 2024-04-19 NOTE — FLOWSHEET NOTE
04/19/24 1412   AVS Reviewed   AVS & discharge instructions reviewed with patient and/or representative? Yes   Reviewed instructions with Patient   Level of Understanding Questions answered;Teach back completed;Verbalized understanding;Return demonstration     Site is clean, dry and intact with no bruising, heamtoma or pain.  Discharge paperwork reviewed. Time for questions provided and clarification given. PIV access removed without complication. Patients belongings gathered. Patient taken down and discharged.

## 2024-04-19 NOTE — PROGRESS NOTES
Bedside shift change report given to Cathy Farley RN (oncoming nurse) by Aydin Langley RN  (offgoing nurse). Report included the following information Nurse Handoff Report, Index, ED Encounter Summary, ED SBAR, Adult Overview, Surgery Report, Intake/Output, MAR, Recent Results, Med Rec Status, Cardiac Rhythm A-Fib, V-Paced, and Alarm Parameters.

## 2024-04-19 NOTE — PROGRESS NOTES
NAME: Azucena Myrick        :  1945        MRN:  [id number]                    Assessment   :                                               Plan:  ADOLFO on CKD?  Hypercalcemia, mildly high  MR  H/o HTN, now low BP  Primary HPT Creatinine nml as of  (0.8), now 1.5 to 1.2  (1.1 on admission and a gentle rise since admission)     ordered urine sediment, upc and fena    w/u high calcium (PTH high at 102, 25 VD nml, 1,25 VD mildly low; FLC 2.15 - would send to onc if > 3; pending SPEP). Trend for now, might add sensipar if Ca rises further     On midodrine     S/p ASD closure and MitraClip      ADOLFO likely likely hemodynamic     Stable for discharge from my standpoint. Outpatient f/u       Subjective:     Chief Complaint:  oob in chair. So, so appetite. Otherwise no complaint.    Review of Systems:    Symptom Y/N Comments  Symptom Y/N Comments   Fever/Chills    Chest Pain     Poor Appetite    Edema     Cough    Abdominal Pain     Sputum    Joint Pain     SOB/DE LEÓN    Pruritis/Rash     Nausea/vomit    Tolerating PT/OT     Diarrhea    Tolerating Diet     Constipation    Other       Could not obtain due to:      Objective:     VITALS:   Last 24hrs VS reviewed since prior progress note. Most recent are:  Vitals:    24 0400   BP: 96/67   Pulse: 86   Resp: 16   Temp: 97.9 °F (36.6 °C)   SpO2: 93%       Intake/Output Summary (Last 24 hours) at 2024 0623  Last data filed at 2024 0406  Gross per 24 hour   Intake 100 ml   Output 650 ml   Net -550 ml        Telemetry Reviewed:     PHYSICAL EXAM:  General: NAD      Lab Data Reviewed: (see below)    Medications Reviewed: (see below)    PMH/SH reviewed - no change compared to H&P  ________________________________________________________________________  Care Plan discussed with:  Patient     Family      RN     Care Manager                    Consultant:          Comments   >50% of visit  2.5 mg  2.5 mg Oral TID PRN    potassium chloride (KLOR-CON M) extended release tablet 40 mEq  40 mEq Oral BID    magnesium sulfate 2000 mg in 50 mL IVPB premix  2,000 mg IntraVENous PRN    potassium chloride (KLOR-CON M) extended release tablet 40 mEq  40 mEq Oral PRN    Or    potassium bicarb-citric acid (EFFER-K) effervescent tablet 40 mEq  40 mEq Oral PRN    Or    potassium chloride 10 mEq/100 mL IVPB (Peripheral Line)  10 mEq IntraVENous PRN    sodium phosphate 15 mmol in sodium chloride 0.9 % 250 mL IVPB  15 mmol IntraVENous PRN    sodium chloride flush 0.9 % injection 5-40 mL  5-40 mL IntraVENous 2 times per day    sodium chloride flush 0.9 % injection 5-40 mL  5-40 mL IntraVENous PRN    0.9 % sodium chloride infusion   IntraVENous PRN    ondansetron (ZOFRAN-ODT) disintegrating tablet 4 mg  4 mg Oral Q8H PRN    Or    ondansetron (ZOFRAN) injection 4 mg  4 mg IntraVENous Q6H PRN    polyethylene glycol (GLYCOLAX) packet 17 g  17 g Oral Daily PRN    acetaminophen (TYLENOL) tablet 650 mg  650 mg Oral Q6H PRN    Or    acetaminophen (TYLENOL) suppository 650 mg  650 mg Rectal Q6H PRN    metoprolol tartrate (LOPRESSOR) tablet 25 mg  25 mg Oral BID    pravastatin (PRAVACHOL) tablet 20 mg  20 mg Oral Daily

## 2024-04-19 NOTE — PLAN OF CARE
Predictive Model Details          37 (Caution)  Factor Value    Calculated 4/19/2024 12:10 34% Age 78 years old    Deterioration Index Model 24% Respiratory rate 12     12% Potassium 5.1 mmol/L     12% Systolic 97     11% Cardiac rhythm Atrial fib;Ventricular paced     4% Pulse oximetry 100 %     3% BUN abnormal (33 MG/DL)     1% Sodium 139 mmol/L     0% WBC count 7.2 K/uL     0% Pulse 77     0% Hematocrit 40.1 %     0% Temperature 98.2 °F (36.8 °C)        Problem: Cardiovascular - Adult  Goal: Absence of cardiac dysrhythmias or at baseline  4/19/2024 0056 by Cathy Farley RN  Outcome: Progressing  Flowsheets (Taken 4/18/2024 1900)  Absence of cardiac dysrhythmias or at baseline: Monitor cardiac rate and rhythm     Problem: ABCDS Injury Assessment  Goal: Absence of physical injury  4/19/2024 1210 by Radha Ambrose RN  Outcome: Adequate for Discharge  4/19/2024 0056 by Cathy Farley RN  Outcome: Progressing  Flowsheets (Taken 4/18/2024 1900)  Absence of Physical Injury: Implement safety measures based on patient assessment     Problem: Safety - Adult  Goal: Free from fall injury  4/19/2024 1210 by Radha Ambrose RN  Outcome: Adequate for Discharge  4/19/2024 0056 by Cathy Farley RN  Outcome: Progressing  Flowsheets (Taken 4/18/2024 1900)  Free From Fall Injury:   Instruct family/caregiver on patient safety   Based on caregiver fall risk screen, instruct family/caregiver to ask for assistance with transferring infant if caregiver noted to have fall risk factors     Problem: Chronic Conditions and Co-morbidities  Goal: Patient's chronic conditions and co-morbidity symptoms are monitored and maintained or improved  4/19/2024 1210 by Radha Ambrose RN  Outcome: Adequate for Discharge  4/19/2024 0056 by Cathy Farley RN  Outcome: Progressing  Flowsheets (Taken 4/18/2024 1900)  Care Plan - Patient's Chronic Conditions and Co-Morbidity Symptoms are Monitored and Maintained or Improved: Monitor and assess  patient and caregiver

## 2024-04-19 NOTE — PROGRESS NOTES
Giovanni Nguyen MD [facility]  Interventional & Structural Cardiology                         [facility]  [place], [place]                                                                                                                         Interventional & Structural Heart Care Note        Patient Name: Azucena Myrick - :1945 - MRN:[id number]  Primary Cardiologist: Dr. Palacio  Consulting Cardiologist: Dr. Nguyen  PCP: Bhavana Mendoza MD      Reason for encounter: Mitral regurgitation - GINA      Date:2024     Assessment and Plan   Mitral regurgitation / ASD- Echocardiogram 2023, EF 60 to 65% now 40-45%, severe degenerative nonrheumatic mitral regurgitation eccentric jet GINA x 3   now with MV mean gradient of 6 mm/hg, mild regurgitation, ASD occluder well positioned, EF 50-55%   PPM/PAF/Afib, -digoxin yesterday, on metoprolol , currently rate controlled, monitor lytes, resumed eliquis d/w pharmacy per CV surgery   Hx PAH/HTN/idiopathic hypotension- BP soft, reduce metoprolol to 12.5 mg bid at dc. d/w CV surgery   Constipation - suppository, lactulose  Acute on Chronic HFpEF- diuresis , on losartan, metoprolol, farxiga, will be sent home with oral bumex, off    5.  S/p ascending aortic aneurysm repair , BP controlled  6.  ADOLFO in setting of diuretics, HFpEF, Creatine improved to 1.2, electrolyte replacement.   7.  Hypoxia - on    8.  Hypercholesterolemia - on statin   9.   Arthritis /spinal stenosis - pain controlled per pt, tylenol  10. GERD -diet controlled, no home meds                                                       ______________________________________     HPI:     78-year-old very pleasant woman with history of type a aortic dissection 2015 post emergent surgical intervention with residual arch and descending aorta dissection (dissection goes to right subclavian, one of the kidneys fills from the false lumen), atrial  unremarkable. There is no free  fluid or free air.     The urinary bladder is unremarkable. There is no pelvic mass.     There are stable degenerative changes in the spine without aggressive bony  lesion.     Impression  IMPRESSION:     1. Stable aortic dissection and surgical changes involving the aortic root.  There is stable dilatation of the aortic arch measuring 4.8 cm in diameter. No  new vascular abnormality.     2. Trace right pleural effusion with patchy opacity throughout the lungs  predominantly in the lower lobes that may represent edema, atelectasis or  nonspecific infection or inflammation.     3. There is no other acute abnormality in the chest, abdomen, or pelvis.        Echo:  04/11/24     ATUL (PRN CONTRAST/BUBBLE/3D) 04/15/2024  1:33 PM (Final)     Interpretation Summary    Left Ventricle: Low normal left ventricular systolic function with a visually estimated EF of 40 - 45%.    Left Atrium: Left atrium is severely dilated.    Image quality is adequate.     Signed by: Giovanni Nguyen MD on 4/15/2024  1:33 PM        Current meds:    Current Medication      Current Facility-Administered Medications:     neomycin-bacitracin-polymyxin (NEOSPORIN) ointment, , Topical, BID, Giovanni Nguyen MD, 1 g at 04/18/24 1339    0.9 % sodium chloride infusion, , IntraVENous, PRN, Amy Enamorado APRN - CNP    sodium chloride flush 0.9 % injection 5-40 mL, 5-40 mL, IntraVENous, PRN, Amy Enamorado APRN - CNP    digoxin (LANOXIN) tablet 62.5 mcg, 62.5 mcg, Oral, Q48H, Nate Gant MD    enoxaparin (LOVENOX) injection 70 mg, 70 mg, SubCUTAneous, Q24H, Nate Gant MD, 70 mg at 04/18/24 0834    sennosides-docusate sodium (SENOKOT-S) 8.6-50 MG tablet 1 tablet, 1 tablet, Oral, Daily, Pino Castellanos MD, 1 tablet at 04/18/24 0834    [Held by provider] bumetanide (BUMEX) tablet 2 mg, 2 mg, Oral, BID, Amalia Amaya APRN - NP, 2 mg at 04/17/24 0800    pantoprazole (PROTONIX) tablet 40 mg, 40  Amalia Amaya, MICHAEL - NP, 25 mg at 04/17/24 2236    pravastatin (PRAVACHOL) tablet 20 mg, 20 mg, Oral, Daily, Loki Sampson MD, 20 mg at 04/18/24 0834                         [x]    High complexity decision making was performed  []    Patient is at high-risk of decompensation with multiple organ involvement  []    Complex/difficult social determinants of health outcomes  Total of 30  minutes were spent on reviewing the records, analyzing investigations and documentation in the chart, on the day of visit including time for examination and time spent with the patient                MICHAEL Roach-CNP  Giovanni Nguyen MD [facility]  Interventional & Structural Cardiology   [facility] [place]  [facility]     [facility] Cardiology      Call center: (P) [phone]  (F) [phone]        CC:Bhavana Mendoza MD

## 2024-04-19 NOTE — PROGRESS NOTES
Bedside shift change report given to MARQUIS Perrin (oncoming nurse) by Cathy Farley RN  (offgoing nurse). Report included the following information Nurse Handoff Report, Index, ED Encounter Summary, ED SBAR, Adult Overview, Surgery Report, Intake/Output, MAR, Recent Results, Med Rec Status, Cardiac Rhythm A-Fib, V-Paced, and Alarm Parameters.

## 2024-04-19 NOTE — PROGRESS NOTES
Comprehensive Nutrition Assessment    Type and Reason for Visit:  Initial, RD Nutrition Re-Screen/LOS, Consult    Nutrition Recommendations/Plan:   Regular/2g Na diet  Ensure plus high protein BID     Malnutrition Assessment:  Malnutrition Status:  Severe malnutrition (04/19/24 1023)    Context:  Chronic Illness     Findings of the 6 clinical characteristics of malnutrition:  Energy Intake:  75% or less estimated energy requirements for 1 month or longer  Weight Loss:  Greater than 10% over 6 months     Body Fat Loss:  No significant body fat loss     Muscle Mass Loss:  Mild muscle mass loss Temples (temporalis), Clavicles (pectoralis & deltoids), Thigh (quadriceps), Calf (gastrocnemius)  Fluid Accumulation:  No significant fluid accumulation     Strength:  Not Performed    Nutrition Assessment:    Patient medically noted for CHF, HTN, MVR s/p mitraclip, ADOLFO on CKD, and AFIB. Chart reviewed for length of stay and consult received for poor appetite. Patient was supposed to be discharged yesterday but family wouldn't accept her. Currently receiving a cardiac diet. Patient resting with eyes closed at time of visit but awoke easily. She reports a decreased appetite for several weeks; most days she was only eating 1 meal/day and this consisted of a salad. She does not like the hospital food and hasn't been eating much since admitted. Significant 15.8% weight loss x 5.5 months noted on chart review. She had been receiving ensure plus high protein earlier in admission; she reports liking this okay and is agreeable to resuming it/continuing it at home. Coupons provided. Discussed small, frequent meals/snacks throughout the day and sipping on ensure to help with kcal intake. Mild muscle wasting noted on NFPE; patient meets criteria for severe malnutrition given decreased intake, weight loss, and muscle wasting. Will liberalize diet slightly to allow more options and help encourage PO intake while admitted. Potassium up  Outcomes: None Identified  Food/Nutrient Intake Outcomes: Food and Nutrient Intake, Supplement Intake  Physical Signs/Symptoms Outcomes: Biochemical Data, Weight    Discharge Planning:    Continue current diet, Continue Oral Nutrition Supplement     Lynne Mckinney RD  Contact: ext [phone]

## 2024-04-19 NOTE — PLAN OF CARE
Problem: Physical Therapy - Adult  Goal: By Discharge: Performs mobility at highest level of function for planned discharge setting.  See evaluation for individualized goals.  Description: FUNCTIONAL STATUS PRIOR TO ADMISSION: Patient ambulated with no AD inside her home and used a single point cane in the community.     HOME SUPPORT PRIOR TO ADMISSION: The patient lives with her  and son in a 2-story home but she stays on 1st floor. 4 steps with B rails (too wide to reach both) to enter from outside.    Physical Therapy Goals  Initiated 4/18/2024  1.  Patient will move from supine to sit and sit to supine in bed with supervision/set-up within 7 day(s).    2.  Patient will perform sit to stand with supervision/set-up within 7 day(s).  3.  Patient will transfer from bed to chair and chair to bed with supervision/set-up using the least restrictive device within 7 day(s).  4.  Patient will ambulate with supervision/set-up for 300 feet with the least restrictive device within 7 day(s).   5.  Patient will ascend/descend 4 stairs with 1 handrail(s) with supervision/set-up within 7 day(s).   Outcome: Progressing   PHYSICAL THERAPY TREATMENT    Patient: Azucena Myrick (78 y.o. female)  Date: 4/19/2024  Diagnosis: Acute on chronic heart failure, unspecified heart failure type (HCC) [I50.9]  CHF (congestive heart failure), NYHA class I, acute on chronic, combined (HCC) [I50.43] Acute on chronic heart failure, unspecified heart failure type (HCC)  Procedure(s) (LRB):  Transcatheter mitral valve repair (N/A)  Koid during tavr case (N/A)  Ultrasound guided vascular access (N/A)  Atrial septal defect closure (N/A) 2 Days Post-Op  Precautions: Fall Risk                      ASSESSMENT:  Patient continues to benefit from skilled PT services and is progressing towards goals. Pt received sidelying in bed, agreeable to participate in therapy. Pt continues to show improvements with mobility with improved tolerance to

## 2024-04-19 NOTE — PLAN OF CARE
Problem: Physical Therapy - Adult  Goal: By Discharge: Performs mobility at highest level of function for planned discharge setting.  See evaluation for individualized goals.  Description: FUNCTIONAL STATUS PRIOR TO ADMISSION: Patient ambulated with no AD inside her home and used a single point cane in the community.     HOME SUPPORT PRIOR TO ADMISSION: The patient lives with her  and son in a 2-story home but she stays on 1st floor. 4 steps with B rails (too wide to reach both) to enter from outside.    Physical Therapy Goals  Initiated 4/18/2024  1.  Patient will move from supine to sit and sit to supine in bed with supervision/set-up within 7 day(s).    2.  Patient will perform sit to stand with supervision/set-up within 7 day(s).  3.  Patient will transfer from bed to chair and chair to bed with supervision/set-up using the least restrictive device within 7 day(s).  4.  Patient will ambulate with supervision/set-up for 300 feet with the least restrictive device within 7 day(s).   5.  Patient will ascend/descend 4 stairs with 1 handrail(s) with supervision/set-up within 7 day(s).   4/18/2024 1104 by Anita Palacios, PT  Outcome: Not Progressing     Problem: Physical Therapy - Adult  Goal: By Discharge: Performs mobility at highest level of function for planned discharge setting.  See evaluation for individualized goals.  Description: FUNCTIONAL STATUS PRIOR TO ADMISSION: Patient ambulated with no AD inside her home and used a single point cane in the community.     HOME SUPPORT PRIOR TO ADMISSION: The patient lives with her  and son in a 2-story home but she stays on 1st floor. 4 steps with B rails (too wide to reach both) to enter from outside.    Physical Therapy Goals  Initiated 4/18/2024  1.  Patient will move from supine to sit and sit to supine in bed with supervision/set-up within 7 day(s).    2.  Patient will perform sit to stand with supervision/set-up within 7 day(s).  3.  Patient will

## 2024-04-19 NOTE — PROGRESS NOTES
Pt. Ambulate to bathroom and in the beth without difficulty.  Pt. Denies SOB, CP, dizziness and palpitation.  Vital signs are stable.  Right groin procedure site no bleeding/ no hematoma noted.    Pt. Is in a chair.

## 2024-04-19 NOTE — PROGRESS NOTES
aortic aneurysm repair  -TTE EF 65-70%.  Mod to severe TR and MR.  Mod PH.  pending, will need ATUL and right heart cath to assess shunt level  -Takes Eliquis at home, switch to Lovenox  -Continue metoprolol  -s/p digoxin load, continue 125 mcg daily  -VR still >100 with minor activity, just under 100 resting     HTN, currently low BP  -Recently discontinued amlodipine  -Continue metoprolol with holding parameters  -Blood pressure on softer side, holding losartan  -add midodrine prn          Medical Decision Making:   I personally reviewed labs: CBC, BMP, magnesium, phosphorus  I personally reviewed imaging:   I personally reviewed EKG:  Toxic drug monitoring: H&H while patient is on Lovenox  Discussed case with: Patient, RN        Code Status: Full code  DVT Prophylaxis: Lovenox  GI Prophylaxis: Protonix    Subjective:     Chief Complaint / Reason for Physician Visit  \" Follow-up for acute CHF exacerbation, severe TR, moderate MR, paroxysmal A-fib on Eliquis, ASD, status post ascending aortic aneurysm repair, HTN.\".  Discussed with RN events overnight.       Objective:     VITALS:   Last 24hrs VS reviewed since prior progress note. Most recent are:  Patient Vitals for the past 24 hrs:   BP Temp Temp src Pulse Resp SpO2 Height Weight   04/19/24 0730 (!) 94/58 98.1 °F (36.7 °C) -- 76 12 96 % -- --   04/19/24 0600 -- -- -- -- -- -- 1.651 m (5' 5\") 65.2 kg (143 lb 11.8 oz)   04/19/24 0400 96/67 97.9 °F (36.6 °C) Oral 86 16 93 % -- --   04/19/24 0008 101/74 -- -- (!) 103 -- 91 % -- --   04/18/24 2252 119/75 98.1 °F (36.7 °C) Oral (!) 105 16 93 % -- --   04/18/24 2232 119/75 -- -- 99 -- 95 % -- --   04/18/24 2200 112/67 -- -- (!) 101 -- 92 % -- --   04/18/24 2130 106/79 -- -- 98 -- 94 % -- --   04/18/24 2000 108/65 -- -- (!) 128 -- 93 % -- --   04/18/24 1938 107/81 99.1 °F (37.3 °C) Oral (!) 115 18 92 % -- --   04/18/24 1500 103/77 -- -- (!) 117 -- -- -- --   04/18/24 1425 110/78 98.1 °F (36.7 °C) Oral (!) 121 -- -- -- --  3.0  --  3.7 2.7   BILITOT 0.7  --  0.6  --    AST 35  --  29  --    ALT 23  --  20  --        Signed: Gail Raymundo MD

## 2024-04-20 ENCOUNTER — FOLLOWUP TELEPHONE ENCOUNTER (OUTPATIENT)
Facility: HOSPITAL | Age: 79
End: 2024-04-20

## 2024-04-20 LAB
ABO + RH BLD: NORMAL
BLD PROD TYP BPU: NORMAL
BLOOD BANK DISPENSE STATUS: NORMAL
BLOOD GROUP ANTIBODIES SERPL: NORMAL
BPU ID: NORMAL
CROSSMATCH RESULT: NORMAL
SPECIMEN EXP DATE BLD: NORMAL
UNIT DIVISION: 0

## 2024-04-20 NOTE — TELEPHONE ENCOUNTER
0933 - Patient's [employer] appeal returned stating \"the physician reviewer disagrees with the termination of services.\"  Patient appears to have been discharged; CM Supervisor notified and said to notify MD.  CM notified discharging NP (Amalia Amaya) via [employer].        Leigha Gerard, YSLN, RN  [facility] Care Management

## 2024-04-22 ENCOUNTER — HOSPITAL ENCOUNTER (INPATIENT)
Facility: HOSPITAL | Age: 79
LOS: 2 days | Discharge: HOME | DRG: 683 | End: 2024-04-24
Attending: EMERGENCY MEDICINE | Admitting: STUDENT IN AN ORGANIZED HEALTH CARE EDUCATION/TRAINING PROGRAM
Payer: MEDICARE

## 2024-04-22 ENCOUNTER — APPOINTMENT (OUTPATIENT)
Facility: HOSPITAL | Age: 79
DRG: 683 | End: 2024-04-22
Payer: MEDICARE

## 2024-04-22 DIAGNOSIS — K59.00 CONSTIPATION, UNSPECIFIED CONSTIPATION TYPE: ICD-10-CM

## 2024-04-22 DIAGNOSIS — E86.0 DEHYDRATION: ICD-10-CM

## 2024-04-22 DIAGNOSIS — N17.9 AKI (ACUTE KIDNEY INJURY) (HCC): Primary | ICD-10-CM

## 2024-04-22 LAB
ALBUMIN SERPL ELPH-MCNC: 3.7 G/DL (ref 2.9–4.4)
ALBUMIN SERPL-MCNC: 3.4 G/DL (ref 3.5–5)
ALBUMIN/GLOB SERPL: 0.9 (ref 1.1–2.2)
ALBUMIN/GLOB SERPL: 1.1 (ref 0.7–1.7)
ALP SERPL-CCNC: 64 U/L (ref 45–117)
ALPHA1 GLOB SERPL ELPH-MCNC: 0.3 G/DL (ref 0–0.4)
ALPHA2 GLOB SERPL ELPH-MCNC: 0.8 G/DL (ref 0.4–1)
ALT SERPL-CCNC: 12 U/L (ref 12–78)
ANION GAP SERPL CALC-SCNC: 5 MMOL/L (ref 5–15)
APPEARANCE UR: ABNORMAL
AST SERPL-CCNC: 14 U/L (ref 15–37)
B-GLOBULIN SERPL ELPH-MCNC: 1.4 G/DL (ref 0.7–1.3)
BACTERIA URNS QL MICRO: NEGATIVE /HPF
BASOPHILS # BLD: 0 K/UL (ref 0–0.1)
BASOPHILS NFR BLD: 0 % (ref 0–1)
BILIRUB SERPL-MCNC: 0.5 MG/DL (ref 0.2–1)
BILIRUB UR QL: NEGATIVE
BUN SERPL-MCNC: 52 MG/DL (ref 6–20)
BUN/CREAT SERPL: 20 (ref 12–20)
CALCIUM SERPL-MCNC: 10.4 MG/DL (ref 8.5–10.1)
CHLORIDE SERPL-SCNC: 105 MMOL/L (ref 97–108)
CO2 SERPL-SCNC: 30 MMOL/L (ref 21–32)
COLOR UR: ABNORMAL
COMMENT:: NORMAL
CREAT SERPL-MCNC: 2.56 MG/DL (ref 0.55–1.02)
CREAT UR-MCNC: 229 MG/DL
DIFFERENTIAL METHOD BLD: ABNORMAL
EOSINOPHIL # BLD: 0.2 K/UL (ref 0–0.4)
EOSINOPHIL NFR BLD: 3 % (ref 0–7)
EPITH CASTS URNS QL MICRO: ABNORMAL /LPF
ERYTHROCYTE [DISTWIDTH] IN BLOOD BY AUTOMATED COUNT: 14.7 % (ref 11.5–14.5)
GAMMA GLOB SERPL ELPH-MCNC: 0.9 G/DL (ref 0.4–1.8)
GLOBULIN SER CALC-MCNC: 3.4 G/DL (ref 2.2–3.9)
GLOBULIN SER CALC-MCNC: 3.6 G/DL (ref 2–4)
GLUCOSE SERPL-MCNC: 114 MG/DL (ref 65–100)
GLUCOSE UR STRIP.AUTO-MCNC: NEGATIVE MG/DL
HCT VFR BLD AUTO: 38.1 % (ref 35–47)
HGB BLD-MCNC: 11.5 G/DL (ref 11.5–16)
HGB UR QL STRIP: NEGATIVE
IMM GRANULOCYTES # BLD AUTO: 0 K/UL (ref 0–0.04)
IMM GRANULOCYTES NFR BLD AUTO: 1 % (ref 0–0.5)
KETONES UR QL STRIP.AUTO: ABNORMAL MG/DL
LEUKOCYTE ESTERASE UR QL STRIP.AUTO: ABNORMAL
LYMPHOCYTES # BLD: 2 K/UL (ref 0.8–3.5)
LYMPHOCYTES NFR BLD: 30 % (ref 12–49)
M PROTEIN SERPL ELPH-MCNC: ABNORMAL G/DL
MCH RBC QN AUTO: 28 PG (ref 26–34)
MCHC RBC AUTO-ENTMCNC: 30.2 G/DL (ref 30–36.5)
MCV RBC AUTO: 92.9 FL (ref 80–99)
MONOCYTES # BLD: 1.2 K/UL (ref 0–1)
MONOCYTES NFR BLD: 19 % (ref 5–13)
NEUTS SEG # BLD: 3.1 K/UL (ref 1.8–8)
NEUTS SEG NFR BLD: 47 % (ref 32–75)
NITRITE UR QL STRIP.AUTO: NEGATIVE
NRBC # BLD: 0 K/UL (ref 0–0.01)
NRBC BLD-RTO: 0 PER 100 WBC
PH UR STRIP: 5 (ref 5–8)
PLATELET # BLD AUTO: 190 K/UL (ref 150–400)
PMV BLD AUTO: 11.4 FL (ref 8.9–12.9)
POTASSIUM SERPL-SCNC: 4.5 MMOL/L (ref 3.5–5.1)
PROT SERPL-MCNC: 7 G/DL (ref 6.4–8.2)
PROT SERPL-MCNC: 7.1 G/DL (ref 6–8.5)
PROT UR STRIP-MCNC: ABNORMAL MG/DL
RBC # BLD AUTO: 4.1 M/UL (ref 3.8–5.2)
RBC #/AREA URNS HPF: ABNORMAL /HPF (ref 0–5)
SODIUM SERPL-SCNC: 140 MMOL/L (ref 136–145)
SODIUM UR-SCNC: 6 MMOL/L
SP GR UR REFRACTOMETRY: 1.02
SPECIMEN HOLD: NORMAL
URINE CULTURE IF INDICATED: ABNORMAL
UROBILINOGEN UR QL STRIP.AUTO: 0.2 EU/DL (ref 0.2–1)
UUN UR-MCNC: 707 MG/DL
WBC # BLD AUTO: 6.5 K/UL (ref 3.6–11)
WBC URNS QL MICRO: ABNORMAL /HPF (ref 0–4)

## 2024-04-22 PROCEDURE — 84300 ASSAY OF URINE SODIUM: CPT

## 2024-04-22 PROCEDURE — 82570 ASSAY OF URINE CREATININE: CPT

## 2024-04-22 PROCEDURE — 81001 URINALYSIS AUTO W/SCOPE: CPT

## 2024-04-22 PROCEDURE — 2580000003 HC RX 258: Performed by: INTERNAL MEDICINE

## 2024-04-22 PROCEDURE — 2580000003 HC RX 258: Performed by: EMERGENCY MEDICINE

## 2024-04-22 PROCEDURE — 1100000003 HC PRIVATE W/ TELEMETRY

## 2024-04-22 PROCEDURE — 0DCP7ZZ EXTIRPATION OF MATTER FROM RECTUM, VIA NATURAL OR ARTIFICIAL OPENING: ICD-10-PCS | Performed by: EMERGENCY MEDICINE

## 2024-04-22 PROCEDURE — 87086 URINE CULTURE/COLONY COUNT: CPT

## 2024-04-22 PROCEDURE — 99285 EMERGENCY DEPT VISIT HI MDM: CPT

## 2024-04-22 PROCEDURE — 85025 COMPLETE CBC W/AUTO DIFF WBC: CPT

## 2024-04-22 PROCEDURE — 80053 COMPREHEN METABOLIC PANEL: CPT

## 2024-04-22 PROCEDURE — 36415 COLL VENOUS BLD VENIPUNCTURE: CPT

## 2024-04-22 PROCEDURE — 2580000003 HC RX 258: Performed by: STUDENT IN AN ORGANIZED HEALTH CARE EDUCATION/TRAINING PROGRAM

## 2024-04-22 PROCEDURE — 76770 US EXAM ABDO BACK WALL COMP: CPT

## 2024-04-22 PROCEDURE — 84540 ASSAY OF URINE/UREA-N: CPT

## 2024-04-22 PROCEDURE — 6370000000 HC RX 637 (ALT 250 FOR IP): Performed by: STUDENT IN AN ORGANIZED HEALTH CARE EDUCATION/TRAINING PROGRAM

## 2024-04-22 RX ORDER — SODIUM CHLORIDE 9 MG/ML
INJECTION, SOLUTION INTRAVENOUS PRN
Status: DISCONTINUED | OUTPATIENT
Start: 2024-04-22 | End: 2024-04-24 | Stop reason: HOSPADM

## 2024-04-22 RX ORDER — ACETAMINOPHEN 325 MG/1
650 TABLET ORAL EVERY 6 HOURS PRN
Status: DISCONTINUED | OUTPATIENT
Start: 2024-04-22 | End: 2024-04-24 | Stop reason: HOSPADM

## 2024-04-22 RX ORDER — 0.9 % SODIUM CHLORIDE 0.9 %
500 INTRAVENOUS SOLUTION INTRAVENOUS ONCE
Status: COMPLETED | OUTPATIENT
Start: 2024-04-22 | End: 2024-04-22

## 2024-04-22 RX ORDER — SODIUM CHLORIDE 0.9 % (FLUSH) 0.9 %
5-40 SYRINGE (ML) INJECTION EVERY 12 HOURS SCHEDULED
Status: DISCONTINUED | OUTPATIENT
Start: 2024-04-22 | End: 2024-04-24 | Stop reason: HOSPADM

## 2024-04-22 RX ORDER — PRAVASTATIN SODIUM 10 MG
20 TABLET ORAL DAILY
Status: DISCONTINUED | OUTPATIENT
Start: 2024-04-22 | End: 2024-04-24 | Stop reason: HOSPADM

## 2024-04-22 RX ORDER — ENOXAPARIN SODIUM 100 MG/ML
30 INJECTION SUBCUTANEOUS DAILY
Status: DISCONTINUED | OUTPATIENT
Start: 2024-04-22 | End: 2024-04-22

## 2024-04-22 RX ORDER — SODIUM CHLORIDE 9 MG/ML
INJECTION, SOLUTION INTRAVENOUS CONTINUOUS
Status: ACTIVE | OUTPATIENT
Start: 2024-04-22 | End: 2024-04-23

## 2024-04-22 RX ORDER — SODIUM CHLORIDE 0.9 % (FLUSH) 0.9 %
5-40 SYRINGE (ML) INJECTION PRN
Status: DISCONTINUED | OUTPATIENT
Start: 2024-04-22 | End: 2024-04-24 | Stop reason: HOSPADM

## 2024-04-22 RX ORDER — ONDANSETRON 2 MG/ML
4 INJECTION INTRAMUSCULAR; INTRAVENOUS EVERY 6 HOURS PRN
Status: DISCONTINUED | OUTPATIENT
Start: 2024-04-22 | End: 2024-04-24 | Stop reason: HOSPADM

## 2024-04-22 RX ORDER — ONDANSETRON 4 MG/1
4 TABLET, ORALLY DISINTEGRATING ORAL EVERY 8 HOURS PRN
Status: DISCONTINUED | OUTPATIENT
Start: 2024-04-22 | End: 2024-04-24 | Stop reason: HOSPADM

## 2024-04-22 RX ORDER — ACETAMINOPHEN 650 MG/1
650 SUPPOSITORY RECTAL EVERY 6 HOURS PRN
Status: DISCONTINUED | OUTPATIENT
Start: 2024-04-22 | End: 2024-04-24 | Stop reason: HOSPADM

## 2024-04-22 RX ORDER — POLYETHYLENE GLYCOL 3350 17 G/17G
17 POWDER, FOR SOLUTION ORAL DAILY PRN
Status: DISCONTINUED | OUTPATIENT
Start: 2024-04-22 | End: 2024-04-23

## 2024-04-22 RX ADMIN — METOPROLOL TARTRATE 12.5 MG: 25 TABLET, FILM COATED ORAL at 22:21

## 2024-04-22 RX ADMIN — SODIUM CHLORIDE 500 ML: 9 INJECTION, SOLUTION INTRAVENOUS at 04:51

## 2024-04-22 RX ADMIN — SODIUM CHLORIDE: 9 INJECTION, SOLUTION INTRAVENOUS at 13:49

## 2024-04-22 RX ADMIN — PRAVASTATIN SODIUM 20 MG: 10 TABLET ORAL at 13:49

## 2024-04-22 RX ADMIN — SODIUM CHLORIDE, PRESERVATIVE FREE 10 ML: 5 INJECTION INTRAVENOUS at 13:55

## 2024-04-22 RX ADMIN — APIXABAN 2.5 MG: 5 TABLET, FILM COATED ORAL at 13:50

## 2024-04-22 RX ADMIN — SODIUM CHLORIDE, PRESERVATIVE FREE 10 ML: 5 INJECTION INTRAVENOUS at 22:22

## 2024-04-22 RX ADMIN — APIXABAN 2.5 MG: 5 TABLET, FILM COATED ORAL at 22:21

## 2024-04-22 RX ADMIN — SODIUM CHLORIDE: 9 INJECTION, SOLUTION INTRAVENOUS at 22:25

## 2024-04-22 ASSESSMENT — PAIN SCALES - GENERAL
PAINLEVEL_OUTOF10: 0
PAINLEVEL_OUTOF10: 0

## 2024-04-22 ASSESSMENT — LIFESTYLE VARIABLES
HOW OFTEN DO YOU HAVE A DRINK CONTAINING ALCOHOL: NEVER
HOW MANY STANDARD DRINKS CONTAINING ALCOHOL DO YOU HAVE ON A TYPICAL DAY: PATIENT DOES NOT DRINK

## 2024-04-22 NOTE — PROGRESS NOTES
NAME: Azucena Myrick        :  1945        MRN:  [id number]                    Assessment   :                                               Plan:  ADOLFO on CKD?  ? Dehydrated  ADOLFO worse  Hypercalcemia, mildly high  MR  H/o HTN, now low BP  Primary HPT Creatinine nml as of  (0.8),  Creatinine 1.2 => 2.56  BUN 33 => 52  BUN less than 33-52  UOP not recorded  Bladder scan reportedly was empty  Calcium 10.4, albumin 3.4  Renal ultrasound to be repeated  Clinically dehydrated-will give IV fluids    Please note Dr. Ramirez's notes on hypercalcemia workup-  w/u high calcium (PTH high at 102, 25 VD nml, 1,25 VD mildly low; FLC 2.15 - would send to onc if > 3; pending SPEP). Trend for now, might add sensipar if Ca rises further     On midodrine     S/p ASD closure and MitraClip           Subjective:     Chief Complaint: Resting in bed, said that she had trouble voiding and and is constipated as well.  Not short of breath.  Feels weak    Review of Systems:    See HPI    Objective:     VITALS:   Last 24hrs VS reviewed since prior progress note. Most recent are:  Vitals:    24 0851   BP: 98/65   Pulse: 73   Resp: 16   Temp: 97.5 °F (36.4 °C)   SpO2: 99%     No intake or output data in the 24 hours ending 24 1010   Telemetry Reviewed:     PHYSICAL EXAM:  General: NAD  Alert oriented x 3  Nonlabored respiration  Cooperative and pleasant  No asterixis  No edema  Abdomen is nontender    Lab Data Reviewed: (see below)    Medications Reviewed: (see below)    PMH/SH reviewed - no change compared to H&P  ________________________________________________________________________  Care Plan discussed with:  Patient x    Family      RN x    Care Manager                    Consultant:          Comments   >50% of visit spent in counseling and coordination of care       ________________________________________________________________________  Amar

## 2024-04-22 NOTE — ED NOTES
Report given to MARQUIS Redmond. Nurse was informed of reason for arrival, vitals, labs, medications, orders, procedures, results, anything left pending and current plan of action. Questions were asked and received prior to departure from the patient.

## 2024-04-22 NOTE — CARE COORDINATION
Care Management Initial Assessment       RUR: calculating   Readmission? Yes d/c home Sat with family  1st IM letter given? Yes  1st  letter given: No     04/22/24 1324   Service Assessment   Patient Orientation Alert and Oriented   Cognition Alert   History Provided By Patient   Primary Caregiver Self   Support Systems Spouse/Significant Other;Children   Patient's Healthcare Decision Maker is: Legal Next of Kin   PCP Verified by CM Yes   Last Visit to PCP Within last 3 months   Prior Functional Level Independent in ADLs/IADLs   Current Functional Level Independent in ADLs/IADLs   Can patient return to prior living arrangement Yes   Ability to make needs known: Good   Family able to assist with home care needs: Yes   Would you like for me to discuss the discharge plan with any other family members/significant others, and if so, who? Yes  (, Nash Myrick III)   Financial Resources Medicare   Community Resources None   Social/Functional History   Lives With Spouse;Son   Type of Home House   Bathroom Shower/Tub Shower chair with back   Bathroom Equipment Shower chair   Home Equipment Cane;Walker, standard;Walker, rolling   Receives Help From Family   ADL Assistance Independent   Homemaking Assistance Independent   Homemaking Responsibilities Yes   Ambulation Assistance Independent   Transfer Assistance Independent   Active  No   Mode of Transportation Family   Occupation Retired   Discharge Planning   Type of Residence House   Living Arrangements Spouse/Significant Other;Children   Current Services Prior To Admission Durable Medical Equipment   Current DME Prior to Arrival Walker;Shower Chair;Cane   Potential Assistance Needed N/A   DME Ordered? No   Potential Assistance Purchasing Medications No   Type of Home Care Services None   Patient expects to be discharged to: House   History of falls? 0   Services At/After Discharge   Transition of Care Consult (CM Consult) N/A   Services At/After

## 2024-04-22 NOTE — PLAN OF CARE
Problem: Safety - Adult  Goal: Free from fall injury  Outcome: Progressing  Flowsheets (Taken 4/22/2024 0934 by Be Enriquez, RN)  Free From Fall Injury: Instruct family/caregiver on patient safety     Problem: Discharge Planning  Goal: Discharge to home or other facility with appropriate resources  Outcome: Progressing     Problem: Chronic Conditions and Co-morbidities  Goal: Patient's chronic conditions and co-morbidity symptoms are monitored and maintained or improved  Outcome: Progressing     Problem: Pain  Goal: Verbalizes/displays adequate comfort level or baseline comfort level  Outcome: Progressing     Problem: ABCDS Injury Assessment  Goal: Absence of physical injury  Outcome: Progressing  Flowsheets (Taken 4/22/2024 0934 by Be Enriquez, RN)  Absence of Physical Injury: Implement safety measures based on patient assessment

## 2024-04-22 NOTE — PROGRESS NOTES
Physician Progress Note      PATIENT:               SUSY GRAF  CSN #:                  [id number]  :                       1945  ADMIT DATE:       2024 4:33 AM  DISCH DATE:  RESPONDING  PROVIDER #:        Carole Mcmahon MD          QUERY TEXT:    Good afternoon.    Patient admitted with ADOLFO, noted to have paroxysmal atrial fibrillation and is   maintained on Eliquis.    If possible, please document in progress notes and discharge summary if you   are evaluating and/or treating any of the following:    The medical record reflects the following:    Risk Factors: 78 yr old female, HTN, CHF    Clinical Indicators: from  H&P: \"Paroxysmal A-fib Continue metoprolol and   Eliquis Rate is well-controlled\"    Treatment: Eliquis 2.5 mg po twice daily    Thank you,  Sommer Simon RN, CDI  Options provided:  -- Secondary hypercoagulable state in a patient with atrial fibrillation  -- Other - I will add my own diagnosis  -- Disagree - Not applicable / Not valid  -- Disagree - Clinically unable to determine / Unknown  -- Refer to Clinical Documentation Reviewer    PROVIDER RESPONSE TEXT:    This patient has secondary hypercoagulable state in a patient with atrial   fibrillation.    Query created by: Sommer Simon on 2024 3:16 PM      Electronically signed by:  Carole Mcmahon MD 2024 3:21 PM

## 2024-04-22 NOTE — H&P
to Admission medications    Medication Sig Start Date End Date Taking? Authorizing Provider   metoprolol tartrate (LOPRESSOR) 25 MG tablet Take 0.5 tablets by mouth 2 times daily 24  Amalia Amaya APRN - NP   dapagliflozin (FARXIGA) 10 MG tablet Take 1 tablet by mouth every morning 24   Amalia Amaya APRN - NP   losartan (COZAAR) 100 MG tablet Take 0.5 tablets by mouth daily 24  Amalia Amaya APRN - NP   melatonin 3 MG TABS tablet Take 1 tablet by mouth nightly as needed (sleep) 24   Amalia Amaya APRN - NP   bumetanide (BUMEX) 1 MG tablet Take 1 tablet by mouth daily 24   Amalia Amaya APRN - NP   apixaban (ELIQUIS) 5 MG TABS tablet Take 1 tablet by mouth twice daily 3/4/24   Bhavana Mendoza MD   pravastatin (PRAVACHOL) 20 MG tablet Take 1 tablet by mouth daily 10/3/23 9/27/24  Bhavana Mendoza MD   acetaminophen (TYLENOL) 650 MG extended release tablet Take by mouth every 6 hours as needed    Automatic Reconciliation, Ar   calcium carbonate 1500 (600 Ca) MG TABS tablet Take by mouth 2 times daily    Automatic Reconciliation, Ar         Objective:   VITALS:    Patient Vitals for the past 24 hrs:   BP Temp Temp src Pulse Resp SpO2 Height Weight   24 1356 95/61 -- -- -- -- -- -- --   24 0851 98/65 97.5 °F (36.4 °C) Oral 73 16 99 % -- --   24 0715 102/67 97.5 °F (36.4 °C) Oral 72 18 97 % -- --   24 0714 -- -- -- -- 19 -- -- --   24 0700 109/67 -- -- 76 14 -- -- --   24 0500 91/70 -- -- 88 18 -- -- --   24 0439 94/75 98.3 °F (36.8 °C) -- 80 12 97 % 1.651 m (5' 5\") 65.2 kg (143 lb 11.8 oz)       Temp (24hrs), Av.8 °F (36.6 °C), Min:97.5 °F (36.4 °C), Max:98.3 °F (36.8 °C)        O2 Device: None (Room air)    Wt Readings from Last 12 Encounters:   24 65.2 kg (143 lb 11.8 oz)   24 65.2 kg (143 lb 11.8 oz)   24 71.7 kg (158 lb)   23 77.4 kg (170 lb 9.6 oz)   23 77.1 kg (170

## 2024-04-22 NOTE — ED PROVIDER NOTES
convey agreement and understanding for the need to be admitted and for the admission diagnosis.     PATIENT REFERRED TO:  No follow-up provider specified.     DISCHARGE MEDICATIONS:     Medication List        ASK your doctor about these medications      acetaminophen 650 MG extended release tablet  Commonly known as: TYLENOL     bumetanide 1 MG tablet  Commonly known as: BUMEX  Take 1 tablet by mouth daily     calcium carbonate 1500 (600 Ca) MG Tabs tablet     dapagliflozin 10 MG tablet  Commonly known as: FARXIGA  Take 1 tablet by mouth every morning     Eliquis 5 MG Tabs tablet  Generic drug: apixaban  Take 1 tablet by mouth twice daily     losartan 100 MG tablet  Commonly known as: COZAAR  Take 0.5 tablets by mouth daily     melatonin 3 MG Tabs tablet  Take 1 tablet by mouth nightly as needed (sleep)     metoprolol tartrate 25 MG tablet  Commonly known as: LOPRESSOR  Take 0.5 tablets by mouth 2 times daily     pravastatin 20 MG tablet  Commonly known as: PRAVACHOL  Take 1 tablet by mouth daily                DISCONTINUED MEDICATIONS:  Current Discharge Medication List          I am the Primary Clinician of Record.   Emmanuel Hirsch DO (electronically signed)    (Please note that parts of this dictation were completed with voice recognition software. Quite often unanticipated grammatical, syntax, homophones, and other interpretive errors are inadvertently transcribed by the computer software. Please disregards these errors. Please excuse any errors that have escaped final proofreading.)         Emmanuel Hirsch DO  04/22/24 7350

## 2024-04-23 PROBLEM — H10.10 ALLERGIC CONJUNCTIVITIS: Status: RESOLVED | Noted: 2017-07-26 | Resolved: 2024-04-23

## 2024-04-23 PROBLEM — G93.31 POSTVIRAL FATIGUE SYNDROME: Status: RESOLVED | Noted: 2018-09-18 | Resolved: 2024-04-23

## 2024-04-23 PROBLEM — L30.4 INTERTRIGO: Status: RESOLVED | Noted: 2017-07-26 | Resolved: 2024-04-23

## 2024-04-23 LAB
ANION GAP SERPL CALC-SCNC: 5 MMOL/L (ref 5–15)
BACTERIA SPEC CULT: NORMAL
BASOPHILS # BLD: 0 K/UL (ref 0–0.1)
BASOPHILS NFR BLD: 0 % (ref 0–1)
BUN SERPL-MCNC: 41 MG/DL (ref 6–20)
BUN/CREAT SERPL: 27 (ref 12–20)
CALCIUM SERPL-MCNC: 9.6 MG/DL (ref 8.5–10.1)
CC UR VC: NORMAL
CHLORIDE SERPL-SCNC: 109 MMOL/L (ref 97–108)
CO2 SERPL-SCNC: 25 MMOL/L (ref 21–32)
CREAT SERPL-MCNC: 1.51 MG/DL (ref 0.55–1.02)
DIFFERENTIAL METHOD BLD: ABNORMAL
EOSINOPHIL # BLD: 0.2 K/UL (ref 0–0.4)
EOSINOPHIL NFR BLD: 4 % (ref 0–7)
ERYTHROCYTE [DISTWIDTH] IN BLOOD BY AUTOMATED COUNT: 14.6 % (ref 11.5–14.5)
GLUCOSE SERPL-MCNC: 94 MG/DL (ref 65–100)
HCT VFR BLD AUTO: 32.8 % (ref 35–47)
HGB BLD-MCNC: 10 G/DL (ref 11.5–16)
IMM GRANULOCYTES # BLD AUTO: 0 K/UL (ref 0–0.04)
IMM GRANULOCYTES NFR BLD AUTO: 1 % (ref 0–0.5)
LYMPHOCYTES # BLD: 2.1 K/UL (ref 0.8–3.5)
LYMPHOCYTES NFR BLD: 36 % (ref 12–49)
MCH RBC QN AUTO: 27.2 PG (ref 26–34)
MCHC RBC AUTO-ENTMCNC: 30.5 G/DL (ref 30–36.5)
MCV RBC AUTO: 89.4 FL (ref 80–99)
MONOCYTES # BLD: 1.1 K/UL (ref 0–1)
MONOCYTES NFR BLD: 18 % (ref 5–13)
NEUTS SEG # BLD: 2.4 K/UL (ref 1.8–8)
NEUTS SEG NFR BLD: 41 % (ref 32–75)
NRBC # BLD: 0 K/UL (ref 0–0.01)
NRBC BLD-RTO: 0 PER 100 WBC
PHOSPHATE SERPL-MCNC: 3 MG/DL (ref 2.6–4.7)
PLATELET # BLD AUTO: 155 K/UL (ref 150–400)
PMV BLD AUTO: 10.7 FL (ref 8.9–12.9)
POTASSIUM SERPL-SCNC: 4.5 MMOL/L (ref 3.5–5.1)
RBC # BLD AUTO: 3.67 M/UL (ref 3.8–5.2)
SERVICE CMNT-IMP: NORMAL
SODIUM SERPL-SCNC: 139 MMOL/L (ref 136–145)
WBC # BLD AUTO: 5.8 K/UL (ref 3.6–11)

## 2024-04-23 PROCEDURE — 2580000003 HC RX 258: Performed by: INTERNAL MEDICINE

## 2024-04-23 PROCEDURE — 2580000003 HC RX 258: Performed by: STUDENT IN AN ORGANIZED HEALTH CARE EDUCATION/TRAINING PROGRAM

## 2024-04-23 PROCEDURE — 85025 COMPLETE CBC W/AUTO DIFF WBC: CPT

## 2024-04-23 PROCEDURE — 6370000000 HC RX 637 (ALT 250 FOR IP): Performed by: STUDENT IN AN ORGANIZED HEALTH CARE EDUCATION/TRAINING PROGRAM

## 2024-04-23 PROCEDURE — 84100 ASSAY OF PHOSPHORUS: CPT

## 2024-04-23 PROCEDURE — 1100000003 HC PRIVATE W/ TELEMETRY

## 2024-04-23 PROCEDURE — 36415 COLL VENOUS BLD VENIPUNCTURE: CPT

## 2024-04-23 PROCEDURE — 80048 BASIC METABOLIC PNL TOTAL CA: CPT

## 2024-04-23 RX ORDER — BISACODYL 10 MG
10 SUPPOSITORY, RECTAL RECTAL ONCE
Status: COMPLETED | OUTPATIENT
Start: 2024-04-23 | End: 2024-04-23

## 2024-04-23 RX ORDER — POLYETHYLENE GLYCOL 3350 17 G/17G
17 POWDER, FOR SOLUTION ORAL DAILY
Status: DISCONTINUED | OUTPATIENT
Start: 2024-04-23 | End: 2024-04-24 | Stop reason: HOSPADM

## 2024-04-23 RX ADMIN — SODIUM CHLORIDE, PRESERVATIVE FREE 10 ML: 5 INJECTION INTRAVENOUS at 10:06

## 2024-04-23 RX ADMIN — APIXABAN 2.5 MG: 5 TABLET, FILM COATED ORAL at 22:07

## 2024-04-23 RX ADMIN — SODIUM CHLORIDE, PRESERVATIVE FREE 10 ML: 5 INJECTION INTRAVENOUS at 22:09

## 2024-04-23 RX ADMIN — BISACODYL 10 MG: 10 SUPPOSITORY RECTAL at 10:16

## 2024-04-23 RX ADMIN — POLYETHYLENE GLYCOL 3350 17 G: 17 POWDER, FOR SOLUTION ORAL at 10:16

## 2024-04-23 RX ADMIN — PRAVASTATIN SODIUM 20 MG: 10 TABLET ORAL at 10:05

## 2024-04-23 RX ADMIN — SODIUM CHLORIDE: 9 INJECTION, SOLUTION INTRAVENOUS at 06:06

## 2024-04-23 RX ADMIN — SODIUM CHLORIDE: 9 INJECTION, SOLUTION INTRAVENOUS at 14:27

## 2024-04-23 RX ADMIN — APIXABAN 2.5 MG: 5 TABLET, FILM COATED ORAL at 10:04

## 2024-04-23 RX ADMIN — METOPROLOL TARTRATE 12.5 MG: 25 TABLET, FILM COATED ORAL at 22:07

## 2024-04-23 ASSESSMENT — PAIN SCALES - GENERAL
PAINLEVEL_OUTOF10: 0

## 2024-04-23 NOTE — PROGRESS NOTES
NAME: Azucena Myrick        :  1945        MRN:  [id number]                    Assessment   :                                               Plan:  ADOLFO on CKD?    ADOLFO improving  Hypercalcemia, mildly high  MR  H/o HTN, now low BP  Primary HPT Creatinine nml as of  (0.8),  Creatinine 1.2 => 2.56 => 1.5  BUN 33 => 52 => 41    Continue IV fluids for the rest of the day  Calcium 9.6, albumin 3.4  Kidney ultrasound was normal  Clinically dehydrated-will give IV fluids       S/p ASD closure and MitraClip           Subjective:     Chief Complaint: Feeling a little bit better now.  Voiding well.  IV fluids still running    Review of Systems:    See HPI    Objective:     VITALS:   Last 24hrs VS reviewed since prior progress note. Most recent are:  Vitals:    24 1000   BP: (!) 86/62   Pulse: 90   Resp:    Temp: 98 °F (36.7 °C)   SpO2:        Intake/Output Summary (Last 24 hours) at 2024 1315  Last data filed at 2024 2333  Gross per 24 hour   Intake 510 ml   Output 750 ml   Net -240 ml      Telemetry Reviewed:     PHYSICAL EXAM:  General: NAD  Alert oriented x 3  Nonlabored respiration  Cooperative and pleasant  No asterixis  No edema  Abdomen is nontender    Lab Data Reviewed: (see below)    Medications Reviewed: (see below)    PMH/SH reviewed - no change compared to H&P  ________________________________________________________________________  Care Plan discussed with:  Patient x    Family      RN x    Care Manager                    Consultant:          Comments   >50% of visit spent in counseling and coordination of care       ________________________________________________________________________  Yana Perez MD     Procedures: see electronic medical records for all procedures/Xrays and details which  were not copied into this note but were reviewed prior to creation of Plan.      LABS:  Recent Labs

## 2024-04-23 NOTE — PLAN OF CARE
Problem: Safety - Adult  Goal: Free from fall injury  4/23/2024 0642 by Arabella Dukes RN  Outcome: Progressing  4/23/2024 0641 by Arabella Dukes RN  Outcome: Progressing  4/22/2024 1705 by Patricia Sampson RN  Outcome: Progressing  Flowsheets (Taken 4/22/2024 0934 by Be Enriquez, RN)  Free From Fall Injury: Instruct family/caregiver on patient safety     Problem: Discharge Planning  Goal: Discharge to home or other facility with appropriate resources  4/23/2024 0642 by Arabella Dukes RN  Outcome: Progressing  4/22/2024 1705 by Patricia Sampson RN  Outcome: Progressing     Problem: Chronic Conditions and Co-morbidities  Goal: Patient's chronic conditions and co-morbidity symptoms are monitored and maintained or improved  4/23/2024 0642 by Arabella Dukes RN  Outcome: Progressing  4/22/2024 1705 by Patricia Sampson RN  Outcome: Progressing     Problem: Pain  Goal: Verbalizes/displays adequate comfort level or baseline comfort level  4/23/2024 0642 by Arabella Dukes RN  Outcome: Progressing  4/22/2024 1705 by Patricia Sampson RN  Outcome: Progressing     Problem: ABCDS Injury Assessment  Goal: Absence of physical injury  4/22/2024 1705 by Patricia Sampson RN  Outcome: Progressing  Flowsheets (Taken 4/22/2024 0934 by Be Enriquez, RN)  Absence of Physical Injury: Implement safety measures based on patient assessment

## 2024-04-23 NOTE — PROGRESS NOTES
Hospitalist Progress Note    NAME:   Azucena Myrick   : 1945   MRN: [id number]     Date/Time: 2024 11:03 AM  Patient PCP: Bhavana Mendoza MD    Estimated discharge date:24  Barriers: nephro clearance       Assessment / Plan:    ADOLFO on CKD  Likely due to volume depletion versus diuretics use  Baseline creatinine 1.20,4 days ago  Creatinine today 2.56  Urine na - 6, cr - 229, urea - 707-FEUREA - 15%, prerenal   Ultrasound retroperitoneum negative for any obstruction  Nephrology consulted-continue IV fluid for now  Avoid nephrotoxic agents and renally dose medications  Hold off on further diuretics at this moment- need to discuss if she needs further diuretics on discharge - with cardio   Check urine output- output picking up 750 ml for last shift   Cr better today - need to monitor for excessive diuresis   Awaiting nephro clearance      History of hypercalcemia  Per nephrology note evaluated in the past with elevated PTH of 202, vitamin D normal, free light chain 2.15.  Monitor BMP for calcium and daily labs  May need Sensipar if calcium rises further  Ca improved      History of mitral valve repair with MitraClip, ASD repair  History of CHF     History of ascending aortic aneurysm repair  Patient does not appear to be volume overloaded at this moment rather depleted  Hold off on diuretics  Was on losartan and Farxiga- At home-held all those medications for now monitor renal function better     Paroxysmal A-fib  Continue metoprolol and Eliquis  Rate is well-controlled     Hypertensive blood pressure stable right now  One episode of hypotension  Continue metoprolol with holding parameters   Avoid hypotension  Continue iv fluids      Constipation   Will order suppository and keep miralax as standing order     HLD   Continue pravastatin         Medical Decision Making:   I personally reviewed labs: CBC BMP  I personally reviewed imaging: none   I personally reviewed EKG:none   Toxic drug

## 2024-04-24 VITALS
OXYGEN SATURATION: 96 % | TEMPERATURE: 99.7 F | DIASTOLIC BLOOD PRESSURE: 75 MMHG | HEART RATE: 75 BPM | WEIGHT: 143.74 LBS | RESPIRATION RATE: 18 BRPM | BODY MASS INDEX: 23.95 KG/M2 | SYSTOLIC BLOOD PRESSURE: 106 MMHG | HEIGHT: 65 IN

## 2024-04-24 LAB
ACT BLD: 217 SECS (ref 79–138)
ACT BLD: 271 SECS (ref 79–138)
ACT BLD: 282 SECS (ref 79–138)
ACT BLD: 282 SECS (ref 79–138)
ACT BLD: 287 SECS (ref 79–138)
ACT BLD: 303 SECS (ref 79–138)
ACT BLD: 309 SECS (ref 79–138)
ALBUMIN SERPL-MCNC: 2.8 G/DL (ref 3.5–5)
ANION GAP SERPL CALC-SCNC: 5 MMOL/L (ref 5–15)
BUN SERPL-MCNC: 28 MG/DL (ref 6–20)
BUN/CREAT SERPL: 24 (ref 12–20)
CALCIUM SERPL-MCNC: 9.8 MG/DL (ref 8.5–10.1)
CHLORIDE SERPL-SCNC: 111 MMOL/L (ref 97–108)
CO2 SERPL-SCNC: 24 MMOL/L (ref 21–32)
CREAT SERPL-MCNC: 1.17 MG/DL (ref 0.55–1.02)
ERYTHROCYTE [DISTWIDTH] IN BLOOD BY AUTOMATED COUNT: 14.6 % (ref 11.5–14.5)
GLUCOSE SERPL-MCNC: 95 MG/DL (ref 65–100)
HCT VFR BLD AUTO: 35.7 % (ref 35–47)
HGB BLD-MCNC: 10.8 G/DL (ref 11.5–16)
MCH RBC QN AUTO: 27.6 PG (ref 26–34)
MCHC RBC AUTO-ENTMCNC: 30.3 G/DL (ref 30–36.5)
MCV RBC AUTO: 91.1 FL (ref 80–99)
NRBC # BLD: 0 K/UL (ref 0–0.01)
NRBC BLD-RTO: 0 PER 100 WBC
PHOSPHATE SERPL-MCNC: 2.7 MG/DL (ref 2.6–4.7)
PLATELET # BLD AUTO: 160 K/UL (ref 150–400)
PMV BLD AUTO: 10.5 FL (ref 8.9–12.9)
POTASSIUM SERPL-SCNC: 4.3 MMOL/L (ref 3.5–5.1)
RBC # BLD AUTO: 3.92 M/UL (ref 3.8–5.2)
SODIUM SERPL-SCNC: 140 MMOL/L (ref 136–145)
WBC # BLD AUTO: 4.7 K/UL (ref 3.6–11)

## 2024-04-24 PROCEDURE — 2580000003 HC RX 258: Performed by: STUDENT IN AN ORGANIZED HEALTH CARE EDUCATION/TRAINING PROGRAM

## 2024-04-24 PROCEDURE — 80048 BASIC METABOLIC PNL TOTAL CA: CPT

## 2024-04-24 PROCEDURE — 36415 COLL VENOUS BLD VENIPUNCTURE: CPT

## 2024-04-24 PROCEDURE — 85027 COMPLETE CBC AUTOMATED: CPT

## 2024-04-24 PROCEDURE — 6370000000 HC RX 637 (ALT 250 FOR IP): Performed by: STUDENT IN AN ORGANIZED HEALTH CARE EDUCATION/TRAINING PROGRAM

## 2024-04-24 PROCEDURE — 84100 ASSAY OF PHOSPHORUS: CPT

## 2024-04-24 PROCEDURE — 82040 ASSAY OF SERUM ALBUMIN: CPT

## 2024-04-24 RX ORDER — BUMETANIDE 0.5 MG/1
0.5 TABLET ORAL DAILY
Qty: 30 TABLET | Refills: 2 | Status: ON HOLD | OUTPATIENT
Start: 2024-04-24

## 2024-04-24 RX ORDER — BUMETANIDE 0.5 MG/1
0.5 TABLET ORAL DAILY
Qty: 30 TABLET | Refills: 2 | Status: SHIPPED | OUTPATIENT
Start: 2024-04-24 | End: 2024-04-24

## 2024-04-24 RX ADMIN — APIXABAN 2.5 MG: 5 TABLET, FILM COATED ORAL at 11:42

## 2024-04-24 RX ADMIN — SODIUM CHLORIDE, PRESERVATIVE FREE 10 ML: 5 INJECTION INTRAVENOUS at 09:41

## 2024-04-24 RX ADMIN — POLYETHYLENE GLYCOL 3350 17 G: 17 POWDER, FOR SOLUTION ORAL at 10:00

## 2024-04-24 RX ADMIN — METOPROLOL TARTRATE 12.5 MG: 25 TABLET, FILM COATED ORAL at 09:59

## 2024-04-24 RX ADMIN — PRAVASTATIN SODIUM 20 MG: 10 TABLET ORAL at 09:59

## 2024-04-24 ASSESSMENT — PAIN SCALES - GENERAL: PAINLEVEL_OUTOF10: 0

## 2024-04-24 NOTE — PLAN OF CARE
Problem: Safety - Adult  Goal: Free from fall injury  Outcome: Progressing     Problem: Discharge Planning  Goal: Discharge to home or other facility with appropriate resources  Outcome: Progressing     Problem: Chronic Conditions and Co-morbidities  Goal: Patient's chronic conditions and co-morbidity symptoms are monitored and maintained or improved  Outcome: Progressing     Problem: Pain  Goal: Verbalizes/displays adequate comfort level or baseline comfort level  Outcome: Progressing     Problem: ABCDS Injury Assessment  Goal: Absence of physical injury  Outcome: Progressing

## 2024-04-24 NOTE — DISCHARGE INSTRUCTIONS
HOSPITALIST DISCHARGE INSTRUCTIONS    It is important that you take the medication exactly as they are prescribed.   Keep your medication in the bottles provided by the pharmacist and keep a list of the medication names, dosages, and times to be taken in your wallet.   Do not take other medications without consulting your doctor.     What to do at Home  Recommended diet:   ADULT DIET; Regular    Recommended activity:   activity as tolerated    If you have questions regarding the hospital related prescriptions or hospital related issues please call [facility]' office at . You can always direct your questions to your primary care doctor if you are unable to reach your hospital physician; your PCP works as an extension of your hospital doctor just like your hospital doctor is an extension of your PCP for your time at the hospital ([facility])    If you experience any of the following symptoms then please call your primary care physician or return to the emergency room if you cannot get hold of your doctor:    Fever, chills, nausea, vomiting, or persistent diarrhea  Worsening weakness or new problems with your speech or balance  Dark stools or visible blood in your stools  New Leg swelling or shortness of breath as these could be signs of a clot    Additional Instructions:    Please follow up with your PCP in one week.   Bring these papers with you to your follow up appointments. The papers will help your doctors be sure to continue the care plan from the hospital.        Information obtained by :  I understand that if any problems occur once I am at home I am to contact my physician.    I understand and acknowledge receipt of the instructions indicated above.                                                                                                                                           Physician's or R.N.'s Signature

## 2024-04-24 NOTE — DISCHARGE SUMMARY
Discharge Summary    Name: Azucena Myrick  [id number]  YOB: 1945 (Age: 78 y.o.)   Date of Admission: 4/22/2024  Date of Discharge: 4/24/2024  Attending Physician: Aurea Bell MD      Discharge Diagnosis:   ADOLFO on CKD  Likely due to volume depletion versus diuretics use  Baseline creatinine 1.20,4 days ago  History of hypercalcemia  History of mitral valve repair with MitraClip, ASD repair  History of CHF  History of ascending aortic aneurysm repair  Paroxysmal A-fib  Hypertensive blood pressure stable right now  HLD       Consultations:  IP CONSULT TO NEPHROLOGY      Brief Admission History/Reason for Admission Per Carole Mcmahon MD:   Azucena Myrick is a 78 y.o.  female with PMHx significant as below came to the ED for evaluation after patient started noticing decreased urine output since yesterday.  Patient has recently undergone Mitraclip for MR and ASD closure . She was discharged home with bumex . Patient had been taking her duiretics daily . Since yesterday she had urinated only twice for the last 24 hours.  She also noticed her urine was little darker.  No nausea or vomiting or blood loss from anywhere.  No recent fever.  No joint pain or rash.  No abdominal pain or back pain.     Review of systems-negative for headache no blurring of vision no chest pain shortness of breath or palpitations.  No diarrhea or constipation.     In the ED, patient received IV fluid.  Patient was admitted for further evaluation of ADOLFO.         Brief Hospital Course by Main Problems:   ADOLFO on CKD  Likely due to volume depletion versus diuretics use  Baseline creatinine 1.20,4 days PTA  Creatinine on admission 2.56  Ultrasound retroperitoneum negative for any obstruction  Nephrology consulted  Given IV fluid   Renal function improved   Avoid nephrotoxic agents and renally dose medications  Given recent admission w [facility], will Lower Bumex dose on DC to 0.5 mg daily, d/w

## 2024-04-24 NOTE — PROGRESS NOTES
NAME: Azucena Myrick        :  1945        MRN:  [id number]                    Assessment   :                                               Plan:  ADOLFO on CKD?    ADOLFO improving  Hypercalcemia, mildly high  MR  H/o HTN, now low BP  Primary HPT Creatinine nml as of  (0.8),  Creatinine 1.2 => 2.56 => 1.5 => 1.17  BUN 33 => 52 => 41 => 28    Discontinue IV fluids  Discharge plan  Calcium 9.8, albumin 2.8  Kidney ultrasound was normal       S/p ASD closure and MitraClip           Subjective:     Chief Complaint: No new complaints.  Reports feeling better.  Not short of breath.  Eating well    Review of Systems:    See HPI    Objective:     VITALS:   Last 24hrs VS reviewed since prior progress note. Most recent are:  Vitals:    24 0959   BP: 104/66   Pulse: (!) 104   Resp:    Temp:    SpO2:        Intake/Output Summary (Last 24 hours) at 2024 1112  Last data filed at 2024 1427  Gross per 24 hour   Intake --   Output 400 ml   Net -400 ml      Telemetry Reviewed:     PHYSICAL EXAM:  General: NAD  Alert oriented x 3  Nonlabored respiration  Cooperative and pleasant  No asterixis  No edema  Abdomen is nontender    Lab Data Reviewed: (see below)    Medications Reviewed: (see below)    PMH/SH reviewed - no change compared to H&P  ________________________________________________________________________  Care Plan discussed with:  Patient x    Family      RN x    Care Manager                    Consultant:          Comments   >50% of visit spent in counseling and coordination of care       ________________________________________________________________________  Yana Perez MD     Procedures: see electronic medical records for all procedures/Xrays and details which  were not copied into this note but were reviewed prior to creation of Plan.      LABS:  Recent Labs     24  0512 24  0422   WBC 5.8 4.7   HGB

## 2024-04-26 ENCOUNTER — APPOINTMENT (OUTPATIENT)
Facility: HOSPITAL | Age: 79
DRG: 673 | End: 2024-04-26
Payer: MEDICARE

## 2024-04-26 ENCOUNTER — HOSPITAL ENCOUNTER (INPATIENT)
Facility: HOSPITAL | Age: 79
LOS: 17 days | Discharge: NURSING, REHAB OR LONG-TERM CARE | DRG: 673 | End: 2024-05-13
Attending: STUDENT IN AN ORGANIZED HEALTH CARE EDUCATION/TRAINING PROGRAM | Admitting: INTERNAL MEDICINE
Payer: MEDICARE

## 2024-04-26 DIAGNOSIS — N17.9 AKI (ACUTE KIDNEY INJURY) (HCC): Primary | ICD-10-CM

## 2024-04-26 DIAGNOSIS — J96.01 ACUTE RESPIRATORY FAILURE WITH HYPOXIA (HCC): ICD-10-CM

## 2024-04-26 DIAGNOSIS — R19.7 NAUSEA VOMITING AND DIARRHEA: ICD-10-CM

## 2024-04-26 DIAGNOSIS — I48.91 ATRIAL FIBRILLATION WITH RVR (HCC): ICD-10-CM

## 2024-04-26 DIAGNOSIS — R11.2 NAUSEA VOMITING AND DIARRHEA: ICD-10-CM

## 2024-04-26 LAB
ALBUMIN SERPL-MCNC: 3.6 G/DL (ref 3.5–5)
ALBUMIN/GLOB SERPL: 0.9 (ref 1.1–2.2)
ALP SERPL-CCNC: 71 U/L (ref 45–117)
ALT SERPL-CCNC: 16 U/L (ref 12–78)
ANION GAP SERPL CALC-SCNC: 4 MMOL/L (ref 5–15)
AST SERPL-CCNC: 19 U/L (ref 15–37)
BASOPHILS # BLD: 0 K/UL (ref 0–0.1)
BASOPHILS NFR BLD: 1 % (ref 0–1)
BILIRUB SERPL-MCNC: 0.7 MG/DL (ref 0.2–1)
BUN SERPL-MCNC: 29 MG/DL (ref 6–20)
BUN/CREAT SERPL: 17 (ref 12–20)
CALCIUM SERPL-MCNC: 10.5 MG/DL (ref 8.5–10.1)
CHLORIDE SERPL-SCNC: 108 MMOL/L (ref 97–108)
CO2 SERPL-SCNC: 25 MMOL/L (ref 21–32)
CREAT SERPL-MCNC: 1.7 MG/DL (ref 0.55–1.02)
DIFFERENTIAL METHOD BLD: ABNORMAL
EKG ATRIAL RATE: 178 BPM
EKG DIAGNOSIS: NORMAL
EKG Q-T INTERVAL: 260 MS
EKG QRS DURATION: 82 MS
EKG QTC CALCULATION (BAZETT): 377 MS
EKG R AXIS: 3 DEGREES
EKG T AXIS: 73 DEGREES
EKG VENTRICULAR RATE: 127 BPM
EOSINOPHIL # BLD: 0.1 K/UL (ref 0–0.4)
EOSINOPHIL NFR BLD: 1 % (ref 0–7)
ERYTHROCYTE [DISTWIDTH] IN BLOOD BY AUTOMATED COUNT: 14.8 % (ref 11.5–14.5)
GLOBULIN SER CALC-MCNC: 3.8 G/DL (ref 2–4)
GLUCOSE SERPL-MCNC: 127 MG/DL (ref 65–100)
HCT VFR BLD AUTO: 36.6 % (ref 35–47)
HGB BLD-MCNC: 11.4 G/DL (ref 11.5–16)
IMM GRANULOCYTES # BLD AUTO: 0 K/UL (ref 0–0.04)
IMM GRANULOCYTES NFR BLD AUTO: 0 % (ref 0–0.5)
LACTATE BLD-SCNC: 1.36 MMOL/L (ref 0.4–2)
LIPASE SERPL-CCNC: 33 U/L (ref 13–75)
LYMPHOCYTES # BLD: 1.3 K/UL (ref 0.8–3.5)
LYMPHOCYTES NFR BLD: 29 % (ref 12–49)
MAGNESIUM SERPL-MCNC: 2.2 MG/DL (ref 1.6–2.4)
MCH RBC QN AUTO: 27.4 PG (ref 26–34)
MCHC RBC AUTO-ENTMCNC: 31.1 G/DL (ref 30–36.5)
MCV RBC AUTO: 88 FL (ref 80–99)
MONOCYTES # BLD: 0.7 K/UL (ref 0–1)
MONOCYTES NFR BLD: 16 % (ref 5–13)
NEUTS SEG # BLD: 2.3 K/UL (ref 1.8–8)
NEUTS SEG NFR BLD: 53 % (ref 32–75)
NRBC # BLD: 0 K/UL (ref 0–0.01)
NRBC BLD-RTO: 0 PER 100 WBC
PLATELET # BLD AUTO: 228 K/UL (ref 150–400)
PMV BLD AUTO: 11.1 FL (ref 8.9–12.9)
POTASSIUM SERPL-SCNC: 4.3 MMOL/L (ref 3.5–5.1)
PROT SERPL-MCNC: 7.4 G/DL (ref 6.4–8.2)
RBC # BLD AUTO: 4.16 M/UL (ref 3.8–5.2)
SODIUM SERPL-SCNC: 137 MMOL/L (ref 136–145)
TROPONIN I SERPL HS-MCNC: 107 NG/L (ref 0–51)
WBC # BLD AUTO: 4.3 K/UL (ref 3.6–11)

## 2024-04-26 PROCEDURE — 36415 COLL VENOUS BLD VENIPUNCTURE: CPT

## 2024-04-26 PROCEDURE — 85025 COMPLETE CBC W/AUTO DIFF WBC: CPT

## 2024-04-26 PROCEDURE — 2580000003 HC RX 258: Performed by: STUDENT IN AN ORGANIZED HEALTH CARE EDUCATION/TRAINING PROGRAM

## 2024-04-26 PROCEDURE — 96374 THER/PROPH/DIAG INJ IV PUSH: CPT

## 2024-04-26 PROCEDURE — 2500000003 HC RX 250 WO HCPCS: Performed by: INTERNAL MEDICINE

## 2024-04-26 PROCEDURE — 83690 ASSAY OF LIPASE: CPT

## 2024-04-26 PROCEDURE — 6360000004 HC RX CONTRAST MEDICATION: Performed by: STUDENT IN AN ORGANIZED HEALTH CARE EDUCATION/TRAINING PROGRAM

## 2024-04-26 PROCEDURE — 80053 COMPREHEN METABOLIC PANEL: CPT

## 2024-04-26 PROCEDURE — 71045 X-RAY EXAM CHEST 1 VIEW: CPT

## 2024-04-26 PROCEDURE — 2580000003 HC RX 258: Performed by: NURSE PRACTITIONER

## 2024-04-26 PROCEDURE — 99285 EMERGENCY DEPT VISIT HI MDM: CPT

## 2024-04-26 PROCEDURE — 96361 HYDRATE IV INFUSION ADD-ON: CPT

## 2024-04-26 PROCEDURE — 84484 ASSAY OF TROPONIN QUANT: CPT

## 2024-04-26 PROCEDURE — 2060000000 HC ICU INTERMEDIATE R&B

## 2024-04-26 PROCEDURE — 83735 ASSAY OF MAGNESIUM: CPT

## 2024-04-26 PROCEDURE — 6370000000 HC RX 637 (ALT 250 FOR IP): Performed by: INTERNAL MEDICINE

## 2024-04-26 PROCEDURE — 76705 ECHO EXAM OF ABDOMEN: CPT

## 2024-04-26 PROCEDURE — 2580000003 HC RX 258: Performed by: INTERNAL MEDICINE

## 2024-04-26 PROCEDURE — 83605 ASSAY OF LACTIC ACID: CPT

## 2024-04-26 PROCEDURE — 6360000002 HC RX W HCPCS: Performed by: STUDENT IN AN ORGANIZED HEALTH CARE EDUCATION/TRAINING PROGRAM

## 2024-04-26 PROCEDURE — 74177 CT ABD & PELVIS W/CONTRAST: CPT

## 2024-04-26 PROCEDURE — 2500000003 HC RX 250 WO HCPCS: Performed by: NURSE PRACTITIONER

## 2024-04-26 PROCEDURE — 93005 ELECTROCARDIOGRAM TRACING: CPT | Performed by: STUDENT IN AN ORGANIZED HEALTH CARE EDUCATION/TRAINING PROGRAM

## 2024-04-26 RX ORDER — ACETAMINOPHEN 325 MG/1
650 TABLET ORAL EVERY 6 HOURS PRN
Status: DISCONTINUED | OUTPATIENT
Start: 2024-04-26 | End: 2024-05-13 | Stop reason: HOSPADM

## 2024-04-26 RX ORDER — PRAVASTATIN SODIUM 10 MG
20 TABLET ORAL DAILY
Status: DISCONTINUED | OUTPATIENT
Start: 2024-04-26 | End: 2024-05-13 | Stop reason: HOSPADM

## 2024-04-26 RX ORDER — ONDANSETRON 4 MG/1
4 TABLET, ORALLY DISINTEGRATING ORAL EVERY 8 HOURS PRN
Status: DISCONTINUED | OUTPATIENT
Start: 2024-04-26 | End: 2024-05-13 | Stop reason: HOSPADM

## 2024-04-26 RX ORDER — 0.9 % SODIUM CHLORIDE 0.9 %
250 INTRAVENOUS SOLUTION INTRAVENOUS ONCE
Status: COMPLETED | OUTPATIENT
Start: 2024-04-26 | End: 2024-04-27

## 2024-04-26 RX ORDER — ACETAMINOPHEN 650 MG/1
650 SUPPOSITORY RECTAL EVERY 6 HOURS PRN
Status: DISCONTINUED | OUTPATIENT
Start: 2024-04-26 | End: 2024-05-13 | Stop reason: HOSPADM

## 2024-04-26 RX ORDER — 0.9 % SODIUM CHLORIDE 0.9 %
500 INTRAVENOUS SOLUTION INTRAVENOUS ONCE
Status: COMPLETED | OUTPATIENT
Start: 2024-04-26 | End: 2024-04-26

## 2024-04-26 RX ORDER — POLYETHYLENE GLYCOL 3350 17 G/17G
17 POWDER, FOR SOLUTION ORAL DAILY PRN
Status: DISCONTINUED | OUTPATIENT
Start: 2024-04-26 | End: 2024-05-13 | Stop reason: HOSPADM

## 2024-04-26 RX ORDER — SODIUM CHLORIDE 9 MG/ML
INJECTION, SOLUTION INTRAVENOUS CONTINUOUS
Status: ACTIVE | OUTPATIENT
Start: 2024-04-26 | End: 2024-04-27

## 2024-04-26 RX ORDER — IBUPROFEN 200 MG
1200 CAPSULE ORAL 2 TIMES DAILY
Status: DISCONTINUED | OUTPATIENT
Start: 2024-04-26 | End: 2024-04-26

## 2024-04-26 RX ORDER — METOCLOPRAMIDE HYDROCHLORIDE 5 MG/ML
10 INJECTION INTRAMUSCULAR; INTRAVENOUS ONCE
Status: COMPLETED | OUTPATIENT
Start: 2024-04-26 | End: 2024-04-26

## 2024-04-26 RX ORDER — SODIUM CHLORIDE 0.9 % (FLUSH) 0.9 %
5-40 SYRINGE (ML) INJECTION EVERY 12 HOURS SCHEDULED
Status: DISCONTINUED | OUTPATIENT
Start: 2024-04-26 | End: 2024-05-13 | Stop reason: HOSPADM

## 2024-04-26 RX ORDER — SODIUM CHLORIDE 9 MG/ML
INJECTION, SOLUTION INTRAVENOUS PRN
Status: DISCONTINUED | OUTPATIENT
Start: 2024-04-26 | End: 2024-05-13 | Stop reason: HOSPADM

## 2024-04-26 RX ORDER — LANOLIN ALCOHOL/MO/W.PET/CERES
3 CREAM (GRAM) TOPICAL NIGHTLY PRN
Status: DISCONTINUED | OUTPATIENT
Start: 2024-04-26 | End: 2024-05-13 | Stop reason: HOSPADM

## 2024-04-26 RX ORDER — METOPROLOL TARTRATE 1 MG/ML
5 INJECTION, SOLUTION INTRAVENOUS EVERY 6 HOURS PRN
Status: DISCONTINUED | OUTPATIENT
Start: 2024-04-26 | End: 2024-05-13 | Stop reason: HOSPADM

## 2024-04-26 RX ORDER — METOPROLOL TARTRATE 1 MG/ML
5 INJECTION, SOLUTION INTRAVENOUS ONCE
Status: COMPLETED | OUTPATIENT
Start: 2024-04-26 | End: 2024-04-26

## 2024-04-26 RX ORDER — SODIUM CHLORIDE 0.9 % (FLUSH) 0.9 %
5-40 SYRINGE (ML) INJECTION PRN
Status: DISCONTINUED | OUTPATIENT
Start: 2024-04-26 | End: 2024-05-13 | Stop reason: HOSPADM

## 2024-04-26 RX ORDER — ONDANSETRON 2 MG/ML
4 INJECTION INTRAMUSCULAR; INTRAVENOUS EVERY 6 HOURS PRN
Status: DISCONTINUED | OUTPATIENT
Start: 2024-04-26 | End: 2024-05-13 | Stop reason: HOSPADM

## 2024-04-26 RX ORDER — CALCIUM CARBONATE 500(1250)
500 TABLET ORAL DAILY
Status: DISCONTINUED | OUTPATIENT
Start: 2024-04-26 | End: 2024-05-06

## 2024-04-26 RX ADMIN — METOCLOPRAMIDE 10 MG: 5 INJECTION, SOLUTION INTRAMUSCULAR; INTRAVENOUS at 14:12

## 2024-04-26 RX ADMIN — METOPROLOL TARTRATE 5 MG: 5 INJECTION INTRAVENOUS at 18:47

## 2024-04-26 RX ADMIN — SODIUM CHLORIDE: 9 INJECTION, SOLUTION INTRAVENOUS at 18:43

## 2024-04-26 RX ADMIN — IOPAMIDOL 100 ML: 755 INJECTION, SOLUTION INTRAVENOUS at 14:49

## 2024-04-26 RX ADMIN — APIXABAN 2.5 MG: 2.5 TABLET, FILM COATED ORAL at 21:38

## 2024-04-26 RX ADMIN — METOPROLOL TARTRATE 12.5 MG: 25 TABLET, FILM COATED ORAL at 21:38

## 2024-04-26 RX ADMIN — PRAVASTATIN SODIUM 20 MG: 10 TABLET ORAL at 18:47

## 2024-04-26 RX ADMIN — SODIUM CHLORIDE, PRESERVATIVE FREE 10 ML: 5 INJECTION INTRAVENOUS at 21:40

## 2024-04-26 RX ADMIN — SODIUM CHLORIDE 500 ML: 9 INJECTION, SOLUTION INTRAVENOUS at 14:12

## 2024-04-26 RX ADMIN — METOPROLOL TARTRATE 5 MG: 5 INJECTION INTRAVENOUS at 23:05

## 2024-04-26 RX ADMIN — SODIUM CHLORIDE 250 ML: 9 INJECTION, SOLUTION INTRAVENOUS at 23:09

## 2024-04-26 ASSESSMENT — LIFESTYLE VARIABLES
HOW MANY STANDARD DRINKS CONTAINING ALCOHOL DO YOU HAVE ON A TYPICAL DAY: PATIENT DOES NOT DRINK
HOW OFTEN DO YOU HAVE A DRINK CONTAINING ALCOHOL: NEVER

## 2024-04-26 ASSESSMENT — PAIN SCALES - GENERAL: PAINLEVEL_OUTOF10: 0

## 2024-04-26 NOTE — H&P
a atrial septal defect with significant change from left to right, especially the mitral regurgitation jet. Recommendations: Continue ongoing diuresis. Case discussed in the heart team: Recommendation mitral valve clip and ASD closure reassess tricuspid regurgitation. BRIANNA PATEL MD April 15, 2024 1:19 PM     Echo (TTE) complete (PRN contrast/bubble/strain/3D)    Result Date: 4/14/2024    Left Ventricle: Normal left ventricular systolic function with a visually estimated EF of 65 - 70%. Left ventricle size is normal. Normal wall thickness. Normal wall motion. Normal diastolic function.   Mitral Valve: Moderate to severe regurgitation.   Tricuspid Valve: Moderate to severe regurgitation. Moderately elevated RVSP, consistent with moderate pulmonary hypertension.   Left Atrium: Left atrium is severely dilated.   Right Atrium: Right atrium is severely dilated.   Image quality is fair. Procedure performed with the patient in a supine position.     US ORGAN ELASTOGRAPHY    Result Date: 4/12/2024  ULTRASOUND ELASTOGRAPHY OF THE LIVER. INDICATION: Severe tricuspid regurgitation, preop evaluation of liver. TECHNIQUE: 2-D shear wave elastography, with colormetric maps, was performed of the liver. FINDINGS: Liver stiffness ranges from 6.6-13.0 kPa, with a median value of 10.2 kPa and interquartile range of 2.0 kPa. The IQR/median value is 19.9%, indicating little variability and good quality measurements. These values indicate at least moderate hepatic fibrosis. Median values: >= 8.29 kPa - stage 2 fibrosis >= 9.40 kPa - stage 3 fibrosis >= 11.9 kPa - stage 4 fibrosis/cirrhosis     At least moderate hepatic fibrosis.    XR CHEST PORTABLE    Result Date: 4/11/2024  EXAM:  XR CHEST PORTABLE INDICATION: chf COMPARISON: 7/11/2023 TECHNIQUE: portable chest AP view FINDINGS: Mild cardiomegaly is noted. The patient is status post median sternotomy. Pacer leads are unchanged in position. The pulmonary vasculature is within

## 2024-04-26 NOTE — ED PROVIDER NOTES
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 Pt c/o N/V/D x Wednesday. Pt was here admitted recently. Pt ambulatory. Pt has pacemaker and has been in and out of AFIB with RVR    Diarrhea        HISTORY OF PRESENT ILLNESS: 1 or more elements      History From: patient, History limited by: none     Azucena Myrick is a 78 y.o. female presenting with diarrhea nausea vomiting.  She notes this started since she was discharged from the hospital.  She notes she was admitted for constipation and dehydration.  This is fixable and patient now she is having persistent diarrhea with everything she eats.  Notes she feels like she has lots of gas in her epigastric region.  No fevers.  Denies shortness of breath or chest pain.  Notably hypoxic to 88-89 and placed on 2 L per EMS.       Please See MDM for Additional Details of the HPI/PMH  Nursing Notes were all reviewed and agreed with or any disagreements were addressed in the HPI.     REVIEW OF SYSTEMS        Positives and Pertinent negatives as per HPI.    PAST HISTORY     Past Medical History:  Past Medical History:   Diagnosis Date    Acute on chronic heart failure, unspecified heart failure type (HCC) 04/11/2024    Allergic conjunctivitis 07/26/2017    Aortic aneurysm (HCC) 05/2018    Thoracic, abdominal    Arthritis     lower back    Asthma     Current use of long term anticoagulation     Essential hypertension 08/21/2017    GERD (gastroesophageal reflux disease)     History of aortic dissection     History of echocardiogram 09/2019    Left Ventricle: Normal cavity size, systolic function (ejection fraction normal) and diastolic function.

## 2024-04-27 LAB
ANION GAP SERPL CALC-SCNC: 3 MMOL/L (ref 5–15)
BASOPHILS # BLD: 0 K/UL (ref 0–0.1)
BASOPHILS NFR BLD: 0 % (ref 0–1)
BUN SERPL-MCNC: 29 MG/DL (ref 6–20)
BUN/CREAT SERPL: 17 (ref 12–20)
CALCIUM SERPL-MCNC: 10 MG/DL (ref 8.5–10.1)
CHLORIDE SERPL-SCNC: 112 MMOL/L (ref 97–108)
CO2 SERPL-SCNC: 22 MMOL/L (ref 21–32)
CREAT SERPL-MCNC: 1.67 MG/DL (ref 0.55–1.02)
DIFFERENTIAL METHOD BLD: ABNORMAL
EOSINOPHIL # BLD: 0.1 K/UL (ref 0–0.4)
EOSINOPHIL NFR BLD: 1 % (ref 0–7)
ERYTHROCYTE [DISTWIDTH] IN BLOOD BY AUTOMATED COUNT: 15 % (ref 11.5–14.5)
GLUCOSE SERPL-MCNC: 108 MG/DL (ref 65–100)
HCT VFR BLD AUTO: 35.6 % (ref 35–47)
HGB BLD-MCNC: 10.7 G/DL (ref 11.5–16)
IMM GRANULOCYTES # BLD AUTO: 0 K/UL (ref 0–0.04)
IMM GRANULOCYTES NFR BLD AUTO: 0 % (ref 0–0.5)
LYMPHOCYTES # BLD: 1.6 K/UL (ref 0.8–3.5)
LYMPHOCYTES NFR BLD: 27 % (ref 12–49)
MCH RBC QN AUTO: 26.9 PG (ref 26–34)
MCHC RBC AUTO-ENTMCNC: 30.1 G/DL (ref 30–36.5)
MCV RBC AUTO: 89.4 FL (ref 80–99)
MONOCYTES # BLD: 1.4 K/UL (ref 0–1)
MONOCYTES NFR BLD: 24 % (ref 5–13)
NEUTS SEG # BLD: 2.8 K/UL (ref 1.8–8)
NEUTS SEG NFR BLD: 48 % (ref 32–75)
NRBC # BLD: 0 K/UL (ref 0–0.01)
NRBC BLD-RTO: 0 PER 100 WBC
PLATELET # BLD AUTO: 205 K/UL (ref 150–400)
PMV BLD AUTO: 10.6 FL (ref 8.9–12.9)
POTASSIUM SERPL-SCNC: 4.7 MMOL/L (ref 3.5–5.1)
RBC # BLD AUTO: 3.98 M/UL (ref 3.8–5.2)
RBC MORPH BLD: ABNORMAL
RBC MORPH BLD: ABNORMAL
SODIUM SERPL-SCNC: 137 MMOL/L (ref 136–145)
WBC # BLD AUTO: 5.9 K/UL (ref 3.6–11)

## 2024-04-27 PROCEDURE — 85025 COMPLETE CBC W/AUTO DIFF WBC: CPT

## 2024-04-27 PROCEDURE — 2580000003 HC RX 258: Performed by: STUDENT IN AN ORGANIZED HEALTH CARE EDUCATION/TRAINING PROGRAM

## 2024-04-27 PROCEDURE — 80048 BASIC METABOLIC PNL TOTAL CA: CPT

## 2024-04-27 PROCEDURE — 2700000000 HC OXYGEN THERAPY PER DAY

## 2024-04-27 PROCEDURE — 2500000003 HC RX 250 WO HCPCS: Performed by: INTERNAL MEDICINE

## 2024-04-27 PROCEDURE — 2580000003 HC RX 258: Performed by: INTERNAL MEDICINE

## 2024-04-27 PROCEDURE — 6370000000 HC RX 637 (ALT 250 FOR IP): Performed by: INTERNAL MEDICINE

## 2024-04-27 PROCEDURE — 2060000000 HC ICU INTERMEDIATE R&B

## 2024-04-27 PROCEDURE — 6360000002 HC RX W HCPCS: Performed by: STUDENT IN AN ORGANIZED HEALTH CARE EDUCATION/TRAINING PROGRAM

## 2024-04-27 PROCEDURE — 6360000002 HC RX W HCPCS: Performed by: INTERNAL MEDICINE

## 2024-04-27 PROCEDURE — 36415 COLL VENOUS BLD VENIPUNCTURE: CPT

## 2024-04-27 PROCEDURE — 2500000003 HC RX 250 WO HCPCS: Performed by: STUDENT IN AN ORGANIZED HEALTH CARE EDUCATION/TRAINING PROGRAM

## 2024-04-27 RX ORDER — SODIUM CHLORIDE 9 MG/ML
INJECTION, SOLUTION INTRAVENOUS CONTINUOUS
Status: DISCONTINUED | OUTPATIENT
Start: 2024-04-27 | End: 2024-04-28

## 2024-04-27 RX ORDER — MIDODRINE HYDROCHLORIDE 5 MG/1
5 TABLET ORAL ONCE
Status: COMPLETED | OUTPATIENT
Start: 2024-04-27 | End: 2024-04-27

## 2024-04-27 RX ADMIN — CALCIUM 500 MG: 500 TABLET ORAL at 09:20

## 2024-04-27 RX ADMIN — SODIUM CHLORIDE: 9 INJECTION, SOLUTION INTRAVENOUS at 08:22

## 2024-04-27 RX ADMIN — EMPAGLIFLOZIN 10 MG: 10 TABLET, FILM COATED ORAL at 09:20

## 2024-04-27 RX ADMIN — ONDANSETRON 4 MG: 2 INJECTION INTRAMUSCULAR; INTRAVENOUS at 18:47

## 2024-04-27 RX ADMIN — SODIUM CHLORIDE: 9 INJECTION, SOLUTION INTRAVENOUS at 18:07

## 2024-04-27 RX ADMIN — AMIODARONE HYDROCHLORIDE 1 MG/MIN: 50 INJECTION, SOLUTION INTRAVENOUS at 14:35

## 2024-04-27 RX ADMIN — APIXABAN 2.5 MG: 2.5 TABLET, FILM COATED ORAL at 09:20

## 2024-04-27 RX ADMIN — METOPROLOL TARTRATE 12.5 MG: 25 TABLET, FILM COATED ORAL at 20:10

## 2024-04-27 RX ADMIN — MIDODRINE HYDROCHLORIDE 5 MG: 5 TABLET ORAL at 08:22

## 2024-04-27 RX ADMIN — METOPROLOL TARTRATE 5 MG: 5 INJECTION INTRAVENOUS at 03:12

## 2024-04-27 RX ADMIN — AMIODARONE HYDROCHLORIDE 1 MG/MIN: 50 INJECTION, SOLUTION INTRAVENOUS at 08:33

## 2024-04-27 RX ADMIN — ONDANSETRON 4 MG: 2 INJECTION INTRAMUSCULAR; INTRAVENOUS at 08:56

## 2024-04-27 RX ADMIN — SODIUM CHLORIDE, PRESERVATIVE FREE 10 ML: 5 INJECTION INTRAVENOUS at 08:24

## 2024-04-27 RX ADMIN — PRAVASTATIN SODIUM 20 MG: 10 TABLET ORAL at 09:20

## 2024-04-27 RX ADMIN — AMIODARONE HYDROCHLORIDE 150 MG: 1.5 INJECTION, SOLUTION INTRAVENOUS at 08:18

## 2024-04-27 RX ADMIN — APIXABAN 2.5 MG: 2.5 TABLET, FILM COATED ORAL at 20:10

## 2024-04-27 RX ADMIN — SODIUM CHLORIDE: 9 INJECTION, SOLUTION INTRAVENOUS at 11:00

## 2024-04-27 ASSESSMENT — PAIN SCALES - GENERAL: PAINLEVEL_OUTOF10: 0

## 2024-04-27 NOTE — CONSULTS
[facility]  [address] [place] [address] [phone]  [email or web address]       CARDIOLOGY CONSULTATION    PLEASE NOTE THAT WE CONFIRMED WITH THE REFERRING PHYSICIAN PRIOR TO SEEING THE PATIENT THAT THE PATIENT IS BEING REFERRED FOR INITIAL HOSPITAL EVALUATION AND FOR LONG-TERM ONGOING CARDIAC CARE     Date of  Admission: 4/26/2024  1:34 PM     Admission type:Emergency   Primary Care Physician:Bhavana Mendoza MD     Attending Provider: Sharon Shepard MD  Cardiology Provider: Dr. Nguyen    CC/REASON FOR CONSULT: A fib with RVR     Subjective:     Azucena Myrick is a 78 y.o. female admitted for ADOLFO (acute kidney injury) ([facility]) [N17.9].  She has a PMHx of TR, MR s/p GINA with MV clip x 3, ASD s/p closure, PAF, HFpEF, SSS s/p PM, pulmonary HTN, HTN,HLD, LVH, hx of Type A aortic dissection s/p repair, spinal stenosis and GERD.    Admitted for gastroenteritis.  Had been having N/V/D x 3 days at home.  In ED, noted to have ADOLFO on CKD, likely due to volume depletion 2/2 above.  Also found to be in A fib with RVR vr 130s.  Tried to rate control with IV bb, however BP was too soft.  Remained with RVR overnight, so cardiology consulted.    Pt recently discharged last [facility] from [facility] after undergoing GINA with MV clip and ASD closure.  She did notice feeling better once returning home from discharge.  Started having diarrhea for 3 days, with 1 day of vomiting.    Currently, pt denies complaints.  No further diarrhea.  Feels weak and tired.  Denies palpitation symptoms.  Denies shortness of breath, LE edema.  Feels like her appetite is coming back -- wants to eat strawberries.      Patient Active Problem List    Diagnosis Date Noted    Acute on chronic diastolic heart failure (HCC) 04/11/2024    Idiopathic hypotension 04/11/2024    Pneumonia due to COVID-19 virus 09/18/2018    Acute respiratory failure with hypoxemia (HCC) 09/18/2018    Tachy-abbie syndrome (HCC)

## 2024-04-28 ENCOUNTER — APPOINTMENT (OUTPATIENT)
Facility: HOSPITAL | Age: 79
DRG: 673 | End: 2024-04-28
Payer: MEDICARE

## 2024-04-28 LAB
ANION GAP SERPL CALC-SCNC: 6 MMOL/L (ref 5–15)
BASOPHILS # BLD: 0 K/UL (ref 0–0.1)
BASOPHILS NFR BLD: 0 % (ref 0–1)
BUN SERPL-MCNC: 36 MG/DL (ref 6–20)
BUN/CREAT SERPL: 14 (ref 12–20)
CALCIUM SERPL-MCNC: 9.9 MG/DL (ref 8.5–10.1)
CHLORIDE SERPL-SCNC: 109 MMOL/L (ref 97–108)
CO2 SERPL-SCNC: 21 MMOL/L (ref 21–32)
CREAT SERPL-MCNC: 2.61 MG/DL (ref 0.55–1.02)
DIFFERENTIAL METHOD BLD: ABNORMAL
EOSINOPHIL # BLD: 0 K/UL (ref 0–0.4)
EOSINOPHIL NFR BLD: 0 % (ref 0–7)
ERYTHROCYTE [DISTWIDTH] IN BLOOD BY AUTOMATED COUNT: 15.6 % (ref 11.5–14.5)
GLUCOSE SERPL-MCNC: 116 MG/DL (ref 65–100)
HCT VFR BLD AUTO: 39.4 % (ref 35–47)
HGB BLD-MCNC: 11.6 G/DL (ref 11.5–16)
IMM GRANULOCYTES # BLD AUTO: 0.1 K/UL (ref 0–0.04)
IMM GRANULOCYTES NFR BLD AUTO: 1 % (ref 0–0.5)
LYMPHOCYTES # BLD: 2.5 K/UL (ref 0.8–3.5)
LYMPHOCYTES NFR BLD: 29 % (ref 12–49)
MCH RBC QN AUTO: 27 PG (ref 26–34)
MCHC RBC AUTO-ENTMCNC: 29.4 G/DL (ref 30–36.5)
MCV RBC AUTO: 91.6 FL (ref 80–99)
MONOCYTES # BLD: 1.6 K/UL (ref 0–1)
MONOCYTES NFR BLD: 18 % (ref 5–13)
NEUTS SEG # BLD: 4.6 K/UL (ref 1.8–8)
NEUTS SEG NFR BLD: 52 % (ref 32–75)
NRBC # BLD: 0 K/UL (ref 0–0.01)
NRBC BLD-RTO: 0 PER 100 WBC
PLATELET # BLD AUTO: 231 K/UL (ref 150–400)
PMV BLD AUTO: 10.9 FL (ref 8.9–12.9)
POTASSIUM SERPL-SCNC: 5 MMOL/L (ref 3.5–5.1)
RBC # BLD AUTO: 4.3 M/UL (ref 3.8–5.2)
SODIUM SERPL-SCNC: 136 MMOL/L (ref 136–145)
WBC # BLD AUTO: 8.8 K/UL (ref 3.6–11)

## 2024-04-28 PROCEDURE — 2580000003 HC RX 258: Performed by: INTERNAL MEDICINE

## 2024-04-28 PROCEDURE — 97530 THERAPEUTIC ACTIVITIES: CPT

## 2024-04-28 PROCEDURE — 85025 COMPLETE CBC W/AUTO DIFF WBC: CPT

## 2024-04-28 PROCEDURE — 36415 COLL VENOUS BLD VENIPUNCTURE: CPT

## 2024-04-28 PROCEDURE — 97162 PT EVAL MOD COMPLEX 30 MIN: CPT

## 2024-04-28 PROCEDURE — 6370000000 HC RX 637 (ALT 250 FOR IP): Performed by: INTERNAL MEDICINE

## 2024-04-28 PROCEDURE — 6370000000 HC RX 637 (ALT 250 FOR IP): Performed by: STUDENT IN AN ORGANIZED HEALTH CARE EDUCATION/TRAINING PROGRAM

## 2024-04-28 PROCEDURE — 6360000002 HC RX W HCPCS: Performed by: INTERNAL MEDICINE

## 2024-04-28 PROCEDURE — 80048 BASIC METABOLIC PNL TOTAL CA: CPT

## 2024-04-28 PROCEDURE — 51798 US URINE CAPACITY MEASURE: CPT

## 2024-04-28 PROCEDURE — 2700000000 HC OXYGEN THERAPY PER DAY

## 2024-04-28 PROCEDURE — 6360000002 HC RX W HCPCS: Performed by: NURSE PRACTITIONER

## 2024-04-28 PROCEDURE — 2060000000 HC ICU INTERMEDIATE R&B

## 2024-04-28 PROCEDURE — 71045 X-RAY EXAM CHEST 1 VIEW: CPT

## 2024-04-28 PROCEDURE — 2580000003 HC RX 258: Performed by: NURSE PRACTITIONER

## 2024-04-28 RX ORDER — MIDODRINE HYDROCHLORIDE 5 MG/1
5 TABLET ORAL 3 TIMES DAILY PRN
Status: DISCONTINUED | OUTPATIENT
Start: 2024-04-28 | End: 2024-05-02

## 2024-04-28 RX ORDER — SODIUM CHLORIDE 9 MG/ML
INJECTION, SOLUTION INTRAVENOUS CONTINUOUS
Status: DISCONTINUED | OUTPATIENT
Start: 2024-04-28 | End: 2024-04-28

## 2024-04-28 RX ADMIN — APIXABAN 2.5 MG: 2.5 TABLET, FILM COATED ORAL at 21:30

## 2024-04-28 RX ADMIN — APIXABAN 2.5 MG: 2.5 TABLET, FILM COATED ORAL at 08:56

## 2024-04-28 RX ADMIN — ONDANSETRON 4 MG: 2 INJECTION INTRAMUSCULAR; INTRAVENOUS at 15:59

## 2024-04-28 RX ADMIN — SODIUM CHLORIDE, PRESERVATIVE FREE 10 ML: 5 INJECTION INTRAVENOUS at 21:31

## 2024-04-28 RX ADMIN — SODIUM CHLORIDE, PRESERVATIVE FREE 10 ML: 5 INJECTION INTRAVENOUS at 09:00

## 2024-04-28 RX ADMIN — PRAVASTATIN SODIUM 20 MG: 10 TABLET ORAL at 08:56

## 2024-04-28 RX ADMIN — AMIODARONE HYDROCHLORIDE 0.5 MG/MIN: 50 INJECTION, SOLUTION INTRAVENOUS at 11:55

## 2024-04-28 RX ADMIN — CALCIUM 500 MG: 500 TABLET ORAL at 08:52

## 2024-04-28 RX ADMIN — METOPROLOL TARTRATE 12.5 MG: 25 TABLET, FILM COATED ORAL at 21:30

## 2024-04-28 RX ADMIN — SODIUM CHLORIDE: 9 INJECTION, SOLUTION INTRAVENOUS at 11:48

## 2024-04-28 RX ADMIN — METOPROLOL TARTRATE 12.5 MG: 25 TABLET, FILM COATED ORAL at 08:53

## 2024-04-28 RX ADMIN — MIDODRINE HYDROCHLORIDE 5 MG: 5 TABLET ORAL at 10:43

## 2024-04-28 RX ADMIN — EMPAGLIFLOZIN 10 MG: 10 TABLET, FILM COATED ORAL at 08:56

## 2024-04-28 ASSESSMENT — PAIN SCALES - GENERAL
PAINLEVEL_OUTOF10: 0

## 2024-04-29 ENCOUNTER — APPOINTMENT (OUTPATIENT)
Facility: HOSPITAL | Age: 79
DRG: 673 | End: 2024-04-29
Payer: MEDICARE

## 2024-04-29 LAB
ANION GAP SERPL CALC-SCNC: 9 MMOL/L (ref 5–15)
BASOPHILS # BLD: 0 K/UL (ref 0–0.1)
BASOPHILS NFR BLD: 0 % (ref 0–1)
BUN SERPL-MCNC: 47 MG/DL (ref 6–20)
BUN/CREAT SERPL: 14 (ref 12–20)
CALCIUM SERPL-MCNC: 10 MG/DL (ref 8.5–10.1)
CHLORIDE SERPL-SCNC: 109 MMOL/L (ref 97–108)
CO2 SERPL-SCNC: 19 MMOL/L (ref 21–32)
CREAT SERPL-MCNC: 3.47 MG/DL (ref 0.55–1.02)
DIFFERENTIAL METHOD BLD: ABNORMAL
EOSINOPHIL # BLD: 0 K/UL (ref 0–0.4)
EOSINOPHIL NFR BLD: 0 % (ref 0–7)
ERYTHROCYTE [DISTWIDTH] IN BLOOD BY AUTOMATED COUNT: 15.4 % (ref 11.5–14.5)
GLUCOSE SERPL-MCNC: 148 MG/DL (ref 65–100)
HCT VFR BLD AUTO: 39.8 % (ref 35–47)
HGB BLD-MCNC: 11.8 G/DL (ref 11.5–16)
IMM GRANULOCYTES # BLD AUTO: 0 K/UL (ref 0–0.04)
IMM GRANULOCYTES NFR BLD AUTO: 0 % (ref 0–0.5)
LYMPHOCYTES # BLD: 1.8 K/UL (ref 0.8–3.5)
LYMPHOCYTES NFR BLD: 14 % (ref 12–49)
MCH RBC QN AUTO: 27.1 PG (ref 26–34)
MCHC RBC AUTO-ENTMCNC: 29.6 G/DL (ref 30–36.5)
MCV RBC AUTO: 91.3 FL (ref 80–99)
MONOCYTES # BLD: 0.5 K/UL (ref 0–1)
MONOCYTES NFR BLD: 4 % (ref 5–13)
NEUTS SEG # BLD: 10.3 K/UL (ref 1.8–8)
NEUTS SEG NFR BLD: 82 % (ref 32–75)
NRBC # BLD: 0.02 K/UL (ref 0–0.01)
NRBC BLD-RTO: 0.2 PER 100 WBC
PLATELET # BLD AUTO: 262 K/UL (ref 150–400)
PMV BLD AUTO: 10.8 FL (ref 8.9–12.9)
POTASSIUM SERPL-SCNC: 5.5 MMOL/L (ref 3.5–5.1)
RBC # BLD AUTO: 4.36 M/UL (ref 3.8–5.2)
RBC MORPH BLD: ABNORMAL
SODIUM SERPL-SCNC: 137 MMOL/L (ref 136–145)
WBC # BLD AUTO: 12.6 K/UL (ref 3.6–11)

## 2024-04-29 PROCEDURE — 36415 COLL VENOUS BLD VENIPUNCTURE: CPT

## 2024-04-29 PROCEDURE — 97535 SELF CARE MNGMENT TRAINING: CPT | Performed by: OCCUPATIONAL THERAPIST

## 2024-04-29 PROCEDURE — A9537 TC99M MEBROFENIN: HCPCS | Performed by: INTERNAL MEDICINE

## 2024-04-29 PROCEDURE — 93005 ELECTROCARDIOGRAM TRACING: CPT

## 2024-04-29 PROCEDURE — 6360000002 HC RX W HCPCS: Performed by: NURSE PRACTITIONER

## 2024-04-29 PROCEDURE — 2060000000 HC ICU INTERMEDIATE R&B

## 2024-04-29 PROCEDURE — 2580000003 HC RX 258: Performed by: NURSE PRACTITIONER

## 2024-04-29 PROCEDURE — 2700000000 HC OXYGEN THERAPY PER DAY

## 2024-04-29 PROCEDURE — 2580000003 HC RX 258: Performed by: INTERNAL MEDICINE

## 2024-04-29 PROCEDURE — 6360000004 HC RX CONTRAST MEDICATION: Performed by: INTERNAL MEDICINE

## 2024-04-29 PROCEDURE — 97165 OT EVAL LOW COMPLEX 30 MIN: CPT | Performed by: OCCUPATIONAL THERAPIST

## 2024-04-29 PROCEDURE — 6360000002 HC RX W HCPCS

## 2024-04-29 PROCEDURE — 80048 BASIC METABOLIC PNL TOTAL CA: CPT

## 2024-04-29 PROCEDURE — 6370000000 HC RX 637 (ALT 250 FOR IP): Performed by: INTERNAL MEDICINE

## 2024-04-29 PROCEDURE — 78227 HEPATOBIL SYST IMAGE W/DRUG: CPT

## 2024-04-29 PROCEDURE — 97530 THERAPEUTIC ACTIVITIES: CPT | Performed by: OCCUPATIONAL THERAPIST

## 2024-04-29 PROCEDURE — 85025 COMPLETE CBC W/AUTO DIFF WBC: CPT

## 2024-04-29 PROCEDURE — 3430000000 HC RX DIAGNOSTIC RADIOPHARMACEUTICAL: Performed by: INTERNAL MEDICINE

## 2024-04-29 RX ORDER — SINCALIDE 5 UG/5ML
0.02 INJECTION, POWDER, LYOPHILIZED, FOR SOLUTION INTRAVENOUS ONCE
Status: COMPLETED | OUTPATIENT
Start: 2024-04-29 | End: 2024-04-29

## 2024-04-29 RX ORDER — FUROSEMIDE 10 MG/ML
40 INJECTION INTRAMUSCULAR; INTRAVENOUS ONCE
Status: COMPLETED | OUTPATIENT
Start: 2024-04-29 | End: 2024-04-29

## 2024-04-29 RX ORDER — METOLAZONE 2.5 MG/1
5 TABLET ORAL ONCE
Status: COMPLETED | OUTPATIENT
Start: 2024-04-30 | End: 2024-04-30

## 2024-04-29 RX ORDER — FUROSEMIDE 10 MG/ML
60 INJECTION INTRAMUSCULAR; INTRAVENOUS 2 TIMES DAILY
Status: DISCONTINUED | OUTPATIENT
Start: 2024-04-30 | End: 2024-05-01

## 2024-04-29 RX ORDER — KIT FOR THE PREPARATION OF TECHNETIUM TC 99M MEBROFENIN 45 MG/10ML
5.3 INJECTION, POWDER, LYOPHILIZED, FOR SOLUTION INTRAVENOUS
Status: COMPLETED | OUTPATIENT
Start: 2024-04-29 | End: 2024-04-29

## 2024-04-29 RX ADMIN — METOPROLOL TARTRATE 12.5 MG: 25 TABLET, FILM COATED ORAL at 11:53

## 2024-04-29 RX ADMIN — AMIODARONE HYDROCHLORIDE 0.5 MG/MIN: 50 INJECTION, SOLUTION INTRAVENOUS at 18:05

## 2024-04-29 RX ADMIN — APIXABAN 2.5 MG: 2.5 TABLET, FILM COATED ORAL at 11:54

## 2024-04-29 RX ADMIN — ONDANSETRON 4 MG: 4 TABLET, ORALLY DISINTEGRATING ORAL at 18:02

## 2024-04-29 RX ADMIN — PRAVASTATIN SODIUM 20 MG: 10 TABLET ORAL at 11:52

## 2024-04-29 RX ADMIN — CALCIUM 500 MG: 500 TABLET ORAL at 11:53

## 2024-04-29 RX ADMIN — SODIUM ZIRCONIUM CYCLOSILICATE 10 G: 10 POWDER, FOR SUSPENSION ORAL at 11:54

## 2024-04-29 RX ADMIN — APIXABAN 2.5 MG: 2.5 TABLET, FILM COATED ORAL at 20:50

## 2024-04-29 RX ADMIN — FUROSEMIDE 40 MG: 10 INJECTION, SOLUTION INTRAMUSCULAR; INTRAVENOUS at 16:22

## 2024-04-29 RX ADMIN — MEBROFENIN 5.3 MILLICURIE: 45 INJECTION, POWDER, LYOPHILIZED, FOR SOLUTION INTRAVENOUS at 09:30

## 2024-04-29 RX ADMIN — SODIUM CHLORIDE, PRESERVATIVE FREE 10 ML: 5 INJECTION INTRAVENOUS at 20:50

## 2024-04-29 RX ADMIN — EMPAGLIFLOZIN 10 MG: 10 TABLET, FILM COATED ORAL at 11:52

## 2024-04-29 RX ADMIN — SODIUM CHLORIDE, PRESERVATIVE FREE 10 ML: 5 INJECTION INTRAVENOUS at 12:03

## 2024-04-29 RX ADMIN — SINCALIDE 1.3 MCG: 5 INJECTION, POWDER, LYOPHILIZED, FOR SOLUTION INTRAVENOUS at 11:00

## 2024-04-29 ASSESSMENT — PAIN SCALES - GENERAL
PAINLEVEL_OUTOF10: 0

## 2024-04-30 ENCOUNTER — APPOINTMENT (OUTPATIENT)
Facility: HOSPITAL | Age: 79
DRG: 673 | End: 2024-04-30
Payer: MEDICARE

## 2024-04-30 LAB
ANION GAP SERPL CALC-SCNC: 7 MMOL/L (ref 5–15)
BASOPHILS # BLD: 0 K/UL (ref 0–0.1)
BASOPHILS NFR BLD: 0 % (ref 0–1)
BUN SERPL-MCNC: 54 MG/DL (ref 6–20)
BUN/CREAT SERPL: 14 (ref 12–20)
CALCIUM SERPL-MCNC: 9.8 MG/DL (ref 8.5–10.1)
CHLORIDE SERPL-SCNC: 106 MMOL/L (ref 97–108)
CO2 SERPL-SCNC: 21 MMOL/L (ref 21–32)
CREAT SERPL-MCNC: 3.86 MG/DL (ref 0.55–1.02)
DIFFERENTIAL METHOD BLD: ABNORMAL
EOSINOPHIL # BLD: 0 K/UL (ref 0–0.4)
EOSINOPHIL NFR BLD: 0 % (ref 0–7)
ERYTHROCYTE [DISTWIDTH] IN BLOOD BY AUTOMATED COUNT: 15.4 % (ref 11.5–14.5)
GLUCOSE SERPL-MCNC: 104 MG/DL (ref 65–100)
HBV SURFACE AB SER QL: NONREACTIVE
HBV SURFACE AB SER QL: NONREACTIVE
HBV SURFACE AB SER-ACNC: <3.1 MIU/ML
HBV SURFACE AB SER-ACNC: <3.1 MIU/ML
HBV SURFACE AG SER QL: 0.14 INDEX
HBV SURFACE AG SER QL: 0.24 INDEX
HBV SURFACE AG SER QL: NEGATIVE
HBV SURFACE AG SER QL: NEGATIVE
HCT VFR BLD AUTO: 36.7 % (ref 35–47)
HGB BLD-MCNC: 11.3 G/DL (ref 11.5–16)
IMM GRANULOCYTES # BLD AUTO: 0 K/UL (ref 0–0.04)
IMM GRANULOCYTES NFR BLD AUTO: 0 % (ref 0–0.5)
LYMPHOCYTES # BLD: 2.7 K/UL (ref 0.8–3.5)
LYMPHOCYTES NFR BLD: 18 % (ref 12–49)
MCH RBC QN AUTO: 27.4 PG (ref 26–34)
MCHC RBC AUTO-ENTMCNC: 30.8 G/DL (ref 30–36.5)
MCV RBC AUTO: 88.9 FL (ref 80–99)
MONOCYTES # BLD: 1.2 K/UL (ref 0–1)
MONOCYTES NFR BLD: 8 % (ref 5–13)
MYELOCYTES NFR BLD MANUAL: 1 %
NEUTS SEG # BLD: 10.9 K/UL (ref 1.8–8)
NEUTS SEG NFR BLD: 73 % (ref 32–75)
NRBC # BLD: 0.11 K/UL (ref 0–0.01)
NRBC BLD-RTO: 0.7 PER 100 WBC
NT PRO BNP: ABNORMAL PG/ML
PLATELET # BLD AUTO: 256 K/UL (ref 150–400)
PMV BLD AUTO: 11.5 FL (ref 8.9–12.9)
POTASSIUM SERPL-SCNC: 5.3 MMOL/L (ref 3.5–5.1)
RBC # BLD AUTO: 4.13 M/UL (ref 3.8–5.2)
RBC MORPH BLD: ABNORMAL
SODIUM SERPL-SCNC: 134 MMOL/L (ref 136–145)
WBC # BLD AUTO: 14.9 K/UL (ref 3.6–11)

## 2024-04-30 PROCEDURE — 6360000002 HC RX W HCPCS: Performed by: INTERNAL MEDICINE

## 2024-04-30 PROCEDURE — 71045 X-RAY EXAM CHEST 1 VIEW: CPT

## 2024-04-30 PROCEDURE — 6370000000 HC RX 637 (ALT 250 FOR IP): Performed by: INTERNAL MEDICINE

## 2024-04-30 PROCEDURE — 80048 BASIC METABOLIC PNL TOTAL CA: CPT

## 2024-04-30 PROCEDURE — 90935 HEMODIALYSIS ONE EVALUATION: CPT

## 2024-04-30 PROCEDURE — 6360000002 HC RX W HCPCS: Performed by: NURSE PRACTITIONER

## 2024-04-30 PROCEDURE — 85025 COMPLETE CBC W/AUTO DIFF WBC: CPT

## 2024-04-30 PROCEDURE — 77001 FLUOROGUIDE FOR VEIN DEVICE: CPT

## 2024-04-30 PROCEDURE — 87340 HEPATITIS B SURFACE AG IA: CPT

## 2024-04-30 PROCEDURE — 2700000000 HC OXYGEN THERAPY PER DAY

## 2024-04-30 PROCEDURE — 86704 HEP B CORE ANTIBODY TOTAL: CPT

## 2024-04-30 PROCEDURE — 36415 COLL VENOUS BLD VENIPUNCTURE: CPT

## 2024-04-30 PROCEDURE — 2500000003 HC RX 250 WO HCPCS: Performed by: STUDENT IN AN ORGANIZED HEALTH CARE EDUCATION/TRAINING PROGRAM

## 2024-04-30 PROCEDURE — 6370000000 HC RX 637 (ALT 250 FOR IP): Performed by: STUDENT IN AN ORGANIZED HEALTH CARE EDUCATION/TRAINING PROGRAM

## 2024-04-30 PROCEDURE — 2580000003 HC RX 258: Performed by: NURSE PRACTITIONER

## 2024-04-30 PROCEDURE — 2580000003 HC RX 258: Performed by: INTERNAL MEDICINE

## 2024-04-30 PROCEDURE — 6360000002 HC RX W HCPCS: Performed by: STUDENT IN AN ORGANIZED HEALTH CARE EDUCATION/TRAINING PROGRAM

## 2024-04-30 PROCEDURE — 02H633Z INSERTION OF INFUSION DEVICE INTO RIGHT ATRIUM, PERCUTANEOUS APPROACH: ICD-10-PCS | Performed by: NURSE PRACTITIONER

## 2024-04-30 PROCEDURE — 2060000000 HC ICU INTERMEDIATE R&B

## 2024-04-30 PROCEDURE — 83880 ASSAY OF NATRIURETIC PEPTIDE: CPT

## 2024-04-30 PROCEDURE — 6370000000 HC RX 637 (ALT 250 FOR IP)

## 2024-04-30 PROCEDURE — 5A1D70Z PERFORMANCE OF URINARY FILTRATION, INTERMITTENT, LESS THAN 6 HOURS PER DAY: ICD-10-PCS | Performed by: INTERNAL MEDICINE

## 2024-04-30 PROCEDURE — 86706 HEP B SURFACE ANTIBODY: CPT

## 2024-04-30 RX ORDER — HEPARIN SODIUM 5000 [USP'U]/ML
10000 INJECTION, SOLUTION INTRAVENOUS; SUBCUTANEOUS ONCE
Status: DISCONTINUED | OUTPATIENT
Start: 2024-04-30 | End: 2024-04-30

## 2024-04-30 RX ORDER — LIDOCAINE HYDROCHLORIDE 20 MG/ML
20 INJECTION, SOLUTION INFILTRATION; PERINEURAL ONCE
Status: COMPLETED | OUTPATIENT
Start: 2024-04-30 | End: 2024-04-30

## 2024-04-30 RX ORDER — HEPARIN SODIUM 200 [USP'U]/100ML
200 INJECTION, SOLUTION INTRAVENOUS ONCE
Status: DISCONTINUED | OUTPATIENT
Start: 2024-04-30 | End: 2024-05-13 | Stop reason: HOSPADM

## 2024-04-30 RX ORDER — HEPARIN 100 UNIT/ML
300 SYRINGE INTRAVENOUS ONCE
Status: COMPLETED | OUTPATIENT
Start: 2024-04-30 | End: 2024-04-30

## 2024-04-30 RX ADMIN — PRAVASTATIN SODIUM 20 MG: 10 TABLET ORAL at 09:08

## 2024-04-30 RX ADMIN — ALTEPLASE 2 MG: 2.2 INJECTION, POWDER, LYOPHILIZED, FOR SOLUTION INTRAVENOUS at 16:42

## 2024-04-30 RX ADMIN — HEPARIN SODIUM 1300 UNITS: 1000 INJECTION INTRAVENOUS; SUBCUTANEOUS at 19:37

## 2024-04-30 RX ADMIN — APIXABAN 2.5 MG: 2.5 TABLET, FILM COATED ORAL at 20:49

## 2024-04-30 RX ADMIN — Medication 260 UNITS: at 14:38

## 2024-04-30 RX ADMIN — AMIODARONE HYDROCHLORIDE 0.5 MG/MIN: 50 INJECTION, SOLUTION INTRAVENOUS at 07:45

## 2024-04-30 RX ADMIN — METOLAZONE 5 MG: 2.5 TABLET ORAL at 09:09

## 2024-04-30 RX ADMIN — FUROSEMIDE 60 MG: 10 INJECTION, SOLUTION INTRAMUSCULAR; INTRAVENOUS at 09:05

## 2024-04-30 RX ADMIN — APIXABAN 2.5 MG: 2.5 TABLET, FILM COATED ORAL at 09:08

## 2024-04-30 RX ADMIN — METOPROLOL TARTRATE 12.5 MG: 25 TABLET, FILM COATED ORAL at 09:09

## 2024-04-30 RX ADMIN — SODIUM ZIRCONIUM CYCLOSILICATE 10 G: 10 POWDER, FOR SUSPENSION ORAL at 09:14

## 2024-04-30 RX ADMIN — HEPARIN SODIUM 30 ML: 200 INJECTION, SOLUTION INTRAVENOUS at 14:36

## 2024-04-30 RX ADMIN — ONDANSETRON 4 MG: 2 INJECTION INTRAMUSCULAR; INTRAVENOUS at 09:04

## 2024-04-30 RX ADMIN — LIDOCAINE HYDROCHLORIDE 5 ML: 20 INJECTION, SOLUTION INFILTRATION; PERINEURAL at 14:35

## 2024-04-30 RX ADMIN — SODIUM CHLORIDE, PRESERVATIVE FREE 10 ML: 5 INJECTION INTRAVENOUS at 09:06

## 2024-04-30 RX ADMIN — SODIUM CHLORIDE, PRESERVATIVE FREE 10 ML: 5 INJECTION INTRAVENOUS at 20:49

## 2024-04-30 RX ADMIN — CALCIUM 500 MG: 500 TABLET ORAL at 09:08

## 2024-04-30 ASSESSMENT — PAIN SCALES - GENERAL
PAINLEVEL_OUTOF10: 0

## 2024-05-01 LAB
ANION GAP SERPL CALC-SCNC: 6 MMOL/L (ref 5–15)
BASOPHILS # BLD: 0 K/UL (ref 0–0.1)
BASOPHILS NFR BLD: 0 % (ref 0–1)
BUN SERPL-MCNC: 40 MG/DL (ref 6–20)
BUN/CREAT SERPL: 13 (ref 12–20)
CALCIUM SERPL-MCNC: 9.5 MG/DL (ref 8.5–10.1)
CHLORIDE SERPL-SCNC: 106 MMOL/L (ref 97–108)
CO2 SERPL-SCNC: 23 MMOL/L (ref 21–32)
CREAT SERPL-MCNC: 3.07 MG/DL (ref 0.55–1.02)
DIFFERENTIAL METHOD BLD: ABNORMAL
EKG ATRIAL RATE: 105 BPM
EKG DIAGNOSIS: NORMAL
EKG Q-T INTERVAL: 334 MS
EKG QRS DURATION: 96 MS
EKG QTC CALCULATION (BAZETT): 419 MS
EKG R AXIS: 11 DEGREES
EKG T AXIS: 1 DEGREES
EKG VENTRICULAR RATE: 95 BPM
EOSINOPHIL # BLD: 0 K/UL (ref 0–0.4)
EOSINOPHIL NFR BLD: 0 % (ref 0–7)
ERYTHROCYTE [DISTWIDTH] IN BLOOD BY AUTOMATED COUNT: 15.4 % (ref 11.5–14.5)
GLUCOSE SERPL-MCNC: 79 MG/DL (ref 65–100)
HBV CORE AB SERPL QL IA: NEGATIVE
HCT VFR BLD AUTO: 34.8 % (ref 35–47)
HGB BLD-MCNC: 10.8 G/DL (ref 11.5–16)
IMM GRANULOCYTES # BLD AUTO: 0 K/UL (ref 0–0.04)
IMM GRANULOCYTES NFR BLD AUTO: 0 % (ref 0–0.5)
LYMPHOCYTES # BLD: 1.7 K/UL (ref 0.8–3.5)
LYMPHOCYTES NFR BLD: 12 % (ref 12–49)
MCH RBC QN AUTO: 26.8 PG (ref 26–34)
MCHC RBC AUTO-ENTMCNC: 31 G/DL (ref 30–36.5)
MCV RBC AUTO: 86.4 FL (ref 80–99)
MONOCYTES # BLD: 1.1 K/UL (ref 0–1)
MONOCYTES NFR BLD: 8 % (ref 5–13)
NEUTS SEG # BLD: 11.5 K/UL (ref 1.8–8)
NEUTS SEG NFR BLD: 80 % (ref 32–75)
NRBC # BLD: 0.23 K/UL (ref 0–0.01)
NRBC BLD-RTO: 1.6 PER 100 WBC
PLATELET # BLD AUTO: 204 K/UL (ref 150–400)
PMV BLD AUTO: 10.8 FL (ref 8.9–12.9)
POTASSIUM SERPL-SCNC: 4.3 MMOL/L (ref 3.5–5.1)
RBC # BLD AUTO: 4.03 M/UL (ref 3.8–5.2)
RBC MORPH BLD: ABNORMAL
SODIUM SERPL-SCNC: 135 MMOL/L (ref 136–145)
WBC # BLD AUTO: 14.3 K/UL (ref 3.6–11)
WBC MORPH BLD: ABNORMAL

## 2024-05-01 PROCEDURE — 2580000003 HC RX 258: Performed by: INTERNAL MEDICINE

## 2024-05-01 PROCEDURE — 6370000000 HC RX 637 (ALT 250 FOR IP): Performed by: INTERNAL MEDICINE

## 2024-05-01 PROCEDURE — 80048 BASIC METABOLIC PNL TOTAL CA: CPT

## 2024-05-01 PROCEDURE — P9047 ALBUMIN (HUMAN), 25%, 50ML: HCPCS

## 2024-05-01 PROCEDURE — 2580000003 HC RX 258: Performed by: NURSE PRACTITIONER

## 2024-05-01 PROCEDURE — 6360000002 HC RX W HCPCS: Performed by: NURSE PRACTITIONER

## 2024-05-01 PROCEDURE — 97530 THERAPEUTIC ACTIVITIES: CPT

## 2024-05-01 PROCEDURE — 36415 COLL VENOUS BLD VENIPUNCTURE: CPT

## 2024-05-01 PROCEDURE — 6370000000 HC RX 637 (ALT 250 FOR IP): Performed by: STUDENT IN AN ORGANIZED HEALTH CARE EDUCATION/TRAINING PROGRAM

## 2024-05-01 PROCEDURE — 97116 GAIT TRAINING THERAPY: CPT

## 2024-05-01 PROCEDURE — 6360000002 HC RX W HCPCS: Performed by: INTERNAL MEDICINE

## 2024-05-01 PROCEDURE — 6370000000 HC RX 637 (ALT 250 FOR IP)

## 2024-05-01 PROCEDURE — 85025 COMPLETE CBC W/AUTO DIFF WBC: CPT

## 2024-05-01 PROCEDURE — 90935 HEMODIALYSIS ONE EVALUATION: CPT

## 2024-05-01 PROCEDURE — 2060000000 HC ICU INTERMEDIATE R&B

## 2024-05-01 PROCEDURE — 2700000000 HC OXYGEN THERAPY PER DAY

## 2024-05-01 PROCEDURE — 6360000002 HC RX W HCPCS

## 2024-05-01 RX ORDER — METOPROLOL SUCCINATE 25 MG/1
12.5 TABLET, EXTENDED RELEASE ORAL DAILY
Status: DISCONTINUED | OUTPATIENT
Start: 2024-05-01 | End: 2024-05-06

## 2024-05-01 RX ORDER — ALBUMIN (HUMAN) 12.5 G/50ML
SOLUTION INTRAVENOUS
Status: COMPLETED
Start: 2024-05-01 | End: 2024-05-01

## 2024-05-01 RX ORDER — AMIODARONE HYDROCHLORIDE 200 MG/1
200 TABLET ORAL 2 TIMES DAILY
Status: DISCONTINUED | OUTPATIENT
Start: 2024-05-01 | End: 2024-05-13 | Stop reason: HOSPADM

## 2024-05-01 RX ORDER — ALBUMIN (HUMAN) 12.5 G/50ML
25 SOLUTION INTRAVENOUS ONCE
Status: COMPLETED | OUTPATIENT
Start: 2024-05-01 | End: 2024-05-01

## 2024-05-01 RX ORDER — PANTOPRAZOLE SODIUM 40 MG/1
40 TABLET, DELAYED RELEASE ORAL
Status: DISCONTINUED | OUTPATIENT
Start: 2024-05-01 | End: 2024-05-13 | Stop reason: HOSPADM

## 2024-05-01 RX ORDER — HEPARIN SODIUM 1000 [USP'U]/ML
INJECTION, SOLUTION INTRAVENOUS; SUBCUTANEOUS
Status: DISPENSED
Start: 2024-05-01 | End: 2024-05-02

## 2024-05-01 RX ORDER — LOPERAMIDE HYDROCHLORIDE 2 MG/1
2 CAPSULE ORAL 4 TIMES DAILY PRN
Status: DISCONTINUED | OUTPATIENT
Start: 2024-05-01 | End: 2024-05-13 | Stop reason: HOSPADM

## 2024-05-01 RX ADMIN — APIXABAN 2.5 MG: 2.5 TABLET, FILM COATED ORAL at 22:35

## 2024-05-01 RX ADMIN — HEPARIN SODIUM 1300 UNITS: 1000 INJECTION INTRAVENOUS; SUBCUTANEOUS at 22:00

## 2024-05-01 RX ADMIN — AMIODARONE HYDROCHLORIDE 0.5 MG/MIN: 50 INJECTION, SOLUTION INTRAVENOUS at 01:34

## 2024-05-01 RX ADMIN — APIXABAN 2.5 MG: 2.5 TABLET, FILM COATED ORAL at 09:27

## 2024-05-01 RX ADMIN — ALBUMIN (HUMAN) 25 G: 12.5 SOLUTION INTRAVENOUS at 21:13

## 2024-05-01 RX ADMIN — PRAVASTATIN SODIUM 20 MG: 10 TABLET ORAL at 09:27

## 2024-05-01 RX ADMIN — METOPROLOL TARTRATE 12.5 MG: 25 TABLET, FILM COATED ORAL at 09:27

## 2024-05-01 RX ADMIN — ALBUMIN (HUMAN) 25 G: 0.25 INJECTION, SOLUTION INTRAVENOUS at 21:13

## 2024-05-01 RX ADMIN — LOPERAMIDE HYDROCHLORIDE 2 MG: 2 CAPSULE ORAL at 16:15

## 2024-05-01 RX ADMIN — AMIODARONE HYDROCHLORIDE 200 MG: 200 TABLET ORAL at 09:31

## 2024-05-01 RX ADMIN — AMIODARONE HYDROCHLORIDE 200 MG: 200 TABLET ORAL at 22:35

## 2024-05-01 RX ADMIN — SODIUM CHLORIDE, PRESERVATIVE FREE 10 ML: 5 INJECTION INTRAVENOUS at 09:28

## 2024-05-01 RX ADMIN — SODIUM CHLORIDE, PRESERVATIVE FREE 10 ML: 5 INJECTION INTRAVENOUS at 22:35

## 2024-05-01 RX ADMIN — CALCIUM 500 MG: 500 TABLET ORAL at 09:27

## 2024-05-01 RX ADMIN — PANTOPRAZOLE SODIUM 40 MG: 40 TABLET, DELAYED RELEASE ORAL at 09:31

## 2024-05-01 RX ADMIN — METOPROLOL SUCCINATE 12.5 MG: 25 TABLET, EXTENDED RELEASE ORAL at 17:23

## 2024-05-01 ASSESSMENT — PAIN SCALES - GENERAL
PAINLEVEL_OUTOF10: 0

## 2024-05-02 LAB
AMORPH CRY URNS QL MICRO: ABNORMAL
ANION GAP SERPL CALC-SCNC: 7 MMOL/L (ref 5–15)
APPEARANCE UR: ABNORMAL
BACTERIA URNS QL MICRO: ABNORMAL /HPF
BILIRUB UR QL CFM: NEGATIVE
BUN SERPL-MCNC: 33 MG/DL (ref 6–20)
BUN/CREAT SERPL: 13 (ref 12–20)
CALCIUM SERPL-MCNC: 9.3 MG/DL (ref 8.5–10.1)
CHLORIDE SERPL-SCNC: 106 MMOL/L (ref 97–108)
CO2 SERPL-SCNC: 24 MMOL/L (ref 21–32)
COLOR UR: ABNORMAL
CREAT SERPL-MCNC: 2.64 MG/DL (ref 0.55–1.02)
EPITH CASTS URNS QL MICRO: ABNORMAL /LPF
GLUCOSE SERPL-MCNC: 97 MG/DL (ref 65–100)
GLUCOSE UR STRIP.AUTO-MCNC: NEGATIVE MG/DL
HGB UR QL STRIP: NEGATIVE
HYALINE CASTS URNS QL MICRO: >20 /LPF (ref 0–5)
KETONES UR QL STRIP.AUTO: NEGATIVE MG/DL
LEUKOCYTE ESTERASE UR QL STRIP.AUTO: NEGATIVE
NITRITE UR QL STRIP.AUTO: NEGATIVE
PH UR STRIP: 5 (ref 5–8)
POTASSIUM SERPL-SCNC: 3.7 MMOL/L (ref 3.5–5.1)
PROT UR STRIP-MCNC: 300 MG/DL
RBC #/AREA URNS HPF: ABNORMAL /HPF (ref 0–5)
SODIUM SERPL-SCNC: 137 MMOL/L (ref 136–145)
SP GR UR REFRACTOMETRY: 1.02
URINE CULTURE IF INDICATED: ABNORMAL
UROBILINOGEN UR QL STRIP.AUTO: 1 EU/DL (ref 0.2–1)
WBC URNS QL MICRO: ABNORMAL /HPF (ref 0–4)

## 2024-05-02 PROCEDURE — 2060000000 HC ICU INTERMEDIATE R&B

## 2024-05-02 PROCEDURE — 51701 INSERT BLADDER CATHETER: CPT

## 2024-05-02 PROCEDURE — 97530 THERAPEUTIC ACTIVITIES: CPT

## 2024-05-02 PROCEDURE — 6370000000 HC RX 637 (ALT 250 FOR IP): Performed by: INTERNAL MEDICINE

## 2024-05-02 PROCEDURE — 2580000003 HC RX 258: Performed by: INTERNAL MEDICINE

## 2024-05-02 PROCEDURE — 6360000002 HC RX W HCPCS: Performed by: INTERNAL MEDICINE

## 2024-05-02 PROCEDURE — 97116 GAIT TRAINING THERAPY: CPT

## 2024-05-02 PROCEDURE — P9047 ALBUMIN (HUMAN), 25%, 50ML: HCPCS | Performed by: INTERNAL MEDICINE

## 2024-05-02 PROCEDURE — 2700000000 HC OXYGEN THERAPY PER DAY

## 2024-05-02 PROCEDURE — 6370000000 HC RX 637 (ALT 250 FOR IP): Performed by: STUDENT IN AN ORGANIZED HEALTH CARE EDUCATION/TRAINING PROGRAM

## 2024-05-02 PROCEDURE — 36415 COLL VENOUS BLD VENIPUNCTURE: CPT

## 2024-05-02 PROCEDURE — 81001 URINALYSIS AUTO W/SCOPE: CPT

## 2024-05-02 PROCEDURE — 6370000000 HC RX 637 (ALT 250 FOR IP)

## 2024-05-02 PROCEDURE — 51798 US URINE CAPACITY MEASURE: CPT

## 2024-05-02 PROCEDURE — 80048 BASIC METABOLIC PNL TOTAL CA: CPT

## 2024-05-02 RX ORDER — MIDODRINE HYDROCHLORIDE 5 MG/1
10 TABLET ORAL ONCE
Status: COMPLETED | OUTPATIENT
Start: 2024-05-02 | End: 2024-05-02

## 2024-05-02 RX ORDER — ALBUMIN (HUMAN) 12.5 G/50ML
25 SOLUTION INTRAVENOUS AS NEEDED
Status: DISCONTINUED | OUTPATIENT
Start: 2024-05-02 | End: 2024-05-13 | Stop reason: HOSPADM

## 2024-05-02 RX ORDER — MIDODRINE HYDROCHLORIDE 5 MG/1
5 TABLET ORAL
Status: DISCONTINUED | OUTPATIENT
Start: 2024-05-02 | End: 2024-05-13 | Stop reason: HOSPADM

## 2024-05-02 RX ORDER — ALBUMIN (HUMAN) 12.5 G/50ML
25 SOLUTION INTRAVENOUS 3 TIMES DAILY PRN
Status: DISCONTINUED | OUTPATIENT
Start: 2024-05-02 | End: 2024-05-13 | Stop reason: HOSPADM

## 2024-05-02 RX ADMIN — ALTEPLASE 2 MG: 2.2 INJECTION, POWDER, LYOPHILIZED, FOR SOLUTION INTRAVENOUS at 12:41

## 2024-05-02 RX ADMIN — HEPARIN SODIUM 1300 UNITS: 1000 INJECTION INTRAVENOUS; SUBCUTANEOUS at 15:17

## 2024-05-02 RX ADMIN — AMIODARONE HYDROCHLORIDE 200 MG: 200 TABLET ORAL at 08:31

## 2024-05-02 RX ADMIN — MIDODRINE HYDROCHLORIDE 5 MG: 5 TABLET ORAL at 11:56

## 2024-05-02 RX ADMIN — APIXABAN 2.5 MG: 2.5 TABLET, FILM COATED ORAL at 08:30

## 2024-05-02 RX ADMIN — AMIODARONE HYDROCHLORIDE 200 MG: 200 TABLET ORAL at 21:46

## 2024-05-02 RX ADMIN — MIDODRINE HYDROCHLORIDE 10 MG: 5 TABLET ORAL at 08:31

## 2024-05-02 RX ADMIN — APIXABAN 2.5 MG: 2.5 TABLET, FILM COATED ORAL at 21:41

## 2024-05-02 RX ADMIN — CALCIUM 500 MG: 500 TABLET ORAL at 08:30

## 2024-05-02 RX ADMIN — PRAVASTATIN SODIUM 20 MG: 10 TABLET ORAL at 08:30

## 2024-05-02 RX ADMIN — SODIUM CHLORIDE, PRESERVATIVE FREE 10 ML: 5 INJECTION INTRAVENOUS at 08:31

## 2024-05-02 RX ADMIN — ALBUMIN (HUMAN) 25 G: 0.25 INJECTION, SOLUTION INTRAVENOUS at 14:37

## 2024-05-02 RX ADMIN — SODIUM CHLORIDE, PRESERVATIVE FREE 10 ML: 5 INJECTION INTRAVENOUS at 21:41

## 2024-05-02 RX ADMIN — HEPARIN SODIUM 1300 UNITS: 1000 INJECTION INTRAVENOUS; SUBCUTANEOUS at 15:16

## 2024-05-02 RX ADMIN — MIDODRINE HYDROCHLORIDE 5 MG: 5 TABLET ORAL at 03:13

## 2024-05-02 RX ADMIN — ALTEPLASE 2 MG: 2.2 INJECTION, POWDER, LYOPHILIZED, FOR SOLUTION INTRAVENOUS at 17:11

## 2024-05-02 RX ADMIN — MIDODRINE HYDROCHLORIDE 5 MG: 5 TABLET ORAL at 17:12

## 2024-05-02 RX ADMIN — PANTOPRAZOLE SODIUM 40 MG: 40 TABLET, DELAYED RELEASE ORAL at 05:27

## 2024-05-02 ASSESSMENT — PAIN SCALES - GENERAL
PAINLEVEL_OUTOF10: 0
PAINLEVEL_OUTOF10: 0

## 2024-05-03 ENCOUNTER — APPOINTMENT (OUTPATIENT)
Facility: HOSPITAL | Age: 79
DRG: 673 | End: 2024-05-03
Payer: MEDICARE

## 2024-05-03 LAB
ANION GAP SERPL CALC-SCNC: 8 MMOL/L (ref 5–15)
BASOPHILS # BLD: 0.1 K/UL (ref 0–0.1)
BASOPHILS NFR BLD: 1 % (ref 0–1)
BUN SERPL-MCNC: 43 MG/DL (ref 6–20)
BUN/CREAT SERPL: 12 (ref 12–20)
CALCIUM SERPL-MCNC: 9.8 MG/DL (ref 8.5–10.1)
CHLORIDE SERPL-SCNC: 104 MMOL/L (ref 97–108)
CO2 SERPL-SCNC: 24 MMOL/L (ref 21–32)
CREAT SERPL-MCNC: 3.49 MG/DL (ref 0.55–1.02)
DIFFERENTIAL METHOD BLD: ABNORMAL
EOSINOPHIL # BLD: 0 K/UL (ref 0–0.4)
EOSINOPHIL NFR BLD: 0 % (ref 0–7)
ERYTHROCYTE [DISTWIDTH] IN BLOOD BY AUTOMATED COUNT: 15.9 % (ref 11.5–14.5)
ERYTHROCYTE [SEDIMENTATION RATE] IN BLOOD: 6 MM/HR (ref 0–30)
GLUCOSE SERPL-MCNC: 90 MG/DL (ref 65–100)
HCT VFR BLD AUTO: 34.3 % (ref 35–47)
HGB BLD-MCNC: 10.5 G/DL (ref 11.5–16)
IMM GRANULOCYTES # BLD AUTO: 0 K/UL (ref 0–0.04)
IMM GRANULOCYTES NFR BLD AUTO: 0 % (ref 0–0.5)
LYMPHOCYTES # BLD: 1.7 K/UL (ref 0.8–3.5)
LYMPHOCYTES NFR BLD: 16 % (ref 12–49)
MCH RBC QN AUTO: 27.1 PG (ref 26–34)
MCHC RBC AUTO-ENTMCNC: 30.6 G/DL (ref 30–36.5)
MCV RBC AUTO: 88.6 FL (ref 80–99)
METAMYELOCYTES NFR BLD MANUAL: 1 %
MONOCYTES # BLD: 0.7 K/UL (ref 0–1)
MONOCYTES NFR BLD: 7 % (ref 5–13)
NEUTS SEG # BLD: 8 K/UL (ref 1.8–8)
NEUTS SEG NFR BLD: 75 % (ref 32–75)
NRBC # BLD: 0.11 K/UL (ref 0–0.01)
NRBC BLD-RTO: 1 PER 100 WBC
PLATELET # BLD AUTO: 173 K/UL (ref 150–400)
PMV BLD AUTO: 10.7 FL (ref 8.9–12.9)
POTASSIUM SERPL-SCNC: 4.2 MMOL/L (ref 3.5–5.1)
PROCALCITONIN SERPL-MCNC: 0.38 NG/ML
RBC # BLD AUTO: 3.87 M/UL (ref 3.8–5.2)
RBC MORPH BLD: ABNORMAL
SODIUM SERPL-SCNC: 136 MMOL/L (ref 136–145)
WBC # BLD AUTO: 10.6 K/UL (ref 3.6–11)

## 2024-05-03 PROCEDURE — 02PY33Z REMOVAL OF INFUSION DEVICE FROM GREAT VESSEL, PERCUTANEOUS APPROACH: ICD-10-PCS | Performed by: NURSE PRACTITIONER

## 2024-05-03 PROCEDURE — 71045 X-RAY EXAM CHEST 1 VIEW: CPT

## 2024-05-03 PROCEDURE — 6360000002 HC RX W HCPCS: Performed by: STUDENT IN AN ORGANIZED HEALTH CARE EDUCATION/TRAINING PROGRAM

## 2024-05-03 PROCEDURE — 2060000000 HC ICU INTERMEDIATE R&B

## 2024-05-03 PROCEDURE — 2709999900 IR NONTUNNELED VASCULAR CATHETER > 5 YEARS

## 2024-05-03 PROCEDURE — 84145 PROCALCITONIN (PCT): CPT

## 2024-05-03 PROCEDURE — 2709999900 IR NON TUNNELED CATH WO PORT REPLACEMENT

## 2024-05-03 PROCEDURE — 85652 RBC SED RATE AUTOMATED: CPT

## 2024-05-03 PROCEDURE — 2700000000 HC OXYGEN THERAPY PER DAY

## 2024-05-03 PROCEDURE — 36415 COLL VENOUS BLD VENIPUNCTURE: CPT

## 2024-05-03 PROCEDURE — 6370000000 HC RX 637 (ALT 250 FOR IP)

## 2024-05-03 PROCEDURE — 90935 HEMODIALYSIS ONE EVALUATION: CPT

## 2024-05-03 PROCEDURE — 6370000000 HC RX 637 (ALT 250 FOR IP): Performed by: INTERNAL MEDICINE

## 2024-05-03 PROCEDURE — 2500000003 HC RX 250 WO HCPCS: Performed by: STUDENT IN AN ORGANIZED HEALTH CARE EDUCATION/TRAINING PROGRAM

## 2024-05-03 PROCEDURE — 85025 COMPLETE CBC W/AUTO DIFF WBC: CPT

## 2024-05-03 PROCEDURE — 6360000002 HC RX W HCPCS

## 2024-05-03 PROCEDURE — 80048 BASIC METABOLIC PNL TOTAL CA: CPT

## 2024-05-03 PROCEDURE — 02HV33Z INSERTION OF INFUSION DEVICE INTO SUPERIOR VENA CAVA, PERCUTANEOUS APPROACH: ICD-10-PCS | Performed by: NURSE PRACTITIONER

## 2024-05-03 PROCEDURE — 2580000003 HC RX 258: Performed by: INTERNAL MEDICINE

## 2024-05-03 PROCEDURE — P9047 ALBUMIN (HUMAN), 25%, 50ML: HCPCS

## 2024-05-03 RX ORDER — HEPARIN 100 UNIT/ML
300 SYRINGE INTRAVENOUS ONCE
Status: COMPLETED | OUTPATIENT
Start: 2024-05-03 | End: 2024-05-03

## 2024-05-03 RX ORDER — LIDOCAINE HYDROCHLORIDE 20 MG/ML
20 INJECTION, SOLUTION INFILTRATION; PERINEURAL ONCE
Status: COMPLETED | OUTPATIENT
Start: 2024-05-03 | End: 2024-05-03

## 2024-05-03 RX ORDER — ALBUMIN (HUMAN) 12.5 G/50ML
SOLUTION INTRAVENOUS
Status: COMPLETED
Start: 2024-05-03 | End: 2024-05-03

## 2024-05-03 RX ORDER — HEPARIN SODIUM 200 [USP'U]/100ML
200 INJECTION, SOLUTION INTRAVENOUS ONCE
Status: DISCONTINUED | OUTPATIENT
Start: 2024-05-03 | End: 2024-05-13 | Stop reason: HOSPADM

## 2024-05-03 RX ADMIN — MIDODRINE HYDROCHLORIDE 5 MG: 5 TABLET ORAL at 11:17

## 2024-05-03 RX ADMIN — MIDODRINE HYDROCHLORIDE 5 MG: 5 TABLET ORAL at 08:24

## 2024-05-03 RX ADMIN — Medication 400 UNITS: at 13:37

## 2024-05-03 RX ADMIN — MIDODRINE HYDROCHLORIDE 5 MG: 5 TABLET ORAL at 17:53

## 2024-05-03 RX ADMIN — AMIODARONE HYDROCHLORIDE 200 MG: 200 TABLET ORAL at 21:23

## 2024-05-03 RX ADMIN — APIXABAN 2.5 MG: 2.5 TABLET, FILM COATED ORAL at 21:23

## 2024-05-03 RX ADMIN — AMIODARONE HYDROCHLORIDE 200 MG: 200 TABLET ORAL at 08:25

## 2024-05-03 RX ADMIN — ALBUMIN (HUMAN) 25 G: 0.25 INJECTION, SOLUTION INTRAVENOUS at 13:20

## 2024-05-03 RX ADMIN — SODIUM CHLORIDE, PRESERVATIVE FREE 10 ML: 5 INJECTION INTRAVENOUS at 08:25

## 2024-05-03 RX ADMIN — SODIUM CHLORIDE, PRESERVATIVE FREE 10 ML: 5 INJECTION INTRAVENOUS at 21:24

## 2024-05-03 RX ADMIN — APIXABAN 2.5 MG: 2.5 TABLET, FILM COATED ORAL at 08:24

## 2024-05-03 RX ADMIN — LIDOCAINE HYDROCHLORIDE 4 ML: 20 INJECTION, SOLUTION INFILTRATION; PERINEURAL at 13:38

## 2024-05-03 RX ADMIN — CALCIUM 500 MG: 500 TABLET ORAL at 08:25

## 2024-05-03 RX ADMIN — ALBUMIN (HUMAN) 25 G: 12.5 SOLUTION INTRAVENOUS at 13:20

## 2024-05-03 RX ADMIN — PRAVASTATIN SODIUM 20 MG: 10 TABLET ORAL at 08:25

## 2024-05-03 RX ADMIN — PANTOPRAZOLE SODIUM 40 MG: 40 TABLET, DELAYED RELEASE ORAL at 06:45

## 2024-05-03 ASSESSMENT — PAIN SCALES - GENERAL
PAINLEVEL_OUTOF10: 0

## 2024-05-04 LAB
ANION GAP SERPL CALC-SCNC: 6 MMOL/L (ref 5–15)
ANION GAP SERPL CALC-SCNC: 7 MMOL/L (ref 5–15)
BASOPHILS # BLD: 0 K/UL (ref 0–0.1)
BASOPHILS NFR BLD: 0 % (ref 0–1)
BUN SERPL-MCNC: 27 MG/DL (ref 6–20)
BUN SERPL-MCNC: 37 MG/DL (ref 6–20)
BUN/CREAT SERPL: 10 (ref 12–20)
BUN/CREAT SERPL: 11 (ref 12–20)
CALCIUM SERPL-MCNC: 9.2 MG/DL (ref 8.5–10.1)
CALCIUM SERPL-MCNC: 9.9 MG/DL (ref 8.5–10.1)
CHLORIDE SERPL-SCNC: 104 MMOL/L (ref 97–108)
CHLORIDE SERPL-SCNC: 105 MMOL/L (ref 97–108)
CO2 SERPL-SCNC: 27 MMOL/L (ref 21–32)
CO2 SERPL-SCNC: 27 MMOL/L (ref 21–32)
CREAT SERPL-MCNC: 2.78 MG/DL (ref 0.55–1.02)
CREAT SERPL-MCNC: 3.34 MG/DL (ref 0.55–1.02)
DIFFERENTIAL METHOD BLD: ABNORMAL
EOSINOPHIL # BLD: 0.2 K/UL (ref 0–0.4)
EOSINOPHIL NFR BLD: 2 % (ref 0–7)
ERYTHROCYTE [DISTWIDTH] IN BLOOD BY AUTOMATED COUNT: 15.9 % (ref 11.5–14.5)
GLUCOSE SERPL-MCNC: 115 MG/DL (ref 65–100)
GLUCOSE SERPL-MCNC: 92 MG/DL (ref 65–100)
HCT VFR BLD AUTO: 34.3 % (ref 35–47)
HGB BLD-MCNC: 10.4 G/DL (ref 11.5–16)
IMM GRANULOCYTES # BLD AUTO: 0.2 K/UL (ref 0–0.04)
IMM GRANULOCYTES NFR BLD AUTO: 2 % (ref 0–0.5)
LIPASE SERPL-CCNC: 92 U/L (ref 13–75)
LYMPHOCYTES # BLD: 1.5 K/UL (ref 0.8–3.5)
LYMPHOCYTES NFR BLD: 16 % (ref 12–49)
MCH RBC QN AUTO: 27.2 PG (ref 26–34)
MCHC RBC AUTO-ENTMCNC: 30.3 G/DL (ref 30–36.5)
MCV RBC AUTO: 89.6 FL (ref 80–99)
MONOCYTES # BLD: 1.5 K/UL (ref 0–1)
MONOCYTES NFR BLD: 16 % (ref 5–13)
NEUTS SEG # BLD: 5.9 K/UL (ref 1.8–8)
NEUTS SEG NFR BLD: 64 % (ref 32–75)
NRBC # BLD: 0.08 K/UL (ref 0–0.01)
NRBC BLD-RTO: 0.9 PER 100 WBC
PLATELET # BLD AUTO: 138 K/UL (ref 150–400)
PMV BLD AUTO: 9.9 FL (ref 8.9–12.9)
POTASSIUM SERPL-SCNC: 3.9 MMOL/L (ref 3.5–5.1)
POTASSIUM SERPL-SCNC: 4.3 MMOL/L (ref 3.5–5.1)
RBC # BLD AUTO: 3.83 M/UL (ref 3.8–5.2)
SODIUM SERPL-SCNC: 138 MMOL/L (ref 136–145)
SODIUM SERPL-SCNC: 138 MMOL/L (ref 136–145)
WBC # BLD AUTO: 9.2 K/UL (ref 3.6–11)

## 2024-05-04 PROCEDURE — 6370000000 HC RX 637 (ALT 250 FOR IP): Performed by: INTERNAL MEDICINE

## 2024-05-04 PROCEDURE — 2060000000 HC ICU INTERMEDIATE R&B

## 2024-05-04 PROCEDURE — 85025 COMPLETE CBC W/AUTO DIFF WBC: CPT

## 2024-05-04 PROCEDURE — 6370000000 HC RX 637 (ALT 250 FOR IP)

## 2024-05-04 PROCEDURE — 36415 COLL VENOUS BLD VENIPUNCTURE: CPT

## 2024-05-04 PROCEDURE — C9113 INJ PANTOPRAZOLE SODIUM, VIA: HCPCS | Performed by: NURSE PRACTITIONER

## 2024-05-04 PROCEDURE — 2580000003 HC RX 258: Performed by: NURSE PRACTITIONER

## 2024-05-04 PROCEDURE — A4216 STERILE WATER/SALINE, 10 ML: HCPCS | Performed by: NURSE PRACTITIONER

## 2024-05-04 PROCEDURE — 2700000000 HC OXYGEN THERAPY PER DAY

## 2024-05-04 PROCEDURE — 2580000003 HC RX 258: Performed by: INTERNAL MEDICINE

## 2024-05-04 PROCEDURE — 6360000002 HC RX W HCPCS: Performed by: NURSE PRACTITIONER

## 2024-05-04 PROCEDURE — 80048 BASIC METABOLIC PNL TOTAL CA: CPT

## 2024-05-04 PROCEDURE — 83690 ASSAY OF LIPASE: CPT

## 2024-05-04 RX ADMIN — PANTOPRAZOLE SODIUM 40 MG: 40 TABLET, DELAYED RELEASE ORAL at 08:22

## 2024-05-04 RX ADMIN — APIXABAN 2.5 MG: 2.5 TABLET, FILM COATED ORAL at 20:14

## 2024-05-04 RX ADMIN — MIDODRINE HYDROCHLORIDE 5 MG: 5 TABLET ORAL at 12:00

## 2024-05-04 RX ADMIN — SODIUM CHLORIDE 40 MG: 9 INJECTION INTRAMUSCULAR; INTRAVENOUS; SUBCUTANEOUS at 22:31

## 2024-05-04 RX ADMIN — AMIODARONE HYDROCHLORIDE 200 MG: 200 TABLET ORAL at 08:23

## 2024-05-04 RX ADMIN — CALCIUM 500 MG: 500 TABLET ORAL at 08:23

## 2024-05-04 RX ADMIN — APIXABAN 2.5 MG: 2.5 TABLET, FILM COATED ORAL at 08:23

## 2024-05-04 RX ADMIN — MIDODRINE HYDROCHLORIDE 5 MG: 5 TABLET ORAL at 17:17

## 2024-05-04 RX ADMIN — SODIUM CHLORIDE, PRESERVATIVE FREE 10 ML: 5 INJECTION INTRAVENOUS at 14:45

## 2024-05-04 RX ADMIN — MIDODRINE HYDROCHLORIDE 5 MG: 5 TABLET ORAL at 08:22

## 2024-05-04 RX ADMIN — AMIODARONE HYDROCHLORIDE 200 MG: 200 TABLET ORAL at 20:14

## 2024-05-04 RX ADMIN — SODIUM CHLORIDE, PRESERVATIVE FREE 10 ML: 5 INJECTION INTRAVENOUS at 20:19

## 2024-05-04 RX ADMIN — PRAVASTATIN SODIUM 20 MG: 10 TABLET ORAL at 08:23

## 2024-05-04 ASSESSMENT — PAIN SCALES - GENERAL
PAINLEVEL_OUTOF10: 0

## 2024-05-05 LAB
BASOPHILS # BLD: 0.1 K/UL (ref 0–0.1)
BASOPHILS NFR BLD: 1 % (ref 0–1)
DIFFERENTIAL METHOD BLD: ABNORMAL
EOSINOPHIL # BLD: 0.1 K/UL (ref 0–0.4)
EOSINOPHIL NFR BLD: 2 % (ref 0–7)
ERYTHROCYTE [DISTWIDTH] IN BLOOD BY AUTOMATED COUNT: 16 % (ref 11.5–14.5)
HCT VFR BLD AUTO: 36.2 % (ref 35–47)
HGB BLD-MCNC: 10.9 G/DL (ref 11.5–16)
IMM GRANULOCYTES # BLD AUTO: 0.2 K/UL (ref 0–0.04)
IMM GRANULOCYTES NFR BLD AUTO: 2 % (ref 0–0.5)
LYMPHOCYTES # BLD: 1.8 K/UL (ref 0.8–3.5)
LYMPHOCYTES NFR BLD: 19 % (ref 12–49)
MCH RBC QN AUTO: 27 PG (ref 26–34)
MCHC RBC AUTO-ENTMCNC: 30.1 G/DL (ref 30–36.5)
MCV RBC AUTO: 89.8 FL (ref 80–99)
MONOCYTES # BLD: 1.8 K/UL (ref 0–1)
MONOCYTES NFR BLD: 19 % (ref 5–13)
NEUTS SEG # BLD: 5.5 K/UL (ref 1.8–8)
NEUTS SEG NFR BLD: 57 % (ref 32–75)
NRBC # BLD: 0.12 K/UL (ref 0–0.01)
NRBC BLD-RTO: 1.3 PER 100 WBC
PLATELET # BLD AUTO: 170 K/UL (ref 150–400)
PMV BLD AUTO: 10.8 FL (ref 8.9–12.9)
RBC # BLD AUTO: 4.03 M/UL (ref 3.8–5.2)
WBC # BLD AUTO: 9.5 K/UL (ref 3.6–11)

## 2024-05-05 PROCEDURE — 6370000000 HC RX 637 (ALT 250 FOR IP)

## 2024-05-05 PROCEDURE — 2580000003 HC RX 258: Performed by: INTERNAL MEDICINE

## 2024-05-05 PROCEDURE — 6370000000 HC RX 637 (ALT 250 FOR IP): Performed by: INTERNAL MEDICINE

## 2024-05-05 PROCEDURE — 85025 COMPLETE CBC W/AUTO DIFF WBC: CPT

## 2024-05-05 PROCEDURE — 2060000000 HC ICU INTERMEDIATE R&B

## 2024-05-05 PROCEDURE — 36415 COLL VENOUS BLD VENIPUNCTURE: CPT

## 2024-05-05 PROCEDURE — 2700000000 HC OXYGEN THERAPY PER DAY

## 2024-05-05 RX ADMIN — AMIODARONE HYDROCHLORIDE 200 MG: 200 TABLET ORAL at 08:48

## 2024-05-05 RX ADMIN — PRAVASTATIN SODIUM 20 MG: 10 TABLET ORAL at 08:48

## 2024-05-05 RX ADMIN — PANTOPRAZOLE SODIUM 40 MG: 40 TABLET, DELAYED RELEASE ORAL at 05:45

## 2024-05-05 RX ADMIN — APIXABAN 2.5 MG: 2.5 TABLET, FILM COATED ORAL at 08:48

## 2024-05-05 RX ADMIN — MIDODRINE HYDROCHLORIDE 5 MG: 5 TABLET ORAL at 08:48

## 2024-05-05 RX ADMIN — MIDODRINE HYDROCHLORIDE 5 MG: 5 TABLET ORAL at 11:32

## 2024-05-05 RX ADMIN — AMIODARONE HYDROCHLORIDE 200 MG: 200 TABLET ORAL at 21:39

## 2024-05-05 RX ADMIN — CALCIUM 500 MG: 500 TABLET ORAL at 08:48

## 2024-05-05 RX ADMIN — APIXABAN 2.5 MG: 2.5 TABLET, FILM COATED ORAL at 21:39

## 2024-05-05 RX ADMIN — MIDODRINE HYDROCHLORIDE 5 MG: 5 TABLET ORAL at 18:04

## 2024-05-05 RX ADMIN — SODIUM CHLORIDE, PRESERVATIVE FREE 10 ML: 5 INJECTION INTRAVENOUS at 21:39

## 2024-05-05 RX ADMIN — SODIUM CHLORIDE, PRESERVATIVE FREE 10 ML: 5 INJECTION INTRAVENOUS at 08:48

## 2024-05-05 ASSESSMENT — PAIN SCALES - GENERAL
PAINLEVEL_OUTOF10: 0

## 2024-05-06 LAB
ANION GAP SERPL CALC-SCNC: 6 MMOL/L (ref 5–15)
BASOPHILS # BLD: 0.1 K/UL (ref 0–0.1)
BASOPHILS NFR BLD: 1 % (ref 0–1)
BUN SERPL-MCNC: 47 MG/DL (ref 6–20)
BUN/CREAT SERPL: 13 (ref 12–20)
CALCIUM SERPL-MCNC: 10.1 MG/DL (ref 8.5–10.1)
CHLORIDE SERPL-SCNC: 104 MMOL/L (ref 97–108)
CO2 SERPL-SCNC: 24 MMOL/L (ref 21–32)
CREAT SERPL-MCNC: 3.71 MG/DL (ref 0.55–1.02)
DIFFERENTIAL METHOD BLD: ABNORMAL
EOSINOPHIL # BLD: 0.2 K/UL (ref 0–0.4)
EOSINOPHIL NFR BLD: 2 % (ref 0–7)
ERYTHROCYTE [DISTWIDTH] IN BLOOD BY AUTOMATED COUNT: 16.1 % (ref 11.5–14.5)
GLUCOSE SERPL-MCNC: 103 MG/DL (ref 65–100)
HCT VFR BLD AUTO: 36.7 % (ref 35–47)
HGB BLD-MCNC: 11.1 G/DL (ref 11.5–16)
IMM GRANULOCYTES # BLD AUTO: 0.1 K/UL (ref 0–0.04)
IMM GRANULOCYTES NFR BLD AUTO: 1 % (ref 0–0.5)
LYMPHOCYTES # BLD: 1.8 K/UL (ref 0.8–3.5)
LYMPHOCYTES NFR BLD: 20 % (ref 12–49)
MCH RBC QN AUTO: 26.4 PG (ref 26–34)
MCHC RBC AUTO-ENTMCNC: 30.2 G/DL (ref 30–36.5)
MCV RBC AUTO: 87.4 FL (ref 80–99)
MONOCYTES # BLD: 1.5 K/UL (ref 0–1)
MONOCYTES NFR BLD: 17 % (ref 5–13)
NEUTS SEG # BLD: 5.4 K/UL (ref 1.8–8)
NEUTS SEG NFR BLD: 59 % (ref 32–75)
NRBC # BLD: 0.08 K/UL (ref 0–0.01)
NRBC BLD-RTO: 0.9 PER 100 WBC
PLATELET # BLD AUTO: 160 K/UL (ref 150–400)
PMV BLD AUTO: 10.3 FL (ref 8.9–12.9)
POTASSIUM SERPL-SCNC: 4.6 MMOL/L (ref 3.5–5.1)
RBC # BLD AUTO: 4.2 M/UL (ref 3.8–5.2)
SODIUM SERPL-SCNC: 134 MMOL/L (ref 136–145)
WBC # BLD AUTO: 9.1 K/UL (ref 3.6–11)

## 2024-05-06 PROCEDURE — 97530 THERAPEUTIC ACTIVITIES: CPT

## 2024-05-06 PROCEDURE — 85025 COMPLETE CBC W/AUTO DIFF WBC: CPT

## 2024-05-06 PROCEDURE — 6370000000 HC RX 637 (ALT 250 FOR IP)

## 2024-05-06 PROCEDURE — 97116 GAIT TRAINING THERAPY: CPT

## 2024-05-06 PROCEDURE — 80048 BASIC METABOLIC PNL TOTAL CA: CPT

## 2024-05-06 PROCEDURE — 2580000003 HC RX 258: Performed by: INTERNAL MEDICINE

## 2024-05-06 PROCEDURE — 97110 THERAPEUTIC EXERCISES: CPT

## 2024-05-06 PROCEDURE — 2700000000 HC OXYGEN THERAPY PER DAY

## 2024-05-06 PROCEDURE — 6360000002 HC RX W HCPCS: Performed by: STUDENT IN AN ORGANIZED HEALTH CARE EDUCATION/TRAINING PROGRAM

## 2024-05-06 PROCEDURE — 36415 COLL VENOUS BLD VENIPUNCTURE: CPT

## 2024-05-06 PROCEDURE — 2060000000 HC ICU INTERMEDIATE R&B

## 2024-05-06 PROCEDURE — 97530 THERAPEUTIC ACTIVITIES: CPT | Performed by: OCCUPATIONAL THERAPIST

## 2024-05-06 PROCEDURE — 6370000000 HC RX 637 (ALT 250 FOR IP): Performed by: INTERNAL MEDICINE

## 2024-05-06 PROCEDURE — 97535 SELF CARE MNGMENT TRAINING: CPT | Performed by: OCCUPATIONAL THERAPIST

## 2024-05-06 PROCEDURE — 90935 HEMODIALYSIS ONE EVALUATION: CPT

## 2024-05-06 RX ORDER — DIGOXIN 0.25 MG/ML
250 INJECTION INTRAMUSCULAR; INTRAVENOUS ONCE
Status: COMPLETED | OUTPATIENT
Start: 2024-05-06 | End: 2024-05-06

## 2024-05-06 RX ORDER — DIGOXIN 125 MCG
62.5 TABLET ORAL EVERY OTHER DAY
Status: DISCONTINUED | OUTPATIENT
Start: 2024-05-07 | End: 2024-05-13 | Stop reason: HOSPADM

## 2024-05-06 RX ADMIN — CALCIUM 500 MG: 500 TABLET ORAL at 07:51

## 2024-05-06 RX ADMIN — SODIUM CHLORIDE, PRESERVATIVE FREE 10 ML: 5 INJECTION INTRAVENOUS at 21:04

## 2024-05-06 RX ADMIN — DIGOXIN 250 MCG: 0.25 INJECTION INTRAMUSCULAR; INTRAVENOUS at 16:50

## 2024-05-06 RX ADMIN — MIDODRINE HYDROCHLORIDE 5 MG: 5 TABLET ORAL at 07:51

## 2024-05-06 RX ADMIN — MIDODRINE HYDROCHLORIDE 5 MG: 5 TABLET ORAL at 12:51

## 2024-05-06 RX ADMIN — APIXABAN 2.5 MG: 2.5 TABLET, FILM COATED ORAL at 21:01

## 2024-05-06 RX ADMIN — APIXABAN 2.5 MG: 2.5 TABLET, FILM COATED ORAL at 07:51

## 2024-05-06 RX ADMIN — PRAVASTATIN SODIUM 20 MG: 10 TABLET ORAL at 07:51

## 2024-05-06 RX ADMIN — AMIODARONE HYDROCHLORIDE 200 MG: 200 TABLET ORAL at 21:01

## 2024-05-06 RX ADMIN — MIDODRINE HYDROCHLORIDE 5 MG: 5 TABLET ORAL at 16:50

## 2024-05-06 RX ADMIN — AMIODARONE HYDROCHLORIDE 200 MG: 200 TABLET ORAL at 07:51

## 2024-05-07 LAB
ANION GAP SERPL CALC-SCNC: 7 MMOL/L (ref 5–15)
BASOPHILS # BLD: 0.1 K/UL (ref 0–0.1)
BASOPHILS NFR BLD: 1 % (ref 0–1)
BUN SERPL-MCNC: 31 MG/DL (ref 6–20)
BUN/CREAT SERPL: 12 (ref 12–20)
CALCIUM SERPL-MCNC: 9.8 MG/DL (ref 8.5–10.1)
CHLORIDE SERPL-SCNC: 103 MMOL/L (ref 97–108)
CO2 SERPL-SCNC: 25 MMOL/L (ref 21–32)
CREAT SERPL-MCNC: 2.69 MG/DL (ref 0.55–1.02)
DIFFERENTIAL METHOD BLD: ABNORMAL
EKG DIAGNOSIS: NORMAL
EKG Q-T INTERVAL: 370 MS
EKG QRS DURATION: 102 MS
EKG QTC CALCULATION (BAZETT): 486 MS
EKG R AXIS: 6 DEGREES
EKG T AXIS: 71 DEGREES
EKG VENTRICULAR RATE: 104 BPM
EOSINOPHIL # BLD: 0.2 K/UL (ref 0–0.4)
EOSINOPHIL NFR BLD: 2 % (ref 0–7)
ERYTHROCYTE [DISTWIDTH] IN BLOOD BY AUTOMATED COUNT: 16.2 % (ref 11.5–14.5)
GLUCOSE SERPL-MCNC: 77 MG/DL (ref 65–100)
HCT VFR BLD AUTO: 36.6 % (ref 35–47)
HGB BLD-MCNC: 11.1 G/DL (ref 11.5–16)
IMM GRANULOCYTES # BLD AUTO: 0.1 K/UL (ref 0–0.04)
IMM GRANULOCYTES NFR BLD AUTO: 2 % (ref 0–0.5)
LYMPHOCYTES # BLD: 1.8 K/UL (ref 0.8–3.5)
LYMPHOCYTES NFR BLD: 21 % (ref 12–49)
MCH RBC QN AUTO: 26.7 PG (ref 26–34)
MCHC RBC AUTO-ENTMCNC: 30.3 G/DL (ref 30–36.5)
MCV RBC AUTO: 88.2 FL (ref 80–99)
MONOCYTES # BLD: 1.4 K/UL (ref 0–1)
MONOCYTES NFR BLD: 15 % (ref 5–13)
NEUTS SEG # BLD: 5.3 K/UL (ref 1.8–8)
NEUTS SEG NFR BLD: 59 % (ref 32–75)
NRBC # BLD: 0.02 K/UL (ref 0–0.01)
NRBC BLD-RTO: 0.2 PER 100 WBC
PLATELET # BLD AUTO: 146 K/UL (ref 150–400)
PMV BLD AUTO: 10.2 FL (ref 8.9–12.9)
POTASSIUM SERPL-SCNC: 4.2 MMOL/L (ref 3.5–5.1)
RBC # BLD AUTO: 4.15 M/UL (ref 3.8–5.2)
SODIUM SERPL-SCNC: 135 MMOL/L (ref 136–145)
WBC # BLD AUTO: 8.9 K/UL (ref 3.6–11)

## 2024-05-07 PROCEDURE — 2060000000 HC ICU INTERMEDIATE R&B

## 2024-05-07 PROCEDURE — 80048 BASIC METABOLIC PNL TOTAL CA: CPT

## 2024-05-07 PROCEDURE — 2700000000 HC OXYGEN THERAPY PER DAY

## 2024-05-07 PROCEDURE — 2580000003 HC RX 258: Performed by: INTERNAL MEDICINE

## 2024-05-07 PROCEDURE — 6370000000 HC RX 637 (ALT 250 FOR IP): Performed by: INTERNAL MEDICINE

## 2024-05-07 PROCEDURE — 85025 COMPLETE CBC W/AUTO DIFF WBC: CPT

## 2024-05-07 PROCEDURE — 36415 COLL VENOUS BLD VENIPUNCTURE: CPT

## 2024-05-07 PROCEDURE — 6370000000 HC RX 637 (ALT 250 FOR IP): Performed by: STUDENT IN AN ORGANIZED HEALTH CARE EDUCATION/TRAINING PROGRAM

## 2024-05-07 PROCEDURE — 6370000000 HC RX 637 (ALT 250 FOR IP)

## 2024-05-07 RX ADMIN — MIDODRINE HYDROCHLORIDE 5 MG: 5 TABLET ORAL at 18:12

## 2024-05-07 RX ADMIN — DIGOXIN 62.5 MCG: 125 TABLET ORAL at 10:12

## 2024-05-07 RX ADMIN — APIXABAN 2.5 MG: 2.5 TABLET, FILM COATED ORAL at 20:51

## 2024-05-07 RX ADMIN — PRAVASTATIN SODIUM 20 MG: 10 TABLET ORAL at 10:10

## 2024-05-07 RX ADMIN — SODIUM CHLORIDE, PRESERVATIVE FREE 10 ML: 5 INJECTION INTRAVENOUS at 23:31

## 2024-05-07 RX ADMIN — SODIUM CHLORIDE, PRESERVATIVE FREE 10 ML: 5 INJECTION INTRAVENOUS at 10:16

## 2024-05-07 RX ADMIN — MIDODRINE HYDROCHLORIDE 5 MG: 5 TABLET ORAL at 10:11

## 2024-05-07 RX ADMIN — AMIODARONE HYDROCHLORIDE 200 MG: 200 TABLET ORAL at 20:51

## 2024-05-07 RX ADMIN — MIDODRINE HYDROCHLORIDE 5 MG: 5 TABLET ORAL at 14:05

## 2024-05-07 RX ADMIN — AMIODARONE HYDROCHLORIDE 200 MG: 200 TABLET ORAL at 10:10

## 2024-05-07 RX ADMIN — APIXABAN 2.5 MG: 2.5 TABLET, FILM COATED ORAL at 10:11

## 2024-05-07 RX ADMIN — PANTOPRAZOLE SODIUM 40 MG: 40 TABLET, DELAYED RELEASE ORAL at 07:38

## 2024-05-07 ASSESSMENT — PAIN SCALES - GENERAL: PAINLEVEL_OUTOF10: 0

## 2024-05-08 ENCOUNTER — APPOINTMENT (OUTPATIENT)
Facility: HOSPITAL | Age: 79
DRG: 673 | End: 2024-05-08
Payer: MEDICARE

## 2024-05-08 LAB
ANION GAP SERPL CALC-SCNC: 7 MMOL/L (ref 5–15)
BASOPHILS # BLD: 0 K/UL (ref 0–0.1)
BASOPHILS NFR BLD: 0 % (ref 0–1)
BUN SERPL-MCNC: 39 MG/DL (ref 6–20)
BUN/CREAT SERPL: 13 (ref 12–20)
CALCIUM SERPL-MCNC: 9.6 MG/DL (ref 8.5–10.1)
CHLORIDE SERPL-SCNC: 104 MMOL/L (ref 97–108)
CO2 SERPL-SCNC: 24 MMOL/L (ref 21–32)
CREAT SERPL-MCNC: 3.06 MG/DL (ref 0.55–1.02)
DIFFERENTIAL METHOD BLD: ABNORMAL
EOSINOPHIL # BLD: 0.2 K/UL (ref 0–0.4)
EOSINOPHIL NFR BLD: 2 % (ref 0–7)
ERYTHROCYTE [DISTWIDTH] IN BLOOD BY AUTOMATED COUNT: 15.9 % (ref 11.5–14.5)
GLUCOSE SERPL-MCNC: 90 MG/DL (ref 65–100)
HCT VFR BLD AUTO: 38.2 % (ref 35–47)
HGB BLD-MCNC: 11.4 G/DL (ref 11.5–16)
IMM GRANULOCYTES # BLD AUTO: 0.1 K/UL (ref 0–0.04)
IMM GRANULOCYTES NFR BLD AUTO: 1 % (ref 0–0.5)
LYMPHOCYTES # BLD: 1.7 K/UL (ref 0.8–3.5)
LYMPHOCYTES NFR BLD: 18 % (ref 12–49)
MCH RBC QN AUTO: 26.3 PG (ref 26–34)
MCHC RBC AUTO-ENTMCNC: 29.8 G/DL (ref 30–36.5)
MCV RBC AUTO: 88.2 FL (ref 80–99)
MONOCYTES # BLD: 1.6 K/UL (ref 0–1)
MONOCYTES NFR BLD: 17 % (ref 5–13)
NEUTS SEG # BLD: 6 K/UL (ref 1.8–8)
NEUTS SEG NFR BLD: 62 % (ref 32–75)
NRBC # BLD: 0.02 K/UL (ref 0–0.01)
NRBC BLD-RTO: 0.2 PER 100 WBC
PLATELET # BLD AUTO: 149 K/UL (ref 150–400)
PMV BLD AUTO: 10.5 FL (ref 8.9–12.9)
POTASSIUM SERPL-SCNC: 4.2 MMOL/L (ref 3.5–5.1)
RBC # BLD AUTO: 4.33 M/UL (ref 3.8–5.2)
SODIUM SERPL-SCNC: 135 MMOL/L (ref 136–145)
WBC # BLD AUTO: 9.7 K/UL (ref 3.6–11)

## 2024-05-08 PROCEDURE — 77001 FLUOROGUIDE FOR VEIN DEVICE: CPT

## 2024-05-08 PROCEDURE — 0JH63XZ INSERTION OF TUNNELED VASCULAR ACCESS DEVICE INTO CHEST SUBCUTANEOUS TISSUE AND FASCIA, PERCUTANEOUS APPROACH: ICD-10-PCS | Performed by: STUDENT IN AN ORGANIZED HEALTH CARE EDUCATION/TRAINING PROGRAM

## 2024-05-08 PROCEDURE — 6370000000 HC RX 637 (ALT 250 FOR IP)

## 2024-05-08 PROCEDURE — 2580000003 HC RX 258: Performed by: STUDENT IN AN ORGANIZED HEALTH CARE EDUCATION/TRAINING PROGRAM

## 2024-05-08 PROCEDURE — 2060000000 HC ICU INTERMEDIATE R&B

## 2024-05-08 PROCEDURE — 36415 COLL VENOUS BLD VENIPUNCTURE: CPT

## 2024-05-08 PROCEDURE — 6370000000 HC RX 637 (ALT 250 FOR IP): Performed by: INTERNAL MEDICINE

## 2024-05-08 PROCEDURE — 6360000002 HC RX W HCPCS: Performed by: INTERNAL MEDICINE

## 2024-05-08 PROCEDURE — 2500000003 HC RX 250 WO HCPCS: Performed by: STUDENT IN AN ORGANIZED HEALTH CARE EDUCATION/TRAINING PROGRAM

## 2024-05-08 PROCEDURE — 6360000002 HC RX W HCPCS

## 2024-05-08 PROCEDURE — 6360000002 HC RX W HCPCS: Performed by: STUDENT IN AN ORGANIZED HEALTH CARE EDUCATION/TRAINING PROGRAM

## 2024-05-08 PROCEDURE — 90935 HEMODIALYSIS ONE EVALUATION: CPT

## 2024-05-08 PROCEDURE — 2580000003 HC RX 258: Performed by: INTERNAL MEDICINE

## 2024-05-08 PROCEDURE — 85025 COMPLETE CBC W/AUTO DIFF WBC: CPT

## 2024-05-08 PROCEDURE — 02H633Z INSERTION OF INFUSION DEVICE INTO RIGHT ATRIUM, PERCUTANEOUS APPROACH: ICD-10-PCS | Performed by: STUDENT IN AN ORGANIZED HEALTH CARE EDUCATION/TRAINING PROGRAM

## 2024-05-08 PROCEDURE — 80048 BASIC METABOLIC PNL TOTAL CA: CPT

## 2024-05-08 RX ORDER — HEPARIN SODIUM 5000 [USP'U]/ML
10000 INJECTION, SOLUTION INTRAVENOUS; SUBCUTANEOUS ONCE
Status: COMPLETED | OUTPATIENT
Start: 2024-05-08 | End: 2024-05-08

## 2024-05-08 RX ORDER — LIDOCAINE HYDROCHLORIDE 20 MG/ML
20 INJECTION, SOLUTION INFILTRATION; PERINEURAL ONCE
Status: COMPLETED | OUTPATIENT
Start: 2024-05-08 | End: 2024-05-08

## 2024-05-08 RX ORDER — HEPARIN SODIUM 200 [USP'U]/100ML
200 INJECTION, SOLUTION INTRAVENOUS ONCE
Status: COMPLETED | OUTPATIENT
Start: 2024-05-08 | End: 2024-05-08

## 2024-05-08 RX ORDER — HEPARIN SODIUM 1000 [USP'U]/ML
INJECTION, SOLUTION INTRAVENOUS; SUBCUTANEOUS
Status: COMPLETED
Start: 2024-05-08 | End: 2024-05-08

## 2024-05-08 RX ADMIN — APIXABAN 2.5 MG: 2.5 TABLET, FILM COATED ORAL at 20:18

## 2024-05-08 RX ADMIN — AMIODARONE HYDROCHLORIDE 200 MG: 200 TABLET ORAL at 20:18

## 2024-05-08 RX ADMIN — HEPARIN SODIUM 1300 UNITS: 1000 INJECTION INTRAVENOUS; SUBCUTANEOUS at 10:29

## 2024-05-08 RX ADMIN — SODIUM CHLORIDE, PRESERVATIVE FREE 10 ML: 5 INJECTION INTRAVENOUS at 23:40

## 2024-05-08 RX ADMIN — MIDODRINE HYDROCHLORIDE 5 MG: 5 TABLET ORAL at 08:43

## 2024-05-08 RX ADMIN — ONDANSETRON 4 MG: 2 INJECTION INTRAMUSCULAR; INTRAVENOUS at 13:45

## 2024-05-08 RX ADMIN — HEPARIN SODIUM IN SODIUM CHLORIDE 1000 UNITS: 200 INJECTION INTRAVENOUS at 14:01

## 2024-05-08 RX ADMIN — HEPARIN SODIUM 3600 UNITS: 5000 INJECTION, SOLUTION INTRAVENOUS; SUBCUTANEOUS at 14:00

## 2024-05-08 RX ADMIN — MIDODRINE HYDROCHLORIDE 5 MG: 5 TABLET ORAL at 17:35

## 2024-05-08 RX ADMIN — MELATONIN 3 MG: at 01:21

## 2024-05-08 RX ADMIN — WATER 2000 MG: 1 INJECTION INTRAMUSCULAR; INTRAVENOUS; SUBCUTANEOUS at 13:30

## 2024-05-08 RX ADMIN — LIDOCAINE HYDROCHLORIDE 10 ML: 20 INJECTION, SOLUTION INFILTRATION; PERINEURAL at 13:59

## 2024-05-08 ASSESSMENT — PAIN SCALES - GENERAL
PAINLEVEL_OUTOF10: 0
PAINLEVEL_OUTOF10: 0

## 2024-05-08 NOTE — CONSULTS
Vascular Surgery Consult Note  5/8/2024    Subjective:     Azucena Myrick is a 78 y.o.  female admitted with recurrent ADOLFO and started on hemodialysis.  Hx of ascending aortic repair with residual chronic dissection from ascending to iliac artery without malperfusion with some aneurysmal degeneration.     Past Medical History:   Diagnosis Date    Acute on chronic heart failure, unspecified heart failure type ([facility]) 04/11/2024    Allergic conjunctivitis 07/26/2017    Aortic aneurysm ([facility]) 05/2018    Thoracic, abdominal    Arthritis     lower back    Asthma     Current use of long term anticoagulation     Essential hypertension 08/21/2017    GERD (gastroesophageal reflux disease)     History of aortic dissection     History of echocardiogram 09/2019    Left Ventricle: Normal cavity size, systolic function (ejection fraction normal) and diastolic function. Moderate concentric hypertrophy. Estimated left ventricular ejection fraction is 56 - 60%. No regional wall motion abnormality noted.Small secundum atrial septal defect present. Left Atrium: Moderately dilated left atrium. Mitral Valve: Mitral valve thickening. Mild mitral valve regurgitation.    History of echocardiogram 04/2023    LVEF 60-65%. diastolic dysfunction. Mild MAC. Moderate MR. mild ASCL w/o AS. mod LAE.    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

## 2024-05-09 LAB
ANION GAP SERPL CALC-SCNC: 5 MMOL/L (ref 5–15)
BASOPHILS # BLD: 0.1 K/UL (ref 0–0.1)
BASOPHILS NFR BLD: 1 % (ref 0–1)
BUN SERPL-MCNC: 25 MG/DL (ref 6–20)
BUN/CREAT SERPL: 10 (ref 12–20)
CALCIUM SERPL-MCNC: 9.4 MG/DL (ref 8.5–10.1)
CHLORIDE SERPL-SCNC: 103 MMOL/L (ref 97–108)
CO2 SERPL-SCNC: 29 MMOL/L (ref 21–32)
CREAT SERPL-MCNC: 2.53 MG/DL (ref 0.55–1.02)
DIFFERENTIAL METHOD BLD: ABNORMAL
EOSINOPHIL # BLD: 0.2 K/UL (ref 0–0.4)
EOSINOPHIL NFR BLD: 2 % (ref 0–7)
ERYTHROCYTE [DISTWIDTH] IN BLOOD BY AUTOMATED COUNT: 16.3 % (ref 11.5–14.5)
GLUCOSE SERPL-MCNC: 97 MG/DL (ref 65–100)
HCT VFR BLD AUTO: 37.2 % (ref 35–47)
HGB BLD-MCNC: 11.2 G/DL (ref 11.5–16)
IMM GRANULOCYTES # BLD AUTO: 0.1 K/UL (ref 0–0.04)
IMM GRANULOCYTES NFR BLD AUTO: 1 % (ref 0–0.5)
LYMPHOCYTES # BLD: 2.2 K/UL (ref 0.8–3.5)
LYMPHOCYTES NFR BLD: 22 % (ref 12–49)
MCH RBC QN AUTO: 26.4 PG (ref 26–34)
MCHC RBC AUTO-ENTMCNC: 30.1 G/DL (ref 30–36.5)
MCV RBC AUTO: 87.7 FL (ref 80–99)
MONOCYTES # BLD: 1.8 K/UL (ref 0–1)
MONOCYTES NFR BLD: 18 % (ref 5–13)
NEUTS SEG # BLD: 5.7 K/UL (ref 1.8–8)
NEUTS SEG NFR BLD: 56 % (ref 32–75)
NRBC # BLD: 0 K/UL (ref 0–0.01)
NRBC BLD-RTO: 0 PER 100 WBC
PLATELET # BLD AUTO: 135 K/UL (ref 150–400)
PMV BLD AUTO: 9.8 FL (ref 8.9–12.9)
POTASSIUM SERPL-SCNC: 4.1 MMOL/L (ref 3.5–5.1)
RBC # BLD AUTO: 4.24 M/UL (ref 3.8–5.2)
SODIUM SERPL-SCNC: 137 MMOL/L (ref 136–145)
WBC # BLD AUTO: 10 K/UL (ref 3.6–11)

## 2024-05-09 PROCEDURE — 6370000000 HC RX 637 (ALT 250 FOR IP): Performed by: INTERNAL MEDICINE

## 2024-05-09 PROCEDURE — 97530 THERAPEUTIC ACTIVITIES: CPT

## 2024-05-09 PROCEDURE — 36415 COLL VENOUS BLD VENIPUNCTURE: CPT

## 2024-05-09 PROCEDURE — 6370000000 HC RX 637 (ALT 250 FOR IP): Performed by: STUDENT IN AN ORGANIZED HEALTH CARE EDUCATION/TRAINING PROGRAM

## 2024-05-09 PROCEDURE — 6370000000 HC RX 637 (ALT 250 FOR IP)

## 2024-05-09 PROCEDURE — 2060000000 HC ICU INTERMEDIATE R&B

## 2024-05-09 PROCEDURE — 97116 GAIT TRAINING THERAPY: CPT

## 2024-05-09 PROCEDURE — 97530 THERAPEUTIC ACTIVITIES: CPT | Performed by: OCCUPATIONAL THERAPIST

## 2024-05-09 PROCEDURE — 85025 COMPLETE CBC W/AUTO DIFF WBC: CPT

## 2024-05-09 PROCEDURE — 97535 SELF CARE MNGMENT TRAINING: CPT | Performed by: OCCUPATIONAL THERAPIST

## 2024-05-09 PROCEDURE — 80048 BASIC METABOLIC PNL TOTAL CA: CPT

## 2024-05-09 PROCEDURE — 2580000003 HC RX 258: Performed by: INTERNAL MEDICINE

## 2024-05-09 RX ADMIN — MIDODRINE HYDROCHLORIDE 5 MG: 5 TABLET ORAL at 08:36

## 2024-05-09 RX ADMIN — PANTOPRAZOLE SODIUM 40 MG: 40 TABLET, DELAYED RELEASE ORAL at 05:02

## 2024-05-09 RX ADMIN — MIDODRINE HYDROCHLORIDE 5 MG: 5 TABLET ORAL at 17:59

## 2024-05-09 RX ADMIN — AMIODARONE HYDROCHLORIDE 200 MG: 200 TABLET ORAL at 08:36

## 2024-05-09 RX ADMIN — AMIODARONE HYDROCHLORIDE 200 MG: 200 TABLET ORAL at 20:21

## 2024-05-09 RX ADMIN — MIDODRINE HYDROCHLORIDE 5 MG: 5 TABLET ORAL at 11:42

## 2024-05-09 RX ADMIN — APIXABAN 2.5 MG: 2.5 TABLET, FILM COATED ORAL at 08:37

## 2024-05-09 RX ADMIN — SODIUM CHLORIDE, PRESERVATIVE FREE 10 ML: 5 INJECTION INTRAVENOUS at 20:22

## 2024-05-09 RX ADMIN — DIGOXIN 62.5 MCG: 125 TABLET ORAL at 08:37

## 2024-05-09 RX ADMIN — SODIUM CHLORIDE, PRESERVATIVE FREE 10 ML: 5 INJECTION INTRAVENOUS at 08:38

## 2024-05-09 RX ADMIN — APIXABAN 2.5 MG: 2.5 TABLET, FILM COATED ORAL at 20:21

## 2024-05-09 RX ADMIN — PRAVASTATIN SODIUM 20 MG: 10 TABLET ORAL at 08:36

## 2024-05-09 ASSESSMENT — PAIN SCALES - GENERAL
PAINLEVEL_OUTOF10: 0

## 2024-05-10 LAB
ANION GAP SERPL CALC-SCNC: 5 MMOL/L (ref 5–15)
BASOPHILS # BLD: 0 K/UL (ref 0–0.1)
BASOPHILS NFR BLD: 1 % (ref 0–1)
BUN SERPL-MCNC: 39 MG/DL (ref 6–20)
BUN/CREAT SERPL: 13 (ref 12–20)
CALCIUM SERPL-MCNC: 9.8 MG/DL (ref 8.5–10.1)
CHLORIDE SERPL-SCNC: 102 MMOL/L (ref 97–108)
CO2 SERPL-SCNC: 28 MMOL/L (ref 21–32)
CREAT SERPL-MCNC: 2.99 MG/DL (ref 0.55–1.02)
DIFFERENTIAL METHOD BLD: ABNORMAL
EOSINOPHIL # BLD: 0.2 K/UL (ref 0–0.4)
EOSINOPHIL NFR BLD: 2 % (ref 0–7)
ERYTHROCYTE [DISTWIDTH] IN BLOOD BY AUTOMATED COUNT: 15.9 % (ref 11.5–14.5)
GLUCOSE SERPL-MCNC: 116 MG/DL (ref 65–100)
HCT VFR BLD AUTO: 36.2 % (ref 35–47)
HGB BLD-MCNC: 10.9 G/DL (ref 11.5–16)
IMM GRANULOCYTES # BLD AUTO: 0.1 K/UL (ref 0–0.04)
IMM GRANULOCYTES NFR BLD AUTO: 1 % (ref 0–0.5)
LYMPHOCYTES # BLD: 1.7 K/UL (ref 0.8–3.5)
LYMPHOCYTES NFR BLD: 20 % (ref 12–49)
MCH RBC QN AUTO: 26.5 PG (ref 26–34)
MCHC RBC AUTO-ENTMCNC: 30.1 G/DL (ref 30–36.5)
MCV RBC AUTO: 87.9 FL (ref 80–99)
MONOCYTES # BLD: 1.6 K/UL (ref 0–1)
MONOCYTES NFR BLD: 19 % (ref 5–13)
NEUTS SEG # BLD: 4.9 K/UL (ref 1.8–8)
NEUTS SEG NFR BLD: 57 % (ref 32–75)
NRBC # BLD: 0 K/UL (ref 0–0.01)
NRBC BLD-RTO: 0 PER 100 WBC
PLATELET # BLD AUTO: 154 K/UL (ref 150–400)
PMV BLD AUTO: 10.7 FL (ref 8.9–12.9)
POTASSIUM SERPL-SCNC: 4.3 MMOL/L (ref 3.5–5.1)
RBC # BLD AUTO: 4.12 M/UL (ref 3.8–5.2)
SODIUM SERPL-SCNC: 135 MMOL/L (ref 136–145)
WBC # BLD AUTO: 8.5 K/UL (ref 3.6–11)

## 2024-05-10 PROCEDURE — 6360000002 HC RX W HCPCS: Performed by: INTERNAL MEDICINE

## 2024-05-10 PROCEDURE — 80048 BASIC METABOLIC PNL TOTAL CA: CPT

## 2024-05-10 PROCEDURE — 6370000000 HC RX 637 (ALT 250 FOR IP)

## 2024-05-10 PROCEDURE — 2580000003 HC RX 258: Performed by: INTERNAL MEDICINE

## 2024-05-10 PROCEDURE — 36415 COLL VENOUS BLD VENIPUNCTURE: CPT

## 2024-05-10 PROCEDURE — 85025 COMPLETE CBC W/AUTO DIFF WBC: CPT

## 2024-05-10 PROCEDURE — 2060000000 HC ICU INTERMEDIATE R&B

## 2024-05-10 PROCEDURE — 6370000000 HC RX 637 (ALT 250 FOR IP): Performed by: INTERNAL MEDICINE

## 2024-05-10 PROCEDURE — 2700000000 HC OXYGEN THERAPY PER DAY

## 2024-05-10 PROCEDURE — 94760 N-INVAS EAR/PLS OXIMETRY 1: CPT

## 2024-05-10 PROCEDURE — 90935 HEMODIALYSIS ONE EVALUATION: CPT

## 2024-05-10 RX ADMIN — SODIUM CHLORIDE, PRESERVATIVE FREE 10 ML: 5 INJECTION INTRAVENOUS at 08:25

## 2024-05-10 RX ADMIN — MIDODRINE HYDROCHLORIDE 5 MG: 5 TABLET ORAL at 13:48

## 2024-05-10 RX ADMIN — MIDODRINE HYDROCHLORIDE 5 MG: 5 TABLET ORAL at 17:09

## 2024-05-10 RX ADMIN — PANTOPRAZOLE SODIUM 40 MG: 40 TABLET, DELAYED RELEASE ORAL at 05:23

## 2024-05-10 RX ADMIN — AMIODARONE HYDROCHLORIDE 200 MG: 200 TABLET ORAL at 13:47

## 2024-05-10 RX ADMIN — SODIUM CHLORIDE, PRESERVATIVE FREE 10 ML: 5 INJECTION INTRAVENOUS at 22:02

## 2024-05-10 RX ADMIN — MIDODRINE HYDROCHLORIDE 5 MG: 5 TABLET ORAL at 08:19

## 2024-05-10 RX ADMIN — APIXABAN 2.5 MG: 2.5 TABLET, FILM COATED ORAL at 22:01

## 2024-05-10 RX ADMIN — HEPARIN SODIUM 1300 UNITS: 1000 INJECTION INTRAVENOUS; SUBCUTANEOUS at 11:32

## 2024-05-10 RX ADMIN — AMIODARONE HYDROCHLORIDE 200 MG: 200 TABLET ORAL at 22:01

## 2024-05-10 RX ADMIN — PRAVASTATIN SODIUM 20 MG: 10 TABLET ORAL at 13:48

## 2024-05-10 RX ADMIN — APIXABAN 2.5 MG: 2.5 TABLET, FILM COATED ORAL at 13:50

## 2024-05-10 ASSESSMENT — PAIN SCALES - GENERAL
PAINLEVEL_OUTOF10: 0

## 2024-05-10 NOTE — FLOWSHEET NOTE
05/01/24 1940   Vital Signs   BP 99/61   Temp 98.7 °F (37.1 °C)   Pulse 100   Respirations 16   Pain Assessment   Pain Assessment None - Denies Pain   Treatment   Treatment Number 2   Time On 1955   Time Off 2225   Treatment Goal 2000   Observations & Evaluations   Level of Consciousness 0   Oriented X 4   Respiratory Quality/Effort Unlabored   O2 Device Nasal cannula   Bilateral Breath Sounds Diminished   Skin Condition/Temp Warm;Dry   Appetite Fair   Abdomen Inspection Soft   Edema Right lower extremity;Left lower extremity   RLE Edema +2   LLE Edema +2   Technical Checks   Dialysis Machine No. 03   RO Machine Number [id number]   Dialyzer Lot No. [id number]   [place] Lot Number [id number]   All Connections Secure Yes   NS Bag Yes   Saline Line Double Clamped Yes   Dialyzer Revaclear 300   Prime Volume (mL) 250 mL   ICEBOAT I;C;E;B;O;A;T   RO Machine Log Sheet Completed Yes   Machine Alarm Self Test Completed;Passed   Air Foam Detector Tested;Proper Function;pH Reading   Extracorporeal Circuit Tested for Integrity Yes   Machine Conductivity 13.6   Manual Ph 7.4   Bleach Test (Neg) Yes   Bath Temperature 98.6 °F (37 °C)   Dialysis Bath   K+ (Potassium) 2   Ca+ (Calcium) 2.5   Na+ (Sodium) 140   HCO3 (Bicarb) 35   Bicarbonate Concentrate Lot No. [id number]   Acid Concentrate Lot No. [id number]   Treatment Initiation   Dialyze Hours 2.5   Treatment  Initiation Universal Precautions maintained;Lines secured to patient;Connections secured;Prime given;Venous Parameters set;Arterial Parameters set;Air foam detector engaged;Dialysate;Saline line double clamped;Dialyzer;Revaclear Dialyzer;REV-300   Hemodialysis Central Access Right Neck   Placement Date/Time: 04/30/24 1425   Present on Admission/Arrival: No  Inserted by: Nino Enamorado NP  Insertion Practices: Chlorohexadine skin antisepsis;Hand hygiene;Maximal barrier precautions;Optimal catheter site selection;Sterile ultrasound techniq...   Continued need for line? Yes   Site 
   05/03/24 1618   Vital Signs   /69   Temp 97.7 °F (36.5 °C)   Pulse 99   Respirations 18   Pain Assessment   Pain Assessment None - Denies Pain   Post-Hemodialysis Assessment   Post-Treatment Procedures Blood returned;Catheter Capped, clamped with Saline x2 ports   Machine Disinfection Process Acid/Vinegar Clean;Heat Disinfect;Exterior Machine Disinfection   Rinseback Volume (ml) 300 ml   Blood Volume Processed (Liters) 51.6 L   Dialyzer Clearance Lightly streaked   Duration of Treatment (minutes) 180 minutes   Hemodialysis Intake (ml) 500 ml   Hemodialysis Output (ml) 1500 ml   NET Removed (ml) 1000   Tolerated Treatment Fair   Bilateral Breath Sounds Diminished   Edema Right lower extremity;Left lower extremity   RLE Edema +1;+2   LLE Edema +1;+2   Time Off 1618   Patient Disposition Return to room   Observations & Evaluations   Level of Consciousness 0   Oriented X 4   Heart Rhythm Irregular   Respiratory Quality/Effort Unlabored   O2 Device Nasal cannula   Skin Condition/Temp Dry;Warm   Appetite Fair   Abdomen Inspection Soft   Bowel Sounds (All Quadrants) Active     Primary RN SBAR: Jesusita Paniagua RN  Comments: Pt tolerated treatment fair. Hypotensive throughout but pt asymptomatic. PRN albumin given and UF goal decreased per Dr. Noland. VSS upon departure from suite.     
Pre Dialysis:   05/08/24 0810   Treatment   Time On 0810   Time Off 1110   Treatment Goal 0.5L   Observations & Evaluations   Level of Consciousness 0   Heart Rhythm Regular   Respiratory Quality/Effort Unlabored   O2 Device None (Room air)   Skin Condition/Temp Dry;Warm   Abdomen Inspection Soft   Edema Generalized   Vital Signs   /66   Temp 97.7 °F (36.5 °C)   Pulse 93   Respirations 18   Pain Assessment   Pain Assessment None - Denies Pain   Technical Checks   Dialysis Machine No. 07   RO Machine Number [id number]   Dialyzer Lot No. [id number]   [place] Lot Number [id number]   All Connections Secure Yes   NS Bag Yes   Saline Line Double Clamped Yes   Dialyzer Revaclear 300   Prime Volume (mL) 250 mL   ICEBOAT I;C;E;B;O;A;T   RO Machine Log Sheet Completed Yes   Machine Alarm Self Test Completed;Passed   Air Foam Detector Tested;Proper Function   Extracorporeal Circuit Tested for Integrity Yes   Machine Conductivity 13.9   Manual Ph 7.4   Bleach Test (Neg) Yes   Bath Temperature 96.8 °F (36 °C)   Treatment Initiation   Dialyze Hours 3   Treatment  Initiation Universal Precautions maintained;Lines secured to patient;Prime given;Venous Parameters set;Arterial Parameters set;Air foam detector engaged;Saline line double clamped;Revaclear Dialyzer   During Hemodialysis Assessment   Blood Flow Rate (ml/min) 400 ml/min   Arterial Pressure (mmHg) -130 mmHg   Venous Pressure (mmHg) 140   TMP 40      Access Visible Yes   Ultrafiltration Rate (ml/hr) 330 ml/hr   Ultrafiltration Removed (ml) 0 ml   Hemodialysis Central Access Right Neck   Placement Date/Time: 04/30/24 1425   Present on Admission/Arrival: No  Inserted by: Nino Enamorado NP  Insertion Practices: Chlorohexadine skin antisepsis;Hand hygiene;Maximal barrier precautions;Optimal catheter site selection;Sterile ultrasound techniq...   Continued need for line? Yes   Site Assessment Clean, dry & intact   CVC Lumen Status Infusing   Venous Lumen Status Brisk blood 
Pre HD note    05/06/24 0900   Vital Signs   /60   Temp 98.2 °F (36.8 °C)   Pulse 98   Respirations 18   Pain Assessment   Pain Assessment None - Denies Pain   Treatment   Time On 0900   Treatment Goal 500   Observations & Evaluations   Level of Consciousness 0   Oriented X 3   Heart Rhythm Regular   Respiratory Quality/Effort Unlabored   O2 Device Nasal cannula   Bilateral Breath Sounds Clear   Edema None   Technical Checks   Dialysis Machine No. 05   RO Machine Number [id number]   Dialyzer Lot No. [id number]   [place] Lot Number [id number]   All Connections Secure Yes   NS Bag Yes   Saline Line Double Clamped Yes   Dialyzer Revaclear 300   Prime Volume (mL) 300 mL   ICEBOAT I;C;E;B;O;A;T   RO Machine Log Sheet Completed Yes   Machine Alarm Self Test Completed;Passed   Air Foam Detector Tested;Proper Function;pH Reading   Extracorporeal Circuit Tested for Integrity Yes   Machine Conductivity 13.8   Machine Ph 7.2   Manual Ph 7   Bleach Test (Neg) Yes   Bath Temperature 98.1 °F (36.7 °C)   Dialysis Bath   K+ (Potassium) 3   Ca+ (Calcium) 2.5   Na+ (Sodium) 139   HCO3 (Bicarb) 35   Bicarbonate Concentrate Lot No. [id number]   Acid Concentrate Lot No. [id number]   Treatment Initiation   Dialyze Hours 3   Hemodialysis Central Access Right Neck   Placement Date/Time: 04/30/24 1425   Present on Admission/Arrival: No  Inserted by: Nino Enamorado NP  Insertion Practices: Chlorohexadine skin antisepsis;Hand hygiene;Maximal barrier precautions;Optimal catheter site selection;Sterile ultrasound techniq...   Continued need for line? Yes   Site Assessment Clean   CVC Lumen Status Alcohol cap present   Venous Lumen Status Brisk blood return;Alcohol cap present;Flushed   Arterial Lumen Status Brisk blood return;Alcohol cap present;Flushed   Line Care Chlorhexidine wipes;Line pulled back;Ports disinfected;Connections checked and tightened   Dressing Type Bacteriocidal   Date of Last Dressing Change 05/03/24   Dressing Status Old drainage 
Pre- Treatment      04/30/24 1545   Vital Signs   /87   Temp 97.6 °F (36.4 °C)   Pulse (!) 105   Respirations 18   SpO2 90 %   Pain Assessment   Pain Assessment None - Denies Pain   Pain Level 0   Treatment   Treatment Number 1   Time On 1600   Time Off 1830, now 1949   Treatment Goal 2.5h, 1.5L   Observations & Evaluations   Level of Consciousness 0   Oriented X 4   Heart Rhythm Regular   Respiratory Quality/Effort Dyspnea with exertion   O2 Device Nasal cannula  (5L)   Bilateral Breath Sounds Diminished   Skin Color Other (comment)  (Appropriate for race)   Skin Condition/Temp Dry;Fragile;Warm   Appetite Poor   Abdomen Inspection Soft   Bowel Sounds (All Quadrants) Active;Audible   Edema Left lower extremity;Right lower extremity;Left upper extremity;Right upper extremity   RUE Edema Non-pitting   LUE Edema Non-pitting   RLE Edema +2   LLE Edema +2   Technical Checks   Dialysis Machine No. 06   RO Machine Number [id number]   Dialyzer Lot No. [id number]   [place] Lot Number [id number]   All Connections Secure Yes   NS Bag Yes   Saline Line Double Clamped Yes   Dialyzer Revaclear 300   Prime Volume (mL) 200 mL   ICEBOAT I;C;E;B;O;A;T   RO Machine Log Sheet Completed Yes   Machine Alarm Self Test Completed;Passed   Air Foam Detector Tested;Proper Function;pH Reading   Extracorporeal Circuit Tested for Integrity Yes   Machine Conductivity 14.0   Manual Ph 7.6   Bleach Test (Neg) Yes   Bath Temperature 98.6 °F (37 °C)   Dialysis Bath   K+ (Potassium) 2   Ca+ (Calcium) 2.5   Na+ (Sodium) 140   HCO3 (Bicarb) 35   Bicarbonate Concentrate Lot No. [id number]   Acid Concentrate Lot No. [id number]   Treatment Initiation   Dialyze Hours 2.5   Treatment  Initiation Universal Precautions maintained;Lines secured to patient;Connections secured;Prime given;Venous Parameters set;Arterial Parameters set;Air foam detector engaged;Dialysate;Revaclear Dialyzer;REV-300   Hemodialysis Central Access Right Neck   Placement Date/Time: 
Primary RN SBAR: Jesusita Paniagua RN  Patient Education provided: HD Treatment  Preferred Education method and Primary language: Verbal, English  Hospital associated wait time; reason: none  Hepatitis B Surface Ag   Date/Time Value Ref Range Status   04/30/2024 11:56 AM 0.24 Index Final     Hep B S Ab   Date/Time Value Ref Range Status   04/30/2024 11:56 AM <3.10 mIU/mL Final        05/03/24 1317   Vital Signs   /65   Temp 97.7 °F (36.5 °C)   Pulse 87   Respirations 18   Pain Assessment   Pain Assessment None - Denies Pain   Treatment   Treatment Number 3   Time On 1317   Treatment Goal 1000 ml  (per Dr. Noland)   Observations & Evaluations   Level of Consciousness 0   Oriented X 4   Heart Rhythm Irregular   Respiratory Quality/Effort Unlabored   O2 Device Nasal cannula   Bilateral Breath Sounds Diminished   Skin Condition/Temp Dry;Warm   Appetite Fair   Abdomen Inspection Soft   Bowel Sounds (All Quadrants) Active   Edema Right lower extremity;Left lower extremity   RLE Edema +2   LLE Edema +2   Technical Checks   Dialysis Machine No. 08   RO Machine Number [id number]   Dialyzer Lot No. [id number]   [place] Lot Number [id number]   All Connections Secure Yes   NS Bag Yes   Saline Line Double Clamped Yes   Dialyzer Revaclear 300   Prime Volume (mL) 200 mL   ICEBOAT I;C;E;B;O;A;T   RO Machine Log Sheet Completed Yes   Machine Alarm Self Test Completed;Passed   Air Foam Detector Tested;Proper Function   Extracorporeal Circuit Tested for Integrity Yes   Machine Conductivity 13.8   Manual Ph 7.2   Bleach Test (Neg) Yes   Bath Temperature 98.6 °F (37 °C)   Dialysis Bath   K+ (Potassium) 3   Ca+ (Calcium) 2.5   Na+ (Sodium) 140   HCO3 (Bicarb) 35   Treatment Initiation   Dialyze Hours 3   Treatment  Initiation Universal Precautions maintained;Lines secured to patient;Connections secured;Prime given;Venous Parameters set;Arterial Parameters set;Air foam detector engaged;Saline line double clamped;Revaclear Dialyzer 
Primary RN SBAR: Nichol Sosa RN  Patient Education provided: access issues, hd procedure  Preferred Education method and Primary language: verbal english  Hospital associated wait time; reason: NA  Hepatitis B Surface Ag   Date/Time Value Ref Range Status   04/30/2024 11:56 AM 0.24 Index Final     Hep B S Ab   Date/Time Value Ref Range Status   04/30/2024 11:56 AM <3.10 mIU/mL Final     PRE HD   05/02/24 2315   Vital Signs   BP 90/79   Temp 97.5 °F (36.4 °C)   Pulse 95   Respirations 18   SpO2 92 %   Pain Assessment   Pain Assessment None - Denies Pain   Treatment   Time On 2330   Time Off 2335   Treatment Goal 1000   Observations & Evaluations   Level of Consciousness 0   Oriented X 4   O2 Device Nasal cannula   Bilateral Breath Sounds Diminished   Skin Color   (appropriate for ethnicity)   Skin Condition/Temp Cool;Dry   Appetite Fair   Abdomen Inspection Soft   Bowel Sounds (All Quadrants) Present   Edema None   Technical Checks   Dialysis Machine No. 04   RO Machine Number [id number]   Dialyzer Lot No. [id number]   [place] Lot Number [id number]   All Connections Secure Yes   NS Bag Yes   Saline Line Double Clamped Yes   Dialyzer Revaclear 300   Prime Volume (mL) 250 mL   ICEBOAT I;C;E;B;O;A;T   RO Machine Log Sheet Completed Yes   Machine Alarm Self Test Completed;Passed   Air Foam Detector Tested;Proper Function   Extracorporeal Circuit Tested for Integrity Yes   Machine Conductivity 13.8   Manual Ph 7.4   Bleach Test (Neg) Yes   Bath Temperature 98.6 °F (37 °C)   Dialysis Bath   K+ (Potassium) 3   Ca+ (Calcium) 2.5   Na+ (Sodium) 138   HCO3 (Bicarb) 40   Bicarbonate Concentrate Lot No. [id number]   Acid Concentrate Lot No. [id number]   Treatment Initiation   Dialyze Hours 3   Treatment  Initiation Universal Precautions maintained;Lines secured to patient;Connections secured;Prime given;Venous Parameters set;Arterial Parameters set;Air foam detector engaged;Dialysate;Hemo-Safe Applied;Saline line double 
  Venous Lumen Status Alcohol cap present   Arterial Lumen Status Alcohol cap present   Alcohol Cap Used Yes   Line Care Connections checked and tightened;Chlorhexidine wipes;Line pulled back;Ports disinfected   Date of Last Dressing Change 05/09/24   Dressing Status Old drainage noted   Dressing Change Due 05/10/24      RN completed patient assessment. RN reviewed technicians vital signs and procedure note. Tx completed. Reviewed by RN Keerthi Alvarez  Primary RN SBAR: Aletha Perrin   Patient Education provided: access care  Preferred Education method and Primary language: English  Hospital associated wait time; reason: 20min        Hepatitis B Surface Ag   Date/Time Value Ref Range Status   04/30/2024 11:56 AM 0.24 Index Final            Hep B S Ab   Date/Time Value Ref Range Status   04/30/2024 11:56 AM <3.10 mIU/mL Final                 Post hd note   05/10/24 1145   Treatment   Time Off 1145   Treatment Goal 500   Observations & Evaluations   Level of Consciousness 0   Oriented X 3   Heart Rhythm Regular   Respiratory Quality/Effort Unlabored   O2 Device None (Room air)   Bilateral Breath Sounds Clear   Edema Right lower extremity;Left lower extremity   Edema Generalized +1   RLE Edema +1   LLE Edema +1   Vital Signs   /65   Temp 97.6 °F (36.4 °C)   Pulse 90   Respirations 16   Pain Assessment   Pain Assessment None - Denies Pain   During Hemodialysis Assessment   Ultrafiltration Removed (ml) 1000 ml   Hemodialysis Central Access Right Subclavian   Placement Date/Time: 05/08/24 1345   Present on Admission/Arrival: No  Inserted by: Mila Miller NP  Insertion Practices: Chlorohexadine skin antisepsis;Hand hygiene;Maximal barrier precautions;Optimal catheter site selection;Sterile ultrasound techn...   CVC Lumen Status Flushed;Heparin locked   Venous Lumen Status Alcohol cap applied   Arterial Lumen Status Alcohol cap applied   Post-Hemodialysis Assessment   Post-Treatment Procedures Blood 
techniq...   Continued need for line? Yes   Site Assessment Clean, dry & intact   CVC Lumen Status Alcohol cap applied;Heparin locked   Venous Lumen Status Sluggish blood return;Heparin locked;Alcohol cap applied   Arterial Lumen Status Brisk blood return;Flushed;Alcohol cap applied   Alcohol Cap Used Yes   Line Care Cap changed;Connections checked and tightened;Chlorhexidine wipes;Line pulled back;Ports disinfected   Dressing Type Bacteriocidal;Transparent   Date of Last Dressing Change 04/30/24   Dressing Status Clean, dry & intact   Dressing Change Due 05/02/24   Verification by x-ray Yes     Primary RN SBAR: FELA Perrin RN     Comments: Pt tolerated treatment fair. Unable to reach ordered BFR of 300 due to sluggish venous port. MD notified and orders received to cathflo venous port. Venous pressures were lower after cathflo. UF goal decreased to support BP. Treatment completed. All possible blood returned. A/V ports packed with Heparin. Clearguards applied. Dressing intact and dated for 4/30/2024. Report given to primary nurse. Pt left resting in bed with call light in reach.

## 2024-05-11 PROCEDURE — 6370000000 HC RX 637 (ALT 250 FOR IP): Performed by: STUDENT IN AN ORGANIZED HEALTH CARE EDUCATION/TRAINING PROGRAM

## 2024-05-11 PROCEDURE — 6370000000 HC RX 637 (ALT 250 FOR IP)

## 2024-05-11 PROCEDURE — 2060000000 HC ICU INTERMEDIATE R&B

## 2024-05-11 PROCEDURE — 6370000000 HC RX 637 (ALT 250 FOR IP): Performed by: INTERNAL MEDICINE

## 2024-05-11 PROCEDURE — 6360000002 HC RX W HCPCS: Performed by: INTERNAL MEDICINE

## 2024-05-11 PROCEDURE — 2580000003 HC RX 258: Performed by: INTERNAL MEDICINE

## 2024-05-11 RX ADMIN — ONDANSETRON 4 MG: 2 INJECTION INTRAMUSCULAR; INTRAVENOUS at 16:31

## 2024-05-11 RX ADMIN — APIXABAN 2.5 MG: 2.5 TABLET, FILM COATED ORAL at 08:08

## 2024-05-11 RX ADMIN — DIGOXIN 62.5 MCG: 125 TABLET ORAL at 08:08

## 2024-05-11 RX ADMIN — AMIODARONE HYDROCHLORIDE 200 MG: 200 TABLET ORAL at 20:46

## 2024-05-11 RX ADMIN — SODIUM CHLORIDE, PRESERVATIVE FREE 10 ML: 5 INJECTION INTRAVENOUS at 22:59

## 2024-05-11 RX ADMIN — AMIODARONE HYDROCHLORIDE 200 MG: 200 TABLET ORAL at 08:08

## 2024-05-11 RX ADMIN — MIDODRINE HYDROCHLORIDE 5 MG: 5 TABLET ORAL at 08:08

## 2024-05-11 RX ADMIN — SODIUM CHLORIDE, PRESERVATIVE FREE 10 ML: 5 INJECTION INTRAVENOUS at 08:10

## 2024-05-11 RX ADMIN — APIXABAN 2.5 MG: 2.5 TABLET, FILM COATED ORAL at 20:46

## 2024-05-11 RX ADMIN — MIDODRINE HYDROCHLORIDE 5 MG: 5 TABLET ORAL at 16:34

## 2024-05-11 RX ADMIN — MIDODRINE HYDROCHLORIDE 5 MG: 5 TABLET ORAL at 11:42

## 2024-05-11 RX ADMIN — PRAVASTATIN SODIUM 20 MG: 10 TABLET ORAL at 08:09

## 2024-05-11 RX ADMIN — PANTOPRAZOLE SODIUM 40 MG: 40 TABLET, DELAYED RELEASE ORAL at 08:09

## 2024-05-11 ASSESSMENT — PAIN SCALES - GENERAL
PAINLEVEL_OUTOF10: 0

## 2024-05-12 PROCEDURE — 2060000000 HC ICU INTERMEDIATE R&B

## 2024-05-12 PROCEDURE — 2580000003 HC RX 258: Performed by: INTERNAL MEDICINE

## 2024-05-12 PROCEDURE — 6370000000 HC RX 637 (ALT 250 FOR IP)

## 2024-05-12 PROCEDURE — 6370000000 HC RX 637 (ALT 250 FOR IP): Performed by: INTERNAL MEDICINE

## 2024-05-12 RX ADMIN — MIDODRINE HYDROCHLORIDE 5 MG: 5 TABLET ORAL at 14:16

## 2024-05-12 RX ADMIN — AMIODARONE HYDROCHLORIDE 200 MG: 200 TABLET ORAL at 19:30

## 2024-05-12 RX ADMIN — MIDODRINE HYDROCHLORIDE 5 MG: 5 TABLET ORAL at 09:16

## 2024-05-12 RX ADMIN — SODIUM CHLORIDE, PRESERVATIVE FREE 10 ML: 5 INJECTION INTRAVENOUS at 19:44

## 2024-05-12 RX ADMIN — APIXABAN 2.5 MG: 2.5 TABLET, FILM COATED ORAL at 09:16

## 2024-05-12 RX ADMIN — APIXABAN 2.5 MG: 2.5 TABLET, FILM COATED ORAL at 19:30

## 2024-05-12 RX ADMIN — PANTOPRAZOLE SODIUM 40 MG: 40 TABLET, DELAYED RELEASE ORAL at 06:31

## 2024-05-12 RX ADMIN — PRAVASTATIN SODIUM 20 MG: 10 TABLET ORAL at 09:16

## 2024-05-12 RX ADMIN — AMIODARONE HYDROCHLORIDE 200 MG: 200 TABLET ORAL at 09:16

## 2024-05-12 RX ADMIN — SODIUM CHLORIDE, PRESERVATIVE FREE 10 ML: 5 INJECTION INTRAVENOUS at 09:17

## 2024-05-12 RX ADMIN — MIDODRINE HYDROCHLORIDE 5 MG: 5 TABLET ORAL at 18:28

## 2024-05-12 RX ADMIN — MELATONIN 3 MG: at 19:30

## 2024-05-12 ASSESSMENT — PAIN SCALES - GENERAL
PAINLEVEL_OUTOF10: 0

## 2024-05-13 VITALS
DIASTOLIC BLOOD PRESSURE: 72 MMHG | WEIGHT: 159.39 LBS | SYSTOLIC BLOOD PRESSURE: 97 MMHG | HEIGHT: 65 IN | HEART RATE: 91 BPM | TEMPERATURE: 98 F | RESPIRATION RATE: 20 BRPM | BODY MASS INDEX: 26.56 KG/M2 | OXYGEN SATURATION: 95 %

## 2024-05-13 LAB
ANION GAP SERPL CALC-SCNC: 6 MMOL/L (ref 5–15)
BUN SERPL-MCNC: 37 MG/DL (ref 6–20)
BUN/CREAT SERPL: 13 (ref 12–20)
CALCIUM SERPL-MCNC: 9.7 MG/DL (ref 8.5–10.1)
CHLORIDE SERPL-SCNC: 101 MMOL/L (ref 97–108)
CO2 SERPL-SCNC: 27 MMOL/L (ref 21–32)
CREAT SERPL-MCNC: 2.79 MG/DL (ref 0.55–1.02)
ERYTHROCYTE [DISTWIDTH] IN BLOOD BY AUTOMATED COUNT: 15.9 % (ref 11.5–14.5)
GLUCOSE SERPL-MCNC: 103 MG/DL (ref 65–100)
HCT VFR BLD AUTO: 35.2 % (ref 35–47)
HGB BLD-MCNC: 10.5 G/DL (ref 11.5–16)
MCH RBC QN AUTO: 26 PG (ref 26–34)
MCHC RBC AUTO-ENTMCNC: 29.8 G/DL (ref 30–36.5)
MCV RBC AUTO: 87.1 FL (ref 80–99)
NRBC # BLD: 0.02 K/UL (ref 0–0.01)
NRBC BLD-RTO: 0.3 PER 100 WBC
PLATELET # BLD AUTO: 183 K/UL (ref 150–400)
PMV BLD AUTO: 10.8 FL (ref 8.9–12.9)
POTASSIUM SERPL-SCNC: 4.3 MMOL/L (ref 3.5–5.1)
RBC # BLD AUTO: 4.04 M/UL (ref 3.8–5.2)
SODIUM SERPL-SCNC: 134 MMOL/L (ref 136–145)
WBC # BLD AUTO: 7.7 K/UL (ref 3.6–11)

## 2024-05-13 PROCEDURE — 80048 BASIC METABOLIC PNL TOTAL CA: CPT

## 2024-05-13 PROCEDURE — 36415 COLL VENOUS BLD VENIPUNCTURE: CPT

## 2024-05-13 PROCEDURE — 6370000000 HC RX 637 (ALT 250 FOR IP)

## 2024-05-13 PROCEDURE — 85027 COMPLETE CBC AUTOMATED: CPT

## 2024-05-13 PROCEDURE — 6370000000 HC RX 637 (ALT 250 FOR IP): Performed by: INTERNAL MEDICINE

## 2024-05-13 PROCEDURE — 6370000000 HC RX 637 (ALT 250 FOR IP): Performed by: STUDENT IN AN ORGANIZED HEALTH CARE EDUCATION/TRAINING PROGRAM

## 2024-05-13 RX ORDER — AMIODARONE HYDROCHLORIDE 200 MG/1
200 TABLET ORAL 2 TIMES DAILY
Qty: 60 TABLET | Refills: 0 | Status: SHIPPED
Start: 2024-05-13 | End: 2024-05-13

## 2024-05-13 RX ORDER — PANTOPRAZOLE SODIUM 40 MG/1
40 TABLET, DELAYED RELEASE ORAL
Qty: 30 TABLET | Refills: 3 | Status: SHIPPED | OUTPATIENT
Start: 2024-05-14

## 2024-05-13 RX ORDER — DIGOXIN 0.06 MG/1
62.5 TABLET ORAL EVERY OTHER DAY
Qty: 30 TABLET | Refills: 3 | Status: SHIPPED | OUTPATIENT
Start: 2024-05-13

## 2024-05-13 RX ORDER — MIDODRINE HYDROCHLORIDE 5 MG/1
5 TABLET ORAL
Qty: 90 TABLET | Refills: 3 | Status: SHIPPED | OUTPATIENT
Start: 2024-05-13

## 2024-05-13 RX ORDER — PANTOPRAZOLE SODIUM 40 MG/1
40 TABLET, DELAYED RELEASE ORAL
Qty: 30 TABLET | Refills: 3 | Status: SHIPPED | OUTPATIENT
Start: 2024-05-14 | End: 2024-05-13

## 2024-05-13 RX ORDER — POLYETHYLENE GLYCOL 3350 17 G/17G
17 POWDER, FOR SOLUTION ORAL DAILY PRN
Qty: 527 G | Refills: 0 | Status: SHIPPED | OUTPATIENT
Start: 2024-05-13 | End: 2024-06-12

## 2024-05-13 RX ORDER — AMIODARONE HYDROCHLORIDE 200 MG/1
200 TABLET ORAL 2 TIMES DAILY
Qty: 60 TABLET | Refills: 0 | Status: SHIPPED | OUTPATIENT
Start: 2024-05-13 | End: 2024-06-12

## 2024-05-13 RX ORDER — MIDODRINE HYDROCHLORIDE 5 MG/1
5 TABLET ORAL
Qty: 90 TABLET | Refills: 3 | Status: SHIPPED
Start: 2024-05-13 | End: 2024-05-13

## 2024-05-13 RX ORDER — DIGOXIN 0.06 MG/1
62.5 TABLET ORAL EVERY OTHER DAY
Qty: 30 TABLET | Refills: 3 | Status: SHIPPED | OUTPATIENT
Start: 2024-05-13 | End: 2024-05-13

## 2024-05-13 RX ADMIN — MIDODRINE HYDROCHLORIDE 5 MG: 5 TABLET ORAL at 12:43

## 2024-05-13 RX ADMIN — PANTOPRAZOLE SODIUM 40 MG: 40 TABLET, DELAYED RELEASE ORAL at 06:39

## 2024-05-13 RX ADMIN — APIXABAN 2.5 MG: 2.5 TABLET, FILM COATED ORAL at 09:48

## 2024-05-13 RX ADMIN — AMIODARONE HYDROCHLORIDE 200 MG: 200 TABLET ORAL at 12:56

## 2024-05-13 RX ADMIN — POLYETHYLENE GLYCOL 3350 17 G: 17 POWDER, FOR SOLUTION ORAL at 17:02

## 2024-05-13 RX ADMIN — MIDODRINE HYDROCHLORIDE 5 MG: 5 TABLET ORAL at 09:48

## 2024-05-13 RX ADMIN — MIDODRINE HYDROCHLORIDE 5 MG: 5 TABLET ORAL at 17:02

## 2024-05-13 RX ADMIN — PRAVASTATIN SODIUM 20 MG: 10 TABLET ORAL at 09:48

## 2024-05-13 RX ADMIN — DIGOXIN 62.5 MCG: 125 TABLET ORAL at 12:54

## 2024-05-13 NOTE — PROGRESS NOTES
Name: Azucena Myrick   MRN: [id number]  : 1945      Problem list: pt recently d/c on . Now re admitted with n/v/d. Suspected gastroenteritis and recurrence of ADOLFO    Assessment:  ADOLFO-likely due to prerenal azotemia in the setting of N/V/D + A-fib with RVR with borderline hypotension + TINA (got CT of abdomen with IV contrast on 2024)  A-fib with RVR  Borderline hypotension  Primary HPT with intermittent hypercalcemia  Hx of MR-s/p mitral clip  S/P ASD repair  Hyperkalemia    Patient's ADOLFO was improving and she left the hospital with a CR down to 1.17 during previous admit on 2024.  She has recurrence of ADOLFO.    CT of A/P on admit with IV contrast did not show any hydronephrosis.  She did have findings of possible cholecystitis as well as chronic aortic dissection extending from ascending thoracic aorta down to right iliac artery    Plan/Recommendations:    HD #1 on .  Plan for HD #3 today.    Potassium better.      rate and rhythm control as able-on IV amiodarone  Encourage increase mobility as able  Avoid nephrotoxins  Daily labs                Subjective:    \"I feel OK.\"  Less nausea.  No emesis.  No dyspnea.    Exam:  BP 92/66   Pulse 95   Temp 98 °F (36.7 °C) (Oral)   Resp 18   Wt 81 kg (178 lb 9.2 oz)   SpO2 93%   BMI 29.72 kg/m²     Elderly, frail, NAD  Irregular, not tachycardic   Decreased BS at bases, mild tachypnea  No significant peripheral edema  No skin rash  Aox3    Labs/Data:    Lab Results   Component Value Date    WBC 14.3 (H) 2024    HGB 10.8 (L) 2024    HCT 34.8 (L) 2024    MCV 86.4 2024     2024       Lab Results   Component Value Date/Time     2024 01:42 AM    K 3.7 2024 01:42 AM     2024 01:42 AM    CO2 24 2024 01:42 AM    BUN 33 2024 01:42 AM    
                                                                                                                                              Name: Azucena Myrick   MRN: [id number]  : 1945      Problem list: pt recently d/c on . Now re admitted with n/v/d. Suspected gastroenteritis and recurrence of ADOLFO    Assessment:  ADOLFO-likely due to prerenal azotemia in the setting of N/V/D + A-fib with RVR with borderline hypotension + TINA (got CT of abdomen with IV contrast on 2024)  A-fib with RVR  Borderline hypotension  Primary HPT with intermittent hypercalcemia  Hx of MR-s/p mitral clip  S/P ASD repair  Hyperkalemia    Patient's ADOLFO was improving and she left the hospital with a CR down to 1.17 during previous admit on 2024.  She has recurrence of ADOLFO with CR of 1.6-1.7 and now bumped to 3.47 today.  She is oligoanuric.      CT of A/P on admit with IV contrast did not show any hydronephrosis.  She did have findings of possible cholecystitis as well as chronic aortic dissection extending from ascending thoracic aorta down to right iliac artery    Plan/Recommendations:    DC IV fluids to avoid fluid overload   Use as needed IV Lasix for worsening SOB or hypoxia   rate and rhythm control as able-on IV amiodarone  Encourage increase mobility as able  Avoid nephrotoxins  Daily labs    There is no acute need for HD at this point.  I talked with the  patient's son about the possible need for HD if her creatinine continues to worsen.      Lokelma for elevated potassium.            Subjective:    Weak    Exam:  /75   Pulse 97   Temp 97.9 °F (36.6 °C) (Oral)   Resp 20   SpO2 95%     Elderly, frail, NAD  Irregular, not tachycardic   Decreased BS at bases, mild tachypnea  No significant peripheral edema  No skin rash  Aox3    Labs/Data:    Lab Results   Component Value Date    WBC 12.6 (H) 2024    HGB 11.8 2024    HCT 39.8 2024    MCV 91.3 2024     2024       Lab 
                                                                                                                                              Name: Azucena Myrick   MRN: [id number]  : 1945      Problem list: pt recently d/c on . Now re admitted with n/v/d. Suspected gastroenteritis and recurrence of ADOLFO    Assessment:  ADOLFO-likely due to prerenal azotemia in the setting of N/V/D + A-fib with RVR with borderline hypotension + TINA (got CT of abdomen with IV contrast on 2024)  A-fib with RVR  Borderline hypotension  Primary HPT with intermittent hypercalcemia  Hx of MR-s/p mitral clip  S/P ASD repair  Hyperkalemia    Patient's ADOLFO was improving and she left the hospital with a CR down to 1.17 during previous admit on 2024.  She has recurrence of ADOLFO.    CT of A/P on admit with IV contrast did not show any hydronephrosis.  She did have findings of possible cholecystitis as well as chronic aortic dissection extending from ascending thoracic aorta down to right iliac artery    Plan/Recommendations:    HD #3 on 5/3.  No acute need for HD today.  Reassess in AM.    Watch for renal recovery.    Potassium better.    Encourage increase mobility as able  Avoid nephrotoxins  Daily labs                Subjective:    No complaints.    Exam:  /70   Pulse (!) 110   Temp 97.5 °F (36.4 °C) (Oral)   Resp 16   Ht 1.651 m (5' 5\")   Wt 72.5 kg (159 lb 13.3 oz)   SpO2 99%   BMI 26.60 kg/m²     Elderly, frail, NAD  Irregular, not tachycardic   Decreased BS at bases, mild tachypnea  No significant peripheral edema  No skin rash  Aox3    Labs/Data:    Lab Results   Component Value Date    WBC 9.5 2024    HGB 10.9 (L) 2024    HCT 36.2 2024    MCV 89.8 2024     2024       Lab Results   Component Value Date/Time     2024 10:36 PM    K 4.3 2024 10:36 PM     2024 10:36 PM    CO2 27 2024 10:36 PM    BUN 37 2024 10:36 PM    CREATININE 
                                                                                                                                              Name: Azucena Myrick   MRN: [id number]  : 1945      Problem list: pt recently d/c on . Now re admitted with n/v/d. Suspected gastroenteritis and recurrence of ADOLFO    Assessment:  ADOLFO-likely due to prerenal azotemia in the setting of N/V/D + A-fib with RVR with borderline hypotension + TINA (got CT of abdomen with IV contrast on 2024)  A-fib with RVR  Borderline hypotension  Primary HPT with intermittent hypercalcemia  Hx of MR-s/p mitral clip  S/P ASD repair    Patient's ADOLFO was improving and she left the hospital with a CR down to 1.17 during previous admit on 2024.  She has recurrence of ADOLFO with CR of 1.6-1.7 and now bumped to 2.6 today.  She is oligoanuric.  She is starting to get short of breath and chest x-ray from 2024 shows mild pulmonary edema/left pleural effusion.  I asked RN to do bladder scan which came out to be 240 mL.  CT of A/P on admit with IV contrast did not show any hydronephrosis.  She did have findings of possible cholecystitis as well as chronic aortic dissection extending from ascending thoracic aorta down to right iliac artery    Plan/Recommendations:  DC IV fluids to avoid fluid overload   Use as needed IV Lasix for worsening SOB or hypoxia   rate and rhythm control as able-on IV amiodarone  Encourage increase mobility as able  Avoid nephrotoxins  Daily labs  If ADOLFO continues to worsen, there is a possibility she may need to dialysis.  This was briefly discussed with patient and her son.    Discussed with patient, son at the bedside, RN and Dr. Shepard    Subjective:  Resting.  Feels weak.  Still borderline tachycardic.  Few family members including  at the bedside    Exam:  BP (!) 89/69   Pulse 85   Temp 98.3 °F (36.8 °C) (Oral)   Resp 18   SpO2 97%     Elderly, frail, NAD  Irregular, not tachycardic   Decreased 
                                                                                                                                              Name: Azucena Myrick   MRN: [id number]  : 1945      Problem list: pt recently d/c on . Now re admitted with n/v/d. Suspected gastroenteritis and recurrence of ADOLFO    Assessment:  ADOLFO-likely due to prerenal azotemia in the setting of N/V/D + A-fib with RVR with borderline hypotension  A-fib with RVR  Borderline hypotension  Primary HPT with intermittent hypercalcemia  Hx of MR-s/p mitral clip  S/P ASD repair    Patient's ADOLFO was improving and she left the hospital with a CR down to 1.17 during previous admit on 2024.  Now she has recurrence of ADOLFO with creatinine of 1.7, which is trending down to 1.67 today.  She is still borderline hypotensive and tachycardic.  Calcium is down to normal today.    Plan/Recommendations:  Increase IV fluids (NS) rate to 100 mL/h  Rate and rhythm control as able  Encourage increase mobility as able  Avoid nephrotoxins  A.m. labs    Discussed with patient and family at the bedside    Subjective:  Resting.  Feels weak.  Still borderline tachycardic.  Few family members including  at the bedside    Exam:  BP 92/76   Pulse (!) 118   Temp 98.2 °F (36.8 °C) (Oral)   Resp 20   SpO2 97%     Elderly, frail, NAD  Irregular, tachycardia  Clear  No peripheral edema  Aox3    Labs/Data:    Lab Results   Component Value Date    WBC 5.9 2024    HGB 10.7 (L) 2024    HCT 35.6 2024    MCV 89.4 2024     2024       Lab Results   Component Value Date/Time     2024 06:14 AM    K 4.7 2024 06:14 AM     2024 06:14 AM    CO2 22 2024 06:14 AM    BUN 29 2024 06:14 AM    CREATININE 1.67 2024 06:14 AM    GLUCOSE 108 2024 06:14 AM    CALCIUM 10.0 2024 06:14 AM    LABGLOM 31 2024 06:14 AM        Wt Readings from Last 3 Encounters:   24 65.2 kg 
                                                                                                                                              Name: Azucena Myrick   MRN: [id number]  : 1945      Problem list: pt recently d/c on . Now re admitted with n/v/d. Suspected gastroenteritis and recurrence of ADOLFO    Assessment:  ADOLFO-likely due to prerenal azotemia in the setting of N/V/D + A-fib with RVR with borderline hypotension + TINA (got CT of abdomen with IV contrast on 2024)  A-fib with RVR  Borderline hypotension  Primary HPT with intermittent hypercalcemia  Hx of MR-s/p mitral clip  S/P ASD repair  Hyperkalemia    Patient's ADOLFO was improving and she left the hospital with a CR down to 1.17 during previous admit on 2024.  She has recurrence of ADOLFO.    CT of A/P on admit with IV contrast did not show any hydronephrosis.  She did have findings of possible cholecystitis as well as chronic aortic dissection extending from ascending thoracic aorta down to right iliac artery    Plan/Recommendations:    HD #1 on .  Plan for HD today and again in AM.    Potassium better.      rate and rhythm control as able-on IV amiodarone  Encourage increase mobility as able  Avoid nephrotoxins  Daily labs                Subjective:    \"I feel a little better.\"  Less nausea.  No emesis.  Less dyspnea.    Exam:  BP 98/75   Pulse (!) 105   Temp 97.5 °F (36.4 °C) (Oral)   Resp 16   Wt 81 kg (178 lb 9.2 oz)   SpO2 96%   BMI 29.72 kg/m²     Elderly, frail, NAD  Irregular, not tachycardic   Decreased BS at bases, mild tachypnea  No significant peripheral edema  No skin rash  Aox3    Labs/Data:    Lab Results   Component Value Date    WBC 14.3 (H) 2024    HGB 10.8 (L) 2024    HCT 34.8 (L) 2024    MCV 86.4 2024     2024       Lab Results   Component Value Date/Time     2024 01:39 AM    K 4.3 2024 01:39 AM     2024 01:39 AM    CO2 23 2024 01:39 
                                                                                                                                              Name: Azucena Myrick   MRN: [id number]  : 1945      Problem list: pt recently d/c on . Now re admitted with n/v/d. Suspected gastroenteritis and recurrence of ADOLFO    Assessment:  ADOLFO-likely due to prerenal azotemia in the setting of N/V/D + A-fib with RVR with borderline hypotension + TINA (got CT of abdomen with IV contrast on 2024)  A-fib with RVR  Borderline hypotension  Primary HPT with intermittent hypercalcemia  Hx of MR-s/p mitral clip  S/P ASD repair  Hyperkalemia    Patient's ADOLFO was improving and she left the hospital with a CR down to 1.17 during previous admit on 2024.  She has recurrence of ADOLFO with CR of 1.6-1.7 and now bumped to 3.86 today.  She is oligoanuric.      CT of A/P on admit with IV contrast did not show any hydronephrosis.  She did have findings of possible cholecystitis as well as chronic aortic dissection extending from ascending thoracic aorta down to right iliac artery    Plan/Recommendations:    Potassium still elevated.  Fair UO.  Creatinine worse.  She looks uremic.  I will plan on HD today.  DW patient who understands and agrees to proceed.        rate and rhythm control as able-on IV amiodarone  Encourage increase mobility as able  Avoid nephrotoxins  Daily labs                Subjective:    \"I feel bad.\"  + nausea.  No emesis.  Mild dyspnea.    Exam:  /71   Pulse 100   Temp 97.9 °F (36.6 °C) (Oral)   Resp 18   Wt 81 kg (178 lb 9.2 oz)   SpO2 96%   BMI 29.72 kg/m²     Elderly, frail, NAD  Irregular, not tachycardic   Decreased BS at bases, mild tachypnea  No significant peripheral edema  No skin rash  Aox3    Labs/Data:    Lab Results   Component Value Date    WBC 14.9 (H) 2024    HGB 11.3 (L) 2024    HCT 36.7 2024    MCV 88.9 2024     2024       Lab Results   Component 
                                                                                                                                              Name: Azucena Myrick   MRN: [id number]  : 1945      Problem list: pt recently d/c on . Now re admitted with n/v/d. Suspected gastroenteritis and recurrence of ADOLFO    Assessment:  ADOLFO-likely due to prerenal azotemia in the setting of N/V/D + A-fib with RVR with borderline hypotension + TINA (got CT of abdomen with IV contrast on 2024)  A-fib with RVR  Borderline hypotension  Primary HPT with intermittent hypercalcemia  Hx of MR-s/p mitral clip  S/P ASD repair  Hyperkalemia    Patient's ADOLFO was improving and she left the hospital with a CR down to 1.17 during previous admit on 2024.  She has recurrence of ADOLFO.    CT of A/P on admit with IV contrast did not show any hydronephrosis.  She did have findings of possible cholecystitis as well as chronic aortic dissection extending from ascending thoracic aorta down to right iliac artery    Plan/Recommendations:    HD #3 on 5/3.  No acute need for HD today.  Reassess in AM.    Watch for renal recovery.    Potassium better.    Encourage increase mobility as able  Avoid nephrotoxins  Daily labs                Subjective:    No complaints.    Exam:  BP (!) 87/70   Pulse (!) 101   Temp 97.7 °F (36.5 °C) (Oral)   Resp 16   Ht 1.651 m (5' 5\")   Wt 72.5 kg (159 lb 13.3 oz)   SpO2 99%   BMI 26.60 kg/m²     Elderly, frail, NAD  Irregular, not tachycardic   Decreased BS at bases, mild tachypnea  No significant peripheral edema  No skin rash  Aox3    Labs/Data:    Lab Results   Component Value Date    WBC 9.2 2024    HGB 10.4 (L) 2024    HCT 34.3 (L) 2024    MCV 89.6 2024     (L) 2024       Lab Results   Component Value Date/Time     2024 03:21 AM    K 3.9 2024 03:21 AM     2024 03:21 AM    CO2 27 2024 03:21 AM    BUN 27 2024 03:21 AM    
                                                                         NAME: Azucena Myrick        :  1945        MRN:  [id number]          Assessment :    Plan:  ADOLFO, recurrent    A-fib with RVR  Borderline hypotension  Primary HPT with intermittent hypercalcemia  Hx of MR-s/p mitral clip  S/P ASD repair  Hyperkalemia  Mild hyponatremia  Chronic aortic dissection HD initiated on , HD MWF for now; HD today; watch for recovery, none so far     CM to arrange outpatient HD under ADOLFO diagnosis at [facility]; HBV status completed on   for perm cath placement today    Continue on midodrine    Hgb > 10, no agnieszka         Subjective:     Chief Complaint:  resting. Chart reviewed. The patient was seen on dialysis at 10:06 AM .  BP is stable. Catheter is functioning well. D/W HD nurse.     Review of Systems:    Symptom Y/N Comments  Symptom Y/N Comments   Fever/Chills    Chest Pain     Poor Appetite    Edema     Cough    Abdominal Pain     Sputum    Joint Pain     SOB/DE LEÓN    Pruritis/Rash     Nausea/vomit    Tolerating PT/OT     Diarrhea    Tolerating Diet     Constipation    Other       Could not obtain due to:      Objective:     VITALS:   Last 24hrs VS reviewed since prior progress note. Most recent are:  Vitals:    24 0351   BP: 97/73   Pulse: 99   Resp: 16   Temp: 97.7 °F (36.5 °C)   SpO2: 100%       Intake/Output Summary (Last 24 hours) at 2024 0605  Last data filed at 2024 0800  Gross per 24 hour   Intake 240 ml   Output --   Net 240 ml        Telemetry Reviewed:     PHYSICAL EXAM:  General: NAD      Lab Data Reviewed: (see below)    Medications Reviewed: (see below)    PMH/SH reviewed - no change compared to H&P  ________________________________________________________________________  Care Plan discussed with:  Patient     Family      RN     Care Manager                    Consultant:          Comments   >50% of visit spent in counseling and coordination of care   
                                                                         NAME: Azucena Myrick        :  1945        MRN:  [id number]          Assessment :    Plan:  ADOLFO, recurrent    A-fib with RVR  Borderline hypotension  Primary HPT with intermittent hypercalcemia  Hx of MR-s/p mitral clip  S/P ASD repair  Hyperkalemia  Mild hyponatremia  Chronic aortic dissection HD initiated on , HD MWF for now; no HD today; watch for recovery, none so far     CM to arrange outpatient HD under ADOLFO diagnosis at [facility]; HBV status completed on   perm cath placement     Continue on midodrine    Hgb > 10, no agnieszka         Subjective:     Chief Complaint:  alert. Oob in chair. Feels better today. Family visiting. We discussed the above. Chart reviewed.      Review of Systems:    Symptom Y/N Comments  Symptom Y/N Comments   Fever/Chills    Chest Pain     Poor Appetite    Edema     Cough    Abdominal Pain     Sputum    Joint Pain     SOB/DE LEÓN    Pruritis/Rash     Nausea/vomit    Tolerating PT/OT     Diarrhea    Tolerating Diet     Constipation    Other       Could not obtain due to:      Objective:     VITALS:   Last 24hrs VS reviewed since prior progress note. Most recent are:  Vitals:    24 0305   BP: 95/73   Pulse: (!) 108   Resp: 18   Temp: 97.6 °F (36.4 °C)   SpO2: 93%       Intake/Output Summary (Last 24 hours) at 2024 0606  Last data filed at 2024 0259  Gross per 24 hour   Intake 920 ml   Output 1150 ml   Net -230 ml        Telemetry Reviewed:     PHYSICAL EXAM:  General: NAD      Lab Data Reviewed: (see below)    Medications Reviewed: (see below)    PMH/SH reviewed - no change compared to H&P  ________________________________________________________________________  Care Plan discussed with:  Patient     Family      RN     Care Manager                    Consultant:          Comments   >50% of visit spent in counseling and coordination of care   
                                                                         NAME: Azucena Myrick        :  1945        MRN:  [id number]          Assessment :    Plan:  ADOLFO, recurrent    A-fib with RVR  Borderline hypotension  Primary HPT with intermittent hypercalcemia  Hx of MR-s/p mitral clip  S/P ASD repair  Hyperkalemia  Mild hyponatremia  Chronic aortic dissection   A.m. creatinine 2.7- UOP no record.  Hold dialysis today. Encourage bettter oral intake.  -  outpatient HD under ADOLFO diagnosis at [facility]; Will go to [facility] which provides in house HD; HBV status completed on   perm cath placement , HD initiated on ,    Continue on midodrine    Hgb > 10, no agnieszka           Subjective:   Appetite poor.  Lethargic.  Poor urinary output.      Objective:     VITALS:   Last 24hrs VS reviewed since prior progress note. Most recent are:  Vitals:    24 1254   BP: 102/64   Pulse: 73   Resp:    Temp:    SpO2:        Intake/Output Summary (Last 24 hours) at 2024 1349  Last data filed at 2024 1700  Gross per 24 hour   Intake 120 ml   Output --   Net 120 ml      Telemetry Reviewed:     PHYSICAL EXAM:  General: NAD  Lethargic  No asterixis  Nonlabored breathing  Plus edema.        Lab Data Reviewed: (see below)    Medications Reviewed: (see below)    PMH/SH reviewed - no change compared to H&P  ________________________________________________________________________  Care Plan discussed with:  Patient     Family      RN     Care Manager                    Consultant:          Comments   >50% of visit spent in counseling and coordination of care       ________________________________________________________________________  Yana Perez MD     Procedures: see electronic medical records for all procedures/Xrays and details which  were not copied into this note but were reviewed prior to creation of Plan.      LABS:  Recent Labs     24  0309   WBC 7.7   HGB 10.5*   HCT 
                                                                         NAME: Azucena Myrick        :  1945        MRN:  [id number]          Assessment :    Plan:  ADOLFO, recurrent    A-fib with RVR  Borderline hypotension  Primary HPT with intermittent hypercalcemia  Hx of MR-s/p mitral clip  S/P ASD repair  Hyperkalemia  Mild hyponatremia  Chronic aortic dissection HD initiated on , HD today; watch for recovery, none so far     outpatient HD under ADOLFO diagnosis at [facility]; Will go to [facility] which provides in house HD; HBV status completed on   perm cath placement     Continue on midodrine    Hgb > 10, no agnieszka    Will see again on Monday, but please call with questions or changes in the meantime.        Subjective:     Chief Complaint:  sleeping. Chart reviewed. The patient was seen on dialysis at 11:08 AM .  BP is stable. Catheter is functioning well. D/W HD nurse.      Review of Systems:    Symptom Y/N Comments  Symptom Y/N Comments   Fever/Chills    Chest Pain     Poor Appetite    Edema     Cough    Abdominal Pain     Sputum    Joint Pain     SOB/DE LEÓN    Pruritis/Rash     Nausea/vomit    Tolerating PT/OT     Diarrhea    Tolerating Diet     Constipation    Other       Could not obtain due to:      Objective:     VITALS:   Last 24hrs VS reviewed since prior progress note. Most recent are:  Vitals:    05/10/24 0225   BP: 112/73   Pulse: (!) 101   Resp: 18   Temp: 97.9 °F (36.6 °C)   SpO2: 97%       Intake/Output Summary (Last 24 hours) at 5/10/2024 0602  Last data filed at 5/10/2024 0225  Gross per 24 hour   Intake 570 ml   Output 200 ml   Net 370 ml        Telemetry Reviewed:     PHYSICAL EXAM:  General: NAD      Lab Data Reviewed: (see below)    Medications Reviewed: (see below)    PMH/SH reviewed - no change compared to H&P  ________________________________________________________________________  Care Plan discussed with:  Patient     Family      RN     Care Manager          
                                                                         NAME: Azucena Myrick        :  1945        MRN:  [id number]          Assessment :    Plan:  ADOLFO, recurrent    A-fib with RVR  Borderline hypotension  Primary HPT with intermittent hypercalcemia  Hx of MR-s/p mitral clip  S/P ASD repair  Hyperkalemia  Mild hyponatremia  Chronic aortic dissection HD initiated on , last HD 5/3; HD today; watch for recovery, none so far; likely due to prerenal azotemia in the setting of N/V/D + A-fib with RVR with borderline hypotension + TINA (got CT of abdomen with IV contrast on 2024)    Continue on midodrine    Hgb > 10, no agnieszka    Calcium high nml, stop oscal       Subjective:     Chief Complaint:  The patient was seen on dialysis at 9:13 AM .  BP is stable. Catheter is functioning well. D/W HD nurse. No complaint. No questions.     Review of Systems:    Symptom Y/N Comments  Symptom Y/N Comments   Fever/Chills    Chest Pain     Poor Appetite    Edema     Cough    Abdominal Pain     Sputum    Joint Pain     SOB/DE ELÓN    Pruritis/Rash     Nausea/vomit    Tolerating PT/OT     Diarrhea    Tolerating Diet     Constipation    Other       Could not obtain due to:      Objective:     VITALS:   Last 24hrs VS reviewed since prior progress note. Most recent are:  Vitals:    24 0237   BP: 98/75   Pulse: (!) 102   Resp: 16   Temp: 97.7 °F (36.5 °C)   SpO2: 98%       Intake/Output Summary (Last 24 hours) at 2024 0558  Last data filed at 2024 2202  Gross per 24 hour   Intake 560 ml   Output 200 ml   Net 360 ml      Telemetry Reviewed:     PHYSICAL EXAM:  General: NAD      Lab Data Reviewed: (see below)    Medications Reviewed: (see below)    PMH/SH reviewed - no change compared to H&P  ________________________________________________________________________  Care Plan discussed with:  Patient     Family      RN     Care Manager                    Consultant:          Comments   >50% of visit spent 
      Hospitalist Progress Note    NAME:   Azucena Myirck   : 1945   MRN: [id number]     Date/Time: 2024 8:34 AM  Patient PCP: Bhavana Mendoza MD    Estimated discharge date: 5/10  Barriers:  Outpatient HD set up ,SNF        Assessment / Plan:     ADOLFO on CKD, now needing hemodialysis  -Likely s/s volume depletion in the setting of nausea/vomiting diarrhea  -S/p initiation of hemodialysis.   -Continue midodrine , consider increasing the dose if blood pressure remains low, at present BP acceptable   -No sign of renal recovery yet.  For now will need dialysis per renal.    -S/p perm cath placement  on  .   -Case management consulted for outpatient HD arrangements  -Nephrology following       Acute hyperkalemia- resolved  -Potassium is 4.1     Nausea/vomiting/diarrhea Likely gastro enteritis  CT abdomen shows cholelithiasis, US abdomen negative for cholecystitis  Zofran prn  HIDA scan negative  -Symptoms resolved     Atrial fibrillation with RVR  Paroxysmal afib:  Continue amiodarone and Eliquis.  Metoprolol discontinued due to borderline low blood pressure.  Continue added digoxin.  Heart rate is better controlled.  Appreciate recommendations from Dr. Chung  HR in 100s      Hx of mitral valve repair with mitraclip, ASD repair  Acute on chronic diastolic heart failure  -Cardiology recommendations noted.  Metoprolol held due to borderline hypotension since initiation of hemodialysis        Leukocytosis:  No fever  No clear cause. CT abdomen cholelithiasis, HIDA negative for cholecystitis. CXR negative for pneumonia  Resolved.  UA is not a clean-catch.  She does not report any dysuria        Hx of ascending aortic aneurysm repair  Incidental chronic aortic dissection on CT  -CT now shows chronic aortic dissection.  As per  Dr. Luu, likely no intervention at this time, outpatient follow up         Hypertension  Hyperlipidemia  -Holding metoprolol due to hypotension.  Resume when appropriate  -Continue 
      Hospitalist Progress Note    NAME:   Azucena Myrick   : 1945   MRN: [id number]     Date/Time: 2024 5:02 PM  Patient PCP: Bhavana Mendoza MD    Estimated discharge date:>48 hours  Barriers: clinical improvement      Assessment / Plan:    ADOLFO on CKD  Likely s/s volume depletion in the setting of nausea/vomiting diarrhea  Iv fluids discontinued  Continue lasix 60 mg iv BID  Appreciate Nephrology consult- plan for HD       Acute hyperkalemia:  Will give lokelma     Nausea/vomiting/diarrhea  Likely gastro enteritis  CT abdomen shows cholelithiasis, US abdomen negative for cholecystitis  Zofran prn  HIDA scan negative     Atrial fibrillation with RVR  Paroxysmal afib:  Continue metoprolol and eliquis  Appreciate cardiology consult  Continue amiodarone drip  Continue  midodrine 5 mg po q8h prn for hypotension     Hx of mitral valve repair with mitraclip, ASD repair        Acute on chronic diastolic heart failure  Appreciate cardiology consult  Continue lasix 60 mg iv BID    Leukocytosis:  No fever  No clear cause. CT abdomen cholelithiasis, HIDA negative for cholecystitis. CXR negative for pneumonia  Will send UA          Hx ov ascending aortic aneurysm repair              Hypertension  Hyperlipidemia  Metoprolol as tolerated          Medical Decision Making:   I personally reviewed labs:cbc, BMP: creatinine trending up from 3.4 to 3.88- nephrology starting HD, on iv lasix as per cardiology, elevated potassium- lokelma given, cbc- white count trending up- will send UA  I personally reviewed imaging:   I personally reviewed EKG:  Toxic drug monitoring:   Discussed case with: patient, RN          Code Status: full  DVT Prophylaxis: eliquis  GI Prophylaxis:    Subjective:     Chief Complaint / Reason for Physician Visit  No overnight events      Objective:     VITALS:   Last 24hrs VS reviewed since prior progress note. Most recent are:  Patient Vitals for the past 24 hrs:   BP Temp Temp src Pulse Resp SpO2 
      Hospitalist Progress Note    NAME:   Azucena Myrick   : 1945   MRN: [id number]     Date/Time: 2024 2:09 PM  Patient PCP: Bhavana Mendoza MD    Estimated discharge date:>48 hours  Barriers: clinical improvement      Assessment / Plan:    ADOLFO on CKD  Likely s/s volume depletion in the setting of nausea/vomiting diarrhea  Iv fluids discontinued  S/p lasix 60 mg iv BID- now off it as HD started yesterday  Appreciate Nephrology consult- plan for HD today and then tomorrow       Acute hyperkalemia- resolved  S/p lokelma     Nausea/vomiting/diarrhea  Likely gastro enteritis  CT abdomen shows cholelithiasis, US abdomen negative for cholecystitis  Zofran prn  HIDA scan negative     Atrial fibrillation with RVR  Paroxysmal afib:  Continue metoprolol and eliquis  Appreciate cardiology consult  Off amiodarone drip now  Continue  midodrine 5 mg po q8h prn for hypotension     Hx of mitral valve repair with mitraclip, ASD repair  Acute on chronic diastolic heart failure  Appreciate cardiology consult- following      Leukocytosis:  No fever  No clear cause. CT abdomen cholelithiasis, HIDA negative for cholecystitis. CXR negative for pneumonia  Will send UA          Hx ov ascending aortic aneurysm repair              Hypertension  Hyperlipidemia  Metoprolol as tolerated          Medical Decision Making:   I personally reviewed labs:Yes  I personally reviewed imaging:  Yes  I personally reviewed EKG:  Toxic drug monitoring:   Discussed case with: patient, RN, CM on IDRs          Code Status: full  DVT Prophylaxis: eliquis  GI Prophylaxis:    Subjective:     Chief Complaint / Reason for Physician Visit:  F/u for ESRD needing HD (NEW), Afib RVR  No overnight events      Objective:     VITALS:   Last 24hrs VS reviewed since prior progress note. Most recent are:  Patient Vitals for the past 24 hrs:   BP Temp Temp src Pulse Resp SpO2   24 1036 98/75 97.5 °F (36.4 °C) Oral (!) 105 16 96 %   24 0916 97/60 -- -- 
      Hospitalist Progress Note    NAME:   Azucena Myrick   : 1945   MRN: [id number]     Date/Time: 2024 2:24 PM  Patient PCP: Bhavana Mendoza MD    Estimated discharge date:   Barriers: Nephrology clearance, renal recovery, HD arrangements      Assessment / Plan:    ADOLFO on CKD, now needing hemodialysis  -Likely s/s volume depletion in the setting of nausea/vomiting diarrhea  -S/p initiation of hemodialysis.  Nephrology following and appreciate recommendations.  - Monitor urine output.  -Continue midodrine due to hypotension, consider increasing the dose if blood pressure remains low  -Wait for renal recovery.  No renal recovery so far.  Nephrology following.  Check BMP in a.m.     Acute hyperkalemia- resolved  -Potassium is normal     Nausea/vomiting/diarrhea Likely gastro enteritis  CT abdomen shows cholelithiasis, US abdomen negative for cholecystitis  Zofran prn  HIDA scan negative  -Symptoms resolved     Atrial fibrillation with RVR  Paroxysmal afib:  Continue amiodarone and Eliquis.  Metoprolol being held due to borderline low blood pressure.  Cardiology recommendations noted.  Will notify Dr. Chung about need for further medication adjustments since she is mildly tachycardic now that metoprolol is being held due to borderline hypotension.       Hx of mitral valve repair with mitraclip, ASD repair  Acute on chronic diastolic heart failure  -Cardiology recommendations noted.  Metoprolol held due to borderline hypotension since initiation of hemodialysis      Leukocytosis:  No fever  No clear cause. CT abdomen cholelithiasis, HIDA negative for cholecystitis. CXR negative for pneumonia  Resolved.  UA is not a clean-catch.  She does not report any dysuria        Hx of ascending aortic aneurysm repair  -CT now shows chronic aortic dissection.  Discussed with vascular surgery and they did not recommend any acute intervention at this time           Hypertension  Hyperlipidemia  -Holding metoprolol due 
      Hospitalist Progress Note    NAME:   Azucena Myrick   : 1945   MRN: [id number]     Date/Time: 2024 4:24 PM  Patient PCP: Bhavana Mendoza MD    Estimated discharge date:24  Barriers: clinical improvement      Assessment / Plan:    ADOLFO on CKD  Likely s/s volume depletion in the setting of nausea/vomiting diarrhea  Iv fluids discontinued  Lasix iv 40 mg given  Appreciate Nephrology consult        Nausea/vomiting/diarrhea  Likely gastro enteritis  CT abdomen shows cholelithiasis, US abdomen negative for cholecystitis  Zofran prn  HIDA scan negative     Atrial fibrillation with RVR  Paroxysmal afib:  Continue metoprolol and eliquis  Appreciate cardiology consult  Continue amiodarone drip  Continue  midodrine 5 mg po q8h prn for hypotension     Hx of mitral valve repair with mitraclip, ASD repair        Hx of CHF:  Was given lasix 40 mg iv today        Hx ov ascending aortic aneurysm repair              Hypertension  Hyperlipidemia  Metoprolol as tolerated          Medical Decision Making:   I personally reviewed labs:cbc, BMP: creatinine trending up from   I personally reviewed imaging:   I personally reviewed EKG:  Toxic drug monitoring:   Discussed case with: patient, RN          Code Status: full  DVT Prophylaxis: eliquis  GI Prophylaxis:    Subjective:     Chief Complaint / Reason for Physician Visit  Patient reports feeling better today.      Objective:     VITALS:   Last 24hrs VS reviewed since prior progress note. Most recent are:  Patient Vitals for the past 24 hrs:   BP Temp Temp src Pulse Resp SpO2 Weight   24 1150 98/79 97.6 °F (36.4 °C) Oral 97 20 99 % --   24 1040 -- -- -- -- -- -- 64.9 kg (143 lb)   24 0830 107/75 -- -- 97 -- -- --   24 0715 94/71 97.9 °F (36.6 °C) Oral (!) 103 20 95 % --   24 0400 92/67 -- -- 95 -- -- --   24 0330 103/71 98 °F (36.7 °C) Oral (!) 108 17 -- --   24 0200 97/82 -- -- (!) 106 -- -- --   24 0000 99/73 -- -- 
      Hospitalist Progress Note    NAME:   Azucena Myrick   : 1945   MRN: [id number]     Date/Time: 2024 1:51 PM  Patient PCP: Bhavana Mendoza MD    Estimated discharge date:   Barriers: Nephrology clearance, renal recovery, HD arrangements      Assessment / Plan:    ADOLFO on CKD, now needing hemodialysis  -Likely s/s volume depletion in the setting of nausea/vomiting diarrhea  -S/p initiation of hemodialysis.  Nephrology following and appreciate recommendations.  -Check BMP in a.m. tomorrow.  Monitor urine output.  -Continue midodrine due to hypotension, consider increasing the dose if blood pressure remains low     Acute hyperkalemia- resolved  -Potassium is normal     Nausea/vomiting/diarrhea Likely gastro enteritis  CT abdomen shows cholelithiasis, US abdomen negative for cholecystitis  Zofran prn  HIDA scan negative  -Symptoms resolved     Atrial fibrillation with RVR  Paroxysmal afib:  Continue metoprolol, amiodarone and eliquis  Cardiology recommendations noted       Hx of mitral valve repair with mitraclip, ASD repair  Acute on chronic diastolic heart failure  -Cardiology recommendations noted.  Metoprolol held due to borderline hypotension since initiation of hemodialysis      Leukocytosis:  No fever  No clear cause. CT abdomen cholelithiasis, HIDA negative for cholecystitis. CXR negative for pneumonia  Resolved.  UA is not a clean-catch.  She does not report any dysuria        Hx of ascending aortic aneurysm repair  -CT now shows chronic aortic dissection.  Will check with vascular surgery and any further recommendations           Hypertension  Hyperlipidemia  -Holding metoprolol due to hypotension.  Resume when appropriate  -Continue statin        Medical Decision Making:   I personally reviewed labs: CBC, BMP  I personally reviewed imaging:    I personally reviewed EKG:  Toxic drug monitoring:   Discussed case with: Pharmacy,           Code Status: full  DVT Prophylaxis: 
      Hospitalist Progress Note    NAME:   Azucena Myrick   : 1945   MRN: [id number]     Date/Time: 2024 9:05 AM  Patient PCP: Bhavana Mendoza MD    Estimated discharge date:   Barriers: Permacath placement in a.m. tomorrow, nephrology clearance, vascular surgery clearance        Assessment / Plan:     ADOLFO on CKD, now needing hemodialysis  -Likely s/s volume depletion in the setting of nausea/vomiting diarrhea  -S/p initiation of hemodialysis.   - Monitor urine output.  -Continue midodrine , consider increasing the dose if blood pressure remains low, at present BP acceptable   -No sign of renal recovery yet.  For now will need dialysis per renal.    -S/p perm cath placement  on  .   -Case management consulted for outpatient HD arrangements      Acute hyperkalemia- resolved  -Potassium is 4.2     Nausea/vomiting/diarrhea Likely gastro enteritis  CT abdomen shows cholelithiasis, US abdomen negative for cholecystitis  Zofran prn  HIDA scan negative  -Symptoms resolved     Atrial fibrillation with RVR  Paroxysmal afib:  Continue amiodarone and Eliquis.  Metoprolol discontinued due to borderline low blood pressure.  Continue added digoxin.  Heart rate is better controlled.  Appreciate recommendations from Dr. Chung     Hx of mitral valve repair with mitraclip, ASD repair  Acute on chronic diastolic heart failure  -Cardiology recommendations noted.  Metoprolol held due to borderline hypotension since initiation of hemodialysis        Leukocytosis:  No fever  No clear cause. CT abdomen cholelithiasis, HIDA negative for cholecystitis. CXR negative for pneumonia  Resolved.  UA is not a clean-catch.  She does not report any dysuria        Hx of ascending aortic aneurysm repair  Incidental chronic aortic dissection on CT  -CT now shows chronic aortic dissection.  As per  Dr. Luu, likely no intervention at this time, outpatient follow up         Hypertension  Hyperlipidemia  -Holding metoprolol due to 
      Hospitalist Progress Note    NAME:   Azucena Myrick   : 1945   MRN: [id number]     Date/Time: 2024 2:01 PM  Patient PCP: Bhavana Mendoza MD    Estimated discharge date:   Barriers: Nephrology clearance, renal recovery, HD arrangements      Assessment / Plan:    ADOLFO on CKD, now needing hemodialysis  -Likely s/s volume depletion in the setting of nausea/vomiting diarrhea  -S/p initiation of hemodialysis.  Nephrology following and appreciate recommendations.  - Monitor urine output.  -Continue midodrine due to hypotension, consider increasing the dose if blood pressure remains low  -Wait for renal recovery.  Check BMP in a.m.     Acute hyperkalemia- resolved  -Potassium is normal     Nausea/vomiting/diarrhea Likely gastro enteritis  CT abdomen shows cholelithiasis, US abdomen negative for cholecystitis  Zofran prn  HIDA scan negative  -Symptoms resolved     Atrial fibrillation with RVR  Paroxysmal afib:  Continue metoprolol, amiodarone and eliquis  Cardiology recommendations noted       Hx of mitral valve repair with mitraclip, ASD repair  Acute on chronic diastolic heart failure  -Cardiology recommendations noted.  Metoprolol held due to borderline hypotension since initiation of hemodialysis      Leukocytosis:  No fever  No clear cause. CT abdomen cholelithiasis, HIDA negative for cholecystitis. CXR negative for pneumonia  Resolved.  UA is not a clean-catch.  She does not report any dysuria        Hx of ascending aortic aneurysm repair  -CT now shows chronic aortic dissection.  Will check with vascular surgery and any further recommendations           Hypertension  Hyperlipidemia  -Holding metoprolol due to hypotension.  Resume when appropriate  -Continue statin        Medical Decision Making:   I personally reviewed labs: CBC, BMP  I personally reviewed imaging:    I personally reviewed EKG:  Toxic drug monitoring:   Discussed case with: Pharmacy,           Code Status: full  DVT 
      Hospitalist Progress Note    NAME:   Azucena Myrick   : 1945   MRN: [id number]     Date/Time: 5/3/2024 1:56 PM  Patient PCP: Bhavana Mendoza MD    Estimated discharge date:   Barriers: Nephrology clearance, renal recovery, HD arrangements      Assessment / Plan:    ADOLFO on CKD, now needing hemodialysis  -Likely s/s volume depletion in the setting of nausea/vomiting diarrhea  -S/p initiation of hemodialysis.  Nephrology following and appreciate recommendations.  -Check BMP in a.m. tomorrow.  Monitor urine output.  -Continue midodrine due to hypotension  -Going for Daryl catheter placement today.       Acute hyperkalemia- resolved  -Potassium is normal     Nausea/vomiting/diarrhea Likely gastro enteritis  CT abdomen shows cholelithiasis, US abdomen negative for cholecystitis  Zofran prn  HIDA scan negative  -Symptoms resolved     Atrial fibrillation with RVR  Paroxysmal afib:  Continue metoprolol, amiodarone and eliquis  Cardiology recommendations noted       Hx of mitral valve repair with mitraclip, ASD repair  Acute on chronic diastolic heart failure  -Cardiology recommendations noted.  Metoprolol held due to borderline hypotension since initiation of hemodialysis      Leukocytosis:  No fever  No clear cause. CT abdomen cholelithiasis, HIDA negative for cholecystitis. CXR negative for pneumonia  Resolved.  UA is not a clean-catch.  She does not report any dysuria        Hx of ascending aortic aneurysm repair  -CT now shows chronic aortic dissection.  Will check with vascular surgery and any further recommendations           Hypertension  Hyperlipidemia  -Holding metoprolol due to hypotension.  Resume when appropriate  -Continue statin        Medical Decision Making:   I personally reviewed labs: CBC, BMP  I personally reviewed imaging:    I personally reviewed EKG:  Toxic drug monitoring:   Discussed case with: Pharmacy,           Code Status: full  DVT Prophylaxis: eliquis  GI 
      Hospitalist Progress Note    NAME:   Azucena Myrick   : 1945   MRN: [id number]     Date/Time: 2024 8:54 AM  Patient PCP: Bhavana Mendoza MD    Estimated discharge date:   Barriers:  Pending auth         Assessment / Plan:     ADOLFO on CKD, now needing hemodialysis  -Likely s/s volume depletion in the setting of nausea/vomiting diarrhea  -S/p initiation of hemodialysis.   -Continue midodrine , consider increasing the dose if blood pressure remains low, at present BP acceptable   -No sign of renal recovery yet.  For now will need dialysis per renal.    -S/p perm cath placement  on  .   -Case management consulted for outpatient HD arrangements  -Nephrology following   -HD as per nephro,last on 5/10      Acute hyperkalemia- resolved  -Potassium is 4.3     Nausea/vomiting/diarrhea Likely gastro enteritis  CT abdomen shows cholelithiasis, US abdomen negative for cholecystitis  Zofran prn  HIDA scan negative  -Symptoms resolved     Atrial fibrillation with RVR  Paroxysmal afib:  Continue amiodarone and Eliquis.  Metoprolol discontinued due to borderline low blood pressure.  Continue added digoxin.  Heart rate is better controlled.  Appreciate recommendations from Dr. Chung  HR in 100s      Hx of mitral valve repair with mitraclip, ASD repair  Acute on chronic diastolic heart failure  -Metoprolol held due to borderline hypotension since initiation of hemodialysis        Leukocytosis:  No fever  No clear cause. CT abdomen cholelithiasis, HIDA negative for cholecystitis. CXR negative for pneumonia  Resolved.  UA is not a clean-catch.  She does not report any dysuria        Hx of ascending aortic aneurysm repair  Incidental chronic aortic dissection on CT  -CT now shows chronic aortic dissection.  As per  Dr. Luu, likely no intervention at this time, outpatient follow up         Hypertension  Hyperlipidemia  -Holding metoprolol due to hypotension.  Resume when appropriate  -Continue statin     Severe 
      Hospitalist Progress Note    NAME:   Azucena Myrick   : 1945   MRN: [id number]     Date/Time: 5/10/2024 8:46 AM  Patient PCP: Bhavana Mendoza MD    Estimated discharge date: 5/10  Barriers:  Pending auth         Assessment / Plan:     ADOLFO on CKD, now needing hemodialysis  -Likely s/s volume depletion in the setting of nausea/vomiting diarrhea  -S/p initiation of hemodialysis.   -Continue midodrine , consider increasing the dose if blood pressure remains low, at present BP acceptable   -No sign of renal recovery yet.  For now will need dialysis per renal.    -S/p perm cath placement  on  .   -Case management consulted for outpatient HD arrangements  -Nephrology recs appreciated   -HD as per nephro      Acute hyperkalemia- resolved  -Potassium is 4.3     Nausea/vomiting/diarrhea Likely gastro enteritis  CT abdomen shows cholelithiasis, US abdomen negative for cholecystitis  Zofran prn  HIDA scan negative  -Symptoms resolved     Atrial fibrillation with RVR  Paroxysmal afib:  Continue amiodarone and Eliquis.  Metoprolol discontinued due to borderline low blood pressure.  Continue added digoxin.  Heart rate is better controlled.  Appreciate recommendations from Dr. Chung  HR in 100s      Hx of mitral valve repair with mitraclip, ASD repair  Acute on chronic diastolic heart failure  -Metoprolol held due to borderline hypotension since initiation of hemodialysis        Leukocytosis:  No fever  No clear cause. CT abdomen cholelithiasis, HIDA negative for cholecystitis. CXR negative for pneumonia  Resolved.  UA is not a clean-catch.  She does not report any dysuria        Hx of ascending aortic aneurysm repair  Incidental chronic aortic dissection on CT  -CT now shows chronic aortic dissection.  As per  Dr. Luu, likely no intervention at this time, outpatient follow up         Hypertension  Hyperlipidemia  -Holding metoprolol due to hypotension.  Resume when appropriate  -Continue statin     Severe 
      Hospitalist Progress Note    NAME:   Azucena Myrick   : 1945   MRN: [id number]     Date/Time: 2024 1:25 PM  Patient PCP: Bhavana Mendoza MD    Estimated discharge date:   Barriers: Nephrology clearance, renal recovery, HD arrangements      Assessment / Plan:    ADOLFO on CKD, now needing hemodialysis  -Likely s/s volume depletion in the setting of nausea/vomiting diarrhea  -S/p initiation of hemodialysis.  Nephrology following and appreciate recommendations.  -Check BMP in a.m. tomorrow.  Monitor urine output.  -Will give 1 dose of 10 mg midodrine due to hypotension.  Start midodrine since she is hypotensive.       Acute hyperkalemia- resolved  -Potassium is normal     Nausea/vomiting/diarrhea Likely gastro enteritis  CT abdomen shows cholelithiasis, US abdomen negative for cholecystitis  Zofran prn  HIDA scan negative     Atrial fibrillation with RVR  Paroxysmal afib:  Continue metoprolol and eliquis  Appreciate cardiology consult       Hx of mitral valve repair with mitraclip, ASD repair  Acute on chronic diastolic heart failure  -Cardiology recommendations noted      Leukocytosis:  No fever  No clear cause. CT abdomen cholelithiasis, HIDA negative for cholecystitis. CXR negative for pneumonia  Pending urine analysis  Check CBC in a.m. and if persistent, will order further workup        Hx of ascending aortic aneurysm repair  -CT now shows chronic aortic dissection.  Will check with vascular surgery and any further recommendations           Hypertension  Hyperlipidemia  -Holding metoprolol due to hypotension.  Resume when appropriate  -Continue statin        Medical Decision Making:   I personally reviewed labs: CBC, BMP  I personally reviewed imaging:    I personally reviewed EKG:  Toxic drug monitoring:   Discussed case with: Pharmacy,           Code Status: full  DVT Prophylaxis: eliquis  GI Prophylaxis:    Subjective:     Chief Complaint / Reason for Physician Visit:  Reports 
      Hospitalist Progress Note    NAME:   Azucena Myrick   : 1945   MRN: [id number]     Date/Time: 2024 2:39 PM  Patient PCP: Bhavana Mendoza MD    Estimated discharge date:24  Barriers: clinical improvement- heart rate control, improvement in nausea/vomiting, improvement in blood pressure      Assessment / Plan:    ADOLFO on CKD  Likely s/s volume depletion in the setting of nausea/vomiting diarrhea  Continue iv fluids  Appreciate Nephrology consult        Nausea/vomiting/diarrhea  Likely gastro enteritis  CT abdomen shows cholelithiasis, US abdomen negative for cholecystitis  Zofran prn  Iv fluids     Atrial fibrillation with RVR  Paroxysmal afib:  Continue metoprolol and eliquis  Appreciate cardiology consult  Continue amiodarone drip  Start midodrine 5 mg po q8h prn for hypotension     Hx of mitral valve repair with mitraclip, ASD repair        Hx of CHF:  Hold diuretics        Hx ov ascending aortic aneurysm repair              Hypertension  Hyperlipidemia  Metoprolol as tolerated          Medical Decision Making:   I personally reviewed labs:cbc, BMP: creatinine trending up from 1.67 to 2.61  I personally reviewed imaging: CXR  I personally reviewed EKG:  Toxic drug monitoring:   Discussed case with: patient, RN          Code Status: full  DVT Prophylaxis: eliquis  GI Prophylaxis:    Subjective:     Chief Complaint / Reason for Physician Visit  Patient reports generalized weakness. PT was working with patient.       Objective:     VITALS:   Last 24hrs VS reviewed since prior progress note. Most recent are:  Patient Vitals for the past 24 hrs:   BP Temp Temp src Pulse Resp SpO2   24 1130 91/60 97.6 °F (36.4 °C) Oral 85 18 96 %   24 0853 95/72 -- -- 87 -- --   24 0725 101/72 97.6 °F (36.4 °C) Oral 87 20 97 %   24 0248 101/66 97.5 °F (36.4 °C) Oral 80 18 96 %   24 2337 (!) 89/67 97.6 °F (36.4 °C) Oral 90 20 94 %   24 101/78 -- -- (!) 117 -- --   24 
   05/06/24 1445 05/06/24 1454 05/06/24 1500   Vital Signs   Pulse (!) 121  --   --    /81 97/69 107/73   MAP (Calculated) 88 78 84   BP Location Right upper arm Right upper arm Right upper arm   BP Method Automatic Automatic Automatic   Patient Position Up in chair Up in chair Up in chair       
  Physician Progress Note      PATIENT:               SUSY GRAF  CSN #:                  [id number]  :                       1945  ADMIT DATE:       2024 1:34 PM  DISCH DATE:  RESPONDING  PROVIDER #:        Alden Miller MD          QUERY TEXT:    Patient admitted with ADOLFO and CKD, atrial fibrillation with RVR, acute on   chronic HFpEF, gastroenteritis. Documentation reflects acute hypoxic   respiratory failure in ED MD note dated 24.  If possible, please document   in the progress notes and discharge summary if acute hypoxic respiratory   failure was:    The medical record reflects the following:  Risk Factors:  -per ED MD note dated 24, \" 78 y.o. female presenting with diarrhea   nausea vomiting.  She notes this started since she was discharged from the   hospital.  She notes she was admitted for constipation and dehydration.  This   is fixable and patient now she is having persistent diarrhea with everything   she eats.  Notes she feels like she has lots of gas in her epigastric region.    No fevers.  Denies shortness of breath or chest pain.  Notably hypoxic to   88-89 and placed on 2 L per EMS\"    Clinical Indicators:  -per ED MD note dated 24, \" Notably hypoxic to 88-89 and placed on 2 L   per EMS.  Vital signs notable for A-fib RVR with a rate of 130.  On 2 L for   some hypoxia.  Appears uncomfortable; Acute respiratory failure with hypoxia\"  -24: 02 sats ranging 93-98% on 02 2lpm NC with respiratory rate up to 39   (24 1500)  -24: 02 sats ranging 95-98% on 02 2lpm NC; 24 2337, 02 sats 94% on   02 5lpm NC  -24: 02 sats ranging % on 02 5lpm NC  -24: 02 sats 95-99% on 02 4-5lpm NC  -24: 02 sats % 02 02 5lpm NC  -24: weaned from 5lpm NC to 3lpm NC with 02 sats 97%; further weaned to   1lpm NC with 02 sats 96%    Treatment:  -labs, imaging, telemetry, 02, IV Lasix, IV Amiodarone, Cardiology and   Nephrology consults    Thank 
  Physician Progress Note      PATIENT:               SUSY GRAF  CSN #:                  [id number]  :                       1945  ADMIT DATE:       2024 1:34 PM  DISCH DATE:  RESPONDING  PROVIDER #:        Alden Miller MD          QUERY TEXT:    Patient admitted with  ADOLFO and CKD, atrial fibrillation with RVR, acute on   chronic HFpEF, gastroenteritis. Noted to have Severe Malnutrition per RD   Progress note dated . If possible, please document in progress notes and   discharge summary if you are evaluating and /or treating any of the following:    The medical record reflects the following:  Risk Factors: 77 y/o, poor PO intake, ADOLFO on CKD, N/V/D    Clinical Indicators: Per RD Malnutrition Assessment:  * Malnutrition Status:  Severe malnutrition (24 1446)  * Context:  Chronic Illness  * Findings of the 6 clinical characteristics of malnutrition: (+ Findings)  +  Energy Intake:  75% or less estimated energy requirements for 1 month or   longer  +  Weight Loss:  Greater than 10% over 6 months  +  Muscle Mass Loss:  Mild muscle mass loss Temples (temporalis), Clavicles   (pectoralis & deltoids), Thigh (quadriceps), Calf (gastrocnemius)  +  Fluid Accumulation:  Mild Extremities    Treatment: RD consult with Nutrition Recommendations/Plan:  1. Liberalized diet to help promote better PO intake  2. Ensure plus TID  3. Please document % meals and supplements consumed in flowsheet I/O's under   intake    ASPEN Criteria:    https://aspenjournals.onlinelibrary.choudhary.com/doi/full/10.1177/700219664219531  5    Thank you,  Ludmila LOPEZ, RN, CCDS  Please contact me for any questions or concerns regarding this query at   [email or web address]  Options provided:  -- Protein calorie malnutrition severe  -- Other - I will add my own diagnosis  -- Disagree - Not applicable / Not valid  -- Disagree - Clinically unable to determine / Unknown  -- Refer to Clinical Documentation Reviewer    PROVIDER 
0700: Bedside and Verbal shift change report given to MARQUIS Patel (oncoming nurse) by MARQUIS Zeng (offgoing nurse). Report included the following information Nurse Handoff Report, Intake/Output, MAR, and Recent Results.    
0700: Bedside and Verbal shift change report given to MARQUIS Patel (oncoming nurse) by MARQUIS Zeng (offgoing nurse). Report included the following information Nurse Handoff Report, Intake/Output, MAR, and Recent Results.    
0705: Bedside shift change report given to MARQUIS Grossman (oncoming nurse) by MARQUIS Carballo (offgoing nurse). Report included the following information Nurse Handoff Report, Intake/Output, MAR, Recent Results, and Cardiac Rhythm A Fib RVR .     0818: Patient started on amiodarone bolus.     0822: This RN was present for all medication administrations by Juani Hoang, Student Nurse on 4/27/24 from 9623-6945.    0829: Amio bolus completed.     0833: Amio gtt started.     1100: Continuous IVF increased to 75ml/hr.     1435: Patient hasn't urinated this shift. Highest bladder scan volume was 171mls. Patient did not have the urge to urinate.     1446: IVF increased to 100ml/hr.    1737: Repeat bladder scan showed 219mls. Patient still does not have the urge to urinate.    1900:   End of Shift Note    Bedside shift change report given to MARQUIS Carballo (oncoming nurse) by Kailey Stallings RN (offgoing nurse).  Report included the following information SBAR, Intake/Output, MAR, Recent Results, and Cardiac Rhythm A Fib RVR    Shift worked:  4511-7049     Shift summary and any significant changes:     Please see above notes. Poor appetite. Received x1 dose of IV zofran this morning. Tried to get patient up to the chair today, patient couldn't hold herself up and MD was notified. PT/OT orders placed.     Concerns for physician to address:       Zone phone for oncoming shift:          Activity:     Number times ambulated in hallways past shift: 0  Number of times OOB to chair past shift: 0    Cardiac:   Cardiac Monitoring: Yes           Access:  Current line(s): PIV     Genitourinary:   Urinary status: due to void    Respiratory:      Chronic home O2 use?: NO  Incentive spirometer at bedside: NO       GI:     Current diet:  ADULT DIET; Regular; Low Fat/Low Chol/High Fiber/2 gm Na  ADULT ORAL NUTRITION SUPPLEMENT; Breakfast, Lunch, Dinner; Standard High Calorie/High Protein Oral Supplement  Passing flatus: YES  Tolerating current diet: YES   
0805: Off the unit for HD.    0843: Midodrine given at HD suite.    1040: Received a call from MARQUIS Jones from HD suite. She said the patient will be going to IR for permacath placement after HD.    1445: Patient back from IR. Patient now has permacath on right side. Patient states she is not hungry, just want to sleep since she is tired.    1500: Message sent to Dr. Alfaro to receive a diet order. Receive order for renal diet.    1510: Bedside and Verbal shift change report given to Anita (oncoming nurse) by Evi (offgoing nurse). Report included the following information Nurse Handoff Report.                 
1230: Patient arrived to [facility] Recovery area via in patient room. Patient is A&Ox4 on RA in NAD at this time.  Code status, allergies, and NPO status reviewed with patient prior to procedure.     1350:   Name of Procedure: Tunneled dialysis catheter placement     Vital Signs:  VSS throughout     Any complications related to procedure: none identified at this time     Patient is A&Ox4, on RA, and is in NAD at this time.     1430: Report given to primary RN Josephine. Report consisted of procedure and medication administered.   
1300: Patient arrived via stretcher to XRAY Recovery area. Patient is A&Ox4 on 5L NC in NAD at this time. Code status and allergies verified with patient prior to dialysis catheter insertion.     Name of Procedure: Daryl catheter placement     Start: 1420  End: 1430     Vital Signs:  VSS throughout    Any complications related to procedure: none identified at this time     Patient is A&Ox4, on 5L NC, and is in NAD at this time.  Dialysis catheter is in correct location and ready for use. Patient taken to XRAY holding for ordered test.  Primary RN, Amanda given report.   
1606: Bladder scan max volume of 240mls. Notified nephrologist of this and that patient hasn't urinated all shift again.     1634: Nephrologist aware we've been bladder scanning multiple times a shift and that patient only urinated once last night. He feels she is oliguric due to acute kidney failure, Cr bumped to 2.61 today from 1.67.     I have reviewed and am in agreement with the assessment and documentation as performed by my orienteeMyra RN. Please refer to the above progress note for update on pt's daily events.    
1833: Patient arrived from ED in Afib RVR, HR 120s-160s. BP 85/66. Started IV fluids. /70 after recheck. IV metoprolol given. HR 110s.     1930: Bedside shift change report given to MARQUIS Carballo (oncoming nurse) by MARQUIS Howard (offgoing nurse). Report included the following information Nurse Handoff Report, Index, ED SBAR, Intake/Output, MAR, Recent Results, Med Rec Status, and Cardiac Rhythm Afib RVR .     
Apixaban for Atrial Fibrillation    Age 78, Scr 1.7, Weight 65 kg    Dose reduce to 2.5 mg, patient meets 2 criteria for dose adjustment.    Pharmacy will monitor and adjust when Scr improves  
Chart reviewed and spoke with patients RN. Per the RN the patient is not doing as well as yesterday and needing increased O2 support as well as decreasing kidney function. Will be having HD today. PT will defer and continue to follow.    Sherman Wick, PT    
Comprehensive Nutrition Assessment    Type and Reason for Visit:  Initial, RD Nutrition Re-Screen/LOS    Nutrition Recommendations/Plan:   Liberalized diet to help promote better PO intake  Ensure plus TID  Please document % meals and supplements consumed in flowsheet I/O's under intake      Malnutrition Assessment:  Malnutrition Status:  Severe malnutrition (05/04/24 1446)    Context:  Chronic Illness     Findings of the 6 clinical characteristics of malnutrition:  Energy Intake:  75% or less estimated energy requirements for 1 month or longer  Weight Loss:  Greater than 10% over 6 months     Body Fat Loss:  No significant body fat loss     Muscle Mass Loss:  Mild muscle mass loss Temples (temporalis), Clavicles (pectoralis & deltoids), Thigh (quadriceps), Calf (gastrocnemius)  Fluid Accumulation:  Mild Extremities   Strength:  Not Performed    Nutrition Assessment:     Chart reviewed for LOS. Pt known to our team from recent admission. She met ASPEN criteria for severe malnutrition per previous RD assessment on 4/19 which remains applicable with continued poor PO intake. Pt medically noted for ADOLFO on CKD now requiring HD, N/V/D likely gastroenteritis (resolved), afib, CHF, HTN, HLD, MVR with mitraclip. Per nursing documentation, pt's PO intake remains quite poor. She likes Ensure shakes and has been consuming slightly more of them than meals. RD liberalized diet to help promote better intake. Continue to encourage intake of meals and supplements.     Patient Vitals for the past 120 hrs:   PO Meals Eaten (%)   05/04/24 1200 1 - 25%   05/04/24 0730 1 - 25%   05/03/24 1800 26 - 50%   05/02/24 0730 1 - 25%   04/30/24 1223 0%   04/30/24 0800 1 - 25%     Patient Vitals for the past 120 hrs:   PO Supplement (%)   05/04/24 1200 26 - 50%   05/04/24 0730 0%   05/03/24 1800 51 - 75%     Wt Readings from Last 10 Encounters:   05/04/24 72.5 kg (159 lb 13.3 oz)   04/22/24 65.2 kg (143 lb 11.8 oz)   04/19/24 65.2 kg (143 lb 
Comprehensive Nutrition Assessment    Type and Reason for Visit:  Reassess    Nutrition Recommendations/Plan:   Resume diet after procedure  Resume Ensure plus (chocolate) TID  Please document % meals and supplements consumed in flowsheet I/O's under intake      Malnutrition Assessment:  Malnutrition Status:  Severe malnutrition (05/04/24 1446)    Context:  Chronic Illness     Findings of the 6 clinical characteristics of malnutrition:  Energy Intake:  75% or less estimated energy requirements for 1 month or longer  Weight Loss:  Greater than 10% over 6 months     Body Fat Loss:  No significant body fat loss     Muscle Mass Loss:  Mild muscle mass loss Temples (temporalis), Clavicles (pectoralis & deltoids), Thigh (quadriceps), Calf (gastrocnemius)  Fluid Accumulation:  Mild Extremities   Strength:  Not Performed    Nutrition Assessment:     Chart reviewed and case discussed during IDR. Pt off the floor for HD at time of RD visit this morning. She is NPO today for permcath placement. Recently documented PO intake has been fluctuating. Will resume supplement order to go live when her diet is entered after the procedure and continue monitoring.     Patient Vitals for the past 120 hrs:   PO Meals Eaten (%)   05/07/24 0800 76 - 100%   05/05/24 1700 26 - 50%   05/05/24 1214 1 - 25%   05/05/24 0730 1 - 25%   05/04/24 1800 1 - 25%   05/04/24 1200 1 - 25%   05/04/24 0730 1 - 25%   05/03/24 1800 26 - 50%     Patient Vitals for the past 120 hrs:   PO Supplement (%)   05/05/24 1700 1 - 25%   05/05/24 0730 0%   05/04/24 1800 26 - 50%   05/04/24 1200 26 - 50%   05/04/24 0730 0%   05/03/24 1800 51 - 75%         Nutrition Related Findings:    Labs: Na 135, Cr 3.06.   Meds: ancef, protonix.   Edema: 1+ BUE, 2+ BLE.   BM 5/3 (constipation).   Wound Type: None       Current Nutrition Intake & Therapies:    Average Meal Intake: %  Average Supplements Intake: Unable to assess  Diet NPO  ADULT ORAL NUTRITION SUPPLEMENT; 
Discharged patient in the care of patients son to transport to [place].  Report was called to MARQUIS Nagel at [place], SBAR given.    Patient had no complaints and had full understanding of discharge instructions.  IV's removed, tele removed.  Patient discharged with all belongings.   
End of Shift Note    Bedside shift change report given to MARQUIS Howard  (oncoming nurse) by Meg Sheppard RN (offgoing nurse).  Report included the following information SBAR, Kardex, MAR, and Recent Results    Shift worked:  07:00-15:30     Shift summary and any significant changes:     Patient resting quietly with no complaints. Patient scheduled for dialysis today.     Concerns for physician to address:  Discharge planning     Zone phone for oncoming shift:   [phone]       Activity:     Number times ambulated in hallways past shift: 0  Number of times OOB to chair past shift: Patient has been in recliner from 10:00-now (14:46)    Cardiac:   Cardiac Monitoring: Yes           Access:  Current line(s): PIV     Genitourinary:   Urinary status: voiding    Respiratory:      Chronic home O2 use?: NO  Incentive spirometer at bedside: NO       GI:     Current diet:  ADULT DIET; Regular; Low Sodium (2 gm); Low Potassium (Less than 3000 mg/day); Low Phosphorus (Less than 1000 mg); Low Fat (less than or equal to 50 gm/day); High Fiber  ADULT ORAL NUTRITION SUPPLEMENT; Breakfast, Lunch, Dinner; Standard High Calorie/High Protein Oral Supplement  DIET ONE TIME MESSAGE;  DIET ONE TIME MESSAGE;  Passing flatus: YES  Tolerating current diet: YES       Pain Management:   Patient states pain is manageable on current regimen: N/A    Skin:     Interventions: float heels and increase time out of bed    Patient Safety:  Fall Score:    Interventions: bed/chair alarm, gripper socks, and pt to call before getting OOB       Length of Stay:  Expected LOS: 7  Actual LOS: 5      Meg Sheppard RN                            
End of Shift Note    Bedside shift change report given to RN (oncoming nurse) by BRAD CHAVEZ RN (offgoing nurse).  Report included the following information SBAR, Kardex, Procedure Summary, Intake/Output, MAR, and Recent Results    Shift worked:  7-7pm     Shift summary and any significant changes:     No significant changes. On 2 L NC, VS are stable, continue scheduled Midodrine     Concerns for physician to address:       Zone phone for oncoming shift:              BRAD CHAVEZ, RN              
End of Shift Note    Bedside shift change report given to RN (oncoming nurse) by BRAD CHAVEZ RN (offgoing nurse).  Report included the following information SBAR, Kardex, Procedure Summary, Intake/Output, MAR, and Recent Results    Shift worked:  7-7pm     Shift summary and any significant changes:     No significant changes. On 2 L NC, VS are stable, continue scheduled Midodrine.  Complains of generalized weakness.  Poor PO intake     Concerns for physician to address:       Zone phone for oncoming shift:              BRAD CHAVEZ RN          
End of Shift Note    Bedside shift change report given to RN (oncoming nurse) by BRAD CHAVEZ RN (offgoing nurse).  Report included the following information SBAR, Kardex, Procedure Summary, Intake/Output, MAR, and Recent Results    Shift worked:  7-7pm     Shift summary and any significant changes:     No significant changes. On 2 L NC, VS are stable, continue scheduled Midodrine.  Complains of generalized weakness.  Poor PO intake     Concerns for physician to address:       Zone phone for oncoming shift:              BRAD CHAVEZ RN              
End of Shift Note    Bedside shift change report given to RN (oncoming nurse) by Jasmyn Phan RN (offgoing nurse).  Report included the following information SBAR, Intake/Output, and MAR    Shift worked:  7A-7P     Shift summary and any significant changes:     Pt AOX4, able to make needs known. Pt assisted to bedside chair. Pt has order tunneled catheter placement tomorrow, NPO after midnight.      Concerns for physician to address:       Zone phone for oncoming shift:          Activity:     Number times ambulated in hallways past shift: 0  Number of times OOB to chair past shift: 2    Cardiac:   Cardiac Monitoring: Yes           Access:  Current line(s): PIV and HD access     Genitourinary:   Urinary status: voiding    Respiratory:      Chronic home O2 use?: NO  Incentive spirometer at bedside: YES       GI:     Current diet:  ADULT ORAL NUTRITION SUPPLEMENT; Breakfast, Lunch, Dinner; Standard High Calorie/High Protein Oral Supplement  ADULT DIET; Regular; Low Sodium (2 gm); Low Potassium (Less than 3000 mg/day); Low Phosphorus (Less than 1000 mg)  DIET ONE TIME MESSAGE;  Diet NPO  Passing flatus: YES  Tolerating current diet: YES       Pain Management:   Patient states pain is manageable on current regimen: YES    Skin:     Interventions: increase time out of bed    Patient Safety:  Fall Score:    Interventions: bed/chair alarm       Length of Stay:  Expected LOS: 14  Actual LOS: 11      Jasmyn Phan RN                           
End of Shift Note    Bedside shift change report given to RN (oncoming nurse) by Nichol Sosa RN (offgoing nurse).  Report included the following information SBAR and Kardex    Shift worked:  3505-2240     Shift summary and any significant changes:     Dialysis was not done overnight due to non functioning arely.  Dialysis nurse has notified nephrology, and they will evaluate today.       Concerns for physician to address: See above    Zone phone for oncoming shift:       Activity:     Number times ambulated in hallways past shift: 0  Number of times OOB to chair past shift: 0    Cardiac:   Cardiac Monitoring: Yes           Access:  Current line(s): PIV and HD access     Genitourinary:   Urinary status: anuric    Respiratory:      Chronic home O2 use?: NO  Incentive spirometer at bedside: N/A       GI:     Current diet:  ADULT DIET; Regular; Low Sodium (2 gm); Low Potassium (Less than 3000 mg/day); Low Phosphorus (Less than 1000 mg); Low Fat (less than or equal to 50 gm/day); High Fiber  ADULT ORAL NUTRITION SUPPLEMENT; Breakfast, Lunch, Dinner; Standard High Calorie/High Protein Oral Supplement  DIET ONE TIME MESSAGE;  DIET ONE TIME MESSAGE;  Passing flatus: YES  Tolerating current diet: YES       Pain Management:   Patient states pain is manageable on current regimen: YES    Skin:     Interventions: increase time out of bed    Patient Safety:  Fall Score:    Interventions: bed/chair alarm       Length of Stay:  Expected LOS: 10  Actual LOS: 7      Nichol Sosa RN                            
Giovanni Nguyen MD [facility]  Interventional & Structural Cardiology                         [facility]  [place], [place]                                                                     Interventional & Structural Heart Care Note    []Initial Encounter     [x]Follow-up    Patient Name: Azucena Myrick - :1945 - MRN:[id number]  Primary Cardiologist: Dr. Palacio  PCP: Bhavana Mendoza MD     ** Seen in conjunction with [facility]    Reason for encounter: S/p GINA , ASD occluder    Date: 2024    Assessment and Plan   Mitral regurgitation / ASD- Echocardiogram 2023, EF 60 to 65% now 40-45%, severe degenerative nonrheumatic mitral regurgitation eccentric jet GINA x 3  on now with MV mean gradient of 6 mm/hg, mild regurgitation, ASD occluder well positioned, EF 50-55%  ADOLFO/Hyperkalemia in setting of vomiting /diarrhea/IV contrast  -creatine now 3.86,  CT showed no hydronephrosis,  nephrology following, Fluid overloaded, BNP up, per Dr. Chung increased lasix to 60 mg IV and added metolazone, K 5.3  N/V /diarrhea- CT scan showed possible cholecystitis, NM biliary study negative    PPM/PAF/Afib, -RVR in ER on amiodarone gtt 0.5 mg/min,  eliquis, HR now , paced, repeat EKG c/f pacer not capture - interrogate,  change amio to oral when able   Acute HFmrEF- worsening 02 requirement now on 02 at 5 liters, BNP 30,997 up from 2805 on , per Dr. Chung  increased Lasix 60 mg added metolazone,  incentive spirometry, strict I & O s, put out 200 cc with 40 mg lasix   Hypoxia - now with 02 at 5 liters, rales  Hx PAH/HTN/idiopathic hypotension-metoprolol prn IV, metoprolol 12.5 mg,  has midodrine PRN   S/p ascending aortic aneurysm repair , BP controlled  Hypercholesterolemia - on pravastatin   Arthritis /spinal stenosis - pain controlled per pt  GERD -diet controlled, no home meds  Hepatosteatosis -low fat diet    DVT ppx: per 
Giovanni Nguyen MD [facility]  Interventional & Structural Cardiology                         [facility]  [place], [place]                                                                     Interventional & Structural Heart Care Note    []Initial Encounter     [x]Follow-up    Patient Name: Azucena Myrick - :1945 - MRN:[id number]  Primary Cardiologist: Dr. Palacio  PCP: Bhavana Mendoza MD     ** Seen in conjunction with [facility]    Reason for encounter: S/p GINA , ASD occluder    Date: 2024    Assessment and Plan   Mitral regurgitation / ASD- Echocardiogram 2023, EF 60 to 65% now 40-45%, severe degenerative nonrheumatic mitral regurgitation eccentric jet GINA x 3  on now with MV mean gradient of 6 mm/hg, mild regurgitation, ASD occluder well positioned, EF 50-55%  ADOLFO/Hyperkalemia in setting of vomiting /diarrhea at home creatine now 3.47,  CT showed no hydronephrosis, lokalma today, nephrology following, Fluid overloaded, will give 40 mg  IV lasix x 1 , bladder scan showed 200 cc  N/V /diarrhea- CT scan showed possible cholecystitis, NM biliary study pending   PPM/PAF/Afib, -RVR in ER on amiodarone gtt,  eliquis, HR now , paced, repeat EKG,  change amio to oral when able to tolerate diet.   HFpEF- worsening 02 requirement now on 02 at 4 liters, BNP,  smal lasix dose, incentive spirometry, strict I & O s  Hypoxia - now with 02 at 4 liters, rales, increased work of breathing, lasix 40 mg IV, d/w nephrology  Hx PAH/HTN/idiopathic hypotension-metoprolol prn IV, metoprolol 12.5 mg, BP soft will reduce metoprolol to daily , has midodrine PRN   S/p ascending aortic aneurysm repair , BP controlled  Hypercholesterolemia - on pravastatin   Arthritis /spinal stenosis - pain controlled per pt  GERD -diet controlled, no home meds  Hepatosteatosis -low fat diet    DVT ppx: per primary, eliquis  Bowel/ GI ppx: per 
Nuclear Medicine   Biliary study with CCK completed @ 7360 today  Please check with DR regarding diet   Results pending   
Nursing contacted Nocturnist/cross cover provider and notified patient afib now hr 80-90s, states feeling flushed, bp better than prior sbp high 90s, pt appears calm at the bedside, NAD. No other concerns reported. No acute distress reported. At this time cards note states titrate amio to keep HR < 100, at 1mg and will change to 0.5mg has been on 1mg about 12ish hours presently, may be feeling a little flushed from the amiodarone, but doesn't appear to be having anaphylaxis or other effects. VSS. Ordered decreased dose amio 0.5mg as d/w nurse. Will defer further evaluation/management to the day shift primary attending care team. Patient denies any further complaints or concerns. Nursing to notify Hospitalist for further/continued concerns. Will remain available overnight for further concerns if nursing/patient needs.     Non-billable note.       
Nursing contacted Nocturnist/cross cover provider and notified patient having c/o chest tightness and belching and band like sensation around epigastric area. No other concerns reported. No acute distress reported. VSS. Ordered lipase x1 now- nurse already [facility] labs cbc, bmp- pending to be sent off. EKG stat done- reviewed was afib rvr rate 104, w/ pvcs, no ischemia- pending final cards review. On bedside exam pt is belching, drinking gingerale, pain reproducible to the epigastric and mid abd area, no reported vomiting, denies chest pain or pressure, states \"tightness\", but not \"pain\". Labs pending results already sent. Protonix 40mg iv x1 now ordered, suspect reflux, h/o known reflux on protonix 40mg po daily already. No acute distress, vss, no other concerns reported. Will defer further evaluation/management to the day shift primary attending care team. Patient denies any further complaints or concerns. Nursing to notify Hospitalist for further/continued concerns. Will remain available overnight for further concerns if nursing/patient needs.     Non-billable note.       
Nursing contacted Nocturnist/cross cover provider and notified patient hr 110-130s afib rvr, po metoprolol 12.5mg was given, not due for lopressor 5mg as of yet per orders, asymptomatic, on 50ml/hr ivf. No other concerns reported. No acute distress reported. VSS. Ordered 250ml ns bolus over 2 hours, and also lopressor 5mg iv x1. Will defer further evaluation/management to the day shift primary attending care team. Patient denies any further complaints or concerns. Nursing to notify Hospitalist for further/continued concerns. Will remain available overnight for further concerns if nursing/patient needs.     Update  0255 nurse reported pt having hr 124 bp 93/72 (81) and per parameters prn lopressor since sbp<100 she can't give this med. No acute distress reported. Vss otherwise. Asked nurse to please give the med x1 dose now lopressor. Nurse reported she didn't call a rapid as pt is stable at this time, is just mainly asking for medication orders. Meds ordered as above and cardiology consult for am ordered- appreciate expertise.    Non-billable note.       
Occupational Therapy     Chart reviewed pt and orders acknowledged. Per PT, pt hypotensive, symptomatic w/ PT during session and not appropriate for OT evaluation at this time. Will continue to follow pt.     Les Rodriguez, [place], OTR/L  
Occupational Therapy    Attempted to see patient for OT evaluation, but she is currently off the floor. Will follow back as able for OT evaluation.    Jose Ramon Jung, OTR/L    
Occupational Therapy    Attempted to see patient for OT treatment, but per nursing who was present in room the patient has been OOB for awhile and has just been assisted back to bed. The patient is also hypotensive with her BP 80s/50s and now about to receive some albumin to address this. Will defer OT treatment and follow up tomorrow.     Jose Ramon Jung, OTR/L    
Occupational Therapy    Chart reviewed for updates and attempted to see patient for OT treatment, but she is off the floor for Permacath placement. Will follow up tomorrow for OT treatment.     Jose Ramon Jung, OTR/L    
Occupational Therapy  Informed by PT that therapy on hold today due to pt's increasing need for O2 support and decreasing kidney function.  HD is planned for today.  Will defer and continue to follow as appropriate.    
Occupational Therapy  Pt is off the floor at this time in IR.  Will defer and continue to follow  
Physical Therapy    Chart reviewed. Attempted to see patient for skilled therapy session. Patient with plan to dc at 1630 per RN to SNF. Offered mobility prior to discharge but patient resting in chair and politely declined. Will continue to follow if dc delayed.    Yolanda Dunaway, PT, DPT  
Physical Therapy    Patient's chart reviewed and cleared to be seen by nursing for PT. Patient being transported to IR at this time. PT will defer and follow up as able.   
Physical Therapy:    Attempted to see this pt for PT session. Pt is currently ZULEMA for dialysis. Will defer and continue to follow as appropriate. Thanks.    Eva Newsome, PTA  
Physical Therapy:    Attempted to see this pt for PT session. Pt is currently ZULEMA for dialysis. Will defer session and continue to follow as appropriate. Thanks.    Eva Newsome, PTA    12:57PM     Attempted to see this pt again for PT session. Pt is ZULEMA for permacath placement. Will defer and continue to follow as appropriate. Thanks.    Eva Newsome, PTA  
Reconsult for hypotension and tachycardia    Intravenous digoxin loading followed by oral maintenance dialysis dose  Discontinue metoprolol  Continue amiodarone    Thank you.  Please call with questions  
Spiritual Care Assessment/Progress Note  [facility]    Name: Azucena Myrick MRN: [id number]    Age: 78 y.o.     Sex: female   Language: English     Date: 4/30/2024            Total Time Calculated: 24 min              Spiritual Assessment begun in [facility] CARDIOPULMONARY CARE     Referral/Consult From: Family  Encounter Overview/Reason: Initial Encounter    Spiritual beliefs:      [x] Involved in a osmin tradition/spiritual practice:      [] Supported by a osmin community:      [] Claims no spiritual orientation:      [] Seeking spiritual identity:           [] Adheres to an individual form of spirituality:      [] Not able to assess:                Identified resources for coping and support system:   Support System: Family members       [x] Prayer                  [] Devotional reading               [] Music                  [] Guided Imagery     [] Pet visits                                        [] Other: (COMMENT)     Specific area/focus of visit   Encounter:    Crisis:    Spiritual/Emotional needs: Type: Spiritual Support  Ritual, Rites and Sacraments:    Grief, Loss, and Adjustments:    Ethics/Mediation:    Behavioral Health:    Palliative Care:    Advance Care Planning:      Plan/Referrals: No future visits requested    Narrative:    visited patient in 2162 for initial spiritual assessment. Patient appeared weak in bed, several family members present. Patient and family engaged in conversation and life review about patient's medical condition. They shared that there have been some changes in patient's condition that was concerning. They are people of osmin and value prayer.  listened with empathy and affirmation, offered their requested prayer and spoke words of encouragement. They were grateful for the visit and prayer.     Visited by: Chaplain Tiburcio Danielle M.Div., [facility].   Paging Service: JC [phone])  
05/03/2024 03:49 AM    CREATININE 3.49 05/03/2024 03:49 AM    GLUCOSE 90 05/03/2024 03:49 AM    CALCIUM 9.8 05/03/2024 03:49 AM    LABGLOM 13 05/03/2024 03:49 AM    LABGLOM 11 04/30/2024 01:56 AM    LABGLOM >60 02/22/2023 11:48 AM        Wt Readings from Last 3 Encounters:   05/03/24 73.8 kg (162 lb 11.2 oz)   04/22/24 65.2 kg (143 lb 11.8 oz)   04/19/24 65.2 kg (143 lb 11.8 oz)         Intake/Output Summary (Last 24 hours) at 5/3/2024 1017  Last data filed at 5/3/2024 0010  Gross per 24 hour   Intake 500 ml   Output 210 ml   Net 290 ml         Patient seen and examined. Chart reviewed. Labs, data and other pertinent notes reviewed in last 24 hrs.    PMH/SH/FH reviewed and unchanged compared to H&P      Eduar Noland MD             
   And    heparin (porcine) 1000 UNIT/ML injection 1,300 Units  1,300 Units IntraCATHeter PRN    sodium chloride flush 0.9 % injection 5-40 mL  5-40 mL IntraVENous 2 times per day    sodium chloride flush 0.9 % injection 5-40 mL  5-40 mL IntraVENous PRN    0.9 % sodium chloride infusion   IntraVENous PRN    ondansetron (ZOFRAN-ODT) disintegrating tablet 4 mg  4 mg Oral Q8H PRN    Or    ondansetron (ZOFRAN) injection 4 mg  4 mg IntraVENous Q6H PRN    polyethylene glycol (GLYCOLAX) packet 17 g  17 g Oral Daily PRN    acetaminophen (TYLENOL) tablet 650 mg  650 mg Oral Q6H PRN    Or    acetaminophen (TYLENOL) suppository 650 mg  650 mg Rectal Q6H PRN    apixaban (ELIQUIS) tablet 2.5 mg  2.5 mg Oral BID    melatonin tablet 3 mg  3 mg Oral Nightly PRN    pravastatin (PRAVACHOL) tablet 20 mg  20 mg Oral Daily    metoprolol (LOPRESSOR) injection 5 mg  5 mg IntraVENous Q6H PRN     
35.6    205     Recent Labs     04/26/24  1402 04/27/24  0614    137   K 4.3 4.7    112*   CO2 25 22   GLUCOSE 127* 108*   BUN 29* 29*   CREATININE 1.70* 1.67*   CALCIUM 10.5* 10.0   MG 2.2  --    BILITOT 0.7  --    AST 19  --    ALT 16  --        Signed: Sharon Shepard MD         
by: Maddy Chung MD on 4/18/2024 11:51 AM      Stress Test:  No results found for this or any previous visit.       Radiology Results in the last 24 hours:  XR CHEST PORTABLE  Narrative: EXAM:  XR CHEST PORTABLE    INDICATION: Hypoxia, vomiting, and atrial fibrillation.    COMPARISON: Chest views on 4/26/2024 and 7/11/2023.    TECHNIQUE: portable chest AP view    FINDINGS: Pacemaker battery pack and leads are unchanged. Sternotomy wires are  unchanged. Cardiomegaly is unchanged. Dilated aortic arch is chronic and  unchanged. Pulmonary vascular prominence is mild.    Reticular interstitial opacities are mild and similar to 2 days ago. No  pneumothorax. Small left pleural effusion is new versus more conspicuous. The  visualized bones and upper abdomen are age-appropriate.  Impression: Mild CHF pattern of pulmonary edema. Small left pleural effusion. Consider PA  and lateral chest views when the patient can better tolerate.      Assessment:     Principal Problem:    ADOLFO (acute kidney injury) ([facility])  Resolved Problems:    * No resolved hospital problems. *      Plan:     Atrial fibrillation with RVR  Likely in the setting of volume depletion 2/2 N/V/D  Rates improved, continue amio gtt at 0.5 mg/hr.  Plan to transition off vs switch to PO tomorrow.  Continue Eliquis for stroke prevention -- on low dose given age/SCr  Continue BB with hold parameters (hold for SBP < 90 mmHg)  Continue fluid resuscitation     2.  Mitral regurgitation  S/p GINA with MV Clip x 3 on 4/17/24 with ASD closure by Dr. Nguyen  Post procedure echo 4/2024 with preserved LVEF 50-55% with mild MR, mod PI, mild PHTN, sev LAD, and ASD occluder device well seated.     3.  ASD  S/p closure by Dr. Nguyen on 4/17/24 with 12 mm Amplatz ASD occluder  Post procedure echo 4/2024 with preserved LVEF 50-55% with mild MR, mod PI, mild PHTN, sev LAD, and ASD occluder device well seated.     4.  HFpEF  Does not appear in exacerbation -- monitor closely     5.  
not copied into this note but were reviewed prior to creation of Plan.      LABS:  I reviewed today's most current labs and imaging studies.  Pertinent labs include:  Recent Labs     05/10/24  0240   WBC 8.5   HGB 10.9*   HCT 36.2        Recent Labs     05/10/24  0240   *   K 4.3      CO2 28   GLUCOSE 116*   BUN 39*   CREATININE 2.99*   CALCIUM 9.8       Signed: Caren Alfaro MD         
rhythm,  B/L edema  GI:  Soft, Non distended, Non tender.  +Bowel sounds  Neurologic:  Alert and oriented X 3, normal speech,   Psych:   Good insight. Not anxious nor agitated  Skin:  No rashes.  No jaundice    Reviewed most current lab test results and cultures  YES  Reviewed most current radiology test results   YES  Review and summation of old records today    NO  Reviewed patient's current orders and MAR    YES  PMH/SH reviewed - no change compared to H&P    Procedures: see electronic medical records for all procedures/Xrays and details which were not copied into this note but were reviewed prior to creation of Plan.      LABS:  I reviewed today's most current labs and imaging studies.  Pertinent labs include:  Recent Labs     05/05/24  0002 05/06/24  0309 05/07/24  0306   WBC 9.5 9.1 8.9   HGB 10.9* 11.1* 11.1*   HCT 36.2 36.7 36.6    160 146*       Recent Labs     05/04/24  2236 05/06/24  0309 05/07/24  0306    134* 135*   K 4.3 4.6 4.2    104 103   CO2 27 24 25   GLUCOSE 115* 103* 77   BUN 37* 47* 31*   CREATININE 3.34* 3.71* 2.69*   CALCIUM 9.9 10.1 9.8         Signed: Alden Miller MD    Total time: 35 mins     
small stones in the gallbladder. There is fluid around the  gallbladder. The findings could represent acute cholecystitis. No significant  biliary dilatation.    The adrenal glands appear normal.    The kidneys enhance symmetrically. No hydronephrosis.    No stones in the bladder. No bladder wall thickening.    The uterus is surgically absent.    No free air, free fluid, or abscesses.    No significant lymphadenopathy.    No evidence of intestinal obstruction.    Chronic aortic dissection extending from the ascending thoracic aorta to the  right common iliac artery is again noted.    No significant bony abnormalities. There are multilevel degenerative changes.    Impression  1.  There is cholelithiasis with evidence of inflammatory changes and fluid  around the gallbladder. This may represent acute cholecystitis.  2.  Chronic aortic dissection extending from the ascending thoracic aorta to the  right common iliac artery.      Echo:  04/11/24    ECHO (TTE) COMPLETE (PRN CONTRAST/BUBBLE/STRAIN/3D) 04/18/2024 11:51 AM (Final)    Interpretation Summary    Left Ventricle: Low normal left ventricular systolic function with a visually estimated EF of 50 - 55%. Not well visualized. Left ventricle size is normal. Increased wall thickness. Normal wall motion.    Right Ventricle: Mildly reduced systolic function.    Mitral Valve: Valve repaired by MitraClip. MV mean gradient is 6 mmHg. Mild regurgitation. MV mean gradient is 6 mmHg.    Tricuspid Valve: Mildly elevated RVSP, consistent with mild pulmonary hypertension.    Pulmonic Valve: Moderate regurgitation.    Left Atrium: Left atrium is severely dilated.    Interatrial Septum: ASD occluder device is well-seated    IVC/SVC: IVC diameter is greater than 21 mm and decreases less than 50% during inspiration; therefore the estimated right atrial pressure is elevated (~15 mmHg).    Image quality is adequate.    Signed by: Maddy Chung MD on 4/18/2024 11:51 AM       Current

## 2024-05-13 NOTE — PLAN OF CARE
Problem: Occupational Therapy - Adult  Goal: By Discharge: Performs self-care activities at highest level of function for planned discharge setting.  See evaluation for individualized goals.  Description: FUNCTIONAL STATUS PRIOR TO ADMISSION:  Patient is independent to mod I for ADLs and functional mobility, ambulating with a RW.   HOME SUPPORT: The patient lived with  and son, states son works full-time but is able to assist at night.    Occupational Therapy Goals:  Initiated 4/29/2024  1.  Patient will perform grooming standing at sink with Set-up and Supervision within 7 day(s).  2.  Patient will perform upper body dressing with Set-up and Supervision within 7 day(s).  3.  Patient will perform lower body dressing with Set-up and Supervision within 7 day(s).  4.  Patient will perform toilet transfers with Supervision  within 7 day(s).  5.  Patient will perform all aspects of toileting with Set-up and Supervision within 7 day(s).  6.  Patient will sponge bathing with Set-up and Supervision within 7 day(s).     4/29/2024 1714 by Jose Ramon Jung, OTR/L  Outcome: Progressing    OCCUPATIONAL THERAPY EVALUATION    Patient: Azucena Myrick (78 y.o. female)  Date: 4/29/2024  Primary Diagnosis: ADOLFO (acute kidney injury) ([facility]) [N17.9]         Precautions:                    ASSESSMENT :  The patient is currently limited by GW, poor activity tolerance and decreased balance which is impairing her functional independence. She is now functioning below her independent to mod I baseline, performing ADLs at an independent to min A level and is SBA to CGA for functional mobility. At this time the patient will continue to benefit from acute OT and she will need HHOT, PT and the assistance of family VS SNF after discharge, depending on her progress acutely.          PLAN :  Recommendations and Planned Interventions:   self care training, therapeutic activities, functional mobility training, balance training, therapeutic 
  Problem: Occupational Therapy - Adult  Goal: By Discharge: Performs self-care activities at highest level of function for planned discharge setting.  See evaluation for individualized goals.  Description: FUNCTIONAL STATUS PRIOR TO ADMISSION:  Patient is independent to mod I for ADLs and functional mobility, ambulating with a RW.   HOME SUPPORT: The patient lived with  and son, states son works full-time but is able to assist at night.    Occupational Therapy Goals:  Initiated 4/29/2024, Goals remain appropriate as per 5/6 weekly re-evaluation.   1.  Patient will perform grooming standing at sink with Set-up and Supervision within 7 day(s).  2.  Patient will perform upper body dressing with Set-up and Supervision within 7 day(s).  3.  Patient will perform lower body dressing with Set-up and Supervision within 7 day(s).  4.  Patient will perform toilet transfers with Supervision  within 7 day(s).  5.  Patient will perform all aspects of toileting with Set-up and Supervision within 7 day(s).  6.  Patient will sponge bathing with Set-up and Supervision within 7 day(s).     Outcome: Progressing    OCCUPATIONAL THERAPY TREATMENT  Patient: Azucena Myrick (78 y.o. female)  Date: 5/9/2024  Primary Diagnosis: ADOLFO (acute kidney injury) ([facility]) [N17.9]       Precautions:  (falls, HD)                Chart, occupational therapy assessment, plan of care, and goals were reviewed.    ASSESSMENT  Patient presented up in chair, agreeable to work with OT. She is making steady functional progress, but remains limited by GW, decreased activity tolerance, decreased safety awareness and decreased balance. Overall she was min A for transfers and ambulation with a RW, she was CGA for toileting and standing grooming and she was up to mod A for LB dressing performed in sitting and standing. She is requiring cueing for safe hand placement during sit to/from stand transfers and for management of the RW during ambulation, but she is 
  Problem: Physical Therapy - Adult  Goal: By Discharge: Performs mobility at highest level of function for planned discharge setting.  See evaluation for individualized goals.  Description: FUNCTIONAL STATUS PRIOR TO ADMISSION: Pt reports relying on rolling walker since recent hospitalization however previously ambulating with SPC for longer distances and no AD inside the home. Denies home O2 use. Denies history of falls.     HOME SUPPORT PRIOR TO ADMISSION: The patient lived with  and son, states son works full-time but is able to assist at night.    Physical Therapy Goals  Initiated 4/28/2024  1.  Patient will move from supine to sit and sit to supine, scoot up and down, and roll side to side in bed with modified independence within 7 day(s).    2.  Patient will perform sit to stand with modified independence within 7 day(s).  3.  Patient will transfer from bed to chair and chair to bed with modified independence using the least restrictive device within 7 day(s).  4.  Patient will ambulate with modified independence for 150 feet with the least restrictive device within 7 day(s).   5.  Patient will ascend/descend 4 stairs with 1 handrail(s) with modified independence within 7 day(s).   5/2/2024 1321 by Danyell Branch, PT  Outcome: Progressing     
  Problem: Physical Therapy - Adult  Goal: By Discharge: Performs mobility at highest level of function for planned discharge setting.  See evaluation for individualized goals.  Description: FUNCTIONAL STATUS PRIOR TO ADMISSION: Pt reports relying on rolling walker since recent hospitalization however previously ambulating with SPC for longer distances and no AD inside the home. Denies home O2 use. Denies history of falls.     HOME SUPPORT PRIOR TO ADMISSION: The patient lived with  and son, states son works full-time but is able to assist at night.    Physical Therapy Goals  Initiated 4/28/2024  1.  Patient will move from supine to sit and sit to supine, scoot up and down, and roll side to side in bed with modified independence within 7 day(s).    2.  Patient will perform sit to stand with modified independence within 7 day(s).  3.  Patient will transfer from bed to chair and chair to bed with modified independence using the least restrictive device within 7 day(s).  4.  Patient will ambulate with modified independence for 150 feet with the least restrictive device within 7 day(s).   5.  Patient will ascend/descend 4 stairs with 1 handrail(s) with modified independence within 7 day(s).   Outcome: Progressing   PHYSICAL THERAPY EVALUATION    Patient: Azucena Myrick (78 y.o. female)  Date: 4/28/2024  Primary Diagnosis: ADOLFO (acute kidney injury) ([facility]) [N17.9]       Precautions:                        ASSESSMENT :   DEFICITS/IMPAIRMENTS:   The patient is limited by symptomatic orthostatic hypotension, generalized weakness, impaired activity tolerance, DE LEÓN, and overall impaired functional mobility. Pt received supine in bed on 5L O2, agreeable to participation with therapy despite reports of generally feeling poorly. Pt assumed seated position EOB w/ SBAx1, sitting balance intact. BP assessed and found to be 85/65. Attempted to have pt perform BLE strengthening exercise however pt with poor performance, reports 
  Problem: Physical Therapy - Adult  Goal: By Discharge: Performs mobility at highest level of function for planned discharge setting.  See evaluation for individualized goals.  Description: FUNCTIONAL STATUS PRIOR TO ADMISSION: Pt reports relying on rolling walker since recent hospitalization however previously ambulating with SPC for longer distances and no AD inside the home. Denies home O2 use. Denies history of falls.     HOME SUPPORT PRIOR TO ADMISSION: The patient lived with  and son, states son works full-time but is able to assist at night.    Physical Therapy Goals  Initiated 4/28/2024  1.  Patient will move from supine to sit and sit to supine, scoot up and down, and roll side to side in bed with modified independence within 7 day(s).    2.  Patient will perform sit to stand with modified independence within 7 day(s).  3.  Patient will transfer from bed to chair and chair to bed with modified independence using the least restrictive device within 7 day(s).  4.  Patient will ambulate with modified independence for 150 feet with the least restrictive device within 7 day(s).   5.  Patient will ascend/descend 4 stairs with 1 handrail(s) with modified independence within 7 day(s).   Outcome: Progressing   PHYSICAL THERAPY TREATMENT    Patient: Azucena Myrick (78 y.o. female)  Date: 5/1/2024  Diagnosis: ADOLFO (acute kidney injury) ([facility]) [N17.9] ADOLFO (acute kidney injury) ([facility])      Precautions:                        ASSESSMENT:  Patient continues to benefit from skilled PT services and is progressing towards goals. Pt received semi fowlers in bed, agreeable to participate in therapy. Received on 3L O2 via NC sating at 96%. Able to wean pt to 1L O2 throughout mobility oxygen remained >95%. Pt with less c/o regarding dizziness, BP WNL throughout, however demonstrates significant lethargy. Good tolerance to ambulation progression today. Pt does required seated rest break in between trials as well as 
  Problem: Physical Therapy - Adult  Goal: By Discharge: Performs mobility at highest level of function for planned discharge setting.  See evaluation for individualized goals.  Description: FUNCTIONAL STATUS PRIOR TO ADMISSION: Pt reports relying on rolling walker since recent hospitalization however previously ambulating with SPC for longer distances and no AD inside the home. Denies home O2 use. Denies history of falls.     HOME SUPPORT PRIOR TO ADMISSION: The patient lived with  and son, states son works full-time but is able to assist at night.    Physical Therapy Goals  Initiated 4/28/2024  1.  Patient will move from supine to sit and sit to supine, scoot up and down, and roll side to side in bed with modified independence within 7 day(s).    2.  Patient will perform sit to stand with modified independence within 7 day(s).  3.  Patient will transfer from bed to chair and chair to bed with modified independence using the least restrictive device within 7 day(s).  4.  Patient will ambulate with modified independence for 150 feet with the least restrictive device within 7 day(s).   5.  Patient will ascend/descend 4 stairs with 1 handrail(s) with modified independence within 7 day(s).   Outcome: Progressing   PHYSICAL THERAPY TREATMENT    Patient: Azucena Myrick (78 y.o. female)  Date: 5/9/2024  Diagnosis: ADOLFO (acute kidney injury) ([facility]) [N17.9] ADOLFO (acute kidney injury) ([facility])      Precautions:  (falls, HD)                      ASSESSMENT:  Patient continues to benefit from skilled PT services and is slowly progressing towards goals. Pt received semi fowlers in bed, agreeable to participate in therapy. Pt showing some improvements with mobility. Performed bed mobility with less assist than previous sessions however continues to require increased assist for STS from varied surfaces. Performed short gait trials in room with seated rest break in between in order to recover from fatigue and weakness. Pt noted 
  Problem: Safety - Adult  Goal: Free from fall injury  4/30/2024 0818 by Amanda Cardona RN  Outcome: Progressing  4/30/2024 0020 by Azucena Ogden RN  Outcome: Progressing     Problem: Discharge Planning  Goal: Discharge to home or other facility with appropriate resources  4/30/2024 0818 by Amanda Cardona RN  Outcome: Progressing  4/30/2024 0020 by Azucena Ogden RN  Outcome: Progressing  Flowsheets (Taken 4/29/2024 1930)  Discharge to home or other facility with appropriate resources:   Identify barriers to discharge with patient and caregiver   Arrange for needed discharge resources and transportation as appropriate   Identify discharge learning needs (meds, wound care, etc)   Arrange for interpreters to assist at discharge as needed   Refer to discharge planning if patient needs post-hospital services based on physician order or complex needs related to functional status, cognitive ability or social support system     Problem: Pain  Goal: Verbalizes/displays adequate comfort level or baseline comfort level  4/30/2024 0020 by Azucena Ogden RN  Outcome: Progressing  Flowsheets (Taken 4/29/2024 1930)  Verbalizes/displays adequate comfort level or baseline comfort level:   Encourage patient to monitor pain and request assistance   Assess pain using appropriate pain scale   Administer analgesics based on type and severity of pain and evaluate response   Implement non-pharmacological measures as appropriate and evaluate response   Consider cultural and social influences on pain and pain management   Notify Licensed Independent Practitioner if interventions unsuccessful or patient reports new pain     Problem: Skin/Tissue Integrity  Goal: Absence of new skin breakdown  Description: 1.  Monitor for areas of redness and/or skin breakdown  2.  Assess vascular access sites hourly  3.  Every 4-6 hours minimum:  Change oxygen saturation probe site  4.  Every 4-6 hours:  If on nasal continuous positive airway pressure, 
  Problem: Safety - Adult  Goal: Free from fall injury  4/30/2024 1939 by Aletha Perrin RN  Outcome: Progressing  4/30/2024 0818 by Amanda Cardona RN  Outcome: Progressing     Problem: Discharge Planning  Goal: Discharge to home or other facility with appropriate resources  4/30/2024 1939 by Aletha Perrin RN  Outcome: Progressing  4/30/2024 0818 by Amanda Cardona RN  Outcome: Progressing     Problem: Skin/Tissue Integrity  Goal: Absence of new skin breakdown  Description: 1.  Monitor for areas of redness and/or skin breakdown  2.  Assess vascular access sites hourly  3.  Every 4-6 hours minimum:  Change oxygen saturation probe site  4.  Every 4-6 hours:  If on nasal continuous positive airway pressure, respiratory therapy assess nares and determine need for appliance change or resting period.  4/30/2024 0818 by Amanda Cardona RN  Outcome: Progressing     Problem: Chronic Conditions and Co-morbidities  Goal: Patient's chronic conditions and co-morbidity symptoms are monitored and maintained or improved  4/30/2024 0818 by Amanda Cardona RN  Outcome: Progressing     
  Problem: Safety - Adult  Goal: Free from fall injury  5/11/2024 2303 by Matthew Ernandez RN  Outcome: Progressing  5/11/2024 1801 by Merly Velasco RN  Outcome: Progressing     Problem: Discharge Planning  Goal: Discharge to home or other facility with appropriate resources  5/11/2024 2303 by Matthew Ernandez RN  Outcome: Progressing  5/11/2024 1801 by Merly Velasco RN  Outcome: Progressing     Problem: Pain  Goal: Verbalizes/displays adequate comfort level or baseline comfort level  5/11/2024 2303 by Matthew Ernandez RN  Outcome: Progressing  5/11/2024 1801 by Merly Velasco RN  Outcome: Progressing     Problem: Skin/Tissue Integrity  Goal: Absence of new skin breakdown  Description: 1.  Monitor for areas of redness and/or skin breakdown  2.  Assess vascular access sites hourly  3.  Every 4-6 hours minimum:  Change oxygen saturation probe site  4.  Every 4-6 hours:  If on nasal continuous positive airway pressure, respiratory therapy assess nares and determine need for appliance change or resting period.  5/11/2024 2303 by Matthew Ernandez RN  Outcome: Progressing  5/11/2024 1801 by Merly Velasco RN  Outcome: Progressing     Problem: Chronic Conditions and Co-morbidities  Goal: Patient's chronic conditions and co-morbidity symptoms are monitored and maintained or improved  5/11/2024 2303 by Matthew Ernandez RN  Outcome: Progressing  5/11/2024 1801 by Merly Velasco RN  Outcome: Progressing     Problem: Neurosensory - Adult  Goal: Achieves stable or improved neurological status  5/11/2024 2303 by Matthew Ernandez RN  Outcome: Progressing  5/11/2024 1801 by Merly Velasco RN  Outcome: Progressing     Problem: Respiratory - Adult  Goal: Achieves optimal ventilation and oxygenation  Outcome: Progressing     Problem: Cardiovascular - Adult  Goal: Maintains optimal cardiac output and hemodynamic stability  5/11/2024 2303 by Matthew Ernandez RN  Outcome: Progressing  5/11/2024 1801 by Merly Velasco 
  Problem: Safety - Adult  Goal: Free from fall injury  5/12/2024 2006 by Matthew Ernandez RN  Outcome: Progressing  5/12/2024 1906 by Gerard Roman RN  Outcome: Progressing     Problem: Discharge Planning  Goal: Discharge to home or other facility with appropriate resources  5/12/2024 2006 by Matthew Ernandez RN  Outcome: Progressing  5/12/2024 1906 by Gerard Roman RN  Outcome: Progressing     Problem: Pain  Goal: Verbalizes/displays adequate comfort level or baseline comfort level  5/12/2024 2006 by Matthew Ernandez RN  Outcome: Progressing  5/12/2024 1906 by Gerard Roman RN  Outcome: Progressing     Problem: Skin/Tissue Integrity  Goal: Absence of new skin breakdown  Description: 1.  Monitor for areas of redness and/or skin breakdown  2.  Assess vascular access sites hourly  3.  Every 4-6 hours minimum:  Change oxygen saturation probe site  4.  Every 4-6 hours:  If on nasal continuous positive airway pressure, respiratory therapy assess nares and determine need for appliance change or resting period.  5/12/2024 2006 by Matthew Ernandez RN  Outcome: Progressing  5/12/2024 1906 by Gerard Roman RN  Outcome: Progressing     Problem: Chronic Conditions and Co-morbidities  Goal: Patient's chronic conditions and co-morbidity symptoms are monitored and maintained or improved  5/12/2024 2006 by Matthew Ernandez RN  Outcome: Progressing  5/12/2024 1906 by Gerard Roman RN  Outcome: Progressing     Problem: Neurosensory - Adult  Goal: Achieves stable or improved neurological status  5/12/2024 2006 by Matthew Ernandez RN  Outcome: Progressing  5/12/2024 1906 by Gerard Roman RN  Outcome: Progressing     Problem: Respiratory - Adult  Goal: Achieves optimal ventilation and oxygenation  5/12/2024 2006 by Matthew Ernandez RN  Outcome: Progressing  5/12/2024 1906 by Gerard Roman RN  Outcome: Progressing     Problem: Cardiovascular - Adult  Goal: Maintains optimal cardiac output and hemodynamic 
  Problem: Safety - Adult  Goal: Free from fall injury  5/4/2024 1954 by Aletha Perrin, RN  Outcome: Progressing  5/4/2024 1318 by Jesusita Paniagua RN  Outcome: Progressing     Problem: Discharge Planning  Goal: Discharge to home or other facility with appropriate resources  Outcome: Progressing     Problem: Skin/Tissue Integrity  Goal: Absence of new skin breakdown  Description: 1.  Monitor for areas of redness and/or skin breakdown  2.  Assess vascular access sites hourly  3.  Every 4-6 hours minimum:  Change oxygen saturation probe site  4.  Every 4-6 hours:  If on nasal continuous positive airway pressure, respiratory therapy assess nares and determine need for appliance change or resting period.  5/4/2024 1318 by Jesusita Paniagua RN  Outcome: Progressing     Problem: Chronic Conditions and Co-morbidities  Goal: Patient's chronic conditions and co-morbidity symptoms are monitored and maintained or improved  5/4/2024 1318 by Jesusita Paniagua, RN  Outcome: Progressing     Problem: Neurosensory - Adult  Goal: Achieves stable or improved neurological status  5/4/2024 1318 by Jesusita Paniagua, RN  Outcome: Progressing     
  Problem: Safety - Adult  Goal: Free from fall injury  5/8/2024 2016 by Aletha Perrin RN  Outcome: Progressing  5/8/2024 1634 by Anita Wolf RN  Outcome: Progressing     Problem: Discharge Planning  Goal: Discharge to home or other facility with appropriate resources  5/8/2024 2016 by Aletha Perrin RN  Outcome: Progressing  5/8/2024 1634 by Anita Wolf RN  Outcome: Progressing     Problem: Pain  Goal: Verbalizes/displays adequate comfort level or baseline comfort level  5/8/2024 1634 by Anita Wolf RN  Outcome: Progressing     Problem: Skin/Tissue Integrity  Goal: Absence of new skin breakdown  Description: 1.  Monitor for areas of redness and/or skin breakdown  2.  Assess vascular access sites hourly  3.  Every 4-6 hours minimum:  Change oxygen saturation probe site  4.  Every 4-6 hours:  If on nasal continuous positive airway pressure, respiratory therapy assess nares and determine need for appliance change or resting period.  5/8/2024 1634 by Anita Wolf RN  Outcome: Progressing     Problem: Chronic Conditions and Co-morbidities  Goal: Patient's chronic conditions and co-morbidity symptoms are monitored and maintained or improved  5/8/2024 1634 by Anita Wolf RN  Outcome: Progressing     Problem: Neurosensory - Adult  Goal: Achieves stable or improved neurological status  5/8/2024 1634 by Ainta Wolf RN  Outcome: Progressing     Problem: Respiratory - Adult  Goal: Achieves optimal ventilation and oxygenation  5/8/2024 1634 by Anita Wolf RN  Outcome: Progressing     Problem: Cardiovascular - Adult  Goal: Maintains optimal cardiac output and hemodynamic stability  5/8/2024 1634 by Anita Wolf RN  Outcome: Progressing     Problem: Skin/Tissue Integrity - Adult  Goal: Skin integrity remains intact  5/8/2024 1634 by Anita Wolf RN  Outcome: Progressing     Problem: Musculoskeletal - 
  Problem: Safety - Adult  Goal: Free from fall injury  Outcome: Adequate for Discharge     Problem: Discharge Planning  Goal: Discharge to home or other facility with appropriate resources  Outcome: Adequate for Discharge     Problem: Pain  Goal: Verbalizes/displays adequate comfort level or baseline comfort level  Outcome: Adequate for Discharge     Problem: Skin/Tissue Integrity  Goal: Absence of new skin breakdown  Description: 1.  Monitor for areas of redness and/or skin breakdown  2.  Assess vascular access sites hourly  3.  Every 4-6 hours minimum:  Change oxygen saturation probe site  4.  Every 4-6 hours:  If on nasal continuous positive airway pressure, respiratory therapy assess nares and determine need for appliance change or resting period.  Outcome: Adequate for Discharge     Problem: Chronic Conditions and Co-morbidities  Goal: Patient's chronic conditions and co-morbidity symptoms are monitored and maintained or improved  Outcome: Adequate for Discharge     Problem: Neurosensory - Adult  Goal: Achieves stable or improved neurological status  Outcome: Adequate for Discharge     Problem: Respiratory - Adult  Goal: Achieves optimal ventilation and oxygenation  Outcome: Adequate for Discharge     Problem: Cardiovascular - Adult  Goal: Maintains optimal cardiac output and hemodynamic stability  Outcome: Adequate for Discharge     Problem: Skin/Tissue Integrity - Adult  Goal: Skin integrity remains intact  Outcome: Adequate for Discharge     Problem: Musculoskeletal - Adult  Goal: Return mobility to safest level of function  Outcome: Adequate for Discharge     Problem: Gastrointestinal - Adult  Goal: Minimal or absence of nausea and vomiting  Outcome: Adequate for Discharge     Problem: Genitourinary - Adult  Goal: Absence of urinary retention  Outcome: Adequate for Discharge     Problem: Infection - Adult  Goal: Absence of infection at discharge  Outcome: Adequate for Discharge     Problem: Metabolic/Fluid 
  Problem: Safety - Adult  Goal: Free from fall injury  Outcome: Progressing     Problem: Discharge Planning  Goal: Discharge to home or other facility with appropriate resources  Outcome: Progressing     Problem: Pain  Goal: Verbalizes/displays adequate comfort level or baseline comfort level  Outcome: Progressing     Problem: Skin/Tissue Integrity  Goal: Absence of new skin breakdown  Description: 1.  Monitor for areas of redness and/or skin breakdown  2.  Assess vascular access sites hourly  3.  Every 4-6 hours minimum:  Change oxygen saturation probe site  4.  Every 4-6 hours:  If on nasal continuous positive airway pressure, respiratory therapy assess nares and determine need for appliance change or resting period.  Outcome: Progressing     
  Problem: Safety - Adult  Goal: Free from fall injury  Outcome: Progressing     Problem: Discharge Planning  Goal: Discharge to home or other facility with appropriate resources  Outcome: Progressing     Problem: Pain  Goal: Verbalizes/displays adequate comfort level or baseline comfort level  Outcome: Progressing     Problem: Skin/Tissue Integrity  Goal: Absence of new skin breakdown  Description: 1.  Monitor for areas of redness and/or skin breakdown  2.  Assess vascular access sites hourly  3.  Every 4-6 hours minimum:  Change oxygen saturation probe site  4.  Every 4-6 hours:  If on nasal continuous positive airway pressure, respiratory therapy assess nares and determine need for appliance change or resting period.  Outcome: Progressing     Problem: Chronic Conditions and Co-morbidities  Goal: Patient's chronic conditions and co-morbidity symptoms are monitored and maintained or improved  Outcome: Progressing     Problem: Neurosensory - Adult  Goal: Achieves stable or improved neurological status  Outcome: Progressing     Problem: Cardiovascular - Adult  Goal: Maintains optimal cardiac output and hemodynamic stability  Outcome: Progressing     Problem: Skin/Tissue Integrity - Adult  Goal: Skin integrity remains intact  Outcome: Progressing     Problem: Musculoskeletal - Adult  Goal: Return mobility to safest level of function  Outcome: Progressing     Problem: Genitourinary - Adult  Goal: Absence of urinary retention  Outcome: Progressing     Problem: Infection - Adult  Goal: Absence of infection at discharge  Outcome: Progressing     Problem: Metabolic/Fluid and Electrolytes - Adult  Goal: Electrolytes maintained within normal limits  Outcome: Progressing     Problem: Hematologic - Adult  Goal: Maintains hematologic stability  Outcome: Progressing     Problem: Nutrition Deficit:  Goal: Optimize nutritional status  Outcome: Progressing     
  Problem: Safety - Adult  Goal: Free from fall injury  Outcome: Progressing     Problem: Discharge Planning  Goal: Discharge to home or other facility with appropriate resources  Outcome: Progressing     Problem: Pain  Goal: Verbalizes/displays adequate comfort level or baseline comfort level  Outcome: Progressing     Problem: Skin/Tissue Integrity  Goal: Absence of new skin breakdown  Description: 1.  Monitor for areas of redness and/or skin breakdown  2.  Assess vascular access sites hourly  3.  Every 4-6 hours minimum:  Change oxygen saturation probe site  4.  Every 4-6 hours:  If on nasal continuous positive airway pressure, respiratory therapy assess nares and determine need for appliance change or resting period.  Outcome: Progressing     Problem: Chronic Conditions and Co-morbidities  Goal: Patient's chronic conditions and co-morbidity symptoms are monitored and maintained or improved  Outcome: Progressing     Problem: Neurosensory - Adult  Goal: Achieves stable or improved neurological status  Outcome: Progressing     Problem: Respiratory - Adult  Goal: Achieves optimal ventilation and oxygenation  Outcome: Progressing     Problem: Cardiovascular - Adult  Goal: Maintains optimal cardiac output and hemodynamic stability  Outcome: Progressing     Problem: Gastrointestinal - Adult  Goal: Minimal or absence of nausea and vomiting  Outcome: Progressing     Problem: Infection - Adult  Goal: Absence of infection at discharge  Outcome: Progressing     Problem: Metabolic/Fluid and Electrolytes - Adult  Goal: Electrolytes maintained within normal limits  Outcome: Progressing     
  Problem: Safety - Adult  Goal: Free from fall injury  Outcome: Progressing     Problem: Discharge Planning  Goal: Discharge to home or other facility with appropriate resources  Outcome: Progressing     Problem: Pain  Goal: Verbalizes/displays adequate comfort level or baseline comfort level  Outcome: Progressing     Problem: Skin/Tissue Integrity  Goal: Absence of new skin breakdown  Description: 1.  Monitor for areas of redness and/or skin breakdown  2.  Assess vascular access sites hourly  3.  Every 4-6 hours minimum:  Change oxygen saturation probe site  4.  Every 4-6 hours:  If on nasal continuous positive airway pressure, respiratory therapy assess nares and determine need for appliance change or resting period.  Outcome: Progressing     Problem: Chronic Conditions and Co-morbidities  Goal: Patient's chronic conditions and co-morbidity symptoms are monitored and maintained or improved  Outcome: Progressing     Problem: Neurosensory - Adult  Goal: Achieves stable or improved neurological status  Outcome: Progressing     Problem: Respiratory - Adult  Goal: Achieves optimal ventilation and oxygenation  Outcome: Progressing     Problem: Cardiovascular - Adult  Goal: Maintains optimal cardiac output and hemodynamic stability  Outcome: Progressing     Problem: Skin/Tissue Integrity - Adult  Goal: Skin integrity remains intact  Outcome: Progressing     Problem: Musculoskeletal - Adult  Goal: Return mobility to safest level of function  Outcome: Progressing     Problem: Gastrointestinal - Adult  Goal: Minimal or absence of nausea and vomiting  Outcome: Progressing     Problem: Genitourinary - Adult  Goal: Absence of urinary retention  Outcome: Progressing     Problem: Infection - Adult  Goal: Absence of infection at discharge  Outcome: Progressing     Problem: Metabolic/Fluid and Electrolytes - Adult  Goal: Electrolytes maintained within normal limits  Outcome: Progressing     Problem: Hematologic - Adult  Goal: 
  Problem: Safety - Adult  Goal: Free from fall injury  Outcome: Progressing     Problem: Discharge Planning  Goal: Discharge to home or other facility with appropriate resources  Outcome: Progressing  Flowsheets (Taken 4/29/2024 1930)  Discharge to home or other facility with appropriate resources:   Identify barriers to discharge with patient and caregiver   Arrange for needed discharge resources and transportation as appropriate   Identify discharge learning needs (meds, wound care, etc)   Arrange for interpreters to assist at discharge as needed   Refer to discharge planning if patient needs post-hospital services based on physician order or complex needs related to functional status, cognitive ability or social support system     Problem: Pain  Goal: Verbalizes/displays adequate comfort level or baseline comfort level  Outcome: Progressing  Flowsheets (Taken 4/29/2024 1930)  Verbalizes/displays adequate comfort level or baseline comfort level:   Encourage patient to monitor pain and request assistance   Assess pain using appropriate pain scale   Administer analgesics based on type and severity of pain and evaluate response   Implement non-pharmacological measures as appropriate and evaluate response   Consider cultural and social influences on pain and pain management   Notify Licensed Independent Practitioner if interventions unsuccessful or patient reports new pain     Problem: Skin/Tissue Integrity  Goal: Absence of new skin breakdown  Description: 1.  Monitor for areas of redness and/or skin breakdown  2.  Assess vascular access sites hourly  3.  Every 4-6 hours minimum:  Change oxygen saturation probe site  4.  Every 4-6 hours:  If on nasal continuous positive airway pressure, respiratory therapy assess nares and determine need for appliance change or resting period.  Outcome: Progressing     Problem: Chronic Conditions and Co-morbidities  Goal: Patient's chronic conditions and co-morbidity symptoms are 
  Problem: Safety - Adult  Goal: Free from fall injury  Outcome: Progressing     Problem: Skin/Tissue Integrity  Goal: Absence of new skin breakdown  Description: 1.  Monitor for areas of redness and/or skin breakdown  2.  Assess vascular access sites hourly  3.  Every 4-6 hours minimum:  Change oxygen saturation probe site  4.  Every 4-6 hours:  If on nasal continuous positive airway pressure, respiratory therapy assess nares and determine need for appliance change or resting period.  Outcome: Progressing     Problem: Chronic Conditions and Co-morbidities  Goal: Patient's chronic conditions and co-morbidity symptoms are monitored and maintained or improved  5/3/2024 1433 by Jesusita Paniagua RN  Outcome: Progressing  5/3/2024 0201 by Nichol Sosa RN  Outcome: Progressing     Problem: Respiratory - Adult  Goal: Achieves optimal ventilation and oxygenation  5/3/2024 1433 by Jesusita Paniagua RN  Outcome: Progressing  5/3/2024 0201 by Nichol Sosa RN  Outcome: Progressing     Problem: Cardiovascular - Adult  Goal: Maintains optimal cardiac output and hemodynamic stability  5/3/2024 0201 by Nichol Sosa RN  Outcome: Progressing     
  Problem: Safety - Adult  Goal: Free from fall injury  Outcome: Progressing     Problem: Skin/Tissue Integrity  Goal: Absence of new skin breakdown  Description: 1.  Monitor for areas of redness and/or skin breakdown  2.  Assess vascular access sites hourly  3.  Every 4-6 hours minimum:  Change oxygen saturation probe site  4.  Every 4-6 hours:  If on nasal continuous positive airway pressure, respiratory therapy assess nares and determine need for appliance change or resting period.  Outcome: Progressing     Problem: Chronic Conditions and Co-morbidities  Goal: Patient's chronic conditions and co-morbidity symptoms are monitored and maintained or improved  Outcome: Progressing     Problem: Neurosensory - Adult  Goal: Achieves stable or improved neurological status  Outcome: Progressing     
  Problem: Safety - Adult  Goal: Free from fall injury  Outcome: Progressing     Problem: Skin/Tissue Integrity  Goal: Absence of new skin breakdown  Description: 1.  Monitor for areas of redness and/or skin breakdown  2.  Assess vascular access sites hourly  3.  Every 4-6 hours minimum:  Change oxygen saturation probe site  4.  Every 4-6 hours:  If on nasal continuous positive airway pressure, respiratory therapy assess nares and determine need for appliance change or resting period.  Outcome: Progressing     Problem: Chronic Conditions and Co-morbidities  Goal: Patient's chronic conditions and co-morbidity symptoms are monitored and maintained or improved  Outcome: Progressing     Problem: Neurosensory - Adult  Goal: Achieves stable or improved neurological status  Outcome: Progressing     Problem: Respiratory - Adult  Goal: Achieves optimal ventilation and oxygenation  Outcome: Progressing     
  Problem: Safety - Adult  Goal: Free from fall injury  Outcome: Progressing  Flowsheets (Taken 5/9/2024 0730 by Lucrecia Delarosa, RN)  Free From Fall Injury:   Instruct family/caregiver on patient safety   Based on caregiver fall risk screen, instruct family/caregiver to ask for assistance with transferring infant if caregiver noted to have fall risk factors     Problem: Discharge Planning  Goal: Discharge to home or other facility with appropriate resources  Outcome: Progressing  Flowsheets (Taken 5/9/2024 0730 by Lucrecia Delarosa, RN)  Discharge to home or other facility with appropriate resources:   Identify barriers to discharge with patient and caregiver   Arrange for needed discharge resources and transportation as appropriate     Problem: Physical Therapy - Adult  Goal: By Discharge: Performs mobility at highest level of function for planned discharge setting.  See evaluation for individualized goals.  Description: FUNCTIONAL STATUS PRIOR TO ADMISSION: Pt reports relying on rolling walker since recent hospitalization however previously ambulating with SPC for longer distances and no AD inside the home. Denies home O2 use. Denies history of falls.     HOME SUPPORT PRIOR TO ADMISSION: The patient lived with  and son, states son works full-time but is able to assist at night.    Physical Therapy Goals  Initiated 4/28/2024  1.  Patient will move from supine to sit and sit to supine, scoot up and down, and roll side to side in bed with modified independence within 7 day(s).    2.  Patient will perform sit to stand with modified independence within 7 day(s).  3.  Patient will transfer from bed to chair and chair to bed with modified independence using the least restrictive device within 7 day(s).  4.  Patient will ambulate with modified independence for 150 feet with the least restrictive device within 7 day(s).   5.  Patient will ascend/descend 4 stairs with 1 handrail(s) with modified independence 
  Problem: Skin/Tissue Integrity  Goal: Absence of new skin breakdown  Description: 1.  Monitor for areas of redness and/or skin breakdown  2.  Assess vascular access sites hourly  3.  Every 4-6 hours minimum:  Change oxygen saturation probe site  4.  Every 4-6 hours:  If on nasal continuous positive airway pressure, respiratory therapy assess nares and determine need for appliance change or resting period.  Outcome: Progressing     Problem: Chronic Conditions and Co-morbidities  Goal: Patient's chronic conditions and co-morbidity symptoms are monitored and maintained or improved  Outcome: Progressing     
Maintains hematologic stability  Outcome: Progressing     Problem: Nutrition Deficit:  Goal: Optimize nutritional status  Outcome: Progressing  Flowsheets (Taken 5/8/2024 1237 by Juani Richardson RD)  Nutrient intake appropriate for improving, restoring, or maintaining nutritional needs:   Assess nutritional status and recommend course of action   Monitor oral intake, labs, and treatment plans   Recommend appropriate diets, oral nutritional supplements, and vitamin/mineral supplements     
RN in to administer midodrine to pt.  Pt with report of feeling a little lightheaded after transitioning from supine to sitting eob.  BP stable.  Had pt perform ankle pumps and symptom resolved.  Pt able to stand(no report of lightheadedness) and ambulate a short distance in the room and left seated in the recliner, prepared to eat her lunch.  O2 sats decreased with activity and pt required 2L of O2 to keep sats > or = to 90% t/o session.  HHPT remains appropriate at discharge.         PLAN:  Patient continues to benefit from skilled intervention to address the above impairments.  Continue treatment per established plan of care.    Recommend with staff: OOB for meals, ambulate to bathroom with RW    Recommend next PT session: progress gait distance    Recommendation for discharge: (in order for the patient to meet his/her long term goals): Therapy 2x a week in the home    Other factors to consider for discharge: no additional factors    IF patient discharges home will need the following DME: patient owns DME required for discharge       SUBJECTIVE:   Patient stated, \"I'll get up.\"    OBJECTIVE DATA SUMMARY:   Critical Behavior:          Functional Mobility Training:  Bed Mobility:  Bed Mobility Training  Bed Mobility Training: Yes  Rolling: Stand-by assistance;Supervision  Supine to Sit: Stand-by assistance;Supervision;Additional time;Adaptive equipment (hob slightly elevated and minimal use of bed rail)  Scooting: Supervision  Transfers:  Transfer Training  Transfer Training: Yes  Interventions: Safety awareness training;Verbal cues  Sit to Stand: Contact-guard assistance  Stand to Sit: Stand-by assistance  Balance:  Balance  Sitting: Intact  Standing: With support  Standing - Static: Good  Standing - Dynamic: Good;Fair   Ambulation/Gait Training:     Gait  Gait Training: Yes  Left Side Weight Bearing: Full  Right Side Weight Bearing: Full  Overall Level of Assistance: Stand-by assistance;Additional time  Distance 
breakdown  Description: 1.  Monitor for areas of redness and/or skin breakdown  2.  Assess vascular access sites hourly  3.  Every 4-6 hours minimum:  Change oxygen saturation probe site  4.  Every 4-6 hours:  If on nasal continuous positive airway pressure, respiratory therapy assess nares and determine need for appliance change or resting period.  Outcome: Progressing     Problem: Physical Therapy - Adult  Goal: By Discharge: Performs mobility at highest level of function for planned discharge setting.  See evaluation for individualized goals.  Description: FUNCTIONAL STATUS PRIOR TO ADMISSION: Pt reports relying on rolling walker since recent hospitalization however previously ambulating with SPC for longer distances and no AD inside the home. Denies home O2 use. Denies history of falls.     HOME SUPPORT PRIOR TO ADMISSION: The patient lived with  and son, states son works full-time but is able to assist at night.    Physical Therapy Goals  Initiated 4/28/2024  1.  Patient will move from supine to sit and sit to supine, scoot up and down, and roll side to side in bed with modified independence within 7 day(s).    2.  Patient will perform sit to stand with modified independence within 7 day(s).  3.  Patient will transfer from bed to chair and chair to bed with modified independence using the least restrictive device within 7 day(s).  4.  Patient will ambulate with modified independence for 150 feet with the least restrictive device within 7 day(s).   5.  Patient will ascend/descend 4 stairs with 1 handrail(s) with modified independence within 7 day(s).   5/1/2024 1000 by Eva Newsome PTA  Outcome: Progressing     Problem: Chronic Conditions and Co-morbidities  Goal: Patient's chronic conditions and co-morbidity symptoms are monitored and maintained or improved  Outcome: Progressing  Flowsheets (Taken 5/1/2024 1915)  Care Plan - Patient's Chronic Conditions and Co-Morbidity Symptoms are Monitored and 
within 7 day(s).   5/6/2024 1533 by Ekaterina Wolf, PT  Outcome: Progressing     Problem: Occupational Therapy - Adult  Goal: By Discharge: Performs self-care activities at highest level of function for planned discharge setting.  See evaluation for individualized goals.  Description: FUNCTIONAL STATUS PRIOR TO ADMISSION:  Patient is independent to mod I for ADLs and functional mobility, ambulating with a RW.   HOME SUPPORT: The patient lived with  and son, states son works full-time but is able to assist at night.    Occupational Therapy Goals:  Initiated 4/29/2024, Goals remain appropriate as per 5/6 weekly re-evaluation.   1.  Patient will perform grooming standing at sink with Set-up and Supervision within 7 day(s).  2.  Patient will perform upper body dressing with Set-up and Supervision within 7 day(s).  3.  Patient will perform lower body dressing with Set-up and Supervision within 7 day(s).  4.  Patient will perform toilet transfers with Supervision  within 7 day(s).  5.  Patient will perform all aspects of toileting with Set-up and Supervision within 7 day(s).  6.  Patient will sponge bathing with Set-up and Supervision within 7 day(s).     5/6/2024 1801 by Jose Ramon Jung, OTR/L  Outcome: Progressing  5/6/2024 1800 by Jose Ramon Jung, OTR/L  Outcome: Progressing     Problem: Chronic Conditions and Co-morbidities  Goal: Patient's chronic conditions and co-morbidity symptoms are monitored and maintained or improved  Outcome: Progressing     Problem: Neurosensory - Adult  Goal: Achieves stable or improved neurological status  Outcome: Progressing     Problem: Respiratory - Adult  Goal: Achieves optimal ventilation and oxygenation  Outcome: Progressing     Problem: Cardiovascular - Adult  Goal: Maintains optimal cardiac output and hemodynamic stability  Outcome: Progressing     Problem: Skin/Tissue Integrity - Adult  Goal: Skin integrity remains intact  Outcome: Progressing     Problem: 
Assistance: Minimum assistance  Interventions: Demonstration;Verbal cues;Manual cues;Safety awareness training;Tactile cues  Rolling: Stand-by assistance  Supine to Sit: Stand-by assistance  Sit to Supine: Contact-guard assistance  Scooting: Stand-by assistance     Transfers:   Transfer Training  Transfer Training: Yes  Overall Level of Assistance: Minimum assistance;Contact-guard assistance  Interventions: Manual cues;Demonstration;Verbal cues;Safety awareness training;Tactile cues  Sit to Stand: Minimum assistance;Assist X1  Stand to Sit: Minimum assistance;Assist X1  Bed to Chair: Minimum assistance  Toilet Transfer: Moderate assistance (using grab bar)    Balance:  Standing: Impaired  Balance  Sitting: Impaired  Sitting - Static: Good (unsupported)  Sitting - Dynamic: Fair (occasional)  Standing: Impaired  Standing - Static: Good;Constant support  Standing - Dynamic: Fair;Constant support    ADL Intervention:    Grooming: Contact guard assistance  Grooming Skilled Clinical Factors: to wash hand standing at sink    UE Dressing: Minimal assistance;Verbal cueing  UE Dressing Skilled Clinical Factors: to don gown as a robe seated EOB    Toileting: Contact guard assistance;Minimal assistance;Setup  Toileting Skilled Clinical Factors: CGA/setup for bladder hygiene, min A for clothing management    Pain Rating:  No complaint of pain    Activity Tolerance:   Fair   BP supine: 101/81  BP sittin/80  BP standin/70  BP seated EOB s/p ambulation to/from the bathroom: 124/78   HR: 120s to 140s during activity.   SpO2 stable on 2 L NC  After treatment:   Patient left in no apparent distress in bed, Call bell within reach, Bed/ chair alarm activated, and Caregiver / family present    COMMUNICATION/EDUCATION:   The patient's plan of care was discussed with: registered nurse    Patient Education  Education Provided: Role of Therapy;Plan of Care;Transfer Training;Fall Prevention Strategies  Education Method: 
Genitourinary - Adult  Goal: Absence of urinary retention  5/7/2024 0743 by Jasmyn Phan RN  Outcome: Progressing  5/6/2024 2236 by Kelsea Watts RN  Outcome: Progressing     Problem: Infection - Adult  Goal: Absence of infection at discharge  5/7/2024 0743 by Jasmyn Phan RN  Outcome: Progressing  5/6/2024 2236 by Kelsea Watts RN  Outcome: Progressing     Problem: Metabolic/Fluid and Electrolytes - Adult  Goal: Electrolytes maintained within normal limits  5/7/2024 0743 by Jasmyn Phan RN  Outcome: Progressing  5/6/2024 2236 by Kelsea Watts RN  Outcome: Progressing     Problem: Hematologic - Adult  Goal: Maintains hematologic stability  5/7/2024 0743 by Jasmyn Phan RN  Outcome: Progressing  5/6/2024 2236 by Kelsea Watts RN  Outcome: Progressing     Problem: Nutrition Deficit:  Goal: Optimize nutritional status  5/7/2024 0743 by Jasmyn Phan RN  Outcome: Progressing  5/6/2024 2236 by Kelsea Watts RN  Outcome: Progressing     
training;Tactile cues  Supine to Sit: Minimum assistance  Scooting: Contact-guard assistance;Minimum assistance    Transfers:  Transfer Training  Transfer Training: Yes  Overall Level of Assistance: Minimum assistance;Contact-guard assistance  Interventions: Manual cues;Demonstration;Verbal cues;Safety awareness training;Tactile cues  Sit to Stand: Contact-guard assistance;Minimum assistance  Stand to Sit: Minimum assistance;Contact-guard assistance  Bed to Chair: Minimum assistance    Balance:  Balance  Sitting: Impaired  Sitting - Static:  (good minus)  Sitting - Dynamic:  (fair plus/good minus)  Standing: Impaired  Standing - Static: Constant support (fair plus/good minus)  Standing - Dynamic: Constant support;Fair     Ambulation/Gait Training:     Gait  Gait Training: Yes  Overall Level of Assistance: Minimum assistance;Contact-guard assistance  Distance (ft): 16 Feet (16ft, 6ft)  Assistive Device: Gait belt;Walker, rolling  Interventions: Demonstration;Verbal cues;Manual cues;Safety awareness training;Tactile cues (steadying assist, tactile/vcs for safety, line/dme management)  Gait Abnormalities:  (slow unsteady reciprocal gait, decreased step/stride, foot clearance, cielo; forward flexed posture)  Number of Stairs Trained: 0    Neuro Re-Education:                                                                                                                                                                                                                                                 [facility] AM-PAC®      Basic Mobility Inpatient Short Form (6-Clicks) Version 2  How much HELP from another person do you currently need... (If the patient hasn't done an activity recently, how much help from another person do you think they would need if they tried?) Total A Lot A Little None   1.  Turning from your back to your side while in a flat bed without using bedrails? []  1 []  2 []  3  [x]  4   2.  Moving from

## 2024-05-13 NOTE — DISCHARGE SUMMARY
Discharge Summary    Name: Azucena Myrick  [id number]  YOB: 1945 (Age: 78 y.o.)   Date of Admission: 4/26/2024  Date of Discharge: 5/13/2024  Attending Physician: Caren Alfaro MD    Discharge Diagnosis:   ADOLFO on CKD, now needing hemodialysis   Acute hyperkalemia-resolved    Nausea/vomiting/diarrhea from gastro enteritis  Atrial fibrillation with RVR  Hx of mitral valve repair with mitraclip, ASD repair  Acute on chronic diastolic heart failure  Leukocytosis -resolved   Hx of ascending aortic aneurysm repair  Incidental chronic aortic dissection on CT  Hypertension  Hyperlipidemia  Severe malnutrition       Appreciated dietitian recs    Consultations:  IP CONSULT TO CARDIOLOGY  IP CONSULT TO CASE MANAGEMENT  IP CONSULT TO CASE MANAGEMENT  IP CONSULT TO VASCULAR SURGERY      Brief Admission History/Reason for Admission Per Sharon Shepard MD:   Azucena Myrick is a 78 y.o.  female with PMHx significant for chronic heart failure, arthritis, GERD, paroxysmal afib presented to ED with c/o nausea/vomiting and diarrhea for 3 days.   Patient was recently discharged from the hospital.   Patient was noted to be hypoxic to 88-89% and placed on 2L by EMS.    Brief Hospital Course by Main Problems:   ADOLFO on CKD, now needing hemodialysis  -Likely s/s volume depletion in the setting of nausea/vomiting diarrhea  -S/p initiation of hemodialysis.   -Continue midodrine , consider increasing the dose if blood pressure remains low, at present BP acceptable   -S/p perm cath placement  on 5/8 .   -HD as per nephro  -Bumetanide discontinued       Acute hyperkalemia- resolved  -Potassium is 4.3     Nausea/vomiting/diarrhea Likely gastro enteritis  CT abdomen shows cholelithiasis, US abdomen negative for cholecystitis  Zofran prn  HIDA scan negative  -Symptoms resolved     Atrial fibrillation with RVR  Paroxysmal afib:  Continue amiodarone and Eliquis.  Metoprolol discontinued due to

## 2024-05-13 NOTE — CARE COORDINATION
Transition of Care Plan:     RUR: 20%   Prior Level of Functioning: independent  Disposition: SNF rehab with new OP HD - [facility] - facilty can accept and provides in house HD  [facility] [place] chair approved for TTS 11am   If SNF or IPR: Date FOC offered: 5/7/2024  Date FOC received: 5/7/2024  Accepting facility: [facility]  Date authorization started with reference number: 5/9  Date authorization received and expires:   Transportation at discharge: medical transport  IM/IMM Medicare/ letter given: will need prior to discharge  Caregiver Contact: spouse Nash Myrick [phone]   RT Ramez son [phone]  Discharge Caregiver contacted prior to discharge? yes  Care Conference needed? Not at this time   Barriers to discharge: medical stability and insurance authorization,     Update  D/c order acknowledged. Insurance auth remains pending. Avoidable delay entered.     Initial note  Insurance auth remains pending for SNF at [facility]. CM has sent updated notes.     RIGO Luis, CM  [phone]    
Transition of Care Plan:     RUR: 20%   Prior Level of Functioning: independent  Disposition: SNF rehab with new OP HD - [facility] - facilty can accept and provides in house HD  [facility] [place] chair approved for TTS 11am   If SNF or IPR: Date FOC offered: 5/7/2024  Date FOC received: 5/7/2024  Accepting facility: [facility]  Date authorization started with reference number: 5/9  Date authorization received and expires:   Transportation at discharge: medical transport  IM/IMM Medicare/ letter given: will need prior to discharge  Caregiver Contact: spouse Nash Myrick [phone]   RT Ramez son [phone]  Discharge Caregiver contacted prior to discharge? yes  Care Conference needed? Not at this time   Barriers to discharge: medical stability and insurance authorization,     Update  CM received call back from [facility] reporting pt's chair time is TTS 11am. CM will need to fax d/c summary, latest neph note, and HD flow sheets to [facility] upon d/c from [facility].       Initial note  CM contacted [facility] central admissions and spoke with Kesha [phone] who confirmed they received referral, insurance has approved, and waiting on chair time. Kesha is aware patient to go to [place] upon d/c.     CM sent message to [place] requesting auth be initiated.     RIGO Luis, CM  [id number]    
Transition of Care Plan:     RUR: 22%  Prior Level of Functioning: independent   Disposition: home with home health ([facility]- accepted)  Follow up appointments: PCP, speciality   DME needed: none at this time   Transportation at discharge: family   IM/IMM Medicare/ letter given: to be provided   Caregiver Contact: spouse Nash Myrick [phone]  Discharge Caregiver contacted prior to discharge? To be contacted  Care Conference needed? Not at this time   Barriers to discharge: medical stability     CM completed chart review and will continue to follow to assist with dcp.     RIGO Luis, CM  [id number]    
Transition of Care Plan:     RUR: 22%  Prior Level of Functioning: independent   Disposition: home with home health ([facility]- accepted)  Follow up appointments: PCP, speciality   DME needed: none at this time   Transportation at discharge: family   IM/IMM Medicare/ letter given: to be provided   Caregiver Contact: spouse Nash Myrick [phone]  Discharge Caregiver contacted prior to discharge? To be contacted  Care Conference needed? Not at this time   Barriers to discharge: medical stability        Chart review completed. CM continuing to follow to see if patient will need new OP HD chair upon d/c.     Genny Lockhart, RIGO, CM  [id number]    
Transition of Care Plan:     RUR: 22%  Prior Level of Functioning: independent   Disposition: home with home health ([facility]- accepted)  Follow up appointments: PCP, speciality   DME needed: none at this time   Transportation at discharge: family   IM/IMM Medicare/ letter given: to be provided   Caregiver Contact: spouse Nash Myrick [phone]  Discharge Caregiver contacted prior to discharge? To be contacted  Care Conference needed? Not at this time   Barriers to discharge: medical stability       CM completed chart review and continues to follow for potential new OP HD needs.     HH is secured through [facility]. CM to notify [facility] upon d/c.       RIGO Luis, CM  [id number]    
Transition of Care Plan:    RUR: 20%   Prior Level of Functioning: independent  Disposition: SNF rehab with new OP HD - [facility] - facilty can accept and provides in house HD  Referral made to [facility] [place] for OP HD faxed to [phone]  If SNF or IPR: Date FOC offered: 5/7/2024  Date FOC received: 5/7/2024  Accepting facility: [facility]  Date authorization started with reference number:   Date authorization received and expires:   Follow up appointments: PCP/Specialist  DME needed: per facilty  Transportation at discharge: medical transport  IM/IMM Medicare/ letter given: will need prior to discharge  Is patient a [place] and connected with [facility]?    If yes, was [facility] transfer form completed and VA notified?   Caregiver Contact: spouse Nash Myrick [phone]   RT Ramez son [phone]  Discharge Caregiver contacted prior to discharge? yes  Care Conference needed?   Barriers to discharge: permacath on 5/8/2024, medical stability and insurance authorization, OP HD    CM met with patient and  and Jessica cousin at bedside to discuss transition of care plans- family is not able to provide 24/7 caregiver assistance and transportation to and from OP HD may also be a barrier - family got RT, son on the phone as he lives with patient and her  - discussed option and they are all agreeable to SNF rehab option -offered local SNF that have in house OP HD - first choice is [facility] - referral sent via [employer] and also faxed requested OP clinicals to [phone] - spoke with Aydin [phone] facility liaison who indicates they accept [facility] insurance and she will medically review and start authorization tomorrow - back up [facility]. HERIBERTO MONTOYA, MSW  [phone]    
Transition of Care Plan:    RUR: 20% (high RUR, readmission)  Prior Level of Functioning: Independent  Disposition: [facility] with HD (patient approved for new HD at [facility] TTS 11am).  If SNF or IPR: Date FOC offered: 5/7/2024 SNF  Date FOC received: 5/7/2024  Accepting facility: [facility]  Date authorization started with reference number: 5/9 Ref # Unknown  Date authorization received and expires: TBD  Follow up appointments: defer to SNF  DME needed: defer to SNF.  Patient has cane, RW and wheelchair at home.  Transportation at discharge: Transportation needed.  IM/IMM Medicare/ letter given: 2nd IM needed prior to discharge  Is patient a  and connected with VA? No.   If yes, was  transfer form completed and VA notified? N/A  Caregiver Contact: Nash Myrick - spouse - [phone]   Discharge Caregiver contacted prior to discharge? Patient to contact.  Care Conference needed? No.  Barriers to discharge:  insurance auth/SNF bed    0821 -  contacted [facility] admissions ([phone]) to check on authorization, unable to reach anyone, VM left with weekend and weekday callback number.      Leigha Gerard, SYLN, RN    Care Management  [phone]  
Transition of Care Plan:    RUR: 22%  Prior Level of Functioning: independent   Disposition: home with home health ([facility]- accepted)  Follow up appointments: PCP, speciality   DME needed: none at this time   Transportation at discharge: family   IM/IMM Medicare/ letter given: to be provided   Caregiver Contact: spouse Nash Myrick [phone]  Discharge Caregiver contacted prior to discharge? To be contacted  Care Conference needed? Not at this time   Barriers to discharge: medical stability       [facility] has accepted. AVS updated.     TAYLOR 5/3/24.       RIGO Luis, CM  [id number]      
visit your Primary Care Physician after you left the hospital, before you returned this time?: No  Why weren't you able to visit your PCP?:  (admitted 1 day after d/c)  Did you see a specialist, such as Cardiac, Pulmonary, Orthopedic Physician, etc. after you left the hospital?: No  Does the patient report anything that got in the way of taking their medications?: No  In our efforts to provide the best possible care to you and others like you, can you think of anything that we could have done to help you after you left the hospital the first time, so that you might not have needed to return so soon?: Other (Comment) (patient declined)      RIGO Luis, CM  [id number]        
related to the following treatment goals: SNF   The Patient and/or Patient Representative was provided with a Choice of Provider? Patient;Patient Representative   Name of the Patient Representative who was provided with the Choice of Provider and agrees with the Discharge Plan?  RT Ramez son   The Patient and/Or Patient Representative agree with the Discharge Plan? Yes   Freedom of Choice list was provided with basic dialogue that supports the patient's individualized plan of care/goals, treatment preferences, and shares the quality data associated with the providers?  Yes

## 2024-05-17 ENCOUNTER — TELEPHONE (OUTPATIENT)
Age: 79
End: 2024-05-17

## 2024-05-17 NOTE — TELEPHONE ENCOUNTER
Spoke with patients son to confirm appointment, he informed me that she was hospitalized and is now in rehab. Please let me know if you would like to reschedule her 1 Month PO appointment. If you need to get information to her its best to call her son.

## 2024-05-20 ENCOUNTER — TELEPHONE (OUTPATIENT)
Age: 79
End: 2024-05-20

## 2024-05-20 NOTE — TELEPHONE ENCOUNTER
Left message with RT Myrick regarding dialysis questions. Stated we will defer to rehab physician and nephrology regarding dialysis decisions but if there are any cardiac questions to please give us a call back.

## 2024-05-20 NOTE — TELEPHONE ENCOUNTER
Patient's son called this morning to discuss today's appointment.  I informed him that per our NP it will be cancelled until after she discharges from rehab.  He understands.    He states the rehab doctor is not going to have her do dialysis today because her numbers are good.  He wanted to know if that is okay and make sure the rehab center is communicating with our office.  His call back is [phone].

## 2024-05-24 ENCOUNTER — TELEPHONE (OUTPATIENT)
Facility: CLINIC | Age: 79
End: 2024-05-24

## 2024-05-24 NOTE — TELEPHONE ENCOUNTER
I called and informed Pebbles on the message per Dr. Mendoza. She stated understanding and had no further questions.

## 2024-05-24 NOTE — TELEPHONE ENCOUNTER
Pebbles Figueroa (intake nurse) with [facility] needs verbal orders that provider will follow    Phone: [phone]    What hospital/ facility & date of discharge[facility]      Why they need home health for: muscle atrophy, a-vib     What services are needed: skilled nursing, PT, OT

## 2024-06-11 ENCOUNTER — OFFICE VISIT (OUTPATIENT)
Facility: CLINIC | Age: 79
End: 2024-06-11

## 2024-06-11 VITALS
HEART RATE: 87 BPM | SYSTOLIC BLOOD PRESSURE: 98 MMHG | RESPIRATION RATE: 16 BRPM | OXYGEN SATURATION: 96 % | BODY MASS INDEX: 23.32 KG/M2 | WEIGHT: 140 LBS | DIASTOLIC BLOOD PRESSURE: 65 MMHG | TEMPERATURE: 97.7 F | HEIGHT: 65 IN

## 2024-06-11 DIAGNOSIS — I48.0 PAF (PAROXYSMAL ATRIAL FIBRILLATION) (HCC): ICD-10-CM

## 2024-06-11 DIAGNOSIS — I71.60 THORACOABDOMINAL AORTIC ANEURYSM (TAAA) WITHOUT RUPTURE, UNSPECIFIED PART (HCC): ICD-10-CM

## 2024-06-11 DIAGNOSIS — Z09 HOSPITAL DISCHARGE FOLLOW-UP: Primary | ICD-10-CM

## 2024-06-11 DIAGNOSIS — D68.69 SECONDARY HYPERCOAGULABLE STATE (HCC): ICD-10-CM

## 2024-06-11 DIAGNOSIS — N17.9 AKI (ACUTE KIDNEY INJURY) (HCC): ICD-10-CM

## 2024-06-11 DIAGNOSIS — I95.9 HYPOTENSION, UNSPECIFIED HYPOTENSION TYPE: ICD-10-CM

## 2024-06-11 DIAGNOSIS — I50.43 CHF (CONGESTIVE HEART FAILURE), NYHA CLASS I, ACUTE ON CHRONIC, COMBINED (HCC): ICD-10-CM

## 2024-06-11 LAB
ANION GAP SERPL CALC-SCNC: 7 MMOL/L (ref 5–15)
BUN SERPL-MCNC: 65 MG/DL (ref 6–20)
BUN/CREAT SERPL: 25 (ref 12–20)
CALCIUM SERPL-MCNC: 10.4 MG/DL (ref 8.5–10.1)
CHLORIDE SERPL-SCNC: 101 MMOL/L (ref 97–108)
CO2 SERPL-SCNC: 34 MMOL/L (ref 21–32)
CREAT SERPL-MCNC: 2.56 MG/DL (ref 0.55–1.02)
DIGOXIN SERPL-MCNC: 0.5 NG/ML (ref 0.9–2)
ERYTHROCYTE [DISTWIDTH] IN BLOOD BY AUTOMATED COUNT: 22.5 % (ref 11.5–14.5)
GLUCOSE SERPL-MCNC: 104 MG/DL (ref 65–100)
HCT VFR BLD AUTO: 36.7 % (ref 35–47)
HGB BLD-MCNC: 11.1 G/DL (ref 11.5–16)
MCH RBC QN AUTO: 26.6 PG (ref 26–34)
MCHC RBC AUTO-ENTMCNC: 30.2 G/DL (ref 30–36.5)
MCV RBC AUTO: 88 FL (ref 80–99)
NRBC # BLD: 0 K/UL (ref 0–0.01)
NRBC BLD-RTO: 0 PER 100 WBC
PLATELET # BLD AUTO: 189 K/UL (ref 150–400)
PMV BLD AUTO: 12.1 FL (ref 8.9–12.9)
POTASSIUM SERPL-SCNC: 3.7 MMOL/L (ref 3.5–5.1)
RBC # BLD AUTO: 4.17 M/UL (ref 3.8–5.2)
SODIUM SERPL-SCNC: 142 MMOL/L (ref 136–145)
WBC # BLD AUTO: 5.7 K/UL (ref 3.6–11)

## 2024-06-11 NOTE — PROGRESS NOTES
Post-Discharge Transitional Care  Follow Up      Azucena Myrick   YOB: 1945    Date of Office Visit:  6/11/2024  Date of Hospital Admission: 4/26/24  Date of Hospital Discharge: 5/13/24  Risk of hospital readmission (high >=14%. Medium >=10%) :Readmission Risk Score: 21.7      Care management risk score Rising risk (score 2-5) and Complex Care (Scores >=6): No Risk Score On File     Non face to face  following discharge, date last encounter closed (first attempt may have been earlier): 06/06/2024    Call initiated 2 business days of discharge: No    ASSESSMENT/PLAN:   Hospital discharge follow-up  Her son will call or send a MeetMe Chart message to report her medications.  -     NM DISCHARGE MEDS RECONCILED W/ CURRENT OUTPATIENT MED LIST  ADOLFO (acute kidney injury) (HCC)  Improved. No longer on HD. Check renal function, K+, and CBC today.        -     Basic Metabolic Panel  -     CBC  Hypotension, unspecified hypotension type  Stop metoprolol as instructed on hospital discharge. She has been taking it.  PAF (paroxysmal atrial fibrillation) (HCC)  Continue amiodarone and digoxin. Will check digoxin level today. Patient instructed to schedule a post-hospitalization appointment with Dr. Palacio since several of her cardiac medications were changed.  -     Digoxin Level  CHF (congestive heart failure), NYHA class I, acute on chronic, combined (HCC)  Thoracoabdominal aortic aneurysm (TAAA) without rupture, unspecified part (HCC)  Stable. Follow up with her cardiologist.  Secondary hypercoagulable state (HCC)  On Eliquis.    Medical Decision Making: moderate complexity    Return in about 3 months (around 9/11/2024), or if symptoms worsen or fail to improve, for HTN, appetite.      On this date 6/11/2024 I have spent 45 minutes reviewing previous notes, test results and face to face with the patient discussing the diagnosis and importance of compliance with the treatment plan as well as documenting on the day

## 2024-06-11 NOTE — PROGRESS NOTES
Azucena Myrick  Identified pt with two pt identifiers(name and ).  Chief Complaint   Patient presents with    Follow-Up from Hospital       1. Have you been to the ER, urgent care clinic since your last visit?  Hospitalized since your last visit? The end of March hole in heart and then taken to [facility] rehab.     2. Have you seen or consulted any other health care providers outside of the [facility] since your last visit?  Include any pap smears or colon screening. NO      Provider notified of reason for visit, vitals and flowsheets obtained on patients.     Patient received paperwork for advance directive during previous visit but has not completed at this time     Reviewed record In preparation for visit, huddled with provider and have obtained necessary documentation      Health Maintenance Due   Topic    Respiratory Syncytial Virus (RSV) Pregnant or age 60 yrs+ (1 - 1-dose 60+ series)    COVID-19 Vaccine ( season)    Annual Wellness Visit (Medicare Advantage)        Wt Readings from Last 3 Encounters:   24 63.5 kg (140 lb)   24 72.3 kg (159 lb 6.3 oz)   24 65.2 kg (143 lb 11.8 oz)     Temp Readings from Last 3 Encounters:   24 97.7 °F (36.5 °C) (Oral)   24 98 °F (36.7 °C) (Oral)   24 99.7 °F (37.6 °C) (Oral)     BP Readings from Last 3 Encounters:   24 98/65   24 97/72   24 106/75     Pulse Readings from Last 3 Encounters:   24 87   24 91   24 75          No data to display                  Learning Assessment:  :         2023    11:10 AM   [facility] AMB LEARNING ASSESSMENT   Primary Learner Patient   Primary Language ENGLISH   Learning Preference VIDEOS   Answered By patient   Relationship to Learner SELF       Fall Risk Assessment:  :         2024     9:46 AM 2023     5:31 PM 2023     1:34 PM 3/8/2023     2:12 PM 2022    11:01 AM 2022     1:38 PM 3/23/2021    11:21 AM   Amb Fall Risk

## 2024-06-14 ENCOUNTER — CLINICAL DOCUMENTATION (OUTPATIENT)
Facility: CLINIC | Age: 79
End: 2024-06-14

## 2024-06-14 ENCOUNTER — CARE COORDINATION (OUTPATIENT)
Facility: CLINIC | Age: 79
End: 2024-06-14

## 2024-06-14 DIAGNOSIS — I50.33 ACUTE ON CHRONIC DIASTOLIC HEART FAILURE (HCC): ICD-10-CM

## 2024-06-14 DIAGNOSIS — I10 ESSENTIAL HYPERTENSION: Primary | ICD-10-CM

## 2024-06-14 NOTE — PROGRESS NOTES
Remote Patient Monitoring Treatment Plan    Received request from ACM/Kenya Linares, RN   to order remote patient monitoring for in home monitoring of CHF and  HTN/hypotension; condition managed by Dr Palacio with [facility] and order completed. History of tachy-abbie syndrome and idiopathic hypotension.     Patient will be monitoring blood pressure   pulse ox   weight.      Patient will engage in Remote Patient Monitoring each day to develop the skills necessary for self management.       RPM Care Team Responsibilities:   Alerts will be reviewed daily and addressed within 2-4 hours during operational hours (Monday -Friday 9 am-4 pm)  Alert response and intervention documented in patient medical record  Alert response escalated to PCP per protocol and documented in patient medical record  Patient monitored over approximately  days  Discharge from program based on self-management readiness    See care coordination encounters for additional details.

## 2024-06-14 NOTE — PROGRESS NOTES
Message received on 6/14/24:    Raleigh Mendoza and Dr. Palacio,   Azucena Myrick has enrolled in [facility] Care Management with Remote Patient Monitoring program for CHF and HTN/hypotension. We will contact Dr. Palacio for concerns related to the management of the patient's alerts. I am your backup provider if you are unavailable/unable to respond. Please feel free to contact me if you have any questions/concerns. Thank you! Sincerely, Madeline Bender, SOL MSN FNP-C; Remote Patient Monitoring NP; Ph: [phone]

## 2024-06-14 NOTE — CARE COORDINATION
Remote Patient Kit Ordering Note      Date/Time:  6/14/2024 10:42 AM      CCSS placed phone call to patient/family today to notify of RPM kit order; patient/family was available; discussed the following topics below and all questions answered.    [x[facility]  [x] Patient will receive package over the next 1-3 business days. Someone 21 years or older must be present to sign for UPS delivery.  [x] HRS will contact patient within 24 hours, an HRS  will call the patient directly: If the patient does not answer, HRS will follow up with the clinical team notifying them about the unsuccessful attempt to contact the patient. HRS will make three call attempts to the patient.Provide patient with [facility] Virtual install number is: [phone].  [x] ACM will contact patient once equipment is active to welcome them to the program.                                                         [x] Hours of RPM monitoring - Monday-Friday 1387-8044; encourage patient to get vitals entered by Noon each day to have the alert addressed same day.  [x][facility] mailed RPM Patient flyer to patient.                      ACM made aware the RPM kit has been ordered.

## 2024-06-24 ENCOUNTER — CARE COORDINATION (OUTPATIENT)
Dept: CARE COORDINATION | Age: 79
End: 2024-06-24

## 2024-06-24 NOTE — CARE COORDINATION
06/24/24 2:05 PM Patient is currently enrolled in Remote Patient Monitoring for CHF and HTN.  Pt rechecked BP heart rate at 2:04 PM with updated reading of 87.  BP heart rate now within RPM parameters.  Plan/Follow Up: Will continue to review, monitor and address alerts with follow up based on severity of symptoms and risk factors.

## 2024-06-24 NOTE — CARE COORDINATION
24 1:59 pm Patient is currently enrolled in Remote Patient Monitoring for CHF and HTN.  Noted that pt has not updated BP heart rate as of this time.  Outreach made to pt (pt name and  verified as identifiers) who states her brother in law has not come by yet.  She thinks he should be there soon.  Plan/Follow Up: Will continue to review, monitor and address alerts with follow up based on severity of symptoms and risk factors.

## 2024-06-24 NOTE — CARE COORDINATION
Remote Alert Monitoring Note  Date/Time:  2024 8:17 AM  Patient Current Location: [place]  Verified patients name and  as identifiers.  Rpm alert to be reviewed by the provider   red alert  blood pressure heart rate (127)  Additional needs to be addressed by provider: No     LPN contacted patient by telephone regarding red alert received   Background: Pt enrolled in RPM r/t CHF, HTN.  Refer to 911 immediately if:  Patient unresponsive or unable to provide history  Change in cognition or sudden confusion  Patient unable to respond in complete sentences  Intense chest pain/tightness  Any concern for any clinical emergency  Red Alert: Provider response time of 1 hr required for any red alert requiring intervention  Yellow Alert: Provider response time of 3hr required for any escalated yellow alert  Patient Chief Complaint:  HR Triage  Are you having any Chest Pain? no   Are you having any Shortness of Breath? no   Are you having any dizziness? no   Are you feeling more fatigued or tired than normal? no   Are you having any other health concerns or issues? no   Clinical Interventions: Reviewed and followed up on alerts and treatments-Pt has dx of PAF, taking Eliquis, Amiodarone and digoxin.  Spoke with pt who is in no apparent distress and offers no complaints at this time.  Pt states her son sets up her medications in her pill box and she is unable to review.  She took her morning medications around 7:00 am today.  Pt requested nurse to call son to discuss and nurse attempted to reach son with no answer at this time and voice mailbox full.  Per pt, her brother in law will be coming to her house later today and will assist her to recheck BP heart rate.    Plan/Follow Up: Will continue to review, monitor and address alerts with follow up based on severity of symptoms and risk factors.  **For any new or worsening symptoms, please contact your Provider or report to the nearest Emergency Room.**

## 2024-06-25 ENCOUNTER — CARE COORDINATION (OUTPATIENT)
Dept: CARE COORDINATION | Age: 79
End: 2024-06-25

## 2024-06-25 NOTE — CARE COORDINATION
Remote Alert Monitoring Note  Date/Time:  2024 11:36 AM  Patient Current Location: [place]  Verified patients name and  as identifiers.  Rpm alert to be reviewed by the provider   yellow alert  survey response (Pt placed self in yellow zone for CHF)  Additional needs to be addressed by provider: No     LPN contacted patient by telephone regarding red alert received   Background: Pt enrolled in RPM r/t CHF, HTN.  Refer to 911 immediately if:  Patient unresponsive or unable to provide history  Change in cognition or sudden confusion  Patient unable to respond in complete sentences  Intense chest pain/tightness  Any concern for any clinical emergency  Red Alert: Provider response time of 1 hr required for any red alert requiring intervention  Yellow Alert: Provider response time of 3hr required for any escalated yellow alert  Clinical Interventions: Reviewed and followed up on alerts and treatments-Spoke with pt who states her brother in law has not come by yet and so she has not updated oxygen level.  Continues to deny any SOB/dyspnea.  Reviewed survey response and pt did not know how to answer question.  Reviewed CHF zone tool with pt and pt in green zone today.      Plan/Follow Up: Will continue to review, monitor and address alerts with follow up based on severity of symptoms and risk factors.  **For any new or worsening symptoms, please contact your Provider or report to the nearest Emergency Room.**

## 2024-06-25 NOTE — CARE COORDINATION
06/25/24 1:04 PM Patient is currently enrolled in Remote Patient Monitoring for CHF and HTN.  Pt rechecked oxygen level at this time with updated reading of 96%.  Oxygen level now within RPM parameters.  Plan/Follow Up: Will continue to review, monitor and address alerts with follow up based on severity of symptoms and risk factors.

## 2024-06-25 NOTE — CARE COORDINATION
Remote Alert Monitoring Note  Date/Time:  2024 9:30 AM  Patient Current Location: [place]  Verified patients name and  as identifiers.  Rpm alert to be reviewed by the provider   red alert  pulse ox reading (80%)  Additional needs to be addressed by provider: No     LPN contacted patient by telephone regarding red alert received   Background: Pt enrolled in RPM CHF, HTN.  Refer to 911 immediately if:  Patient unresponsive or unable to provide history  Change in cognition or sudden confusion  Patient unable to respond in complete sentences  Intense chest pain/tightness  Any concern for any clinical emergency  Red Alert: Provider response time of 1 hr required for any red alert requiring intervention  Yellow Alert: Provider response time of 3hr required for any escalated yellow alert  Patient Chief Complaint:  O2 Triage  Are you having any Chest Pain? no   Are you having any Shortness of Breath? no   Swelling in your hands or feet? Yes, feet   Are you having any other health concerns or issues? no   Clinical Interventions: Reviewed and followed up on alerts and treatments-Pt speaking in full sentences, no apparent distress.  States her hands are cold.  Pt does not use equipment on her own and states her brother in law will be coming over between 10 and 11 am and will assist her to recheck metric.  Pt education provided to rub hands briskly to warm prior to placing oximeter.  Pt verbalizes understanding.    Plan/Follow Up: Will continue to review, monitor and address alerts with follow up based on severity of symptoms and risk factors.  **For any new or worsening symptoms, please contact your Provider or report to the nearest Emergency Room.**

## 2024-06-27 ENCOUNTER — CARE COORDINATION (OUTPATIENT)
Dept: CASE MANAGEMENT | Age: 79
End: 2024-06-27

## 2024-06-27 NOTE — CARE COORDINATION
-Remote Alert Monitoring Note      Date/Time:  2024 11:46 AM  Patient Current Location: Home: [id number] [place] [id number]  Verified patients name and  as identifiers.    Rpm alert to be reviewed by the provider   red alert  pulse ox reading (73%)  Vitals Recheck pulse ox reading (98%)  Additional needs to be addressed by provider: No                   LPN contacted patient by telephone regarding red alert received   Background: ENROLLED IN RPM for CHF AND HTN  Refer to [facility] immediately if:  Patient unresponsive or unable to provide history  Change in cognition or sudden confusion  Patient unable to respond in complete sentences  Intense chest pain/tightness  Any concern for any clinical emergency  Red Alert: Provider response time of 1 hr required for any red alert requiring intervention  Yellow Alert: Provider response time of 3hr required for any escalated yellow alert  Patient Chief Complaint:  O2 Triage  Are you having any Chest Pain? no   Are you having any Shortness of Breath? no   Swelling in your hands or feet? no     Are you having any other health concerns or issues? no     Clinical Interventions: Reviewed and followed up on alerts and treatments-Spoke to Nash.  Nash is not able to hear or understand this writer. She checked her BP metrics 5 times. She states she is unable to recheck her pulse ox metrics herself.  Denies dyspnea, speaks clearly in full sentences with no respiratory distress. Pt is confused and kept checking her BP. Pt having difficulty placing pulse ox on her finger. She requests a call to her son.  Explained directions for checking pulse ox metrics. Pt verbalized understanding. She states she was holding her hand out. She now rested her hand on the table . New pulse ox reading is 98%. Pt asked if she should take another blood pressure pill. Instructed to Not take any more medications than what is ordered. Verbalized understanding. Call to son Nash to update. No

## 2024-06-27 NOTE — CARE COORDINATION
-Remote Alert Monitoring Note      Date/Time:  2024 8:04 AM  Patient Current Location: Home: [address] [place] [address] [facility] [id number]  Verified patients name and  as identifiers.    Rpm alert to be reviewed by the provider   red alert  pulse ox reading (73%)  Vitals Recheck n/a  Additional needs to be addressed by provider: No                   LPN contacted patient by telephone regarding red alert received   Background: enrolled in RPM for CHF AND HTN  Refer to 911 immediately if:  Patient unresponsive or unable to provide history  Change in cognition or sudden confusion  Patient unable to respond in complete sentences  Intense chest pain/tightness  Any concern for any clinical emergency  Red Alert: Provider response time of 1 hr required for any red alert requiring intervention  Yellow Alert: Provider response time of 3hr required for any escalated yellow alert  Patient Chief Complaint:  O2 Triage  Are you having any Chest Pain? no   Are you having any Shortness of Breath? no   Swelling in your hands or feet? no     Are you having any other health concerns or issues? no     Clinical Interventions: Reviewed and followed up on alerts and treatments-spoke to Azucena regarding Rpm RED Alert for low pulse ox reading. Pt states she has a difficult time getting an accurate reading.  Denies chest pain or dyspnea. No use of oxygen. Speaks clearly in full sentences with no respiratory distress. Reviewed instructions for checking pulse ox.   Rub hands together briskly for 10 seconds to improve circulation. Lay hand flat on table, dresser or counter. Place pulse oximeter on index finger. verbalized understanding.  Pt education given to recheck oxygen level if below 92%.  Pt states she is unable to open the pulse ox and recheck herself. She will recheck later when her son comes to her home.   Plan/Follow Up: Will continue to review, monitor and address alerts with follow up based on severity of symptoms and risk

## 2024-07-03 RX ORDER — BUMETANIDE 0.5 MG/1
TABLET ORAL
COMMUNITY
End: 2024-07-03 | Stop reason: DRUGHIGH

## 2024-07-03 RX ORDER — BUMETANIDE 2 MG/1
TABLET ORAL
Qty: 60 TABLET | Refills: 2 | Status: SHIPPED | OUTPATIENT
Start: 2024-07-03

## 2024-07-03 RX ORDER — BUMETANIDE 2 MG/1
TABLET ORAL
COMMUNITY
End: 2024-07-03 | Stop reason: SDUPTHER

## 2024-07-03 NOTE — TELEPHONE ENCOUNTER
PCP: Bhavana Mendoza MD     Last appt:  6/11/2024      Future Appointments   Date Time Provider Department Center   9/23/2024  2:00 PM Bhavana Mendoza MD [facility] BS AMB          Requested Prescriptions     Pending Prescriptions Disp Refills    bumetanide (BUMEX) 2 MG tablet 30 tablet 1     Sig: Take 1 Tablet By Mouth TWICE daily for Edema.

## 2024-07-03 NOTE — TELEPHONE ENCOUNTER
Patient son said that they took her off one and put her on this when she was in [place] and put her on this Bumetanide.    Please send to [facility]  Any question call son

## 2024-07-05 VITALS
DIASTOLIC BLOOD PRESSURE: 71 MMHG | OXYGEN SATURATION: 98 % | SYSTOLIC BLOOD PRESSURE: 107 MMHG | WEIGHT: 140.4 LBS | BODY MASS INDEX: 23.36 KG/M2 | HEART RATE: 100 BPM

## 2024-07-16 ENCOUNTER — TELEPHONE (OUTPATIENT)
Facility: CLINIC | Age: 79
End: 2024-07-16

## 2024-07-16 ENCOUNTER — CARE COORDINATION (OUTPATIENT)
Dept: CARE COORDINATION | Age: 79
End: 2024-07-16

## 2024-07-16 NOTE — CARE COORDINATION
Remote Alert Monitoring Note  Date/Time:  7/16/2024 8:18 AM  Patient Current Location: [facility] alert to be reviewed by the provider   red alert  weight (increase of 3 lbs x 1 day, 5 lbs x 7 days)  Additional needs to be addressed by provider: No     Background: Pt enrolled in RPM r/t CHF, HTN.  Clinical Interventions: Reviewed and followed up on alerts and treatments-Per HRS chart review, weight recorded yesterday of 131 lbs was down 12 lbs from previous day.  Weight today of 140.2 lbs is consistent with patients baseline weight.  Will remove inaccurate weight recorded on 07/15/24.  No outreach needed at this time.    Plan/Follow Up: Will continue to review, monitor and address alerts with follow up based on severity of symptoms and risk factors.

## 2024-07-16 NOTE — TELEPHONE ENCOUNTER
I spoke with patients son RT. He stated that she is having low blood pressures and wanted to confirm how she should be taking the Midodrine due to getting 2 different sets of directions. The first set says: if the blood pressure is less than 110, take 1 tablet but if it is greater that 110 do not take any.     Most Recent Blood Pressure Readings:  106/71  101/69  100/76  100/71  97/75  92/70  89/68  85/63  97/74    I spoke with Cam due to Dr. Mendoza being out of the office and he stated that the patient should take the medication 3 times a day due to low blood pressure readings. If she blood pressure goes up to 140/90 or above, they can change the medication back to three times a day as needed.     RT stated understanding and had no further questions.

## 2024-07-18 ENCOUNTER — TELEPHONE (OUTPATIENT)
Facility: CLINIC | Age: 79
End: 2024-07-18

## 2024-07-18 DIAGNOSIS — R63.0 POOR APPETITE: Primary | ICD-10-CM

## 2024-07-18 DIAGNOSIS — R45.89 DEPRESSED MOOD: ICD-10-CM

## 2024-07-18 RX ORDER — MIRTAZAPINE 7.5 MG/1
7.5 TABLET, FILM COATED ORAL NIGHTLY
Qty: 30 TABLET | Refills: 1 | Status: ON HOLD | OUTPATIENT
Start: 2024-07-18

## 2024-07-18 NOTE — TELEPHONE ENCOUNTER
I received a message from patient's niece, Martinez Goodman, who relayed information from her son, Nash Myrick. Ms. Myrick has very poor appetite and won't drink Ensure. She seems to be depressed. Plan: I will start her on mirtazapine 7.5 mg daily for depression and appetite.

## 2024-07-19 ENCOUNTER — CARE COORDINATION (OUTPATIENT)
Dept: CARE COORDINATION | Age: 79
End: 2024-07-19

## 2024-07-19 ENCOUNTER — APPOINTMENT (OUTPATIENT)
Facility: HOSPITAL | Age: 79
DRG: 291 | End: 2024-07-19
Payer: MEDICARE

## 2024-07-19 ENCOUNTER — HOSPITAL ENCOUNTER (INPATIENT)
Facility: HOSPITAL | Age: 79
LOS: 11 days | Discharge: HOSPICE | DRG: 291 | End: 2024-07-31
Attending: STUDENT IN AN ORGANIZED HEALTH CARE EDUCATION/TRAINING PROGRAM | Admitting: STUDENT IN AN ORGANIZED HEALTH CARE EDUCATION/TRAINING PROGRAM
Payer: MEDICARE

## 2024-07-19 DIAGNOSIS — I50.9 CONGESTIVE HEART FAILURE, UNSPECIFIED HF CHRONICITY, UNSPECIFIED HEART FAILURE TYPE (HCC): Primary | ICD-10-CM

## 2024-07-19 DIAGNOSIS — I50.23 ACUTE ON CHRONIC SYSTOLIC CONGESTIVE HEART FAILURE (HCC): ICD-10-CM

## 2024-07-19 LAB
ALBUMIN SERPL-MCNC: 3 G/DL (ref 3.5–5)
ALBUMIN/GLOB SERPL: 0.9 (ref 1.1–2.2)
ALP SERPL-CCNC: 61 U/L (ref 45–117)
ALT SERPL-CCNC: 16 U/L (ref 12–78)
ANION GAP SERPL CALC-SCNC: 9 MMOL/L (ref 5–15)
AST SERPL-CCNC: 20 U/L (ref 15–37)
BASOPHILS # BLD: 0.1 K/UL (ref 0–0.1)
BASOPHILS NFR BLD: 1 % (ref 0–1)
BILIRUB SERPL-MCNC: 2.3 MG/DL (ref 0.2–1)
BUN SERPL-MCNC: 59 MG/DL (ref 6–20)
BUN/CREAT SERPL: 24 (ref 12–20)
CALCIUM SERPL-MCNC: 10 MG/DL (ref 8.5–10.1)
CHLORIDE SERPL-SCNC: 97 MMOL/L (ref 97–108)
CO2 SERPL-SCNC: 30 MMOL/L (ref 21–32)
CREAT SERPL-MCNC: 2.44 MG/DL (ref 0.55–1.02)
DIFFERENTIAL METHOD BLD: ABNORMAL
EOSINOPHIL # BLD: 0.1 K/UL (ref 0–0.4)
EOSINOPHIL NFR BLD: 1 % (ref 0–7)
ERYTHROCYTE [DISTWIDTH] IN BLOOD BY AUTOMATED COUNT: 21.7 % (ref 11.5–14.5)
GLOBULIN SER CALC-MCNC: 3.3 G/DL (ref 2–4)
GLUCOSE SERPL-MCNC: 82 MG/DL (ref 65–100)
HCT VFR BLD AUTO: 39.9 % (ref 35–47)
HGB BLD-MCNC: 12.7 G/DL (ref 11.5–16)
IMM GRANULOCYTES # BLD AUTO: 0 K/UL (ref 0–0.04)
IMM GRANULOCYTES NFR BLD AUTO: 0 % (ref 0–0.5)
LYMPHOCYTES # BLD: 1.3 K/UL (ref 0.8–3.5)
LYMPHOCYTES NFR BLD: 15 % (ref 12–49)
MCH RBC QN AUTO: 28.2 PG (ref 26–34)
MCHC RBC AUTO-ENTMCNC: 31.8 G/DL (ref 30–36.5)
MCV RBC AUTO: 88.5 FL (ref 80–99)
MONOCYTES # BLD: 0.5 K/UL (ref 0–1)
MONOCYTES NFR BLD: 6 % (ref 5–13)
NEUTS SEG # BLD: 6.5 K/UL (ref 1.8–8)
NEUTS SEG NFR BLD: 77 % (ref 32–75)
NRBC # BLD: 0 K/UL (ref 0–0.01)
NRBC BLD-RTO: 0 PER 100 WBC
NT PRO BNP: ABNORMAL PG/ML
PLATELET # BLD AUTO: 186 K/UL (ref 150–400)
PMV BLD AUTO: 11.5 FL (ref 8.9–12.9)
POTASSIUM SERPL-SCNC: 4.3 MMOL/L (ref 3.5–5.1)
PROT SERPL-MCNC: 6.3 G/DL (ref 6.4–8.2)
RBC # BLD AUTO: 4.51 M/UL (ref 3.8–5.2)
RBC MORPH BLD: ABNORMAL
SODIUM SERPL-SCNC: 136 MMOL/L (ref 136–145)
TROPONIN I SERPL HS-MCNC: 58 NG/L (ref 0–51)
TROPONIN I SERPL HS-MCNC: 71 NG/L (ref 0–51)
WBC # BLD AUTO: 8.5 K/UL (ref 3.6–11)

## 2024-07-19 PROCEDURE — 96365 THER/PROPH/DIAG IV INF INIT: CPT

## 2024-07-19 PROCEDURE — 83880 ASSAY OF NATRIURETIC PEPTIDE: CPT

## 2024-07-19 PROCEDURE — 87040 BLOOD CULTURE FOR BACTERIA: CPT

## 2024-07-19 PROCEDURE — 80053 COMPREHEN METABOLIC PANEL: CPT

## 2024-07-19 PROCEDURE — 93005 ELECTROCARDIOGRAM TRACING: CPT | Performed by: PHYSICIAN ASSISTANT

## 2024-07-19 PROCEDURE — 85025 COMPLETE CBC W/AUTO DIFF WBC: CPT

## 2024-07-19 PROCEDURE — 96375 TX/PRO/DX INJ NEW DRUG ADDON: CPT

## 2024-07-19 PROCEDURE — 71045 X-RAY EXAM CHEST 1 VIEW: CPT

## 2024-07-19 PROCEDURE — 84484 ASSAY OF TROPONIN QUANT: CPT

## 2024-07-19 PROCEDURE — 36415 COLL VENOUS BLD VENIPUNCTURE: CPT

## 2024-07-19 PROCEDURE — 99285 EMERGENCY DEPT VISIT HI MDM: CPT

## 2024-07-19 PROCEDURE — 6360000002 HC RX W HCPCS: Performed by: PHYSICIAN ASSISTANT

## 2024-07-19 PROCEDURE — 2580000003 HC RX 258: Performed by: PHYSICIAN ASSISTANT

## 2024-07-19 PROCEDURE — 93970 EXTREMITY STUDY: CPT

## 2024-07-19 RX ORDER — ACETAMINOPHEN 325 MG/1
650 TABLET ORAL EVERY 4 HOURS PRN
Status: DISCONTINUED | OUTPATIENT
Start: 2024-07-19 | End: 2024-07-20 | Stop reason: SDUPTHER

## 2024-07-19 RX ORDER — BUMETANIDE 0.25 MG/ML
2 INJECTION INTRAMUSCULAR; INTRAVENOUS ONCE
Status: COMPLETED | OUTPATIENT
Start: 2024-07-19 | End: 2024-07-19

## 2024-07-19 RX ORDER — ONDANSETRON 2 MG/ML
4 INJECTION INTRAMUSCULAR; INTRAVENOUS EVERY 4 HOURS PRN
Status: DISCONTINUED | OUTPATIENT
Start: 2024-07-19 | End: 2024-07-20 | Stop reason: SDUPTHER

## 2024-07-19 RX ADMIN — BUMETANIDE 2 MG: 0.25 INJECTION INTRAMUSCULAR; INTRAVENOUS at 21:34

## 2024-07-19 RX ADMIN — PIPERACILLIN AND TAZOBACTAM 3375 MG: 3; .375 INJECTION, POWDER, LYOPHILIZED, FOR SOLUTION INTRAVENOUS at 21:33

## 2024-07-19 ASSESSMENT — PAIN - FUNCTIONAL ASSESSMENT: PAIN_FUNCTIONAL_ASSESSMENT: NONE - DENIES PAIN

## 2024-07-19 NOTE — ED PROVIDER NOTES
MRM 2 CARDIAC MEDICAL STEP DOWN  EMERGENCY DEPARTMENT ENCOUNTER       Pt Name: Azucena Myrick  MRN: [id number]  Birthdate 1945  Date of evaluation: 7/19/2024  Provider: ORION Dainels   PCP: Bhavana Mendoza MD  Note Started: 7:26 PM EDT 7/19/24     CHIEF COMPLAINT       Chief Complaint   Patient presents with    Leg Swelling     BIBEMS from home with complaints of bilateral foot and leg swelling. Hx of CHF and a-fib.      HISTORY OF PRESENT ILLNESS: 1 or more elements      History From: Patient  HPI Limitations: None     Azucena Myrick is a 78 y.o. female who presents by EMS for bilateral LE edema, which she states started today. The right is worse than the left. She notes there is fluid leaking from her legs. She denies chest pain, palpitations, SOB.  There has been no fever or chills. Of note, patient is a poor historian. She notes she has heart problems but unsure of what and she takes heart medications but she unsure for what conditions.      Nursing Notes were all reviewed and agreed with or any disagreements were addressed in the HPI.     REVIEW OF SYSTEMS      Review of Systems     Positives and Pertinent negatives as per HPI.    PAST HISTORY     Past Medical History:  Past Medical History:   Diagnosis Date    Acute on chronic heart failure, unspecified heart failure type (HCC) 04/11/2024    Allergic conjunctivitis 07/26/2017    Aortic aneurysm (HCC) 05/2018    Thoracic, abdominal    Arthritis     lower back    Asthma     Current use of long term anticoagulation     Essential hypertension 08/21/2017    GERD (gastroesophageal reflux disease)     History of aortic dissection     History of echocardiogram 09/2019    Left Ventricle: Normal cavity size, systolic function (ejection fraction normal) and diastolic function. Moderate concentric hypertrophy. Estimated left ventricular ejection fraction is 56 - 60%. No regional wall motion abnormality noted.Small secundum atrial septal defect present. Left 
No

## 2024-07-19 NOTE — CARE COORDINATION
07/19/24 11:49 AM Patient is currently enrolled in Remote Patient Monitoring for CHF and HTN.  Patients son called back to update that still having difficulty getting oxygen/oxygen heart rate.  He put patients hands in warm water and reading still low.  Had son switch fingers and have patient sit up straight.  After takign a couple of deep breaths, oxygen level now 93 with oxygen heart rate 81.  All RPM metrics now within RPM parameters.  Plan/Follow Up: Will continue to review, monitor and address alerts with follow up based on severity of symptoms and risk factors.   
24 10:40 AM Patient is currently enrolled in Remote Patient Monitoring for CHF and HTN.  Received call from pt and son at this time.  Pt name and  verified as identifiers.  Reviewed medications with son who states pt taking Digoxin 62.5 mg QOD.  Took morning meds today around 6:30 am.  Per son, patient has very cold hands and fingernails are getting really long. He is having difficulty getting oximeter down on her finger.  Multiple low oxygen levels recorded at this time with pt in no apparent distress.  He is going to wait and try to recheck oxygen later this morning.  States pt has appointment this weekend for a manicure and will have nails trimmed.  Pt education provided to rub hands briskly to warm prior to placing oximeter.  Understanding verbalized.  Plan/Follow Up: Will continue to review, monitor and address alerts with follow up based on severity of symptoms and risk factors.           
nearest Emergency Room.**

## 2024-07-20 PROBLEM — I50.43 HEART FAILURE, ACUTE ON CHRONIC, SYSTOLIC AND DIASTOLIC (HCC): Status: ACTIVE | Noted: 2024-07-20

## 2024-07-20 PROBLEM — E43 SEVERE PROTEIN-CALORIE MALNUTRITION (HCC): Status: ACTIVE | Noted: 2024-07-20

## 2024-07-20 LAB
ANION GAP SERPL CALC-SCNC: 12 MMOL/L (ref 5–15)
APTT PPP: 37.2 SEC (ref 22.1–31)
BASOPHILS # BLD: 0.1 K/UL (ref 0–0.1)
BASOPHILS NFR BLD: 1 % (ref 0–1)
BUN SERPL-MCNC: 55 MG/DL (ref 6–20)
BUN/CREAT SERPL: 25 (ref 12–20)
CALCIUM SERPL-MCNC: 9.1 MG/DL (ref 8.5–10.1)
CHLORIDE SERPL-SCNC: 100 MMOL/L (ref 97–108)
CO2 SERPL-SCNC: 26 MMOL/L (ref 21–32)
CREAT SERPL-MCNC: 2.22 MG/DL (ref 0.55–1.02)
DIFFERENTIAL METHOD BLD: ABNORMAL
EKG ATRIAL RATE: 174 BPM
EKG DIAGNOSIS: NORMAL
EKG Q-T INTERVAL: 316 MS
EKG QRS DURATION: 102 MS
EKG QTC CALCULATION (BAZETT): 405 MS
EKG R AXIS: 135 DEGREES
EKG T AXIS: 37 DEGREES
EKG VENTRICULAR RATE: 99 BPM
EOSINOPHIL # BLD: 0 K/UL (ref 0–0.4)
EOSINOPHIL NFR BLD: 0 % (ref 0–7)
ERYTHROCYTE [DISTWIDTH] IN BLOOD BY AUTOMATED COUNT: 21.4 % (ref 11.5–14.5)
GLUCOSE SERPL-MCNC: 69 MG/DL (ref 65–100)
HCT VFR BLD AUTO: 35 % (ref 35–47)
HGB BLD-MCNC: 10.8 G/DL (ref 11.5–16)
IMM GRANULOCYTES # BLD AUTO: 0.1 K/UL (ref 0–0.04)
IMM GRANULOCYTES NFR BLD AUTO: 1 % (ref 0–0.5)
INR PPP: 1.7 (ref 0.9–1.1)
LYMPHOCYTES # BLD: 0.9 K/UL (ref 0.8–3.5)
LYMPHOCYTES NFR BLD: 10 % (ref 12–49)
MCH RBC QN AUTO: 27.6 PG (ref 26–34)
MCHC RBC AUTO-ENTMCNC: 30.9 G/DL (ref 30–36.5)
MCV RBC AUTO: 89.5 FL (ref 80–99)
MONOCYTES # BLD: 0.5 K/UL (ref 0–1)
MONOCYTES NFR BLD: 5 % (ref 5–13)
NEUTS SEG # BLD: 7.4 K/UL (ref 1.8–8)
NEUTS SEG NFR BLD: 83 % (ref 32–75)
NRBC # BLD: 0 K/UL (ref 0–0.01)
NRBC BLD-RTO: 0 PER 100 WBC
PLATELET # BLD AUTO: 151 K/UL (ref 150–400)
PMV BLD AUTO: 9.8 FL (ref 8.9–12.9)
POTASSIUM SERPL-SCNC: 3.6 MMOL/L (ref 3.5–5.1)
PROTHROMBIN TIME: 16.9 SEC (ref 9–11.1)
RBC # BLD AUTO: 3.91 M/UL (ref 3.8–5.2)
RBC MORPH BLD: ABNORMAL
RBC MORPH BLD: ABNORMAL
SODIUM SERPL-SCNC: 138 MMOL/L (ref 136–145)
THERAPEUTIC RANGE: ABNORMAL SECS (ref 58–77)
WBC # BLD AUTO: 9 K/UL (ref 3.6–11)

## 2024-07-20 PROCEDURE — 80048 BASIC METABOLIC PNL TOTAL CA: CPT

## 2024-07-20 PROCEDURE — 2580000003 HC RX 258: Performed by: STUDENT IN AN ORGANIZED HEALTH CARE EDUCATION/TRAINING PROGRAM

## 2024-07-20 PROCEDURE — 6360000002 HC RX W HCPCS: Performed by: GENERAL ACUTE CARE HOSPITAL

## 2024-07-20 PROCEDURE — 85610 PROTHROMBIN TIME: CPT

## 2024-07-20 PROCEDURE — 1100000003 HC PRIVATE W/ TELEMETRY

## 2024-07-20 PROCEDURE — 85025 COMPLETE CBC W/AUTO DIFF WBC: CPT

## 2024-07-20 PROCEDURE — 6370000000 HC RX 637 (ALT 250 FOR IP): Performed by: GENERAL ACUTE CARE HOSPITAL

## 2024-07-20 PROCEDURE — 6370000000 HC RX 637 (ALT 250 FOR IP): Performed by: PHYSICIAN ASSISTANT

## 2024-07-20 PROCEDURE — 6360000002 HC RX W HCPCS: Performed by: STUDENT IN AN ORGANIZED HEALTH CARE EDUCATION/TRAINING PROGRAM

## 2024-07-20 PROCEDURE — 85730 THROMBOPLASTIN TIME PARTIAL: CPT

## 2024-07-20 PROCEDURE — 97530 THERAPEUTIC ACTIVITIES: CPT

## 2024-07-20 PROCEDURE — 6360000002 HC RX W HCPCS: Performed by: NURSE PRACTITIONER

## 2024-07-20 PROCEDURE — 97530 THERAPEUTIC ACTIVITIES: CPT | Performed by: OCCUPATIONAL THERAPIST

## 2024-07-20 PROCEDURE — 97167 OT EVAL HIGH COMPLEX 60 MIN: CPT | Performed by: OCCUPATIONAL THERAPIST

## 2024-07-20 PROCEDURE — 2580000003 HC RX 258: Performed by: NURSE PRACTITIONER

## 2024-07-20 PROCEDURE — 36415 COLL VENOUS BLD VENIPUNCTURE: CPT

## 2024-07-20 PROCEDURE — 6370000000 HC RX 637 (ALT 250 FOR IP): Performed by: NURSE PRACTITIONER

## 2024-07-20 PROCEDURE — 97162 PT EVAL MOD COMPLEX 30 MIN: CPT

## 2024-07-20 RX ORDER — POLYETHYLENE GLYCOL 3350 17 G/17G
17 POWDER, FOR SOLUTION ORAL DAILY PRN
Status: DISCONTINUED | OUTPATIENT
Start: 2024-07-20 | End: 2024-07-31 | Stop reason: HOSPADM

## 2024-07-20 RX ORDER — PANTOPRAZOLE SODIUM 40 MG/1
40 TABLET, DELAYED RELEASE ORAL
Status: DISCONTINUED | OUTPATIENT
Start: 2024-07-20 | End: 2024-07-31 | Stop reason: HOSPADM

## 2024-07-20 RX ORDER — BUMETANIDE 1 MG/1
2 TABLET ORAL 2 TIMES DAILY
Status: DISCONTINUED | OUTPATIENT
Start: 2024-07-20 | End: 2024-07-23

## 2024-07-20 RX ORDER — ACETAMINOPHEN 650 MG/1
650 SUPPOSITORY RECTAL EVERY 6 HOURS PRN
Status: DISCONTINUED | OUTPATIENT
Start: 2024-07-20 | End: 2024-07-31 | Stop reason: HOSPADM

## 2024-07-20 RX ORDER — MIDODRINE HYDROCHLORIDE 5 MG/1
5 TABLET ORAL
Status: DISCONTINUED | OUTPATIENT
Start: 2024-07-20 | End: 2024-07-20

## 2024-07-20 RX ORDER — ONDANSETRON 2 MG/ML
4 INJECTION INTRAMUSCULAR; INTRAVENOUS EVERY 6 HOURS PRN
Status: DISCONTINUED | OUTPATIENT
Start: 2024-07-20 | End: 2024-07-31 | Stop reason: HOSPADM

## 2024-07-20 RX ORDER — MIRTAZAPINE 15 MG/1
7.5 TABLET, FILM COATED ORAL NIGHTLY
Status: DISCONTINUED | OUTPATIENT
Start: 2024-07-20 | End: 2024-07-31 | Stop reason: HOSPADM

## 2024-07-20 RX ORDER — IPRATROPIUM BROMIDE AND ALBUTEROL SULFATE 2.5; .5 MG/3ML; MG/3ML
1 SOLUTION RESPIRATORY (INHALATION) EVERY 4 HOURS PRN
Status: DISCONTINUED | OUTPATIENT
Start: 2024-07-20 | End: 2024-07-31 | Stop reason: HOSPADM

## 2024-07-20 RX ORDER — SODIUM CHLORIDE 9 MG/ML
INJECTION, SOLUTION INTRAVENOUS PRN
Status: DISCONTINUED | OUTPATIENT
Start: 2024-07-20 | End: 2024-07-31 | Stop reason: HOSPADM

## 2024-07-20 RX ORDER — FERROUS SULFATE 325(65) MG
325 TABLET ORAL
Status: ON HOLD | COMMUNITY
End: 2024-07-31 | Stop reason: HOSPADM

## 2024-07-20 RX ORDER — MIDODRINE HYDROCHLORIDE 5 MG/1
10 TABLET ORAL
Status: DISCONTINUED | OUTPATIENT
Start: 2024-07-20 | End: 2024-07-31 | Stop reason: HOSPADM

## 2024-07-20 RX ORDER — OXYCODONE HYDROCHLORIDE 5 MG/1
5 TABLET ORAL EVERY 6 HOURS PRN
Status: DISCONTINUED | OUTPATIENT
Start: 2024-07-20 | End: 2024-07-31 | Stop reason: HOSPADM

## 2024-07-20 RX ORDER — ASCORBIC ACID 500 MG
500 TABLET ORAL DAILY
COMMUNITY

## 2024-07-20 RX ORDER — LACTOBACILLUS RHAMNOSUS GG 10B CELL
1 CAPSULE ORAL DAILY
Status: DISCONTINUED | OUTPATIENT
Start: 2024-07-20 | End: 2024-07-31 | Stop reason: HOSPADM

## 2024-07-20 RX ORDER — ACETAMINOPHEN 325 MG/1
650 TABLET ORAL EVERY 6 HOURS PRN
Status: DISCONTINUED | OUTPATIENT
Start: 2024-07-20 | End: 2024-07-31 | Stop reason: HOSPADM

## 2024-07-20 RX ORDER — BUMETANIDE 0.25 MG/ML
2 INJECTION INTRAMUSCULAR; INTRAVENOUS 2 TIMES DAILY
Status: DISCONTINUED | OUTPATIENT
Start: 2024-07-20 | End: 2024-07-25

## 2024-07-20 RX ORDER — SODIUM CHLORIDE 0.9 % (FLUSH) 0.9 %
5-40 SYRINGE (ML) INJECTION PRN
Status: DISCONTINUED | OUTPATIENT
Start: 2024-07-20 | End: 2024-07-31 | Stop reason: HOSPADM

## 2024-07-20 RX ORDER — SODIUM CHLORIDE 0.9 % (FLUSH) 0.9 %
5-40 SYRINGE (ML) INJECTION EVERY 12 HOURS SCHEDULED
Status: DISCONTINUED | OUTPATIENT
Start: 2024-07-20 | End: 2024-07-31 | Stop reason: HOSPADM

## 2024-07-20 RX ORDER — LANOLIN ALCOHOL/MO/W.PET/CERES
3 CREAM (GRAM) TOPICAL NIGHTLY PRN
Status: DISCONTINUED | OUTPATIENT
Start: 2024-07-20 | End: 2024-07-31 | Stop reason: HOSPADM

## 2024-07-20 RX ORDER — PRAVASTATIN SODIUM 10 MG
20 TABLET ORAL DAILY
Status: DISCONTINUED | OUTPATIENT
Start: 2024-07-20 | End: 2024-07-31 | Stop reason: HOSPADM

## 2024-07-20 RX ORDER — DIGOXIN 125 MCG
62.5 TABLET ORAL EVERY OTHER DAY
Status: DISCONTINUED | OUTPATIENT
Start: 2024-07-20 | End: 2024-07-22

## 2024-07-20 RX ORDER — ZINC SULFATE 50(220)MG
50 CAPSULE ORAL DAILY
COMMUNITY

## 2024-07-20 RX ORDER — ONDANSETRON 4 MG/1
4 TABLET, ORALLY DISINTEGRATING ORAL EVERY 8 HOURS PRN
Status: DISCONTINUED | OUTPATIENT
Start: 2024-07-20 | End: 2024-07-31 | Stop reason: HOSPADM

## 2024-07-20 RX ADMIN — PIPERACILLIN SODIUM AND TAZOBACTAM SODIUM 3375 MG: 3; .375 INJECTION, POWDER, LYOPHILIZED, FOR SOLUTION INTRAVENOUS at 04:36

## 2024-07-20 RX ADMIN — MIDODRINE HYDROCHLORIDE 10 MG: 5 TABLET ORAL at 17:02

## 2024-07-20 RX ADMIN — Medication 1 CAPSULE: at 13:34

## 2024-07-20 RX ADMIN — DIGOXIN 62.5 MCG: 125 TABLET ORAL at 08:23

## 2024-07-20 RX ADMIN — PANTOPRAZOLE SODIUM 40 MG: 40 TABLET, DELAYED RELEASE ORAL at 06:04

## 2024-07-20 RX ADMIN — PRAVASTATIN SODIUM 20 MG: 10 TABLET ORAL at 08:24

## 2024-07-20 RX ADMIN — OXYCODONE 5 MG: 5 TABLET ORAL at 17:02

## 2024-07-20 RX ADMIN — APIXABAN 2.5 MG: 2.5 TABLET, FILM COATED ORAL at 22:09

## 2024-07-20 RX ADMIN — MIRTAZAPINE 7.5 MG: 15 TABLET, FILM COATED ORAL at 02:47

## 2024-07-20 RX ADMIN — SODIUM CHLORIDE: 9 INJECTION, SOLUTION INTRAVENOUS at 13:35

## 2024-07-20 RX ADMIN — ACETAMINOPHEN 650 MG: 325 TABLET ORAL at 08:23

## 2024-07-20 RX ADMIN — SODIUM CHLORIDE 1500 MG: 9 INJECTION, SOLUTION INTRAVENOUS at 02:47

## 2024-07-20 RX ADMIN — MIDODRINE HYDROCHLORIDE 5 MG: 5 TABLET ORAL at 08:23

## 2024-07-20 RX ADMIN — PIPERACILLIN SODIUM AND TAZOBACTAM SODIUM 3375 MG: 3; .375 INJECTION, POWDER, LYOPHILIZED, FOR SOLUTION INTRAVENOUS at 22:09

## 2024-07-20 RX ADMIN — MIRTAZAPINE 7.5 MG: 15 TABLET, FILM COATED ORAL at 22:09

## 2024-07-20 RX ADMIN — PHYTONADIONE 2.5 MG: 10 INJECTION, EMULSION INTRAMUSCULAR; INTRAVENOUS; SUBCUTANEOUS at 13:34

## 2024-07-20 RX ADMIN — SODIUM CHLORIDE, PRESERVATIVE FREE 10 ML: 5 INJECTION INTRAVENOUS at 21:55

## 2024-07-20 RX ADMIN — SODIUM CHLORIDE, PRESERVATIVE FREE 10 ML: 5 INJECTION INTRAVENOUS at 08:29

## 2024-07-20 RX ADMIN — APIXABAN 2.5 MG: 2.5 TABLET, FILM COATED ORAL at 08:23

## 2024-07-20 RX ADMIN — PIPERACILLIN SODIUM AND TAZOBACTAM SODIUM 3375 MG: 3; .375 INJECTION, POWDER, LYOPHILIZED, FOR SOLUTION INTRAVENOUS at 13:36

## 2024-07-20 ASSESSMENT — PAIN DESCRIPTION - ORIENTATION
ORIENTATION: RIGHT

## 2024-07-20 ASSESSMENT — PAIN DESCRIPTION - DESCRIPTORS
DESCRIPTORS: ACHING;CRAMPING
DESCRIPTORS: ACHING
DESCRIPTORS: ACHING;CRAMPING

## 2024-07-20 ASSESSMENT — PAIN DESCRIPTION - LOCATION
LOCATION: LEG

## 2024-07-20 ASSESSMENT — PAIN SCALES - GENERAL
PAINLEVEL_OUTOF10: 5
PAINLEVEL_OUTOF10: 7
PAINLEVEL_OUTOF10: 4

## 2024-07-20 ASSESSMENT — PAIN - FUNCTIONAL ASSESSMENT
PAIN_FUNCTIONAL_ASSESSMENT: ACTIVITIES ARE NOT PREVENTED
PAIN_FUNCTIONAL_ASSESSMENT: PREVENTS OR INTERFERES SOME ACTIVE ACTIVITIES AND ADLS
PAIN_FUNCTIONAL_ASSESSMENT: ACTIVITIES ARE NOT PREVENTED

## 2024-07-20 ASSESSMENT — PAIN DESCRIPTION - FREQUENCY
FREQUENCY: CONTINUOUS
FREQUENCY: INTERMITTENT

## 2024-07-20 ASSESSMENT — PAIN DESCRIPTION - PAIN TYPE
TYPE: ACUTE PAIN
TYPE: ACUTE PAIN

## 2024-07-20 ASSESSMENT — PAIN DESCRIPTION - ONSET
ONSET: ON-GOING
ONSET: ON-GOING

## 2024-07-20 NOTE — ED NOTES
Pt. Calls out states she has to void urine, pt on Purewick and reports \"I have tried and tried and just can't go.\" Pt. States she can try to get on bedside commode, unable to with assistance due to RLE pain. Attempting to obtain bladder scanner at this time without success, CN notified

## 2024-07-20 NOTE — ED NOTES
ANILA Conklin at bedside to admit patient; per ANILA Conklin pt can receive bladder scan once arriving to the floor and if retaining urine to message on [employer] for siddiqui and UA

## 2024-07-20 NOTE — ED NOTES
Pt. Found to have 56mL and 156mL per tech upon bladder scan; pt was then able to void 200mL, states she feels relief at this time. MARQUIS Díaz updated at this time

## 2024-07-20 NOTE — H&P
tablet by mouth daily 10/3/23 9/27/24  Bhavana Mendoza MD   acetaminophen (TYLENOL) 650 MG extended release tablet Take by mouth every 6 hours as needed    Automatic Reconciliation, Ar   calcium carbonate 1500 (600 Ca) MG TABS tablet Take by mouth 2 times daily  Patient not taking: Reported on 2024    Automatic Reconciliation, Ar         Objective:   VITALS:    Patient Vitals for the past 24 hrs:   BP Temp Pulse Resp SpO2   24 0001 99/61 -- 90 15 --   24 2315 101/63 -- 87 15 --   24 2245 (!) 91/58 -- 83 -- --   24 2200 100/65 -- 91 22 --   24 2145 (!) 96/59 -- 94 18 95 %   244 107/60 -- -- -- --   24 2100 106/82 -- 98 21 96 %   24 -- -- -- -- 93 %   24 -- -- -- -- (!) 88 %   245 101/70 -- 99 -- --   24 192 -- 97.9 °F (36.6 °C) -- -- --   24 1904 118/77 -- (!) 108 18 --       Temp (24hrs), Av.9 °F (36.6 °C), Min:97.9 °F (36.6 °C), Max:97.9 °F (36.6 °C)      O2 Flow Rate (L/min): 2 L/min O2 Device: Nasal cannula    Wt Readings from Last 12 Encounters:   24 64.7 kg (142 lb 9.6 oz)   24 63.5 kg (140 lb)   24 72.3 kg (159 lb 6.3 oz)   24 65.2 kg (143 lb 11.8 oz)   24 65.2 kg (143 lb 11.8 oz)   24 71.7 kg (158 lb)   23 77.4 kg (170 lb 9.6 oz)   23 77.1 kg (170 lb)   23 77.1 kg (170 lb)   23 80.7 kg (178 lb)   23 79.8 kg (176 lb)   23 79.9 kg (176 lb 3.2 oz)         PHYSICAL EXAM:  General:    Alert, cooperative, appears stated age.     Lungs:   On low-flow O2 via NC for supportive care, no increased work of breathing, RRR, scattered Rales to bilateral lower lobes, without any other adventitious lung sounds noted on exam.  Chest wall:  No tenderness. No accessory muscle use.   Heart:   Regular  rhythm, No  Murmur.  +3 bilateral pitting edema.  Abdomen:   Soft, NT. ND, BS+  Extremities: No cyanosis. No clubbing,    Skin:  turgor normal, Radial dial pulse

## 2024-07-21 LAB
ANION GAP SERPL CALC-SCNC: 9 MMOL/L (ref 5–15)
APPEARANCE UR: CLEAR
BACTERIA URNS QL MICRO: ABNORMAL /HPF
BILIRUB UR QL: NEGATIVE
BUN SERPL-MCNC: 69 MG/DL (ref 6–20)
BUN/CREAT SERPL: 25 (ref 12–20)
CALCIUM SERPL-MCNC: 10.1 MG/DL (ref 8.5–10.1)
CHLORIDE SERPL-SCNC: 96 MMOL/L (ref 97–108)
CO2 SERPL-SCNC: 32 MMOL/L (ref 21–32)
COLOR UR: ABNORMAL
CREAT SERPL-MCNC: 2.73 MG/DL (ref 0.55–1.02)
DIGOXIN SERPL-MCNC: 1.2 NG/ML (ref 0.9–2)
EPITH CASTS URNS QL MICRO: ABNORMAL /LPF
GLUCOSE SERPL-MCNC: 113 MG/DL (ref 65–100)
GLUCOSE UR STRIP.AUTO-MCNC: NEGATIVE MG/DL
HGB UR QL STRIP: ABNORMAL
HYALINE CASTS URNS QL MICRO: ABNORMAL /LPF (ref 0–2)
KETONES UR QL STRIP.AUTO: NEGATIVE MG/DL
LEUKOCYTE ESTERASE UR QL STRIP.AUTO: ABNORMAL
NITRITE UR QL STRIP.AUTO: NEGATIVE
PH UR STRIP: 7.5 (ref 5–8)
POTASSIUM SERPL-SCNC: 3.7 MMOL/L (ref 3.5–5.1)
PROT UR STRIP-MCNC: 100 MG/DL
RBC #/AREA URNS HPF: ABNORMAL /HPF (ref 0–5)
SODIUM SERPL-SCNC: 137 MMOL/L (ref 136–145)
SP GR UR REFRACTOMETRY: 1.01
URINE CULTURE IF INDICATED: ABNORMAL
UROBILINOGEN UR QL STRIP.AUTO: 1 EU/DL (ref 0.2–1)
WBC URNS QL MICRO: ABNORMAL /HPF (ref 0–4)

## 2024-07-21 PROCEDURE — 6370000000 HC RX 637 (ALT 250 FOR IP): Performed by: GENERAL ACUTE CARE HOSPITAL

## 2024-07-21 PROCEDURE — 6370000000 HC RX 637 (ALT 250 FOR IP): Performed by: NURSE PRACTITIONER

## 2024-07-21 PROCEDURE — 2580000003 HC RX 258: Performed by: NURSE PRACTITIONER

## 2024-07-21 PROCEDURE — 87088 URINE BACTERIA CULTURE: CPT

## 2024-07-21 PROCEDURE — 1100000003 HC PRIVATE W/ TELEMETRY

## 2024-07-21 PROCEDURE — 6360000002 HC RX W HCPCS: Performed by: NURSE PRACTITIONER

## 2024-07-21 PROCEDURE — 80048 BASIC METABOLIC PNL TOTAL CA: CPT

## 2024-07-21 PROCEDURE — 80162 ASSAY OF DIGOXIN TOTAL: CPT

## 2024-07-21 PROCEDURE — 36415 COLL VENOUS BLD VENIPUNCTURE: CPT

## 2024-07-21 PROCEDURE — 81001 URINALYSIS AUTO W/SCOPE: CPT

## 2024-07-21 PROCEDURE — 2580000003 HC RX 258: Performed by: GENERAL ACUTE CARE HOSPITAL

## 2024-07-21 PROCEDURE — 87186 SC STD MICRODIL/AGAR DIL: CPT

## 2024-07-21 PROCEDURE — 87086 URINE CULTURE/COLONY COUNT: CPT

## 2024-07-21 PROCEDURE — 6360000002 HC RX W HCPCS: Performed by: GENERAL ACUTE CARE HOSPITAL

## 2024-07-21 RX ADMIN — PRAVASTATIN SODIUM 20 MG: 10 TABLET ORAL at 09:39

## 2024-07-21 RX ADMIN — MELATONIN 3 MG: at 22:04

## 2024-07-21 RX ADMIN — BUMETANIDE 2 MG: 0.25 INJECTION INTRAMUSCULAR; INTRAVENOUS at 18:38

## 2024-07-21 RX ADMIN — APIXABAN 2.5 MG: 2.5 TABLET, FILM COATED ORAL at 09:39

## 2024-07-21 RX ADMIN — SODIUM CHLORIDE: 9 INJECTION, SOLUTION INTRAVENOUS at 11:55

## 2024-07-21 RX ADMIN — SODIUM CHLORIDE, PRESERVATIVE FREE 10 ML: 5 INJECTION INTRAVENOUS at 22:02

## 2024-07-21 RX ADMIN — PIPERACILLIN SODIUM AND TAZOBACTAM SODIUM 3375 MG: 3; .375 INJECTION, POWDER, LYOPHILIZED, FOR SOLUTION INTRAVENOUS at 05:13

## 2024-07-21 RX ADMIN — MIDODRINE HYDROCHLORIDE 10 MG: 5 TABLET ORAL at 11:55

## 2024-07-21 RX ADMIN — SODIUM CHLORIDE, PRESERVATIVE FREE 10 ML: 5 INJECTION INTRAVENOUS at 09:40

## 2024-07-21 RX ADMIN — MIDODRINE HYDROCHLORIDE 10 MG: 5 TABLET ORAL at 18:38

## 2024-07-21 RX ADMIN — PIPERACILLIN AND TAZOBACTAM 3375 MG: 3; .375 INJECTION, POWDER, LYOPHILIZED, FOR SOLUTION INTRAVENOUS at 22:03

## 2024-07-21 RX ADMIN — PIPERACILLIN SODIUM AND TAZOBACTAM SODIUM 3375 MG: 3; .375 INJECTION, POWDER, LYOPHILIZED, FOR SOLUTION INTRAVENOUS at 11:56

## 2024-07-21 RX ADMIN — MIRTAZAPINE 7.5 MG: 15 TABLET, FILM COATED ORAL at 22:04

## 2024-07-21 RX ADMIN — Medication 1 CAPSULE: at 09:39

## 2024-07-21 RX ADMIN — APIXABAN 2.5 MG: 2.5 TABLET, FILM COATED ORAL at 22:04

## 2024-07-21 RX ADMIN — MIDODRINE HYDROCHLORIDE 10 MG: 5 TABLET ORAL at 09:39

## 2024-07-21 RX ADMIN — PANTOPRAZOLE SODIUM 40 MG: 40 TABLET, DELAYED RELEASE ORAL at 06:32

## 2024-07-21 ASSESSMENT — PAIN DESCRIPTION - ONSET: ONSET: ON-GOING

## 2024-07-21 ASSESSMENT — PAIN DESCRIPTION - DESCRIPTORS: DESCRIPTORS: ACHING;CRAMPING

## 2024-07-21 ASSESSMENT — PAIN SCALES - GENERAL: PAINLEVEL_OUTOF10: 4

## 2024-07-21 ASSESSMENT — PAIN DESCRIPTION - FREQUENCY: FREQUENCY: CONTINUOUS

## 2024-07-21 ASSESSMENT — PAIN DESCRIPTION - ORIENTATION: ORIENTATION: RIGHT

## 2024-07-21 ASSESSMENT — PAIN DESCRIPTION - PAIN TYPE: TYPE: ACUTE PAIN

## 2024-07-21 ASSESSMENT — PAIN DESCRIPTION - LOCATION: LOCATION: LEG

## 2024-07-21 ASSESSMENT — PAIN - FUNCTIONAL ASSESSMENT: PAIN_FUNCTIONAL_ASSESSMENT: ACTIVITIES ARE NOT PREVENTED

## 2024-07-22 ENCOUNTER — CARE COORDINATION (OUTPATIENT)
Dept: CARE COORDINATION | Age: 79
End: 2024-07-22

## 2024-07-22 ENCOUNTER — APPOINTMENT (OUTPATIENT)
Facility: HOSPITAL | Age: 79
DRG: 291 | End: 2024-07-22
Payer: MEDICARE

## 2024-07-22 LAB
ANION GAP SERPL CALC-SCNC: 9 MMOL/L (ref 5–15)
BACTERIA SPEC CULT: NORMAL
BACTERIA SPEC CULT: NORMAL
BUN SERPL-MCNC: 68 MG/DL (ref 6–20)
BUN/CREAT SERPL: 25 (ref 12–20)
CALCIUM SERPL-MCNC: 10.1 MG/DL (ref 8.5–10.1)
CHLORIDE SERPL-SCNC: 96 MMOL/L (ref 97–108)
CO2 SERPL-SCNC: 30 MMOL/L (ref 21–32)
CREAT SERPL-MCNC: 2.77 MG/DL (ref 0.55–1.02)
ECHO AO ROOT DIAM: 3.7 CM
ECHO AO ROOT INDEX: 2.18 CM/M2
ECHO AV AREA PEAK VELOCITY: 3 CM2
ECHO AV AREA/BSA PEAK VELOCITY: 1.8 CM2/M2
ECHO AV PEAK GRADIENT: 6 MMHG
ECHO AV PEAK VELOCITY: 1.2 M/S
ECHO AV VELOCITY RATIO: 0.67
ECHO BSA: 1.71 M2
ECHO EST RA PRESSURE: 15 MMHG
ECHO LA DIAMETER INDEX: 3.88 CM/M2
ECHO LA DIAMETER: 6.6 CM
ECHO LA TO AORTIC ROOT RATIO: 1.78
ECHO LV E' LATERAL VELOCITY: 11 CM/S
ECHO LV E' SEPTAL VELOCITY: 6 CM/S
ECHO LV FRACTIONAL SHORTENING: 25 % (ref 28–44)
ECHO LV INTERNAL DIMENSION DIASTOLE INDEX: 2.12 CM/M2
ECHO LV INTERNAL DIMENSION DIASTOLIC: 3.6 CM (ref 3.9–5.3)
ECHO LV INTERNAL DIMENSION SYSTOLIC INDEX: 1.59 CM/M2
ECHO LV INTERNAL DIMENSION SYSTOLIC: 2.7 CM
ECHO LV IVSD: 1.4 CM (ref 0.6–0.9)
ECHO LV MASS 2D: 179.9 G (ref 67–162)
ECHO LV MASS INDEX 2D: 105.8 G/M2 (ref 43–95)
ECHO LV POSTERIOR WALL DIASTOLIC: 1.4 CM (ref 0.6–0.9)
ECHO LV RELATIVE WALL THICKNESS RATIO: 0.78
ECHO LVOT AREA: 4.9 CM2
ECHO LVOT DIAM: 2.5 CM
ECHO LVOT PEAK GRADIENT: 2 MMHG
ECHO LVOT PEAK VELOCITY: 0.8 M/S
ECHO MV A VELOCITY: 0.93 M/S
ECHO MV E DECELERATION TIME (DT): 202.9 MS
ECHO MV E VELOCITY: 1.95 M/S
ECHO MV E/A RATIO: 2.1
ECHO MV E/E' LATERAL: 17.73
ECHO MV E/E' RATIO (AVERAGED): 25.11
ECHO MV E/E' SEPTAL: 32.5
ECHO MV MAX VELOCITY: 2.3 M/S
ECHO MV MEAN GRADIENT: 7 MMHG
ECHO MV MEAN VELOCITY: 1.2 M/S
ECHO MV PEAK GRADIENT: 21 MMHG
ECHO MV REGURGITANT PEAK GRADIENT: 77 MMHG
ECHO MV REGURGITANT PEAK VELOCITY: 4.4 M/S
ECHO MV VTI: 47.4 CM
ECHO PV MAX VELOCITY: 1.7 M/S
ECHO PV PEAK GRADIENT: 11 MMHG
ECHO RA AREA 4C: 37 CM2
ECHO RIGHT VENTRICULAR SYSTOLIC PRESSURE (RVSP): 86 MMHG
ECHO RV FREE WALL PEAK S': 7 CM/S
ECHO RV TAPSE: 1.2 CM (ref 1.7–?)
ECHO TV REGURGITANT MAX VELOCITY: 4.2 M/S
ECHO TV REGURGITANT PEAK GRADIENT: 72 MMHG
ERYTHROCYTE [DISTWIDTH] IN BLOOD BY AUTOMATED COUNT: 21 % (ref 11.5–14.5)
GLUCOSE SERPL-MCNC: 94 MG/DL (ref 65–100)
HCT VFR BLD AUTO: 42.8 % (ref 35–47)
HGB BLD-MCNC: 13.3 G/DL (ref 11.5–16)
MCH RBC QN AUTO: 27.7 PG (ref 26–34)
MCHC RBC AUTO-ENTMCNC: 31.1 G/DL (ref 30–36.5)
MCV RBC AUTO: 89 FL (ref 80–99)
NRBC # BLD: 0 K/UL (ref 0–0.01)
NRBC BLD-RTO: 0 PER 100 WBC
PLATELET # BLD AUTO: 158 K/UL (ref 150–400)
PMV BLD AUTO: 10.7 FL (ref 8.9–12.9)
POTASSIUM SERPL-SCNC: 4.1 MMOL/L (ref 3.5–5.1)
RBC # BLD AUTO: 4.81 M/UL (ref 3.8–5.2)
SERVICE CMNT-IMP: NORMAL
SODIUM SERPL-SCNC: 135 MMOL/L (ref 136–145)
WBC # BLD AUTO: 13.7 K/UL (ref 3.6–11)

## 2024-07-22 PROCEDURE — 6360000002 HC RX W HCPCS: Performed by: INTERNAL MEDICINE

## 2024-07-22 PROCEDURE — 85027 COMPLETE CBC AUTOMATED: CPT

## 2024-07-22 PROCEDURE — P9047 ALBUMIN (HUMAN), 25%, 50ML: HCPCS | Performed by: INTERNAL MEDICINE

## 2024-07-22 PROCEDURE — 6370000000 HC RX 637 (ALT 250 FOR IP): Performed by: INTERNAL MEDICINE

## 2024-07-22 PROCEDURE — 2580000003 HC RX 258: Performed by: GENERAL ACUTE CARE HOSPITAL

## 2024-07-22 PROCEDURE — 80048 BASIC METABOLIC PNL TOTAL CA: CPT

## 2024-07-22 PROCEDURE — 97535 SELF CARE MNGMENT TRAINING: CPT

## 2024-07-22 PROCEDURE — 1100000003 HC PRIVATE W/ TELEMETRY

## 2024-07-22 PROCEDURE — 2580000003 HC RX 258: Performed by: NURSE PRACTITIONER

## 2024-07-22 PROCEDURE — 6360000002 HC RX W HCPCS: Performed by: GENERAL ACUTE CARE HOSPITAL

## 2024-07-22 PROCEDURE — 6370000000 HC RX 637 (ALT 250 FOR IP): Performed by: GENERAL ACUTE CARE HOSPITAL

## 2024-07-22 PROCEDURE — 36415 COLL VENOUS BLD VENIPUNCTURE: CPT

## 2024-07-22 PROCEDURE — 6370000000 HC RX 637 (ALT 250 FOR IP): Performed by: NURSE PRACTITIONER

## 2024-07-22 PROCEDURE — 2700000000 HC OXYGEN THERAPY PER DAY

## 2024-07-22 PROCEDURE — 51798 US URINE CAPACITY MEASURE: CPT

## 2024-07-22 PROCEDURE — 93306 TTE W/DOPPLER COMPLETE: CPT

## 2024-07-22 RX ORDER — CASTOR OIL AND BALSAM, PERU 788; 87 MG/G; MG/G
OINTMENT TOPICAL 2 TIMES DAILY
Status: DISCONTINUED | OUTPATIENT
Start: 2024-07-22 | End: 2024-07-31 | Stop reason: HOSPADM

## 2024-07-22 RX ORDER — ALBUMIN (HUMAN) 12.5 G/50ML
25 SOLUTION INTRAVENOUS ONCE
Status: COMPLETED | OUTPATIENT
Start: 2024-07-22 | End: 2024-07-22

## 2024-07-22 RX ORDER — METOLAZONE 5 MG/1
2.5 TABLET ORAL DAILY
Status: DISCONTINUED | OUTPATIENT
Start: 2024-07-23 | End: 2024-07-23

## 2024-07-22 RX ADMIN — ALBUMIN (HUMAN) 25 G: 0.25 INJECTION, SOLUTION INTRAVENOUS at 12:30

## 2024-07-22 RX ADMIN — PANTOPRAZOLE SODIUM 40 MG: 40 TABLET, DELAYED RELEASE ORAL at 06:52

## 2024-07-22 RX ADMIN — APIXABAN 2.5 MG: 2.5 TABLET, FILM COATED ORAL at 08:27

## 2024-07-22 RX ADMIN — SODIUM CHLORIDE, PRESERVATIVE FREE 10 ML: 5 INJECTION INTRAVENOUS at 21:32

## 2024-07-22 RX ADMIN — MIDODRINE HYDROCHLORIDE 10 MG: 5 TABLET ORAL at 08:27

## 2024-07-22 RX ADMIN — PIPERACILLIN AND TAZOBACTAM 3375 MG: 3; .375 INJECTION, POWDER, LYOPHILIZED, FOR SOLUTION INTRAVENOUS at 09:39

## 2024-07-22 RX ADMIN — BUMETANIDE 2 MG: 0.25 INJECTION INTRAMUSCULAR; INTRAVENOUS at 17:41

## 2024-07-22 RX ADMIN — Medication: at 21:32

## 2024-07-22 RX ADMIN — MIDODRINE HYDROCHLORIDE 10 MG: 5 TABLET ORAL at 17:41

## 2024-07-22 RX ADMIN — PIPERACILLIN AND TAZOBACTAM 3375 MG: 3; .375 INJECTION, POWDER, LYOPHILIZED, FOR SOLUTION INTRAVENOUS at 21:31

## 2024-07-22 RX ADMIN — Medication: at 09:49

## 2024-07-22 RX ADMIN — MIRTAZAPINE 7.5 MG: 15 TABLET, FILM COATED ORAL at 21:29

## 2024-07-22 RX ADMIN — DIGOXIN 62.5 MCG: 125 TABLET ORAL at 08:27

## 2024-07-22 RX ADMIN — APIXABAN 2.5 MG: 2.5 TABLET, FILM COATED ORAL at 21:29

## 2024-07-22 RX ADMIN — SODIUM CHLORIDE, PRESERVATIVE FREE 10 ML: 5 INJECTION INTRAVENOUS at 09:40

## 2024-07-22 RX ADMIN — SODIUM CHLORIDE: 9 INJECTION, SOLUTION INTRAVENOUS at 09:33

## 2024-07-22 RX ADMIN — Medication 1 CAPSULE: at 08:27

## 2024-07-22 RX ADMIN — MIDODRINE HYDROCHLORIDE 10 MG: 5 TABLET ORAL at 12:19

## 2024-07-22 RX ADMIN — PRAVASTATIN SODIUM 20 MG: 10 TABLET ORAL at 08:27

## 2024-07-22 ASSESSMENT — PAIN SCALES - GENERAL: PAINLEVEL_OUTOF10: 0

## 2024-07-22 NOTE — CONSULTS
[facility]  [address] [place] [address]  [phone]  [email or web address]       CARDIOLOGY CONSULTATION       Date of  Admission: 7/19/2024  6:59 PM     Admission type:Emergency   Primary Care Physician:Bhavana Mendoza MD     Attending Provider: Pino Castellanos MD  Cardiology Provider: Dr. Palacio  CC/REASON FOR CONSULT: CHF     Subjective:   78-year-old female with chronic atrial fibrillation on apixaban, chronic diastolic heart failure, chronic kidney disease, idiopathic hypotension, atrial septal defect (status post percutaneous closure), mitral valve regurgitation (status post MitraClip x 3, April 2024), status post ascending aortic aneurysm repair, sick sinus syndrome, status post pacemaker in 2018 and tricuspid regurgitation was admitted to the hospital because of suspected cellulitis of the legs in the setting of longstanding edema.    The patient was seen and examined at the bedside.  She denies any chest pain, shortness of breath or cough.    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    PAF (paroxysmal atrial fibrillation) ([facility]) 08/20/2018    Essential hypertension 08/21/2017    Hypercholesterolemia 08/21/2017    Heart failure, acute on chronic, systolic and diastolic ([facility]) 07/20/2024    Severe protein-calorie malnutrition ([facility]) 07/20/2024    Secondary hypercoagulable state ([facility]) 06/11/2024    ADOLFO (acute kidney injury) ([facility]) 04/22/2024    Acute on chronic heart failure, unspecified heart failure type ([facility]) 04/11/2024    CHF (congestive heart failure), NYHA class I, acute on chronic, combined ([facility]) 04/11/2024    Pulmonary hypertension ([facility]) 08/22/2023    Tricuspid regurgitation 08/22/2023    Severe

## 2024-07-22 NOTE — CARE COORDINATION
Remote Patient Monitoring Note  Date/Time:  7/22/2024 12:24 PM  Patient Current Location: [facility] noted yellow alert received for no BP or weight x > 2 days.   Background: Pt enrolled in RPM r/t CHF, HTN.  Clinical Interventions: Reviewed and followed up on alerts and treatments-Per EPIC chart review, noted pt currently admitted inpatient at [facility] for cellulitis of lower extremities.  Will pause RPM at this time and route to [facility] to update.    Plan/Follow Up: Will continue to review, monitor and address alerts with follow up based on severity of symptoms and risk factors.

## 2024-07-23 LAB
ANION GAP SERPL CALC-SCNC: 8 MMOL/L (ref 5–15)
BACTERIA SPEC CULT: ABNORMAL
BUN SERPL-MCNC: 69 MG/DL (ref 6–20)
BUN/CREAT SERPL: 27 (ref 12–20)
CALCIUM SERPL-MCNC: 8.7 MG/DL (ref 8.5–10.1)
CC UR VC: ABNORMAL
CHLORIDE SERPL-SCNC: 102 MMOL/L (ref 97–108)
CO2 SERPL-SCNC: 28 MMOL/L (ref 21–32)
CREAT SERPL-MCNC: 2.58 MG/DL (ref 0.55–1.02)
GLUCOSE SERPL-MCNC: 69 MG/DL (ref 65–100)
POTASSIUM SERPL-SCNC: 3.5 MMOL/L (ref 3.5–5.1)
SERVICE CMNT-IMP: ABNORMAL
SODIUM SERPL-SCNC: 138 MMOL/L (ref 136–145)

## 2024-07-23 PROCEDURE — 97530 THERAPEUTIC ACTIVITIES: CPT | Performed by: OCCUPATIONAL THERAPIST

## 2024-07-23 PROCEDURE — 6360000002 HC RX W HCPCS: Performed by: GENERAL ACUTE CARE HOSPITAL

## 2024-07-23 PROCEDURE — 97116 GAIT TRAINING THERAPY: CPT

## 2024-07-23 PROCEDURE — 2580000003 HC RX 258: Performed by: NURSE PRACTITIONER

## 2024-07-23 PROCEDURE — 1100000003 HC PRIVATE W/ TELEMETRY

## 2024-07-23 PROCEDURE — 36415 COLL VENOUS BLD VENIPUNCTURE: CPT

## 2024-07-23 PROCEDURE — 97530 THERAPEUTIC ACTIVITIES: CPT

## 2024-07-23 PROCEDURE — 2700000000 HC OXYGEN THERAPY PER DAY

## 2024-07-23 PROCEDURE — 6370000000 HC RX 637 (ALT 250 FOR IP): Performed by: NURSE PRACTITIONER

## 2024-07-23 PROCEDURE — 80048 BASIC METABOLIC PNL TOTAL CA: CPT

## 2024-07-23 PROCEDURE — 2580000003 HC RX 258: Performed by: GENERAL ACUTE CARE HOSPITAL

## 2024-07-23 PROCEDURE — 6370000000 HC RX 637 (ALT 250 FOR IP): Performed by: GENERAL ACUTE CARE HOSPITAL

## 2024-07-23 RX ORDER — BISACODYL 10 MG
10 SUPPOSITORY, RECTAL RECTAL DAILY PRN
Status: DISCONTINUED | OUTPATIENT
Start: 2024-07-23 | End: 2024-07-31 | Stop reason: HOSPADM

## 2024-07-23 RX ORDER — LACTULOSE 10 G/15ML
20 SOLUTION ORAL 2 TIMES DAILY PRN
Status: DISCONTINUED | OUTPATIENT
Start: 2024-07-23 | End: 2024-07-31 | Stop reason: HOSPADM

## 2024-07-23 RX ORDER — SENNA AND DOCUSATE SODIUM 50; 8.6 MG/1; MG/1
2 TABLET, FILM COATED ORAL 2 TIMES DAILY
Status: DISCONTINUED | OUTPATIENT
Start: 2024-07-23 | End: 2024-07-31 | Stop reason: HOSPADM

## 2024-07-23 RX ADMIN — PANTOPRAZOLE SODIUM 40 MG: 40 TABLET, DELAYED RELEASE ORAL at 05:51

## 2024-07-23 RX ADMIN — APIXABAN 2.5 MG: 2.5 TABLET, FILM COATED ORAL at 20:40

## 2024-07-23 RX ADMIN — MIDODRINE HYDROCHLORIDE 10 MG: 5 TABLET ORAL at 09:12

## 2024-07-23 RX ADMIN — MIDODRINE HYDROCHLORIDE 10 MG: 5 TABLET ORAL at 16:30

## 2024-07-23 RX ADMIN — MIDODRINE HYDROCHLORIDE 10 MG: 5 TABLET ORAL at 11:54

## 2024-07-23 RX ADMIN — PIPERACILLIN AND TAZOBACTAM 3375 MG: 3; .375 INJECTION, POWDER, LYOPHILIZED, FOR SOLUTION INTRAVENOUS at 20:41

## 2024-07-23 RX ADMIN — POLYETHYLENE GLYCOL 3350 17 G: 17 POWDER, FOR SOLUTION ORAL at 13:58

## 2024-07-23 RX ADMIN — Medication 1 CAPSULE: at 09:12

## 2024-07-23 RX ADMIN — MIRTAZAPINE 7.5 MG: 15 TABLET, FILM COATED ORAL at 20:40

## 2024-07-23 RX ADMIN — Medication: at 20:40

## 2024-07-23 RX ADMIN — Medication: at 09:12

## 2024-07-23 RX ADMIN — APIXABAN 2.5 MG: 2.5 TABLET, FILM COATED ORAL at 09:12

## 2024-07-23 RX ADMIN — BUMETANIDE 2 MG: 0.25 INJECTION INTRAMUSCULAR; INTRAVENOUS at 17:27

## 2024-07-23 RX ADMIN — PIPERACILLIN AND TAZOBACTAM 3375 MG: 3; .375 INJECTION, POWDER, LYOPHILIZED, FOR SOLUTION INTRAVENOUS at 09:22

## 2024-07-23 RX ADMIN — BUMETANIDE 2 MG: 0.25 INJECTION INTRAMUSCULAR; INTRAVENOUS at 09:12

## 2024-07-23 RX ADMIN — SODIUM CHLORIDE, PRESERVATIVE FREE 10 ML: 5 INJECTION INTRAVENOUS at 20:40

## 2024-07-23 RX ADMIN — PRAVASTATIN SODIUM 20 MG: 10 TABLET ORAL at 09:12

## 2024-07-23 RX ADMIN — SENNOSIDES AND DOCUSATE SODIUM 2 TABLET: 50; 8.6 TABLET ORAL at 20:39

## 2024-07-23 RX ADMIN — SODIUM CHLORIDE, PRESERVATIVE FREE 10 ML: 5 INJECTION INTRAVENOUS at 09:13

## 2024-07-23 RX ADMIN — LACTULOSE 20 G: 10 SOLUTION ORAL at 16:30

## 2024-07-23 RX ADMIN — BISACODYL 10 MG: 10 SUPPOSITORY RECTAL at 16:30

## 2024-07-23 ASSESSMENT — PAIN SCALES - GENERAL
PAINLEVEL_OUTOF10: 0

## 2024-07-24 LAB
ANION GAP SERPL CALC-SCNC: 7 MMOL/L (ref 5–15)
BUN SERPL-MCNC: 76 MG/DL (ref 6–20)
BUN/CREAT SERPL: 22 (ref 12–20)
CALCIUM SERPL-MCNC: 9.8 MG/DL (ref 8.5–10.1)
CHLORIDE SERPL-SCNC: 96 MMOL/L (ref 97–108)
CO2 SERPL-SCNC: 34 MMOL/L (ref 21–32)
COMMENT:: NORMAL
CREAT SERPL-MCNC: 3.45 MG/DL (ref 0.55–1.02)
GLUCOSE SERPL-MCNC: 100 MG/DL (ref 65–100)
MAGNESIUM SERPL-MCNC: 2.5 MG/DL (ref 1.6–2.4)
PHOSPHATE SERPL-MCNC: 4.5 MG/DL (ref 2.6–4.7)
POTASSIUM SERPL-SCNC: 4 MMOL/L (ref 3.5–5.1)
SODIUM SERPL-SCNC: 137 MMOL/L (ref 136–145)
SPECIMEN HOLD: NORMAL

## 2024-07-24 PROCEDURE — 80048 BASIC METABOLIC PNL TOTAL CA: CPT

## 2024-07-24 PROCEDURE — 6370000000 HC RX 637 (ALT 250 FOR IP): Performed by: GENERAL ACUTE CARE HOSPITAL

## 2024-07-24 PROCEDURE — 2580000003 HC RX 258: Performed by: NURSE PRACTITIONER

## 2024-07-24 PROCEDURE — 83735 ASSAY OF MAGNESIUM: CPT

## 2024-07-24 PROCEDURE — 6370000000 HC RX 637 (ALT 250 FOR IP): Performed by: INTERNAL MEDICINE

## 2024-07-24 PROCEDURE — 2580000003 HC RX 258: Performed by: GENERAL ACUTE CARE HOSPITAL

## 2024-07-24 PROCEDURE — 36415 COLL VENOUS BLD VENIPUNCTURE: CPT

## 2024-07-24 PROCEDURE — 51798 US URINE CAPACITY MEASURE: CPT

## 2024-07-24 PROCEDURE — 84100 ASSAY OF PHOSPHORUS: CPT

## 2024-07-24 PROCEDURE — 6370000000 HC RX 637 (ALT 250 FOR IP): Performed by: NURSE PRACTITIONER

## 2024-07-24 PROCEDURE — 2700000000 HC OXYGEN THERAPY PER DAY

## 2024-07-24 PROCEDURE — 1100000003 HC PRIVATE W/ TELEMETRY

## 2024-07-24 PROCEDURE — 6360000002 HC RX W HCPCS: Performed by: GENERAL ACUTE CARE HOSPITAL

## 2024-07-24 RX ORDER — POTASSIUM CHLORIDE 20 MEQ/1
20 TABLET, EXTENDED RELEASE ORAL ONCE
Status: COMPLETED | OUTPATIENT
Start: 2024-07-24 | End: 2024-07-24

## 2024-07-24 RX ADMIN — SODIUM CHLORIDE: 9 INJECTION, SOLUTION INTRAVENOUS at 21:01

## 2024-07-24 RX ADMIN — SODIUM CHLORIDE: 9 INJECTION, SOLUTION INTRAVENOUS at 09:33

## 2024-07-24 RX ADMIN — MIDODRINE HYDROCHLORIDE 10 MG: 5 TABLET ORAL at 09:22

## 2024-07-24 RX ADMIN — POTASSIUM CHLORIDE 20 MEQ: 1500 TABLET, EXTENDED RELEASE ORAL at 14:30

## 2024-07-24 RX ADMIN — APIXABAN 2.5 MG: 2.5 TABLET, FILM COATED ORAL at 20:59

## 2024-07-24 RX ADMIN — Medication: at 20:59

## 2024-07-24 RX ADMIN — MIDODRINE HYDROCHLORIDE 10 MG: 5 TABLET ORAL at 18:00

## 2024-07-24 RX ADMIN — SENNOSIDES AND DOCUSATE SODIUM 2 TABLET: 50; 8.6 TABLET ORAL at 21:00

## 2024-07-24 RX ADMIN — Medication: at 09:23

## 2024-07-24 RX ADMIN — SODIUM CHLORIDE, PRESERVATIVE FREE 10 ML: 5 INJECTION INTRAVENOUS at 21:00

## 2024-07-24 RX ADMIN — MIDODRINE HYDROCHLORIDE 10 MG: 5 TABLET ORAL at 14:30

## 2024-07-24 RX ADMIN — PIPERACILLIN AND TAZOBACTAM 3375 MG: 3; .375 INJECTION, POWDER, LYOPHILIZED, FOR SOLUTION INTRAVENOUS at 09:35

## 2024-07-24 RX ADMIN — PIPERACILLIN AND TAZOBACTAM 3375 MG: 3; .375 INJECTION, POWDER, LYOPHILIZED, FOR SOLUTION INTRAVENOUS at 21:03

## 2024-07-24 RX ADMIN — Medication 1 CAPSULE: at 09:23

## 2024-07-24 RX ADMIN — SODIUM CHLORIDE, PRESERVATIVE FREE 10 ML: 5 INJECTION INTRAVENOUS at 09:33

## 2024-07-24 RX ADMIN — APIXABAN 2.5 MG: 2.5 TABLET, FILM COATED ORAL at 09:23

## 2024-07-24 RX ADMIN — PANTOPRAZOLE SODIUM 40 MG: 40 TABLET, DELAYED RELEASE ORAL at 05:33

## 2024-07-24 RX ADMIN — MIRTAZAPINE 7.5 MG: 15 TABLET, FILM COATED ORAL at 20:59

## 2024-07-24 RX ADMIN — PRAVASTATIN SODIUM 20 MG: 10 TABLET ORAL at 09:23

## 2024-07-24 ASSESSMENT — PAIN SCALES - GENERAL: PAINLEVEL_OUTOF10: 0

## 2024-07-24 NOTE — WOUND CARE
Wound care consulted for wounds / skin issues that were present on admission. Pt. Has been taken care of at home (per the patient and ).  Pt. Is 79 years old and has heart issues with edema in the extremities.  She also has several pressure injuries POA including the DTI on the right lateral leg, the DTI on both sides of the Coccyx area and a stage 2 pressure injury to the right of the Sacrum.   Assessment and Treatment: Generally alert when awake but answers questions with one -two words answers, fairly weak.  Pt. Has a Large BM - soft, brown, incontinent. The Purewick was removed and the patient cleaned up. See photos below for remainder of assessment and Treatments: her heels are intact but does contain discoloration in spots, old wounds that are healed.     Right laterl lower leg DTI: Venelex applied.      DTI around the coccyx on buttocks and   stage 2 on the sacrum.         Edema and redness of the leg:       Small linear DTI vs. Pinching injury:   Non-blanching.         Left lower leg clear fluid filled blister:   No inflammation. Needs to be protected  And / or left open to air to reabsorb.       Plan:  Pressure injury prevention.  No time on her back.  Float the heels.   Pt. Needs an Air pump for her bed and she needs to be turned with a significant angle.   Wound care with the Venelex, air bed and keep the patient clean and dry with regard to continence care.   Sandra Barahona RN, BSN, CWON

## 2024-07-24 NOTE — CONSULTS
Nephrology Consult Note      Assessment:  CKD stage 4-> Serum Cr 2.2-2.7mg/dl->suspect residual CKD from recent ADOLFO (warranted temp HD this past May 2024). Cr may fluctuate with needed diuresis.     Decompensated Diastolic CHF:    RHF/Severe Pulm HTN    Enterococcus UTI    Hypokalemia    Hypotension    RLE cellulitis    Plan/Recommendations:  Awaiting lab work  Bumex on hold  Oral Kcl 20meq x1 dose  IV Abx  Midodrine  Strict I/Os  Avoid nephrotoxins  Am labs    Discussed with patient    Thanks for the consultation. Renal service will follow patient with you. Please contact me with any questions or concerns.    Initial Consult note         Patient name: Azucena Myrick  MR no: [id number]  Date of admission: 7/19/2024  Date of consultation:7/24/24  Requested by: Dr. Maria E Kilgore  Reason for consult: CKD 3b/4    Patient seen and examined. History obtained from patient and chart review. Relevant labs, data and notes reviewed.      HPI: Azucena Myrick is a 79 y.o. female with PMH significant for CKD stage 4, Diastolic CHF, RHF/Severe Pulm HTN, MR s/p MitrClip, TR, AAA admitted with decompensated CHF. Chart reviewed-> patient is a limited historian. Seen in the past by my partner-> warranted temporary HD during her last admission in May. Sent to [facility] on HD-> suspect she recovered enough function to come off. This admission serum Cr fluctuating b/w 2.2-2.7mg/dl.       PMH:  Past Medical History:   Diagnosis Date    Acute on chronic heart failure, unspecified heart failure type (HCC) 04/11/2024    Allergic conjunctivitis 07/26/2017    Aortic aneurysm (HCC) 05/2018    Thoracic, abdominal    Arthritis     lower back    Asthma     Current use of long term anticoagulation     Essential hypertension 08/21/2017    GERD (gastroesophageal reflux disease)     History of aortic dissection     History of echocardiogram 09/2019    Left Ventricle: Normal cavity size, systolic function (ejection fraction normal) and

## 2024-07-25 ENCOUNTER — APPOINTMENT (OUTPATIENT)
Facility: HOSPITAL | Age: 79
DRG: 291 | End: 2024-07-25
Payer: MEDICARE

## 2024-07-25 LAB
ANION GAP SERPL CALC-SCNC: 12 MMOL/L (ref 5–15)
BACTERIA SPEC CULT: NORMAL
BACTERIA SPEC CULT: NORMAL
BASOPHILS # BLD: 0 K/UL (ref 0–0.1)
BASOPHILS NFR BLD: 0 % (ref 0–1)
BUN SERPL-MCNC: 81 MG/DL (ref 6–20)
BUN/CREAT SERPL: 21 (ref 12–20)
CALCIUM SERPL-MCNC: 10 MG/DL (ref 8.5–10.1)
CHLORIDE SERPL-SCNC: 95 MMOL/L (ref 97–108)
CO2 SERPL-SCNC: 29 MMOL/L (ref 21–32)
CREAT SERPL-MCNC: 3.85 MG/DL (ref 0.55–1.02)
DIFFERENTIAL METHOD BLD: ABNORMAL
EOSINOPHIL # BLD: 0 K/UL (ref 0–0.4)
EOSINOPHIL NFR BLD: 0 % (ref 0–7)
ERYTHROCYTE [DISTWIDTH] IN BLOOD BY AUTOMATED COUNT: 20.6 % (ref 11.5–14.5)
GLUCOSE SERPL-MCNC: 94 MG/DL (ref 65–100)
HCT VFR BLD AUTO: 39.4 % (ref 35–47)
HGB BLD-MCNC: 12.4 G/DL (ref 11.5–16)
IMM GRANULOCYTES # BLD AUTO: 0.1 K/UL (ref 0–0.04)
IMM GRANULOCYTES NFR BLD AUTO: 1 % (ref 0–0.5)
LYMPHOCYTES # BLD: 0.8 K/UL (ref 0.8–3.5)
LYMPHOCYTES NFR BLD: 11 % (ref 12–49)
MCH RBC QN AUTO: 27.6 PG (ref 26–34)
MCHC RBC AUTO-ENTMCNC: 31.5 G/DL (ref 30–36.5)
MCV RBC AUTO: 87.8 FL (ref 80–99)
MONOCYTES # BLD: 0.7 K/UL (ref 0–1)
MONOCYTES NFR BLD: 9 % (ref 5–13)
NEUTS SEG # BLD: 5.9 K/UL (ref 1.8–8)
NEUTS SEG NFR BLD: 79 % (ref 32–75)
NRBC # BLD: 0.03 K/UL (ref 0–0.01)
NRBC BLD-RTO: 0.4 PER 100 WBC
PLATELET # BLD AUTO: 174 K/UL (ref 150–400)
PMV BLD AUTO: 10.1 FL (ref 8.9–12.9)
POTASSIUM SERPL-SCNC: 4.4 MMOL/L (ref 3.5–5.1)
RBC # BLD AUTO: 4.49 M/UL (ref 3.8–5.2)
RBC MORPH BLD: ABNORMAL
SERVICE CMNT-IMP: NORMAL
SERVICE CMNT-IMP: NORMAL
SODIUM SERPL-SCNC: 136 MMOL/L (ref 136–145)
WBC # BLD AUTO: 7.5 K/UL (ref 3.6–11)

## 2024-07-25 PROCEDURE — 97116 GAIT TRAINING THERAPY: CPT

## 2024-07-25 PROCEDURE — 97535 SELF CARE MNGMENT TRAINING: CPT | Performed by: OCCUPATIONAL THERAPIST

## 2024-07-25 PROCEDURE — 36415 COLL VENOUS BLD VENIPUNCTURE: CPT

## 2024-07-25 PROCEDURE — 51798 US URINE CAPACITY MEASURE: CPT

## 2024-07-25 PROCEDURE — 1100000003 HC PRIVATE W/ TELEMETRY

## 2024-07-25 PROCEDURE — 97530 THERAPEUTIC ACTIVITIES: CPT

## 2024-07-25 PROCEDURE — 6370000000 HC RX 637 (ALT 250 FOR IP): Performed by: GENERAL ACUTE CARE HOSPITAL

## 2024-07-25 PROCEDURE — 6370000000 HC RX 637 (ALT 250 FOR IP): Performed by: NURSE PRACTITIONER

## 2024-07-25 PROCEDURE — 85025 COMPLETE CBC W/AUTO DIFF WBC: CPT

## 2024-07-25 PROCEDURE — 80048 BASIC METABOLIC PNL TOTAL CA: CPT

## 2024-07-25 PROCEDURE — 97530 THERAPEUTIC ACTIVITIES: CPT | Performed by: OCCUPATIONAL THERAPIST

## 2024-07-25 PROCEDURE — 6360000002 HC RX W HCPCS: Performed by: GENERAL ACUTE CARE HOSPITAL

## 2024-07-25 PROCEDURE — 2580000003 HC RX 258: Performed by: NURSE PRACTITIONER

## 2024-07-25 PROCEDURE — 76770 US EXAM ABDO BACK WALL COMP: CPT

## 2024-07-25 PROCEDURE — 2580000003 HC RX 258: Performed by: GENERAL ACUTE CARE HOSPITAL

## 2024-07-25 RX ORDER — TORSEMIDE 20 MG/1
40 TABLET ORAL DAILY
Status: DISCONTINUED | OUTPATIENT
Start: 2024-07-25 | End: 2024-07-25

## 2024-07-25 RX ORDER — TORSEMIDE 20 MG/1
40 TABLET ORAL DAILY
Status: DISCONTINUED | OUTPATIENT
Start: 2024-07-26 | End: 2024-07-26

## 2024-07-25 RX ORDER — TORSEMIDE 20 MG/1
20 TABLET ORAL DAILY
Status: DISCONTINUED | OUTPATIENT
Start: 2024-07-25 | End: 2024-07-25

## 2024-07-25 RX ADMIN — APIXABAN 2.5 MG: 2.5 TABLET, FILM COATED ORAL at 09:04

## 2024-07-25 RX ADMIN — Medication: at 09:14

## 2024-07-25 RX ADMIN — SODIUM CHLORIDE: 9 INJECTION, SOLUTION INTRAVENOUS at 09:09

## 2024-07-25 RX ADMIN — Medication: at 20:38

## 2024-07-25 RX ADMIN — SENNOSIDES AND DOCUSATE SODIUM 2 TABLET: 50; 8.6 TABLET ORAL at 20:37

## 2024-07-25 RX ADMIN — SODIUM CHLORIDE, PRESERVATIVE FREE 10 ML: 5 INJECTION INTRAVENOUS at 09:08

## 2024-07-25 RX ADMIN — APIXABAN 2.5 MG: 2.5 TABLET, FILM COATED ORAL at 20:38

## 2024-07-25 RX ADMIN — MIDODRINE HYDROCHLORIDE 10 MG: 5 TABLET ORAL at 09:04

## 2024-07-25 RX ADMIN — MIRTAZAPINE 7.5 MG: 15 TABLET, FILM COATED ORAL at 20:37

## 2024-07-25 RX ADMIN — PRAVASTATIN SODIUM 20 MG: 10 TABLET ORAL at 09:04

## 2024-07-25 RX ADMIN — Medication 1 CAPSULE: at 09:04

## 2024-07-25 RX ADMIN — MIDODRINE HYDROCHLORIDE 10 MG: 5 TABLET ORAL at 17:57

## 2024-07-25 RX ADMIN — PIPERACILLIN AND TAZOBACTAM 3375 MG: 3; .375 INJECTION, POWDER, LYOPHILIZED, FOR SOLUTION INTRAVENOUS at 09:10

## 2024-07-25 RX ADMIN — MIDODRINE HYDROCHLORIDE 10 MG: 5 TABLET ORAL at 12:55

## 2024-07-25 RX ADMIN — PANTOPRAZOLE SODIUM 40 MG: 40 TABLET, DELAYED RELEASE ORAL at 05:29

## 2024-07-25 RX ADMIN — PIPERACILLIN AND TAZOBACTAM 3375 MG: 3; .375 INJECTION, POWDER, LYOPHILIZED, FOR SOLUTION INTRAVENOUS at 20:39

## 2024-07-25 RX ADMIN — SODIUM CHLORIDE, PRESERVATIVE FREE 10 ML: 5 INJECTION INTRAVENOUS at 20:38

## 2024-07-25 ASSESSMENT — PAIN SCALES - GENERAL: PAINLEVEL_OUTOF10: 0

## 2024-07-26 LAB
ANION GAP SERPL CALC-SCNC: 11 MMOL/L (ref 5–15)
BASOPHILS # BLD: 0 K/UL (ref 0–0.1)
BASOPHILS NFR BLD: 0 % (ref 0–1)
BUN SERPL-MCNC: 85 MG/DL (ref 6–20)
BUN/CREAT SERPL: 21 (ref 12–20)
CALCIUM SERPL-MCNC: 10 MG/DL (ref 8.5–10.1)
CHLORIDE SERPL-SCNC: 95 MMOL/L (ref 97–108)
CO2 SERPL-SCNC: 30 MMOL/L (ref 21–32)
CREAT SERPL-MCNC: 4.1 MG/DL (ref 0.55–1.02)
DIFFERENTIAL METHOD BLD: ABNORMAL
EOSINOPHIL # BLD: 0 K/UL (ref 0–0.4)
EOSINOPHIL NFR BLD: 0 % (ref 0–7)
ERYTHROCYTE [DISTWIDTH] IN BLOOD BY AUTOMATED COUNT: 20.2 % (ref 11.5–14.5)
GLUCOSE SERPL-MCNC: 78 MG/DL (ref 65–100)
HCT VFR BLD AUTO: 38 % (ref 35–47)
HGB BLD-MCNC: 11.7 G/DL (ref 11.5–16)
IMM GRANULOCYTES # BLD AUTO: 0.1 K/UL (ref 0–0.04)
IMM GRANULOCYTES NFR BLD AUTO: 1 % (ref 0–0.5)
LYMPHOCYTES # BLD: 1.8 K/UL (ref 0.8–3.5)
LYMPHOCYTES NFR BLD: 22 % (ref 12–49)
MCH RBC QN AUTO: 26.9 PG (ref 26–34)
MCHC RBC AUTO-ENTMCNC: 30.8 G/DL (ref 30–36.5)
MCV RBC AUTO: 87.4 FL (ref 80–99)
MONOCYTES # BLD: 0.7 K/UL (ref 0–1)
MONOCYTES NFR BLD: 9 % (ref 5–13)
NEUTS SEG # BLD: 5.4 K/UL (ref 1.8–8)
NEUTS SEG NFR BLD: 68 % (ref 32–75)
NRBC # BLD: 0.02 K/UL (ref 0–0.01)
NRBC BLD-RTO: 0.3 PER 100 WBC
PLATELET # BLD AUTO: 183 K/UL (ref 150–400)
PMV BLD AUTO: 11.2 FL (ref 8.9–12.9)
POTASSIUM SERPL-SCNC: 4.6 MMOL/L (ref 3.5–5.1)
RBC # BLD AUTO: 4.35 M/UL (ref 3.8–5.2)
RBC MORPH BLD: ABNORMAL
SODIUM SERPL-SCNC: 136 MMOL/L (ref 136–145)
WBC # BLD AUTO: 8 K/UL (ref 3.6–11)

## 2024-07-26 PROCEDURE — 6360000002 HC RX W HCPCS: Performed by: INTERNAL MEDICINE

## 2024-07-26 PROCEDURE — P9047 ALBUMIN (HUMAN), 25%, 50ML: HCPCS | Performed by: INTERNAL MEDICINE

## 2024-07-26 PROCEDURE — 6370000000 HC RX 637 (ALT 250 FOR IP): Performed by: GENERAL ACUTE CARE HOSPITAL

## 2024-07-26 PROCEDURE — 85025 COMPLETE CBC W/AUTO DIFF WBC: CPT

## 2024-07-26 PROCEDURE — 2700000000 HC OXYGEN THERAPY PER DAY

## 2024-07-26 PROCEDURE — 36415 COLL VENOUS BLD VENIPUNCTURE: CPT

## 2024-07-26 PROCEDURE — 6360000002 HC RX W HCPCS: Performed by: GENERAL ACUTE CARE HOSPITAL

## 2024-07-26 PROCEDURE — 2580000003 HC RX 258: Performed by: NURSE PRACTITIONER

## 2024-07-26 PROCEDURE — 97116 GAIT TRAINING THERAPY: CPT

## 2024-07-26 PROCEDURE — 97530 THERAPEUTIC ACTIVITIES: CPT

## 2024-07-26 PROCEDURE — 6360000002 HC RX W HCPCS: Performed by: NURSE PRACTITIONER

## 2024-07-26 PROCEDURE — 6370000000 HC RX 637 (ALT 250 FOR IP): Performed by: NURSE PRACTITIONER

## 2024-07-26 PROCEDURE — 80048 BASIC METABOLIC PNL TOTAL CA: CPT

## 2024-07-26 PROCEDURE — 2580000003 HC RX 258: Performed by: GENERAL ACUTE CARE HOSPITAL

## 2024-07-26 PROCEDURE — 1100000003 HC PRIVATE W/ TELEMETRY

## 2024-07-26 PROCEDURE — 99223 1ST HOSP IP/OBS HIGH 75: CPT | Performed by: NURSE PRACTITIONER

## 2024-07-26 PROCEDURE — 6370000000 HC RX 637 (ALT 250 FOR IP): Performed by: STUDENT IN AN ORGANIZED HEALTH CARE EDUCATION/TRAINING PROGRAM

## 2024-07-26 RX ORDER — ALBUMIN (HUMAN) 12.5 G/50ML
25 SOLUTION INTRAVENOUS EVERY 6 HOURS
Status: COMPLETED | OUTPATIENT
Start: 2024-07-26 | End: 2024-07-26

## 2024-07-26 RX ADMIN — ALBUMIN (HUMAN) 25 G: 0.25 INJECTION, SOLUTION INTRAVENOUS at 16:25

## 2024-07-26 RX ADMIN — MELATONIN 3 MG: at 20:06

## 2024-07-26 RX ADMIN — PIPERACILLIN AND TAZOBACTAM 3375 MG: 3; .375 INJECTION, POWDER, LYOPHILIZED, FOR SOLUTION INTRAVENOUS at 09:22

## 2024-07-26 RX ADMIN — PRAVASTATIN SODIUM 20 MG: 10 TABLET ORAL at 09:09

## 2024-07-26 RX ADMIN — SENNOSIDES AND DOCUSATE SODIUM 2 TABLET: 50; 8.6 TABLET ORAL at 09:08

## 2024-07-26 RX ADMIN — APIXABAN 2.5 MG: 2.5 TABLET, FILM COATED ORAL at 09:09

## 2024-07-26 RX ADMIN — SODIUM CHLORIDE, PRESERVATIVE FREE 10 ML: 5 INJECTION INTRAVENOUS at 20:08

## 2024-07-26 RX ADMIN — TORSEMIDE 40 MG: 20 TABLET ORAL at 09:09

## 2024-07-26 RX ADMIN — SENNOSIDES AND DOCUSATE SODIUM 2 TABLET: 50; 8.6 TABLET ORAL at 20:06

## 2024-07-26 RX ADMIN — Medication 1 CAPSULE: at 09:09

## 2024-07-26 RX ADMIN — Medication: at 09:09

## 2024-07-26 RX ADMIN — APIXABAN 2.5 MG: 2.5 TABLET, FILM COATED ORAL at 20:06

## 2024-07-26 RX ADMIN — Medication: at 20:07

## 2024-07-26 RX ADMIN — ALBUMIN (HUMAN) 25 G: 0.25 INJECTION, SOLUTION INTRAVENOUS at 10:52

## 2024-07-26 RX ADMIN — MIDODRINE HYDROCHLORIDE 10 MG: 5 TABLET ORAL at 09:08

## 2024-07-26 RX ADMIN — ACETAMINOPHEN 650 MG: 325 TABLET ORAL at 20:06

## 2024-07-26 RX ADMIN — MIDODRINE HYDROCHLORIDE 10 MG: 5 TABLET ORAL at 12:07

## 2024-07-26 RX ADMIN — ONDANSETRON 4 MG: 2 INJECTION INTRAMUSCULAR; INTRAVENOUS at 14:27

## 2024-07-26 RX ADMIN — SODIUM CHLORIDE, PRESERVATIVE FREE 10 ML: 5 INJECTION INTRAVENOUS at 09:09

## 2024-07-26 RX ADMIN — MIDODRINE HYDROCHLORIDE 10 MG: 5 TABLET ORAL at 16:20

## 2024-07-26 RX ADMIN — PIPERACILLIN AND TAZOBACTAM 3375 MG: 3; .375 INJECTION, POWDER, LYOPHILIZED, FOR SOLUTION INTRAVENOUS at 20:20

## 2024-07-26 RX ADMIN — MIRTAZAPINE 7.5 MG: 15 TABLET, FILM COATED ORAL at 20:07

## 2024-07-26 RX ADMIN — PANTOPRAZOLE SODIUM 40 MG: 40 TABLET, DELAYED RELEASE ORAL at 06:10

## 2024-07-26 NOTE — CONSULTS
Palliative Medicine  Patient Name: Azucena Myrick  YOB: 1945  MRN: [id number]  Age: 79 y.o.  Gender: female    Date of Initial Consult: 7/26/2024  Date of Service: 7/27/2024  Time: 1:44 AM  Provider: MICHAEL Smith NP  Hospital Day: 9  Admit Date: 7/19/2024  Referring Provider: Dr. Ingram       Reasons for Consultation:  Goals of Care    HISTORY OF PRESENT ILLNESS (HPI):   Azucena Myrick is a 79 y.o. female with a past medical history of HTN, HLD, CHF, s/p ASD closure and TAVR w/ MV clip (4/17/2024), severe TVR. PHTN, PAF, SSS s/p PPM (2018), recent progression to ESRD requiring HD (5/2024), AAA dissection s/p repair(12/2015), idiopathic hypotension, Vetigo, lumbar stenosis , who was admitted on 7/19/2024 from Home with a diagnosis of Acute/chronic CHF, Hypoxic respiratory failure and RLE cellulitis.     Course of hospitalization-Urine CX + enterococcus.   7/22 ECHO EF 55-60%. LA, RV and RA severely dilated, MV w/ mod regurg but may be underestimated sevreity. TV w/ severe regurg, +severe pulmonary HTN, PV mild regurg.     Patient w/ poor intake/nutrition, reports having no appetite.Weight loss.    7/25-Per PT note- slowly progressing towards goals, max assist w/ transfer supine>sit on side of bed, sit>stand w/ RW and mod assist.    7/26- Creatinine up 4.10      Prior hospitalizations:    7/18- Recent call to PCP, family concerned she is not eating or drinking, thinks she may be depressed, was started on mirtazapine.    4/26-5/13/2024- Hypoxia, Afib w/ RVR, ADOLFO on CKD s/p temporary HD, acute/chronic CHF, N/V from gastro enteritis,   Per PT-Min assist sit to stand and ambulation 16ft, 6ft w/ RW.    Vascular surgery consulted Asymptomatic aortic dissection from ascending to iliac artery, chronic  Some degeneration with an ascending and descending diameter of 5-5.5cm.  Does not yet meet criteria for repair and it would be a complicated repair     4/22-4/24/2024  for constipation. Mildly hypoxic.

## 2024-07-27 PROBLEM — E43 PROTEIN-CALORIE MALNUTRITION, SEVERE (HCC): Status: ACTIVE | Noted: 2024-07-27

## 2024-07-27 PROBLEM — N17.9 ACUTE KIDNEY INJURY (HCC): Status: ACTIVE | Noted: 2024-07-27

## 2024-07-27 PROBLEM — Z51.5 PALLIATIVE CARE ENCOUNTER: Status: ACTIVE | Noted: 2024-07-27

## 2024-07-27 PROBLEM — Z71.89 GOALS OF CARE, COUNSELING/DISCUSSION: Status: ACTIVE | Noted: 2024-07-27

## 2024-07-27 PROBLEM — I50.9 CONGESTIVE HEART FAILURE (HCC): Status: ACTIVE | Noted: 2024-07-27

## 2024-07-27 LAB
ALBUMIN SERPL-MCNC: 2.8 G/DL (ref 3.5–5)
ALBUMIN/GLOB SERPL: 1 (ref 1.1–2.2)
ALP SERPL-CCNC: 46 U/L (ref 45–117)
ALT SERPL-CCNC: 14 U/L (ref 12–78)
ANION GAP SERPL CALC-SCNC: 13 MMOL/L (ref 5–15)
AST SERPL-CCNC: 12 U/L (ref 15–37)
BASOPHILS # BLD: 0 K/UL (ref 0–0.1)
BASOPHILS NFR BLD: 0 % (ref 0–1)
BILIRUB SERPL-MCNC: 2.2 MG/DL (ref 0.2–1)
BUN SERPL-MCNC: 85 MG/DL (ref 6–20)
BUN/CREAT SERPL: 19 (ref 12–20)
CALCIUM SERPL-MCNC: 9.9 MG/DL (ref 8.5–10.1)
CHLORIDE SERPL-SCNC: 96 MMOL/L (ref 97–108)
CO2 SERPL-SCNC: 26 MMOL/L (ref 21–32)
CREAT SERPL-MCNC: 4.49 MG/DL (ref 0.55–1.02)
DIFFERENTIAL METHOD BLD: ABNORMAL
EOSINOPHIL # BLD: 0.1 K/UL (ref 0–0.4)
EOSINOPHIL NFR BLD: 1 % (ref 0–7)
ERYTHROCYTE [DISTWIDTH] IN BLOOD BY AUTOMATED COUNT: 20.1 % (ref 11.5–14.5)
GLOBULIN SER CALC-MCNC: 2.8 G/DL (ref 2–4)
GLUCOSE SERPL-MCNC: 66 MG/DL (ref 65–100)
HCT VFR BLD AUTO: 35.3 % (ref 35–47)
HGB BLD-MCNC: 11 G/DL (ref 11.5–16)
IMM GRANULOCYTES # BLD AUTO: 0.1 K/UL (ref 0–0.04)
IMM GRANULOCYTES NFR BLD AUTO: 2 % (ref 0–0.5)
LYMPHOCYTES # BLD: 1.6 K/UL (ref 0.8–3.5)
LYMPHOCYTES NFR BLD: 22 % (ref 12–49)
MCH RBC QN AUTO: 27.2 PG (ref 26–34)
MCHC RBC AUTO-ENTMCNC: 31.2 G/DL (ref 30–36.5)
MCV RBC AUTO: 87.2 FL (ref 80–99)
MONOCYTES # BLD: 0.6 K/UL (ref 0–1)
MONOCYTES NFR BLD: 8 % (ref 5–13)
NEUTS SEG # BLD: 4.8 K/UL (ref 1.8–8)
NEUTS SEG NFR BLD: 68 % (ref 32–75)
NRBC # BLD: 0 K/UL (ref 0–0.01)
NRBC BLD-RTO: 0 PER 100 WBC
PLATELET # BLD AUTO: 161 K/UL (ref 150–400)
PMV BLD AUTO: 11.2 FL (ref 8.9–12.9)
POTASSIUM SERPL-SCNC: 4 MMOL/L (ref 3.5–5.1)
PROT SERPL-MCNC: 5.6 G/DL (ref 6.4–8.2)
RBC # BLD AUTO: 4.05 M/UL (ref 3.8–5.2)
SODIUM SERPL-SCNC: 135 MMOL/L (ref 136–145)
WBC # BLD AUTO: 7.1 K/UL (ref 3.6–11)

## 2024-07-27 PROCEDURE — 6360000002 HC RX W HCPCS: Performed by: GENERAL ACUTE CARE HOSPITAL

## 2024-07-27 PROCEDURE — 2700000000 HC OXYGEN THERAPY PER DAY

## 2024-07-27 PROCEDURE — 36415 COLL VENOUS BLD VENIPUNCTURE: CPT

## 2024-07-27 PROCEDURE — 6370000000 HC RX 637 (ALT 250 FOR IP): Performed by: NURSE PRACTITIONER

## 2024-07-27 PROCEDURE — 85025 COMPLETE CBC W/AUTO DIFF WBC: CPT

## 2024-07-27 PROCEDURE — 80053 COMPREHEN METABOLIC PANEL: CPT

## 2024-07-27 PROCEDURE — 2580000003 HC RX 258: Performed by: NURSE PRACTITIONER

## 2024-07-27 PROCEDURE — 6370000000 HC RX 637 (ALT 250 FOR IP): Performed by: GENERAL ACUTE CARE HOSPITAL

## 2024-07-27 PROCEDURE — 2580000003 HC RX 258: Performed by: GENERAL ACUTE CARE HOSPITAL

## 2024-07-27 PROCEDURE — 1100000003 HC PRIVATE W/ TELEMETRY

## 2024-07-27 PROCEDURE — 51798 US URINE CAPACITY MEASURE: CPT

## 2024-07-27 RX ADMIN — MIDODRINE HYDROCHLORIDE 10 MG: 5 TABLET ORAL at 12:03

## 2024-07-27 RX ADMIN — PIPERACILLIN AND TAZOBACTAM 3375 MG: 3; .375 INJECTION, POWDER, LYOPHILIZED, FOR SOLUTION INTRAVENOUS at 09:31

## 2024-07-27 RX ADMIN — PRAVASTATIN SODIUM 20 MG: 10 TABLET ORAL at 09:27

## 2024-07-27 RX ADMIN — Medication 1 CAPSULE: at 09:28

## 2024-07-27 RX ADMIN — SENNOSIDES AND DOCUSATE SODIUM 2 TABLET: 50; 8.6 TABLET ORAL at 09:27

## 2024-07-27 RX ADMIN — PANTOPRAZOLE SODIUM 40 MG: 40 TABLET, DELAYED RELEASE ORAL at 20:02

## 2024-07-27 RX ADMIN — MIDODRINE HYDROCHLORIDE 10 MG: 5 TABLET ORAL at 17:27

## 2024-07-27 RX ADMIN — Medication: at 20:29

## 2024-07-27 RX ADMIN — SENNOSIDES AND DOCUSATE SODIUM 2 TABLET: 50; 8.6 TABLET ORAL at 19:59

## 2024-07-27 RX ADMIN — MIRTAZAPINE 7.5 MG: 15 TABLET, FILM COATED ORAL at 19:59

## 2024-07-27 RX ADMIN — Medication: at 09:28

## 2024-07-27 RX ADMIN — APIXABAN 2.5 MG: 2.5 TABLET, FILM COATED ORAL at 19:59

## 2024-07-27 RX ADMIN — APIXABAN 2.5 MG: 2.5 TABLET, FILM COATED ORAL at 09:28

## 2024-07-27 RX ADMIN — SODIUM CHLORIDE, PRESERVATIVE FREE 10 ML: 5 INJECTION INTRAVENOUS at 09:28

## 2024-07-27 RX ADMIN — MIDODRINE HYDROCHLORIDE 10 MG: 5 TABLET ORAL at 09:27

## 2024-07-27 RX ADMIN — SODIUM CHLORIDE, PRESERVATIVE FREE 10 ML: 5 INJECTION INTRAVENOUS at 19:59

## 2024-07-27 ASSESSMENT — PAIN SCALES - GENERAL
PAINLEVEL_OUTOF10: 0
PAINLEVEL_OUTOF10: 0

## 2024-07-28 LAB
ANION GAP SERPL CALC-SCNC: 15 MMOL/L (ref 5–15)
BASOPHILS # BLD: 0 K/UL (ref 0–0.1)
BASOPHILS NFR BLD: 0 % (ref 0–1)
BUN SERPL-MCNC: 92 MG/DL (ref 6–20)
BUN/CREAT SERPL: 18 (ref 12–20)
CALCIUM SERPL-MCNC: 10.3 MG/DL (ref 8.5–10.1)
CHLORIDE SERPL-SCNC: 94 MMOL/L (ref 97–108)
CO2 SERPL-SCNC: 26 MMOL/L (ref 21–32)
CREAT SERPL-MCNC: 5.03 MG/DL (ref 0.55–1.02)
DIFFERENTIAL METHOD BLD: ABNORMAL
EOSINOPHIL # BLD: 0 K/UL (ref 0–0.4)
EOSINOPHIL NFR BLD: 0 % (ref 0–7)
ERYTHROCYTE [DISTWIDTH] IN BLOOD BY AUTOMATED COUNT: 20.9 % (ref 11.5–14.5)
GLUCOSE SERPL-MCNC: 67 MG/DL (ref 65–100)
HCT VFR BLD AUTO: 35.5 % (ref 35–47)
HGB BLD-MCNC: 11.2 G/DL (ref 11.5–16)
IMM GRANULOCYTES # BLD AUTO: 0.1 K/UL (ref 0–0.04)
IMM GRANULOCYTES NFR BLD AUTO: 1 % (ref 0–0.5)
LYMPHOCYTES # BLD: 0.8 K/UL (ref 0.8–3.5)
LYMPHOCYTES NFR BLD: 12 % (ref 12–49)
MCH RBC QN AUTO: 27.1 PG (ref 26–34)
MCHC RBC AUTO-ENTMCNC: 31.5 G/DL (ref 30–36.5)
MCV RBC AUTO: 85.7 FL (ref 80–99)
MONOCYTES # BLD: 0.4 K/UL (ref 0–1)
MONOCYTES NFR BLD: 6 % (ref 5–13)
NEUTS SEG # BLD: 5.5 K/UL (ref 1.8–8)
NEUTS SEG NFR BLD: 81 % (ref 32–75)
NRBC # BLD: 0.03 K/UL (ref 0–0.01)
NRBC BLD-RTO: 0.4 PER 100 WBC
PLATELET # BLD AUTO: 214 K/UL (ref 150–400)
PMV BLD AUTO: 12.2 FL (ref 8.9–12.9)
POTASSIUM SERPL-SCNC: 4.6 MMOL/L (ref 3.5–5.1)
RBC # BLD AUTO: 4.14 M/UL (ref 3.8–5.2)
SODIUM SERPL-SCNC: 135 MMOL/L (ref 136–145)
WBC # BLD AUTO: 6.8 K/UL (ref 3.6–11)

## 2024-07-28 PROCEDURE — 80048 BASIC METABOLIC PNL TOTAL CA: CPT

## 2024-07-28 PROCEDURE — 2580000003 HC RX 258: Performed by: NURSE PRACTITIONER

## 2024-07-28 PROCEDURE — 85025 COMPLETE CBC W/AUTO DIFF WBC: CPT

## 2024-07-28 PROCEDURE — 51798 US URINE CAPACITY MEASURE: CPT

## 2024-07-28 PROCEDURE — 6370000000 HC RX 637 (ALT 250 FOR IP): Performed by: NURSE PRACTITIONER

## 2024-07-28 PROCEDURE — 1100000003 HC PRIVATE W/ TELEMETRY

## 2024-07-28 PROCEDURE — 6370000000 HC RX 637 (ALT 250 FOR IP): Performed by: GENERAL ACUTE CARE HOSPITAL

## 2024-07-28 PROCEDURE — 36415 COLL VENOUS BLD VENIPUNCTURE: CPT

## 2024-07-28 RX ADMIN — PANTOPRAZOLE SODIUM 40 MG: 40 TABLET, DELAYED RELEASE ORAL at 08:54

## 2024-07-28 RX ADMIN — PRAVASTATIN SODIUM 20 MG: 10 TABLET ORAL at 08:54

## 2024-07-28 RX ADMIN — SENNOSIDES AND DOCUSATE SODIUM 2 TABLET: 50; 8.6 TABLET ORAL at 08:54

## 2024-07-28 RX ADMIN — MELATONIN 3 MG: at 02:39

## 2024-07-28 RX ADMIN — APIXABAN 2.5 MG: 2.5 TABLET, FILM COATED ORAL at 20:30

## 2024-07-28 RX ADMIN — Medication: at 09:00

## 2024-07-28 RX ADMIN — APIXABAN 2.5 MG: 2.5 TABLET, FILM COATED ORAL at 08:54

## 2024-07-28 RX ADMIN — MIDODRINE HYDROCHLORIDE 10 MG: 5 TABLET ORAL at 17:42

## 2024-07-28 RX ADMIN — MIRTAZAPINE 7.5 MG: 15 TABLET, FILM COATED ORAL at 20:28

## 2024-07-28 RX ADMIN — MIDODRINE HYDROCHLORIDE 10 MG: 5 TABLET ORAL at 08:53

## 2024-07-28 RX ADMIN — MIDODRINE HYDROCHLORIDE 10 MG: 5 TABLET ORAL at 11:43

## 2024-07-28 RX ADMIN — SODIUM CHLORIDE, PRESERVATIVE FREE 10 ML: 5 INJECTION INTRAVENOUS at 09:00

## 2024-07-28 RX ADMIN — Medication 1 CAPSULE: at 08:54

## 2024-07-28 RX ADMIN — Medication: at 20:32

## 2024-07-28 RX ADMIN — SODIUM CHLORIDE, PRESERVATIVE FREE 10 ML: 5 INJECTION INTRAVENOUS at 20:33

## 2024-07-28 RX ADMIN — SENNOSIDES AND DOCUSATE SODIUM 2 TABLET: 50; 8.6 TABLET ORAL at 20:29

## 2024-07-28 ASSESSMENT — PAIN SCALES - GENERAL
PAINLEVEL_OUTOF10: 0

## 2024-07-29 PROBLEM — Z71.89 DNR (DO NOT RESUSCITATE) DISCUSSION: Status: ACTIVE | Noted: 2024-07-29

## 2024-07-29 PROCEDURE — 99233 SBSQ HOSP IP/OBS HIGH 50: CPT | Performed by: NURSE PRACTITIONER

## 2024-07-29 PROCEDURE — 6370000000 HC RX 637 (ALT 250 FOR IP): Performed by: NURSE PRACTITIONER

## 2024-07-29 PROCEDURE — 6370000000 HC RX 637 (ALT 250 FOR IP): Performed by: INTERNAL MEDICINE

## 2024-07-29 PROCEDURE — 2580000003 HC RX 258: Performed by: NURSE PRACTITIONER

## 2024-07-29 PROCEDURE — 1100000003 HC PRIVATE W/ TELEMETRY

## 2024-07-29 PROCEDURE — 2060000000 HC ICU INTERMEDIATE R&B

## 2024-07-29 PROCEDURE — 6370000000 HC RX 637 (ALT 250 FOR IP): Performed by: GENERAL ACUTE CARE HOSPITAL

## 2024-07-29 RX ADMIN — SODIUM CHLORIDE, PRESERVATIVE FREE 10 ML: 5 INJECTION INTRAVENOUS at 22:18

## 2024-07-29 RX ADMIN — PANTOPRAZOLE SODIUM 40 MG: 40 TABLET, DELAYED RELEASE ORAL at 06:08

## 2024-07-29 RX ADMIN — SODIUM CHLORIDE, PRESERVATIVE FREE 10 ML: 5 INJECTION INTRAVENOUS at 08:44

## 2024-07-29 RX ADMIN — Medication: at 22:16

## 2024-07-29 RX ADMIN — Medication 1 CAPSULE: at 08:44

## 2024-07-29 RX ADMIN — MIDODRINE HYDROCHLORIDE 10 MG: 5 TABLET ORAL at 17:10

## 2024-07-29 RX ADMIN — APIXABAN 2.5 MG: 2.5 TABLET, FILM COATED ORAL at 08:44

## 2024-07-29 RX ADMIN — APIXABAN 2.5 MG: 2.5 TABLET, FILM COATED ORAL at 22:16

## 2024-07-29 RX ADMIN — SENNOSIDES AND DOCUSATE SODIUM 2 TABLET: 50; 8.6 TABLET ORAL at 08:44

## 2024-07-29 RX ADMIN — PRAVASTATIN SODIUM 20 MG: 10 TABLET ORAL at 08:44

## 2024-07-29 RX ADMIN — MIDODRINE HYDROCHLORIDE 10 MG: 5 TABLET ORAL at 12:46

## 2024-07-29 RX ADMIN — Medication: at 08:45

## 2024-07-29 RX ADMIN — SENNOSIDES AND DOCUSATE SODIUM 2 TABLET: 50; 8.6 TABLET ORAL at 22:16

## 2024-07-29 RX ADMIN — MIDODRINE HYDROCHLORIDE 10 MG: 5 TABLET ORAL at 08:44

## 2024-07-29 RX ADMIN — MIRTAZAPINE 7.5 MG: 15 TABLET, FILM COATED ORAL at 22:17

## 2024-07-29 ASSESSMENT — PAIN SCALES - GENERAL
PAINLEVEL_OUTOF10: 0

## 2024-07-29 NOTE — SIGNIFICANT EVENT
RAPID RESPONSE TEAM NOTE:    1345-Writer responded to overhead RRT page by Dr. Kilgore for worsening generalized declining. Upon arrival to the room, Dr. Kilgore was present and had spoken with patient's son-code status changed to DNR.    Outcome:    Patient to remain in room at this time    Please call with any questions or concerns  Keerthi Witt RN  RRT [phone]

## 2024-07-29 NOTE — ACP (ADVANCE CARE PLANNING)
Updated patient's son Nash Myrick [phone], updated with regards to patient's worsening clinical status, worsening mentation. Patient's son voiced understanding, mentioned that the patient should be DNR however to continue other treatment measures at this time.    DNR order placed, discussed with Nursing Staff/Palliative care team

## 2024-07-30 PROCEDURE — 6370000000 HC RX 637 (ALT 250 FOR IP): Performed by: NURSE PRACTITIONER

## 2024-07-30 PROCEDURE — 2580000003 HC RX 258: Performed by: NURSE PRACTITIONER

## 2024-07-30 PROCEDURE — 2060000000 HC ICU INTERMEDIATE R&B

## 2024-07-30 PROCEDURE — 6370000000 HC RX 637 (ALT 250 FOR IP): Performed by: GENERAL ACUTE CARE HOSPITAL

## 2024-07-30 RX ADMIN — SENNOSIDES AND DOCUSATE SODIUM 2 TABLET: 50; 8.6 TABLET ORAL at 20:50

## 2024-07-30 RX ADMIN — PANTOPRAZOLE SODIUM 40 MG: 40 TABLET, DELAYED RELEASE ORAL at 07:35

## 2024-07-30 RX ADMIN — MIRTAZAPINE 7.5 MG: 15 TABLET, FILM COATED ORAL at 20:46

## 2024-07-30 RX ADMIN — SODIUM CHLORIDE, PRESERVATIVE FREE 10 ML: 5 INJECTION INTRAVENOUS at 20:47

## 2024-07-30 RX ADMIN — APIXABAN 2.5 MG: 2.5 TABLET, FILM COATED ORAL at 20:46

## 2024-07-30 RX ADMIN — Medication: at 20:50

## 2024-07-30 RX ADMIN — Medication: at 10:35

## 2024-07-30 RX ADMIN — SODIUM CHLORIDE, PRESERVATIVE FREE 10 ML: 5 INJECTION INTRAVENOUS at 10:36

## 2024-07-30 ASSESSMENT — PAIN SCALES - GENERAL
PAINLEVEL_OUTOF10: 0

## 2024-07-31 VITALS
OXYGEN SATURATION: 91 % | SYSTOLIC BLOOD PRESSURE: 86 MMHG | DIASTOLIC BLOOD PRESSURE: 77 MMHG | HEART RATE: 117 BPM | WEIGHT: 131.39 LBS | RESPIRATION RATE: 18 BRPM | HEIGHT: 64 IN | BODY MASS INDEX: 22.43 KG/M2 | TEMPERATURE: 97.3 F

## 2024-07-31 LAB
ANION GAP SERPL CALC-SCNC: 16 MMOL/L (ref 5–15)
BASOPHILS # BLD: 0 K/UL (ref 0–0.1)
BASOPHILS NFR BLD: 0 % (ref 0–1)
BUN SERPL-MCNC: 112 MG/DL (ref 6–20)
BUN/CREAT SERPL: 17 (ref 12–20)
CALCIUM SERPL-MCNC: 10.7 MG/DL (ref 8.5–10.1)
CHLORIDE SERPL-SCNC: 93 MMOL/L (ref 97–108)
CO2 SERPL-SCNC: 24 MMOL/L (ref 21–32)
CREAT SERPL-MCNC: 6.47 MG/DL (ref 0.55–1.02)
DIFFERENTIAL METHOD BLD: ABNORMAL
EOSINOPHIL # BLD: 0 K/UL (ref 0–0.4)
EOSINOPHIL NFR BLD: 0 % (ref 0–7)
ERYTHROCYTE [DISTWIDTH] IN BLOOD BY AUTOMATED COUNT: 21.3 % (ref 11.5–14.5)
GLUCOSE BLD STRIP.AUTO-MCNC: 113 MG/DL (ref 65–117)
GLUCOSE BLD STRIP.AUTO-MCNC: 52 MG/DL (ref 65–117)
GLUCOSE BLD STRIP.AUTO-MCNC: 54 MG/DL (ref 65–117)
GLUCOSE SERPL-MCNC: 48 MG/DL (ref 65–100)
HCT VFR BLD AUTO: 40.5 % (ref 35–47)
HGB BLD-MCNC: 12.6 G/DL (ref 11.5–16)
IMM GRANULOCYTES # BLD AUTO: 0.1 K/UL (ref 0–0.04)
IMM GRANULOCYTES NFR BLD AUTO: 1 % (ref 0–0.5)
LYMPHOCYTES # BLD: 0.6 K/UL (ref 0.8–3.5)
LYMPHOCYTES NFR BLD: 6 % (ref 12–49)
MCH RBC QN AUTO: 27 PG (ref 26–34)
MCHC RBC AUTO-ENTMCNC: 31.1 G/DL (ref 30–36.5)
MCV RBC AUTO: 86.9 FL (ref 80–99)
MONOCYTES # BLD: 0.7 K/UL (ref 0–1)
MONOCYTES NFR BLD: 7 % (ref 5–13)
NEUTS SEG # BLD: 9.1 K/UL (ref 1.8–8)
NEUTS SEG NFR BLD: 86 % (ref 32–75)
NRBC # BLD: 0.05 K/UL (ref 0–0.01)
NRBC BLD-RTO: 0.5 PER 100 WBC
PLATELET # BLD AUTO: 194 K/UL (ref 150–400)
PMV BLD AUTO: 11 FL (ref 8.9–12.9)
POTASSIUM SERPL-SCNC: 5 MMOL/L (ref 3.5–5.1)
RBC # BLD AUTO: 4.66 M/UL (ref 3.8–5.2)
RBC MORPH BLD: ABNORMAL
RBC MORPH BLD: ABNORMAL
SERVICE CMNT-IMP: ABNORMAL
SERVICE CMNT-IMP: ABNORMAL
SERVICE CMNT-IMP: NORMAL
SODIUM SERPL-SCNC: 133 MMOL/L (ref 136–145)
WBC # BLD AUTO: 10.5 K/UL (ref 3.6–11)

## 2024-07-31 PROCEDURE — 2580000003 HC RX 258: Performed by: NURSE PRACTITIONER

## 2024-07-31 PROCEDURE — 6370000000 HC RX 637 (ALT 250 FOR IP): Performed by: NURSE PRACTITIONER

## 2024-07-31 PROCEDURE — 82962 GLUCOSE BLOOD TEST: CPT

## 2024-07-31 PROCEDURE — 80048 BASIC METABOLIC PNL TOTAL CA: CPT

## 2024-07-31 PROCEDURE — 85025 COMPLETE CBC W/AUTO DIFF WBC: CPT

## 2024-07-31 PROCEDURE — 6370000000 HC RX 637 (ALT 250 FOR IP): Performed by: GENERAL ACUTE CARE HOSPITAL

## 2024-07-31 PROCEDURE — 36415 COLL VENOUS BLD VENIPUNCTURE: CPT

## 2024-07-31 RX ORDER — LACTOBACILLUS RHAMNOSUS GG 10B CELL
1 CAPSULE ORAL DAILY
Qty: 10 CAPSULE | Refills: 0 | Status: SHIPPED | OUTPATIENT
Start: 2024-08-01 | End: 2024-08-11

## 2024-07-31 RX ORDER — GLUCAGON 1 MG/ML
1 KIT INJECTION PRN
Status: DISCONTINUED | OUTPATIENT
Start: 2024-07-31 | End: 2024-07-31 | Stop reason: HOSPADM

## 2024-07-31 RX ORDER — ONDANSETRON 4 MG/1
4 TABLET, ORALLY DISINTEGRATING ORAL EVERY 8 HOURS PRN
Qty: 21 TABLET | Refills: 0 | Status: SHIPPED | OUTPATIENT
Start: 2024-07-31

## 2024-07-31 RX ORDER — DEXTROSE MONOHYDRATE 100 MG/ML
INJECTION, SOLUTION INTRAVENOUS CONTINUOUS PRN
Status: DISCONTINUED | OUTPATIENT
Start: 2024-07-31 | End: 2024-07-31 | Stop reason: HOSPADM

## 2024-07-31 RX ORDER — MIDODRINE HYDROCHLORIDE 10 MG/1
10 TABLET ORAL
Qty: 90 TABLET | Refills: 0 | Status: SHIPPED | OUTPATIENT
Start: 2024-07-31

## 2024-07-31 RX ADMIN — DEXTROSE MONOHYDRATE 125 ML: 100 INJECTION, SOLUTION INTRAVENOUS at 03:04

## 2024-07-31 RX ADMIN — SENNOSIDES AND DOCUSATE SODIUM 2 TABLET: 50; 8.6 TABLET ORAL at 10:46

## 2024-07-31 RX ADMIN — APIXABAN 2.5 MG: 2.5 TABLET, FILM COATED ORAL at 10:46

## 2024-07-31 RX ADMIN — PRAVASTATIN SODIUM 20 MG: 10 TABLET ORAL at 10:46

## 2024-07-31 RX ADMIN — Medication 1 CAPSULE: at 10:46

## 2024-07-31 RX ADMIN — SODIUM CHLORIDE, PRESERVATIVE FREE 10 ML: 5 INJECTION INTRAVENOUS at 10:46

## 2024-07-31 RX ADMIN — MIDODRINE HYDROCHLORIDE 10 MG: 5 TABLET ORAL at 10:46

## 2024-07-31 ASSESSMENT — PAIN SCALES - GENERAL: PAINLEVEL_OUTOF10: 0

## 2024-07-31 NOTE — PLAN OF CARE
Problem: Discharge Planning  Goal: Discharge to home or other facility with appropriate resources  7/20/2024 1158 by Lauren Figueroa RN  Outcome: Progressing  Flowsheets (Taken 7/20/2024 1157)  Discharge to home or other facility with appropriate resources: Identify barriers to discharge with patient and caregiver  7/20/2024 1157 by Lauren Figueroa RN  Outcome: Progressing  Flowsheets  Taken 7/20/2024 1157  Discharge to home or other facility with appropriate resources: Identify barriers to discharge with patient and caregiver  Taken 7/20/2024 0800  Discharge to home or other facility with appropriate resources: Identify barriers to discharge with patient and caregiver  7/20/2024 0300 by Arjun Silva RN  Outcome: Progressing     Problem: Safety - Adult  Goal: Free from fall injury  7/20/2024 1158 by Lauren Figueroa RN  Outcome: Progressing  Flowsheets (Taken 7/20/2024 1158)  Free From Fall Injury: Instruct family/caregiver on patient safety  7/20/2024 1157 by Lauren Figueroa RN  Outcome: Progressing  Flowsheets (Taken 7/20/2024 1157)  Free From Fall Injury: Instruct family/caregiver on patient safety  7/20/2024 0300 by Arjun Silva RN  Outcome: Progressing     Problem: ABCDS Injury Assessment  Goal: Absence of physical injury  7/20/2024 1158 by Lauren Figueroa RN  Outcome: Progressing  Flowsheets (Taken 7/20/2024 1157)  Absence of Physical Injury: Implement safety measures based on patient assessment  7/20/2024 1157 by Lauren Figueroa RN  Outcome: Progressing  Flowsheets (Taken 7/20/2024 1157)  Absence of Physical Injury: Implement safety measures based on patient assessment  7/20/2024 0300 by Arjun Silva RN  Outcome: Progressing     Problem: Skin/Tissue Integrity  Goal: Absence of new skin breakdown  Description: 1.  Monitor for areas of redness and/or skin breakdown  2.  Assess vascular access sites hourly  3.  Every 4-6 hours minimum:  Change oxygen saturation probe site  4.  Every 4-6 
  Problem: Discharge Planning  Goal: Discharge to home or other facility with appropriate resources  7/20/2024 2021 by Arjun Silva RN  Outcome: Progressing  7/20/2024 1158 by Lauren Figueroa RN  Outcome: Progressing  Flowsheets (Taken 7/20/2024 1157)  Discharge to home or other facility with appropriate resources: Identify barriers to discharge with patient and caregiver  7/20/2024 1157 by Lauren Figueroa RN  Outcome: Progressing  Flowsheets  Taken 7/20/2024 1157  Discharge to home or other facility with appropriate resources: Identify barriers to discharge with patient and caregiver  Taken 7/20/2024 0800  Discharge to home or other facility with appropriate resources: Identify barriers to discharge with patient and caregiver     Problem: Safety - Adult  Goal: Free from fall injury  7/20/2024 2021 by Arjun Silva RN  Outcome: Progressing  7/20/2024 1158 by Lauren Figueroa RN  Outcome: Progressing  Flowsheets (Taken 7/20/2024 1158)  Free From Fall Injury: Instruct family/caregiver on patient safety  7/20/2024 1157 by Lauren Figueroa RN  Outcome: Progressing  Flowsheets (Taken 7/20/2024 1157)  Free From Fall Injury: Instruct family/caregiver on patient safety     Problem: ABCDS Injury Assessment  Goal: Absence of physical injury  7/20/2024 2021 by Arjun Silva RN  Outcome: Progressing  7/20/2024 1158 by Lauren Figueroa RN  Outcome: Progressing  Flowsheets (Taken 7/20/2024 1157)  Absence of Physical Injury: Implement safety measures based on patient assessment  7/20/2024 1157 by Lauren Figueroa RN  Outcome: Progressing  Flowsheets (Taken 7/20/2024 1157)  Absence of Physical Injury: Implement safety measures based on patient assessment     Problem: Skin/Tissue Integrity  Goal: Absence of new skin breakdown  Description: 1.  Monitor for areas of redness and/or skin breakdown  2.  Assess vascular access sites hourly  3.  Every 4-6 hours minimum:  Change oxygen saturation probe site  4.  Every 4-6 
  Problem: Discharge Planning  Goal: Discharge to home or other facility with appropriate resources  7/23/2024 1053 by Naomi Ying RN  Outcome: Progressing  7/23/2024 0009 by Flor Mendoza RN  Outcome: Progressing  7/23/2024 0009 by Flor Mendoza RN  Outcome: Progressing  Flowsheets (Taken 7/22/2024 2015)  Discharge to home or other facility with appropriate resources: Identify barriers to discharge with patient and caregiver     Problem: Safety - Adult  Goal: Free from fall injury  7/23/2024 1053 by Naomi Ying RN  Outcome: Progressing  7/23/2024 0009 by Flor Mendoza RN  Outcome: Progressing  7/23/2024 0009 by Flor Mendoza RN  Outcome: Progressing  Flowsheets (Taken 7/22/2024 2015)  Free From Fall Injury: Instruct family/caregiver on patient safety     Problem: ABCDS Injury Assessment  Goal: Absence of physical injury  7/23/2024 1053 by Naomi Ying RN  Outcome: Progressing  7/23/2024 0009 by Flor Mendoza RN  Outcome: Progressing  7/23/2024 0009 by Flor Mendoza RN  Outcome: Progressing  Flowsheets (Taken 7/22/2024 2015)  Absence of Physical Injury: Implement safety measures based on patient assessment     Problem: Skin/Tissue Integrity  Goal: Absence of new skin breakdown  Description: 1.  Monitor for areas of redness and/or skin breakdown  2.  Assess vascular access sites hourly  3.  Every 4-6 hours minimum:  Change oxygen saturation probe site  4.  Every 4-6 hours:  If on nasal continuous positive airway pressure, respiratory therapy assess nares and determine need for appliance change or resting period.  7/23/2024 1053 by Naomi Ying RN  Outcome: Progressing  7/23/2024 0009 by Flor Mendoza RN  Outcome: Progressing  7/23/2024 0009 by Flor Mendoza RN  Outcome: Progressing     Problem: Pain  Goal: Verbalizes/displays adequate comfort level or baseline comfort level  7/23/2024 1053 by Naomi Ying RN  Outcome: Progressing  Flowsheets (Taken 7/23/2024 0021 by Reji 
  Problem: Discharge Planning  Goal: Discharge to home or other facility with appropriate resources  7/23/2024 2150 by Jerrica Brunner RN  Outcome: Progressing  7/23/2024 1053 by Naomi Ying RN  Outcome: Progressing     Problem: Safety - Adult  Goal: Free from fall injury  7/23/2024 2150 by Jerrica Brunner RN  Outcome: Progressing  7/23/2024 1053 by Naomi Ying RN  Outcome: Progressing     Problem: ABCDS Injury Assessment  Goal: Absence of physical injury  7/23/2024 2150 by Jerrica Brunner RN  Outcome: Progressing  7/23/2024 1053 by Naomi Ying RN  Outcome: Progressing     Problem: Skin/Tissue Integrity  Goal: Absence of new skin breakdown  Description: 1.  Monitor for areas of redness and/or skin breakdown  2.  Assess vascular access sites hourly  3.  Every 4-6 hours minimum:  Change oxygen saturation probe site  4.  Every 4-6 hours:  If on nasal continuous positive airway pressure, respiratory therapy assess nares and determine need for appliance change or resting period.  7/23/2024 2150 by Jerrica Brunner RN  Outcome: Progressing  7/23/2024 1053 by Naomi Ying RN  Outcome: Progressing     Problem: Pain  Goal: Verbalizes/displays adequate comfort level or baseline comfort level  7/23/2024 2150 by Jerrica Brunner RN  Outcome: Progressing  7/23/2024 1053 by Naomi Ying RN  Outcome: Progressing  Flowsheets (Taken 7/23/2024 0021 by Flor Mendoza, RN)  Verbalizes/displays adequate comfort level or baseline comfort level: Encourage patient to monitor pain and request assistance     Problem: Occupational Therapy - Adult  Goal: By Discharge: Performs self-care activities at highest level of function for planned discharge setting.  See evaluation for individualized goals.  Description: FUNCTIONAL STATUS PRIOR TO ADMISSION:  reports recent decline in functional mobility and ADLS, she reports needing constant physical assist with bed mobility/transfers and mobility with RW, spouse whom has dementia was 
  Problem: Discharge Planning  Goal: Discharge to home or other facility with appropriate resources  7/24/2024 2145 by Kamini Moon RN  Outcome: Progressing  7/24/2024 1245 by Lauren Figueroa RN  Outcome: Progressing  Flowsheets (Taken 7/24/2024 0800)  Discharge to home or other facility with appropriate resources: Identify barriers to discharge with patient and caregiver     Problem: Safety - Adult  Goal: Free from fall injury  7/24/2024 2145 by Kamini Moon RN  Outcome: Progressing  Flowsheets (Taken 7/23/2024 2030 by Jerrica Brunner, RN)  Free From Fall Injury: Instruct family/caregiver on patient safety  7/24/2024 1245 by Lauren Figueroa RN  Outcome: Progressing  Flowsheets (Taken 7/23/2024 2030 by Jerrica Brunner, RN)  Free From Fall Injury: Instruct family/caregiver on patient safety     Problem: Pain  Goal: Verbalizes/displays adequate comfort level or baseline comfort level  7/24/2024 2145 by Kamini Moon RN  Outcome: Progressing  7/24/2024 1245 by Lauren Figueroa RN  Outcome: Progressing  Flowsheets (Taken 7/23/2024 0021 by Flor Mendoza, RN)  Verbalizes/displays adequate comfort level or baseline comfort level: Encourage patient to monitor pain and request assistance     Problem: Nutrition Deficit:  Goal: Optimize nutritional status  7/24/2024 1245 by Lauren Figueroa RN  Outcome: Progressing  Flowsheets (Taken 7/24/2024 1245)  Nutrient intake appropriate for improving, restoring, or maintaining nutritional needs:   Recommend appropriate diets, oral nutritional supplements, and vitamin/mineral supplements   Monitor oral intake, labs, and treatment plans   Assess nutritional status and recommend course of action     
  Problem: Discharge Planning  Goal: Discharge to home or other facility with appropriate resources  7/25/2024 1059 by Lauren Figueroa RN  Outcome: Progressing  Flowsheets (Taken 7/24/2024 0800)  Discharge to home or other facility with appropriate resources: Identify barriers to discharge with patient and caregiver  7/24/2024 2145 by Kamini Moon RN  Outcome: Progressing     Problem: Safety - Adult  Goal: Free from fall injury  7/25/2024 1059 by Lauren Figueroa RN  Outcome: Progressing  Flowsheets (Taken 7/23/2024 2030 by Jerrica Brunner, RN)  Free From Fall Injury: Instruct family/caregiver on patient safety  7/24/2024 2145 by Kamini Moon RN  Outcome: Progressing  Flowsheets (Taken 7/23/2024 2030 by Jerrica Brunner, RN)  Free From Fall Injury: Instruct family/caregiver on patient safety     Problem: ABCDS Injury Assessment  Goal: Absence of physical injury  Outcome: Progressing  Flowsheets (Taken 7/22/2024 2015 by Flor Mendoza, RN)  Absence of Physical Injury: Implement safety measures based on patient assessment     Problem: Skin/Tissue Integrity  Goal: Absence of new skin breakdown  Description: 1.  Monitor for areas of redness and/or skin breakdown  2.  Assess vascular access sites hourly  3.  Every 4-6 hours minimum:  Change oxygen saturation probe site  4.  Every 4-6 hours:  If on nasal continuous positive airway pressure, respiratory therapy assess nares and determine need for appliance change or resting period.  Outcome: Progressing     Problem: Pain  Goal: Verbalizes/displays adequate comfort level or baseline comfort level  7/24/2024 2145 by Kamini Moon RN  Outcome: Progressing     Problem: Nutrition Deficit:  Goal: Optimize nutritional status  Outcome: Progressing  Flowsheets (Taken 7/24/2024 1245)  Nutrient intake appropriate for improving, restoring, or maintaining nutritional needs:   Recommend appropriate diets, oral nutritional supplements, and vitamin/mineral supplements   
  Problem: Discharge Planning  Goal: Discharge to home or other facility with appropriate resources  7/26/2024 2205 by Virginia Gold RN  Flowsheets (Taken 7/24/2024 0800 by Lauren Figueroa, RN)  Discharge to home or other facility with appropriate resources: Identify barriers to discharge with patient and caregiver  7/26/2024 1040 by Merly Velasco RN  Outcome: Progressing     Problem: Safety - Adult  Goal: Free from fall injury  7/26/2024 2205 by Virginia Gold RN  Flowsheets (Taken 7/23/2024 2030 by Jerrica Brunner, RN)  Free From Fall Injury: Instruct family/caregiver on patient safety  7/26/2024 1040 by Merly Velasco RN  Outcome: Progressing     Problem: ABCDS Injury Assessment  Goal: Absence of physical injury  7/26/2024 1040 by Merly Velasco RN  Outcome: Progressing     Problem: Skin/Tissue Integrity  Goal: Absence of new skin breakdown  Description: 1.  Monitor for areas of redness and/or skin breakdown  2.  Assess vascular access sites hourly  3.  Every 4-6 hours minimum:  Change oxygen saturation probe site  4.  Every 4-6 hours:  If on nasal continuous positive airway pressure, respiratory therapy assess nares and determine need for appliance change or resting period.  7/26/2024 1040 by Merly Velasco RN  Outcome: Progressing     Problem: Pain  Goal: Verbalizes/displays adequate comfort level or baseline comfort level  7/26/2024 2205 by Virginia Gold RN  Flowsheets (Taken 7/26/2024 2205)  Verbalizes/displays adequate comfort level or baseline comfort level:   Encourage patient to monitor pain and request assistance   Assess pain using appropriate pain scale   Administer analgesics based on type and severity of pain and evaluate response   Implement non-pharmacological measures as appropriate and evaluate response  7/26/2024 1040 by Merly Velasco RN  Outcome: Progressing     Problem: Physical Therapy - Adult  Goal: By Discharge: Performs mobility at highest level of function for planned 
  Problem: Discharge Planning  Goal: Discharge to home or other facility with appropriate resources  Outcome: Not Progressing     Problem: Metabolic/Fluid and Electrolytes - Adult  Goal: Hemodynamic stability and optimal renal function maintained  Outcome: Not Progressing     Problem: Chronic Conditions and Co-morbidities  Goal: Patient's chronic conditions and co-morbidity symptoms are monitored and maintained or improved  Outcome: Not Progressing     Problem: Cardiovascular - Adult  Goal: Maintains optimal cardiac output and hemodynamic stability  Outcome: Not Progressing  Goal: Absence of cardiac dysrhythmias or at baseline  Outcome: Not Progressing     Problem: Musculoskeletal - Adult  Goal: Return ADL status to a safe level of function  Outcome: Not Progressing     Problem: Safety - Adult  Goal: Free from fall injury  Outcome: Progressing     Problem: ABCDS Injury Assessment  Goal: Absence of physical injury  Outcome: Progressing     Problem: Skin/Tissue Integrity  Goal: Absence of new skin breakdown  Description: 1.  Monitor for areas of redness and/or skin breakdown  2.  Assess vascular access sites hourly  3.  Every 4-6 hours minimum:  Change oxygen saturation probe site  4.  Every 4-6 hours:  If on nasal continuous positive airway pressure, respiratory therapy assess nares and determine need for appliance change or resting period.  Outcome: Progressing     Problem: Pain  Goal: Verbalizes/displays adequate comfort level or baseline comfort level  Outcome: Progressing     Problem: Nutrition Deficit:  Goal: Optimize nutritional status  Outcome: Progressing     Problem: Metabolic/Fluid and Electrolytes - Adult  Goal: Electrolytes maintained within normal limits  Outcome: Progressing  Goal: Glucose maintained within prescribed range  Outcome: Progressing     Problem: Neurosensory - Adult  Goal: Achieves stable or improved neurological status  Outcome: Progressing     Problem: Respiratory - Adult  Goal: Achieves 
  Problem: Discharge Planning  Goal: Discharge to home or other facility with appropriate resources  Outcome: Progressing     Problem: Safety - Adult  Goal: Free from fall injury  Outcome: Progressing     Problem: ABCDS Injury Assessment  Goal: Absence of physical injury  Outcome: Progressing     Problem: Skin/Tissue Integrity  Goal: Absence of new skin breakdown  Description: 1.  Monitor for areas of redness and/or skin breakdown  2.  Assess vascular access sites hourly  3.  Every 4-6 hours minimum:  Change oxygen saturation probe site  4.  Every 4-6 hours:  If on nasal continuous positive airway pressure, respiratory therapy assess nares and determine need for appliance change or resting period.  Outcome: Progressing     
  Problem: Discharge Planning  Goal: Discharge to home or other facility with appropriate resources  Outcome: Progressing     Problem: Safety - Adult  Goal: Free from fall injury  Outcome: Progressing     Problem: ABCDS Injury Assessment  Goal: Absence of physical injury  Outcome: Progressing     Problem: Skin/Tissue Integrity  Goal: Absence of new skin breakdown  Description: 1.  Monitor for areas of redness and/or skin breakdown  2.  Assess vascular access sites hourly  3.  Every 4-6 hours minimum:  Change oxygen saturation probe site  4.  Every 4-6 hours:  If on nasal continuous positive airway pressure, respiratory therapy assess nares and determine need for appliance change or resting period.  Outcome: Progressing     Problem: Pain  Goal: Verbalizes/displays adequate comfort level or baseline comfort level  Outcome: Progressing  Flowsheets  Taken 7/28/2024 1745 by Serjio Colón RN  Verbalizes/displays adequate comfort level or baseline comfort level: Encourage patient to monitor pain and request assistance  Taken 7/28/2024 1139 by Serjio Colón RN  Verbalizes/displays adequate comfort level or baseline comfort level: Encourage patient to monitor pain and request assistance     Problem: Nutrition Deficit:  Goal: Optimize nutritional status  Outcome: Progressing     Problem: Metabolic/Fluid and Electrolytes - Adult  Goal: Electrolytes maintained within normal limits  Outcome: Progressing  Goal: Hemodynamic stability and optimal renal function maintained  Outcome: Progressing  Goal: Glucose maintained within prescribed range  Outcome: Progressing     Problem: Chronic Conditions and Co-morbidities  Goal: Patient's chronic conditions and co-morbidity symptoms are monitored and maintained or improved  Outcome: Progressing     Problem: Neurosensory - Adult  Goal: Achieves stable or improved neurological status  Outcome: Progressing     Problem: Respiratory - Adult  Goal: Achieves optimal ventilation and 
  Problem: Discharge Planning  Goal: Discharge to home or other facility with appropriate resources  Outcome: Progressing     Problem: Safety - Adult  Goal: Free from fall injury  Outcome: Progressing     Problem: ABCDS Injury Assessment  Goal: Absence of physical injury  Outcome: Progressing     Problem: Skin/Tissue Integrity  Goal: Absence of new skin breakdown  Description: 1.  Monitor for areas of redness and/or skin breakdown  2.  Assess vascular access sites hourly  3.  Every 4-6 hours minimum:  Change oxygen saturation probe site  4.  Every 4-6 hours:  If on nasal continuous positive airway pressure, respiratory therapy assess nares and determine need for appliance change or resting period.  Outcome: Progressing     Problem: Pain  Goal: Verbalizes/displays adequate comfort level or baseline comfort level  Outcome: Progressing  Flowsheets  Taken 7/29/2024 1545 by Serjio Colón RN  Verbalizes/displays adequate comfort level or baseline comfort level: Encourage patient to monitor pain and request assistance  Taken 7/29/2024 1030 by Serjio Colón RN  Verbalizes/displays adequate comfort level or baseline comfort level: Encourage patient to monitor pain and request assistance     Problem: Nutrition Deficit:  Goal: Optimize nutritional status  Outcome: Progressing     Problem: Metabolic/Fluid and Electrolytes - Adult  Goal: Electrolytes maintained within normal limits  Outcome: Progressing  Goal: Hemodynamic stability and optimal renal function maintained  Outcome: Progressing  Goal: Glucose maintained within prescribed range  Outcome: Progressing     Problem: Chronic Conditions and Co-morbidities  Goal: Patient's chronic conditions and co-morbidity symptoms are monitored and maintained or improved  Outcome: Progressing     Problem: Respiratory - Adult  Goal: Achieves optimal ventilation and oxygenation  Outcome: Progressing     Problem: Cardiovascular - Adult  Goal: Maintains optimal cardiac output and 
  Problem: Discharge Planning  Goal: Discharge to home or other facility with appropriate resources  Outcome: Progressing  Flowsheets  Taken 7/21/2024 1058  Discharge to home or other facility with appropriate resources: Identify barriers to discharge with patient and caregiver  Taken 7/21/2024 0800  Discharge to home or other facility with appropriate resources: Identify barriers to discharge with patient and caregiver     Problem: Safety - Adult  Goal: Free from fall injury  Outcome: Progressing  Flowsheets (Taken 7/20/2024 1158)  Free From Fall Injury: Instruct family/caregiver on patient safety     Problem: ABCDS Injury Assessment  Goal: Absence of physical injury  Outcome: Progressing  Flowsheets (Taken 7/20/2024 1157)  Absence of Physical Injury: Implement safety measures based on patient assessment     Problem: Skin/Tissue Integrity  Goal: Absence of new skin breakdown  Description: 1.  Monitor for areas of redness and/or skin breakdown  2.  Assess vascular access sites hourly  3.  Every 4-6 hours minimum:  Change oxygen saturation probe site  4.  Every 4-6 hours:  If on nasal continuous positive airway pressure, respiratory therapy assess nares and determine need for appliance change or resting period.  Outcome: Progressing     Problem: Pain  Goal: Verbalizes/displays adequate comfort level or baseline comfort level  Outcome: Progressing  Flowsheets (Taken 7/20/2024 1158)  Verbalizes/displays adequate comfort level or baseline comfort level:   Encourage patient to monitor pain and request assistance   Notify Licensed Independent Practitioner if interventions unsuccessful or patient reports new pain   Assess pain using appropriate pain scale   Administer analgesics based on type and severity of pain and evaluate response     Problem: Nutrition Deficit:  Goal: Optimize nutritional status  Outcome: Progressing  Flowsheets (Taken 7/21/2024 1434)  Nutrient intake appropriate for improving, restoring, or 
  Problem: Discharge Planning  Goal: Discharge to home or other facility with appropriate resources  Outcome: Progressing  Flowsheets (Taken 7/24/2024 0800)  Discharge to home or other facility with appropriate resources: Identify barriers to discharge with patient and caregiver     Problem: Safety - Adult  Goal: Free from fall injury  Outcome: Progressing  Flowsheets (Taken 7/23/2024 2030 by Jerrica Brunner, RN)  Free From Fall Injury: Instruct family/caregiver on patient safety     Problem: ABCDS Injury Assessment  Goal: Absence of physical injury  Outcome: Progressing  Flowsheets (Taken 7/22/2024 2015 by Flor Mendoza, RN)  Absence of Physical Injury: Implement safety measures based on patient assessment     Problem: Skin/Tissue Integrity  Goal: Absence of new skin breakdown  Description: 1.  Monitor for areas of redness and/or skin breakdown  2.  Assess vascular access sites hourly  3.  Every 4-6 hours minimum:  Change oxygen saturation probe site  4.  Every 4-6 hours:  If on nasal continuous positive airway pressure, respiratory therapy assess nares and determine need for appliance change or resting period.  Outcome: Progressing     Problem: Pain  Goal: Verbalizes/displays adequate comfort level or baseline comfort level  Outcome: Progressing  Flowsheets (Taken 7/23/2024 0021 by Flor Mendoza, RN)  Verbalizes/displays adequate comfort level or baseline comfort level: Encourage patient to monitor pain and request assistance     Problem: Nutrition Deficit:  Goal: Optimize nutritional status  Outcome: Progressing  Flowsheets (Taken 7/24/2024 1245)  Nutrient intake appropriate for improving, restoring, or maintaining nutritional needs:   Recommend appropriate diets, oral nutritional supplements, and vitamin/mineral supplements   Monitor oral intake, labs, and treatment plans   Assess nutritional status and recommend course of action     
  Problem: Discharge Planning  Goal: Discharge to home or other facility with appropriate resources  Outcome: Progressing  Flowsheets (Taken 7/27/2024 2000 by Marck Abdul, RN)  Discharge to home or other facility with appropriate resources: Identify barriers to discharge with patient and caregiver     Problem: Safety - Adult  Goal: Free from fall injury  Outcome: Progressing  Flowsheets (Taken 7/27/2024 2000 by Marck Abdul, RN)  Free From Fall Injury: Instruct family/caregiver on patient safety     Problem: ABCDS Injury Assessment  Goal: Absence of physical injury  Outcome: Progressing  Flowsheets (Taken 7/27/2024 2000 by Marck Abdul, RN)  Absence of Physical Injury: Implement safety measures based on patient assessment     Problem: Skin/Tissue Integrity  Goal: Absence of new skin breakdown  Description: 1.  Monitor for areas of redness and/or skin breakdown  2.  Assess vascular access sites hourly  3.  Every 4-6 hours minimum:  Change oxygen saturation probe site  4.  Every 4-6 hours:  If on nasal continuous positive airway pressure, respiratory therapy assess nares and determine need for appliance change or resting period.  Outcome: Progressing     Problem: Pain  Goal: Verbalizes/displays adequate comfort level or baseline comfort level  Outcome: Progressing  Flowsheets  Taken 7/28/2024 0740 by Serjio Colón RN  Verbalizes/displays adequate comfort level or baseline comfort level: Encourage patient to monitor pain and request assistance  Taken 7/27/2024 2000 by Marck Abdul RN  Verbalizes/displays adequate comfort level or baseline comfort level: Encourage patient to monitor pain and request assistance     Problem: Nutrition Deficit:  Goal: Optimize nutritional status  Outcome: Progressing     Problem: Metabolic/Fluid and Electrolytes - Adult  Goal: Electrolytes maintained within normal limits  Outcome: Progressing  Flowsheets (Taken 7/27/2024 2000 by Marck Abdul, RN)  Electrolytes maintained 
  Problem: Nutrition Deficit:  Goal: Optimize nutritional status  7/23/2024 0009 by Flor Mendoza RN  Outcome: Not Progressing  7/23/2024 0009 by Flor Mendoza RN  Outcome: Progressing     Problem: Discharge Planning  Goal: Discharge to home or other facility with appropriate resources  7/23/2024 0009 by Flor Mendoza RN  Outcome: Progressing  7/23/2024 0009 by Flor Mendoza RN  Outcome: Progressing  Flowsheets (Taken 7/22/2024 2015)  Discharge to home or other facility with appropriate resources: Identify barriers to discharge with patient and caregiver     Problem: Safety - Adult  Goal: Free from fall injury  7/23/2024 0009 by Flor Mendoza RN  Outcome: Progressing  7/23/2024 0009 by Flor Mendoza RN  Outcome: Progressing  Flowsheets (Taken 7/22/2024 2015)  Free From Fall Injury: Instruct family/caregiver on patient safety     Problem: ABCDS Injury Assessment  Goal: Absence of physical injury  7/23/2024 0009 by Flor Mendoza RN  Outcome: Progressing  7/23/2024 0009 by Flor Mendoza RN  Outcome: Progressing  Flowsheets (Taken 7/22/2024 2015)  Absence of Physical Injury: Implement safety measures based on patient assessment     Problem: Skin/Tissue Integrity  Goal: Absence of new skin breakdown  Description: 1.  Monitor for areas of redness and/or skin breakdown  2.  Assess vascular access sites hourly  3.  Every 4-6 hours minimum:  Change oxygen saturation probe site  4.  Every 4-6 hours:  If on nasal continuous positive airway pressure, respiratory therapy assess nares and determine need for appliance change or resting period.  7/23/2024 0009 by Flor Mendoza RN  Outcome: Progressing  7/23/2024 0009 by Flor Mendoza RN  Outcome: Progressing     Problem: Pain  Goal: Verbalizes/displays adequate comfort level or baseline comfort level  7/23/2024 0009 by Flor Mendoza RN  Outcome: Progressing  7/23/2024 0009 by Flor Mendoza RN  Outcome: Progressing  Flowsheets (Taken 7/22/2024 
  Problem: Nutrition Deficit:  Goal: Optimize nutritional status  Outcome: Not Progressing     Problem: Metabolic/Fluid and Electrolytes - Adult  Goal: Electrolytes maintained within normal limits  Outcome: Not Progressing  Goal: Hemodynamic stability and optimal renal function maintained  Outcome: Not Progressing     Problem: Chronic Conditions and Co-morbidities  Goal: Patient's chronic conditions and co-morbidity symptoms are monitored and maintained or improved  Outcome: Not Progressing     Problem: Cardiovascular - Adult  Goal: Maintains optimal cardiac output and hemodynamic stability  Outcome: Not Progressing  Goal: Absence of cardiac dysrhythmias or at baseline  Outcome: Not Progressing     Problem: Skin/Tissue Integrity - Adult  Goal: Skin integrity remains intact  Outcome: Not Progressing     Problem: Musculoskeletal - Adult  Goal: Return mobility to safest level of function  Outcome: Not Progressing  Goal: Return ADL status to a safe level of function  Outcome: Not Progressing     Problem: Gastrointestinal - Adult  Goal: Minimal or absence of nausea and vomiting  Outcome: Not Progressing  Goal: Maintains or returns to baseline bowel function  Outcome: Not Progressing  Goal: Maintains adequate nutritional intake  Outcome: Not Progressing     Problem: Genitourinary - Adult  Goal: Absence of urinary retention  Outcome: Not Progressing     Problem: Hematologic - Adult  Goal: Maintains hematologic stability  Outcome: Not Progressing     Problem: Discharge Planning  Goal: Discharge to home or other facility with appropriate resources  Outcome: Progressing     Problem: Safety - Adult  Goal: Free from fall injury  Outcome: Progressing     Problem: ABCDS Injury Assessment  Goal: Absence of physical injury  Outcome: Progressing     Problem: Skin/Tissue Integrity  Goal: Absence of new skin breakdown  Description: 1.  Monitor for areas of redness and/or skin breakdown  2.  Assess vascular access sites hourly  3.  
  Problem: Occupational Therapy - Adult  Goal: By Discharge: Performs self-care activities at highest level of function for planned discharge setting.  See evaluation for individualized goals.  Description: FUNCTIONAL STATUS PRIOR TO ADMISSION:  reports recent decline in functional mobility and ADLS, she reports needing constant physical assist with bed mobility/transfers and mobility with RW, spouse whom has dementia was assisting pt was LB dressing/bathing, pt sponge bathes, was able to mobilize into bathroom with assist but was toileting herself, pt had multiple hospitalizations in April with physical decline and need for SNF rehab, she reports returning to home and getting HH therapy services, prior to June pt was able to mobilize without RW    HOME SUPPORT PRIOR TO ADMISSION: The patient lived with spouse (who has dementia) and son, son provides assist for both patient and her spouse.     Occupational Therapy Goals:  Initiated 7/20/2024  1.  Patient will perform grooming seated without back support with supervision after implements provided within 7 day(s).  2.  Patient will perform upper body dressing with Supervision within 7 day(s).  3.  Patient will perform lower body dressing with Moderate Assist within 7 day(s).  4.  Patient will perform toilet transfers with Minimal Assist  within 7 day(s).  5.  Patient will perform all aspects of toileting with Moderate Assist within 7 day(s).    Outcome: Progressing Slowly   OCCUPATIONAL THERAPY TREATMENT  Patient: Azucena Myrick (78 y.o. female)  Date: 7/22/2024  Primary Diagnosis: Heart failure, acute on chronic, systolic and diastolic (HCC) [I50.43]  Congestive heart failure, unspecified HF chronicity, unspecified heart failure type (HCC) [I50.9]       Precautions: Bed Alarm, Fall Risk                Chart, occupational therapy assessment, plan of care, and goals were reviewed.    ASSESSMENT  Patient continues to benefit from skilled OT services and is slowly 
  Problem: Occupational Therapy - Adult  Goal: By Discharge: Performs self-care activities at highest level of function for planned discharge setting.  See evaluation for individualized goals.  Description: FUNCTIONAL STATUS PRIOR TO ADMISSION:  reports recent decline in functional mobility and ADLS, she reports needing constant physical assist with bed mobility/transfers and mobility with RW, spouse whom has dementia was assisting pt was LB dressing/bathing, pt sponge bathes, was able to mobilize into bathroom with assist but was toileting herself, pt had multiple hospitalizations in April with physical decline and need for SNF rehab, she reports returning to home and getting HH therapy services, prior to June pt was able to mobilize without RW    HOME SUPPORT PRIOR TO ADMISSION: The patient lived with spouse (who has dementia) and son, son provides assist for both patient and her spouse.     Occupational Therapy Goals:  Weekly Re-Assessment 7/25/2024 continue below goals, no goals met but continues to benefit from services  Initiated 7/20/2024  1.  Patient will perform grooming seated without back support with supervision after implements provided within 7 day(s).  2.  Patient will perform upper body dressing with Supervision within 7 day(s).  3.  Patient will perform lower body dressing with Moderate Assist within 7 day(s).  4.  Patient will perform toilet transfers with Minimal Assist  within 7 day(s).  5.  Patient will perform all aspects of toileting with Moderate Assist within 7 day(s).    Outcome: Progressing   OCCUPATIONAL THERAPY RE-EVALUATION  Patient: Azucena Myrick (79 y.o. female)  Date: 7/25/2024  Primary Diagnosis: Heart failure, acute on chronic, systolic and diastolic (HCC) [I50.43]  Congestive heart failure, unspecified HF chronicity, unspecified heart failure type (HCC) [I50.9]         Precautions: Bed Alarm, Fall Risk                Chart, occupational therapy assessment, plan of care, and 
  Problem: Physical Therapy - Adult  Goal: By Discharge: Performs mobility at highest level of function for planned discharge setting.  See evaluation for individualized goals.  Description: FUNCTIONAL STATUS PRIOR TO ADMISSION: Patient reports a more recent decline of functional mobility (unable to get exact timeline of decline), requiring constant physical assist with bed mobility, transfers, and ambulation with RW. Spouse assist with LB dressing and bathing. Patient with multiple hospitalization in April resulting in discharge to SNF, patient reports she returned home in June she was able to mobilize with walker without assist and has been working with  therapy.     HOME SUPPORT PRIOR TO ADMISSION: The patient lived with spouse (who has dementia) and son, son provides assist for both patient and her spouse.    Physical Therapy Goals  Initiated 7/20/2024  1.  Patient will move from supine to sit and sit to supine, scoot up and down, and roll side to side in bed with modified independence within 7 day(s).    2.  Patient will perform sit to stand with supervision/set-up within 7 day(s).  3.  Patient will transfer from bed to chair and chair to bed with supervision/set-up using the least restrictive device within 7 day(s).  4.  Patient will ambulate with supervision/set-up for 50 feet with the least restrictive device within 7 day(s).     Outcome: Progressing     PHYSICAL THERAPY TREATMENT    Patient: Azucena Myrick (79 y.o. female)  Date: 7/25/2024  Diagnosis: Heart failure, acute on chronic, systolic and diastolic (HCC) [I50.43]  Congestive heart failure, unspecified HF chronicity, unspecified heart failure type (HCC) [I50.9] Heart failure, acute on chronic, systolic and diastolic (HCC)      Precautions: Bed Alarm, Fall Risk                      ASSESSMENT:  Patient continues to benefit from skilled PT services and is slowly progressing towards goals. Pt encountered semi-reclined in bed in NAD, reports fatigue 
  Problem: Physical Therapy - Adult  Goal: By Discharge: Performs mobility at highest level of function for planned discharge setting.  See evaluation for individualized goals.  Description: FUNCTIONAL STATUS PRIOR TO ADMISSION: Patient reports a more recent decline of functional mobility (unable to get exact timeline of decline), requiring constant physical assist with bed mobility, transfers, and ambulation with RW. Spouse assist with LB dressing and bathing. Patient with multiple hospitalization in April resulting in discharge to SNF, patient reports she returned home in June she was able to mobilize with walker without assist and has been working with  therapy.     HOME SUPPORT PRIOR TO ADMISSION: The patient lived with spouse (who has dementia) and son, son provides assist for both patient and her spouse.    Physical Therapy Goals  Initiated 7/20/2024  1.  Patient will move from supine to sit and sit to supine, scoot up and down, and roll side to side in bed with modified independence within 7 day(s).    2.  Patient will perform sit to stand with supervision/set-up within 7 day(s).  3.  Patient will transfer from bed to chair and chair to bed with supervision/set-up using the least restrictive device within 7 day(s).  4.  Patient will ambulate with supervision/set-up for 50 feet with the least restrictive device within 7 day(s).     Outcome: Progressing   PHYSICAL THERAPY TREATMENT    Patient: Azucena Myrick (79 y.o. female)  Date: 7/23/2024  Diagnosis: Heart failure, acute on chronic, systolic and diastolic (HCC) [I50.43]  Congestive heart failure, unspecified HF chronicity, unspecified heart failure type (HCC) [I50.9] Heart failure, acute on chronic, systolic and diastolic (HCC)      Precautions: Bed Alarm, Fall Risk                      ASSESSMENT:  Patient continues to benefit from skilled PT services and is progressing towards goals. Pt received semi fowlers in bed, agreeable to participate in therapy. 
  Problem: Physical Therapy - Adult  Goal: By Discharge: Performs mobility at highest level of function for planned discharge setting.  See evaluation for individualized goals.  Description: FUNCTIONAL STATUS PRIOR TO ADMISSION: Patient reports a more recent decline of functional mobility (unable to get exact timeline of decline), requiring constant physical assist with bed mobility, transfers, and ambulation with RW. Spouse assist with LB dressing and bathing. Patient with multiple hospitalization in April resulting in discharge to SNF, patient reports she returned home in June she was able to mobilize with walker without assist and has been working with HH therapy.     HOME SUPPORT PRIOR TO ADMISSION: The patient lived with spouse (who has dementia) and son, son provides assist for both patient and her spouse.    Physical Therapy Goals  Initiated 7/20/2024  1.  Patient will move from supine to sit and sit to supine, scoot up and down, and roll side to side in bed with modified independence within 7 day(s).    2.  Patient will perform sit to stand with supervision/set-up within 7 day(s).  3.  Patient will transfer from bed to chair and chair to bed with supervision/set-up using the least restrictive device within 7 day(s).  4.  Patient will ambulate with supervision/set-up for 50 feet with the least restrictive device within 7 day(s).     Outcome: Progressing   PHYSICAL THERAPY TREATMENT WEEKLY REASSESSMENT      Patient: Azucena Myrick (79 y.o. female)  Date: 7/26/2024  Diagnosis: Heart failure, acute on chronic, systolic and diastolic (HCC) [I50.43]  Congestive heart failure, unspecified HF chronicity, unspecified heart failure type (HCC) [I50.9] Heart failure, acute on chronic, systolic and diastolic (HCC)      Precautions: Bed Alarm, Fall Risk                      ASSESSMENT:  Patient continues to benefit from skilled PT services and is slowly progressing towards goals. She demonstrates the ability to tolerate 
Problem: Occupational Therapy - Adult  Goal: By Discharge: Performs self-care activities at highest level of function for planned discharge setting.  See evaluation for individualized goals.  Description: FUNCTIONAL STATUS PRIOR TO ADMISSION:  reports recent decline in functional mobility and ADLS, she reports needing constant physical assist with bed mobility/transfers and mobility with RW, spouse whom has dementia was assisting pt was LB dressing/bathing, pt sponge bathes, was able to mobilize into bathroom with assist but was toileting herself, pt had multiple hospitalizations in April with physical decline and need for SNF rehab, she reports returning to home and getting HH therapy services, prior to June pt was able to mobilize without RW    HOME SUPPORT PRIOR TO ADMISSION: The patient lived with spouse (who has dementia) and son, son provides assist for both patient and her spouse.     Occupational Therapy Goals:  Initiated 7/20/2024  1.  Patient will perform grooming seated without back support with supervision after implements provided within 7 day(s).  2.  Patient will perform upper body dressing with Supervision within 7 day(s).  3.  Patient will perform lower body dressing with Moderate Assist within 7 day(s).  4.  Patient will perform toilet transfers with Minimal Assist  within 7 day(s).  5.  Patient will perform all aspects of toileting with Moderate Assist within 7 day(s).    Outcome: Not Progressing  OCCUPATIONAL THERAPY EVALUATION    Patient: Azucena Myrick (78 y.o. female)  Date: 7/20/2024  Primary Diagnosis: Heart failure, acute on chronic, systolic and diastolic (HCC) [I50.43]  Congestive heart failure, unspecified HF chronicity, unspecified heart failure type (HCC) [I50.9]         Precautions: Bed Alarm, Fall Risk                  ASSESSMENT :  The patient is limited by decreased functional mobility, independence in ADLs, ROM, strength, body mechanics, endurance, balance, posture, orthostatic 
and vomiting  7/30/2024 2304 by Jun Allan RN  Outcome: Progressing  7/30/2024 1607 by Tirso Candelario RN  Outcome: Not Progressing  Goal: Maintains or returns to baseline bowel function  7/30/2024 2304 by Jun Allan RN  Outcome: Progressing  7/30/2024 1607 by Tirso Candelario RN  Outcome: Not Progressing  Goal: Maintains adequate nutritional intake  7/30/2024 2304 by Jun Allan RN  Outcome: Progressing  7/30/2024 1607 by Tirso Candelario RN  Outcome: Not Progressing     Problem: Genitourinary - Adult  Goal: Absence of urinary retention  7/30/2024 2304 by Jun Allan RN  Outcome: Progressing  7/30/2024 1607 by Tirso Candelario RN  Outcome: Not Progressing     Problem: Hematologic - Adult  Goal: Maintains hematologic stability  7/30/2024 2304 by Jun Allan RN  Outcome: Progressing  7/30/2024 1607 by Tirso Candelario RN  Outcome: Not Progressing     
remain intact  Outcome: Progressing     Problem: Musculoskeletal - Adult  Goal: Return mobility to safest level of function  Outcome: Progressing  Goal: Return ADL status to a safe level of function  Outcome: Progressing     Problem: Gastrointestinal - Adult  Goal: Minimal or absence of nausea and vomiting  Outcome: Progressing  Goal: Maintains or returns to baseline bowel function  Outcome: Progressing  Goal: Maintains adequate nutritional intake  Outcome: Progressing     Problem: Genitourinary - Adult  Goal: Absence of urinary retention  Outcome: Progressing     Problem: Infection - Adult  Goal: Absence of infection during hospitalization  Outcome: Progressing     Problem: Hematologic - Adult  Goal: Maintains hematologic stability  Outcome: Progressing     
(Taken 7/29/2024 0730)  Skin Integrity Remains Intact: Monitor for areas of redness and/or skin breakdown  7/28/2024 2235 by Jun Allan RN  Outcome: Progressing  Goal: Oral mucous membranes remain intact  7/29/2024 0747 by Serjio Colón RN  Outcome: Progressing  7/28/2024 2235 by Jun Allan RN  Outcome: Progressing     Problem: Musculoskeletal - Adult  Goal: Return mobility to safest level of function  7/29/2024 0747 by Serjio Colón RN  Outcome: Progressing  7/28/2024 2235 by Jun Allan RN  Outcome: Progressing  Goal: Return ADL status to a safe level of function  7/29/2024 0747 by Serjio Colón RN  Outcome: Progressing  7/28/2024 2235 by Jun Allan RN  Outcome: Progressing     Problem: Gastrointestinal - Adult  Goal: Minimal or absence of nausea and vomiting  7/29/2024 0747 by Serjio Colón RN  Outcome: Progressing  7/28/2024 2235 by Jun Allan RN  Outcome: Progressing  Goal: Maintains or returns to baseline bowel function  7/29/2024 0747 by Serjio Colón RN  Outcome: Progressing  7/28/2024 2235 by Jun Allan RN  Outcome: Progressing  Goal: Maintains adequate nutritional intake  7/29/2024 0747 by Serjio Colón RN  Outcome: Progressing  7/28/2024 2235 by Jun Allan RN  Outcome: Progressing     Problem: Genitourinary - Adult  Goal: Absence of urinary retention  7/29/2024 0747 by Serjio Colón RN  Outcome: Progressing  7/28/2024 2235 by Jun Allan RN  Outcome: Progressing     Problem: Infection - Adult  Goal: Absence of infection during hospitalization  7/29/2024 0747 by Serjio Colón RN  Outcome: Progressing  7/28/2024 2235 by Jun Allan RN  Outcome: Progressing     Problem: Hematologic - Adult  Goal: Maintains hematologic stability  7/29/2024 0747 by Serjio Colón RN  Outcome: Progressing  7/28/2024 2235 by Jun Allan, RN  Outcome: Progressing     
Automatic  --   --    Patient Position Sitting  (edge of bed) Sitting  (at bedside chair after stand pivot transfer with RW) Standing   Pain Assessment   Pain Assessment  --   --   --    Pain Level  --   --   --    Opioid-Induced Sedation   POSS Score  --   --   --    Oxygen Therapy   SpO2 94 % 92 % 92 %   O2 Device Nasal cannula Nasal cannula Nasal cannula   O2 Flow Rate (L/min) 1 L/min 1 L/min 1 L/min      07/23/24 1150 07/23/24 1152   Vital Signs   Pulse (!) 109 94   Heart Rate Source  --  Monitor   BP (!) 78/63 (!) 83/68   MAP (Calculated) 68 73   MAP (mmHg)  --   --    BP Location  --   --    BP Method  --   --    Patient Position Sitting  (after mobility forward with RW ~6 feet with chair follow) Sitting  (reclined in recliner)   Pain Assessment   Pain Assessment  --  None - Denies Pain   Pain Level  --  0   Opioid-Induced Sedation   POSS Score  --  1   Oxygen Therapy   SpO2  --  94 %   O2 Device  --  Nasal cannula   O2 Flow Rate (L/min)  --  1 L/min        PLAN :  Patient continues to benefit from skilled intervention to address the above impairments.  Continue treatment per established plan of care to address goals.    Recommend with staff: two assist stand pivot transfer with gait belt and rolling walker to and from bed/bedside commode    Recommend next OT session: standing balance/tolerance, bedside commode transfer, sit to stands    Recommendation for discharge: (in order for the patient to meet his/her long term goals): Therapy up to 5 days/week in Skilled nursing facility    Other factors to consider for discharge: patient's current support system is unable to meet their requirements for physical assistance, high risk for falls, and concern for safely navigating or managing the home environment    IF patient discharges home will need the following DME: bedside commode and wheelchair 18 inch       SUBJECTIVE:   Patient stated “I'm not dizzy.”    OBJECTIVE DATA SUMMARY:   Cognitive/Behavioral 
Automatic Automatic   Patient Position Semi fowlers Sitting Sitting (after transfer to chair)      07/20/24 0925 07/20/24 0928 07/20/24 0931   Vital Signs   Pulse 97 94 (!) 105   BP (!) 80/56 (!) 86/50 (!) 82/53   MAP (Calculated) 64 62 63   BP Location  --   --   --    BP Method  --   --   --    Patient Position Sitting  (LE elevated) Sitting  (LE elevated) Sitting  (reclined; LE elevated)     Functional Outcome Measure:  The patient scored 11/24 on the AM-PAC outcome measure        PLAN :  Recommendations and Planned Interventions:   bed mobility training, transfer training, gait training, therapeutic exercises, patient and family training/education, and therapeutic activities    Frequency/Duration: Patient will be followed by physical therapy to address goals, PT Plan of Care: 4 times/week to address goals.    Recommend with staff: therapy recommendations for staff: Recommend mobility with staff assist x2 using rolling walker.      Recommendation for discharge: (in order for the patient to meet his/her long term goals): Therapy up to 5 days/week in Skilled nursing facility    Other factors to consider for discharge: patient's current support system is unable to meet their requirements for physical assistance, high risk for falls, not safe to be alone, and concern for safely navigating or managing the home environment    IF patient discharges home will need the following DME: continuing to assess with progress              SUBJECTIVE:   Patient stated “I have been needing more help because this leg hurts so much.”    OBJECTIVE DATA SUMMARY:       Past Medical History:   Diagnosis Date    Acute on chronic heart failure, unspecified heart failure type ([facility]) 04/11/2024    Allergic conjunctivitis 07/26/2017    Aortic aneurysm ([facility]) 05/2018    Thoracic, abdominal    Arthritis     lower back    Asthma     Current use of long term anticoagulation     Essential hypertension 08/21/2017    GERD (gastroesophageal reflux

## 2024-07-31 NOTE — DISCHARGE SUMMARY
10 days  Start taking on: August 1, 2024     ondansetron 4 MG disintegrating tablet  Commonly known as: ZOFRAN-ODT  Take 1 tablet by mouth every 8 hours as needed for Nausea or Vomiting            CHANGE how you take these medications      midodrine 10 MG tablet  Commonly known as: PROAMATINE  Take 1 tablet by mouth 3 times daily (with meals)  What changed:   medication strength  how much to take            CONTINUE taking these medications      acetaminophen 650 MG extended release tablet  Commonly known as: TYLENOL     apixaban 2.5 MG Tabs tablet  Commonly known as: ELIQUIS  Take 1 tablet by mouth 2 times daily     melatonin 3 MG Tabs tablet  Take 1 tablet by mouth nightly as needed (sleep)     mirtazapine 7.5 MG tablet  Commonly known as: REMERON  Take 1 tablet by mouth nightly     pantoprazole 40 MG tablet  Commonly known as: PROTONIX  Take 1 tablet by mouth every morning (before breakfast)     pravastatin 20 MG tablet  Commonly known as: PRAVACHOL  Take 1 tablet by mouth daily     vitamin C 500 MG tablet  Commonly known as: ASCORBIC ACID     zinc sulfate 220 (50 Zn)  mg capsule - elemental zinc  Commonly known as: ZINCATE            STOP taking these medications      amiodarone 200 MG tablet  Commonly known as: CORDARONE     bumetanide 2 MG tablet  Commonly known as: BUMEX     calcium carbonate 1500 (600 Ca) MG Tabs tablet     Digoxin 62.5 MCG Tabs     ferrous sulfate 325 (65 Fe) MG tablet  Commonly known as: IRON 325               Where to Get Your Medications        These medications were sent to [facility] [address] [place] [address] [address] - P [phone] - F [phone]  [address] [facility] [address]      Phone: [phone]   lactobacillus capsule  midodrine 10 MG tablet  ondansetron 4 MG disintegrating tablet             DISPOSITION:    Home with Family:       Home with HH/PT/OT/RN: Home with hospice   SNF/LTC:    MEHDI:    OTHER:            Code status: DNR  Recommended

## 2024-07-31 NOTE — CARE COORDINATION
0903 - CLEM sent message to [facility] requesting an update on authorization.    0912 - [facility] confirmed that auth is at MD review now.   requested reference number.    1129 - [facility] offered requested for SNF auth due to a question of medical stabiity.  Phone number: [phone], opt 5, deadline today around 3:00 PM, Ref #: [id number], Member ID: [id number].  Information sent to attending via [employer].      SYL HoltN, RN    Care Management  [phone]  
1050 - [facility] was asking if patient would need IV abx on discharge.  Pharmacy confirmed during IDRs that patient would not need IV abx on discharge.  Message sent via [employer] regarding this and that TAYLOR is in 1-2 days.    1115 - Abdoulaye with [facility] notified regarding this as well.    2708 - Abdoulaye at [facility] said that patient's insurance is requesting an updated progress note from attending by close of business today regarding the reason patient needs SNF.  Message sent to MD regarding this.    6820 -  sent message to [facility] via [employer] regarding new progress note.   left for Abdoulaye at [facility].      Leigha Gerard, BSN, RN    Care Management  [phone]  
4192 -  sent message to [facility] via [employer] to determine if they have made contact with patient/family.    9281 -  spoke with Joann at [facility] ([phone]) who confirmed that she spoke with son this morning and he is agreeable to hospice; she confirmed that they will returning to the address in the chart and that [facility].  Joann is aware that patient is on 5L O2 currently and will contact  with estimate for DME delivery so transportation can be arranged, likely later this afternoon or tomorrow morning.    6573 - [facility] previously requested an update from ;  notified them that we will contact them if services are needed for discharge.    2928 - Joann notified that  was told patient is on 9L O2; Joann will try to contact son today to plan for discharge today or tomorrow.      Leigha Gerard, SYLN, RN    Care Management  [phone]  
CM Note: Hospice referral placed. I talked with Hospice rep Meron and she said patient will be evaluated tomorrow for Hospice.   English Gamaliel CHO CM   9618  
Care Management Initial Assessment       RUR: 23% (high RUR)  Readmission? No  1st IM letter given? Yes - 2nd IM done 7/22  1st  letter given: No     1121 - Patient has been to [facility] before w/ temporary HD services and [facility].  She is okay with discharging to either facility but prefers [facility].  Referral sent and SNF list left at bedside; more choices requested in the event that these facilities cannot accept.    1147 - [facility] anticipates having a bed tomorrow and notified to start authorization.    1228 - [facility] verified that they have started insurance authorization; CM informed patient who is agreeable.     07/22/24 1109   Service Assessment   Patient Orientation Alert and Oriented   Cognition Alert   History Provided By Patient   Primary Caregiver Self   Accompanied By/Relationship Cousin and her cousin's niece   Support Systems Spouse/Significant Other   Patient's Healthcare Decision Maker is: Legal Next of Kin  (Patient would like son to be decision maker as she states her  cannot remember things)   PCP Verified by CM Yes   Last Visit to PCP Within last 3 months  (about a week ago)   Prior Functional Level Assistance with the following:;Mobility   Current Functional Level Mobility;Assistance with the following:   Can patient return to prior living arrangement Yes   Ability to make needs known: Good   Family able to assist with home care needs: Yes   Would you like for me to discuss the discharge plan with any other family members/significant others, and if so, who? No   Financial Resources Medicare   Community Resources None   Social/Functional History   Lives With Son;Spouse  (Nash/RT is the same person)   Type of Home House   Home Layout Two level;Able to Live on Main level with bedroom/bathroom   Entrance Stairs - Number of Steps 3   Home Equipment Rollator;Wheelchair - Electric   ADL Assistance Needs assistance   Homemaking Assistance Independent 
Transition of Care Plan:    RUR: 24% (high RUR)  Prior Level of Functioning: Assistance with mobility  Disposition: SNF auth went to [facility]/was denied - appeal vs. restart auth for [facility]  If SNF or IPR: Date FOC offered: 7/22  Date FOC received: 7/22 - [facility], [facility]  Accepting facility: [facility]  Date authorization started with reference number: TBD, last auth denied  Date authorization received and expires: TBD, last auth denied  Follow up appointments: defer to SNF  DME needed: defer to SNF.  Patient has a rollator and electric wheelchair at home.  Transportation at discharge: Stretcher anticipated  IM/IMM Medicare/ letter given: 2nd IM needed prior to discharge.  Is patient a [place] and connected with VA? No.   If yes, was [facility] transfer form completed and VA notified? N/A  Caregiver Contact: Nash Myrick III - [facility] - [phone]   Discharge Caregiver contacted prior to discharge? Patient to contact.  Care Conference needed? No.  Barriers to discharge: cards/nephro clearance, updated notes over the weekend/Monday for new auth        SYL HoltN, RN    Care Management  [phone]  
Transition of Care Plan:    RUR: 24% (high RUR)  Prior Level of Functioning: Assistance with mobility  Disposition: SNF auth went to P2P/was denied - appeal vs. restart auth for [facility]  If SNF or IPR: Date FOC offered: 7/22  Date FOC received: 7/22 - [facility], [facility]  Accepting facility: [facility]  Date authorization started with reference number: TBD, last auth denied  Date authorization received and expires: TBD, last auth denied  Follow up appointments: defer to SNF  DME needed: defer to SNF.  Patient has a rollator and electric wheelchair at home.  Transportation at discharge: Stretcher anticipated  IM/IMM Medicare/ letter given: 2nd IM needed prior to discharge.  Is patient a [place] and connected with VA? No.   If yes, was [place] transfer form completed and VA notified? N/A  Caregiver Contact: Nash Myrick [facility] - [phone]   Discharge Caregiver contacted prior to discharge? Patient to contact.  Care Conference needed? No.  Barriers to discharge: cards/nephro clearance, auth needed    0812 - Update received from [facility] regarding authorization denied, appeal number: [phone], fax: [phone] and notification that auth could be resubmitted once the patient is stable for discharge.    Norma Stanford with [facility] aware that authorization can be attempted closer to discharge.      SYL HoltN, RN    Care Management  [phone]  
Transition of Care Plan:    RUR: 25% (high RUR)  Prior Level of Functioning: Assistance with mobility  Disposition: Pending palliative conversation - auth was previously denied after P[facility]P for [facility]  If SNF or IPR: Date FOC offered: 7/22  Date FOC received: 7/22 - [facility], [facility]  Accepting facility: [facility]  Date authorization started with reference number: TBD, last auth denied  Date authorization received and expires: TBD, last auth denied  Follow up appointments: defer to hospice vs. SNF  DME needed: defer to hospice vs. SNF.  Patient has a rollator and electric wheelchair at home.  Transportation at discharge: Stretcher anticipated  IM/IMM Medicare/ letter given: 2nd IM needed prior to discharge.  Is patient a [place] and connected with VA? No.   If yes, was [place] transfer form completed and VA notified? N/A  Caregiver Contact: Nash Myrick III - child - [phone]   Discharge Caregiver contacted prior to discharge? Patient to contact.  Care Conference needed? No.  Barriers to discharge: palliative care meeting    1040 - Team verified during IDRs that patient is not eating PO.    1554 - CM consult received this afternoon regarding son requesting to speak with [facility] and if patient is not stable enough to return home, then  Hospice can eval for GIP.  Referral sent to [facility] via [facility].        JOHN Holt, RN    Care Management  [phone]  
and/Or Patient Representative agree with the Discharge Plan? Yes   Freedom of Choice list was provided with basic dialogue that supports the patient's individualized plan of care/goals, treatment preferences, and shares the quality data associated with the providers?  Yes         JOHN Holt, RN    Care Management  [phone]

## 2024-07-31 NOTE — PROGRESS NOTES
Name: Azucena Myrick   MRN: [id number]  : 1945    Nephrology Progress Note    Assessment:  ADOLFO on CKD stage 4-> Serum Cr rising to 5mg/dl today->cardiorenal effects with diuresis but still rising with holding diuresis. BP borderline low. AIN 2 to IV Zosyn would be a dx of exclusion. Suspect residual CKD from recent ADOLFO (warranted temp HD this past May 2024)-> true baseline to be determined.     Decompensated Diastolic CHF: clinically improving     RHF/Severe Pulm HTN     Enterococcus UTI     Hypokalemia: better     Hypotension:     RLE cellulitis    Hypercalcemia    Plan/Recommendations:  Hospice consult noted  Will sign off. Call us back if needed        Exam:  BP 97/71   Pulse (!) 102   Temp 97.6 °F (36.4 °C) (Oral)   Resp 17   Ht 1.626 m (5' 4.02\")   Wt 66 kg (145 lb 8.1 oz)   SpO2 97%   BMI 24.96 kg/m²     PE deferred    Current Facility-Administered Medications   Medication Dose Route Frequency Provider Last Rate Last Admin    sennosides-docusate sodium (SENOKOT-S) 8.6-50 MG tablet 2 tablet  2 tablet Oral BID Aurea Bell MD   2 tablet at 24    bisacodyl (DULCOLAX) suppository 10 mg  10 mg Rectal Daily PRN Aurea Bell MD   10 mg at 24 163    lactulose (CHRONULAC) 10 GM/15ML solution 20 g  20 g Oral BID PRN Aurea Bell MD   20 g at 24 163    balsum peru-castor oil (VENELEX) ointment   Topical BID Pino Castellanos MD   Given at 24    sodium chloride flush 0.9 % injection 5-40 mL  5-40 mL IntraVENous 2 times per day Rubin Muro APRN - CNP   10 mL at 24    sodium chloride flush 0.9 % injection 5-40 mL  5-40 mL IntraVENous PRN Rubin Muro APRN - CNP        0.9 % sodium chloride infusion   IntraVENous PRN Rubin Muro APRN - CNP   Stopped at 24 102    
                                                                                                                                              Name: Azucena Myrick   MRN: [id number]  : 1945    Nephrology Progress Note    Assessment:  ADOLFO on CKD stage 4-> Serum Cr bump to 3.8mg/dl today->cardiorenal effects with diuresis. BP borderline low. Cr may fluctuate with needed diuresis. Suspect residual CKD from recent ADOLFO (warranted temp HD this past May 2024).      Decompensated Diastolic CHF: clinically improving     RHF/Severe Pulm HTN     Enterococcus UTI     Hypokalemia: better     Hypotension     RLE cellulitis    Plan/Recommendations:  Bumex on hold.   Cardiology wishes to switch her to Torsemide 40mg daily tomorrow  IV Abx  Midodrine  Strict I/Os  Avoid nephrotoxins  Am labs    Subjective:  No new complaints. Poor appetite.     ROS:   No nausea, no vomiting  No chest pain, no shortness of breath    Exam:  BP (!) 89/73   Pulse (!) 117   Temp 97.4 °F (36.3 °C) (Axillary)   Resp 17   Ht 1.626 m (5' 4.02\")   Wt 67.3 kg (148 lb 5.9 oz)   SpO2 95%   BMI 25.45 kg/m²     Gen: NAD/Elderly/Frail  HEENT: AT/NC  Neck: supple  Lungs/Chest wall: Clear. Equal BS. No respiratory distress  Cardiovascular: Regular rate, normal rhythm.   Abdomen/: Soft, NT,  BS+  Ext: Improved peripheral edema  CNS: alert and awake.     Current Facility-Administered Medications   Medication Dose Route Frequency Provider Last Rate Last Admin    [START ON 2024] torsemide (DEMADEX) tablet 40 mg  40 mg Oral Daily Maddy Chung MD        sennosides-docusate sodium (SENOKOT-S) 8.6-50 MG tablet 2 tablet  2 tablet Oral BID Aurea Bell MD   2 tablet at 24 2100    bisacodyl (DULCOLAX) suppository 10 mg  10 mg Rectal Daily PRN Aurea Bell MD   10 mg at 24 1630    lactulose (CHRONULAC) 10 GM/15ML solution 20 g  20 g Oral BID PRN Aurea Bell MD   20 g at 24 1630    balsum peru-castor oil 
      Hospitalist Progress Note    NAME:   Azucena Myrick   : 1945   MRN: [id number]     Date/Time: 2024    Patient PCP: Bhavana Mendoza MD      Assessment / Plan:      Acute on chronic HFpEF exacerbation, with edema legs, and + pulm edema cxr POA, BNP 13,404  mild mitral regurg  Probably right sided heart failure and Diastolic HF  Dilated CMP  ASD s/p repair   Mitral Valve repaired by MitraClip, Moderate regurgitation   Tricuspid Valve Severe regurgitation  Severely elevated RVSP, consistent with severe pulmonary hypertension. The estimated RVSP is 86 mmHg.  AAA 2018  -HFpEF echo 24 EF 50-55%  -cxr revealed Probable CHF: Cardiomegaly, pulmonary vascular congestion, probable interstitial edema, trace left pleural effusion.  -vasc BLLE duplex neg acute dvt BLLE  -Daily weights, strict I's and O's  -Consider Prabhakar catheter/UA if patient develops urinary retention, in ER patient reported difficulty voiding, bladder scan 56 mL, patient ended up being able to void 200 mL. Straight cathed on   Plan:   -Bumex 1mg IV twice daily currently holding, serum creatinine continuing to worsent  -On midodrine for low BP, will increase the dose, cont Bumex w holding parameters   -cardiology consulted  -echo reviewed , worse Pulm HTN, needs pulm eval as outpt  -BP low while working w PT OT but asymptomatic  -Not impressive urine output, will consult nephrology help managing diuretics  Nephrology consult appreciated, continue to follow recommendations    Urinary tract infection-of note patient's urinalysis consistent with urinary tract infection, urine culture consistent with Enterococcus, sensitive to current antibiotics  Continue Zosyn for antibiotic coverage  De-escalate antibiotics according to sensitivity once patient has clinical improvement  Continue to monitor patient clinical status     SSS 2018 s/p PPM  A-fib  On digoxin and eliquis (renal dose) for A Fib, check Dig level.     ?Acute RLE cellulitis, pain 
      Hospitalist Progress Note    NAME:   Azucena Myrick   : 1945   MRN: [id number]     Date/Time: 2024    Patient PCP: Bhavana Mendoza MD      Assessment / Plan:      Acute on chronic HFpEF exacerbation, with edema legs, and + pulm edema cxr POA, BNP 13,404  mild mitral regurg  Probably right sided heart failure and Diastolic HF  Dilated CMP  ASD s/p repair   Mitral Valve repaired by MitraClip, Moderate regurgitation   Tricuspid Valve Severe regurgitation  Severely elevated RVSP, consistent with severe pulmonary hypertension. The estimated RVSP is 86 mmHg.  AAA 2018  -HFpEF echo 24 EF 50-55%  -cxr revealed Probable CHF: Cardiomegaly, pulmonary vascular congestion, probable interstitial edema, trace left pleural effusion.  -vasc BLLE duplex neg acute dvt BLLE  -Daily weights, strict I's and O's  -Consider Prabhakar catheter/UA if patient develops urinary retention, in ER patient reported difficulty voiding, bladder scan 56 mL, patient ended up being able to void 200 mL. Straight cathed on   Plan:   -Bumex 1mg IV twice daily currently holding, serum creatinine continuing to worsent  -On midodrine for low BP, will increase the dose, cont Bumex w holding parameters   -cardiology consulted  -echo reviewed , worse Pulm HTN, needs pulm eval as outpt  -BP low while working w PT OT but asymptomatic  -Not impressive urine output, will consult nephrology help managing diuretics  Nephrology consult appreciated, continue to follow recommendations    Urinary tract infection-of note patient's urinalysis consistent with urinary tract infection, urine culture consistent with Enterococcus, sensitive to current antibiotics  Continue Zosyn for antibiotic coverage  De-escalate antibiotics according to sensitivity once patient has clinical improvement  Continue to monitor patient clinical status     SSS 2018 s/p PPM  A-fib  On digoxin and eliquis (renal dose) for A Fib, check Dig level.     ?Acute RLE cellulitis, pain 
      Hospitalist Progress Note    NAME:   Azucena Myrick   : 1945   MRN: [id number]     Date/Time: 2024    Patient PCP: Bhavana Mendoza MD      Assessment / Plan:      Acute on chronic HFpEF exacerbation, with edema legs, and + pulm edema cxr POA, BNP 13,404  mild mitral regurg  Probably right sided heart failure and Diastolic HF  Dilated CMP  ASD s/p repair   Mitral Valve repaired by MitraClip, Moderate regurgitation   Tricuspid Valve Severe regurgitation  Severely elevated RVSP, consistent with severe pulmonary hypertension. The estimated RVSP is 86 mmHg.  AAA 2018  -HFpEF echo 24 EF 50-55%  -cxr revealed Probable CHF: Cardiomegaly, pulmonary vascular congestion, probable interstitial edema, trace left pleural effusion.  -vasc BLLE duplex neg acute dvt BLLE  -Daily weights, strict I's and O's  -Consider Prabhakar catheter/UA if patient develops urinary retention, in ER patient reported difficulty voiding, bladder scan 56 mL, patient ended up being able to void 200 mL. Straight cathed on   Plan:   -Bumex 1mg IV twice daily currently holding, serum creatinine continuing to worsent  -On midodrine for low BP, will increase the dose, cont Bumex w holding parameters   -cardiology consulted  -echo reviewed , worse Pulm HTN, needs pulm eval as outpt  -BP low while working w PT OT but asymptomatic  -Not impressive urine output, will consult nephrology help managing diuretics  Nephrology consult appreciated, continue to follow recommendations    Urinary tract infection-of note patient's urinalysis consistent with urinary tract infection, urine culture consistent with Enterococcus, sensitive to current antibiotics  Continue Zosyn for antibiotic coverage  De-escalate antibiotics according to sensitivity once patient has clinical improvement  Continue to monitor patient clinical status     SSS 2018 s/p PPM  A-fib  On digoxin and eliquis (renal dose) for A Fib, check Dig level.     ?Acute RLE cellulitis, pain 
      Hospitalist Progress Note    NAME:   Azucena Myrick   : 1945   MRN: [id number]     Date/Time: 2024    Patient PCP: Bhavana Mendoza MD      Assessment / Plan:      Acute on chronic HFpEF exacerbation, with edema legs, and + pulm edema cxr POA, BNP 13,404  mild mitral regurg  Probably right sided heart failure and Diastolic HF  Dilated CMP  ASD s/p repair   Mitral Valve repaired by MitraClip, Moderate regurgitation   Tricuspid Valve Severe regurgitation  Severely elevated RVSP, consistent with severe pulmonary hypertension. The estimated RVSP is 86 mmHg.  AAA 2018  -HFpEF echo 24 EF 50-55%  -cxr revealed Probable CHF: Cardiomegaly, pulmonary vascular congestion, probable interstitial edema, trace left pleural effusion.  -vasc BLLE duplex neg acute dvt BLLE  -Daily weights, strict I's and O's  -Consider Prabhakar catheter/UA if patient develops urinary retention, in ER patient reported difficulty voiding, bladder scan 56 mL, patient ended up being able to void 200 mL. Straight cathed on   Plan:   -Bumex 1mg IV twice daily currently holding until BMP/serum potassium results  -On midodrine for low BP, will increase the dose, cont Bumex w holding parameters   -cardiology consulted  -echo reviewed , worse Pulm HTN, needs pulm eval as outpt  -BP low while working w PT OT but asymptomatic  -Not impressive urine output, will consult nephrology help managing diuretics  Nephrology consult appreciated, continue to follow recommendations    Urinary tract infection-of note patient's urinalysis consistent with urinary tract infection, urine culture consistent with Enterococcus, sensitive to current antibiotics  Continue Zosyn for antibiotic coverage  De-escalate antibiotics according to sensitivity once patient has clinical improvement  Continue to monitor patient clinical status     SSS 2018 s/p PPM  A-fib  On digoxin and eliquis (renal dose) for A Fib, check Dig level.     ?Acute RLE cellulitis, pain to 
      Hospitalist Progress Note    NAME:   Azucena Myrick   : 1945   MRN: [id number]     Date/Time: 2024    Patient PCP: Bhavana Mendoza MD      Assessment / Plan:      Acute on chronic HFpEF exacerbation, with edema legs, and + pulm edema cxr POA, BNP 13,404  mild mitral regurg  Probably right sided heart failure and Diastolic HF  Dilated CMP  ASD s/p repair   Mitral Valve repaired by MitraClip, Moderate regurgitation   Tricuspid Valve Severe regurgitation  Severely elevated RVSP, consistent with severe pulmonary hypertension. The estimated RVSP is 86 mmHg.  AAA 2018  -HFpEF echo 24 EF 50-55%  -cxr revealed Probable CHF: Cardiomegaly, pulmonary vascular congestion, probable interstitial edema, trace left pleural effusion.  -vasc BLLE duplex neg acute dvt BLLE  -Daily weights, strict I's and O's  -Consider Prabhakar catheter/UA if patient develops urinary retention, in ER patient reported difficulty voiding, bladder scan 56 mL, patient ended up being able to void 200 mL. Straight cathed on   Plan:   -Bumex 1mg IV twice daily  -On midodrine for low BP, will increase the dose, cont Bumex w holding parameters   -cardiology consulted  -echo reviewed , worse Pulm HTN, needs pulm eval as outpt  -BP low while working w PT OT but asymptomatic  -Not impressive urine output, will consult nephrology help managing diuretics     SSS 2018 s/p PPM  A-fib  On digoxin and eliquis (renal dose) for A Fib, check Dig level.     ?Acute RLE cellulitis, pain to RLE, has some small scabs and some oozing from site, with BLLE edema neg acute dvt as above  -bld cx neg  -P.o. oxy as needed severe pain, Tylenol as needed  -Cont zosyn for ? cellulitis, hold vanco given renal function, check MRSA screen, pt not septic, no fever/leukocytosis   -Start probiotics  -RLE erythema non blanchable, could be local sq bleeding     CKD at baseline, H/o HD 2024, patient reports no longer on HD and catheter removed  -most recent Cr is 
      Hospitalist Progress Note    NAME:   Azuecna Myrick   : 1945   MRN: [id number]     Date/Time: 2024    Patient PCP: Bhavana Mendoza MD      Assessment / Plan:      Acute on chronic HFpEF exacerbation, with edema legs, and + pulm edema cxr POA, BNP 13,404  mild mitral regurg  AAA   -HFpEF echo 24 EF 50-55%  -cxr revealed Probable CHF: Cardiomegaly, pulmonary vascular congestion, probable interstitial edema, trace left pleural effusion.  -vasc BLLE duplex neg acute dvt BLLE  -Daily weights, strict I's and O's  -Consider Prabhakar catheter/UA if patient develops urinary retention, in ER patient reported difficulty voiding, bladder scan 56 mL, patient ended up being able to void 200 mL. Straight cathed on   Plan:   -Bumex 1mg IV twice daily  -On midodrine for low BP, will increase the dose, cont Bumex w holding parameters   -cardiology consult routine  -echo  -labs pending      SSS  s/p PPM  A-fib  On digoxin and eliquis (renal dose) for A Fib, check Dig level.     ?Acute RLE cellulitis, pain to RLE, has some small scabs and some oozing from site, with BLLE edema neg acute dvt as above  -bld cx x2 in er- pending results  -P.o. oxy as needed severe pain, Tylenol as needed  -Cont zosyn for cellulitis, hold vanco given renal function, check MRSA screen, pt not septic, no fever/leukocytosis   -Start probiotics     CKD at baseline, H/o HD 2024, port still in place, patient reports no longer on HD  -most recent Cr is 2.5-2.7   -Hb stable, no need for Epogen currently   -off HD, catheter removed recently   -if gets worse then will call Nephro     Chronic Conditions:  osteoarthritis, vitamin D deficiency, vitamin B12 deficiency, PUD, GERD, lumbar spinal stenosis, hyperlipidemia, hypertension, history of vertigo, history of aortic root repair, history of aortic dissection, history of asthma, history of intertrigo, depression and poor appetite, insomnia.  -Hold home p.o. supplements, consider 
     Nutrition Note    Chart reviewed for follow up. Noted plans are for pt to discharge with hospice pending DME delivery and transportation. Nothing further to add. Will monitor peripherally to support as needed.     Electronically signed by Juani Richardson RD on 7/30/24 at 10:39 AM EDT    Contact: [phone]    
     Nutrition Note    Consult received from overnight provider d/t hypoglycemia. This pt has not been eating consistently throughout admission, this includes meals and supplements. She is expected to discharge with hospice services today. Given end of life goals of care, would not force unwanted nutrition upon pt. Recommend order for comfort measures only to avoid excessive IV sticks and finger pricks.     Will complete consult. Thank you.     Electronically signed by Juani Richardson RD on 7/31/24 at 11:16 AM EDT    Contact: [phone]    
  Physician Progress Note      PATIENT:               SUSY GRAF  CSN #:                  [id number]  :                       1945  ADMIT DATE:       2024 6:59 PM  DISCH DATE:  RESPONDING  PROVIDER #:        Maria E Davila MD          QUERY TEXT:    78yoF pt admitted with Acute on chronic HFpEF exacerbation and has   malnutrition documented in progress notes  &  by a Registered   Dietitian. Please further specify type of malnutrition with documentation in   the medical record.    The medical record reflects the following:  Risk Factors: hx HTN, CHF< PUD, GERD, CKD  Clinical Indicators: per RD note pt reports poor appetite since May 2024;   endorses weight loss from 150s in April and currently 143lb per bed scale.    Malnutrition Assessment:  Malnutrition Status:  Severe malnutrition (24 1432)  Context:  Acute Illness  Findings of the 6 clinical characteristics of malnutrition:  Energy Intake:  50% or less of estimated energy requirements for 5 or more   days (since May)  Weight Loss:  Greater than 7.5% over 3 months  Body Fat Loss:  Muscle Mass Loss:  Moderate muscle mass loss Clavicles (pectoralis & deltoids)  Fluid Accumulation:  Moderate to Severe Extremities   Strength:  Not Performed    Treatment:  High calorie/High protein oral supplement, cont current diet, RD   monitoring., Culturelle.    ASPEN Criteria:    https://aspenjournals.onlinelibrary.choudhary.com/doi/full/10.1177/105900996376421  5  Options provided:  -- Mild Malnutrition  -- Moderate Malnutrition  -- Severe Malnutrition  -- Mild Protein calorie malnutrition  -- Moderate Protein calorie malnutrition  -- Severe Protein calorie malnutrition  -- Other - I will add my own diagnosis  -- Disagree - Not applicable / Not valid  -- Disagree - Clinically unable to determine / Unknown  -- Refer to Clinical Documentation Reviewer    PROVIDER RESPONSE TEXT:    This patient has moderate protein calorie malnutrition.    Query 
 received a daytime page from [place] in reference to providing comfort for the Ramez family.  shared with them her love for each of them. She shared this information with the  on yesterday.  held hands and prayed for the family ( and cousins).  advised the nurse to page again if needed.    [facility] are available 24 hours a day as requested.     Rev. MELBA Latham [facility]   Paging Service [phone])  
.End of Shift Note     Bedside shift change report given to MARQUIS Pratt (oncoming nurse) MARQIUS Coleman (offgoing nurse).  
0700 Bedside and Verbal shift change report given to Naomi CHO (oncoming nurse) by Flor CHO (offgoing nurse). Report included the following information Nurse Handoff Report, Index, ED Encounter Summary, ED SBAR, Intake/Output, MAR, Recent Results, and Cardiac Rhythm Atrial Fib, Ventricular Paced .     1523 Patient is having constipation. Gave PRN miralax but hasn't resolved issue. Messaged Ray HAYES for a bowel regimen    End of Shift Note    Bedside shift change report given to Jerrica CHO (oncoming nurse) by Naomi Ying RN (offgoing nurse).  Report included the following information SBAR, Kardex, ED Summary, Intake/Output, MAR, Recent Results, and Cardiac Rhythm Atrial Fib, Ventricular Paced    Shift worked:  7a to 7p     Shift summary and any significant changes:     See above    Gave PRN miralax once, new PRN and scheduled bowel regimen, small BM today    Patient had poor appetite today. Encouraged patient to eat during meal times. Patient consistently turned down Ensures and food. Family visited today and also encouraged patient to eat.     Worked with PT/OT today, x 2 assist stand a pivot with RW and gait belt. Uses BSC.patient up in chair most of shift    Auth pending for d/c to [facility]       Concerns for physician to address:        Zone phone for oncoming shift:           Naomi Ying, RN                            
0700: Bedside and Verbal shift change report given to Ada Figueroa RN   (oncoming nurse) by MARQUIS Díaz (offgoing nurse). Report included the following information Nurse Handoff Report, Index, Adult Overview, Intake/Output, MAR, Recent Results, and Cardiac Rhythm A-Fib, AV pace .    0900: Bumex held due to parameters not met. Scheduled Midodrine given.    0915: Lab draw attempted but unsuccessful x2. This RN passed on to the charge nurse to try.    1200: Patient placed on bedpan to have a BM. Patient unable to go to the bathroom at this time. Family at bedside.    1400: Lab called and noted not enough blood for the BMP. Lab draw attempted by another nurse who was unsuccessful.      End of Shift Note    Bedside shift change report given to MARQUIS Becerril (oncoming nurse) by Ada Figueroa RN (offgoing nurse).  Report included the following information SBAR, Kardex, Intake/Output, MAR, Recent Results, and Cardiac Rhythm NSR, A-Fib, AV Pace    Shift worked:  7a-7p     Shift summary and any significant changes:     See above note. Multiple attempts by multiple nurses to get BMP and unsuccessful. Bladder scanned and only shown 243 ml. Patient Q2hour turned. Bumex 2 mg given IV since BP is within parameters.       Concerns for physician to address:  none     Zone phone for oncoming shift:   [phone]       Activity:     Number times ambulated in hallways past shift: 0  Number of times OOB to chair past shift: 0    Cardiac:   Cardiac Monitoring: Yes           Access:  Current line(s): PIV     Genitourinary:   Urinary status: due to void    Respiratory:      Chronic home O2 use?: N/A  Incentive spirometer at bedside: NO       GI:     Current diet:  ADULT ORAL NUTRITION SUPPLEMENT; Breakfast, Lunch, Dinner; Standard High Calorie/High Protein Oral Supplement  ADULT DIET; Regular; Low Sodium (2 gm); vanilla ensure  Passing flatus: YES  Tolerating current diet: YES       Pain Management:   Patient states pain is 
0700: Bedside and Verbal shift change report given to Ada Figueroa RN   (oncoming nurse) by MARQUIS Díaz (offgoing nurse). Report included the following information Nurse Handoff Report, Index, Adult Overview, Intake/Output, MAR, Recent Results, and Cardiac Rhythm A-Fib, NSR, AV Pace .     0800: Patient turned on left side.    0823: Patient complaining of 5/10 pain and is requesting Tylenol. Tylenol 650 mg given for Right leg pain. See MAR.    0830: MD messaged via perfect serve regarding IV Bumex. Patient BP is 93/57.     0859: Per MD, hold IV Bumex and monitor BP.    0905: PT/OT at bedside to work with patient.    1000: Patient up to chair with assist x2 and walker.    1123: Patient son, RT, at bedside requesting to speak with MD when he rounds. Son would like a call if he steps out and misses the provider. This RN messaged provider via perfect serve to request a phone call.    1630: Patient concerned with not urinating.     1714: Bladder scan completed. 227 ml in bladder.    1702: Patient complaining of right leg pain requesting pain medication. Oxycodone given per request. See MAR.    1802: Patient reports pain is better. Patient still resting in chair. Family at bedside.  
0700: Bedside and Verbal shift change report given to Ada Figueroa RN   (oncoming nurse) by MARQUIS Suárez (offgoing nurse). Report included the following information Nurse Handoff Report, Index, Adult Overview, Intake/Output, MAR, Recent Results, and Cardiac Rhythm A-Fib, V Paced .     0845: Patient refusing to eat breakfast. Will take morning pills with orange juice. Patient refusing to take Senokot due to loose stools.    1027: PICC team at bedside to draw AM labs.    1030: PT/OT to see patient.    1100: Patient placed in chair. Pillow placed under right side of hip.    1200: Patient comfortable in chair. Patient refusing to eat lunch and states she has no appetite.    1230: Patient requesting to get back in bed. Patient complains that left side is more sore than the right side. Patient turned on her right side when placed back in bed.    1317: Patient off floor to ultrasound.    1415: Patient back on unit. Settled in bed. Turned on left side. Patient comfortable    1345: Patient doing well. Resting in bed. Extra blanket added for patient comfort. Patient reports she has to urinate. Purewick placed at this time.    End of Shift Note    Bedside shift change report given to MARQUIS Suárez (oncoming nurse) by Ada Figueroa RN (offgoing nurse).  Report included the following information SBAR, Kardex, Intake/Output, MAR, Recent Results, and Cardiac Rhythm A-Fib, V Pace    Shift worked:  7a-7p     Shift summary and any significant changes:     See above note. Patient bladder scanned. 57 ml.     Concerns for physician to address:  Decrease in appetite, decrease in urine production     Zone phone for [facility] shift:   2035       Activity:     Number times ambulated in hallways past shift: 0  Number of times OOB to chair past shift: 1    Cardiac:   Cardiac Monitoring: Yes           Access:  Current line(s): PIV     Genitourinary:   Urinary status: due to void    Respiratory:      Chronic home O2 use?: 
0700: Bedside and Verbal shift change report given to MARQUIS Aguilar (oncoming nurse) by MARQUIS Becerril (offgoing nurse). Report included the following information Nurse Handoff Report, Index, Adult Overview, Intake/Output, MAR, Recent Results, and Cardiac Rhythm A-Fib, V Paced .     0930: Patient is very tired. Patient has family at bedside. Patient is refusing to eat any breakfast or drink her ensure. Patient is willing to drink orange juice with medications.    1020: Wound care at bedside. New pictures and orders placed.    1130: Phone call to PICC team to obtain labs for patient. Supplies including label at bedside.     1156: PICC team at bedside. Lab walked downstairs to lab.    1300: Patient drinking tea and having a few strawberries for lunch.    1400: Patient moved onto new Isoair bed. Bed scales are zeroed out and new weight is obtained.    1700: Patient refusing to eat supper or drink ensure. Patient offered fresh fruit from the fridge and patient does not want to eat it.    1800: Received phone call from son, RT, regarding patient appetite and depression. He expressed concerns that she is going back down hill. This RN messaged MD via perfect serve regarding his concerns.  
0730: Bedside and Verbal shift change report given to Serjio (oncoming nurse) by Jun (offgoing nurse). Report included the following information Nurse Handoff Report, Index, Adult Overview, Intake/Output, MAR, Recent Results, and Cardiac Rhythm A. fib .      0740: Pt unable to keep eyes open when communicating with this RN. O2 requirements have increased from 2L to 5L NC. spO2: 96%.    0815: Pt hallucinating asking about a blue bracelet she put on. This RN redirected her. Pt able to take all medications with no issues.     1023: Palliative care contacted in regards to change in pt condition.    1030: POA at pt bedside. MD notified POA would like to speak with him at this time.     1036: Pt RR shallow. SpO2: 98%. Pt states she is comfortable.     1400: Pt apnic using accesory muscles to breath. Md at bedside. Rapid reponse called.    1410: Rapid response canceled. Pt code status changed    1930: End of Shift Note    Bedside shift change report given to RN (oncoming nurse) by Serjio Colón RN (offgoing nurse).  Report included the following information SBAR, Kardex, Intake/Output, MAR, Recent Results, and Cardiac Rhythm A. fib    Shift worked:  4539-4705     Shift summary and any significant changes:     See above note     Concerns for physician to address:  N/A     Zone phone for oncoming shift:          Activity:     Number times ambulated in hallways past shift: 0  Number of times OOB to chair past shift: 0    Cardiac:   Cardiac Monitoring: Yes           Access:  Current line(s): PIV     Genitourinary:   Urinary status: oliguric    Respiratory:      Chronic home O2 use?: NO  Incentive spirometer at bedside: NO       GI:     Current diet:  ADULT ORAL NUTRITION SUPPLEMENT; Breakfast, Dinner; Standard High Calorie/High Protein Oral Supplement  ADULT ORAL NUTRITION SUPPLEMENT; Lunch; Frozen Oral Supplement  DIET ONE TIME MESSAGE;  DIET ONE TIME MESSAGE;  ADULT DIET; Dysphagia - Minced and Moist; Low Sodium (2 
0740: Bedside and Verbal shift change report given to Serjio (oncoming nurse) by Marck (offgoing nurse). Report included the following information Nurse Handoff Report, Index, Adult Overview, Intake/Output, MAR, Recent Results, and Cardiac Rhythm A. fib .      0800: Made aware by night shift only family members allowed to visit. (Nash Myrick, Yg Myrick, and Brianna Dinodeisi)     1450: Pt stand and picot to BSC. Pt unable to hold body weight up. Heavy X2 assist with walker and gait belt.     1815: Bladder scanned pt. 46mL in bladder.     1930: End of Shift Note    Bedside shift change report given to RN (oncoming nurse) by Serjio Colón RN (offgoing nurse).  Report included the following information SBAR, Kardex, Intake/Output, MAR, Recent Results, and Cardiac Rhythm A. fib    Shift worked:  1788-9010     Shift summary and any significant changes:     See above note     Concerns for physician to address:  N/A     Zone phone for oncoming shift:          Activity:     Number times ambulated in hallways past shift: 0  Number of times OOB to chair past shift: 0    Cardiac:   Cardiac Monitoring: Yes           Access:  Current line(s): PIV     Genitourinary:   Urinary status: oliguric    Respiratory:      Chronic home O2 use?: NO  Incentive spirometer at bedside: YES       GI:     Current diet:  ADULT ORAL NUTRITION SUPPLEMENT; Breakfast, Dinner; Standard High Calorie/High Protein Oral Supplement  ADULT ORAL NUTRITION SUPPLEMENT; Lunch; Frozen Oral Supplement  DIET ONE TIME MESSAGE;  DIET ONE TIME MESSAGE;  ADULT DIET; Dysphagia - Minced and Moist; Low Sodium (2 gm); vanilla ensure  Passing flatus: YES  Tolerating current diet: YES       Pain Management:   Patient states pain is manageable on current regimen: YES    Skin:     Interventions: specialty bed, float heels, and increase time out of bed    Patient Safety:  Fall Score:    Interventions: bed/chair alarm, assistive device (walker, cane. etc), gripper 
1900 Bedside and Verbal shift change report given to Jun CHO (oncoming nurse) by Serjio CHO (offgoing nurse). Report included the following information Nurse Handoff Report, MAR, Recent Results, and Cardiac Rhythm NSR/S Tachy .     0630 Wound cleanse and applied foam pad on the right calf (skin tear)    0700 Bedside shift change report given to Terence CHO (oncoming nurse) by Jun Allan RN (offgoing nurse).  Report included the following information SBAR, MAR, Recent Results, and Cardiac Rhythm NSR /S tachy.    Favian Score 14. All of the following interventions have been implemented to prevent pressure injury:    SKIN ASSESSMENT (S)  Dual skin assessment completed at shift change: Yes  Name of second RN who completed Dual Skin Assessment: Serjio CHO  Picture of wound uploaded to EMR: Yes  Wound added as LDA in Avatar: Yes  Venelex ordered for stage 1 or greater pressure injuries ONLY: Yes  Wound care consulted if wounds present: Yes    SURFACE (S)  Jessica air pump or specialty bed ordered: Yes  Type of bed: Jessica  Only white chux used with specialty surfaces (no green chux or bed alarm pads): Yes  Waffle cushion used for chair positioning: Yes    KEEP MOVING (K)  Mobility status (Bedrest, Chairbound, UWA x 1 assist , 2 assist, Max assist): Max  Q2 turn sheet placed on outside of door and initialed appropriately:Yes  Q2 hour turns documented in flowsheets: Yes  Refusals to turn and education provided documented: Yes  Device used to float heels: Foam  PT/OT consulted: Yes    INCONTINENCE (I)  Incontinence status assessed Q2 hours: Yes  External catheter in use: Purewick  Barrier cream in use: Venelex    NUTRITION (N)  I/O's documented every 8 hours: Yes  Oral supplements ordered if appropriate: Yes  Nutrition services consulted: Yes    All concerns about new DTI's must be escalated immediately to attending MD, charge nurse, CCL and nurse director.                           
1900 Bedside and Verbal shift change report given to Jun CHO (oncoming nurse) by Terence CHO (offgoing nurse). Report included the following information Nurse Handoff Report, MAR, Recent Results, and Cardiac Rhythm NSR/S Tachy.     2030 Pt son at bedside Nash rosenthal \"RT\" Ramez III. Has taken her watch and phone home. Phone and id shown for verifying identity.    2125 Pt confuse and angry. Has remove leads, gown, oxygen cannula and iv line. Informed ANILA Becerril. Informed that pt has no due IV meds, will attempt to insert during the morning blood draw.    2200 Informed by Jerrica HIGH that IV line may be inserted at midnight with morning labs since pt is still agitated and has no due IV meds during the time being.    0030 New IV line started at right wrist and blood samples was collected and sent to lab. Placed gauze bandage in the IV line for protection to prevent from being taken out again.    0220 Got a call from lab for blood sugar at 48. Recheck sugar with result of 54. Given a cup of 2 orange juice plus 3 sugar pack (2.38g/pack) and will recheck in 15 minutes. Pt not in distress, much calmer and not lethargic. Pt does not have a hypoglycemia protocol. Informed Jerrica HIGH.    0244 Hypoglycemic protocol ordered by Jerrica HIGH and carried out.    0340 Informed Jerrica HIGH about latest sugar of 113 after dextrose 10% 125ml bolus.    0600 Pt is agitated and angry. Refused to be clean, informed Arjun CHO (Night charge RN). Will try to ask again later.    0700 Bedside shift change report given to Meg CHO (oncoming nurse) by Jun Allan RN (offgoing nurse).  Report included the following information SBAR, MAR, Recent Results, and Cardiac Rhythm NSR/S Tachy.    ------------    Favian Score 14. All of the following interventions have been implemented to prevent pressure injury:    SKIN ASSESSMENT (S)  Dual skin assessment completed at shift change: Yes  Name of second RN who completed Dual Skin Assessment: Terence CHO  Picture 
1900 Bedside and Verbal shift change report given to Serjio RN (oncoming nurse) by Jun RN (offgoing nurse). Report included the following information Nurse Handoff Report, MAR, Recent Results, and Cardiac Rhythm A fib.     0638 Informed PICC team for blood collection. Tried failed 3x to collect (2x for me and 1x for Sharron RN).    0700 Bedside shift change report given to Serjio RN (oncoming nurse) by Jun Allan RN (offgoing nurse).  Report included the following information SBAR, MAR, Recent Results, and Cardiac Rhythm A Fib  
1900-  Report received from Maia Bustamante RN.    2029- Patient assessed.  See flowsheet.  Labs drawn and sent.    2320- Reassessed.  No changes.    0300- Reassessed.  No changes.  Labs drawn and sent.  Patient requested to sit on the side of the bed.  This was completed.      0700- Report given to the oncoming shift.  
1900: Report received at bedside from MARQUIS Pratt in SBAR format with all questions and concerns addressed.     2000: Pt assessment completed with findings documented, pt repositioned.    2200: Rounded on pt resting quietly in bed with eyes closed, pt repositioned.    0000: Reassessed pt, findings documented, pt repositioned.    0200: Rounded on pt resting quietly in bed with eyes closed, pt repositioned.     0230: Pt unable to sleep requested something to help, gave melatonin (see MAR).    0400: Reassessed pt, findings documented, pt repositioned.    0600: Bladder scanned pt, anuric during shift, 0 mL read.    0700: Report given at bedside to MARQUIS Story in SBAR format with all questions and concerns addressed.  
Attempted to see pt for OT services. Pt was drowsy and fatigued.  Will defer OT at this time, but continue to follow.   
Bedside shift change report given to jenniffer haro (oncoming nurse) by jenniffer emery (offgoing nurse). Report included the following information Nurse Handoff Report, ED Encounter Summary, Adult Overview, MAR, Recent Results, and Cardiac Rhythm av paced, afib .     End of Shift Note    Bedside shift change report given to rupert abad (oncoming nurse) by OTILIO BARROS RN (offgoing nurse).  Report included the following information SBAR, ED Summary, Procedure Summary, MAR, Recent Results, and Cardiac Rhythm av paced/afib    Shift worked:  7p-7a     Shift summary and any significant changes:     na     Concerns for physician to address:  na     Zone phone for oncoming shift:          Activity:     Number times ambulated in hallways past shift: 0  Number of times OOB to chair past shift: 0    Cardiac:   Cardiac Monitoring: Yes           Access:  Current line(s): PIV     Genitourinary:   Urinary status: voiding and external catheter    Respiratory:      Chronic home O2 use?: NO  Incentive spirometer at bedside: YES       GI:     Current diet:  ADULT ORAL NUTRITION SUPPLEMENT; Breakfast, Lunch, Dinner; Standard High Calorie/High Protein Oral Supplement  ADULT DIET; Regular; Low Sodium (2 gm); vanilla ensure  Passing flatus: YES  Tolerating current diet: YES       Pain Management:   Patient states pain is manageable on current regimen: YES    Skin:     Interventions: turn team, float heels, and PT/OT consult    Patient Safety:  Fall Score:    Interventions: bed/chair alarm and gripper socks       Length of Stay:  Expected LOS: 3  Actual LOS: 0      OTILIO BARROS RN                            
Comprehensive Nutrition Assessment    Type and Reason for Visit:  Initial, Positive Nutrition Screen    Nutrition Recommendations/Plan:   RD to liberalize to 2gNa diet   RD to add Ensure Plus High Protein TID  Please document % meals and supplements consumed in flowsheet I/O's under intake      Malnutrition Assessment:  Malnutrition Status:  Severe malnutrition (07/20/24 1432)    Context:  Acute Illness     Findings of the 6 clinical characteristics of malnutrition:  Energy Intake:  50% or less of estimated energy requirements for 5 or more days (since May)  Weight Loss:  Greater than 7.5% over 3 months     Body Fat Loss:        Muscle Mass Loss:  Moderate muscle mass loss Clavicles (pectoralis & deltoids)  Fluid Accumulation:  Moderate to Severe Extremities   Strength:  Not Performed    Nutrition Assessment:  Pt admitted with CHF.  PMH: HTN, CHF, PUD, GERD, CKD.  MST triggered for wt loss and poor appetite.  Chart reviewed, pt sitting up next to the bed awake and alert.  She was last seen by RD in May and met severe malnutrition criteria at that time.  Pt reports poor appetite since May 2024.  She reports it is due to 'taste changes' but then tells me her 'taste changed' a couple of days ago.  Pt does endorse wt loss, she is 143lb per bed scale and was in the 150's in April.  She has significant BLE edema.  NFPE reveals moderate muscle wasting as well as edema.  Pt was previously on dialysis this spring but is now off.  K 3.6.  Pt agreeable to try ensure shakes, prefers vanilla.   Patient Vitals for the past 120 hrs:   PO Meals Eaten (%)   07/20/24 0800 1 - 25%     Wt Readings from Last 10 Encounters:   07/20/24 64.9 kg (143 lb)   07/18/24 64.7 kg (142 lb 9.6 oz)   06/11/24 63.5 kg (140 lb)   05/09/24 72.3 kg (159 lb 6.3 oz)   04/22/24 65.2 kg (143 lb 11.8 oz)   04/19/24 65.2 kg (143 lb 11.8 oz)   04/11/24 71.7 kg (158 lb)   11/02/23 77.4 kg (170 lb 9.6 oz)   09/27/23 77.1 kg (170 lb)   08/01/23 77.1 kg (170 
Comprehensive Nutrition Assessment    Type and Reason for Visit:  Reassess    Nutrition Recommendations/Plan:   Continue current diet  Continue Ensure Plus High Protein BID (vanilla) and try Magic cup daily  Please document % meals and supplements consumed in flowsheet I/O's under intake   Consider adding marinol for appetite stimulation. Pt is already receiving remeron without any appetite benefit.      Malnutrition Assessment:  Malnutrition Status:  Severe malnutrition (07/20/24 1432)    Context:  Acute Illness     Findings of the 6 clinical characteristics of malnutrition:  Energy Intake:  50% or less of estimated energy requirements for 5 or more days (since May)  Weight Loss:  Greater than 7.5% over 3 months     Body Fat Loss:        Muscle Mass Loss:  Moderate muscle mass loss Clavicles (pectoralis & deltoids)  Fluid Accumulation:  Moderate to Severe Extremities   Strength:  Not Performed    Nutrition Assessment:     Chart reviewed. Pt seen with family at bedside. She reports she has only had a couple bites of fruit all day. Open ensure in front of her barely touched (if at all), and there are 4 more on the table unopened. She reports she has no taste and no appetite, but is not feeling sick. She is already receiving remeron and her preferred flavor of Ensure. Pt refused all offers for RD to provide her something to eat. I encouraged family to bring in food. Continue to encourage intake of meals and supplements.     Patient Vitals for the past 120 hrs:   PO Meals Eaten (%)   07/23/24 1152 1 - 25%   07/23/24 0912 0%   07/22/24 1800 0%   07/22/24 1200 1 - 25%   07/22/24 0900 0%   07/21/24 0800 1 - 25%   07/20/24 0800 1 - 25%     Patient Vitals for the past 120 hrs:   PO Supplement (%)   07/23/24 1152 0%   07/23/24 0955 0%   07/23/24 0912 0%   07/21/24 0800 76 - 100%       Nutrition Related Findings:    Labs: Cr 2.58.   Meds: Bumex, Culturelle, Remeron, Protonix, Zosyn, Miralax.   Edema: 2+ LUE, 3+ BLE.   BM 
Comprehensive Nutrition Assessment    Type and Reason for Visit:  Reassess    Nutrition Recommendations/Plan:   Continue regular diet. Family encouraged to bring in food.   D/c supplements. Pt refusing to consume them.   Consider adding another appetite stimulant. Remeron is not helping at this time. Consider Marinol?  GOC discussions should include nutrition support given ongoing refusal to eat.      Malnutrition Assessment:  Malnutrition Status:  Severe malnutrition (07/20/24 1432)    Context:  Acute Illness     Findings of the 6 clinical characteristics of malnutrition:  Energy Intake:  50% or less of estimated energy requirements for 5 or more days (since May)  Weight Loss:  Greater than 7.5% over 3 months     Body Fat Loss:        Muscle Mass Loss:  Moderate muscle mass loss Clavicles (pectoralis & deltoids)  Fluid Accumulation:  Moderate to Severe Extremities   Strength:  Not Performed    Nutrition Assessment:     Chart reviewed. Pt seen in the chair with family present. She had eaten maybe 2 blackberries and a slice of orange. Otherwise, documentation indicates pt has continued to refuse to eat from meals or consume supplements. Her renal function is worsening despite holding diuretics. Cardiologist now recommending CMO. Palliative consult pending. Family asked pt if there was anything in the world she would like to eat. Pt requested shrimp. When family asked pt if she would eat it, she said no. Will continue monitoring.     Patient Vitals for the past 120 hrs:   PO Meals Eaten (%)   07/25/24 0910 0%   07/24/24 1700 0%   07/24/24 1300 1 - 25%   07/24/24 0933 0%   07/23/24 1727 0%   07/23/24 1152 1 - 25%   07/23/24 0912 0%   07/22/24 1800 0%   07/22/24 1200 1 - 25%   07/22/24 0900 0%     Patient Vitals for the past 120 hrs:   PO Supplement (%)   07/25/24 0910 0%   07/24/24 1700 0%   07/24/24 1300 0%   07/24/24 0933 0%   07/23/24 1727 0%   07/23/24 1152 0%   07/23/24 0955 0%   07/23/24 0912 0% 
Favian Score 13. All of the following interventions have been implemented to prevent pressure injury:    SKIN ASSESSMENT (S)  Dual skin assessment completed at shift change: Yes  Name of second RN who completed Dual Skin Assessment: Flor CHO  Picture of wound uploaded to EMR: Yes  Wound added as LDA in Avatar: Yes  Venelex ordered for stage 1 or greater pressure injuries ONLY: Yes  Wound care consulted if wounds present: Yes - ordered 7/22/24    SURFACE (S)  Guin air pump or specialty bed ordered: Yes  Type of bed: yamile  Only white chux used with specialty surfaces (no green chux or bed alarm pads): Yes  Waffle cushion used for chair positioning: Yes    KEEP MOVING (K)  Mobility status (Bedrest, Chairbound, UWA x 1 assist , 2 assist, Max assist): x 2 assist  Q2 turn sheet placed on outside of door and initialed appropriately:Yes  Q2 hour turns documented in flowsheets: Yes  Refusals to turn and education provided documented: Yes  Device used to float heels: heel pillow  PT/OT consulted: Yes    INCONTINENCE (I)  Incontinence status assessed Q2 hours: Yes  External catheter in use: yes  Barrier cream in use: yes    NUTRITION (N)  I/O's documented every 8 hours: Yes  Oral supplements ordered if appropriate: Yes  Nutrition services consulted: Yes    All concerns about new DTI's must be escalated immediately to attending MD, charge nurse, CCL and nurse director.       
Hospice Liaison Note: received call from CLEM Burks regarding after hours referral. Consult will be processed by our intake department tomorrow and our team will follow up.           Thank you for the opportunity to be of service to this patient and family.           Meron Mcnulty RN, BSN, PN  Clinical Nurse Liaison  [facility]  [phone] Office  
[facility]  Good Help to Those in Need  [phone]     Patient Name: Azucena Myrick  YOB: 1945  Age: 79 y.o.    [facility] RN Note:  Hospice consult received, chart reviewed. Will assess and reach out to patient within the hour.    10:15  left for son \"RT\" ([phone]) requesting return call to offer info session.    11:00 In to meet patient. She is alert, oriented  with periods of forgetfulness, pleasant. She reports she wants to cancel hospice referral as she will be going to rehab soon. Assisted her to place call to son without success. Provided [facility] brochure/contact information. Please call if dispo plans change and patient changes her mind about hospice.    Thank you including us in the care of your patient.    Drew Rodriguez RN  Clinical Liaison  [phone]  Available on [employer]  
Nursing contacted Nocturnist/cross cover provider via non-urgent messaging system [employer] and notified patient no urine output documented during dayshift, bladder scan 2200 was 295 mL, reported bladder scan presently at 0530 now 425 mL.  Nurse reported encourage patient to attempt to void and is unable.. No other concerns reported. No acute distress reported. No other information provided by nurse. VSS. Ordered straight cath x 1 due to urinary retention, with u/a reflex to cx- results pending. Will defer further evaluation/management to the day shift primary attending care team. Patient denies any further complaints or concerns. Nursing to notify Hospitalist for further/continued concerns. Will remain available overnight for further concerns if nursing/patient needs. Please note, there are RRT systems in this hospital in place that if nursing has acute or critical patient condition change or concern, this is to help facilitate and notify that patient needs immediate bedside evaluation by a provider.     Non-billable note.       
Nursing contacted Nocturnist/cross cover provider via non-urgent messaging system [employer] and notified patient pulled out her only 24-gauge PIV, removed her hospital gown, repeatedly removing her oxygen. No other concerns reported. No acute distress reported.  Patient presently reported as stable.  Presently reported as no IV access, is a somewhat difficult stick.  Is reported to not have any IV medications ordered for overnight, nurse reported possibly transitioning to hospice tomorrow.  No other information provided by nurse. VSS.  Appears the goals of care per the dayshift hospitalist note is DNR and is due to be followed up with family in the morning with hospitalist for further goals of care discussion.  At this time as patient is reported stable, presently agitated, pulling out lines, presently has no peripheral IV access, nurse okay to wait till lab draw to obtain a new peripheral IV, asked nurse to please obtain the labs around midnight, instructed nurse that if patient has any condition changes to please insert a peripheral IV before then. Will defer further evaluation/management to the day shift primary attending care team. Patient denies any further complaints or concerns. Nursing to notify Hospitalist for further/continued concerns. Will remain available overnight for further concerns if nursing/patient needs. Please note, there are RRT systems in this hospital in place that if nursing has acute or critical patient condition change or concern, this is to help facilitate and notify that patient needs immediate bedside evaluation by a provider.     Update:  Nurse reported lab results returned this morning with glucose 48, was already rechecked with Accu-Chek to confirm and was 54, status post 2 cups of orange juice and sugar packets x 3, nurse reports they will recheck in 15 minutes.  Patient does not have any Accu-Cheks or hypoglycemia protocol.  States patient appears much more calmer presently, no 
Occupational Therapy    Chart reviewed in prep for skilled OT treatment, with chart indicating pt's POC to include D/C home with hospice services, with CM following DME delivery.  Given above mentioned, OT to sign off.    Thank you,  Matthew Kerley, OT    
Occupational Therapy    Patient chart reviewed up to date. RN recommends holding at this time as patient with worsening clinical status; palliative and hospice discussions in progress. Will follow-up tomorrow as appropriate per goals of care.    Thank you.  Nyla Ly OTR/L    
Orders received, chart reviewed and patient evaluated by physical therapy. Pending progression with skilled acute physical therapy, recommend:  Therapy up to 5 days/week in Skilled nursing facility    Recommend with nursing OOB to chair 3x/day with assist x2 and rolling walker. Thank you for completing as able in order to maintain patient strength, endurance and independence.     Full evaluation to follow.     
Palliative Medicine  Patient Name: Azucena Myrick  YOB: 1945  MRN: [id number]  Age: 79 y.o.  Gender: female    Date of Initial Consult: 7/26/2024  Date of Service: 7/29/2024  Time: 10:53 PM  Provider: MICHAEL Smith NP  Hospital Day: 11  Admit Date: 7/19/2024  Referring Provider: Dr. Ingram       Reasons for Consultation:  Goals of Care    HISTORY OF PRESENT ILLNESS (HPI):   Azucena Myrick is a 79 y.o. female with a past medical history of HTN, HLD, CHF, s/p ASD closure and TAVR w/ MV clip (4/17/2024), severe TVR. PHTN, PAF, SSS s/p PPM (2018), recent progression to ESRD requiring HD (5/2024), AAA dissection s/p repair(12/2015), idiopathic hypotension, Vetigo, lumbar stenosis , who was admitted on 7/19/2024 from Home with a diagnosis of Acute/chronic CHF, Hypoxic respiratory failure and RLE cellulitis.     Course of hospitalization-Urine CX + enterococcus.   7/22-creatinine of 2.77, BUN 68.    ECHO EF 55-60%. LA, RV and RA severely dilated, MV w/ mod regurg but may be underestimated sevreity. TV w/ severe regurg, +severe pulmonary HTN, PV mild regurg.     Patient w/ very poor intake/nutrition, reports having no appetite.Weight loss.    7/25-creatinine of 3.85, BUN 81 . Nutritional intake remains very poor.  Per PT note- slowly progressing towards goals, max assist w/ transfer supine>sit on side of bed, sit>stand w/ RW and mod assist.  Retroperitoneal ultrasound-found medical renal disease without hydronephrosis.    7/26- Creatinine up 4.10  7/28-Creatinine up 5.03, BUN 92, calcium 10.3  7/29-patient noted to be more lethargic, BP soft, mildly tachycardic at 106 bpm    Prior hospitalizations:    7/18- Recent call to PCP, family concerned she is not eating or drinking, thinks she may be depressed, was started on mirtazapine.    4/26-5/13/2024- Hypoxia, Afib w/ RVR, ADOLFO on CKD s/p temporary HD, acute/chronic CHF, N/V from gastro enteritis,   Per PT-Min assist sit to stand and ambulation 16ft, 
Patient displaying decreased appetite and was placed on minced moist diet this afternoon because she is struggling to swallow her meals.   
Patient has a visitor at the bedside she approved for visit. Patient is alert and oriented at this time, so capable of making decisions for herself at this time  
Physical Therapy     Chart reviewed in prep for skilled PT treatment. Chart indicates pt planned for discharge home with hospice services today. PT signing off.     Thank you,  MIGUELINA LOMBARDO, PT, DPT   
Physical Therapy     Chart reviewed. Per RN, pt with decline in medical status, hospice and palliative discussions ongoing at this time. PT deferred at this time, will continue to follow as medically appropriate and available.     Thank you,  MIGUELINA LOMBARDO, PT, DPT   
Physical Therapy    Attempted PT treatment. Pt sleeping, unable to stay awake for participation in session. Spoke with RN who notes pt very fatigued today, not eating or drinking. Will con't to follow as appropriate.    Norma Reza, PT, MPT  
Pt admitted earlier for CHF exa and LE cellulitis  Cont diuresis as BP allows  On midodrine for low BP, will increase the dose, cont Bumex w holding parameters    Cont zosyn for cellulitis, hold vanco given renal function, check MRSA screen, pt not septic, no fever/leukocytosis   Start probiotics   On digoxin and eliquis (renal dose) for A Fib, check Dig level.  If renal function gets worse then call Nephro, most recent Cr is 2.5-2.7  Hb stable, no need for Epogen currently   INR elevated, add probiotics and one dose of Vit D, probably nutritional deficiency of Vit K on top of eliquis use  Low BP when working w PT, midodrine dose doubled   Will call the son    Cont rest of care as per H&P  Aurea Bell MD  Non billable progress note  
Pt continues to sleep today. She has been declining medications all day. Son has been made aware.   
SpO2 dropped to 61% and patient presents with lethargy. Nonrebreather applied and SpO2 recovered to 92%. Pt is now on 10L NC and SpO2 is in the 80's. MD and palliative notified that the patient is decompensating. No new orders at this time. Update: pulse ox changed and pt's SpO2 at 98% on 2L NC.  
Spiritual Care Assessment/Progress Note  [facility]    Name: Azucena Myrick MRN: [id number]    Age: 78 y.o.     Sex: female   Language: English     Date: 7/22/2024            Total Time Calculated: 10 min              Spiritual Assessment begun in [facility] 2 CARDIAC MEDICAL STEP DOWN  Service Provided For: Patient not available  Referral/Consult From: Rounding  Encounter Overview/Reason: Attempted Encounter    Spiritual beliefs:      [] Involved in a osmin tradition/spiritual practice:      [] Supported by a osmin community:      [] Claims no spiritual orientation:      [] Seeking spiritual identity:           [] Adheres to an individual form of spirituality:      [x] Not able to assess:                Identified resources for coping and support system:   Support System: Children       [] Prayer                  [] Devotional reading               [] Music                  [] Guided Imagery     [] Pet visits                                        [] Other: (COMMENT)     Specific area/focus of visit   Encounter:    Crisis:    Spiritual/Emotional needs: Type: Spiritual Support  Ritual, Rites and Sacraments:    Grief, Loss, and Adjustments:    Ethics/Mediation:    Behavioral Health:    Palliative Care:    Advance Care Planning:      Plan/Referrals: Continue to visit, (comment)    Narrative:  on the floor rounding and to visit with Ms. Myrick.  attempted to  visit with her however she was receiving medical care.  talked with her nurse in reference to her medical status.    Spiritual Health Services are available 24 hours a day as requested.     Rev. MELBA Latham  [facility]   Paging Service [phone]  
Spiritual Care Assessment/Progress Note  [facility]    Name: Azucena Myrick MRN: [id number]    Age: 79 y.o.     Sex: female   Language: English     Date: 7/28/2024            Total Time Calculated: 32 min              Spiritual Assessment begun in [facility] 2 CARDIAC MEDICAL STEP DOWN  Service Provided For: Patient  Referral/Consult From: Rounding  Encounter Overview/Reason: Initial Encounter    Spiritual beliefs:      [x] Involved in a osmin tradition/spiritual practice:      [] Supported by a osmin community:      [] Claims no spiritual orientation:      [] Seeking spiritual identity:           [] Adheres to an individual form of spirituality:      [] Not able to assess:                Identified resources for coping and support system:   Support System: Children       [x] Prayer                  [] Devotional reading               [] Music                  [] Guided Imagery     [] Pet visits                                        [] Other: (COMMENT)     Specific area/focus of visit   Encounter:    Crisis:    Spiritual/Emotional needs: Type: Spiritual Support  Ritual, Rites and Sacraments:    Grief, Loss, and Adjustments:    Ethics/Mediation:    Behavioral Health:    Palliative Care:    Advance Care Planning:      Plan/Referrals: Continue to visit, (comment)    Narrative:      reviewed the patient's chart prior to the visit.  engaged the patient in conversation. Patient shared her life's challenges and joys.  provided empathetic listening and affirmed her thoughts. She shared her marriage for over forty years and she has a son. Ms. Myrick also shared her love for her osmin and family.  provided a presence, empathetic listening and prayer.   Spiritual Health Services are available 24 hours a day as requested.     Rev. MELBA Latham  [facility]   Paging Service [phone]  
TRANSFER - IN REPORT:    Verbal report received from jenniffer pichardo on Azucena Myrick  being received from ED for routine progression of patient care      Report consisted of patient's Situation, Background, Assessment and   Recommendations(SBAR).     Information from the following report(s) Nurse Handoff Report, ED Encounter Summary, Adult Overview, MAR, Recent Results, and Cardiac Rhythm afib  was reviewed with the receiving nurse.    Opportunity for questions and clarification was provided.      Assessment completed upon patient's arrival to unit and care assumed.       End of Shift Note    Bedside shift change report given to rupert (oncoming nurse) by OTILIO BARROS RN (offgoing nurse).  Report included the following information SBAR, ED Summary, MAR, Recent Results, and Cardiac Rhythm afib/av paced    Shift worked:  7p-7a     Shift summary and any significant changes:     na     Concerns for physician to address:  na     Zone phone for oncoming shift:          Activity:     Number times ambulated in hallways past shift: 0  Number of times OOB to chair past shift: 0    Cardiac:   Cardiac Monitoring: Yes           Access:  Current line(s): PIV and HD access     Genitourinary:   Urinary status: external catheter    Respiratory:      Chronic home O2 use?: NO  Incentive spirometer at bedside: YES       GI:     Current diet:  ADULT DIET; Regular; 4 carb choices (60 gm/meal); Low Fat/Low Chol/High Fiber/2 gm Na  Passing flatus: YES  Tolerating current diet: YES       Pain Management:   Patient states pain is manageable on current regimen: YES    Skin:     Interventions: turn team and float heels    Patient Safety:  Fall Score:    Interventions: bed/chair alarm       Length of Stay:  Expected LOS: 3  Actual LOS: 0      OTILIO BARROS RN                            
Would hold bumetanide until serum chemistries for renal function and electrolyte levels can be obtained  
Aurea SONG MD   10 mg at 07/23/24 1630    lactulose (CHRONULAC) 10 GM/15ML solution 20 g  20 g Oral BID PRN Aurea Bell MD   20 g at 07/23/24 1630    balsum peru-castor oil (VENELEX) ointment   Topical BID Pino Castellanos MD   Given at 07/27/24 0928    sodium chloride flush 0.9 % injection 5-40 mL  5-40 mL IntraVENous 2 times per day Rubin Muro APRN - CNP   10 mL at 07/27/24 0928    sodium chloride flush 0.9 % injection 5-40 mL  5-40 mL IntraVENous PRN ShorewoodRubin valentin APRN - CNP        0.9 % sodium chloride infusion   IntraVENous PRN ShorewoodRubin valentin APRN - CNP   Stopped at 07/25/24 1028    ondansetron (ZOFRAN-ODT) disintegrating tablet 4 mg  4 mg Oral Q8H PRN ShorewoodRubin APRN - CNP        Or    ondansetron (ZOFRAN) injection 4 mg  4 mg IntraVENous Q6H PRN ShorewoodRubin valentin APRN - CNP   4 mg at 07/26/24 1427    polyethylene glycol (GLYCOLAX) packet 17 g  17 g Oral Daily PRN ShorewoodRubni valentin APRN - CNP   17 g at 07/23/24 1358    acetaminophen (TYLENOL) tablet 650 mg  650 mg Oral Q6H PRN ShorewoodRubin APRN - CNP   650 mg at 07/26/24 2006    Or    acetaminophen (TYLENOL) suppository 650 mg  650 mg Rectal Q6H PRN ShorewoodRubin APRN - CNP        ipratropium 0.5 mg-albuterol 2.5 mg (DUONEB) nebulizer solution 1 Dose  1 Dose Inhalation Q4H PRN Rubin Muro APRN - CNP        melatonin tablet 3 mg  3 mg Oral Nightly PRN ShorewoodRubin valentin APRN - CNP   3 mg at 07/26/24 2006    apixaban (ELIQUIS) tablet 2.5 mg  2.5 mg Oral BID ShorewoodRubin valentin APRN - CNP   2.5 mg at 07/27/24 0928    mirtazapine (REMERON) tablet 7.5 mg  7.5 mg Oral Nightly ShorewoodRubin APRN - CNP   7.5 mg at 07/26/24 2007    pantoprazole (PROTONIX) tablet 40 mg  40 mg Oral QAM AC ShorewoodRubin APRN - CNP   40 mg at 07/26/24 0610    pravastatin (PRAVACHOL) tablet 20 mg  20 mg Oral Daily ShorewoodRubin APRN - CNP   20 mg at 07/27/24 0927    oxyCODONE (ROXICODONE) 
Aurea SONG MD   20 g at 07/23/24 1630    balsum peru-castor oil (VENELEX) ointment   Topical BID Pino Castellanos MD   Given at 07/26/24 0909    piperacillin-tazobactam (ZOSYN) 3,375 mg in sodium chloride 0.9 % 50 mL IVPB (mini-bag)  3,375 mg IntraVENous Q12H Aurea Bell MD   Stopped at 07/26/24 0952    sodium chloride flush 0.9 % injection 5-40 mL  5-40 mL IntraVENous 2 times per day MaribelRubin valentin APRN - CNP   10 mL at 07/26/24 0909    sodium chloride flush 0.9 % injection 5-40 mL  5-40 mL IntraVENous PRN MaribelRubin valentin APRN - CNP        0.9 % sodium chloride infusion   IntraVENous PRN MaribelRubin valentin APRN - CNP   Stopped at 07/25/24 1028    ondansetron (ZOFRAN-ODT) disintegrating tablet 4 mg  4 mg Oral Q8H PRN MaribelRubin APRN - CNP        Or    ondansetron (ZOFRAN) injection 4 mg  4 mg IntraVENous Q6H PRN MaribelRubin APRN - CNP   4 mg at 07/26/24 1427    polyethylene glycol (GLYCOLAX) packet 17 g  17 g Oral Daily PRN MaribelRubin APRN - CNP   17 g at 07/23/24 1358    acetaminophen (TYLENOL) tablet 650 mg  650 mg Oral Q6H PRN MaribelRubin valentin APRN - CNP        Or    acetaminophen (TYLENOL) suppository 650 mg  650 mg Rectal Q6H PRN MaribelRubin APRN - CNP        ipratropium 0.5 mg-albuterol 2.5 mg (DUONEB) nebulizer solution 1 Dose  1 Dose Inhalation Q4H PRN MaribelRubin valentin APRN - CNP        melatonin tablet 3 mg  3 mg Oral Nightly PRN MaribelRubin APRN - CNP   3 mg at 07/21/24 2204    apixaban (ELIQUIS) tablet 2.5 mg  2.5 mg Oral BID MaribelRubin APRN - CNP   2.5 mg at 07/26/24 0909    mirtazapine (REMERON) tablet 7.5 mg  7.5 mg Oral Nightly MaribelRubin valentin APRN - CNP   7.5 mg at 07/25/24 2037    pantoprazole (PROTONIX) tablet 40 mg  40 mg Oral QAM AC MaribelRubin valentin APRN - CNP   40 mg at 07/26/24 0610    pravastatin (PRAVACHOL) tablet 20 mg  20 mg Oral Daily MaribelRubin APRN - CNP   20 mg at 07/26/24 0909    
Oral Nightly PRN    apixaban (ELIQUIS) tablet 2.5 mg  2.5 mg Oral BID    mirtazapine (REMERON) tablet 7.5 mg  7.5 mg Oral Nightly    pantoprazole (PROTONIX) tablet 40 mg  40 mg Oral QAM AC    pravastatin (PRAVACHOL) tablet 20 mg  20 mg Oral Daily    oxyCODONE (ROXICODONE) immediate release tablet 5 mg  5 mg Oral Q6H PRN    midodrine (PROAMATINE) tablet 10 mg  10 mg Oral TID WC    lactobacillus (CULTURELLE) capsule 1 capsule  1 capsule Oral Daily         Objective:      Physical Exam  Physical Exam  Constitutional:       General: She is not in acute distress.  HENT:      Head: Normocephalic and atraumatic.   Cardiovascular:      Rate and Rhythm: Tachycardia present. Rhythm irregular.   Pulmonary:      Effort: Pulmonary effort is normal.   Musculoskeletal:      Cervical back: Neck supple.   Skin:     General: Skin is dry.   Neurological:      Mental Status: She is alert.            Data Review:   Recent Labs     07/25/24  1029 07/26/24  0252   WBC 7.5 8.0   HGB 12.4 11.7   HCT 39.4 38.0    183     Recent Labs     07/24/24  1156 07/25/24  1029 07/26/24  0252    136 136   K 4.0 4.4 4.6   CL 96* 95* 95*   CO2 34* 29 30   BUN 76* 81* 85*   CREATININE 3.45* 3.85* 4.10*   MG 2.5*  --   --    PHOS 4.5  --   --      Telemetry: Atrial fibrillation in the 100s  Assessment:   79-year-old patient with worsening renal failure    We have been holding diuretics although renal function continues to worsen.  Also noted extreme exhaustion and deconditioning.  Given patient's advanced age, worsening renal function and marked weakness, consider discussing goals of care.  Consider comfort care only.    Thank you for allowing us to participate in the care of this patient.  Feel free to reach back out if we could be of any further assistance.    Maddy Chung MD    
significant changes:     Patient blood pressure is still soft. No other significant changes.     Concerns for physician to address:  none     Zone phone for oncoming shift:   [phone]       Activity:     Number times ambulated in hallways past shift: 0  Number of times OOB to chair past shift: 1    Cardiac:   Cardiac Monitoring: Yes           Access:  Current line(s): PIV     Genitourinary:   Urinary status: voiding and external catheter    Respiratory:        Incentive spirometer at bedside: NO       GI:     Current diet:  ADULT DIET; Regular; Low Sodium (2 gm); vanilla ensure  ADULT ORAL NUTRITION SUPPLEMENT; Breakfast, Dinner; Standard High Calorie/High Protein Oral Supplement  ADULT ORAL NUTRITION SUPPLEMENT; Lunch; Frozen Oral Supplement  DIET ONE TIME MESSAGE;  DIET ONE TIME MESSAGE;  Passing flatus: YES  Tolerating current diet: YES       Pain Management:   Patient states pain is manageable on current regimen: YES    Skin:     Interventions: float heels, increase time out of bed, PT/OT consult, and internal/external urinary devices    Patient Safety:  Fall Score:    Interventions: bed/chair alarm and gripper socks       Length of Stay:  Expected LOS: 11  Actual LOS: 6      Merly Velasco RN                              
solution 1 Dose  1 Dose Inhalation Q4H PRN    melatonin tablet 3 mg  3 mg Oral Nightly PRN    apixaban (ELIQUIS) tablet 2.5 mg  2.5 mg Oral BID    mirtazapine (REMERON) tablet 7.5 mg  7.5 mg Oral Nightly    pantoprazole (PROTONIX) tablet 40 mg  40 mg Oral QAM AC    pravastatin (PRAVACHOL) tablet 20 mg  20 mg Oral Daily    oxyCODONE (ROXICODONE) immediate release tablet 5 mg  5 mg Oral Q6H PRN    midodrine (PROAMATINE) tablet 10 mg  10 mg Oral TID WC    lactobacillus (CULTURELLE) capsule 1 capsule  1 capsule Oral Daily         Objective:      Physical Exam  Physical Exam  Constitutional:       General: She is not in acute distress.  Neck:      Vascular: No JVD.   Cardiovascular:      Rate and Rhythm: Normal rate and regular rhythm.   Pulmonary:      Effort: Pulmonary effort is normal.   Musculoskeletal:      Cervical back: Neck supple.   Skin:     General: Skin is dry.   Neurological:      Mental Status: She is alert.            Data Review:   Recent Labs     07/25/24  1029   WBC 7.5   HGB 12.4   HCT 39.4        Recent Labs     07/23/24  0507 07/24/24  1156 07/25/24  1029    137 136   K 3.5 4.0 4.4    96* 95*   CO2 28 34* 29   BUN 69* 76* 81*   CREATININE 2.58* 3.45* 3.85*   MG  --  2.5*  --    PHOS  --  4.5  --        Assessment:   79-year-old patient with heart failure    Acute systolic heart failure, acute renal failure, chronic kidney disease: Currently holding diuretics.  Can likely resume oral diuretic tomorrow.  Recommend torsemide 40 mg daily.    Mitral regurgitation, status post MitraClip placement April 2024    history of type a aortic dissection 2015 post emergent surgical intervention with residual arch and descending aorta dissection (dissection goes to right subclavian, one of the kidneys fills from the false lumen), atrial fibrillation on Eliquis, tachy-abbie status post permanent pacemaker (2023), atrial septal defect, right ventricle dysfunction, normal coronary angiography in 
Collected: 07/21/24 0630    Order Status: Completed Specimen: Nares Updated: 07/22/24 1511     Special Requests NO SPECIAL REQUESTS        Culture MRSA NOT PRESENT               Screening of patient nares for MRSA is for surveillance purposes and, if positive, to facilitate isolation considerations in high risk settings. It is not intended for automatic decolonization interventions per se as regimens are not sufficiently effective to warrant routine use.      Culture, Urine [[id number]]  (Abnormal)  (Susceptibility) Collected: 07/21/24 0630    Order Status: Completed Specimen: Urine Updated: 07/23/24 1143     Special Requests --        NO SPECIAL REQUESTS  Reflexed from [id number]       [place] count --        [id number]  COLONIES/mL       Culture Enterococcus faecalis       Susceptibility        Enterococcus faecalis      BACTERIAL SUSCEPTIBILITY PANEL JOSETTE      ampicillin <=2 ug/mL Sensitive      ciprofloxacin 1 ug/mL Sensitive      DAPTOmycin 4 ug/mL Sensitive      levofloxacin 1 ug/mL Sensitive      linezolid 2 ug/mL Sensitive      nitrofurantoin <=16 ug/mL Sensitive      tetracycline >=16 ug/mL Resistant      vancomycin 1 ug/mL Sensitive                           Culture, Blood 1 [[id number]] Collected: 07/19/24 2113    Order Status: Completed Specimen: Blood Updated: 07/23/24 0649     Special Requests --        NO SPECIAL REQUESTS  LEFT  Antecubital       Culture NO GROWTH 4 DAYS       Culture, Blood 2 [[id number]] Collected: 07/19/24 2055    Order Status: Completed Specimen: Blood Updated: 07/23/24 0649     Special Requests --        RIGHT  Antecubital       Culture NO GROWTH 4 DAYS             ECHO:   04/11/24    ECHO (TTE) COMPLETE (PRN CONTRAST/BUBBLE/STRAIN/3D) 04/18/2024 11:51 AM (Final)    Interpretation Summary    Left Ventricle: Low normal left ventricular systolic function with a visually estimated EF of 50 - 55%. Not well visualized. Left ventricle size is normal. Increased wall thickness. Normal wall 
Left Atrium: Left atrium is severely dilated.    Interatrial Septum: ASD occluder device is well-seated    IVC/SVC: IVC diameter is greater than 21 mm and decreases less than 50% during inspiration; therefore the estimated right atrial pressure is elevated (~15 mmHg).    Image quality is adequate.    Signed by: Maddy Chung MD on 4/18/2024 11:51 AM    Procedures: see electronic medical records for all procedures/Xrays and details which were not copied into this note but were reviewed prior to creation of Plan.    Reviewed most current lab test results and cultures  YES  Reviewed most current radiology test results   YES  Review and summation of old records today    NO  Reviewed patient's current orders and MAR    YES  PMH/ reviewed - no change compared to H&P    ________________________________________________________________________      Total NON critical care TIME:  Minutes      Total CRITICAL CARE TIME Spent:   Minutes non procedure based          Comments   >50% of visit spent in counseling and coordination of care x    ________________________________________________________________________        Signed: Pino Castellanos MD              
decolonization interventions per se as regimens are not sufficiently effective to warrant routine use.      Culture, Urine [[id number]]  (Abnormal)  (Susceptibility) Collected: 07/21/24 0630    Order Status: Completed Specimen: Urine Updated: 07/23/24 1143     Special Requests --        NO SPECIAL REQUESTS  Reflexed from [id number]       [employer] count --        [address]  COLONIES/mL       Culture Enterococcus faecalis       Susceptibility        Enterococcus faecalis      BACTERIAL SUSCEPTIBILITY PANEL JOSETTE      ampicillin <=2 ug/mL Sensitive      ciprofloxacin 1 ug/mL Sensitive      DAPTOmycin 4 ug/mL Sensitive      levofloxacin 1 ug/mL Sensitive      linezolid 2 ug/mL Sensitive      nitrofurantoin <=16 ug/mL Sensitive      tetracycline >=16 ug/mL Resistant      vancomycin 1 ug/mL Sensitive                           Culture, Blood 1 [[id number]] Collected: 07/19/24 2113    Order Status: Completed Specimen: Blood Updated: 07/25/24 0552     Special Requests --        NO SPECIAL REQUESTS  LEFT  Antecubital       Culture NO GROWTH 6 DAYS       Culture, Blood 2 [[id number]] Collected: 07/19/24 2055    Order Status: Completed Specimen: Blood Updated: 07/25/24 0552     Special Requests --        RIGHT  Antecubital       Culture NO GROWTH 6 DAYS             ECHO:   04/11/24    ECHO (TTE) COMPLETE (PRN CONTRAST/BUBBLE/STRAIN/3D) 04/18/2024 11:51 AM (Final)    Interpretation Summary    Left Ventricle: Low normal left ventricular systolic function with a visually estimated EF of 50 - 55%. Not well visualized. Left ventricle size is normal. Increased wall thickness. Normal wall motion.    Right Ventricle: Mildly reduced systolic function.    Mitral Valve: Valve repaired by MitraClip. MV mean gradient is 6 mmHg. Mild regurgitation. MV mean gradient is 6 mmHg.    Tricuspid Valve: Mildly elevated RVSP, consistent with mild pulmonary hypertension.    Pulmonic Valve: Moderate regurgitation.    Left Atrium: Left atrium is severely 
diameter is greater than 21 mm and decreases less than 50% during inspiration; therefore the estimated right atrial pressure is elevated (~15 mmHg).    Image quality is adequate.    Signed by: Maddy Chung MD on 4/18/2024 11:51 AM    Procedures: see electronic medical records for all procedures/Xrays and details which were not copied into this note but were reviewed prior to creation of Plan.    Reviewed most current lab test results and cultures  YES  Reviewed most current radiology test results   YES  Review and summation of old records today    NO  Reviewed patient's current orders and MAR    YES  PMH/ reviewed - no change compared to H&P    ________________________________________________________________________      Total NON critical care TIME:  Minutes      Total CRITICAL CARE TIME Spent:   Minutes non procedure based          Comments   >50% of visit spent in counseling and coordination of care x    ________________________________________________________________________        Signed: Maria E Kilgore MD

## 2024-08-01 ENCOUNTER — CARE COORDINATION (OUTPATIENT)
Facility: CLINIC | Age: 79
End: 2024-08-01

## 2024-08-01 ENCOUNTER — TELEPHONE (OUTPATIENT)
Facility: CLINIC | Age: 79
End: 2024-08-01

## 2024-08-01 DIAGNOSIS — I10 ESSENTIAL HYPERTENSION: Primary | ICD-10-CM

## 2024-08-01 DIAGNOSIS — I50.9 ACUTE ON CHRONIC HEART FAILURE, UNSPECIFIED HEART FAILURE TYPE (HCC): ICD-10-CM

## 2024-08-01 NOTE — TELEPHONE ENCOUNTER
Eli with Hospice called to see if Dr Mendoza will follow this patient its okay to leave a message [phone]

## 2024-08-01 NOTE — PROGRESS NOTES
Remote Patient Order Discontinued    Received request from Kenya Hoffmann RN   to discontinue order for remote patient monitoring of CHF and HTN and order completed. Patient has been admitted to hospice.

## 2024-08-01 NOTE — CARE COORDINATION
Patient Azucena Myrick  08/01/24     Care Coordination  placed call to patient to arrange RPM kit  through [employer]. Left HIPAA Compliant Message     provided return and how to pack equipment in original packing via the patients voicemail if available and provided call back number should patient have questions.    Patient made aware [employer] will  equipment in 2-4 days.

## (undated) DEVICE — HEART CATH-MRMC: Brand: MEDLINE INDUSTRIES, INC.

## (undated) DEVICE — SYR ASSEMB INFL BLLN 60ML --

## (undated) DEVICE — ESOPHAGEAL BALLOON DILATATION CATHETER: Brand: CRE FIXED WIRE

## (undated) DEVICE — GLIDESHEATH SLENDER ACCESS KIT: Brand: GLIDESHEATH SLENDER

## (undated) DEVICE — MEDI-VAC YANK SUCT HNDL W/TPRD BULBOUS TIP: Brand: CARDINAL HEALTH

## (undated) DEVICE — HI-TORQUE VERSACORE FLOPPY GUIDE WIRE SYSTEM 145 CM: Brand: HI-TORQUE VERSACORE

## (undated) DEVICE — (D)SYR 10ML 1/5ML GRAD NSAF -- PKGING CHANGE USE ITEM 338027

## (undated) DEVICE — GUIDEWIRE VASC L260CM DIA0.035IN BRECKER FOR COREVLV

## (undated) DEVICE — 3M™ TEGADERM™ TRANSPARENT FILM DRESSING FRAME STYLE, 1626W, 4 IN X 4-3/4 IN (10 CM X 12 CM), 50/CT 4CT/CASE: Brand: 3M™ TEGADERM™

## (undated) DEVICE — SWAN-GANZ TRUE SIZE THERMODULTION CATHETER, 5F: Brand: SWAN-GANZ TRUE SIZE

## (undated) DEVICE — BLOCK BITE ENDOSCP AD 21 MM W/ DIL BLU LF DISP

## (undated) DEVICE — STRAINER URIN CALC RNL MSH -- CONVERT TO ITEM 357634

## (undated) DEVICE — FORCEPS BX L160CM DIA8MM GRSP DISECT CUP TIP NONLOCKING ROT

## (undated) DEVICE — CATHETER PRESSURE WEDGE BLLN 6FRX110CM

## (undated) DEVICE — SYRINGE 50ML E/T

## (undated) DEVICE — SET ADMIN 16ML TBNG L100IN 2 Y INJ SITE IV PIGGY BK DISP

## (undated) DEVICE — PROVE COVER: Brand: UNBRANDED

## (undated) DEVICE — Device: Brand: MEDEX

## (undated) DEVICE — TR BAND RADIAL ARTERY COMPRESSION DEVICE: Brand: TR BAND

## (undated) DEVICE — PERCLOSE™ PROSTYLE™ SUTURE-MEDIATED CLOSURE AND REPAIR SYSTEM: Brand: PERCLOSE™ PROSTYLE™

## (undated) DEVICE — CATHETER 5FR JR4 MEDTRONIC 100CM

## (undated) DEVICE — 1200 GUARD II KIT W/5MM TUBE W/O VAC TUBE: Brand: GUARDIAN

## (undated) DEVICE — TOWEL 4 PLY TISS 19X30 SUE WHT

## (undated) DEVICE — DRAPE OPHTH 4 PLY SGL FEN

## (undated) DEVICE — SYR MED 10ML FIX M LT BLU -- MEDALLION

## (undated) DEVICE — DRESSING HEMOSTATIC INTVENT W/O SLT QUIKCLOT

## (undated) DEVICE — Device

## (undated) DEVICE — PACK PROCEDURE SURG HRT CATH

## (undated) DEVICE — RADIFOCUS OPTITORQUE ANGIOGRAPHIC CATHETER: Brand: OPTITORQUE

## (undated) DEVICE — KIT PTCA HEMSTAS VLV L BOR W/ MTL WIRE INTRO TORQ DEV ACCS

## (undated) DEVICE — INTENDED FOR TISSUE SEPARATION, AND OTHER PROCEDURES THAT REQUIRE A SHARP SURGICAL BLADE TO PUNCTURE OR CUT.: Brand: BARD-PARKER ®  SAFETY SCALPED

## (undated) DEVICE — BASIN EMESIS 500CC ROSE 250/CS 60/PLT: Brand: MEDEGEN MEDICAL PRODUCTS, LLC

## (undated) DEVICE — KIT ACCS INTRO 4FR L10CM NDL 21GA L7CM GWIRE L40CM

## (undated) DEVICE — SPLINT WR POS F/ARTERIAL ACC -- BX/10

## (undated) DEVICE — SC 3W HP RA OFF NB - PG: Brand: NAMIC

## (undated) DEVICE — CATH IV AUTOGRD BC BLU 22GA 25 -- INSYTE

## (undated) DEVICE — NEEDLE HYPO 18GA L1.5IN PNK S STL HUB POLYPR SHLD REG BVL

## (undated) DEVICE — TRAP SUC MUCOUS 70ML -- MEDICHOICE MEDLINE

## (undated) DEVICE — PRESSURE MONITORING SET: Brand: TRUWAVE

## (undated) DEVICE — GUIDEWIRE VASC L145CM 0.035IN J TIP L3MM PTFE FIX COR NAMIC

## (undated) DEVICE — SYR 3ML LL TIP 1/10ML GRAD --

## (undated) DEVICE — HI-TORQUE SPARTACORE 14 PERIPHERAL GUIDE WIRE .014 5.0 CM X 190 CM: Brand: SPARTACORE

## (undated) DEVICE — KENDALL RADIOLUCENT FOAM MONITORING ELECTRODE RECTANGULAR SHAPE: Brand: KENDALL

## (undated) DEVICE — SOLIDIFIER MEDC 1200ML -- CONVERT TO 356117

## (undated) DEVICE — BAG SPEC BIOHZRD 10 X 10 IN --

## (undated) DEVICE — PINNACLE INTRODUCER SHEATH: Brand: PINNACLE

## (undated) DEVICE — GDWIRE FIX COR J 3MM 0.035X260 --

## (undated) DEVICE — SNARE ENDOSCP M L240CM W27MM SHTH DIA2.4MM CHN 2.8MM OVL

## (undated) DEVICE — HI-TORQUE SUPRA CORE .035 PERIPHERAL GUIDE WIRE .035 X 190 CM: Brand: HI-TORQUE SUPRA CORE

## (undated) DEVICE — SHEATH INTRO 26FR L33CM OD9.5MM ID8.7MM HYDRPHLC KINK

## (undated) DEVICE — CONTAINER SPEC 20 ML LID NEUT BUFF FORMALIN 10 % POLYPR STS

## (undated) DEVICE — NON-REM POLYHESIVE PATIENT RETURN ELECTRODE: Brand: VALLEYLAB

## (undated) DEVICE — Z DISCONTINUED PER MEDLINE LINE GAS SAMPLING O2/CO2 LNG AD 13 FT NSL W/ TBNG FILTERLINE

## (undated) DEVICE — TUBING PRSS MON L6IN PVC M FEM CONN